# Patient Record
Sex: MALE | Race: OTHER | NOT HISPANIC OR LATINO | URBAN - METROPOLITAN AREA
[De-identification: names, ages, dates, MRNs, and addresses within clinical notes are randomized per-mention and may not be internally consistent; named-entity substitution may affect disease eponyms.]

---

## 2020-11-12 ENCOUNTER — LAB (OUTPATIENT)
Dept: LAB | Facility: CLINIC | Age: 80
End: 2020-11-12
Payer: COMMERCIAL

## 2020-11-12 ENCOUNTER — TRANSCRIBE ORDERS (OUTPATIENT)
Dept: LAB | Facility: CLINIC | Age: 80
End: 2020-11-12

## 2020-11-12 DIAGNOSIS — C61 MALIGNANT NEOPLASM OF PROSTATE (HCC): Primary | ICD-10-CM

## 2020-11-12 DIAGNOSIS — E06.3 CHRONIC LYMPHOCYTIC THYROIDITIS: ICD-10-CM

## 2020-11-12 DIAGNOSIS — E03.8 HYPOTHYROIDISM DUE TO HASHIMOTO'S THYROIDITIS: ICD-10-CM

## 2020-11-12 DIAGNOSIS — E06.3 HYPOTHYROIDISM DUE TO HASHIMOTO'S THYROIDITIS: ICD-10-CM

## 2020-11-12 DIAGNOSIS — E55.9 VITAMIN D DEFICIENCY: ICD-10-CM

## 2020-11-12 DIAGNOSIS — C61 MALIGNANT NEOPLASM OF PROSTATE (HCC): ICD-10-CM

## 2020-11-12 LAB
25(OH)D3 SERPL-MCNC: 43.6 NG/ML (ref 30–100)
ALBUMIN SERPL BCP-MCNC: 3.1 G/DL (ref 3.5–5)
ALP SERPL-CCNC: 60 U/L (ref 46–116)
ALT SERPL W P-5'-P-CCNC: 23 U/L (ref 12–78)
ANION GAP SERPL CALCULATED.3IONS-SCNC: 4 MMOL/L (ref 4–13)
AST SERPL W P-5'-P-CCNC: 17 U/L (ref 5–45)
BASOPHILS # BLD AUTO: 0.05 THOUSANDS/ΜL (ref 0–0.1)
BASOPHILS NFR BLD AUTO: 1 % (ref 0–1)
BILIRUB SERPL-MCNC: 0.96 MG/DL (ref 0.2–1)
BUN SERPL-MCNC: 10 MG/DL (ref 5–25)
CALCIUM ALBUM COR SERPL-MCNC: 9.1 MG/DL (ref 8.3–10.1)
CALCIUM SERPL-MCNC: 8.4 MG/DL (ref 8.3–10.1)
CHLORIDE SERPL-SCNC: 105 MMOL/L (ref 100–108)
CO2 SERPL-SCNC: 31 MMOL/L (ref 21–32)
CREAT SERPL-MCNC: 0.76 MG/DL (ref 0.6–1.3)
EOSINOPHIL # BLD AUTO: 0.28 THOUSAND/ΜL (ref 0–0.61)
EOSINOPHIL NFR BLD AUTO: 4 % (ref 0–6)
ERYTHROCYTE [DISTWIDTH] IN BLOOD BY AUTOMATED COUNT: 13.4 % (ref 11.6–15.1)
GFR SERPL CREATININE-BSD FRML MDRD: 85 ML/MIN/1.73SQ M
GLUCOSE P FAST SERPL-MCNC: 88 MG/DL (ref 65–99)
HCT VFR BLD AUTO: 39.4 % (ref 36.5–49.3)
HGB BLD-MCNC: 12.5 G/DL (ref 12–17)
IMM GRANULOCYTES # BLD AUTO: 0.02 THOUSAND/UL (ref 0–0.2)
IMM GRANULOCYTES NFR BLD AUTO: 0 % (ref 0–2)
LYMPHOCYTES # BLD AUTO: 2.25 THOUSANDS/ΜL (ref 0.6–4.47)
LYMPHOCYTES NFR BLD AUTO: 31 % (ref 14–44)
MCH RBC QN AUTO: 28.8 PG (ref 26.8–34.3)
MCHC RBC AUTO-ENTMCNC: 31.7 G/DL (ref 31.4–37.4)
MCV RBC AUTO: 91 FL (ref 82–98)
MONOCYTES # BLD AUTO: 0.72 THOUSAND/ΜL (ref 0.17–1.22)
MONOCYTES NFR BLD AUTO: 10 % (ref 4–12)
NEUTROPHILS # BLD AUTO: 3.99 THOUSANDS/ΜL (ref 1.85–7.62)
NEUTS SEG NFR BLD AUTO: 54 % (ref 43–75)
NRBC BLD AUTO-RTO: 0 /100 WBCS
PLATELET # BLD AUTO: 275 THOUSANDS/UL (ref 149–390)
PMV BLD AUTO: 9.9 FL (ref 8.9–12.7)
POTASSIUM SERPL-SCNC: 3.5 MMOL/L (ref 3.5–5.3)
PROT SERPL-MCNC: 6.5 G/DL (ref 6.4–8.2)
PSA SERPL-MCNC: 0.2 NG/ML (ref 0–4)
RBC # BLD AUTO: 4.34 MILLION/UL (ref 3.88–5.62)
SODIUM SERPL-SCNC: 140 MMOL/L (ref 136–145)
T4 FREE SERPL-MCNC: 1 NG/DL (ref 0.76–1.46)
TSH SERPL DL<=0.05 MIU/L-ACNC: 1.71 UIU/ML (ref 0.36–3.74)
WBC # BLD AUTO: 7.31 THOUSAND/UL (ref 4.31–10.16)

## 2020-11-12 PROCEDURE — 80053 COMPREHEN METABOLIC PANEL: CPT

## 2020-11-12 PROCEDURE — 84443 ASSAY THYROID STIM HORMONE: CPT

## 2020-11-12 PROCEDURE — 84153 ASSAY OF PSA TOTAL: CPT

## 2020-11-12 PROCEDURE — 84439 ASSAY OF FREE THYROXINE: CPT

## 2020-11-12 PROCEDURE — 36415 COLL VENOUS BLD VENIPUNCTURE: CPT

## 2020-11-12 PROCEDURE — 82306 VITAMIN D 25 HYDROXY: CPT

## 2020-11-12 PROCEDURE — 85025 COMPLETE CBC W/AUTO DIFF WBC: CPT

## 2021-02-12 ENCOUNTER — IMMUNIZATIONS (OUTPATIENT)
Dept: FAMILY MEDICINE CLINIC | Facility: HOSPITAL | Age: 81
End: 2021-02-12

## 2021-02-12 DIAGNOSIS — Z23 ENCOUNTER FOR IMMUNIZATION: Primary | ICD-10-CM

## 2021-02-12 PROCEDURE — 0011A SARS-COV-2 / COVID-19 MRNA VACCINE (MODERNA) 100 MCG: CPT

## 2021-02-12 PROCEDURE — 91301 SARS-COV-2 / COVID-19 MRNA VACCINE (MODERNA) 100 MCG: CPT

## 2021-03-15 ENCOUNTER — IMMUNIZATIONS (OUTPATIENT)
Dept: FAMILY MEDICINE CLINIC | Facility: HOSPITAL | Age: 81
End: 2021-03-15

## 2021-03-15 DIAGNOSIS — Z23 ENCOUNTER FOR IMMUNIZATION: Primary | ICD-10-CM

## 2021-03-15 PROCEDURE — 91301 SARS-COV-2 / COVID-19 MRNA VACCINE (MODERNA) 100 MCG: CPT

## 2021-03-15 PROCEDURE — 0012A SARS-COV-2 / COVID-19 MRNA VACCINE (MODERNA) 100 MCG: CPT

## 2022-09-14 ENCOUNTER — APPOINTMENT (OUTPATIENT)
Dept: LAB | Facility: CLINIC | Age: 82
End: 2022-09-14
Payer: COMMERCIAL

## 2022-09-14 DIAGNOSIS — E55.9 AVITAMINOSIS D: ICD-10-CM

## 2022-09-14 DIAGNOSIS — E05.00 TOXIC DIFFUSE GOITER WITH PRETIBIAL MYXEDEMA: ICD-10-CM

## 2022-09-14 DIAGNOSIS — R73.01 IMPAIRED FASTING GLUCOSE: ICD-10-CM

## 2022-09-14 DIAGNOSIS — I10 ESSENTIAL HYPERTENSION, MALIGNANT: ICD-10-CM

## 2022-09-14 DIAGNOSIS — E03.8 TOXIC DIFFUSE GOITER WITH PRETIBIAL MYXEDEMA: ICD-10-CM

## 2022-09-14 DIAGNOSIS — Z83.3 FAMILY HISTORY OF DIABETES MELLITUS: ICD-10-CM

## 2022-09-14 DIAGNOSIS — E06.3 CHRONIC LYMPHOCYTIC THYROIDITIS: ICD-10-CM

## 2022-09-14 LAB
25(OH)D3 SERPL-MCNC: 66.9 NG/ML (ref 30–100)
ALBUMIN SERPL BCP-MCNC: 3 G/DL (ref 3.5–5)
ALP SERPL-CCNC: 63 U/L (ref 46–116)
ALT SERPL W P-5'-P-CCNC: 23 U/L (ref 12–78)
ANION GAP SERPL CALCULATED.3IONS-SCNC: 3 MMOL/L (ref 4–13)
AST SERPL W P-5'-P-CCNC: 17 U/L (ref 5–45)
BASOPHILS # BLD AUTO: 0.04 THOUSANDS/ΜL (ref 0–0.1)
BASOPHILS NFR BLD AUTO: 1 % (ref 0–1)
BILIRUB SERPL-MCNC: 1.22 MG/DL (ref 0.2–1)
BUN SERPL-MCNC: 11 MG/DL (ref 5–25)
CALCIUM ALBUM COR SERPL-MCNC: 9.2 MG/DL (ref 8.3–10.1)
CALCIUM SERPL-MCNC: 8.4 MG/DL (ref 8.3–10.1)
CHLORIDE SERPL-SCNC: 106 MMOL/L (ref 96–108)
CHOLEST SERPL-MCNC: 189 MG/DL
CO2 SERPL-SCNC: 31 MMOL/L (ref 21–32)
CREAT SERPL-MCNC: 0.78 MG/DL (ref 0.6–1.3)
EOSINOPHIL # BLD AUTO: 0.23 THOUSAND/ΜL (ref 0–0.61)
EOSINOPHIL NFR BLD AUTO: 3 % (ref 0–6)
ERYTHROCYTE [DISTWIDTH] IN BLOOD BY AUTOMATED COUNT: 13.3 % (ref 11.6–15.1)
GFR SERPL CREATININE-BSD FRML MDRD: 82 ML/MIN/1.73SQ M
GLUCOSE P FAST SERPL-MCNC: 90 MG/DL (ref 65–99)
HCT VFR BLD AUTO: 40.9 % (ref 36.5–49.3)
HDLC SERPL-MCNC: 46 MG/DL
HGB BLD-MCNC: 13.4 G/DL (ref 12–17)
IMM GRANULOCYTES # BLD AUTO: 0.01 THOUSAND/UL (ref 0–0.2)
IMM GRANULOCYTES NFR BLD AUTO: 0 % (ref 0–2)
LDLC SERPL CALC-MCNC: 107 MG/DL (ref 0–100)
LYMPHOCYTES # BLD AUTO: 2.52 THOUSANDS/ΜL (ref 0.6–4.47)
LYMPHOCYTES NFR BLD AUTO: 36 % (ref 14–44)
MCH RBC QN AUTO: 28.9 PG (ref 26.8–34.3)
MCHC RBC AUTO-ENTMCNC: 32.8 G/DL (ref 31.4–37.4)
MCV RBC AUTO: 88 FL (ref 82–98)
MONOCYTES # BLD AUTO: 0.83 THOUSAND/ΜL (ref 0.17–1.22)
MONOCYTES NFR BLD AUTO: 12 % (ref 4–12)
NEUTROPHILS # BLD AUTO: 3.42 THOUSANDS/ΜL (ref 1.85–7.62)
NEUTS SEG NFR BLD AUTO: 48 % (ref 43–75)
NONHDLC SERPL-MCNC: 143 MG/DL
NRBC BLD AUTO-RTO: 0 /100 WBCS
PLATELET # BLD AUTO: 225 THOUSANDS/UL (ref 149–390)
PMV BLD AUTO: 10.2 FL (ref 8.9–12.7)
POTASSIUM SERPL-SCNC: 3.7 MMOL/L (ref 3.5–5.3)
PROT SERPL-MCNC: 6.9 G/DL (ref 6.4–8.4)
RBC # BLD AUTO: 4.63 MILLION/UL (ref 3.88–5.62)
SODIUM SERPL-SCNC: 140 MMOL/L (ref 135–147)
T4 FREE SERPL-MCNC: 1.09 NG/DL (ref 0.76–1.46)
TRIGL SERPL-MCNC: 180 MG/DL
TSH SERPL DL<=0.05 MIU/L-ACNC: 0.66 UIU/ML (ref 0.45–4.5)
WBC # BLD AUTO: 7.05 THOUSAND/UL (ref 4.31–10.16)

## 2022-09-14 PROCEDURE — 83036 HEMOGLOBIN GLYCOSYLATED A1C: CPT

## 2022-09-14 PROCEDURE — 80053 COMPREHEN METABOLIC PANEL: CPT

## 2022-09-14 PROCEDURE — 82306 VITAMIN D 25 HYDROXY: CPT

## 2022-09-14 PROCEDURE — 80061 LIPID PANEL: CPT

## 2022-09-14 PROCEDURE — 84439 ASSAY OF FREE THYROXINE: CPT

## 2022-09-14 PROCEDURE — 84443 ASSAY THYROID STIM HORMONE: CPT

## 2022-09-14 PROCEDURE — 85025 COMPLETE CBC W/AUTO DIFF WBC: CPT

## 2022-09-14 PROCEDURE — 36415 COLL VENOUS BLD VENIPUNCTURE: CPT

## 2022-09-15 LAB
EST. AVERAGE GLUCOSE BLD GHB EST-MCNC: 123 MG/DL
HBA1C MFR BLD: 5.9 %

## 2024-08-08 ENCOUNTER — APPOINTMENT (EMERGENCY)
Dept: CT IMAGING | Facility: HOSPITAL | Age: 84
End: 2024-08-08
Payer: COMMERCIAL

## 2024-08-08 ENCOUNTER — HOSPITAL ENCOUNTER (EMERGENCY)
Facility: HOSPITAL | Age: 84
Discharge: HOME | End: 2024-08-08
Attending: EMERGENCY MEDICINE
Payer: COMMERCIAL

## 2024-08-08 VITALS
RESPIRATION RATE: 16 BRPM | TEMPERATURE: 98.1 F | HEART RATE: 81 BPM | SYSTOLIC BLOOD PRESSURE: 177 MMHG | DIASTOLIC BLOOD PRESSURE: 90 MMHG | OXYGEN SATURATION: 98 %

## 2024-08-08 DIAGNOSIS — R31.9 HEMATURIA: ICD-10-CM

## 2024-08-08 DIAGNOSIS — M54.10 RADICULOPATHY: Primary | ICD-10-CM

## 2024-08-08 LAB
ANION GAP SERPL CALCULATED.3IONS-SCNC: 8 MMOL/L (ref 4–13)
BACTERIA UR QL AUTO: NORMAL /HPF
BASOPHILS # BLD AUTO: 0.03 THOUSANDS/ÂΜL (ref 0–0.1)
BASOPHILS NFR BLD AUTO: 0 % (ref 0–1)
BILIRUB UR QL STRIP: NEGATIVE
BUN SERPL-MCNC: 15 MG/DL (ref 5–25)
CALCIUM SERPL-MCNC: 8.6 MG/DL (ref 8.4–10.2)
CHLORIDE SERPL-SCNC: 102 MMOL/L (ref 96–108)
CLARITY UR: CLEAR
CO2 SERPL-SCNC: 31 MMOL/L (ref 21–32)
COLOR UR: YELLOW
CREAT SERPL-MCNC: 0.65 MG/DL (ref 0.6–1.3)
EOSINOPHIL # BLD AUTO: 0.35 THOUSAND/ÂΜL (ref 0–0.61)
EOSINOPHIL NFR BLD AUTO: 4 % (ref 0–6)
ERYTHROCYTE [DISTWIDTH] IN BLOOD BY AUTOMATED COUNT: 12.9 % (ref 11.6–15.1)
GFR SERPL CREATININE-BSD FRML MDRD: 88 ML/MIN/1.73SQ M
GLUCOSE SERPL-MCNC: 103 MG/DL (ref 65–140)
GLUCOSE UR STRIP-MCNC: NEGATIVE MG/DL
HCT VFR BLD AUTO: 41.2 % (ref 36.5–49.3)
HGB BLD-MCNC: 13.5 G/DL (ref 12–17)
HGB UR QL STRIP.AUTO: ABNORMAL
IMM GRANULOCYTES # BLD AUTO: 0.02 THOUSAND/UL (ref 0–0.2)
IMM GRANULOCYTES NFR BLD AUTO: 0 % (ref 0–2)
KETONES UR STRIP-MCNC: NEGATIVE MG/DL
LEUKOCYTE ESTERASE UR QL STRIP: NEGATIVE
LYMPHOCYTES # BLD AUTO: 3.46 THOUSANDS/ÂΜL (ref 0.6–4.47)
LYMPHOCYTES NFR BLD AUTO: 39 % (ref 14–44)
MCH RBC QN AUTO: 29.5 PG (ref 26.8–34.3)
MCHC RBC AUTO-ENTMCNC: 32.8 G/DL (ref 31.4–37.4)
MCV RBC AUTO: 90 FL (ref 82–98)
MONOCYTES # BLD AUTO: 1.15 THOUSAND/ÂΜL (ref 0.17–1.22)
MONOCYTES NFR BLD AUTO: 13 % (ref 4–12)
NEUTROPHILS # BLD AUTO: 3.98 THOUSANDS/ÂΜL (ref 1.85–7.62)
NEUTS SEG NFR BLD AUTO: 44 % (ref 43–75)
NITRITE UR QL STRIP: NEGATIVE
NON-SQ EPI CELLS URNS QL MICRO: NORMAL /HPF
NRBC BLD AUTO-RTO: 0 /100 WBCS
PH UR STRIP.AUTO: 7 [PH]
PLATELET # BLD AUTO: 231 THOUSANDS/UL (ref 149–390)
PMV BLD AUTO: 9.4 FL (ref 8.9–12.7)
POTASSIUM SERPL-SCNC: 3.6 MMOL/L (ref 3.5–5.3)
PROT UR STRIP-MCNC: NEGATIVE MG/DL
RBC # BLD AUTO: 4.57 MILLION/UL (ref 3.88–5.62)
RBC #/AREA URNS AUTO: NORMAL /HPF
SODIUM SERPL-SCNC: 141 MMOL/L (ref 135–147)
SP GR UR STRIP.AUTO: 1.01
UROBILINOGEN UR QL STRIP.AUTO: 0.2 E.U./DL
WBC # BLD AUTO: 8.99 THOUSAND/UL (ref 4.31–10.16)
WBC #/AREA URNS AUTO: NORMAL /HPF

## 2024-08-08 PROCEDURE — 81001 URINALYSIS AUTO W/SCOPE: CPT | Performed by: EMERGENCY MEDICINE

## 2024-08-08 PROCEDURE — 80048 BASIC METABOLIC PNL TOTAL CA: CPT | Performed by: EMERGENCY MEDICINE

## 2024-08-08 PROCEDURE — 96375 TX/PRO/DX INJ NEW DRUG ADDON: CPT

## 2024-08-08 PROCEDURE — 99283 EMERGENCY DEPT VISIT LOW MDM: CPT

## 2024-08-08 PROCEDURE — 85025 COMPLETE CBC W/AUTO DIFF WBC: CPT | Performed by: EMERGENCY MEDICINE

## 2024-08-08 PROCEDURE — 72131 CT LUMBAR SPINE W/O DYE: CPT

## 2024-08-08 PROCEDURE — 36415 COLL VENOUS BLD VENIPUNCTURE: CPT | Performed by: EMERGENCY MEDICINE

## 2024-08-08 PROCEDURE — 81003 URINALYSIS AUTO W/O SCOPE: CPT | Performed by: EMERGENCY MEDICINE

## 2024-08-08 PROCEDURE — 99285 EMERGENCY DEPT VISIT HI MDM: CPT | Performed by: EMERGENCY MEDICINE

## 2024-08-08 PROCEDURE — 74176 CT ABD & PELVIS W/O CONTRAST: CPT

## 2024-08-08 PROCEDURE — 96365 THER/PROPH/DIAG IV INF INIT: CPT

## 2024-08-08 RX ORDER — ACETAMINOPHEN 10 MG/ML
1000 INJECTION, SOLUTION INTRAVENOUS ONCE
Status: COMPLETED | OUTPATIENT
Start: 2024-08-08 | End: 2024-08-08

## 2024-08-08 RX ADMIN — ACETAMINOPHEN 1000 MG: 10 INJECTION INTRAVENOUS at 22:41

## 2024-08-08 RX ADMIN — MORPHINE SULFATE 2 MG: 2 INJECTION, SOLUTION INTRAMUSCULAR; INTRAVENOUS at 22:42

## 2024-08-09 ENCOUNTER — TELEPHONE (OUTPATIENT)
Dept: PHYSICAL THERAPY | Facility: OTHER | Age: 84
End: 2024-08-09

## 2024-08-09 NOTE — TELEPHONE ENCOUNTER
Patient's son Buck called into [facility] today and left v/m @11:12am.    Returned his call 11:40am.    Spoke with Buck (he is on communication consent and he is primarily contact for his dad). I explained program, protocol and what we offer. Gloria was not with his dad at the moment of the call. He provided Mr. Rivera home number [phone].    Called patient, he did not answer the call. I left v/m in [place] for patient to call back.    Phone number and hours of business provided.    This is the 1st attempt to reach the patient. Will defer referral per protocol.

## 2024-08-09 NOTE — ED PROVIDER NOTES
History  Chief Complaint   Patient presents with    Back Pain     Pt reports right lower back pain that radiates down the right leg. Pts son notes pt had blood in his urine today as well     HPI        All information was obtained via  at the bedside.      84-year-old male, history of adenocarcinoma the prostate presents the emergency department with his family via private vehicle with a chief complaint of a week and a half worth of left-sided back pain, confined to the L PSIS area, radiation down the posterior aspect of the thigh without any loss of bowel or bladder.  No history of trauma.  No hx of fever, chills but there is a history of some intermittent hematuria.  No history of rash, no history of testicular pain or bowel or bladder incontinence.  History of trauma.    History of back pain in which patient has had a renal cyst, followed remotely by urology at [facility].  None       Past Medical History:   Diagnosis Date    Prostate cancer (HCC)     Thyroid cancer (HCC)        No past surgical history on file.    No family history on file.  I have reviewed and agree with the history as documented.    E-Cigarette/Vaping     E-Cigarette/Vaping Substances          Review of Systems   Constitutional: Negative.  Negative for chills and fever.   HENT: Negative.     Eyes: Negative.    Respiratory: Negative.  Negative for chest tightness and shortness of breath.    Cardiovascular: Negative.  Negative for chest pain, palpitations and leg swelling.   Gastrointestinal: Negative.  Negative for abdominal pain, constipation and vomiting.   Endocrine: Negative.    Genitourinary: Negative.    Musculoskeletal:  Positive for back pain.   Skin: Negative.    Allergic/Immunologic: Negative.    Neurological: Negative.    Hematological: Negative.    Psychiatric/Behavioral: Negative.         Physical Exam  Physical Exam  Vitals and nursing note reviewed.   Constitutional:       General: He is not in acute  distress.     Appearance: Normal appearance. He is normal weight. He is not ill-appearing, toxic-appearing or diaphoretic.   HENT:      Head: Normocephalic and atraumatic.      Right Ear: External ear normal.      Left Ear: External ear normal.      Nose: Nose normal.      Mouth/Throat:      Mouth: Mucous membranes are moist.      Pharynx: Oropharynx is clear.   Eyes:      Extraocular Movements: Extraocular movements intact.      Conjunctiva/sclera: Conjunctivae normal.      Pupils: Pupils are equal, round, and reactive to light.   Neck:      Vascular: No carotid bruit.   Cardiovascular:      Rate and Rhythm: Normal rate and regular rhythm.      Pulses: Normal pulses.      Heart sounds: Normal heart sounds. No murmur heard.     No friction rub. No gallop.   Pulmonary:      Effort: Pulmonary effort is normal. No respiratory distress.      Breath sounds: Normal breath sounds. No stridor.   Abdominal:      General: Abdomen is flat. Bowel sounds are normal.   Musculoskeletal:         General: No swelling, tenderness, deformity or signs of injury. Normal range of motion.      Cervical back: Normal range of motion. No rigidity or tenderness.   Lymphadenopathy:      Cervical: No cervical adenopathy.   Skin:     General: Skin is warm.      Capillary Refill: Capillary refill takes less than 2 seconds.   Neurological:      General: No focal deficit present.      Mental Status: He is alert and oriented to person, place, and time. Mental status is at baseline.   Psychiatric:         Mood and Affect: Mood normal.         Behavior: Behavior normal.         Thought Content: Thought content normal.         Judgment: Judgment normal.         Vital Signs  ED Triage Vitals   Temperature Pulse Respirations Blood Pressure SpO2   08/08/24 2143 08/08/24 2143 08/08/24 2143 08/08/24 2143 08/08/24 2143   98.6 °F (37 °C) 86 18 (!) 183/96 96 %      Temp Source Heart Rate Source Patient Position - Orthostatic VS BP Location FiO2 (%)   08/08/24  2143 08/08/24 2143 -- 08/08/24 2143 --   Tympanic Monitor  Left arm       Pain Score       08/08/24 2242       10 - Worst Possible Pain           Vitals:    08/08/24 2143 08/08/24 2200   BP: (!) 183/96 (!) 177/90   Pulse: 86 81         Visual Acuity  Visual Acuity      Flowsheet Row Most Recent Value   L Pupil Size (mm) 3   R Pupil Size (mm) 3            ED Medications  Medications   acetaminophen (Ofirmev) injection 1,000 mg (0 mg Intravenous Stopped 8/8/24 2317)   morphine injection 2 mg (2 mg Intravenous Given 8/8/24 2242)       Diagnostic Studies  Results Reviewed       Procedure Component Value Units Date/Time    Urine Microscopic [[id number]]  (Normal) Collected: 08/08/24 2201    Lab Status: Final result Specimen: Urine, Clean Catch Updated: 08/08/24 2227     RBC, UA 1-2 /hpf      WBC, UA None Seen /hpf      Epithelial Cells None Seen /hpf      Bacteria, UA None Seen /hpf     Basic metabolic panel [[id number]] Collected: 08/08/24 2201    Lab Status: Final result Specimen: Blood from Arm, Left Updated: 08/08/24 2224     Sodium 141 mmol/L      Potassium 3.6 mmol/L      Chloride 102 mmol/L      CO2 31 mmol/L      ANION GAP 8 mmol/L      BUN 15 mg/dL      Creatinine 0.65 mg/dL      Glucose 103 mg/dL      Calcium 8.6 mg/dL      eGFR 88 ml/min/1.73sq m     Narrative:      National Kidney Disease Foundation guidelines for Chronic Kidney Disease (CKD):     Stage 1 with normal or high GFR (GFR > 90 mL/min/1.73 square meters)    Stage 2 Mild CKD (GFR = 60-89 mL/min/1.73 square meters)    Stage 3A Moderate CKD (GFR = 45-59 mL/min/1.73 square meters)    Stage 3B Moderate CKD (GFR = 30-44 mL/min/1.73 square meters)    Stage 4 Severe CKD (GFR = 15-29 mL/min/1.73 square meters)    Stage 5 End Stage CKD (GFR <15 mL/min/1.73 square meters)  Note: GFR calculation is accurate only with a steady state creatinine    UA w Reflex to Microscopic w Reflex to Culture [[id number]]  (Abnormal) Collected: 08/08/24 2201    Lab Status: Final  result Specimen: Urine, Clean Catch Updated: 08/08/24 2208     Color, UA Yellow     Clarity, UA Clear     Specific Gravity, UA 1.015     pH, UA 7.0     Leukocytes, UA Negative     Nitrite, UA Negative     Protein, UA Negative mg/dl      Glucose, UA Negative mg/dl      Ketones, UA Negative mg/dl      Urobilinogen, UA 0.2 E.U./dl      Bilirubin, UA Negative     Occult Blood, UA 1+    CBC and differential [[id number]]  (Abnormal) Collected: 08/08/24 2201    Lab Status: Final result Specimen: Blood from Arm, Left Updated: 08/08/24 2207     WBC 8.99 Thousand/uL      RBC 4.57 Million/uL      Hemoglobin 13.5 g/dL      Hematocrit 41.2 %      MCV 90 fL      MCH 29.5 pg      MCHC 32.8 g/dL      RDW 12.9 %      MPV 9.4 fL      Platelets 231 Thousands/uL      nRBC 0 /100 WBCs      Segmented % 44 %      Immature Grans % 0 %      Lymphocytes % 39 %      Monocytes % 13 %      Eosinophils Relative 4 %      Basophils Relative 0 %      Absolute Neutrophils 3.98 Thousands/µL      Absolute Immature Grans 0.02 Thousand/uL      Absolute Lymphocytes 3.46 Thousands/µL      Absolute Monocytes 1.15 Thousand/µL      Eosinophils Absolute 0.35 Thousand/µL      Basophils Absolute 0.03 Thousands/µL                    CT spine lumbar without contrast   Final Result by Daniel Daniels MD (08/08 2236)      Chronic disc degenerative change, most prominent in the lower lumbar spine resulting in nerve root encroachment.      Workstation performed: [id number]         CT renal stone study abdomen pelvis without contrast   Final Result by Daniel Daniels MD (08/08 2232)         1. No evidence of nephrolithiasis or obstructive uropathy.   2. Possible constipation.         Workstation performed: [id number]                    Procedures  Procedures         ED Course  ED Course as of 08/08/24 2328   Thu Aug 08, 2024   2154 Patient seen and examined: 1 1/2 week hx hx of back pain: PSIS R sided with hx hematuria, no dysuria.  No fevers, no  trauma.    Brief focused differential dx in this patient is as follows: Complications related to history of prostate cancer versus lumbar radiculopathy vs. UTI vs. Renal colic.   2322 Patient reassessed after IV Tylenol and low-dose 2 mg IV morphine, patient reports that the pain is 0 out of 10 when he does not move.  Is only when he moves that pain radiates from the left PSIS area down to the popliteal fossa consistent with lumbar radiculopathy.                                 SBIRT 22yo+      Flowsheet Row Most Recent Value   Initial Alcohol Screen: US AUDIT-C     1. How often do you have a drink containing alcohol? 0 Filed at: 08/08/2024 2204   2. How many drinks containing alcohol do you have on a typical day you are drinking?  0 Filed at: 08/08/2024 2204   3a. Male UNDER 65: How often do you have five or more drinks on one occasion? 0 Filed at: 08/08/2024 2204   3b. FEMALE Any Age, or MALE 65+: How often do you have 4 or more drinks on one occassion? 0 Filed at: 08/08/2024 2204   Audit-C Score 0 Filed at: 08/08/2024 2204   ANTIONE: How many times in the past year have you...    Used an illegal drug or used a prescription medication for non-medical reasons? Never Filed at: 08/08/2024 2204                      Medical Decision Making  CT imaging is consistent with severe multi level degenerative disc disease without entrapment, exam is consistent with with left-sided lumbar radiculopathy, L4-L5.    Denies history of saddle anesthesia/perineal anesthesia. Denies bowel or bladder incontinence/retention.  History does not suggest diagnosis of cauda equina syndrome.  Patient denies history of IVDA, denies history of fevers, no recent surgeries or any procedures to suggest a transient bacteremia leading to a diagnosis of subdural abscess. Denies history of blood thinner use with recent history of lumbar puncture or any violation of the epidural space to suggest history of epidural hematoma. Therefore these above  "diagnoses (cauda equina syndrome, epidural abscess, epidural hematoma) were not pursued with advanced diagnostic imaging.    UA negative for urinary tract infection, will proceed with urine cultures.  Refer patient to [facility] center.  Patient stable for discharge.    Portions of the record may have been created with voice recognition software. Occasional wrong word or \"sound a like\" substitutions may have occurred due to the inherent limitations of voice recognition software. Read the chart carefully and recognize, using context, where substitutions have occurred.       Counseling: I had a detailed discussion with the patient and/or guardian regarding: the historical points, exam findings, and any diagnostic results supporting the discharge diagnosis, lab results, radiology results, discharge instructions reviewed with patient and/or family/caregiver and understanding was verbalized. Instructions given to return to the emergency department if symptoms worsen or persist, or if there are any questions or concerns that arise at home.           Amount and/or Complexity of Data Reviewed  Labs: ordered.  Radiology: ordered.    Risk  Prescription drug management.                 Disposition  Final diagnoses:   Radiculopathy   Hematuria     Time reflects when diagnosis was documented in both MDM as applicable and the Disposition within this note       Time User Action Codes Description Comment    8/8/2024 10:04 PM Matteo Meza Add [M54.10] Radiculopathy     8/8/2024 10:39 PM Matteo Meza Add [R31.9] Hematuria           ED Disposition       ED Disposition   Discharge    Condition   Stable    Date/Time   Thu Aug 8, 2024 2239    Comment   Miguel Henderson discharge to home/self care.                   Follow-up Information       Follow up With Specialties Details Why Contact Info Additional Information    [facility]   [phone]    Usman Stanley " DO [facility]   [address]  [address]  [facility] [id number] [phone]               There are no discharge medications for this patient.          PDMP Review       None            ED Provider  Electronically Signed by             Matteo Meza III, DO  08/08/24 9372

## 2024-08-12 ENCOUNTER — NURSE TRIAGE (OUTPATIENT)
Dept: PHYSICAL THERAPY | Facility: OTHER | Age: 84
End: 2024-08-12

## 2024-08-12 DIAGNOSIS — M54.50 ACUTE EXACERBATION OF CHRONIC LOW BACK PAIN: Primary | ICD-10-CM

## 2024-08-12 DIAGNOSIS — G89.29 ACUTE EXACERBATION OF CHRONIC LOW BACK PAIN: Primary | ICD-10-CM

## 2024-08-12 NOTE — TELEPHONE ENCOUNTER
"Patient's son Buck called into [facility] today 08/12 and left v/m 8:53am.    Returned call @9:00am.    [place] SPEAKING, NEEDS IS.    Additional Information   Negative: Is this related to a work injury?   Negative: Is this related to an MVA?   Negative: Are you currently recieving homecare services?    Background - Initial Assessment  Clinical complaint: ED visit on 08/08 due to Lower Back Pain mainly in the left side that started about 1.5-2 weeks ago. Hx of back pain in the past. Pt has had PT in [place] back in April-May 2024. Patient states pain is from his lower back/ waist, radiating down to his left buttock, hip, down to the entire leg and foot. No numbness or tingling. NKI. Pain is constant \"everyday\". Worse when walking. Patient described pain as painful and heavy and pressure.  Date of onset: 1.5-2 weeks ago ( new flare up)  Frequency of pain: constant  Quality of pain: painful, pressure and heavy.    Protocols used: [facility] Protocol    "

## 2024-08-12 NOTE — TELEPHONE ENCOUNTER
I.S. #[id number]    Additional Information   Negative: Has the patient had unexplained weight loss?   Negative: Does the patient have a fever?   Negative: Is the patient experiencing urine retention?   Negative: Is the patient experiencing acute drop foot or paralysis?   Negative: Has the patient experienced major trauma? (fall from height, high speed collision, direct blow to spine) and is also experiencing nausea, light-headedness, or loss of consciousness?   Negative: Is the patient experiencing blood in sputum?   Negative: Is this a chronic condition?    Protocols used: [facility] Protocol    [facility] was reviewed in detail and what we can provide for their back pain.  Patient is agreeable to being triaged and would like to proceed with Physical Therapy.    Referral was placed for Physical Therapy at the [place] site. Patients information was sent to the  to make evaluation appointment. Patient made aware that the PT office  will be calling to schedule the appointment.  Patient was provided with the phone number to the PT office.    No further questions and/or concerns were voiced by the patient at this time. Patient states understanding of the referral that was placed.    Referral Closed.

## 2024-08-14 ENCOUNTER — EVALUATION (OUTPATIENT)
Dept: PHYSICAL THERAPY | Facility: CLINIC | Age: 84
End: 2024-08-14
Payer: COMMERCIAL

## 2024-08-14 DIAGNOSIS — M54.50 ACUTE EXACERBATION OF CHRONIC LOW BACK PAIN: Primary | ICD-10-CM

## 2024-08-14 DIAGNOSIS — G89.29 ACUTE EXACERBATION OF CHRONIC LOW BACK PAIN: Primary | ICD-10-CM

## 2024-08-14 PROCEDURE — 97162 PT EVAL MOD COMPLEX 30 MIN: CPT | Performed by: PHYSICAL THERAPIST

## 2024-08-14 PROCEDURE — 97535 SELF CARE MNGMENT TRAINING: CPT | Performed by: PHYSICAL THERAPIST

## 2024-08-14 NOTE — PROGRESS NOTES
PT Evaluation     Today's date: 2024  Patient name: Miguel Henderson  : 1940  MRN: [id number]  Referring provider: Usman Haynes*  Dx:   Encounter Diagnosis     ICD-10-CM    1. Acute exacerbation of chronic low back pain  M54.50     G89.29                      Assessment  Impairments: abnormal or restricted ROM, activity intolerance, impaired physical strength, lacks appropriate home exercise program and pain with function  Symptom irritability: high    Assessment details: Pt presents with signs and symptoms consistent with left sided L/S radiculopathy.  Pt presents with difficulty standing and walking with improved symptoms sitting.  Pt also has difficulty sleeping due to pain.    Barriers to intervention: ESL  Understanding of Dx/Px/POC: fair     Prognosis: good    Goals  ST) Pt. Will have centralized radicular symptoms in 6 weeks.  2) Pt. Will be able to sit as long as desired without pain in 6 weeks.  3)  Pt. Will be able to return to home and yard work under light/ modified duty in 6 weeks.  4)  Pt. Will be able to perform all routine chores at home without pain or limitations in 6 weeks.  5) Pt. Will be able to perform all LE dressing tasks without limitations.  LT) Pt. Will be coco to stand as long as desired without pain in 12 weeks.  2) Pt. Will be able to return to full duty home and uard work in 12 weeks.  3)  Pt. Will be able to perform all heavy activity at home, such as garbage, moving furniture, carrying weighted items on all surfaces without pain or limitation in 12 weeks.      Plan  Patient would benefit from: PT eval and skilled physical therapy  Referral necessary: No  Planned modality interventions: cryotherapy, thermotherapy: hydrocollator packs and unattended electrical stimulation    Planned therapy interventions: manual therapy, neuromuscular re-education, patient/caregiver education, self care, therapeutic activities, therapeutic exercise and home exercise  program    Frequency: 3x week  Duration in weeks: 12  Treatment plan discussed with: patient        Subjective Evaluation    History of Present Illness  Mechanism of injury: Chronic history of low back pian (1 year) with recent flare up upon returning to [place] from [place].  Pain starts in left L/S area and travel down the back of left thigh, lateral lower leg and dorsum of foot.  Pain is worse with standing and walking rather than sitting.  He has had difficulty sleeping due to pain.          Recurrent probem    Quality of life: good    Patient Goals  Patient goal: Return to previous activities and chores around the house.    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   Trigger point to gluteus medius and piriformis.     Tenderness     Left Hip   Tenderness in the PSIS.     Active Range of Motion     Lumbar   Flexion:  with pain Restriction level: minimal  Extension:  with pain Restriction level: minimal  Left lateral flexion:  Restriction level: minimal  Right lateral flexion:  Restriction level: minimal    Joint Play     Hypomobile: L3, L4, L5 and S1     Strength/Myotome Testing     Left Hip   Planes of Motion   Flexion: 3+  Extension: 3+    Right Hip   Planes of Motion   Flexion: 4  Extension: 4    Left Knee   Flexion: 3+    Right Knee   Flexion: 4    Left Ankle/Foot   Dorsiflexion: 3  Plantar flexion: 3+    Right Ankle/Foot   Dorsiflexion: 4  Plantar flexion: 4    Muscle Activation   Patient able to activate left transverse abdominals and right transverse abdominals.     Tests     Lumbar   Negative SIJ compression and sacroiliac distraction.     Left   Positive quadrant.   Negative crossed SLR and passive SLR.     Right   Negative  crossed SLR and passive SLR.     Left Hip   Negative long sit.     Right Hip   Negative long sit.     Ambulation     Ambulation: Level Surfaces     Additional Level Surfaces Ambulation Details  Pt ambulates with left sided antalgic gait along with decreased left stance and stride    Functional Assessment      Squat    Trunk lean right.                 Precautions : N/A       Manuals 8-14-24                                       Neuro Re-Ed                                                                 Ther Ex                                                        Pt Ed/ HEP Pathology and involved anatomy as well as issuing and reviewing HEP-15'               Ther Activity                        Gait Training                        Modalities

## 2024-08-14 NOTE — LETTER
2024    sUman Haynes DO   [address]  [address]  [facility] [address]    Patient: Miguel Henderson   YOB: 1940   Date of Visit: 2024     Encounter Diagnosis     ICD-10-CM    1. Acute exacerbation of chronic low back pain  M54.50 Ambulatory referral to PT spine    G89.29           Dear Dr. Haynes:    Thank you for your recent referral of Miguel Henderson. Please review the attached evaluation summary from Miguel's recent visit.     Please verify that you agree with the plan of care by signing the attached order.     If you have any questions or concerns, please do not hesitate to call.     I sincerely appreciate the opportunity to share in the care of one of your patients and hope to have another opportunity to work with you in the near future.       Sincerely,    Rafael Cornelius, PT      Referring Provider:      I certify that I have read the below Plan of Care and certify the need for these services furnished under this plan of treatment while under my care.                    Usman Haynes DO   [address]  [address]  [facility] [address]  Via Fax: [phone]          PT Evaluation     Today's date: 2024  Patient name: Miguel Henderson  : 1940  MRN: [id number]  Referring provider: Usman Haynes*  Dx:   Encounter Diagnosis     ICD-10-CM    1. Acute exacerbation of chronic low back pain  M54.50     G89.29                      Assessment  Impairments: abnormal or restricted ROM, activity intolerance, impaired physical strength, lacks appropriate home exercise program and pain with function  Symptom irritability: high    Assessment details: Pt presents with signs and symptoms consistent with left sided L/S radiculopathy.  Pt presents with difficulty standing and walking with improved symptoms sitting.  Pt also has difficulty sleeping due to pain.    Barriers to intervention: ESL  Understanding of Dx/Px/POC: fair      Prognosis: good    Goals  ST) Pt. Will have centralized radicular symptoms in 6 weeks.  2) Pt. Will be able to sit as long as desired without pain in 6 weeks.  3)  Pt. Will be able to return to home and yard work under light/ modified duty in 6 weeks.  4)  Pt. Will be able to perform all routine chores at home without pain or limitations in 6 weeks.  5) Pt. Will be able to perform all LE dressing tasks without limitations.  LT) Pt. Will be coco to stand as long as desired without pain in 12 weeks.  2) Pt. Will be able to return to full duty home and uard work in 12 weeks.  3)  Pt. Will be able to perform all heavy activity at home, such as garbage, moving furniture, carrying weighted items on all surfaces without pain or limitation in 12 weeks.      Plan  Patient would benefit from: PT eval and skilled physical therapy  Referral necessary: No  Planned modality interventions: cryotherapy, thermotherapy: hydrocollator packs and unattended electrical stimulation    Planned therapy interventions: manual therapy, neuromuscular re-education, patient/caregiver education, self care, therapeutic activities, therapeutic exercise and home exercise program    Frequency: 3x week  Duration in weeks: 12  Treatment plan discussed with: patient        Subjective Evaluation    History of Present Illness  Mechanism of injury: Chronic history of low back pian (1 year) with recent flare up upon returning to [place] from [place].  Pain starts in left L/S area and travel down the back of left thigh, lateral lower leg and dorsum of foot.  Pain is worse with standing and walking rather than sitting.  He has had difficulty sleeping due to pain.          Recurrent probem    Quality of life: good    Patient Goals  Patient goal: Return to previous activities and chores around the house.    Diagnostic Tests  CT scan: abnormal (spondylosis w/ nerve root encroachment)    FCE comments: Pt wants to get back to walking, work in the garage, cut  grass and small home projects.Treatments  Previous treatment: physical therapy (in [place])      Objective     Palpation   Left   Tenderness of the gluteus medius and piriformis.   Trigger point to gluteus medius and piriformis.     Tenderness     Left Hip   Tenderness in the PSIS.     Active Range of Motion     Lumbar   Flexion:  with pain Restriction level: minimal  Extension:  with pain Restriction level: minimal  Left lateral flexion:  Restriction level: minimal  Right lateral flexion:  Restriction level: minimal    Joint Play     Hypomobile: L3, L4, L5 and S1     Strength/Myotome Testing     Left Hip   Planes of Motion   Flexion: 3+  Extension: 3+    Right Hip   Planes of Motion   Flexion: 4  Extension: 4    Left Knee   Flexion: 3+    Right Knee   Flexion: 4    Left Ankle/Foot   Dorsiflexion: 3  Plantar flexion: 3+    Right Ankle/Foot   Dorsiflexion: 4  Plantar flexion: 4    Muscle Activation   Patient able to activate left transverse abdominals and right transverse abdominals.     Tests     Lumbar   Negative SIJ compression and sacroiliac distraction.     Left   Positive quadrant.   Negative crossed SLR and passive SLR.     Right   Negative crossed SLR and passive SLR.     Left Hip   Negative long sit.     Right Hip   Negative long sit.     Ambulation     Ambulation: Level Surfaces     Additional Level Surfaces Ambulation Details  Pt ambulates with left sided antalgic gait along with decreased left stance and stride    Functional Assessment      Squat    Trunk lean right.                 Precautions : N/A       Manuals 8-14-24                                       Neuro Re-Ed                                                                 Ther Ex                                                        Pt Ed/ HEP Pathology and involved anatomy as well as issuing and reviewing HEP-15'               Ther Activity                        Gait Training                        Modalities

## 2024-08-22 ENCOUNTER — OFFICE VISIT (OUTPATIENT)
Dept: PHYSICAL THERAPY | Facility: CLINIC | Age: 84
End: 2024-08-22
Payer: COMMERCIAL

## 2024-08-22 DIAGNOSIS — G89.29 ACUTE EXACERBATION OF CHRONIC LOW BACK PAIN: Primary | ICD-10-CM

## 2024-08-22 DIAGNOSIS — M54.50 ACUTE EXACERBATION OF CHRONIC LOW BACK PAIN: Primary | ICD-10-CM

## 2024-08-22 PROCEDURE — 97110 THERAPEUTIC EXERCISES: CPT | Performed by: PHYSICAL THERAPIST

## 2024-08-22 PROCEDURE — 97140 MANUAL THERAPY 1/> REGIONS: CPT | Performed by: PHYSICAL THERAPIST

## 2024-08-22 NOTE — PROGRESS NOTES
"Daily Note     Today's date: 2024  Patient name: Miguel Henderson  : 1940  MRN: [id number]  Referring provider: Usman Haynes*  Dx:   Encounter Diagnosis     ICD-10-CM    1. Acute exacerbation of chronic low back pain  M54.50     G89.29                      Subjective: Pt and son report that there was not much change after his first visit.  Intense radicular leg symptoms persist when standing or walking.      Objective: See treatment diary below      Assessment: Tolerated treatment well.  Pt responded fairly well to flexion based treatment with additional focus on right LE neural mobilization.  It still appears that his nerve is highly irritated/ inflamed.  Pt has been using OTC NSAIDS for the past week with no changes in symptoms.  PT feels patient may benefit from a steroid dose pack to help reduce inflammation around the nerve as well as to tolerate further progression of rehabilitation.      Plan: Continue with plan to reduce symptoms by increasing musculoskeletal and neural mobility.        Precautions : N/A       Manuals 24 24      Soft tissue mobilization of left L/S  and gluteal musculature  10'      Long axis traction on the left  30\" x4      Manual piriformis stretch  4'              Neuro Re-Ed         PPT  5\" x20                                                      Ther Ex        Nu STep  L3 12'      Seated sciatic nerve glides  20x       LTR  10\" x12  shamar      Glut med stretch  10\" x12 shamar                      Pt Ed/ HEP Pathology and involved anatomy as well as issuing and reviewing HEP-15'               Ther Activity                        Gait Training                        Modalities        IF stim and heat  15' post tx                       "

## 2024-08-26 ENCOUNTER — APPOINTMENT (OUTPATIENT)
Dept: PHYSICAL THERAPY | Facility: CLINIC | Age: 84
End: 2024-08-26
Payer: COMMERCIAL

## 2024-08-29 ENCOUNTER — OFFICE VISIT (OUTPATIENT)
Dept: PHYSICAL THERAPY | Facility: CLINIC | Age: 84
End: 2024-08-29
Payer: COMMERCIAL

## 2024-08-29 DIAGNOSIS — M54.50 ACUTE EXACERBATION OF CHRONIC LOW BACK PAIN: Primary | ICD-10-CM

## 2024-08-29 DIAGNOSIS — G89.29 ACUTE EXACERBATION OF CHRONIC LOW BACK PAIN: Primary | ICD-10-CM

## 2024-08-29 PROCEDURE — 97140 MANUAL THERAPY 1/> REGIONS: CPT

## 2024-08-29 PROCEDURE — 97110 THERAPEUTIC EXERCISES: CPT

## 2024-08-29 NOTE — PROGRESS NOTES
"Daily Note     Today's date: 2024  Patient name: Miguel Henderson  : 1940  MRN: [id number]  Referring provider: Usman Haynes*  Dx:   Encounter Diagnosis     ICD-10-CM    1. Acute exacerbation of chronic low back pain  M54.50     G89.29                      Subjective: Pt's sone reports his father got a lumbar injection as well as prescription medication since last visit and is feeling significantly better.      Objective: See treatment diary below      Assessment: Tolerated treatment well. Patient demonstrated fatigue post treatment, exhibited good technique with therapeutic exercises, and would benefit from continued PT      Plan: Continue per plan of care.  Progress treatment as tolerated.       Precautions : N/A       Manuals 24     Soft tissue mobilization of left L/S  and gluteal musculature  10' 10'     Long axis traction on the left  30\" x4 30\" x4     Manual piriformis stretch  4' 4'             Neuro Re-Ed         PPT  5\" x20 5\" x20                                                     Ther Ex        Nu STep  L3 12' L4 10'     Seated sciatic nerve glides  20x       LTR  10\" x12  shamar 10\" x12 shamar     Glut med stretch  10\" x12 shamar 10\" x12 shamar                     Pt Ed/ HEP Pathology and involved anatomy as well as issuing and reviewing HEP-15'               Ther Activity                        Gait Training                        Modalities        IF stim and heat  15' post tx 15' post tx                        "

## 2024-09-05 ENCOUNTER — OFFICE VISIT (OUTPATIENT)
Dept: PHYSICAL THERAPY | Facility: CLINIC | Age: 84
End: 2024-09-05
Payer: COMMERCIAL

## 2024-09-05 DIAGNOSIS — M54.50 ACUTE EXACERBATION OF CHRONIC LOW BACK PAIN: Primary | ICD-10-CM

## 2024-09-05 DIAGNOSIS — G89.29 ACUTE EXACERBATION OF CHRONIC LOW BACK PAIN: Primary | ICD-10-CM

## 2024-09-05 PROCEDURE — 97110 THERAPEUTIC EXERCISES: CPT | Performed by: PHYSICAL THERAPIST

## 2024-09-05 PROCEDURE — 97140 MANUAL THERAPY 1/> REGIONS: CPT | Performed by: PHYSICAL THERAPIST

## 2024-09-05 NOTE — PROGRESS NOTES
"Daily Note     Today's date: 2024  Patient name: Miguel Henderson  : 1940  MRN: [id number]  Referring provider: Usman Haynes*  Dx:   Encounter Diagnosis     ICD-10-CM    1. Acute exacerbation of chronic low back pain  M54.50     G89.29                      Subjective: Pt's son reports his father is significantly improved over the past 10 days.      Objective: See treatment diary below      Assessment: Tolerated treatment well. Patient demonstrated fatigue post treatment, exhibited good technique with therapeutic exercises, and would benefit from continued PT      Plan: Continue per plan of care.  Progress treatment as tolerated.       Precautions : N/A       Manuals 24    Soft tissue mobilization of left L/S  and gluteal musculature  10' 10' 10'    Long axis traction on the left  30\" x4 30\" x4 --    Manual piriformis stretch  4' 4' 4'            Neuro Re-Ed         PPT  5\" x20 5\" x20 5\" x20                                                    Ther Ex        Nu STep  L3 12' L4 10' L4 12'    Seated sciatic nerve glides  20x       LTR  10\" x12  shamar 10\" x12 shamar 10\" x12 shamar    Glut med stretch  10\" x12 shamar 10\" x12 shamar 10\" x12 shamar    Seated ball roll stretch    10\" x12 to right            Pt Ed/ HEP Pathology and involved anatomy as well as issuing and reviewing HEP-15'               Ther Activity                        Gait Training                        Modalities        IF stim and heat  15' post tx 15' post tx 15' post tx                         "

## 2024-09-10 ENCOUNTER — OFFICE VISIT (OUTPATIENT)
Dept: PHYSICAL THERAPY | Facility: CLINIC | Age: 84
End: 2024-09-10
Payer: COMMERCIAL

## 2024-09-10 DIAGNOSIS — G89.29 ACUTE EXACERBATION OF CHRONIC LOW BACK PAIN: Primary | ICD-10-CM

## 2024-09-10 DIAGNOSIS — M54.50 ACUTE EXACERBATION OF CHRONIC LOW BACK PAIN: Primary | ICD-10-CM

## 2024-09-10 PROCEDURE — 97112 NEUROMUSCULAR REEDUCATION: CPT | Performed by: PHYSICAL THERAPIST

## 2024-09-10 PROCEDURE — 97110 THERAPEUTIC EXERCISES: CPT | Performed by: PHYSICAL THERAPIST

## 2024-09-10 NOTE — PROGRESS NOTES
"Daily Note     Today's date: 9/10/2024  Patient name: Miguel Henderson  : 1940  MRN: [id number]  Referring provider: Usman Haynes*  Dx:   Encounter Diagnosis     ICD-10-CM    1. Acute exacerbation of chronic low back pain  M54.50     G89.29                      Subjective: Pt and his son reports that he is almost fully better and is ready for discharge.  He has returned to most activities at home ut is slow and cautious.      Objective: See treatment diary below      Assessment: Tolerated treatment well. Patient has nearly met all goals and is confident in his ability to return to full previous functional levels.      Plan: Pt will be discharged at this time.     Precautions : N/A       Manuals 8-14-24 8-22-24 8-29-24 9-5-24 9-10-24   Soft tissue mobilization of left L/S  and gluteal musculature  10' 10' 10' 12'   Long axis traction on the left  30\" x4 30\" x4 --    Manual piriformis stretch  4' 4' 4'            Neuro Re-Ed         PPT  5\" x20 5\" x20 5\" x20 5\" x30        Supine marches 20x shamar        Isometric abdominal ball crunches 5\" 2x10                                   Ther Ex        Nu STep  L3 12' L4 10' L4 12' L4 12'   Seated sciatic nerve glides  20x       LTR  10\" x12  shamar 10\" x12 shamar 10\" x12 shamar 10\" x12 shamar   Glut med stretch  10\" x12 shamar 10\" x12 shamar 10\" x12 shamar 10\" x12 shamar   Seated ball roll stretch    10\" x12 to right 10\" x12 to right           Pt Ed/ HEP Pathology and involved anatomy as well as issuing and reviewing HEP-15'               Ther Activity                        Gait Training                        Modalities        IF stim and heat  15' post tx 15' post tx 15' post tx                           "

## 2024-09-12 ENCOUNTER — APPOINTMENT (OUTPATIENT)
Dept: PHYSICAL THERAPY | Facility: CLINIC | Age: 84
End: 2024-09-12
Payer: COMMERCIAL

## 2024-09-18 ENCOUNTER — OFFICE VISIT (OUTPATIENT)
Dept: FAMILY MEDICINE CLINIC | Facility: CLINIC | Age: 84
End: 2024-09-18
Payer: COMMERCIAL

## 2024-09-18 ENCOUNTER — APPOINTMENT (OUTPATIENT)
Dept: LAB | Facility: CLINIC | Age: 84
End: 2024-09-18
Payer: COMMERCIAL

## 2024-09-18 VITALS
OXYGEN SATURATION: 100 % | WEIGHT: 166.7 LBS | HEART RATE: 74 BPM | DIASTOLIC BLOOD PRESSURE: 90 MMHG | HEIGHT: 65 IN | TEMPERATURE: 97.8 F | SYSTOLIC BLOOD PRESSURE: 162 MMHG | BODY MASS INDEX: 27.77 KG/M2

## 2024-09-18 DIAGNOSIS — M25.50 ARTHRALGIA, UNSPECIFIED JOINT: ICD-10-CM

## 2024-09-18 DIAGNOSIS — Z23 ENCOUNTER FOR IMMUNIZATION: ICD-10-CM

## 2024-09-18 DIAGNOSIS — E03.9 HYPOTHYROIDISM, UNSPECIFIED TYPE: ICD-10-CM

## 2024-09-18 DIAGNOSIS — M54.16 LUMBAR RADICULOPATHY: Primary | ICD-10-CM

## 2024-09-18 DIAGNOSIS — Z00.00 MEDICARE ANNUAL WELLNESS VISIT, INITIAL: ICD-10-CM

## 2024-09-18 DIAGNOSIS — C61 ADENOCARCINOMA OF PROSTATE (HCC): ICD-10-CM

## 2024-09-18 DIAGNOSIS — Z23 NEED FOR COVID-19 VACCINE: ICD-10-CM

## 2024-09-18 DIAGNOSIS — I10 PRIMARY HYPERTENSION: ICD-10-CM

## 2024-09-18 PROCEDURE — 86038 ANTINUCLEAR ANTIBODIES: CPT

## 2024-09-18 PROCEDURE — 91320 SARSCV2 VAC 30MCG TRS-SUC IM: CPT

## 2024-09-18 PROCEDURE — 86618 LYME DISEASE ANTIBODY: CPT

## 2024-09-18 PROCEDURE — 86430 RHEUMATOID FACTOR TEST QUAL: CPT

## 2024-09-18 PROCEDURE — 90480 ADMN SARSCOV2 VAC 1/ONLY CMP: CPT

## 2024-09-18 PROCEDURE — 36415 COLL VENOUS BLD VENIPUNCTURE: CPT

## 2024-09-18 PROCEDURE — G0008 ADMIN INFLUENZA VIRUS VAC: HCPCS

## 2024-09-18 PROCEDURE — 99203 OFFICE O/P NEW LOW 30 MIN: CPT | Performed by: FAMILY MEDICINE

## 2024-09-18 PROCEDURE — G0438 PPPS, INITIAL VISIT: HCPCS | Performed by: FAMILY MEDICINE

## 2024-09-18 PROCEDURE — 90662 IIV NO PRSV INCREASED AG IM: CPT

## 2024-09-18 RX ORDER — HYDROCHLOROTHIAZIDE 25 MG/1
1 TABLET ORAL DAILY
COMMUNITY
Start: 2024-07-03

## 2024-09-18 RX ORDER — IBUPROFEN 800 MG/1
800 TABLET, FILM COATED ORAL EVERY 8 HOURS PRN
Qty: 60 TABLET | Refills: 2 | Status: SHIPPED | OUTPATIENT
Start: 2024-09-18

## 2024-09-18 RX ORDER — CYCLOBENZAPRINE HCL 5 MG
5 TABLET ORAL 3 TIMES DAILY PRN
COMMUNITY
Start: 2024-08-26

## 2024-09-18 RX ORDER — LEVOTHYROXINE SODIUM 50 UG/1
50 TABLET ORAL DAILY
COMMUNITY
Start: 2024-07-03

## 2024-09-18 RX ORDER — LEVOTHYROXINE SODIUM 75 UG/1
75 TABLET ORAL
COMMUNITY
Start: 2024-07-03

## 2024-09-18 NOTE — ASSESSMENT & PLAN NOTE
Orders:    ibuprofen (MOTRIN) 800 mg tablet; Take 1 tablet (800 mg total) by mouth every 8 (eight) hours as needed for moderate pain    Ambulatory referral to Spine & Pain Management; Future

## 2024-09-18 NOTE — PROGRESS NOTES
Ambulatory Visit  Name: Miguel Henderson      : 1940      MRN: [id number]  Encounter Provider: Greg Foster MD  Encounter Date: 2024   Encounter department: [facility]    Assessment & Plan  Lumbar radiculopathy    Orders:    ibuprofen (MOTRIN) 800 mg tablet; Take 1 tablet (800 mg total) by mouth every 8 (eight) hours as needed for moderate pain    Ambulatory referral to Spine & Pain Management; Future    Adenocarcinoma of prostate (HCC)  F/U with Urologist       Arthralgia, unspecified joint    Orders:    Lyme Total AB W Reflex to IGM/IGG; Future    SHANTEL Screen w/ Reflex to Titer/Pattern; Future    Rheumatoid Arthritis Factor; Future    Encounter for immunization    Orders:    influenza vaccine, high-dose, PF 0.5 mL (Fluzone High Dose)    Need for COVID-19 vaccine    Orders:    COVID-19 Pfizer mRNA vaccine 12 yr and older (Comirnaty pre-filled syringe)    Hypothyroidism, unspecified type         Primary hypertension  Recommend to take half of hydrochlorothiazide 25 mg dose daily       Medicare annual wellness visit, initial  See medicare wellness note    Follow up in 6 months or as needed          Preventive health issues were discussed with patient, and age appropriate screening tests were ordered as noted in patient's After Visit Summary. Personalized health advice and appropriate referrals for health education or preventive services given if needed, as noted in patient's After Visit Summary.    History of Present Illness     Patient is here to establish care.  Has a hx of hypertension states that at home his BP has been on the low side around 110/80 mmHg at times. He was told by endo he could take hydrochlorothiazide as needed for elevated blood pressure readings.  Also has hypothyroidism denies any symptoms related this.  Also has low back pain over the past few years no injuries to his back no radiation of the pain.  Also has a Hx of prostate cancer in remission.        Patient Care Team:  Greg Foster MD as PCP - General (Family Medicine)    Review of Systems   Constitutional:  Negative for activity change, appetite change, fatigue and fever.   HENT:  Negative for congestion and ear discharge.    Respiratory:  Negative for cough and shortness of breath.    Cardiovascular:  Negative for chest pain and palpitations.   Gastrointestinal:  Negative for diarrhea and nausea.   Musculoskeletal:  Positive for arthralgias and myalgias. Negative for back pain.   Skin:  Negative for color change and rash.   Neurological:  Negative for dizziness and headaches.   Psychiatric/Behavioral:  Negative for agitation and behavioral problems.      Medical History Reviewed by provider this encounter:  Tobacco  Allergies  Meds  Problems  Med Hx  Surg Hx  Fam Hx       Annual Wellness Visit Questionnaire   Miguel is here for his Initial Wellness visit.     Health Risk Assessment:   Patient rates overall health as fair. Patient feels that their physical health rating is slightly worse. Patient is satisfied with their life. Eyesight was rated as same. Hearing was rated as same. Patient feels that their emotional and mental health rating is same. Patients states they are never, rarely angry. Patient states they are sometimes unusually tired/fatigued. Pain experienced in the last 7 days has been a lot. Patient's pain rating has been 4/10. Patient states that he has experienced no weight loss or gain in last 6 months. Back pain     Depression Screening:   PHQ-2 Score: 1      Fall Risk Screening:   In the past year, patient has experienced: no history of falling in past year      Home Safety:  Patient does not have trouble with stairs inside or outside of their home. Patient has working smoke alarms and has working carbon monoxide detector. Home safety hazards include: none. Patient takes it slow due to back pain     Nutrition:   Current diet is Regular.     Medications:   Patient is not currently  taking any over-the-counter supplements. Patient is able to manage medications.     Activities of Daily Living (ADLs)/Instrumental Activities of Daily Living (IADLs):   Walk and transfer into and out of bed and chair?: Yes  Dress and groom yourself?: Yes    Bathe or shower yourself?: Yes    Feed yourself? Yes  Do your laundry/housekeeping?: Yes  Manage your money, pay your bills and track your expenses?: Yes  Make your own meals?: Yes    Do your own shopping?: Yes    Previous Hospitalizations:   Any hospitalizations or ED visits within the last 12 months?: No      PREVENTIVE SCREENINGS      Cardiovascular Screening:    General: Screening Current      Diabetes Screening:     General: Screening Current      Colorectal Cancer Screening:     General: Screening Not Indicated      Prostate Cancer Screening:    General: History Prostate Cancer and Screening Not Indicated      Osteoporosis Screening:    General: Screening Not Indicated      Abdominal Aortic Aneurysm (AAA) Screening:        General: Screening Not Indicated      Lung Cancer Screening:     General: Screening Not Indicated      Hepatitis C Screening:    General: Screening Not Indicated    Screening, Brief Intervention, and Referral to Treatment (SBIRT)    Screening  Typical number of drinks in a day: 0  Typical number of drinks in a week: 3  Interpretation: Low risk drinking behavior.    Single Item Drug Screening:  How often have you used an illegal drug (including marijuana) or a prescription medication for non-medical reasons in the past year? never    Single Item Drug Screen Score: 0  Interpretation: Negative screen for possible drug use disorder    Social Determinants of Health     Food Insecurity: No Food Insecurity (9/18/2024)    Hunger Vital Sign     Worried About Running Out of Food in the Last Year: Never true     Ran Out of Food in the Last Year: Never true   Transportation Needs: No Transportation Needs (9/18/2024)    PRAPARE - Transportation      "Lack of Transportation (Medical): No     Lack of Transportation (Non-Medical): No   Housing Stability: Low Risk  (9/18/2024)    Housing Stability Vital Sign     Unable to Pay for Housing in the Last Year: No     Number of Times Moved in the Last Year: 1     Homeless in the Last Year: No   Utilities: Not At Risk (9/18/2024)    [facility] Utilities     Threatened with loss of utilities: No     No results found.    Objective     /90   Pulse 74   Temp 97.8 °F (36.6 °C)   Ht 5' 5\" (1.651 m)   Wt 75.6 kg (166 lb 11.2 oz)   SpO2 100%   BMI 27.74 kg/m²     Physical Exam  Vitals and nursing note reviewed.   Constitutional:       General: He is not in acute distress.     Appearance: He is well-developed.   HENT:      Head: Normocephalic and atraumatic.   Eyes:      Conjunctiva/sclera: Conjunctivae normal.   Cardiovascular:      Rate and Rhythm: Normal rate and regular rhythm.      Heart sounds: No murmur heard.  Pulmonary:      Effort: Pulmonary effort is normal. No respiratory distress.      Breath sounds: Normal breath sounds.   Abdominal:      Palpations: Abdomen is soft.      Tenderness: There is no abdominal tenderness.   Musculoskeletal:         General: No swelling.      Cervical back: Neck supple.   Skin:     General: Skin is warm and dry.      Capillary Refill: Capillary refill takes less than 2 seconds.   Neurological:      Mental Status: He is alert.   Psychiatric:         Mood and Affect: Mood normal.         "

## 2024-09-18 NOTE — PATIENT INSTRUCTIONS
Medicare Preventive Visit Patient Instructions  Thank you for completing your Welcome to Medicare Visit or Medicare Annual Wellness Visit today. Your next wellness visit will be due in one year (9/19/2025).  The screening/preventive services that you may require over the next 5-10 years are detailed below. Some tests may not apply to you based off risk factors and/or age. Screening tests ordered at today's visit but not completed yet may show as past due. Also, please note that scanned in results may not display below.  Preventive Screenings:  Service Recommendations Previous Testing/Comments   Colorectal Cancer Screening  Colonoscopy    Fecal Occult Blood Test (FOBT)/Fecal Immunochemical Test (FIT)  Fecal DNA/Cologuard Test  Flexible Sigmoidoscopy Age: 45-75 years old   Colonoscopy: every 10 years (May be performed more frequently if at higher risk)  OR  FOBT/FIT: every 1 year  OR  Cologuard: every 3 years  OR  Sigmoidoscopy: every 5 years  Screening may be recommended earlier than age 45 if at higher risk for colorectal cancer. Also, an individualized decision between you and your healthcare provider will decide whether screening between the ages of 76-85 would be appropriate. Colonoscopy: Not on file  FOBT/FIT: Not on file  Cologuard: Not on file  Sigmoidoscopy: Not on file    Screening Not Indicated     Prostate Cancer Screening Individualized decision between patient and health care provider in men between ages of 55-69   Medicare will cover every 12 months beginning on the day after your 50th birthday PSA: 0.2 ng/mL     History Prostate Cancer  Screening Not Indicated     Hepatitis C Screening Once for adults born between 1945 and 1965  More frequently in patients at high risk for Hepatitis C Hep C Antibody: Not on file        Diabetes Screening 1-2 times per year if you're at risk for diabetes or have pre-diabetes Fasting glucose: 90 mg/dL (9/14/2022)  A1C: 5.9 % (6/18/2024)  Screening Current   Cholesterol  Screening Once every 5 years if you don't have a lipid disorder. May order more often based on risk factors. Lipid panel: 06/18/2024  Screening Current      Other Preventive Screenings Covered by Medicare:  Abdominal Aortic Aneurysm (AAA) Screening: covered once if your at risk. You're considered to be at risk if you have a family history of AAA or a male between the age of 65-75 who smoking at least 100 cigarettes in your lifetime.  Lung Cancer Screening: covers low dose CT scan once per year if you meet all of the following conditions: (1) Age 55-77; (2) No signs or symptoms of lung cancer; (3) Current smoker or have quit smoking within the last 15 years; (4) You have a tobacco smoking history of at least 20 pack years (packs per day x number of years you smoked); (5) You get a written order from a healthcare provider.  Glaucoma Screening: covered annually if you're considered high risk: (1) You have diabetes OR (2) Family history of glaucoma OR (3)  aged 50 and older OR (4)  American aged 65 and older  Osteoporosis Screening: covered every 2 years if you meet one of the following conditions: (1) Have a vertebral abnormality; (2) On glucocorticoid therapy for more than 3 months; (3) Have primary hyperparathyroidism; (4) On osteoporosis medications and need to assess response to drug therapy.  HIV Screening: covered annually if you're between the age of 15-65. Also covered annually if you are younger than 15 and older than 65 with risk factors for HIV infection. For pregnant patients, it is covered up to 3 times per pregnancy.    Immunizations:  Immunization Recommendations   Influenza Vaccine Annual influenza vaccination during flu season is recommended for all persons aged >= 6 months who do not have contraindications   Pneumococcal Vaccine   * Pneumococcal conjugate vaccine = PCV13 (Prevnar 13), PCV15 (Vaxneuvance), PCV20 (Prevnar 20)  * Pneumococcal polysaccharide vaccine = PPSV23  (Pneumovax) Adults 19-63 yo with certain risk factors or if 65+ yo  If never received any pneumonia vaccine: recommend Prevnar 20 (PCV20)  Give PCV20 if previously received 1 dose of PCV13 or PPSV23   Hepatitis B Vaccine 3 dose series if at intermediate or high risk (ex: diabetes, end stage renal disease, liver disease)   Respiratory syncytial virus (RSV) Vaccine - COVERED BY MEDICARE PART D  * RSVPreF3 (Arexvy) CDC recommends that adults 60 years of age and older may receive a single dose of RSV vaccine using shared clinical decision-making (SCDM)   Tetanus (Td) Vaccine - COST NOT COVERED BY MEDICARE PART B Following completion of primary series, a booster dose should be given every 10 years to maintain immunity against tetanus. Td may also be given as tetanus wound prophylaxis.   Tdap Vaccine - COST NOT COVERED BY MEDICARE PART B Recommended at least once for all adults. For pregnant patients, recommended with each pregnancy.   Shingles Vaccine (Shingrix) - COST NOT COVERED BY MEDICARE PART B  2 shot series recommended in those 19 years and older who have or will have weakened immune systems or those 50 years and older     Health Maintenance Due:  There are no preventive care reminders to display for this patient.  Immunizations Due:      Topic Date Due   • COVID-19 Vaccine (5 - 2023-24 season) 09/01/2024   • Influenza Vaccine (1) 09/01/2024     Advance Directives   What are advance directives?  Advance directives are legal documents that state your wishes and plans for medical care. These plans are made ahead of time in case you lose your ability to make decisions for yourself. Advance directives can apply to any medical decision, such as the treatments you want, and if you want to donate organs.   What are the types of advance directives?  There are many types of advance directives, and each state has rules about how to use them. You may choose a combination of any of the following:  Living will:  This is a  written record of the treatment you want. You can also choose which treatments you do not want, which to limit, and which to stop at a certain time. This includes surgery, medicine, IV fluid, and tube feedings.   Durable power of  for healthcare (DPAHC):  This is a written record that states who you want to make healthcare choices for you when you are unable to make them for yourself. This person, called a proxy, is usually a family member or a friend. You may choose more than 1 proxy.  Do not resuscitate (DNR) order:  A DNR order is used in case your heart stops beating or you stop breathing. It is a request not to have certain forms of treatment, such as CPR. A DNR order may be included in other types of advance directives.  Medical directive:  This covers the care that you want if you are in a coma, near death, or unable to make decisions for yourself. You can list the treatments you want for each condition. Treatment may include pain medicine, surgery, blood transfusions, dialysis, IV or tube feedings, and a ventilator (breathing machine).  Values history:  This document has questions about your views, beliefs, and how you feel and think about life. This information can help others choose the care that you would choose.  Why are advance directives important?  An advance directive helps you control your care. Although spoken wishes may be used, it is better to have your wishes written down. Spoken wishes can be misunderstood, or not followed. Treatments may be given even if you do not want them. An advance directive may make it easier for your family to make difficult choices about your care.       © Copyright [employer] 2018 Information is for End User's use only and may not be sold, redistributed or otherwise used for commercial purposes. All illustrations and images included in CareNotes® are the copyrighted property of [employer] or [employer]

## 2024-09-19 ENCOUNTER — TELEPHONE (OUTPATIENT)
Dept: FAMILY MEDICINE CLINIC | Facility: CLINIC | Age: 84
End: 2024-09-19

## 2024-09-19 LAB
ANA SER QL IA: NEGATIVE
B BURGDOR IGG+IGM SER QL IA: NEGATIVE
RHEUMATOID FACT SER QL LA: NEGATIVE

## 2024-09-19 NOTE — TELEPHONE ENCOUNTER
----- Message from Greg Foster MD sent at 9/19/2024  1:27 PM EDT -----  Lyme test is negative and rheumatoid factor negative as well. Advise pt.

## 2024-09-27 ENCOUNTER — EVALUATION (OUTPATIENT)
Dept: PHYSICAL THERAPY | Facility: CLINIC | Age: 84
End: 2024-09-27
Payer: COMMERCIAL

## 2024-09-27 DIAGNOSIS — G89.29 ACUTE EXACERBATION OF CHRONIC LOW BACK PAIN: Primary | ICD-10-CM

## 2024-09-27 DIAGNOSIS — M54.50 ACUTE EXACERBATION OF CHRONIC LOW BACK PAIN: Primary | ICD-10-CM

## 2024-09-27 PROCEDURE — 97164 PT RE-EVAL EST PLAN CARE: CPT | Performed by: PHYSICAL THERAPIST

## 2024-09-27 PROCEDURE — 97014 ELECTRIC STIMULATION THERAPY: CPT | Performed by: PHYSICAL THERAPIST

## 2024-09-27 PROCEDURE — 97140 MANUAL THERAPY 1/> REGIONS: CPT | Performed by: PHYSICAL THERAPIST

## 2024-09-27 PROCEDURE — G0283 ELEC STIM OTHER THAN WOUND: HCPCS | Performed by: PHYSICAL THERAPIST

## 2024-09-27 PROCEDURE — 97110 THERAPEUTIC EXERCISES: CPT | Performed by: PHYSICAL THERAPIST

## 2024-09-27 NOTE — LETTER
2024    Greg Foster MD  [address]  [facility] [address]    Patient: Miguel Henderson  YOB: 1940   Date of Visit: 2024      Dear Dr. Foster:    One of your patients, Miguel Henderson, was referred to me by the [facility] program.  Please review the attached evaluation summary from Miguel Henderson's recent visit.  Please verify that you agree with the plan of care by signing the attached document and sending it back to our office.   If you have any questions or concerns, please don't hesitate to call.      Sincerely,    Rafael Cornelius, PT      Primary Care Provider:      I certify that I have read the below Plan of Care and certify the need for these services furnished under this plan of treatment while under my care.                                  Greg Foster MD  [address]  [facility] [address]  Via In Basket           PT Evaluation     Today's date: 2024  Patient name: Miguel Henderson  : 1940  MRN: [id number]  Referring provider: Usman Haynes*  Dx:   Encounter Diagnosis     ICD-10-CM    1. Acute exacerbation of chronic low back pain  M54.50     G89.29                      Assessment  Impairments: abnormal or restricted ROM, activity intolerance, impaired physical strength, lacks appropriate home exercise program and pain with function  Symptom irritability: high    Assessment details: Pt presents with signs and symptoms consistent with left sided L/S radiculopathy.  Pt presents with difficulty standing and walking with improved symptoms sitting.  Pt also has difficulty sleeping due to pain.    Barriers to intervention: ESL  Understanding of Dx/Px/POC: fair     Prognosis: good    Goals  ST) Pt. Will have centralized radicular symptoms in 6 weeks.  2) Pt. Will be able to sit as long as desired without pain in 6 weeks.  3)  Pt. Will be able to return to home and yard work under light/ modified duty in 6 weeks.  4)   Pt. Will be able to perform all routine chores at home without pain or limitations in 6 weeks.  5) Pt. Will be able to perform all LE dressing tasks without limitations.  LT) Pt. Will be coco to stand as long as desired without pain in 12 weeks.  2) Pt. Will be able to return to full duty home and uard work in 12 weeks.  3)  Pt. Will be able to perform all heavy activity at home, such as garbage, moving furniture, carrying weighted items on all surfaces without pain or limitation in 12 weeks.      Plan  Patient would benefit from: PT eval and skilled physical therapy  Referral necessary: No  Planned modality interventions: cryotherapy, thermotherapy: hydrocollator packs and unattended electrical stimulation    Planned therapy interventions: manual therapy, neuromuscular re-education, patient/caregiver education, self care, therapeutic activities, therapeutic exercise and home exercise program    Frequency: 3x week  Duration in weeks: 12  Treatment plan discussed with: patient      Subjective Evaluation    History of Present Illness  Mechanism of injury: Chronic history of low back pian (1 year) with recent flare up upon returning to [facility] from [place].  Pain starts in left L/S area and travel down the back of left thigh, lateral lower leg and dorsum of foot.  Pain is worse with standing and walking rather than sitting.  He has had difficulty sleeping due to pain.          Recurrent probem    Quality of life: good    Patient Goals  Patient goal: Return to previous activities and chores around the house.    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   Trigger point to gluteus medius and piriformis.     Tenderness     Left Hip  "  Tenderness in the PSIS.     Active Range of Motion     Lumbar   Flexion:  with pain Restriction level: minimal  Extension:  with pain Restriction level: minimal  Left lateral flexion:  Restriction level: minimal  Right lateral flexion:  Restriction level: minimal    Joint Play     Hypomobile: L3, L4, L5 and S1     Strength/Myotome Testing     Left Hip   Planes of Motion   Flexion: 3+  Extension: 3+    Right Hip   Planes of Motion   Flexion: 4  Extension: 4    Left Knee   Flexion: 3+    Right Knee   Flexion: 4    Left Ankle/Foot   Dorsiflexion: 3  Plantar flexion: 3+    Right Ankle/Foot   Dorsiflexion: 4  Plantar flexion: 4    Muscle Activation   Patient able to activate left transverse abdominals and right transverse abdominals.     Tests     Lumbar   Negative SIJ compression and sacroiliac distraction.     Left   Positive quadrant.   Negative crossed SLR and passive SLR.     Right   Negative crossed SLR and passive SLR.     Left Hip   Negative long sit.     Right Hip   Negative long sit.     Ambulation     Ambulation: Level Surfaces     Additional Level Surfaces Ambulation Details  Pt ambulates with left sided antalgic gait along with decreased left stance and stride    Functional Assessment      Squat    Trunk lean right.                   Precautions : N/A       Manuals 9-27-24 8-22-24 8-29-24 9-5-24 9-10-24   Soft tissue mobilization of left L/S  and gluteal musculature 15' 10' 10' 10' 12'   Long axis traction on the left  30\" x4 30\" x4 --    Manual piriformis stretch  4' 4' 4'            Neuro Re-Ed         PPT 5\" x20 5\" x20 5\" x20 5\" x20 5\" x30   Supine marches 20x shamar    Supine marches 20x shamar   Isometric abdominal ball crunches   5\" 2x10    Isometric abdominal ball crunches 5\" 2x10                                   Ther Ex        Nu STep L5 12' L3 12' L4 10' L4 12' L4 12'   Seated sciatic nerve glides  20x       LTR 10\" x12 shamar 10\" x12  shamar 10\" x12 shamar 10\" x12 shamar 10\" x12 shamar   Glut med stretch 10\" x12 shamar " "10\" x12 shamar 10\" x12 shamar 10\" x12 shamar 10\" x12 shamar   Seated ball roll stretch 10\" x12 right   10\" x12 to right 10\" x12 to right           Pt Ed/ HEP Pathology and involved anatomy as well as issuing and reviewing HEP-15'               Ther Activity                        Gait Training                        Modalities        IF stim and heat 15' post tx 15' post tx 15' post tx 15' post tx                          "

## 2024-09-27 NOTE — PROGRESS NOTES
PT Re-Evaluation    Today's date: 2024  Patient name: Miguel Henderson  : 1940  MRN: [id number]  Referring provider: Usman Haynes*  Dx:   Encounter Diagnosis     ICD-10-CM    1. Acute exacerbation of chronic low back pain  M54.50     G89.29                      Assessment  Impairments: abnormal or restricted ROM, activity intolerance, impaired physical strength, lacks appropriate home exercise program and pain with function  Symptom irritability: high    Assessment details: Pt presents with signs and symptoms consistent with left sided L/S radiculopathy.  Pt presents with difficulty standing and walking with improved symptoms sitting.  Pt also has difficulty sleeping due to pain.    Barriers to intervention: ESL  Understanding of Dx/Px/POC: fair     Prognosis: good    Goals  ST) Pt. Will have centralized radicular symptoms in 6 weeks.  2) Pt. Will be able to sit as long as desired without pain in 6 weeks.  3)  Pt. Will be able to return to home and yard work under light/ modified duty in 6 weeks.  4)  Pt. Will be able to perform all routine chores at home without pain or limitations in 6 weeks.  5) Pt. Will be able to perform all LE dressing tasks without limitations.  LT) Pt. Will be coco to stand as long as desired without pain in 12 weeks.  2) Pt. Will be able to return to full duty home and uard work in 12 weeks.  3)  Pt. Will be able to perform all heavy activity at home, such as garbage, moving furniture, carrying weighted items on all surfaces without pain or limitation in 12 weeks.      Plan  Patient would benefit from: PT eval and skilled physical therapy  Referral necessary: No  Planned modality interventions: cryotherapy, thermotherapy: hydrocollator packs and unattended electrical stimulation    Planned therapy interventions: manual therapy, neuromuscular re-education, patient/caregiver education, self care, therapeutic activities, therapeutic exercise and home exercise  program    Frequency: 3x week  Duration in weeks: 12  Treatment plan discussed with: patient      Subjective Evaluation    History of Present Illness  Mechanism of injury: Chronic history of low back pian (1 year) with recent flare up upon returning to [place] from [place].  Pain starts in left L/S area and travel down the back of left thigh, lateral lower leg and dorsum of foot.  Pain is worse with standing and walking rather than sitting.  He has had difficulty sleeping due to pain.          Recurrent probem    Quality of life: good    Patient Goals  Patient goal: Return to previous activities and chores around the house.    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   Trigger point to gluteus medius and piriformis.     Tenderness     Left Hip   Tenderness in the PSIS.     Active Range of Motion     Lumbar   Flexion:  with pain Restriction level: minimal  Extension:  with pain Restriction level: minimal  Left lateral flexion:  Restriction level: minimal  Right lateral flexion:  Restriction level: minimal    Joint Play     Hypomobile: L3, L4, L5 and S1     Strength/Myotome Testing     Left Hip   Planes of Motion   Flexion: 3+  Extension: 3+    Right Hip   Planes of Motion   Flexion: 4  Extension: 4    Left Knee   Flexion: 3+    Right Knee   Flexion: 4    Left Ankle/Foot   Dorsiflexion: 3  Plantar flexion: 3+    Right Ankle/Foot   Dorsiflexion: 4  Plantar flexion: 4    Muscle Activation   Patient able to activate left transverse abdominals and right transverse abdominals.     Tests     Lumbar   Negative SIJ compression and sacroiliac distraction.     Left   Positive quadrant.   Negative crossed SLR and passive SLR.     Right   Negative  "crossed SLR and passive SLR.     Left Hip   Negative long sit.     Right Hip   Negative long sit.     Ambulation     Ambulation: Level Surfaces     Additional Level Surfaces Ambulation Details  Pt ambulates with left sided antalgic gait along with decreased left stance and stride    Functional Assessment      Squat    Trunk lean right.                   Precautions : N/A       Manuals 9-27-24 8-22-24 8-29-24 9-5-24 9-10-24   Soft tissue mobilization of left L/S  and gluteal musculature 15' 10' 10' 10' 12'   Long axis traction on the left  30\" x4 30\" x4 --    Manual piriformis stretch  4' 4' 4'            Neuro Re-Ed         PPT 5\" x20 5\" x20 5\" x20 5\" x20 5\" x30   Supine marches 20x shamar    Supine marches 20x shamar   Isometric abdominal ball crunches   5\" 2x10    Isometric abdominal ball crunches 5\" 2x10                                   Ther Ex        Nu STep L5 12' L3 12' L4 10' L4 12' L4 12'   Seated sciatic nerve glides  20x       LTR 10\" x12 shamar 10\" x12  shamar 10\" x12 shamar 10\" x12 shamar 10\" x12 shamar   Glut med stretch 10\" x12 shamar 10\" x12 shamar 10\" x12 shamar 10\" x12 shamar 10\" x12 shamar   Seated ball roll stretch 10\" x12 right   10\" x12 to right 10\" x12 to right           Pt Ed/ HEP Pathology and involved anatomy as well as issuing and reviewing HEP-15'               Ther Activity                        Gait Training                        Modalities        IF stim and heat 15' post tx 15' post tx 15' post tx 15' post tx                 "

## 2024-10-02 ENCOUNTER — OFFICE VISIT (OUTPATIENT)
Dept: PHYSICAL THERAPY | Facility: CLINIC | Age: 84
End: 2024-10-02
Payer: COMMERCIAL

## 2024-10-02 DIAGNOSIS — M54.50 ACUTE EXACERBATION OF CHRONIC LOW BACK PAIN: Primary | ICD-10-CM

## 2024-10-02 DIAGNOSIS — G89.29 ACUTE EXACERBATION OF CHRONIC LOW BACK PAIN: Primary | ICD-10-CM

## 2024-10-02 PROCEDURE — 97112 NEUROMUSCULAR REEDUCATION: CPT | Performed by: PHYSICAL THERAPIST

## 2024-10-02 PROCEDURE — 97110 THERAPEUTIC EXERCISES: CPT | Performed by: PHYSICAL THERAPIST

## 2024-10-02 NOTE — PROGRESS NOTES
"Daily Note     Today's date: 10/2/2024  Patient name: Miguel Henderson  : 1940  MRN: [id number]  Referring provider: Greg Foster MD  Dx:   Encounter Diagnosis     ICD-10-CM    1. Acute exacerbation of chronic low back pain  M54.50     G89.29                      Subjective: Pt reports he was sore after last session but the past few days he is feeling good.      Objective: See treatment diary below      Assessment: Tolerated treatment well. Patient demonstrated fatigue post treatment, exhibited good technique with therapeutic exercises, and would benefit from continued PT      Plan: Continue per plan of care.  Progress treatment as tolerated.  Pt would benefit from progressing with dynamic core stabilization          Precautions : N/A       Manuals 9-27-24 10-2-24 8-29-24 9-5-24 9-10-24   Soft tissue mobilization of left L/S  and gluteal musculature 15'  10' 10' 12'   Long axis traction on the left   30\" x4 --    Manual piriformis stretch   4' 4'            Neuro Re-Ed         PPT 5\" x20 5\" x20 5\" x20 5\" x20 5\" x30   Supine marches 20x shamar    Supine marches 20x shamar   Isometric abdominal ball crunches   5\" 2x10 5\" 2x10   Isometric abdominal ball crunches 5\" 2x10   Anti rotations  Black 2x10 shamar      Diagonal med bal chops  Blue 2x10 shamar                      Ther Ex        Nu STep L5 12' L3 12' L4 10' L4 12' L4 12'   Seated sciatic nerve glides  20x       LTR 10\" x12 shamar 10\" x12  shamar 10\" x12 shamar 10\" x12 shamar 10\" x12 shamar   Glut med stretch 10\" x12 shamar 10\" x12 shamar 10\" x12 shamar 10\" x12 shamar 10\" x12 shamar   Seated ball roll stretch 10\" x12 right 10\" x12 riht  10\" x12 to right 10\" x12 to right           Pt Ed/ HEP Pathology and involved anatomy as well as issuing and reviewing HEP-15'               Ther Activity                        Gait Training                        Modalities        IF stim and heat 15' post tx 15' post tx 15' post tx 15' post tx                 "

## 2024-10-07 ENCOUNTER — HOSPITAL ENCOUNTER (INPATIENT)
Facility: HOSPITAL | Age: 84
LOS: 7 days | Discharge: HOME | DRG: 871 | End: 2024-10-15
Attending: INTERNAL MEDICINE | Admitting: INTERNAL MEDICINE
Payer: COMMERCIAL

## 2024-10-07 ENCOUNTER — APPOINTMENT (EMERGENCY)
Dept: RADIOLOGY | Facility: HOSPITAL | Age: 84
DRG: 871 | End: 2024-10-07
Payer: COMMERCIAL

## 2024-10-07 ENCOUNTER — TELEPHONE (OUTPATIENT)
Age: 84
End: 2024-10-07

## 2024-10-07 DIAGNOSIS — J18.9 COMMUNITY ACQUIRED PNEUMONIA OF RIGHT UPPER LOBE OF LUNG: ICD-10-CM

## 2024-10-07 DIAGNOSIS — R65.10 SIRS (SYSTEMIC INFLAMMATORY RESPONSE SYNDROME) (HCC): ICD-10-CM

## 2024-10-07 DIAGNOSIS — K21.9 GERD (GASTROESOPHAGEAL REFLUX DISEASE): ICD-10-CM

## 2024-10-07 DIAGNOSIS — A41.9 SEPSIS, DUE TO UNSPECIFIED ORGANISM, UNSPECIFIED WHETHER ACUTE ORGAN DYSFUNCTION PRESENT (HCC): Primary | ICD-10-CM

## 2024-10-07 DIAGNOSIS — R65.20 SEVERE SEPSIS (HCC): ICD-10-CM

## 2024-10-07 DIAGNOSIS — A41.9 SEVERE SEPSIS (HCC): ICD-10-CM

## 2024-10-07 DIAGNOSIS — B37.81 CANDIDA ESOPHAGITIS (HCC): ICD-10-CM

## 2024-10-07 LAB
ALBUMIN SERPL BCG-MCNC: 3.3 G/DL (ref 3.5–5)
ALP SERPL-CCNC: 68 U/L (ref 34–104)
ALT SERPL W P-5'-P-CCNC: 22 U/L (ref 7–52)
ANION GAP SERPL CALCULATED.3IONS-SCNC: 11 MMOL/L (ref 4–13)
APTT PPP: 32 SECONDS (ref 23–34)
AST SERPL W P-5'-P-CCNC: 26 U/L (ref 13–39)
BASOPHILS # BLD MANUAL: 0 THOUSAND/UL (ref 0–0.1)
BASOPHILS NFR MAR MANUAL: 0 % (ref 0–1)
BILIRUB SERPL-MCNC: 1.8 MG/DL (ref 0.2–1)
BUN SERPL-MCNC: 21 MG/DL (ref 5–25)
CALCIUM ALBUM COR SERPL-MCNC: 9.1 MG/DL (ref 8.3–10.1)
CALCIUM SERPL-MCNC: 8.5 MG/DL (ref 8.4–10.2)
CHLORIDE SERPL-SCNC: 97 MMOL/L (ref 96–108)
CO2 SERPL-SCNC: 25 MMOL/L (ref 21–32)
CREAT SERPL-MCNC: 1.03 MG/DL (ref 0.6–1.3)
EOSINOPHIL # BLD MANUAL: 0 THOUSAND/UL (ref 0–0.4)
EOSINOPHIL NFR BLD MANUAL: 0 % (ref 0–6)
ERYTHROCYTE [DISTWIDTH] IN BLOOD BY AUTOMATED COUNT: 13.5 % (ref 11.6–15.1)
FLUAV RNA RESP QL NAA+PROBE: NEGATIVE
FLUBV RNA RESP QL NAA+PROBE: NEGATIVE
GFR SERPL CREATININE-BSD FRML MDRD: 65 ML/MIN/1.73SQ M
GLUCOSE SERPL-MCNC: 158 MG/DL (ref 65–140)
HCT VFR BLD AUTO: 41.3 % (ref 36.5–49.3)
HGB BLD-MCNC: 13.8 G/DL (ref 12–17)
INR PPP: 1.18 (ref 0.85–1.19)
LACTATE SERPL-SCNC: 3.8 MMOL/L (ref 0.5–2)
LYMPHOCYTES # BLD AUTO: 0.34 THOUSAND/UL (ref 0.6–4.47)
LYMPHOCYTES # BLD AUTO: 2 % (ref 14–44)
MCH RBC QN AUTO: 29.1 PG (ref 26.8–34.3)
MCHC RBC AUTO-ENTMCNC: 33.4 G/DL (ref 31.4–37.4)
MCV RBC AUTO: 87 FL (ref 82–98)
MONOCYTES # BLD AUTO: 0.85 THOUSAND/UL (ref 0–1.22)
MONOCYTES NFR BLD: 5 % (ref 4–12)
NEUTROPHILS # BLD MANUAL: 15.75 THOUSAND/UL (ref 1.85–7.62)
NEUTS BAND NFR BLD MANUAL: 10 % (ref 0–8)
NEUTS SEG NFR BLD AUTO: 83 % (ref 43–75)
PLATELET # BLD AUTO: 150 THOUSANDS/UL (ref 149–390)
PLATELET BLD QL SMEAR: ADEQUATE
PMV BLD AUTO: 9.7 FL (ref 8.9–12.7)
POTASSIUM SERPL-SCNC: 3.3 MMOL/L (ref 3.5–5.3)
PROCALCITONIN SERPL-MCNC: 2.15 NG/ML
PROT SERPL-MCNC: 6.5 G/DL (ref 6.4–8.4)
PROTHROMBIN TIME: 15.5 SECONDS (ref 12.3–15)
RBC # BLD AUTO: 4.74 MILLION/UL (ref 3.88–5.62)
RBC MORPH BLD: NORMAL
RSV RNA RESP QL NAA+PROBE: NEGATIVE
S PYO DNA THROAT QL NAA+PROBE: NOT DETECTED
SARS-COV-2 RNA RESP QL NAA+PROBE: NEGATIVE
SODIUM SERPL-SCNC: 133 MMOL/L (ref 135–147)
WBC # BLD AUTO: 16.94 THOUSAND/UL (ref 4.31–10.16)

## 2024-10-07 PROCEDURE — 99285 EMERGENCY DEPT VISIT HI MDM: CPT | Performed by: INTERNAL MEDICINE

## 2024-10-07 PROCEDURE — 87651 STREP A DNA AMP PROBE: CPT | Performed by: INTERNAL MEDICINE

## 2024-10-07 PROCEDURE — 85027 COMPLETE CBC AUTOMATED: CPT | Performed by: INTERNAL MEDICINE

## 2024-10-07 PROCEDURE — 81001 URINALYSIS AUTO W/SCOPE: CPT | Performed by: INTERNAL MEDICINE

## 2024-10-07 PROCEDURE — 93005 ELECTROCARDIOGRAM TRACING: CPT

## 2024-10-07 PROCEDURE — 84145 PROCALCITONIN (PCT): CPT | Performed by: INTERNAL MEDICINE

## 2024-10-07 PROCEDURE — 87040 BLOOD CULTURE FOR BACTERIA: CPT | Performed by: INTERNAL MEDICINE

## 2024-10-07 PROCEDURE — 36415 COLL VENOUS BLD VENIPUNCTURE: CPT | Performed by: INTERNAL MEDICINE

## 2024-10-07 PROCEDURE — 96360 HYDRATION IV INFUSION INIT: CPT

## 2024-10-07 PROCEDURE — 0241U HB NFCT DS VIR RESP RNA 4 TRGT: CPT | Performed by: INTERNAL MEDICINE

## 2024-10-07 PROCEDURE — 81003 URINALYSIS AUTO W/O SCOPE: CPT | Performed by: INTERNAL MEDICINE

## 2024-10-07 PROCEDURE — 99285 EMERGENCY DEPT VISIT HI MDM: CPT

## 2024-10-07 PROCEDURE — 71045 X-RAY EXAM CHEST 1 VIEW: CPT

## 2024-10-07 PROCEDURE — 85007 BL SMEAR W/DIFF WBC COUNT: CPT | Performed by: INTERNAL MEDICINE

## 2024-10-07 PROCEDURE — 80053 COMPREHEN METABOLIC PANEL: CPT | Performed by: INTERNAL MEDICINE

## 2024-10-07 PROCEDURE — 85610 PROTHROMBIN TIME: CPT | Performed by: INTERNAL MEDICINE

## 2024-10-07 PROCEDURE — 83605 ASSAY OF LACTIC ACID: CPT | Performed by: INTERNAL MEDICINE

## 2024-10-07 PROCEDURE — 85730 THROMBOPLASTIN TIME PARTIAL: CPT | Performed by: INTERNAL MEDICINE

## 2024-10-07 RX ORDER — SODIUM CHLORIDE 9 MG/ML
150 INJECTION, SOLUTION INTRAVENOUS CONTINUOUS
Status: DISCONTINUED | OUTPATIENT
Start: 2024-10-07 | End: 2024-10-08

## 2024-10-07 RX ADMIN — SODIUM CHLORIDE 150 ML/HR: 0.9 INJECTION, SOLUTION INTRAVENOUS at 23:04

## 2024-10-08 ENCOUNTER — APPOINTMENT (INPATIENT)
Dept: RADIOLOGY | Facility: HOSPITAL | Age: 84
DRG: 871 | End: 2024-10-08
Payer: COMMERCIAL

## 2024-10-08 ENCOUNTER — APPOINTMENT (EMERGENCY)
Dept: CT IMAGING | Facility: HOSPITAL | Age: 84
DRG: 871 | End: 2024-10-08
Payer: COMMERCIAL

## 2024-10-08 PROBLEM — R65.20 SEVERE SEPSIS (HCC): Status: ACTIVE | Noted: 2024-10-08

## 2024-10-08 PROBLEM — J18.9 COMMUNITY ACQUIRED PNEUMONIA OF RIGHT UPPER LOBE OF LUNG: Status: ACTIVE | Noted: 2024-10-08

## 2024-10-08 PROBLEM — A41.9 SEVERE SEPSIS (HCC): Status: ACTIVE | Noted: 2024-10-08

## 2024-10-08 LAB
ANION GAP SERPL CALCULATED.3IONS-SCNC: 6 MMOL/L (ref 4–13)
ATRIAL RATE: 115 BPM
BACTERIA UR QL AUTO: ABNORMAL /HPF
BASOPHILS # BLD AUTO: 0.02 THOUSANDS/ΜL (ref 0–0.1)
BASOPHILS NFR BLD AUTO: 0 % (ref 0–1)
BILIRUB UR QL STRIP: NEGATIVE
BUN SERPL-MCNC: 23 MG/DL (ref 5–25)
CALCIUM SERPL-MCNC: 7.1 MG/DL (ref 8.4–10.2)
CHLORIDE SERPL-SCNC: 104 MMOL/L (ref 96–108)
CLARITY UR: CLEAR
CO2 SERPL-SCNC: 25 MMOL/L (ref 21–32)
COLOR UR: YELLOW
CREAT SERPL-MCNC: 0.86 MG/DL (ref 0.6–1.3)
EOSINOPHIL # BLD AUTO: 0.4 THOUSAND/ΜL (ref 0–0.61)
EOSINOPHIL NFR BLD AUTO: 3 % (ref 0–6)
ERYTHROCYTE [DISTWIDTH] IN BLOOD BY AUTOMATED COUNT: 13.9 % (ref 11.6–15.1)
GFR SERPL CREATININE-BSD FRML MDRD: 78 ML/MIN/1.73SQ M
GLUCOSE SERPL-MCNC: 114 MG/DL (ref 65–140)
GLUCOSE UR STRIP-MCNC: ABNORMAL MG/DL
HCT VFR BLD AUTO: 37.2 % (ref 36.5–49.3)
HGB BLD-MCNC: 12.2 G/DL (ref 12–17)
HGB UR QL STRIP.AUTO: ABNORMAL
IMM GRANULOCYTES # BLD AUTO: 0.08 THOUSAND/UL (ref 0–0.2)
IMM GRANULOCYTES NFR BLD AUTO: 1 % (ref 0–2)
KETONES UR STRIP-MCNC: NEGATIVE MG/DL
LACTATE SERPL-SCNC: 2.2 MMOL/L (ref 0.5–2)
LEUKOCYTE ESTERASE UR QL STRIP: NEGATIVE
LYMPHOCYTES # BLD AUTO: 0.83 THOUSANDS/ΜL (ref 0.6–4.47)
LYMPHOCYTES NFR BLD AUTO: 5 % (ref 14–44)
MCH RBC QN AUTO: 29.4 PG (ref 26.8–34.3)
MCHC RBC AUTO-ENTMCNC: 32.8 G/DL (ref 31.4–37.4)
MCV RBC AUTO: 90 FL (ref 82–98)
MONOCYTES # BLD AUTO: 0.26 THOUSAND/ΜL (ref 0.17–1.22)
MONOCYTES NFR BLD AUTO: 2 % (ref 4–12)
MUCOUS THREADS UR QL AUTO: ABNORMAL
NEUTROPHILS # BLD AUTO: 14.51 THOUSANDS/ΜL (ref 1.85–7.62)
NEUTS SEG NFR BLD AUTO: 89 % (ref 43–75)
NITRITE UR QL STRIP: NEGATIVE
NON-SQ EPI CELLS URNS QL MICRO: ABNORMAL /HPF
NRBC BLD AUTO-RTO: 0 /100 WBCS
P AXIS: 58 DEGREES
PH UR STRIP.AUTO: 6 [PH]
PLATELET # BLD AUTO: 130 THOUSANDS/UL (ref 149–390)
PMV BLD AUTO: 9.4 FL (ref 8.9–12.7)
POTASSIUM SERPL-SCNC: 3.7 MMOL/L (ref 3.5–5.3)
PR INTERVAL: 158 MS
PROT UR STRIP-MCNC: ABNORMAL MG/DL
QRS AXIS: 261 DEGREES
QRSD INTERVAL: 80 MS
QT INTERVAL: 342 MS
QTC INTERVAL: 473 MS
RBC # BLD AUTO: 4.15 MILLION/UL (ref 3.88–5.62)
RBC #/AREA URNS AUTO: ABNORMAL /HPF
RBC CASTS URNS QL MICRO: ABNORMAL /LPF
SODIUM SERPL-SCNC: 135 MMOL/L (ref 135–147)
SP GR UR STRIP.AUTO: 1.02
T WAVE AXIS: 65 DEGREES
UROBILINOGEN UR QL STRIP.AUTO: 1 E.U./DL
VENTRICULAR RATE: 115 BPM
WBC # BLD AUTO: 16.1 THOUSAND/UL (ref 4.31–10.16)
WBC #/AREA URNS AUTO: ABNORMAL /HPF

## 2024-10-08 PROCEDURE — 83605 ASSAY OF LACTIC ACID: CPT | Performed by: INTERNAL MEDICINE

## 2024-10-08 PROCEDURE — 96361 HYDRATE IV INFUSION ADD-ON: CPT

## 2024-10-08 PROCEDURE — 71045 X-RAY EXAM CHEST 1 VIEW: CPT

## 2024-10-08 PROCEDURE — 94664 DEMO&/EVAL PT USE INHALER: CPT

## 2024-10-08 PROCEDURE — 80048 BASIC METABOLIC PNL TOTAL CA: CPT | Performed by: PHYSICIAN ASSISTANT

## 2024-10-08 PROCEDURE — 99223 1ST HOSP IP/OBS HIGH 75: CPT | Performed by: HOSPITALIST

## 2024-10-08 PROCEDURE — 93010 ELECTROCARDIOGRAM REPORT: CPT | Performed by: INTERNAL MEDICINE

## 2024-10-08 PROCEDURE — 94760 N-INVAS EAR/PLS OXIMETRY 1: CPT

## 2024-10-08 PROCEDURE — 94668 MNPJ CHEST WALL SBSQ: CPT

## 2024-10-08 PROCEDURE — 71250 CT THORAX DX C-: CPT

## 2024-10-08 PROCEDURE — 85025 COMPLETE CBC W/AUTO DIFF WBC: CPT | Performed by: PHYSICIAN ASSISTANT

## 2024-10-08 PROCEDURE — 0202U NFCT DS 22 TRGT SARS-COV-2: CPT | Performed by: HOSPITALIST

## 2024-10-08 PROCEDURE — 94640 AIRWAY INHALATION TREATMENT: CPT

## 2024-10-08 PROCEDURE — 36415 COLL VENOUS BLD VENIPUNCTURE: CPT | Performed by: INTERNAL MEDICINE

## 2024-10-08 RX ORDER — LEVOTHYROXINE SODIUM 75 UG/1
75 TABLET ORAL
Status: DISCONTINUED | OUTPATIENT
Start: 2024-10-11 | End: 2024-10-15 | Stop reason: HOSPADM

## 2024-10-08 RX ORDER — ALBUTEROL SULFATE 0.83 MG/ML
2.5 SOLUTION RESPIRATORY (INHALATION) EVERY 6 HOURS PRN
Status: DISCONTINUED | OUTPATIENT
Start: 2024-10-08 | End: 2024-10-15 | Stop reason: HOSPADM

## 2024-10-08 RX ORDER — ONDANSETRON 2 MG/ML
4 INJECTION INTRAMUSCULAR; INTRAVENOUS EVERY 6 HOURS PRN
Status: DISCONTINUED | OUTPATIENT
Start: 2024-10-08 | End: 2024-10-15 | Stop reason: HOSPADM

## 2024-10-08 RX ORDER — KETOROLAC TROMETHAMINE 30 MG/ML
15 INJECTION, SOLUTION INTRAMUSCULAR; INTRAVENOUS ONCE
Status: COMPLETED | OUTPATIENT
Start: 2024-10-08 | End: 2024-10-08

## 2024-10-08 RX ORDER — FUROSEMIDE 10 MG/ML
40 INJECTION INTRAMUSCULAR; INTRAVENOUS ONCE
Status: COMPLETED | OUTPATIENT
Start: 2024-10-08 | End: 2024-10-08

## 2024-10-08 RX ORDER — CYCLOBENZAPRINE HCL 10 MG
5 TABLET ORAL 3 TIMES DAILY PRN
Status: DISCONTINUED | OUTPATIENT
Start: 2024-10-08 | End: 2024-10-13

## 2024-10-08 RX ORDER — POTASSIUM CHLORIDE 1500 MG/1
20 TABLET, EXTENDED RELEASE ORAL ONCE
Status: COMPLETED | OUTPATIENT
Start: 2024-10-08 | End: 2024-10-08

## 2024-10-08 RX ORDER — CEFTRIAXONE 1 G/50ML
1000 INJECTION, SOLUTION INTRAVENOUS EVERY 24 HOURS
Status: DISCONTINUED | OUTPATIENT
Start: 2024-10-09 | End: 2024-10-10

## 2024-10-08 RX ORDER — ACETAMINOPHEN 325 MG/1
650 TABLET ORAL EVERY 6 HOURS PRN
Status: DISCONTINUED | OUTPATIENT
Start: 2024-10-08 | End: 2024-10-15 | Stop reason: HOSPADM

## 2024-10-08 RX ORDER — LANOLIN ALCOHOL/MO/W.PET/CERES
3 CREAM (GRAM) TOPICAL
Status: DISCONTINUED | OUTPATIENT
Start: 2024-10-08 | End: 2024-10-15 | Stop reason: HOSPADM

## 2024-10-08 RX ORDER — HYDROCHLOROTHIAZIDE 25 MG/1
25 TABLET ORAL DAILY
Status: DISCONTINUED | OUTPATIENT
Start: 2024-10-08 | End: 2024-10-09

## 2024-10-08 RX ORDER — IBUPROFEN 800 MG/1
800 TABLET, FILM COATED ORAL EVERY 8 HOURS PRN
Status: DISCONTINUED | OUTPATIENT
Start: 2024-10-08 | End: 2024-10-09

## 2024-10-08 RX ORDER — CEFTRIAXONE 2 G/50ML
2000 INJECTION, SOLUTION INTRAVENOUS ONCE
Status: COMPLETED | OUTPATIENT
Start: 2024-10-08 | End: 2024-10-08

## 2024-10-08 RX ORDER — HEPARIN SODIUM 5000 [USP'U]/ML
5000 INJECTION, SOLUTION INTRAVENOUS; SUBCUTANEOUS EVERY 8 HOURS SCHEDULED
Status: DISCONTINUED | OUTPATIENT
Start: 2024-10-08 | End: 2024-10-15 | Stop reason: HOSPADM

## 2024-10-08 RX ORDER — LEVOTHYROXINE SODIUM 50 UG/1
50 TABLET ORAL
Status: DISCONTINUED | OUTPATIENT
Start: 2024-10-09 | End: 2024-10-15 | Stop reason: HOSPADM

## 2024-10-08 RX ADMIN — Medication 3 MG: at 20:48

## 2024-10-08 RX ADMIN — HEPARIN SODIUM 5000 UNITS: 5000 INJECTION, SOLUTION INTRAVENOUS; SUBCUTANEOUS at 13:04

## 2024-10-08 RX ADMIN — HYDROCHLOROTHIAZIDE 25 MG: 25 TABLET ORAL at 08:23

## 2024-10-08 RX ADMIN — AZITHROMYCIN MONOHYDRATE 500 MG: 500 INJECTION, POWDER, LYOPHILIZED, FOR SOLUTION INTRAVENOUS at 15:08

## 2024-10-08 RX ADMIN — ONDANSETRON 4 MG: 2 INJECTION INTRAMUSCULAR; INTRAVENOUS at 16:57

## 2024-10-08 RX ADMIN — IBUPROFEN 800 MG: 800 TABLET, FILM COATED ORAL at 20:48

## 2024-10-08 RX ADMIN — HEPARIN SODIUM 5000 UNITS: 5000 INJECTION, SOLUTION INTRAVENOUS; SUBCUTANEOUS at 05:43

## 2024-10-08 RX ADMIN — ALBUTEROL SULFATE 2.5 MG: 2.5 SOLUTION RESPIRATORY (INHALATION) at 13:48

## 2024-10-08 RX ADMIN — ACETAMINOPHEN 650 MG: 325 TABLET ORAL at 16:44

## 2024-10-08 RX ADMIN — CEFTRIAXONE 2000 MG: 2 INJECTION, SOLUTION INTRAVENOUS at 02:22

## 2024-10-08 RX ADMIN — CYCLOBENZAPRINE HYDROCHLORIDE 5 MG: 10 TABLET, FILM COATED ORAL at 20:06

## 2024-10-08 RX ADMIN — KETOROLAC TROMETHAMINE 15 MG: 30 INJECTION, SOLUTION INTRAMUSCULAR at 02:37

## 2024-10-08 RX ADMIN — SODIUM CHLORIDE 1000 ML: 0.9 INJECTION, SOLUTION INTRAVENOUS at 04:09

## 2024-10-08 RX ADMIN — SODIUM CHLORIDE 1000 ML: 0.9 INJECTION, SOLUTION INTRAVENOUS at 02:30

## 2024-10-08 RX ADMIN — POTASSIUM CHLORIDE 20 MEQ: 1500 TABLET, EXTENDED RELEASE ORAL at 00:21

## 2024-10-08 RX ADMIN — HEPARIN SODIUM 5000 UNITS: 5000 INJECTION, SOLUTION INTRAVENOUS; SUBCUTANEOUS at 20:47

## 2024-10-08 RX ADMIN — ALBUTEROL SULFATE 2.5 MG: 2.5 SOLUTION RESPIRATORY (INHALATION) at 08:04

## 2024-10-08 RX ADMIN — SODIUM CHLORIDE 150 ML/HR: 0.9 INJECTION, SOLUTION INTRAVENOUS at 07:36

## 2024-10-08 RX ADMIN — FUROSEMIDE 40 MG: 10 INJECTION, SOLUTION INTRAMUSCULAR; INTRAVENOUS at 08:44

## 2024-10-08 NOTE — ED NOTES
Pt appears SOB. Oxygen saturation is WNL. Provider notified      Fluids stopped and lasix given   Shahida Spring RN  10/08/24 0826       Shahida Spring RN  10/08/24 0828

## 2024-10-08 NOTE — ASSESSMENT & PLAN NOTE
Admit to medicine  Trend lactic acid and procalcitonin  Blood cultures are currently pending  We will give sepsis protocol IV fluid bolus followed by normal saline 150 cc/h  Give Rocephin 2 g IV daily  Patient met severe sepsis criteria and that he was febrile with a Tmax of 103, tachycardia with a heart rate as high as 107, cytosis of 16.94, lactic acidosis of 3.8 with a suspected source of infection being right sided pneumonia

## 2024-10-08 NOTE — RESPIRATORY THERAPY NOTE
RT Protocol Note  Miguel Henderson 84 y.o. male MRN: [id number]  Unit/Bed#: ED 05 Encounter: [id number]    Assessment    Active Problems:    Hypothyroidism    Primary hypertension      Home Pulmonary Medications:  none  Home Devices/Therapy:  (none)    Past Medical History:   Diagnosis Date    Prostate cancer (HCC)     Thyroid cancer (HCC)      Social History     Socioeconomic History    Marital status: Unknown     Spouse name: None    Number of children: None    Years of education: None    Highest education level: None   Occupational History    None   Tobacco Use    Smoking status: Never    Smokeless tobacco: Never   Vaping Use    Vaping status: Never Used   Substance and Sexual Activity    Alcohol use: None    Drug use: None    Sexual activity: None   Other Topics Concern    None   Social History Narrative    None     Social Determinants of Health     Financial Resource Strain: Not on file   Food Insecurity: No Food Insecurity (9/18/2024)    Hunger Vital Sign     Worried About Running Out of Food in the Last Year: Never true     Ran Out of Food in the Last Year: Never true   Transportation Needs: No Transportation Needs (9/18/2024)    PRAPARE - Transportation     Lack of Transportation (Medical): No     Lack of Transportation (Non-Medical): No   Physical Activity: Not on file   Stress: Not on file   Social Connections: Not on file   Intimate Partner Violence: Not on file   Housing Stability: Low Risk  (9/18/2024)    Housing Stability Vital Sign     Unable to Pay for Housing in the Last Year: No     Number of Times Moved in the Last Year: 1     Homeless in the Last Year: No       Subjective         Objective    Physical Exam:   Assessment Type: Assess only  General Appearance: Alert, Awake  Respiratory Pattern: Dyspnea at rest  Chest Assessment: Chest expansion symmetrical  Bilateral Breath Sounds: Crackles  Cough: None  O2 Device: room air    Vitals:  Blood pressure 98/69, pulse 93, temperature 100 °F (37.8 °C),  temperature source Oral, resp. rate 20, SpO2 94%.          Imaging and other studies:    10/08/24 0500   Respiratory Protocol   Protocol Initiated? Yes   Protocol Selection Airway Clearance   Language Barrier? Yes   Medical & Social History Reviewed? Yes   Diagnostic Studies Reviewed? Yes   Physical Assessment Performed? Yes   Home Devices/Therapy   (none)   Airway Clearance Plan Flutter;Incentive Spirometer   Respiratory Assessment   Assessment Type Assess only   General Appearance Alert;Awake   Respiratory Pattern Dyspnea at rest   Chest Assessment Chest expansion symmetrical   Bilateral Breath Sounds Crackles   Cough None   Resp Comments pt assessed as per protocol. BBS fine crackles . encourage pt to use IS and flutter valve .   O2 Device room air   Additional Assessments   Pulse 93   Respirations 20   SpO2 94 %         O2 Device: room air     Plan       Airway Clearance Plan: Flutter, Incentive Spirometer     Resp Comments: pt assessed as per protocol. BBS fine crackles . encourage pt to use IS and flutter valve .

## 2024-10-08 NOTE — ED NOTES
Pt ambulated to the bathroom independently, dyspnea on ambulation     Shahida Spring RN  10/08/24 4821

## 2024-10-08 NOTE — ASSESSMENT & PLAN NOTE
Continue prehospital Synthroid 50 mcg p.o. daily Monday through Thursday 75 mcg p.o. daily Friday through Sunday

## 2024-10-08 NOTE — ED NOTES
Messaged Slim to let him know about his hypotension, orders placed for fluids.      Phyllis Del Cid RN  10/08/24 1584

## 2024-10-08 NOTE — ED PROVIDER NOTES
Final diagnoses:   None     ED Disposition       None          Assessment & Plan   {Hyperlinks  Risk Stratification - NIHSS - HEART SCORE - Fill out sepsis note and make sure you call [id number] if severe or septic shock:[id number]}    Medical Decision Making  Amount and/or Complexity of Data Reviewed  Labs: ordered.  Radiology: ordered.    Risk  Prescription drug management.             Medications   sodium chloride 0.9 % infusion (has no administration in time range)       ED Risk Strat Scores                                               History of Present Illness   {Hyperlinks  History (Med, Surg, Fam, Social) - Current Medications - Allergies  :[id number]}    Chief Complaint   Patient presents with    Shortness of Breath     Started Friday with SOB and intermittent cough; pain in chest earlier today that went away; sore throat       Past Medical History:   Diagnosis Date    Prostate cancer (HCC)     Thyroid cancer (HCC)       Past Surgical History:   Procedure Laterality Date    APPENDECTOMY        History reviewed. No pertinent family history.   Social History     Tobacco Use    Smoking status: Never    Smokeless tobacco: Never   Vaping Use    Vaping status: Never Used      E-Cigarette/Vaping    E-Cigarette Use Never User       E-Cigarette/Vaping Substances      I have reviewed and agree with the history as documented.     84-year-old male accompanied by 2 of his sons presents emergency room with fever for the past 3 to 4 days.  TM at home is 103.7, patient was given Tylenol.  Son was concerned because he had a cough and seem to be more short of breath on exertion.  They have been giving the patient Tylenol when he has a fever of greater than 101 and it seems to be keeping the temperature down.  However he increasingly short of breath, had no chest pain chest pressure.  The cough appears to be mostly dry occasionally sputum is clear to yellow.  No recent travel.  Patient is not a smoker he stopped smoking over 30  years ago.  No history of coronary artery disease or diabetes.        Review of Systems   Constitutional:  Positive for appetite change and fever.   HENT:  Positive for sore throat.    Eyes: Negative.    Respiratory:  Positive for cough, shortness of breath and wheezing. Negative for apnea, choking, chest tightness and stridor.    Cardiovascular: Negative.    Gastrointestinal: Negative.    Endocrine: Negative.    Genitourinary: Negative.    Musculoskeletal: Negative.    Skin: Negative.    Neurological: Negative.    Hematological: Negative.    Psychiatric/Behavioral: Negative.             Objective   {Hyperlinks  Historical Vitals - Historical Labs - Chart Review/Microbiology - Last Echo - Code Status  :[id number]}    ED Triage Vitals   Temperature Pulse Blood Pressure Respirations SpO2 Patient Position - Orthostatic VS   10/07/24 2230 10/07/24 2226 10/07/24 2226 10/07/24 2226 10/07/24 2226 --   100 °F (37.8 °C) (!) 107 115/65 20 93 %       Temp Source Heart Rate Source BP Location FiO2 (%) Pain Score    10/07/24 2230 10/07/24 2226 -- -- 10/07/24 2226    Oral Monitor   No Pain      Vitals      Date and Time Temp Pulse SpO2 Resp BP Pain Score FACES Pain Rating User   10/07/24 2230 100 °F (37.8 °C) -- -- -- -- -- -- AF   10/07/24 2226 -- 107 93 % 20 115/65 No Pain -- AF            Physical Exam  Vitals and nursing note reviewed.   Constitutional:       General: He is not in acute distress.     Appearance: He is well-developed. He is diaphoretic. He is not ill-appearing or toxic-appearing.   HENT:      Head: Normocephalic and atraumatic.      Mouth/Throat:      Mouth: Mucous membranes are moist.      Comments: Patient's throat is erythematous there are no exudates appreciated.  Eyes:      Extraocular Movements: Extraocular movements intact.   Cardiovascular:      Rate and Rhythm: Tachycardia present.   Pulmonary:      Effort: Pulmonary effort is normal.      Breath sounds: Examination of the right-lower field reveals  decreased breath sounds. Examination of the left-lower field reveals decreased breath sounds. Decreased breath sounds present. No wheezing, rhonchi or rales.   Chest:      Chest wall: No mass or deformity.   Abdominal:      General: Bowel sounds are normal.      Palpations: Abdomen is soft.   Musculoskeletal:         General: Normal range of motion.      Cervical back: Normal range of motion and neck supple.   Lymphadenopathy:      Cervical: No cervical adenopathy.   Skin:     General: Skin is warm.      Capillary Refill: Capillary refill takes less than 2 seconds.   Neurological:      General: No focal deficit present.      Mental Status: He is alert and oriented to person, place, and time.      Cranial Nerves: No cranial nerve deficit.      Motor: No weakness.   Psychiatric:         Mood and Affect: Mood normal.         Behavior: Behavior normal.         Results Reviewed       Procedure Component Value Units Date/Time    Strep A PCR [[id number]]     Lab Status: No result Specimen: Throat     CBC and differential [[id number]]     Lab Status: No result Specimen: Blood     Comprehensive metabolic panel [[id number]]     Lab Status: No result Specimen: Blood     Lactic acid [[id number]]     Lab Status: No result Specimen: Blood     Procalcitonin [[id number]]     Lab Status: No result Specimen: Blood     Protime-INR [[id number]]     Lab Status: No result Specimen: Blood     APTT [[id number]]     Lab Status: No result Specimen: Blood     Blood culture #1 [[id number]]     Lab Status: No result Specimen: Blood     Blood culture #2 [[id number]]     Lab Status: No result Specimen: Blood     UA w Reflex to Microscopic w Reflex to Culture [[id number]]     Lab Status: No result Specimen: Urine     FLU/RSV/COVID - if FLU/RSV clinically relevant [[id number]]     Lab Status: No result Specimen: Nares from Nose             XR chest portable - 1 view    (Results Pending)       Procedures    ED Medication and Procedure Management   Prior to  Admission Medications   Prescriptions Last Dose Informant Patient Reported? Taking?   cyclobenzaprine (FLEXERIL) 5 mg tablet   Yes No   Sig: Take 5 mg by mouth 3 (three) times a day as needed   hydroCHLOROthiazide 25 mg tablet   Yes No   Sig: Take 1 tablet by mouth daily   ibuprofen (MOTRIN) 800 mg tablet   No No   Sig: Take 1 tablet (800 mg total) by mouth every 8 (eight) hours as needed for moderate pain   levothyroxine 50 mcg tablet   Yes No   Sig: Take 50 mcg by mouth daily   levothyroxine 75 mcg tablet   Yes No   Sig: Take 75 mcg by mouth Contingent Taken every Friday through Sunday      Facility-Administered Medications: None     Patient's Medications   Discharge Prescriptions    No medications on file     No discharge procedures on file.  ED SEPSIS DOCUMENTATION

## 2024-10-08 NOTE — H&P
"H&P - Hospitalist   Name: Miguel Henderson 84 y.o. male I MRN: [id number]  Unit/Bed#: ED 05 I Date of Admission: 10/7/2024   Date of Service: 10/8/2024 I Hospital Day: 0     Assessment & Plan  Severe sepsis (HCC)  Admit to medicine  Trend lactic acid and procalcitonin  Blood cultures are currently pending  We will give sepsis protocol IV fluid bolus followed by normal saline 150 cc/h  Give Rocephin 2 g IV daily  Patient met severe sepsis criteria and that he was febrile with a Tmax of 103, tachycardia with a heart rate as high as 107, cytosis of 16.94, lactic acidosis of 3.8 with a suspected source of infection being right sided pneumonia  Community acquired pneumonia of right upper lobe of lung  CT imaging revealed \"There is some minor groundglass and tree-in-bud nodularity suggested in the right upper lobe inferior segment and to a lesser degree in the right lower lobe\"  Antibiotics as per above  Placed on respiratory and O2 protocol  Hypothyroidism  Continue prehospital Synthroid 50 mcg p.o. daily Monday through Thursday 75 mcg p.o. daily Friday through Sunday  Primary hypertension  Continue prehospital hydrochlorothiazide 25 mg p.o. daily      VTE Pharmacologic Prophylaxis: VTE Score: 8 High Risk (Score >/= 5) - Pharmacological DVT Prophylaxis Ordered: heparin. Sequential Compression Devices Ordered.  Code Status: Level 1 - Full Code patient and patient's sons  Discussion with family: Updated  (son) at bedside.    Anticipated Length of Stay: Patient will be admitted on an inpatient basis with an anticipated length of stay of greater than 2 midnights secondary to severe sepsis requiring IV fluid resuscitation and IV antibiotics.    History of Present Illness   Chief Complaint: Fever x 4 days    Miguel Henderson is a 84 y.o. male with a PMH of prostate cancer and thyroid cancer who presents with fever x 4 days.  Patient is Egyptian-speaking only and history is obtained through assistance of his son " who is present at bedside and acting as .    Patient has been having intermittent fevers which have been responsive to Tylenol and Motrin at home but have ranged as high as 103.7 and yesterday was having some apparent respiratory distress but he has no respiratory history and is on no respiratory medications at home.  Patient does have a scant cough which is nonproductive and denies any increased urinary frequency or dysuria though he does complain of some hematuria which has been ongoing for the past month.  Patient additionally has been complaining of a sore throat for the past 4 days with decreased p.o. fluid intake secondary to pain with swallowing.    While in ER patient was noted to meet severe sepsis criteria and was thus referred for admission.    Review of Systems   Constitutional:  Positive for activity change, appetite change, chills, fatigue and fever.   HENT:  Positive for sore throat.    Respiratory:  Positive for cough and shortness of breath. Negative for wheezing.    Cardiovascular:  Negative for chest pain and palpitations.   Gastrointestinal:  Negative for abdominal pain, diarrhea, nausea and vomiting.   Genitourinary:  Negative for dysuria, frequency, hematuria and urgency.   Neurological:  Negative for weakness, light-headedness and headaches.   All other systems reviewed and are negative.      Historical Information   Past Medical History:   Diagnosis Date    Prostate cancer (HCC)     Thyroid cancer (HCC)      Past Surgical History:   Procedure Laterality Date    APPENDECTOMY       Social History     Tobacco Use    Smoking status: Never    Smokeless tobacco: Never   Vaping Use    Vaping status: Never Used   Substance and Sexual Activity    Alcohol use: Not on file    Drug use: Not on file    Sexual activity: Not on file     E-Cigarette/Vaping    E-Cigarette Use Never User      E-Cigarette/Vaping Substances     Family history non-contributory  Social History:  Marital Status: Unknown    Occupation: Retired  Patient Pre-hospital Living Situation: Home  Patient Pre-hospital Level of Mobility: walks  Patient Pre-hospital Diet Restrictions: None    Objective :  Temp:  [100 °F (37.8 °C)] 100 °F (37.8 °C)  HR:  [] 93  BP: ()/(48-69) 98/69  Resp:  [19-20] 20  SpO2:  [92 %-95 %] 94 %  O2 Device: None (Room air)    Physical Exam  Vitals and nursing note reviewed.   Constitutional:       Appearance: He is well-developed.   HENT:      Head: Normocephalic and atraumatic.      Right Ear: Tympanic membrane normal.      Left Ear: Tympanic membrane normal.      Nose: Nose normal.      Mouth/Throat:      Mouth: Mucous membranes are moist.      Pharynx: No oropharyngeal exudate.   Eyes:      General: No scleral icterus.     Pupils: Pupils are equal, round, and reactive to light.   Neck:      Vascular: No JVD.   Cardiovascular:      Rate and Rhythm: Normal rate and regular rhythm.      Heart sounds: Normal heart sounds. No murmur heard.  Pulmonary:      Effort: Pulmonary effort is normal. No respiratory distress.      Breath sounds: Decreased breath sounds present. No wheezing or rales.   Abdominal:      General: Bowel sounds are normal.      Palpations: Abdomen is soft.      Tenderness: There is no abdominal tenderness. There is no guarding or rebound.   Musculoskeletal:         General: Normal range of motion.      Cervical back: Normal range of motion and neck supple.      Right lower leg: No edema.      Left lower leg: No edema.   Lymphadenopathy:      Cervical: No cervical adenopathy.   Skin:     General: Skin is warm and dry.      Findings: No erythema or rash.   Neurological:      Mental Status: He is alert and oriented to person, place, and time.   Psychiatric:         Behavior: Behavior normal.         Lines/Drains:            Lab Results: I have reviewed the following results:  Results from last 7 days   Lab Units 10/08/24  0509 10/07/24  2306   WBC Thousand/uL 16.10* 16.94*   HEMOGLOBIN g/dL  12.2 13.8   HEMATOCRIT % 37.2 41.3   PLATELETS Thousands/uL 130* 150   BANDS PCT %  --  10*   SEGS PCT % 89*  --    LYMPHO PCT % 5* 2*   MONO PCT % 2* 5   EOS PCT % 3 0     Results from last 7 days   Lab Units 10/08/24  0509 10/07/24  2306   SODIUM mmol/L 135 133*   POTASSIUM mmol/L 3.7 3.3*   CHLORIDE mmol/L 104 97   CO2 mmol/L 25 25   BUN mg/dL 23 21   CREATININE mg/dL 0.86 1.03   ANION GAP mmol/L 6 11   CALCIUM mg/dL 7.1* 8.5   ALBUMIN g/dL  --  3.3*   TOTAL BILIRUBIN mg/dL  --  1.80*   ALK PHOS U/L  --  68   ALT U/L  --  22   AST U/L  --  26   GLUCOSE RANDOM mg/dL 114 158*     Results from last 7 days   Lab Units 10/07/24  2306   INR  1.18         Lab Results   Component Value Date    HGBA1C 5.9 (H) 06/18/2024    HGBA1C 5.8 (H) 09/12/2023    HGBA1C 5.8 (H) 06/26/2023     Results from last 7 days   Lab Units 10/08/24  0202 10/07/24  2306   LACTIC ACID mmol/L 2.2* 3.8*   PROCALCITONIN ng/ml  --  2.15*       Imaging Results Review: I reviewed radiology reports from this admission including: CT chest.  Other Study Results Review: No additional pertinent studies reviewed.    Administrative Statements   I have spent a total time of 60 minutes in caring for this patient on the day of the visit/encounter including Diagnostic results, Patient and family education, Impressions, Documenting in the medical record, Reviewing / ordering tests, medicine, procedures  , and Obtaining or reviewing history  .    ** Please Note: This note has been constructed using a voice recognition system. **

## 2024-10-08 NOTE — ASSESSMENT & PLAN NOTE
"CT imaging revealed \"There is some minor groundglass and tree-in-bud nodularity suggested in the right upper lobe inferior segment and to a lesser degree in the right lower lobe\"  Antibiotics as per above  Placed on respiratory and O2 protocol  "

## 2024-10-08 NOTE — ED NOTES
Monitor for any signs or symptoms of infection such as redness, swelling, or pain at wound site.  Report to the Emergency Room or call clinic if you suspect infection.  Include adequate protein in your diet to assist with wound healing.      Great Job on Healing! We are so happy you chose us to be apart of your wound healing journey. Please feel free to call us should you need us again.   Our phone number is [phone], we are open Monday - Friday 7:00 am- 5:00 pm.     Some important tips to stay healed:  • Eat a balanced diet- good protein and if you are diabetic control blood sugar levels.  • Stay active- activities and exercise promote good circulation  • Avoid smoking- smoking narrows your blood vessels.   • Moisturize your skin daily, avoiding scented lotion. Avoid excess lotion between your toes.   • Most importantly inspect your skin daily.        Report given to 2nd floor charge      Shahida Spring RN  10/08/24 4736

## 2024-10-08 NOTE — RESPIRATORY THERAPY NOTE
RT Protocol Note  Miguel Henderson 84 y.o. male MRN: [id number]  Unit/Bed#: ED 05 Encounter: [id number]    Assessment    Principal Problem:    Severe sepsis (HCC)  Active Problems:    Hypothyroidism    Primary hypertension    Community acquired pneumonia of right upper lobe of lung      Home Pulmonary Medications:  None    Home Devices/Therapy: Other (Comment) (None)    Past Medical History:   Diagnosis Date    Prostate cancer (HCC)     Thyroid cancer (HCC)      Social History     Socioeconomic History    Marital status: Unknown     Spouse name: None    Number of children: None    Years of education: None    Highest education level: None   Occupational History    None   Tobacco Use    Smoking status: Never    Smokeless tobacco: Never   Vaping Use    Vaping status: Never Used   Substance and Sexual Activity    Alcohol use: None    Drug use: None    Sexual activity: None   Other Topics Concern    None   Social History Narrative    None     Social Determinants of Health     Financial Resource Strain: Not on file   Food Insecurity: No Food Insecurity (9/18/2024)    Hunger Vital Sign     Worried About Running Out of Food in the Last Year: Never true     Ran Out of Food in the Last Year: Never true   Transportation Needs: No Transportation Needs (9/18/2024)    PRAPARE - Transportation     Lack of Transportation (Medical): No     Lack of Transportation (Non-Medical): No   Physical Activity: Not on file   Stress: Not on file   Social Connections: Not on file   Intimate Partner Violence: Not on file   Housing Stability: Low Risk  (9/18/2024)    Housing Stability Vital Sign     Unable to Pay for Housing in the Last Year: No     Number of Times Moved in the Last Year: 1     Homeless in the Last Year: No       Subjective         Objective    Physical Exam:   Assessment Type: Pre-treatment  General Appearance: Alert, Awake  Respiratory Pattern: Dyspnea with exertion, Dyspnea at rest  Chest Assessment: Chest expansion  symmetrical  Bilateral Breath Sounds: Diminished  Cough: None  O2 Device: room air    Vitals:  Blood pressure 134/72, pulse 104, temperature 97.5 °F (36.4 °C), temperature source Temporal, resp. rate 21, SpO2 94%.          Imaging and other studies: Results Review Statement: I reviewed radiology reports from this admission including:   and chest xray.    O2 Device: room air     Plan    Respiratory Plan: Mild Distress pathway  Airway Clearance Plan: Flutter     Resp Comments: Patient was very SOB after going to the bathroom . Respiratory protocol reordered and PRN nebs ordered with albuterol. Patient stated he felt alittle better post TX. Will continune to monitor the patient

## 2024-10-08 NOTE — PLAN OF CARE
Problem: PAIN - ADULT  Goal: Verbalizes/displays adequate comfort level or baseline comfort level  Description: Interventions:  - Encourage patient to monitor pain and request assistance  - Assess pain using appropriate pain scale  - Administer analgesics based on type and severity of pain and evaluate response  - Implement non-pharmacological measures as appropriate and evaluate response  - Consider cultural and social influences on pain and pain management  - Notify physician/advanced practitioner if interventions unsuccessful or patient reports new pain  Outcome: Progressing    Problem: INFECTION - ADULT  Goal: Absence or prevention of progression during hospitalization  Description: INTERVENTIONS:  - Assess and monitor for signs and symptoms of infection  - Monitor lab/diagnostic results  - Monitor all insertion sites, i.e. indwelling lines, tubes, and drains  - Monitor endotracheal if appropriate and nasal secretions for changes in amount and color  - [place] appropriate cooling/warming therapies per order  - Administer medications as ordered  - Instruct and encourage patient and family to use good hand hygiene technique  - Identify and instruct in appropriate isolation precautions for identified infection/condition  Outcome: Progressing    Problem: RESPIRATORY - ADULT  Goal: Achieves optimal ventilation and oxygenation  Description: INTERVENTIONS:  - Assess for changes in respiratory status  - Assess for changes in mentation and behavior  - Position to facilitate oxygenation and minimize respiratory effort  - Oxygen administered by appropriate delivery if ordered  - Initiate smoking cessation education as indicated  - Encourage broncho-pulmonary hygiene including cough, deep breathe, Incentive Spirometry  - Assess the need for suctioning and aspirate as needed  - Assess and instruct to report SOB or any respiratory difficulty  - Respiratory Therapy support as indicated  Outcome: Progressing

## 2024-10-09 ENCOUNTER — APPOINTMENT (OUTPATIENT)
Dept: PHYSICAL THERAPY | Facility: CLINIC | Age: 84
End: 2024-10-09
Payer: COMMERCIAL

## 2024-10-09 ENCOUNTER — APPOINTMENT (INPATIENT)
Dept: NON INVASIVE DIAGNOSTICS | Facility: HOSPITAL | Age: 84
DRG: 871 | End: 2024-10-09
Payer: COMMERCIAL

## 2024-10-09 ENCOUNTER — APPOINTMENT (INPATIENT)
Dept: RADIOLOGY | Facility: HOSPITAL | Age: 84
DRG: 871 | End: 2024-10-09
Payer: COMMERCIAL

## 2024-10-09 PROBLEM — J18.9 PNEUMONIA: Status: ACTIVE | Noted: 2024-10-09

## 2024-10-09 PROBLEM — N17.9 ACUTE KIDNEY INJURY (HCC): Status: ACTIVE | Noted: 2024-10-09

## 2024-10-09 PROBLEM — B34.8 RHINOVIRUS: Status: ACTIVE | Noted: 2024-10-09

## 2024-10-09 LAB
ANION GAP SERPL CALCULATED.3IONS-SCNC: 10 MMOL/L (ref 4–13)
AORTIC ROOT: 3.8 CM
APICAL FOUR CHAMBER EJECTION FRACTION: 75 %
ASCENDING AORTA: 3.9 CM
AV LVOT MEAN GRADIENT: 3 MMHG
AV LVOT PEAK GRADIENT: 5 MMHG
B PARAP IS1001 DNA NPH QL NAA+NON-PROBE: NOT DETECTED
B PERT.PT PRMT NPH QL NAA+NON-PROBE: NOT DETECTED
BSA FOR ECHO PROCEDURE: 1.83 M2
BUN SERPL-MCNC: 34 MG/DL (ref 5–25)
C PNEUM DNA NPH QL NAA+NON-PROBE: NOT DETECTED
CALCIUM SERPL-MCNC: 8 MG/DL (ref 8.4–10.2)
CHLORIDE SERPL-SCNC: 99 MMOL/L (ref 96–108)
CO2 SERPL-SCNC: 26 MMOL/L (ref 21–32)
CREAT SERPL-MCNC: 1.43 MG/DL (ref 0.6–1.3)
DOP CALC LVOT PEAK VEL VTI: 19.88 CM
DOP CALC LVOT PEAK VEL: 1.16 M/S
E WAVE DECELERATION TIME: 283 MS
E/A RATIO: 0.74
ERYTHROCYTE [DISTWIDTH] IN BLOOD BY AUTOMATED COUNT: 14 % (ref 11.6–15.1)
FLUAV RNA NPH QL NAA+NON-PROBE: NOT DETECTED
FLUBV RNA NPH QL NAA+NON-PROBE: NOT DETECTED
FRACTIONAL SHORTENING: 31 (ref 28–44)
GFR SERPL CREATININE-BSD FRML MDRD: 44 ML/MIN/1.73SQ M
GLUCOSE SERPL-MCNC: 94 MG/DL (ref 65–140)
HADV DNA NPH QL NAA+NON-PROBE: NOT DETECTED
HCOV 229E RNA NPH QL NAA+NON-PROBE: NOT DETECTED
HCOV HKU1 RNA NPH QL NAA+NON-PROBE: NOT DETECTED
HCOV NL63 RNA NPH QL NAA+NON-PROBE: NOT DETECTED
HCOV OC43 RNA NPH QL NAA+NON-PROBE: NOT DETECTED
HCT VFR BLD AUTO: 40.9 % (ref 36.5–49.3)
HGB BLD-MCNC: 13.5 G/DL (ref 12–17)
HMPV RNA NPH QL NAA+NON-PROBE: NOT DETECTED
HPIV1 RNA NPH QL NAA+NON-PROBE: NOT DETECTED
HPIV2 RNA NPH QL NAA+NON-PROBE: NOT DETECTED
HPIV3 RNA NPH QL NAA+NON-PROBE: NOT DETECTED
HPIV4 RNA NPH QL NAA+NON-PROBE: NOT DETECTED
INTERVENTRICULAR SEPTUM IN DIASTOLE (PARASTERNAL SHORT AXIS VIEW): 1.3 CM
INTERVENTRICULAR SEPTUM: 1.3 CM (ref 0.6–1.1)
L PNEUMO1 AG UR QL IA.RAPID: NEGATIVE
LAAS-AP2: 15.3 CM2
LAAS-AP4: 13.9 CM2
LEFT ATRIUM SIZE: 3.6 CM
LEFT ATRIUM VOLUME (MOD BIPLANE): 33 ML
LEFT ATRIUM VOLUME INDEX (MOD BIPLANE): 18 ML/M2
LEFT INTERNAL DIMENSION IN SYSTOLE: 3.3 CM (ref 2.1–4)
LEFT VENTRICULAR INTERNAL DIMENSION IN DIASTOLE: 4.8 CM (ref 3.5–6)
LEFT VENTRICULAR POSTERIOR WALL IN END DIASTOLE: 1.2 CM
LEFT VENTRICULAR STROKE VOLUME: 64 ML
LVSV (TEICH): 64 ML
M PNEUMO DNA NPH QL NAA+NON-PROBE: NOT DETECTED
MCH RBC QN AUTO: 28.8 PG (ref 26.8–34.3)
MCHC RBC AUTO-ENTMCNC: 33 G/DL (ref 31.4–37.4)
MCV RBC AUTO: 87 FL (ref 82–98)
MV PEAK A VEL: 1 M/S
MV PEAK E VEL: 74 CM/S
MV STENOSIS PRESSURE HALF TIME: 82 MS
MV VALVE AREA P 1/2 METHOD: 2.7
PLATELET # BLD AUTO: 161 THOUSANDS/UL (ref 149–390)
PMV BLD AUTO: 9.8 FL (ref 8.9–12.7)
POTASSIUM SERPL-SCNC: 2.8 MMOL/L (ref 3.5–5.3)
PROCALCITONIN SERPL-MCNC: 6.95 NG/ML
RBC # BLD AUTO: 4.69 MILLION/UL (ref 3.88–5.62)
RIGHT ATRIUM AREA SYSTOLE A4C: 15.1 CM2
RIGHT VENTRICLE ID DIMENSION: 3 CM
RSV RNA NPH QL NAA+NON-PROBE: NOT DETECTED
RV+EV RNA NPH QL NAA+NON-PROBE: DETECTED
S PNEUM AG UR QL: NEGATIVE
SARS-COV-2 RNA NPH QL NAA+NON-PROBE: NOT DETECTED
SL CV LEFT ATRIUM LENGTH A2C: 5.9 CM
SL CV LV EF: 55
SL CV PED ECHO LEFT VENTRICLE DIASTOLIC VOLUME (MOD BIPLANE) 2D: 107 ML
SL CV PED ECHO LEFT VENTRICLE SYSTOLIC VOLUME (MOD BIPLANE) 2D: 43 ML
SODIUM SERPL-SCNC: 135 MMOL/L (ref 135–147)
TR MAX PG: 23 MMHG
TR PEAK VELOCITY: 2.4 M/S
TRICUSPID ANNULAR PLANE SYSTOLIC EXCURSION: 1.8 CM
TRICUSPID VALVE PEAK REGURGITATION VELOCITY: 2.37 M/S
WBC # BLD AUTO: 15.93 THOUSAND/UL (ref 4.31–10.16)

## 2024-10-09 PROCEDURE — 80048 BASIC METABOLIC PNL TOTAL CA: CPT | Performed by: HOSPITALIST

## 2024-10-09 PROCEDURE — 94664 DEMO&/EVAL PT USE INHALER: CPT

## 2024-10-09 PROCEDURE — 87070 CULTURE OTHR SPECIMN AEROBIC: CPT | Performed by: HOSPITALIST

## 2024-10-09 PROCEDURE — 94668 MNPJ CHEST WALL SBSQ: CPT

## 2024-10-09 PROCEDURE — 85027 COMPLETE CBC AUTOMATED: CPT | Performed by: HOSPITALIST

## 2024-10-09 PROCEDURE — 87205 SMEAR GRAM STAIN: CPT | Performed by: HOSPITALIST

## 2024-10-09 PROCEDURE — 94760 N-INVAS EAR/PLS OXIMETRY 1: CPT

## 2024-10-09 PROCEDURE — 87106 FUNGI IDENTIFICATION YEAST: CPT | Performed by: HOSPITALIST

## 2024-10-09 PROCEDURE — 74230 X-RAY XM SWLNG FUNCJ C+: CPT

## 2024-10-09 PROCEDURE — 93306 TTE W/DOPPLER COMPLETE: CPT | Performed by: INTERNAL MEDICINE

## 2024-10-09 PROCEDURE — 87449 NOS EACH ORGANISM AG IA: CPT | Performed by: HOSPITALIST

## 2024-10-09 PROCEDURE — 84145 PROCALCITONIN (PCT): CPT | Performed by: PHYSICIAN ASSISTANT

## 2024-10-09 PROCEDURE — 92611 MOTION FLUOROSCOPY/SWALLOW: CPT

## 2024-10-09 PROCEDURE — 93306 TTE W/DOPPLER COMPLETE: CPT

## 2024-10-09 PROCEDURE — 99223 1ST HOSP IP/OBS HIGH 75: CPT | Performed by: STUDENT IN AN ORGANIZED HEALTH CARE EDUCATION/TRAINING PROGRAM

## 2024-10-09 PROCEDURE — 92610 EVALUATE SWALLOWING FUNCTION: CPT

## 2024-10-09 PROCEDURE — 99232 SBSQ HOSP IP/OBS MODERATE 35: CPT | Performed by: HOSPITALIST

## 2024-10-09 RX ORDER — SODIUM CHLORIDE 9 MG/ML
75 INJECTION, SOLUTION INTRAVENOUS CONTINUOUS
Status: DISCONTINUED | OUTPATIENT
Start: 2024-10-09 | End: 2024-10-11

## 2024-10-09 RX ORDER — KETOROLAC TROMETHAMINE 30 MG/ML
15 INJECTION, SOLUTION INTRAMUSCULAR; INTRAVENOUS EVERY 6 HOURS PRN
Status: DISCONTINUED | OUTPATIENT
Start: 2024-10-09 | End: 2024-10-09

## 2024-10-09 RX ORDER — POTASSIUM CHLORIDE 1500 MG/1
40 TABLET, EXTENDED RELEASE ORAL 2 TIMES DAILY
Status: COMPLETED | OUTPATIENT
Start: 2024-10-09 | End: 2024-10-10

## 2024-10-09 RX ORDER — POTASSIUM CHLORIDE 14.9 MG/ML
20 INJECTION INTRAVENOUS
Status: DISPENSED | OUTPATIENT
Start: 2024-10-09 | End: 2024-10-09

## 2024-10-09 RX ADMIN — POTASSIUM CHLORIDE 40 MEQ: 1500 TABLET, EXTENDED RELEASE ORAL at 13:45

## 2024-10-09 RX ADMIN — ACETAMINOPHEN 650 MG: 325 TABLET ORAL at 17:10

## 2024-10-09 RX ADMIN — ACETAMINOPHEN 650 MG: 325 TABLET ORAL at 10:41

## 2024-10-09 RX ADMIN — HEPARIN SODIUM 5000 UNITS: 5000 INJECTION, SOLUTION INTRAVENOUS; SUBCUTANEOUS at 13:46

## 2024-10-09 RX ADMIN — Medication 3 MG: at 21:30

## 2024-10-09 RX ADMIN — SODIUM CHLORIDE 125 ML/HR: 0.9 INJECTION, SOLUTION INTRAVENOUS at 10:25

## 2024-10-09 RX ADMIN — POTASSIUM CHLORIDE 20 MEQ: 14.9 INJECTION, SOLUTION INTRAVENOUS at 10:25

## 2024-10-09 RX ADMIN — HEPARIN SODIUM 5000 UNITS: 5000 INJECTION, SOLUTION INTRAVENOUS; SUBCUTANEOUS at 06:19

## 2024-10-09 RX ADMIN — HEPARIN SODIUM 5000 UNITS: 5000 INJECTION, SOLUTION INTRAVENOUS; SUBCUTANEOUS at 21:30

## 2024-10-09 RX ADMIN — KETOROLAC TROMETHAMINE 15 MG: 30 INJECTION, SOLUTION INTRAMUSCULAR at 02:48

## 2024-10-09 RX ADMIN — POTASSIUM CHLORIDE 40 MEQ: 1500 TABLET, EXTENDED RELEASE ORAL at 17:10

## 2024-10-09 RX ADMIN — CEFTRIAXONE 1000 MG: 1 INJECTION, SOLUTION INTRAVENOUS at 01:43

## 2024-10-09 RX ADMIN — LEVOTHYROXINE SODIUM 50 MCG: 50 TABLET ORAL at 06:19

## 2024-10-09 NOTE — PLAN OF CARE
Problem: PAIN - ADULT  Goal: Verbalizes/displays adequate comfort level or baseline comfort level  Description: Interventions:  - Encourage patient to monitor pain and request assistance  - Assess pain using appropriate pain scale  - Administer analgesics based on type and severity of pain and evaluate response  - Implement non-pharmacological measures as appropriate and evaluate response  - Consider cultural and social influences on pain and pain management  - Notify physician/advanced practitioner if interventions unsuccessful or patient reports new pain  Outcome: Progressing     Problem: INFECTION - ADULT  Goal: Absence or prevention of progression during hospitalization  Description: INTERVENTIONS:  - Assess and monitor for signs and symptoms of infection  - Monitor lab/diagnostic results  - Monitor all insertion sites, i.e. indwelling lines, tubes, and drains  - Monitor endotracheal if appropriate and nasal secretions for changes in amount and color  - [place] appropriate cooling/warming therapies per order  - Administer medications as ordered  - Instruct and encourage patient and family to use good hand hygiene technique  - Identify and instruct in appropriate isolation precautions for identified infection/condition  Outcome: Progressing  Goal: Absence of fever/infection during neutropenic period  Description: INTERVENTIONS:  - Monitor WBC    Outcome: Progressing     Problem: SAFETY ADULT  Goal: Patient will remain free of falls  Description: INTERVENTIONS:  - Educate patient/family on patient safety including physical limitations  - Instruct patient to call for assistance with activity   - Consult OT/PT to assist with strengthening/mobility   - Keep Call bell within reach  - Keep bed low and locked with side rails adjusted as appropriate  - Keep care items and personal belongings within reach  - Initiate and maintain comfort rounds  - Make Fall Risk Sign visible to staff  - Offer Toileting every 2 Hours,  in advance of need  - Initiate/Maintain bed alarm  - Obtain necessary fall risk management equipment: non skid socks  - Apply yellow socks and bracelet for high fall risk patients  - Consider moving patient to room near nurses station  Outcome: Progressing  Goal: Maintain or return to baseline ADL function  Description: INTERVENTIONS:  -  Assess patient's ability to carry out ADLs; assess patient's baseline for ADL function and identify physical deficits which impact ability to perform ADLs (bathing, care of mouth/teeth, toileting, grooming, dressing, etc.)  - Assess/evaluate cause of self-care deficits   - Assess range of motion  - Assess patient's mobility; develop plan if impaired  - Assess patient's need for assistive devices and provide as appropriate  - Encourage maximum independence but intervene and supervise when necessary  - Involve family in performance of ADLs  - Assess for home care needs following discharge   - Consider OT consult to assist with ADL evaluation and planning for discharge  - Provide patient education as appropriate  Outcome: Progressing  Goal: Maintains/Returns to pre admission functional level  Description: INTERVENTIONS:  - Perform AM-PAC 6 Click Basic Mobility/ Daily Activity assessment daily.  - Set and communicate daily mobility goal to care team and patient/family/caregiver.   - Collaborate with rehabilitation services on mobility goals if consulted  - Perform Range of Motion 2 times a day.  - Reposition patient every 2 hours.  - Dangle patient 2 times a day  - Stand patient 2 times a day  - Ambulate patient 2 times a day  - Out of bed to chair 3 times a day   - Out of bed for meals 3 times a day  - Out of bed for toileting  - Record patient progress and toleration of activity level   Outcome: Progressing     Problem: DISCHARGE PLANNING  Goal: Discharge to home or other facility with appropriate resources  Description: INTERVENTIONS:  - Identify barriers to discharge w/patient and  caregiver  - Arrange for needed discharge resources and transportation as appropriate  - Identify discharge learning needs (meds, wound care, etc.)  - Arrange for interpretive services to assist at discharge as needed  - Refer to Case Management Department for coordinating discharge planning if the patient needs post-hospital services based on physician/advanced practitioner order or complex needs related to functional status, cognitive ability, or social support system  Outcome: Progressing     Problem: Knowledge Deficit  Goal: Patient/family/caregiver demonstrates understanding of disease process, treatment plan, medications, and discharge instructions  Description: Complete learning assessment and assess knowledge base.  Interventions:  - Provide teaching at level of understanding  - Provide teaching via preferred learning methods  Outcome: Progressing     Problem: METABOLIC, FLUID AND ELECTROLYTES - ADULT  Goal: Electrolytes maintained within normal limits  Description: INTERVENTIONS:  - Monitor labs and assess patient for signs and symptoms of electrolyte imbalances  - Administer electrolyte replacement as ordered  - Monitor response to electrolyte replacements, including repeat lab results as appropriate  - Instruct patient on fluid and nutrition as appropriate  Outcome: Progressing  Goal: Fluid balance maintained  Description: INTERVENTIONS:  - Monitor labs   - Monitor I/O and WT  - Instruct patient on fluid and nutrition as appropriate  - Assess for signs & symptoms of volume excess or deficit  Outcome: Progressing     Problem: CARDIOVASCULAR - ADULT  Goal: Maintains optimal cardiac output and hemodynamic stability  Description: INTERVENTIONS:  - Monitor I/O, vital signs and rhythm  - Monitor for S/S and trends of decreased cardiac output  - Administer and titrate ordered vasoactive medications to optimize hemodynamic stability  - Assess quality of pulses, skin color and temperature  - Assess for signs of  decreased coronary artery perfusion  - Instruct patient to report change in severity of symptoms  Outcome: Progressing     Problem: RESPIRATORY - ADULT  Goal: Achieves optimal ventilation and oxygenation  Description: INTERVENTIONS:  - Assess for changes in respiratory status  - Assess for changes in mentation and behavior  - Position to facilitate oxygenation and minimize respiratory effort  - Oxygen administered by appropriate delivery if ordered  - Initiate smoking cessation education as indicated  - Encourage broncho-pulmonary hygiene including cough, deep breathe, Incentive Spirometry  - Assess the need for suctioning and aspirate as needed  - Assess and instruct to report SOB or any respiratory difficulty  - Respiratory Therapy support as indicated  Outcome: Progressing

## 2024-10-09 NOTE — ASSESSMENT & PLAN NOTE
Secondary to a combination of rhinovirus positivity, and a right sided pneumonia  Tmax over the past 24 hours is 102 °F, tachycardia has resolved white blood cell count is slightly downtrending  Procalcitonin testing as follows-2.15, 6.95  Continue ceftriaxone, discontinue Zithromax  Blood cultures x 2 sets are pending  Urine testing for both Streptococcus pneumonia and Legionella antigen is pending  RP 2 panel revealed rhino/enterovirus positivity  Restart IV fluids-patient's blood pressure is on the lower end this morning  We will ask ID to follow along  Repeat blood work in the a.m.

## 2024-10-09 NOTE — ASSESSMENT & PLAN NOTE
"CT imaging revealed \"There is some minor groundglass and tree-in-bud nodularity suggested in the right upper lobe inferior segment and to a lesser degree in the right lower lobe\"  Antibiotics as per above  Continue respiratory protocol  Additionally, Case reviewed in great depth with the patient's son who happens to be a nurse here in the ICU, he states that there are concerns for the possibility of aspiration.  He reports that the patient is pocketing food, and there is some concern for the possibility of aspiration since there is some reported coughing and choking after meals, particularly liquids.  Will check both a bedside, and video barium swallow evaluation to rule out microaspiration  "

## 2024-10-09 NOTE — ASSESSMENT & PLAN NOTE
Likely viral pneumonia.  No dense consolidations noted on chest x-ray and CT chest.  RP 2 positive for rhinovirus/enterovirus.  Procalcitonin slightly elevated.  Urinary antigens and sputum culture are pending.  With recent episodes of coughing while eating, consider possibility of aspiration pneumonitis.  Continue ceftriaxone for now  Discontinue azithromycin  Follow-up blood cultures -if remain negative, anticipate discontinuing antibiotics in the next 24 hours  Follow-up barium swallow  Follow-up sputum culture  Follow-up urinary antigens  Aspiration precautions

## 2024-10-09 NOTE — PROGRESS NOTES
"Progress Note - Hospitalist   Name: Miguel Henderson 84 y.o. male I MRN: [id number]  Unit/Bed#: -01 I Date of Admission: 10/7/2024   Date of Service: 10/9/2024 I Hospital Day: 1    Assessment & Plan  Severe sepsis (HCC)  Secondary to a combination of rhinovirus positivity, and a right sided pneumonia  Tmax over the past 24 hours is 102 °F, tachycardia has resolved white blood cell count is slightly downtrending  Procalcitonin testing as follows-2.15, 6.95  Continue ceftriaxone, discontinue Zithromax  Blood cultures x 2 sets are pending  Urine testing for both Streptococcus pneumonia and Legionella antigen is pending  RP 2 panel revealed rhino/enterovirus positivity  Restart IV fluids-patient's blood pressure is on the lower end this morning  We will ask ID to follow along  Repeat blood work in the a.m.  Community acquired pneumonia of right upper lobe of lung  CT imaging revealed \"There is some minor groundglass and tree-in-bud nodularity suggested in the right upper lobe inferior segment and to a lesser degree in the right lower lobe\"  Antibiotics as per above  Continue respiratory protocol  Additionally, Case reviewed in great depth with the patient's son who happens to be a nurse here in the ICU, he states that there are concerns for the possibility of aspiration.  He reports that the patient is pocketing food, and there is some concern for the possibility of aspiration since there is some reported coughing and choking after meals, particularly liquids.  Will check both a bedside, and video barium swallow evaluation to rule out microaspiration  Hypothyroidism  Continue Synthroid  Primary hypertension  Hydrochlorothiazide has been put on hold in view of softer blood pressures  Rhinovirus  Supportive therapy  Acute kidney injury (HCC)  Most likely secondary to intravascular depletion in the setting of receiving a dose of Lasix yesterday  Will restart IV fluid-normal saline at 125 cc an hour  Echocardiogram " was within normal limits  Monitor input and output, monitor urinary output  Hypokalemia-also secondary to Lasix dosing, will replete IV and recheck  Avoid nephrotoxins-both Motrin, and Toradol have been discontinued    VTE Pharmacologic Prophylaxis: VTE Score: 8 High Risk (Score >/= 5) - Pharmacological DVT Prophylaxis Ordered: heparin. Sequential Compression Devices Ordered.    Mobility:   Basic Mobility Inpatient Raw Score: 19  JH-HLM Goal: 6: Walk 10 steps or more  JH-HLM Achieved: 5: Stand (1 or more minutes)  JH-HLM Goal achieved. Continue to encourage appropriate mobility.    Patient Centered Rounds: I performed bedside rounds with nursing staff today.   Discussions with Specialists or Other Care Team Provider: Infectious disease    Education and Discussions with Family / Patient: Updated  (son) at bedside.    Current Length of Stay: 1 day(s)  Current Patient Status: Inpatient   Certification Statement: The patient will continue to require additional inpatient hospital stay due to need for continued IV antibiotics  Discharge Plan: Anticipate discharge in 48-72 hrs to home.    Code Status: Level 1 - Full Code    Subjective   Patient seen, reports feeling better than yesterday, continues to feel short of breath with exertion, no new pain    Objective :  Temp:  [97.9 °F (36.6 °C)-102 °F (38.9 °C)] 97.9 °F (36.6 °C)  HR:  [] 68  BP: ()/(54-80) 86/54  Resp:  [16-22] 16  SpO2:  [94 %-95 %] 94 %  O2 Device: None (Room air)    Body mass index is 27.79 kg/m².     Input and Output Summary (last 24 hours):     Intake/Output Summary (Last 24 hours) at 10/9/2024 1011  Last data filed at 10/8/2024 1744  Gross per 24 hour   Intake 370 ml   Output --   Net 370 ml       Physical Exam  Vitals and nursing note reviewed.   Constitutional:       General: He is not in acute distress.     Appearance: Normal appearance. He is not ill-appearing.   HENT:      Head: Normocephalic and atraumatic.      Nose: Nose  normal.   Eyes:      Extraocular Movements: Extraocular movements intact.      Pupils: Pupils are equal, round, and reactive to light.   Cardiovascular:      Rate and Rhythm: Normal rate and regular rhythm.      Pulses: Normal pulses.      Heart sounds: Normal heart sounds. No murmur heard.     No friction rub. No gallop.   Pulmonary:      Effort: Pulmonary effort is normal.      Breath sounds: Normal breath sounds.   Abdominal:      General: There is no distension.      Palpations: Abdomen is soft. There is no mass.      Tenderness: There is no abdominal tenderness. There is no guarding or rebound.   Musculoskeletal:         General: No swelling or tenderness. Normal range of motion.      Cervical back: Normal range of motion and neck supple. No rigidity. No muscular tenderness.      Right lower leg: No edema.      Left lower leg: No edema.   Skin:     General: Skin is warm.      Capillary Refill: Capillary refill takes less than 2 seconds.      Findings: No erythema or rash.   Neurological:      General: No focal deficit present.      Mental Status: He is alert and oriented to person, place, and time. Mental status is at baseline.   Psychiatric:         Mood and Affect: Mood normal.         Behavior: Behavior normal.           Lines/Drains:              Lab Results: I have reviewed the following results:   Results from last 7 days   Lab Units 10/09/24  0618 10/08/24  0509 10/07/24  2306   WBC Thousand/uL 15.93* 16.10* 16.94*   HEMOGLOBIN g/dL 13.5 12.2 13.8   HEMATOCRIT % 40.9 37.2 41.3   PLATELETS Thousands/uL 161 130* 150   BANDS PCT %  --   --  10*   SEGS PCT %  --  89*  --    LYMPHO PCT %  --  5* 2*   MONO PCT %  --  2* 5   EOS PCT %  --  3 0     Results from last 7 days   Lab Units 10/09/24  0618 10/08/24  0509 10/07/24  2306   SODIUM mmol/L 135   < > 133*   POTASSIUM mmol/L 2.8*   < > 3.3*   CHLORIDE mmol/L 99   < > 97   CO2 mmol/L 26   < > 25   BUN mg/dL 34*   < > 21   CREATININE mg/dL 1.43*   < > 1.03    ANION GAP mmol/L 10   < > 11   CALCIUM mg/dL 8.0*   < > 8.5   ALBUMIN g/dL  --   --  3.3*   TOTAL BILIRUBIN mg/dL  --   --  1.80*   ALK PHOS U/L  --   --  68   ALT U/L  --   --  22   AST U/L  --   --  26   GLUCOSE RANDOM mg/dL 94   < > 158*    < > = values in this interval not displayed.     Results from last 7 days   Lab Units 10/07/24  2306   INR  1.18             Results from last 7 days   Lab Units 10/09/24  0618 10/08/24  0202 10/07/24  2306   LACTIC ACID mmol/L  --  2.2* 3.8*   PROCALCITONIN ng/ml 6.95*  --  2.15*       Recent Cultures (last 7 days):   Results from last 7 days   Lab Units 10/07/24  2306 10/07/24  2305   BLOOD CULTURE  No Growth at 24 hrs. No Growth at 24 hrs.       Imaging Results Review: I reviewed radiology reports from this admission including: chest xray.  Other Study Results Review: No additional pertinent studies reviewed.    Last 24 Hours Medication List:     Current Facility-Administered Medications:     acetaminophen (TYLENOL) tablet 650 mg, Q6H PRN    albuterol inhalation solution 2.5 mg, Q6H PRN    cefTRIAXone (ROCEPHIN) IVPB (premix in dextrose) 1,000 mg 50 mL, Q24H, Last Rate: 1,000 mg (10/09/24 0143)    cyclobenzaprine (FLEXERIL) tablet 5 mg, TID PRN    heparin (porcine) subcutaneous injection 5,000 Units, Q8H JOE    levothyroxine tablet 50 mcg, Once per day on Monday Tuesday Wednesday Thursday    [START ON 10/11/2024] levothyroxine tablet 75 mcg, Once per day on Sunday Friday Saturday    melatonin tablet 3 mg, HS    ondansetron (ZOFRAN) injection 4 mg, Q6H PRN    potassium chloride 20 mEq IVPB (premix), Q2H    sodium chloride 0.9 % infusion, Continuous    Administrative Statements   Today, Patient Was Seen By: Judy Wade MD      **Please Note: This note may have been constructed using a voice recognition system.**

## 2024-10-09 NOTE — ASSESSMENT & PLAN NOTE
FRAN noted this morning.  Suspect multifactorial etiology.  May be related to hypotensive episode, diuretic use, poor p.o. intake and sepsis.

## 2024-10-09 NOTE — ASSESSMENT & PLAN NOTE
Most likely secondary to intravascular depletion in the setting of receiving a dose of Lasix yesterday  Will restart IV fluid-normal saline at 125 cc an hour  Echocardiogram was within normal limits  Monitor input and output, monitor urinary output  Hypokalemia-also secondary to Lasix dosing, will replete IV and recheck  Avoid nephrotoxins-both Motrin, and Toradol have been discontinued

## 2024-10-09 NOTE — DISCHARGE INSTR - DIET
Video Swallow Study    Patient Name: Miguel Henderson  Today's Date: 10/9/2024     Past Medical History    Images are on PACS for review.      Oral Phase : Pt presented with mild oral dysphagia. Mastication was mod prolonged however eventual functional breakdown. Bolus control, formation were WNL. Transfer was piecemeal. Trace posterior base of tongue residue.      Pharyngeal Phase :Pt presented with mild pharyngeal dysphagia. Swallow initiation was intermittently min delayed with solids and prompt with liquids. Spill to the valleculae occurred with solids. Hyoid elevation and laryngeal excursion were WNL. Pharyngeal stripping wave diminished with solid trials. Epiglottic inversion was mostly complete. With nutri grain min epiglottic undercoat present. Trace vallecular retention with thin liquids and mild/mod with solids. Pt intermittently initiated secondary swallow to reduce residue. No pyriform retention. Large CP bar C4-C5 evident however did not impede bolus flow. No aspiration or penetration observed with trials.      Per Esophageal screen   Slow motility observed with liquids and solids. High retropulsion observed with liquids, pt risk for bottom up aspiration. Stasis occurred in distal esophagus with 13 mm pill however provided with additional sip of thin, ntl, and puree. Provided additional time x4 minutes pt continued with residue with esophagus.      Recommendations:  Diet: Dysphagia 3 dental soft   Liquids:thin   Meds: whole with thin liquids   Strategies:slow rate, alternate liquids with solids, swallow prior to additional po   Upright position  F/u ST tx: yes   Therapy Prognosis: fair   Prognosis considerations:age, medical status,  Intermittent/Distant Supervision  Aspiration Precautions  Reflux Precautions  Consider consult with: GI, rehab   Results reviewed with: pt, nursing, family, physician,   Aspiration precautions posted.  If a dedicated assessment of the  esophagus is desired, consider esophagram/barium swallow or EGD.

## 2024-10-09 NOTE — CONSULTS
Consultation - Infectious Disease   Name: Miguel Henderson 84 y.o. male I MRN: [id number]  Unit/Bed#: -01 I Date of Admission: 10/7/2024   Date of Service: 10/9/2024 I Hospital Day: 1     Inpatient consult to Infectious Diseases  Consult performed by: Yunier Handley PA-C  Consult ordered by: Judy Wade MD        Physician Requesting Evaluation: Judy Wade MD   Reason for Evaluation / Principal Problem: Sepsis, pneumonia    Assessment & Plan  Severe sepsis (HCC)  Leukocytosis, fever, tachycardia and tachypnea on admission.  Tmax 102 °F yesterday afternoon.  Patient is intermittently hypotensive but seems to have responded to IV fluids.  Suspect pulmonary source.  Likely viral.  So far blood cultures are negative.  He remains nontoxic-appearing despite systemic illness.  Continue antibiotics as below  Follow-up blood cultures  Trend temps and hemodynamics  Repeat CBC differential and BMP to monitor response to treatment and for developing toxicities  Pneumonia  Likely viral pneumonia.  No dense consolidations noted on chest x-ray and CT chest.  RP 2 positive for rhinovirus/enterovirus.  Procalcitonin slightly elevated.  Urinary antigens and sputum culture are pending.  With recent episodes of coughing while eating, consider possibility of aspiration pneumonitis.  Continue ceftriaxone for now  Discontinue azithromycin  Follow-up blood cultures -if remain negative, anticipate discontinuing antibiotics in the next 24 hours  Follow-up barium swallow  Follow-up sputum culture  Follow-up urinary antigens  Aspiration precautions  Rhinovirus  Suspected viral tracheobronchitis.  Rhinovirus/enterovirus positive on RP 2.  Strep PCR negative.  Flu/COVID/RSV negative.  Continue supportive cares  Acute kidney injury (HCC)  FRAN noted this morning.  Suspect multifactorial etiology.  May be related to hypotensive episode, diuretic use, poor p.o. intake and sepsis.    I have discussed the above  management plan in detail with the primary service.   Infectious Disease service will follow.    Antibiotics:  IV ceftriaxone day 2    History of Present Illness   Miguel Henderson is a 84 y.o. year old male with hypothyroidism, hypertension who presented with almost 4-day history of fevers.  Patient developed respiratory distress/shortness of breath and was brought to the ED for evaluation.  Temps at home were almost up to 104 °F.  He was taking Tylenol and Motrin as needed.  He reported sore throat, mild cough that was initially dry.  Since admission noted to have productive cough with yellowish sputum.  He was hypotensive shortly after admission and was given IV fluid boluses with improvement.  Met severe sepsis criteria and was admitted for further workup.  Chest x-ray and CT chest were negative for lobar pneumonia but did reveal findings that may be consistent with a viral etiology.  RP 2 was positive for rhinovirus/enterovirus.  Interestingly procalcitonin was slightly elevated.  Patient was started on IV ceftriaxone and azithromycin.  Infectious ease consultation was requested.  So far blood cultures also remain negative.  Patient remains on room air without shortness of breath.  He does still have sore throat and cough is now productive as previously stated.  No vomiting or diarrhea.  No dysuria, urgency or urinary frequency.  Patient lives at home with his significant other.  Sees his 2 young grandchildren, elementary aged frequently after school.  Otherwise no known sick contacts.  He received a dose of IV Lasix this morning and is again hypotensive.    A complete review of systems is negative other than that noted in the HPI.    I have reviewed the patient's PMH, PSH, Social History, Family History, Meds, and Allergies    Objective :  Temp:  [97.9 °F (36.6 °C)-102 °F (38.9 °C)] 97.9 °F (36.6 °C)  HR:  [] 87  BP: ()/(50-80) 88/50  Resp:  [16-22] 16  SpO2:  [94 %-95 %] 95 %  O2 Device: None  (Room air)    Physical exam:  General:  No acute distress  Psychiatric:  Awake and alert  HEENT: Mucous membranes moist.  Neck is supple.  Sclera anicteric.  Cardiovascular: Regular rate and rhythm.  No murmur.  Pulmonary: Lung sounds diminished, otherwise clear.  On room air.  Normal respiratory excursion without accessory muscle use  Abdomen:  Soft, nontender.  Extremities:  No edema to lower extremities.  Moves all 4 extremities.  Skin:  No rashes or wounds noted on exposed skin.  : No suprapubic or flank tenderness  Neurologic: No gross focal neurodeficit      Lab Results: I have reviewed the following results:  Results from last 7 days   Lab Units 10/09/24  0618 10/08/24  0509 10/07/24  2306   WBC Thousand/uL 15.93* 16.10* 16.94*   HEMOGLOBIN g/dL 13.5 12.2 13.8   PLATELETS Thousands/uL 161 130* 150     Results from last 7 days   Lab Units 10/09/24  0618 10/08/24  0509 10/07/24  2306   SODIUM mmol/L 135 135 133*   POTASSIUM mmol/L 2.8* 3.7 3.3*   CHLORIDE mmol/L 99 104 97   CO2 mmol/L 26 25 25   BUN mg/dL 34* 23 21   CREATININE mg/dL 1.43* 0.86 1.03   EGFR ml/min/1.73sq m 44 78 65   CALCIUM mg/dL 8.0* 7.1* 8.5   AST U/L  --   --  26   ALT U/L  --   --  22   ALK PHOS U/L  --   --  68   ALBUMIN g/dL  --   --  3.3*     Results from last 7 days   Lab Units 10/07/24  2306 10/07/24  2305   BLOOD CULTURE  No Growth at 24 hrs. No Growth at 24 hrs.     Results from last 7 days   Lab Units 10/09/24  0618 10/07/24  2306   PROCALCITONIN ng/ml 6.95* 2.15*                   Imaging Results Review: I personally reviewed the following image studies in PACS and associated radiology reports: chest xray and CT chest. My interpretation of the radiology images/reports is: agree no dense lobar consolidations c/w pneumonia.  Other Study Results Review: Other studies reviewed include: micro    Administrative Statements   I have spent a total time of 45 minutes in caring for this patient on the day of the visit/encounter including  Diagnostic results, Patient and family education, Impressions, Counseling / Coordination of care, Documenting in the medical record, Reviewing / ordering tests, medicine, procedures  , Obtaining or reviewing history  , and Communicating with other healthcare professionals .

## 2024-10-09 NOTE — ASSESSMENT & PLAN NOTE
Leukocytosis, fever, tachycardia and tachypnea on admission.  Tmax 102 °F yesterday afternoon.  Patient is intermittently hypotensive but seems to have responded to IV fluids.  Suspect pulmonary source.  Likely viral.  So far blood cultures are negative.  He remains nontoxic-appearing despite systemic illness.  Continue antibiotics as below  Follow-up blood cultures  Trend temps and hemodynamics  Repeat CBC differential and BMP to monitor response to treatment and for developing toxicities

## 2024-10-09 NOTE — ASSESSMENT & PLAN NOTE
Suspected viral tracheobronchitis.  Rhinovirus/enterovirus positive on RP 2.  Strep PCR negative.  Flu/COVID/RSV negative.  Continue supportive cares

## 2024-10-09 NOTE — PROCEDURES
Video Swallow Study      Patient Name: Miguel Henderson  Today's Date: 10/9/2024        Past Medical History  Past Medical History:   Diagnosis Date    Prostate cancer (HCC)     Thyroid cancer (HCC)         Past Surgical History  Past Surgical History:   Procedure Laterality Date    APPENDECTOMY         Summary:  Images are on PACS for review.     Oral Phase : Pt presented with mild oral dysphagia. Mastication was mod prolonged however eventual functional breakdown. Bolus control, formation were WNL. Transfer was piecemeal. Trace posterior base of tongue residue.     Pharyngeal Phase :Pt presented with mild pharyngeal dysphagia. Swallow initiation was intermittently min delayed with solids and prompt with liquids. Spill to the valleculae occurred with solids. Hyoid elevation and laryngeal excursion were WNL. Pharyngeal stripping wave diminished with solid trials. Epiglottic inversion was mostly complete. With nutri grain min epiglottic undercoat present. Trace vallecular retention with thin liquids and mild/mod with solids. Pt intermittently initiated secondary swallow to reduce residue. No pyriform retention. Large CP bar C4-C5 evident however did not impede bolus flow. No aspiration or penetration observed with trials.     Per Esophageal screen   Slow motility observed with liquids and solids. High retropulsion observed with liquids, pt risk for bottom up aspiration. Stasis occurred in distal esophagus with 13 mm pill however provided with additional sip of thin, ntl, and puree. Provided additional time x4 minutes pt continued with residue with esophagus.     Observations: Son present throughout study provided translation.     Recommendations:  Diet: Dysphagia 3 dental soft   Liquids:thin   Meds: whole with thin liquids   Strategies:slow rate, alternate liquids with solids, swallow prior to additional po   Upright position  F/u ST tx: yes   Therapy Prognosis: fair   Prognosis  considerations:age, medical status,  Intermittent/Distant Supervision  Aspiration Precautions  Reflux Precautions  Consider consult with: GI, rehab   Results reviewed with: pt, nursing, physician. ST reviewed images and recommendations reviewed with family at bedside.   Aspiration precautions posted.  If a dedicated assessment of the esophagus is desired, consider esophagram/barium swallow or EGD.    Goals:  Dysphagia LTG  -Patient will demonstrate safe and effective oral intake (without overt s/s significant oral/pharyngeal dysphagia including s/s penetration or aspiration) for the highest appropriate diet level.     Short Term Goals:  -Pt will tolerate Dysphagia 3/advanced (dental soft) diet and thin liquid with no significant s/s oral or pharyngeal dysphagia across 1-3 diagnostic session/s.    -Patient will tolerate trials of upgraded food and/or liquid texture with no significant s/s of oral or pharyngeal dysphagia including aspiration across 1-3 diagnostic sessions     -Patient will comply with a Video/Modified Barium Swallow study for more complete assessment of swallowing anatomy/physiology/aspiration risk and to assess efficacy of treatment techniques    -Patient will demonstrate the ability to adequately self-monitor swallowing skills and perform appropriate compensatory techniques to reduce overt s/sx of penetration/aspiration to safely tolerate least restrictive food/liquid consistencies.    Patient's goal: none stated     H&P/Admit info, pertinent provider notes/procedures/studies/PMH:(copied and placed in this report)  Miguel Henderson is a 84 y.o. male with a PMH of prostate cancer and thyroid cancer who presents with fever x 4 days.  Patient is [place]ic-speaking only and history is obtained through assistance of his son who is present at bedside and acting as .     Patient has been having intermittent fevers which have been responsive to Tylenol and Motrin at home but have ranged as high as  103.7 and yesterday was having some apparent respiratory distress but he has no respiratory history and is on no respiratory medications at home.  Patient does have a scant cough which is nonproductive and denies any increased urinary frequency or dysuria though he does complain of some hematuria which has been ongoing for the past month.  Patient additionally has been complaining of a sore throat for the past 4 days with decreased p.o. fluid intake secondary to pain with swallowing.     While in ER patient was noted to meet severe sepsis criteria and was thus referred for admission.    Special Studies   XR CHEST PORTABLE 10/08/2024  IMPRESSION:  Mild tree-in-bud pattern of opacity mostly in the right lung and in the left base again suggesting a mild infectious/inflammatory condition  CT CHEST WITHOUT IV CONTRAST 10/08/2024  IMPRESSION:  Mild bibasilar probable atelectatic changes, right greater than left. Otherwise no focal airspace consolidation to suggest pneumonia. Minor tree-in-bud nodularity in the right lung which could relate to infectious versus inflammatory small airways   disease. Clinical correlation recommended.  Pulmonary nodules as above. Based on current Fleischner Society 2017 Guidelines on incidental pulmonary nodule, no routine follow-up is needed if the patient is low risk. If the patient is high risk, optional follow-up chest CT at 12 months can be   considered.  Trace right pleural effusion.  Mild right axillary lymphadenopathy, nonspecific    Code Status: Level 1 full code     Previous VBS: none     Precautions   Contact  Precautions     Food allergies:  No know    Current diet:  Regular diet and thin liquids    Premorbid diet:  Regular diet and thin liquids    Dentition  Upper and lower dentures   Oral Mech  WNL   O2 requirements:  Room air    Vocal Quality/Speech WNL   Cognitive status:  Alert      Consistencies administered: Barium laden applesauce, nutri grain bar, sandwich bite, thin  liquids,ntl and  13mm barium pill. Liquids were administered by cup.    Pt was seated laterally at 90 degrees.   Pt  unable to be viewed in AP position, due to transfer status

## 2024-10-09 NOTE — UTILIZATION REVIEW
Initial Clinical Review    Admission: Date/Time/Statement:   Admission Orders (From admission, onward)       Ordered        10/08/24 0202  INPATIENT ADMISSION  Once                          Orders Placed This Encounter   Procedures    INPATIENT ADMISSION     Standing Status:   Standing     Number of Occurrences:   1     Order Specific Question:   Level of Care     Answer:   Med Surg [16]     Order Specific Question:   Estimated length of stay     Answer:   More than 2 Midnights     Order Specific Question:   Certification     Answer:   I certify that inpatient services are medically necessary for this patient for a duration of greater than two midnights. See H&P and MD Progress Notes for additional information about the patient's course of treatment.     ED Arrival Information       Expected   -    Arrival   10/7/2024 22:13    Acuity   Emergent              Means of arrival   Walk-In    Escorted by   Family Member    Service   Hospitalist    Admission type   Emergency              Arrival complaint   sob & fever             Chief Complaint   Patient presents with    Shortness of Breath     Started Friday with SOB and intermittent cough; pain in chest earlier today that went away; sore throat       Initial Presentation: 84 y.o. male to the ED from home with complaints of shortness of breath, cough, chest pain. Admitted to inpatient for severe sepsis, pneumonia.  H/O  prostate cancer and thyroid cancer.  At home had temp of 103.7.  Arrives with decreased breath sounds. Hypotensive. Given 2 liters iv fluids in the ED.  Tachycardic.  Blood cultures pending.  Started on IV abx.     Anticipated Length of Stay/Certification Statement: Patient will be admitted on an inpatient basis with an anticipated length of stay of greater than 2 midnights secondary to severe sepsis requiring IV fluid resuscitation and IV antibiotics.     Date: 10/8   Day 2:    Continues with shortness of breath on exertion.  WBCs 16.94.  CT chest  "shows: There is some minor groundglass and tree-in-bud nodularity suggested in the right upper lobe inferior segment and to a lesser degree in the right lower lobe\" .  Continue  IV abx. Check RP2 panel, check urine studies.  Consult pulmonary.   Date: 10/9  Day 3: Has surpassed a 2nd midnight with active treatments and services.Initially admitted to inpatient for severe sepsis related to pneumonia.  Has been on IV abx. ID following cultures.  Urine test pending.  IV fluids requiring restart due to lower bp. Febrile last pm. Creat up to 1.43 up from 0.86 yesterday.  Recheck.  Continue with IV fluids, lasix.         ED Treatment-Medication Administration from 10/07/2024 2213 to 10/08/2024 1613         Date/Time Order Dose Route Action     10/07/2024 2304 sodium chloride 0.9 % infusion 150 mL/hr Intravenous New Bag     10/08/2024 0736 sodium chloride 0.9 % infusion 150 mL/hr Intravenous New Bag     10/08/2024 0021 potassium chloride (Klor-Con M20) CR tablet 20 mEq 20 mEq Oral Given     10/08/2024 0823 hydroCHLOROthiazide tablet 25 mg 25 mg Oral Given     10/08/2024 0222 cefTRIAXone (ROCEPHIN) IVPB (premix in dextrose) 2,000 mg 50 mL 2,000 mg Intravenous New Bag     10/08/2024 0543 heparin (porcine) subcutaneous injection 5,000 Units 5,000 Units Subcutaneous Given     10/08/2024 1304 heparin (porcine) subcutaneous injection 5,000 Units 5,000 Units Subcutaneous Given     10/08/2024 0237 ketorolac (TORADOL) injection 15 mg 15 mg Intravenous Given     10/08/2024 0230 sodium chloride 0.9 % bolus 1,000 mL 1,000 mL Intravenous New Bag     10/08/2024 0409 sodium chloride 0.9 % bolus 1,000 mL 1,000 mL Intravenous New Bag     10/08/2024 0804 albuterol inhalation solution 2.5 mg 2.5 mg Nebulization Given     10/08/2024 1348 albuterol inhalation solution 2.5 mg 2.5 mg Nebulization Given     10/08/2024 0844 furosemide (LASIX) injection 40 mg 40 mg Intravenous Given     10/08/2024 1508 azithromycin (ZITHROMAX) 500 mg in sodium " chloride 0.9 % 250 mL IVPB 500 mg Intravenous New Bag            Scheduled Medications:  azithromycin, 500 mg, Intravenous, Q24H  cefTRIAXone, 1,000 mg, Intravenous, Q24H  heparin (porcine), 5,000 Units, Subcutaneous, Q8H JOE  hydroCHLOROthiazide, 25 mg, Oral, Daily  levothyroxine, 50 mcg, Oral, Once per day on Monday Tuesday Wednesday Thursday  [START ON 10/11/2024] levothyroxine, 75 mcg, Oral, Once per day on Sunday Friday Saturday  melatonin, 3 mg, Oral, HS  potassium chloride, 20 mEq, Intravenous, Q2H      Continuous IV Infusions:  sodium chloride, 125 mL/hr, Intravenous, Continuous      PRN Meds:  acetaminophen, 650 mg, Oral, Q6H PRN  albuterol, 2.5 mg, Nebulization, Q6H PRN  cyclobenzaprine, 5 mg, Oral, TID PRN  ondansetron, 4 mg, Intravenous, Q6H PRN      ED Triage Vitals   Temperature Pulse Respirations Blood Pressure SpO2 Pain Score   10/07/24 2230 10/07/24 2226 10/07/24 2226 10/07/24 2226 10/07/24 2226 10/07/24 2226   100 °F (37.8 °C) (!) 107 20 115/65 93 % No Pain     Weight (last 2 days)       Date/Time Weight    10/09/24 0917 75.8 (167)    10/09/24 0828 75.8 (167)    10/08/24 1700 75.8 (167)            Vital Signs (last 3 days)       Date/Time Temp Pulse Resp BP MAP (mmHg) SpO2 O2 Device Patient Position - Orthostatic VS [place] Coma Scale Score Pain    10/09/24 1041 -- -- -- -- -- -- -- -- -- 8    10/09/24 1028 -- -- -- 88/50 -- -- -- -- -- --    10/09/24 10:19:46 -- 87 -- 85/50 62 95 % -- -- -- --    10/09/24 10:18:06 -- 61 -- 87/52 64 94 % -- -- -- --    10/09/24 0917 -- 68 -- 86/54 -- -- -- -- -- --    10/09/24 0828 -- 67 -- 86/54 -- -- -- -- -- --    10/09/24 07:59:43 97.9 °F (36.6 °C) 63 16 86/54 65 94 % -- -- -- --    10/09/24 0248 -- -- -- -- -- -- -- -- -- 8    10/09/24 0029 -- -- -- -- -- 94 % None (Room air) -- 15 No Pain    10/08/24 23:11:52 99.2 °F (37.3 °C) 104 22 105/76 86 94 % -- -- -- --    10/08/24 19:44:03 99 °F (37.2 °C) 62 -- -- -- 95 % -- -- -- --    10/08/24 1644 -- -- -- -- -- --  -- -- -- Med Not Given for Pain - for MAR use only    10/08/24 1620 102 °F (38.9 °C) 107 22 129/80 96 95 % -- -- 15 4    10/08/24 1400 -- 111 21 131/70 91 95 % None (Room air) Sitting -- --    10/08/24 1348 -- -- -- -- -- -- None (Room air) -- -- --    10/08/24 1300 -- 112 21 129/69 93 94 % None (Room air) Sitting -- --    10/08/24 1030 -- 96 21 123/77 94 94 % None (Room air) Lying -- --    10/08/24 0930 -- 109 20 120/78 95 94 % None (Room air) Lying -- --    10/08/24 0804 -- -- -- -- -- -- None (Room air) -- -- --    10/08/24 0739 -- -- -- -- -- -- -- -- 15 No Pain    10/08/24 0735 97.5 °F (36.4 °C) 104 21 134/72 98 94 % None (Room air) Lying -- No Pain    10/08/24 0630 -- 101 20 122/87 99 94 % None (Room air) Lying -- --    10/08/24 0615 -- 98 20 114/73 88 94 % None (Room air) Lying -- --    10/08/24 0600 -- 98 20 115/72 88 94 % None (Room air) Lying -- --    10/08/24 0545 -- 100 20 112/71 87 94 % None (Room air) Lying -- --    10/08/24 0500 -- 93 20 -- -- 94 % None (Room air) -- -- --    10/08/24 0445 -- 93 20 98/69 79 95 % None (Room air) Lying -- --    10/08/24 0345 -- 90 20 91/58 69 93 % None (Room air) Lying -- --    10/08/24 0245 -- 96 20 91/59 71 93 % None (Room air) Lying -- --    10/08/24 0234 -- 97 -- 82/57 65 93 % -- -- -- --    10/08/24 0232 -- 97 -- 74/48 57 92 % -- -- -- --    10/08/24 0230 -- 96 19 73/49 57 93 % -- -- -- --    10/08/24 0100 -- 91 20 93/66 75 93 % None (Room air) Sitting -- --    10/08/24 0058 -- -- -- 90/60 -- -- -- -- -- --    10/08/24 0000 -- 96 20 92/58 70 93 % None (Room air) Sitting -- --    10/07/24 2350 -- 96 20 84/58 67 92 % None (Room air) Sitting -- --    10/07/24 2230 100 °F (37.8 °C) -- -- -- -- -- -- -- -- --    10/07/24 2226 -- 107 20 115/65 -- 93 % -- -- -- No Pain              Pertinent Labs/Diagnostic Test Results:   Radiology:  FL barium swallow video w speech   Final Interpretation by SYSTEMGENERATED, DOCUMENTATION (10/09 7114)      XR chest portable   Final  Interpretation by Faheem Olvera MD (10/08 2028)      Mild tree-in-bud pattern of opacity mostly in the right lung and in the left base again suggesting a mild infectious/inflammatory condition            Workstation performed: [id number]         CT chest wo contrast   Final Interpretation by Deondre Leroy MD (10/08 0126)      Mild bibasilar probable atelectatic changes, right greater than left. Otherwise no focal airspace consolidation to suggest pneumonia. Minor tree-in-bud nodularity in the right lung which could relate to infectious versus inflammatory small airways    disease. Clinical correlation recommended.      Pulmonary nodules as above. Based on current Fleischner Society 2017 Guidelines on incidental pulmonary nodule, no routine follow-up is needed if the patient is low risk. If the patient is high risk, optional follow-up chest CT at 12 months can be    considered.      Trace right pleural effusion.      Mild right axillary lymphadenopathy, nonspecific.         Workstation performed: [id number]         XR chest portable - 1 view   ED Interpretation by Adi Johnson MD (10/07 2320)   No obvious infiltrate or effusion is appreciated.      Final Interpretation by Klever Gordon MD (10/08 0628)      No acute cardiopulmonary disease.            Workstation performed: [id number]           Cardiology:  Echo complete w/ contrast if indicated   Final Result by Jorge Frederick DO (10/09 0958)        Left Ventricle: Left ventricular cavity size is normal. Wall thickness    is mildly increased. The left ventricular ejection fraction is 55%.    Systolic function is normal. Wall motion is normal.     Aortic Valve: There is aortic valve sclerosis.     Aorta: The aortic root is mildly dilated. The ascending aorta is mildly    dilated. The aortic root is 3.80 cm. The ascending aorta is 3.9 cm.           10/7 EKG:   Narrative & Impression    Sinus tachycardia with Premature atrial complexes  Right  superior axis deviation  Low voltage QRS  Cannot rule out Anterior infarct , age undetermined  Abnormal ECG  No previous ECGs available         Results from last 7 days   Lab Units 10/08/24  1710 10/07/24  2306   SARS-COV-2  Not Detected Negative     Results from last 7 days   Lab Units 10/09/24  0618 10/08/24  0509 10/07/24  2306   WBC Thousand/uL 15.93* 16.10* 16.94*   HEMOGLOBIN g/dL 13.5 12.2 13.8   HEMATOCRIT % 40.9 37.2 41.3   PLATELETS Thousands/uL 161 130* 150   TOTAL NEUT ABS Thousands/µL  --  14.51*  --    BANDS PCT %  --   --  10*         Results from last 7 days   Lab Units 10/09/24  0618 10/08/24  0509 10/07/24  2306   SODIUM mmol/L 135 135 133*   POTASSIUM mmol/L 2.8* 3.7 3.3*   CHLORIDE mmol/L 99 104 97   CO2 mmol/L 26 25 25   ANION GAP mmol/L 10 6 11   BUN mg/dL 34* 23 21   CREATININE mg/dL 1.43* 0.86 1.03   EGFR ml/min/1.73sq m 44 78 65   CALCIUM mg/dL 8.0* 7.1* 8.5     Results from last 7 days   Lab Units 10/07/24  2306   AST U/L 26   ALT U/L 22   ALK PHOS U/L 68   TOTAL PROTEIN g/dL 6.5   ALBUMIN g/dL 3.3*   TOTAL BILIRUBIN mg/dL 1.80*         Results from last 7 days   Lab Units 10/09/24  0618 10/08/24  0509 10/07/24  2306   GLUCOSE RANDOM mg/dL 94 114 158*       Results from last 7 days   Lab Units 10/07/24  2306   PROTIME seconds 15.5*   INR  1.18   PTT seconds 32         Results from last 7 days   Lab Units 10/09/24  0618 10/07/24  2306   PROCALCITONIN ng/ml 6.95* 2.15*     Results from last 7 days   Lab Units 10/08/24  0202 10/07/24  2306   LACTIC ACID mmol/L 2.2* 3.8*       Results from last 7 days   Lab Units 10/07/24  2359   CLARITY UA  Clear   COLOR UA  Yellow   SPEC GRAV UA  1.025   PH UA  6.0   GLUCOSE UA mg/dl Trace*   KETONES UA mg/dl Negative   BLOOD UA  2+*   PROTEIN UA mg/dl 1+*   NITRITE UA  Negative   BILIRUBIN UA  Negative   UROBILINOGEN UA E.U./dl 1.0   LEUKOCYTES UA  Negative   WBC UA /hpf 0-1   RBC UA /hpf 10-20*   BACTERIA UA /hpf Occasional   EPITHELIAL CELLS WET PREP /hpf  Occasional   MUCUS THREADS  Occasional*     Results from last 7 days   Lab Units 10/09/24  0313 10/08/24  1710 10/07/24  2306   STREP PNEUMONIAE ANTIGEN, URINE  Negative  --   --    LEGIONELLA URINARY ANTIGEN  Negative  --   --    INFLUENZA A PCR   --   --  Negative   INFLUENZA B PCR   --   --  Negative   INFLUENZA B   --  Not Detected  --    RSV PCR   --   --  Negative   RESPIRATORY SYNCYTIAL VIRUS   --  Not Detected  --            Results from last 7 days   Lab Units 10/07/24  2306 10/07/24  2305   BLOOD CULTURE  No Growth at 24 hrs. No Growth at 24 hrs.     Past Medical History:   Diagnosis Date    Prostate cancer (HCC)     Thyroid cancer (HCC)      Present on Admission:   Hypothyroidism   Primary hypertension   Severe sepsis ([facility])   Community acquired pneumonia of right upper lobe of lung      Admitting Diagnosis: SOB (shortness of breath) [R06.02]  SIRS (systemic inflammatory response syndrome) ([facility]) [R65.10]  Community acquired pneumonia of right upper lobe of lung [J18.9]  Sepsis, due to unspecified organism, unspecified whether acute organ dysfunction present ([facility]) [A41.9]  Age/Sex: 84 y.o. male    Network Utilization Review Department  ATTENTION: Please call with any questions or concerns to [phone] and carefully listen to the prompts so that you are directed to the right person. All voicemails are confidential.   For Discharge needs, contact Care Management DC Support Team at [phone] opt. [phone]  Send all requests for admission clinical reviews, approved or denied determinations and any other requests to dedicated fax number below belonging to the campus where the patient is receiving treatment. List of dedicated fax numbers for the Facilities:  FACILITY NAME UR FAX NUMBER   ADMISSION DENIALS (Administrative/Medical Necessity) [phone]   DISCHARGE SUPPORT TEAM (NETWORK) [phone]   PARENT CHILD HEALTH (Maternity/NICU/Pediatrics) [phone]   [facility] [phone]    [facility] [phone]   [facility] [phone]   [facility] [phone]   Critical access hospital [phone]   [facility] [phone]   [facility] [phone]   [facility] [phone]   [facility] [phone]   [facility] [phone]   [facility] [phone]   [facility] [phone]

## 2024-10-09 NOTE — PLAN OF CARE
Problem: INFECTION - ADULT  Goal: Absence or prevention of progression during hospitalization  Description: INTERVENTIONS:  - Assess and monitor for signs and symptoms of infection  - Monitor lab/diagnostic results  - Monitor all insertion sites, i.e. indwelling lines, tubes, and drains  - Monitor endotracheal if appropriate and nasal secretions for changes in amount and color  - [place] appropriate cooling/warming therapies per order  - Administer medications as ordered  - Instruct and encourage patient and family to use good hand hygiene technique  - Identify and instruct in appropriate isolation precautions for identified infection/condition  Outcome: Progressing     Problem: SAFETY ADULT  Goal: Patient will remain free of falls  Description: INTERVENTIONS:  - Educate patient/family on patient safety including physical limitations  - Instruct patient to call for assistance with activity   - Consult OT/PT to assist with strengthening/mobility   - Keep Call bell within reach  - Keep bed low and locked with side rails adjusted as appropriate  - Keep care items and personal belongings within reach  - Initiate and maintain comfort rounds  - Make Fall Risk Sign visible to staff  - Offer Toileting every 2 Hours, in advance of need  - Initiate/Maintain bed alarm  - Obtain necessary fall risk management equipment: non skid socks  - Apply yellow socks and bracelet for high fall risk patients  - Consider moving patient to room near nurses station  Outcome: Progressing     Problem: METABOLIC, FLUID AND ELECTROLYTES - ADULT  Goal: Electrolytes maintained within normal limits  Description: INTERVENTIONS:  - Monitor labs and assess patient for signs and symptoms of electrolyte imbalances  - Administer electrolyte replacement as ordered  - Monitor response to electrolyte replacements, including repeat lab results as appropriate  - Instruct patient on fluid and nutrition as appropriate  Outcome: Progressing  Goal: Fluid balance  maintained  Description: INTERVENTIONS:  - Monitor labs   - Monitor I/O and WT  - Instruct patient on fluid and nutrition as appropriate  - Assess for signs & symptoms of volume excess or deficit  Outcome: Progressing    Problem: RESPIRATORY - ADULT  Goal: Achieves optimal ventilation and oxygenation  Description: INTERVENTIONS:  - Assess for changes in respiratory status  - Assess for changes in mentation and behavior  - Position to facilitate oxygenation and minimize respiratory effort  - Oxygen administered by appropriate delivery if ordered  - Initiate smoking cessation education as indicated  - Encourage broncho-pulmonary hygiene including cough, deep breathe, Incentive Spirometry  - Assess the need for suctioning and aspirate as needed  - Assess and instruct to report SOB or any respiratory difficulty  - Respiratory Therapy support as indicated  Outcome: Progressing

## 2024-10-09 NOTE — SPEECH THERAPY NOTE
Speech Language/Pathology  Speech/Language Pathology  Assessment    Patient Name: Miguel Hendesron  Today's Date: 10/9/2024     Problem List  Principal Problem:    Severe sepsis (HCC)  Active Problems:    Hypothyroidism    Primary hypertension    Community acquired pneumonia of right upper lobe of lung    Pneumonia    Rhinovirus    Acute kidney injury (HCC)    Past Medical History  Past Medical History:   Diagnosis Date    Prostate cancer (HCC)     Thyroid cancer (HCC)      Past Surgical History  Past Surgical History:   Procedure Laterality Date    APPENDECTOMY        Bedside Swallow Evaluation:    Summary:  Pt presented w/ mild oral and suspect WNL pharyngeal stage of swallow. Positioned upright and alert. Family at bedside. Reported pt hx of increased pocketing at meals as well as prolonged mastication with meals. Stated sometimes will cough at end of meal and facial grimacing. Pt agreeable to trials of loorna doones and thin liquids via straw with single and successive sips. Mastication was mod prolonged. Bolus control, formation, and transfer were WNL. Swallows appeared prompt. No overt s/s of aspiration. Nursing administered medications whole with thin liquids, no overt difficulties however did note min facial grimacing present. Family denied use of dietary supplements. Reported that he does have hx of frequent acid reflux which he was utilizing TUMs prior. Did state occasional globus sensation. VBS to further assess, family understood.     Recommendations:  Diet: PENDING VBS  Liquid:PENDING VBS  Meds:PENDING VBS  Positioning:Upright  Strategies: PENDING VBS  Pt to take PO/Meds only when fully alert and upright.   Oral care:  Aspiration precautions  Reflux precautions  Therapy Prognosis: fair   Prognosis considerations: age, medical status, family support   Frequency:1-3 times weekly as indicated     Consider consult w/:  Rehab  Nutrition    Goal(s):  Dysphagia LTG  -Patient will demonstrate safe and  effective oral intake (without overt s/s significant oral/pharyngeal dysphagia including s/s penetration or aspiration) for the highest appropriate diet level.     1.Pt will tolerate least restrictive diet w/out s/s aspiration or oral/pharyngeal difficulties.   2.Pt will will effectively manipulate/masticate and transfer purees/solids w/out s/s dysphagia/aspiration.   3.Pt will tolerate thin liquids w/out s/s aspiration.   -If indicated, patient will comply with a Video/Modified Barium Swallow study for more complete assessment of swallowing anatomy/physiology/aspiration risk and to assess efficacy of treatment techniques so as to best guide treatment plan     H&P/Admit info/ pertinent provider notes: (PMH noted above)  Miguel Henderson is a 84 y.o. male with a PMH of prostate cancer and thyroid cancer who presents with fever x 4 days.  Patient is Wolof-speaking only and history is obtained through assistance of his son who is present at bedside and acting as .     Patient has been having intermittent fevers which have been responsive to Tylenol and Motrin at home but have ranged as high as 103.7 and yesterday was having some apparent respiratory distress but he has no respiratory history and is on no respiratory medications at home.  Patient does have a scant cough which is nonproductive and denies any increased urinary frequency or dysuria though he does complain of some hematuria which has been ongoing for the past month.  Patient additionally has been complaining of a sore throat for the past 4 days with decreased p.o. fluid intake secondary to pain with swallowing.     While in ER patient was noted to meet severe sepsis criteria and was thus referred for admission.    Special Studies:  XR CHEST PORTABLE 10/08/2024  IMPRESSION:  Mild tree-in-bud pattern of opacity mostly in the right lung and in the left base again suggesting a mild infectious/inflammatory condition  CT CHEST WITHOUT IV CONTRAST  10/08/2024  IMPRESSION:  Mild bibasilar probable atelectatic changes, right greater than left. Otherwise no focal airspace consolidation to suggest pneumonia. Minor tree-in-bud nodularity in the right lung which could relate to infectious versus inflammatory small airways   disease. Clinical correlation recommended.  Pulmonary nodules as above. Based on current Fleischner Society 2017 Guidelines on incidental pulmonary nodule, no routine follow-up is needed if the patient is low risk. If the patient is high risk, optional follow-up chest CT at 12 months can be   considered.  Trace right pleural effusion.  Mild right axillary lymphadenopathy, nonspecific    Procalcitonin: 6.95                               10/09/2024   WBC: 15.93                            10/09/2024     Code Status Level 1 full code     Previous MBS:none    Patient's goal: none stated     Did the pt report pain? no  If yes, was nursing notified/was it addressed? N/a    Reason for consult:  R/o aspiration  Determine safest and least restrictive diet  h/o dysphagia   C/o solid food dysphagia    Precautions:  Contact  Droplet    Food Allergies: No known    Current Diet: Regular and thin liquids    Premorbid diet: Regular and thin liquids    O2 requirement: Room air    Social/Prior living Lives with family   Voice/Speech: WNL   Follows commands: Basic WNL   Cognitive status: Alert      Oral [facility] exam:  Dentition: upper and lower dentures   Lips (VII):WNL  Tongue (XII): midline   Mandible (V):adequate rom   Face/oral sensation (V):WNL  Velum (X):WNL    Items administered:  Hard cookie, thin liquids via straw. nursing administered medications whole with thin liquids.     Oral stage:  Lip closure:WNL  Mastication: modl prolonged   Bolus formation:WNL  Bolus control:WNL  Transfer:WNL    Pharyngeal stage:  Swallow promptness:prompt   Laryngeal rise:adequate   No overt s/s aspiration    Esophageal stage:  No s/s reported    Aspiration precautions  posted    Results d/w:  Pt, nursing, family,

## 2024-10-10 ENCOUNTER — APPOINTMENT (INPATIENT)
Dept: RADIOLOGY | Facility: HOSPITAL | Age: 84
DRG: 871 | End: 2024-10-10
Payer: COMMERCIAL

## 2024-10-10 PROBLEM — M25.562 LEFT KNEE PAIN: Status: ACTIVE | Noted: 2024-10-10

## 2024-10-10 LAB
ANION GAP SERPL CALCULATED.3IONS-SCNC: 10 MMOL/L (ref 4–13)
BUN SERPL-MCNC: 37 MG/DL (ref 5–25)
CALCIUM SERPL-MCNC: 7.7 MG/DL (ref 8.4–10.2)
CHLORIDE SERPL-SCNC: 103 MMOL/L (ref 96–108)
CO2 SERPL-SCNC: 22 MMOL/L (ref 21–32)
CREAT SERPL-MCNC: 1.05 MG/DL (ref 0.6–1.3)
ERYTHROCYTE [DISTWIDTH] IN BLOOD BY AUTOMATED COUNT: 14.2 % (ref 11.6–15.1)
GFR SERPL CREATININE-BSD FRML MDRD: 63 ML/MIN/1.73SQ M
GLUCOSE SERPL-MCNC: 79 MG/DL (ref 65–140)
HCT VFR BLD AUTO: 35.9 % (ref 36.5–49.3)
HGB BLD-MCNC: 11.8 G/DL (ref 12–17)
MCH RBC QN AUTO: 29 PG (ref 26.8–34.3)
MCHC RBC AUTO-ENTMCNC: 32.9 G/DL (ref 31.4–37.4)
MCV RBC AUTO: 88 FL (ref 82–98)
PLATELET # BLD AUTO: 155 THOUSANDS/UL (ref 149–390)
PMV BLD AUTO: 11.2 FL (ref 8.9–12.7)
POTASSIUM SERPL-SCNC: 3.5 MMOL/L (ref 3.5–5.3)
RBC # BLD AUTO: 4.07 MILLION/UL (ref 3.88–5.62)
SODIUM SERPL-SCNC: 135 MMOL/L (ref 135–147)
WBC # BLD AUTO: 13.99 THOUSAND/UL (ref 4.31–10.16)

## 2024-10-10 PROCEDURE — 80048 BASIC METABOLIC PNL TOTAL CA: CPT | Performed by: HOSPITALIST

## 2024-10-10 PROCEDURE — 94664 DEMO&/EVAL PT USE INHALER: CPT

## 2024-10-10 PROCEDURE — 94760 N-INVAS EAR/PLS OXIMETRY 1: CPT

## 2024-10-10 PROCEDURE — 94668 MNPJ CHEST WALL SBSQ: CPT

## 2024-10-10 PROCEDURE — 99232 SBSQ HOSP IP/OBS MODERATE 35: CPT | Performed by: HOSPITALIST

## 2024-10-10 PROCEDURE — 99232 SBSQ HOSP IP/OBS MODERATE 35: CPT | Performed by: STUDENT IN AN ORGANIZED HEALTH CARE EDUCATION/TRAINING PROGRAM

## 2024-10-10 PROCEDURE — 73562 X-RAY EXAM OF KNEE 3: CPT

## 2024-10-10 PROCEDURE — 85025 COMPLETE CBC W/AUTO DIFF WBC: CPT | Performed by: HOSPITALIST

## 2024-10-10 PROCEDURE — 94640 AIRWAY INHALATION TREATMENT: CPT

## 2024-10-10 RX ADMIN — Medication 3 MG: at 22:23

## 2024-10-10 RX ADMIN — ALBUTEROL SULFATE 2.5 MG: 2.5 SOLUTION RESPIRATORY (INHALATION) at 15:58

## 2024-10-10 RX ADMIN — ACETAMINOPHEN 650 MG: 325 TABLET ORAL at 22:28

## 2024-10-10 RX ADMIN — POTASSIUM CHLORIDE 40 MEQ: 1500 TABLET, EXTENDED RELEASE ORAL at 18:19

## 2024-10-10 RX ADMIN — HEPARIN SODIUM 5000 UNITS: 5000 INJECTION, SOLUTION INTRAVENOUS; SUBCUTANEOUS at 15:35

## 2024-10-10 RX ADMIN — HEPARIN SODIUM 5000 UNITS: 5000 INJECTION, SOLUTION INTRAVENOUS; SUBCUTANEOUS at 05:39

## 2024-10-10 RX ADMIN — CYCLOBENZAPRINE HYDROCHLORIDE 5 MG: 10 TABLET, FILM COATED ORAL at 15:45

## 2024-10-10 RX ADMIN — ACETAMINOPHEN 650 MG: 325 TABLET ORAL at 08:21

## 2024-10-10 RX ADMIN — HEPARIN SODIUM 5000 UNITS: 5000 INJECTION, SOLUTION INTRAVENOUS; SUBCUTANEOUS at 22:23

## 2024-10-10 RX ADMIN — ACETAMINOPHEN 650 MG: 325 TABLET ORAL at 15:45

## 2024-10-10 RX ADMIN — CEFTRIAXONE 1000 MG: 1 INJECTION, SOLUTION INTRAVENOUS at 02:58

## 2024-10-10 RX ADMIN — LEVOTHYROXINE SODIUM 50 MCG: 50 TABLET ORAL at 05:39

## 2024-10-10 RX ADMIN — SODIUM CHLORIDE 75 ML/HR: 0.9 INJECTION, SOLUTION INTRAVENOUS at 18:19

## 2024-10-10 RX ADMIN — SODIUM CHLORIDE 75 ML/HR: 0.9 INJECTION, SOLUTION INTRAVENOUS at 03:06

## 2024-10-10 RX ADMIN — POTASSIUM CHLORIDE 40 MEQ: 1500 TABLET, EXTENDED RELEASE ORAL at 08:21

## 2024-10-10 NOTE — PROGRESS NOTES
"Progress Note - Hospitalist   Name: Miguel Henderson 84 y.o. male I MRN: [id number]  Unit/Bed#: -01 I Date of Admission: 10/7/2024   Date of Service: 10/10/2024 I Hospital Day: 2    Assessment & Plan  Severe sepsis (HCC)  Secondary to a combination of rhinovirus positivity, and a right sided pneumonia  Tmax over the past 24 hours is 101 point degrees Fahrenheit, tachycardia has resolved white blood cell count continues to downtrend  Patient tested positive for rhinovirus/enterovirus  Patient was initially treated with both ceftriaxone, Zithromax-antibiotics discontinued by ID  Blood cultures x 2 sets -no growth to date  Urine testing for both Streptococcus pneumonia and Legionella antigen is negative  RP 2 panel revealed rhino/enterovirus positivity  Continue IV fluid  If afebrile, discharge planning for tomorrow  Community acquired pneumonia of right upper lobe of lung  CT imaging revealed \"There is some minor groundglass and tree-in-bud nodularity suggested in the right upper lobe inferior segment and to a lesser degree in the right lower lobe\"  Most likely a viral pneumonia-see the management as outlined above  Status post speech and swallow evaluation inclusive of a bedside and video barium swallow -see the full report for additional details  Diet has been modified to dysphagia level 3 and thin liquid diet  Hypothyroidism  Continue Synthroid  Primary hypertension  Hydrochlorothiazide has been put on hold in view of softer blood pressures  Rhinovirus  Continue supportive therapy  Acute kidney injury (HCC)  Has resolved-this is secondary to intravascular depletion in the setting of receiving IV Lasix  Continue IV fluid x 24 more hours  Echocardiogram was within normal limits  Monitor input and output, monitor urinary output  Hypokalemia-has resolved with repletion  Avoid nephrotoxins-both Motrin, and Toradol have been discontinued  Left knee pain  Will check a left knee x-ray  Will request for PT and OT " evaluation    VTE Pharmacologic Prophylaxis: VTE Score: 8 High Risk (Score >/= 5) - Pharmacological DVT Prophylaxis Ordered: heparin. Sequential Compression Devices Ordered.    Mobility:   Basic Mobility Inpatient Raw Score: 19  JH-HLM Goal: 6: Walk 10 steps or more  JH-HLM Achieved: 3: Sit at edge of bed  JH-HLM Goal NOT achieved. Continue with multidisciplinary rounding and encourage appropriate mobility to improve upon JH-HLM goals.    Patient Centered Rounds: I performed bedside rounds with nursing staff today.   Discussions with Specialists or Other Care Team Provider: Infectious disease    Education and Discussions with Family / Patient: Updated  (patient's wife and son) at bedside.    Current Length of Stay: 2 day(s)  Current Patient Status: Inpatient   Certification Statement: The patient will continue to require additional inpatient hospital stay due to the need for continued IV fluid therapy, and imaging of his left knee along with a PT and OT evaluation  Discharge Plan: Anticipate discharge in 24-48 hrs to rehab facility.    Code Status: Level 1 - Full Code    Subjective   Patient seen, looks and feels a lot better, complains of some left knee discomfort, but otherwise is doing okay    Objective :  Temp:  [98.5 °F (36.9 °C)-101.1 °F (38.4 °C)] 98.5 °F (36.9 °C)  HR:  [] 94  BP: ()/(52-74) 106/67  Resp:  [14-19] 19  SpO2:  [94 %-98 %] 95 %  O2 Device: None (Room air)    Body mass index is 27.79 kg/m².     Input and Output Summary (last 24 hours):     Intake/Output Summary (Last 24 hours) at 10/10/2024 1227  Last data filed at 10/10/2024 0900  Gross per 24 hour   Intake 684 ml   Output --   Net 684 ml       Physical Exam  Vitals and nursing note reviewed.   Constitutional:       General: He is not in acute distress.     Appearance: Normal appearance. He is not ill-appearing.   HENT:      Head: Normocephalic and atraumatic.      Nose: Nose normal.   Eyes:      Extraocular Movements:  Extraocular movements intact.      Pupils: Pupils are equal, round, and reactive to light.   Cardiovascular:      Rate and Rhythm: Normal rate and regular rhythm.      Pulses: Normal pulses.      Heart sounds: Normal heart sounds. No murmur heard.     No friction rub. No gallop.   Pulmonary:      Effort: Pulmonary effort is normal.      Breath sounds: Normal breath sounds.   Abdominal:      General: There is no distension.      Palpations: Abdomen is soft. There is no mass.      Tenderness: There is no abdominal tenderness. There is no guarding or rebound.   Musculoskeletal:         General: No swelling or tenderness. Normal range of motion.      Cervical back: Normal range of motion and neck supple. No rigidity. No muscular tenderness.      Right lower leg: No edema.      Left lower leg: No edema.   Skin:     General: Skin is warm.      Capillary Refill: Capillary refill takes less than 2 seconds.      Findings: No erythema or rash.   Neurological:      General: No focal deficit present.      Mental Status: He is alert and oriented to person, place, and time. Mental status is at baseline.   Psychiatric:         Mood and Affect: Mood normal.         Behavior: Behavior normal.           Lab Results: I have reviewed the following results:   Results from last 7 days   Lab Units 10/10/24  0537 10/09/24  0618 10/08/24  0509 10/07/24  2306   WBC Thousand/uL 13.99*   < > 16.10* 16.94*   HEMOGLOBIN g/dL 11.8*   < > 12.2 13.8   HEMATOCRIT % 35.9*   < > 37.2 41.3   PLATELETS Thousands/uL 155   < > 130* 150   BANDS PCT %  --   --   --  10*   SEGS PCT %  --   --  89*  --    LYMPHO PCT %  --   --  5* 2*   MONO PCT %  --   --  2* 5   EOS PCT %  --   --  3 0    < > = values in this interval not displayed.     Results from last 7 days   Lab Units 10/10/24  0537 10/08/24  0509 10/07/24  2306   SODIUM mmol/L 135   < > 133*   POTASSIUM mmol/L 3.5   < > 3.3*   CHLORIDE mmol/L 103   < > 97   CO2 mmol/L 22   < > 25   BUN mg/dL 37*   < >  21   CREATININE mg/dL 1.05   < > 1.03   ANION GAP mmol/L 10   < > 11   CALCIUM mg/dL 7.7*   < > 8.5   ALBUMIN g/dL  --   --  3.3*   TOTAL BILIRUBIN mg/dL  --   --  1.80*   ALK PHOS U/L  --   --  68   ALT U/L  --   --  22   AST U/L  --   --  26   GLUCOSE RANDOM mg/dL 79   < > 158*    < > = values in this interval not displayed.     Results from last 7 days   Lab Units 10/07/24  2306   INR  1.18             Results from last 7 days   Lab Units 10/09/24  0618 10/08/24  0202 10/07/24  2306   LACTIC ACID mmol/L  --  2.2* 3.8*   PROCALCITONIN ng/ml 6.95*  --  2.15*       Recent Cultures (last 7 days):   Results from last 7 days   Lab Units 10/09/24  1745 10/09/24  0313 10/07/24  2306 10/07/24  2305   BLOOD CULTURE   --   --  No Growth at 48 hrs. No Growth at 48 hrs.   SPUTUM CULTURE  Culture too young- will reincubate  --   --   --    GRAM STAIN RESULT  No polys seen*  4+ Yeast*  --   --   --    LEGIONELLA URINARY ANTIGEN   --  Negative  --   --        Imaging Results Review: I reviewed radiology reports from this admission including: Video barium swallow study results.  Other Study Results Review: No additional pertinent studies reviewed.    Last 24 Hours Medication List:     Current Facility-Administered Medications:     acetaminophen (TYLENOL) tablet 650 mg, Q6H PRN    albuterol inhalation solution 2.5 mg, Q6H PRN    cyclobenzaprine (FLEXERIL) tablet 5 mg, TID PRN    heparin (porcine) subcutaneous injection 5,000 Units, Q8H [facility]    levothyroxine tablet 50 mcg, Once per day on Monday Tuesday Wednesday Thursday    [START ON 10/11/2024] levothyroxine tablet 75 mcg, Once per day on Sunday Friday Saturday    melatonin tablet 3 mg, HS    ondansetron (ZOFRAN) injection 4 mg, Q6H PRN    potassium chloride (Klor-Con M20) CR tablet 40 mEq, BID    sodium chloride 0.9 % infusion, Continuous, Last Rate: 75 mL/hr (10/10/24 0306)    Administrative Statements   Today, Patient Was Seen By: Judy Wade MD      **Please Note:  This note may have been constructed using a voice recognition system.**

## 2024-10-10 NOTE — ASSESSMENT & PLAN NOTE
Has resolved-this is secondary to intravascular depletion in the setting of receiving IV Lasix  Continue IV fluid x 24 more hours  Echocardiogram was within normal limits  Monitor input and output, monitor urinary output  Hypokalemia-has resolved with repletion  Avoid nephrotoxins-both Motrin, and Toradol have been discontinued

## 2024-10-10 NOTE — PROGRESS NOTES
Progress Note - Infectious Disease   Name: Miguel Henderson 84 y.o. male I MRN: [id number]  Unit/Bed#: -01 I Date of Admission: 10/7/2024   Date of Service: 10/10/2024 I Hospital Day: 2     Assessment & Plan  Severe sepsis (HCC)  Leukocytosis, fever, tachycardia and tachypnea on admission.  Tmax 101.1 °F this AM.  Patient is intermittently hypotensive but seems to have responded to IV fluids.  Suspect pulmonary source.  Likely viral.  So far blood cultures are negative.  He remains nontoxic-appearing despite systemic illness.  Monitor off antibiotics as below  Follow-up blood cultures  Trend temps and hemodynamics  Repeat CBC differential and BMP to monitor response to treatment and for developing toxicities  Pneumonia  Likely viral pneumonia.  No dense consolidations noted on chest x-ray and CT chest.  RP 2 positive for rhinovirus/enterovirus.  Procalcitonin slightly elevated.  Urinary antigens are negative. Sputum culture pending.  With recent episodes of coughing while eating, Consider possibility of aspiration pneumonitis. Mild oropharyngeal dysphagia noted on VBS.  Disontinue ceftriaxone, monitor off abx   Follow-up blood cultures   Follow-up sputum culture  Aspiration precautions  Rhinovirus  Suspected viral tracheobronchitis.  Rhinovirus/enterovirus positive on RP 2.  Strep PCR negative.  Flu/COVID/RSV negative.  Continue supportive cares  Acute kidney injury (HCC)  Now resolving.  Suspect multifactorial etiology.  May be related to hypotensive episodes, diuretic use, poor p.o. intake and sepsis.    Antibiotics:  D3 IV ceftriaxone    I have discussed the above management plan in detail with patient.     Above plan discussed in detail with Dr. Wade who is in agreement with plan to continue abx as above.     24 Hour events:  Yesterday and overnight notes reviewed. Febrile again this AM.    Subjective:  Patient feels improved today. Some wheezing, chest tightness. Improving sore throat, congestion.  Subjective fevers this AM.     Objective:  Vitals:  Temp:  [98.5 °F (36.9 °C)-101.1 °F (38.4 °C)] 101.1 °F (38.4 °C)  HR:  [] 101  Resp:  [14-19] 19  BP: ()/(50-74) 101/66  SpO2:  [94 %-98 %] 98 %  Temp (24hrs), Av.4 °F (37.4 °C), Min:98.5 °F (36.9 °C), Max:101.1 °F (38.4 °C)  Current: Temperature: (!) 101.1 °F (38.4 °C)    PHYSICAL EXAM:  General Appearance:  Appearing well, nontoxic, and in no distress   HEENT: Normocephalic, without obvious abnormality, atraumatic. Conjunctiva pink and sclera anicteric. Oropharynx moist without lesions.     Lungs:   On room air, few exp wheezes noted, respirations unlabored   Heart:  RRR; no murmur, rub or gallop   Abdomen:   Soft, non-tender, non-distended, positive bowel sounds    Extremities: No cyanosis, clubbing or edema   Musculoskeletal: Back symmetric without curvature, ROM normal.    : No CVA or suprapubic tenderness.     Skin: No rashes or lesions. No draining wounds noted. Peripheral IV intact without evidence of erythema, warmth, or exudate.        LABS, IMAGING, & OTHER STUDIES:  Extensive review of the medical records in epic including review of the notes, radiographs, and laboratory results were reviewed personally as below.     Lab Results:  Results from last 7 days   Lab Units 10/10/24  0537 10/09/24  0618 10/08/24  0509   WBC Thousand/uL 13.99* 15.93* 16.10*   HEMOGLOBIN g/dL 11.8* 13.5 12.2   PLATELETS Thousands/uL 155 161 130*     Results from last 7 days   Lab Units 10/10/24  0537 10/09/24  0618 10/08/24  0509 10/07/24  2306   SODIUM mmol/L 135 135 135 133*   POTASSIUM mmol/L 3.5 2.8* 3.7 3.3*   CHLORIDE mmol/L 103 99 104 97   CO2 mmol/L 22 26 25 25   BUN mg/dL 37* 34* 23 21   CREATININE mg/dL 1.05 1.43* 0.86 1.03   EGFR ml/min/1.73sq m 63 44 78 65   CALCIUM mg/dL 7.7* 8.0* 7.1* 8.5   AST U/L  --   --   --  26   ALT U/L  --   --   --  22   ALK PHOS U/L  --   --   --  68     Results from last 7 days   Lab Units 10/09/24  0313 10/07/24  6586  10/07/24  2305   BLOOD CULTURE   --  No Growth at 48 hrs. No Growth at 48 hrs.   LEGIONELLA URINARY ANTIGEN  Negative  --   --      Results from last 7 days   Lab Units 10/09/24  0618 10/07/24  2306   PROCALCITONIN ng/ml 6.95* 2.15*                   Lab interpretation/comments: improving leukocytosis      Culture data: urinary ag neg, sputum cx pending, blood cx neg    Imaging Studies:   Imaging interpretation/comments: viewed VBS report

## 2024-10-10 NOTE — OCCUPATIONAL THERAPY NOTE
Occupational Therapy    Patient Name: Miguel Henderson  Today's Date: 10/10/2024         10/10/24    OT Last Visit   OT Visit Date 10/10/24   Note Type   Note type Cancelled Session   Additional Comments OT order received and chart review performed. @ this time, OT evaluation cancelled d/t  awaiting imaging L knee . OT will follow and evaluate in efforts to provide skilled interventions as appropriate. Nsg aware.    Brunilda Bradley, OTR/L

## 2024-10-10 NOTE — ASSESSMENT & PLAN NOTE
Now resolving.  Suspect multifactorial etiology.  May be related to hypotensive episodes, diuretic use, poor p.o. intake and sepsis.

## 2024-10-10 NOTE — ASSESSMENT & PLAN NOTE
Likely viral pneumonia.  No dense consolidations noted on chest x-ray and CT chest.  RP 2 positive for rhinovirus/enterovirus.  Procalcitonin slightly elevated.  Urinary antigens are negative. Sputum culture pending.  With recent episodes of coughing while eating, Consider possibility of aspiration pneumonitis. Mild oropharyngeal dysphagia noted on VBS.  Disontinue ceftriaxone, monitor off abx   Follow-up blood cultures   Follow-up sputum culture  Aspiration precautions

## 2024-10-10 NOTE — ASSESSMENT & PLAN NOTE
"CT imaging revealed \"There is some minor groundglass and tree-in-bud nodularity suggested in the right upper lobe inferior segment and to a lesser degree in the right lower lobe\"  Most likely a viral pneumonia-see the management as outlined above  Status post speech and swallow evaluation inclusive of a bedside and video barium swallow -see the full report for additional details  Diet has been modified to dysphagia level 3 and thin liquid diet  "

## 2024-10-10 NOTE — CASE MANAGEMENT
Case Management Assessment & Discharge Planning Note    Patient name Miguel Henderson  Location /-01 MRN [id number]  : 1940 Date 10/10/2024       Current Admission Date: 10/7/2024  Current Admission Diagnosis:Severe sepsis (HCC)   Patient Active Problem List    Diagnosis Date Noted Date Diagnosed    Left knee pain 10/10/2024     Pneumonia 10/09/2024     Rhinovirus 10/09/2024     Acute kidney injury (HCC) 10/09/2024     Severe sepsis (HCC) 10/08/2024     Community acquired pneumonia of right upper lobe of lung 10/08/2024     Adenocarcinoma of prostate (HCC) 2024     Lumbar radiculopathy 2024     Hypothyroidism 2024     Primary hypertension 2024       LOS (days): 2  Geometric Mean LOS (GMLOS) (days): 4.9  Days to GMLOS:2.2     OBJECTIVE:    Risk of Unplanned Readmission Score: 11.57         Current admission status: Inpatient  Referral Reason: Other    Preferred Pharmacy:   [id number] - [place] [id number]  Phone: [phone] Fax: [phone]    Primary Care Provider: Gerg Foster MD    Primary Insurance: [email or web address] [facility]  Secondary Insurance:     ASSESSMENT:  Active Health Care Proxies       Juan Henderson Health Care Representative - Son   Primary Phone: [phone] (Mobile)                 Advance Directives  Does patient have a Health Care POA?: No  Was patient offered paperwork?: Yes (paperwork was given)  Does patient have Advance Directives?: No  Was patient offered paperwork?: Yes (paperwork was given)         Readmission Root Cause  30 Day Readmission: No    Patient Information  Admitted from:: Home  Mental Status: Alert  During Assessment patient was accompanied by: Not accompanied during assessment  Assessment information provided by:: Son  Primary Caregiver: Self  Support Systems: Spouse/significant other, Son, Family members  County of Residence:  do  you live in?: [place]  Home entry access options. Select all that apply.: Stairs  Number of steps to enter home.: 3  Do the steps have railings?: Yes  Type of Current Residence: [facility]  Living Arrangements: Lives w/ Spouse/significant other, Lives w/ Son, Other (Comment) (daughter in-law)  Is patient a ?: No    Activities of Daily Living Prior to Admission  Functional Status: Independent  Completes ADLs independently?: Yes  Ambulates independently?: Yes  Does patient use assisted devices?: No  Does patient currently own DME?: No  Does patient have a history of Outpatient Therapy (PT/OT)?: Yes (active at [facility])  Does the patient have a history of Short-Term Rehab?: No  Does patient have a history of HHC?: No  Does patient currently have HHC?: No         Patient Information Continued  Income Source: Pension/senior care  Does patient have prescription coverage?: Yes ([facility])  Does patient receive dialysis treatments?: No  Does patient have a history of substance abuse?: No  Does patient have a history of Mental Health Diagnosis?: No         Means of Transportation  Means of Transport to Appts:: Family transport      Social Determinants of Health (SDOH)      Flowsheet Row Most Recent Value   Housing Stability    In the last 12 months, was there a time when you were not able to pay the mortgage or rent on time? N   In the past 12 months, how many times have you moved where you were living? 0   At any time in the past 12 months, were you homeless or living in a shelter (including now)? N   Transportation Needs    In the past 12 months, has lack of transportation kept you from medical appointments or from getting medications? no   In the past 12 months, has lack of transportation kept you from meetings, work, or from getting things needed for daily living? No   Food Insecurity    Within the past 12 months, you worried that your food would run out before you got the  money to buy more. Never true   Within the past 12 months, the food you bought just didn't last and you didn't have money to get more. Never true   Utilities    In the past 12 months has the electric, gas, oil, or water company threatened to shut off services in your home? No            DISCHARGE DETAILS:    Discharge planning discussed with:: cm met with his son  Freedom of Choice: Yes  Comments - Freedom of Choice: pt is active with out rehab center-  cm will continue to follow for additonal d/c needs  CM contacted family/caregiver?: Yes             Contacts  Patient Contacts: Juan Henderson  Relationship to Patient:: Family (son)  Contact Method: In Person  Reason/Outcome: Discharge Planning    Requested Home Health Care         Is the patient interested in HHC at discharge?: No    DME Referral Provided  Referral made for DME?: No    Other Referral/Resources/Interventions Provided:  Interventions: Outpatient OT, Outpatient PT  Referral Comments: pt is active at the [facility] center- pt/ot eval ordered - cm will continue to follow for any additonal d/c needs- pt had 101 temp today - pt is not stable for d/c    Would you like to participate in our [facility] service program?  : No - Declined    Treatment Team Recommendation:  (d/c plan TBD - TBD)                                         Family notified:: son

## 2024-10-10 NOTE — PLAN OF CARE
Problem: PAIN - ADULT  Goal: Verbalizes/displays adequate comfort level or baseline comfort level  Description: Interventions:  - Encourage patient to monitor pain and request assistance  - Assess pain using appropriate pain scale  - Administer analgesics based on type and severity of pain and evaluate response  - Implement non-pharmacological measures as appropriate and evaluate response  - Consider cultural and social influences on pain and pain management  - Notify physician/advanced practitioner if interventions unsuccessful or patient reports new pain  Outcome: Progressing     Problem: INFECTION - ADULT  Goal: Absence or prevention of progression during hospitalization  Description: INTERVENTIONS:  - Assess and monitor for signs and symptoms of infection  - Monitor lab/diagnostic results  - Monitor all insertion sites, i.e. indwelling lines, tubes, and drains  - Monitor endotracheal if appropriate and nasal secretions for changes in amount and color  - Burna appropriate cooling/warming therapies per order  - Administer medications as ordered  - Instruct and encourage patient and family to use good hand hygiene technique  - Identify and instruct in appropriate isolation precautions for identified infection/condition  Outcome: Progressing     Problem: SAFETY ADULT  Goal: Patient will remain free of falls  Description: INTERVENTIONS:  - Educate patient/family on patient safety including physical limitations  - Instruct patient to call for assistance with activity   - Consult OT/PT to assist with strengthening/mobility   - Keep Call bell within reach  - Keep bed low and locked with side rails adjusted as appropriate  - Keep care items and personal belongings within reach  - Initiate and maintain comfort rounds  - Make Fall Risk Sign visible to staff  - Offer Toileting every 2 Hours, in advance of need  - Initiate/Maintain bed alarm  - Obtain necessary fall risk management equipment: non skid socks  - Apply  yellow socks and bracelet for high fall risk patients  - Consider moving patient to room near nurses station  Outcome: Progressing    Problem: METABOLIC, FLUID AND ELECTROLYTES - ADULT  Goal: Electrolytes maintained within normal limits  Description: INTERVENTIONS:  - Monitor labs and assess patient for signs and symptoms of electrolyte imbalances  - Administer electrolyte replacement as ordered  - Monitor response to electrolyte replacements, including repeat lab results as appropriate  - Instruct patient on fluid and nutrition as appropriate  Outcome: Progressing  Goal: Fluid balance maintained  Description: INTERVENTIONS:  - Monitor labs   - Monitor I/O and WT  - Instruct patient on fluid and nutrition as appropriate  - Assess for signs & symptoms of volume excess or deficit  Outcome: Progressing     Problem: RESPIRATORY - ADULT  Goal: Achieves optimal ventilation and oxygenation  Description: INTERVENTIONS:  - Assess for changes in respiratory status  - Assess for changes in mentation and behavior  - Position to facilitate oxygenation and minimize respiratory effort  - Oxygen administered by appropriate delivery if ordered  - Initiate smoking cessation education as indicated  - Encourage broncho-pulmonary hygiene including cough, deep breathe, Incentive Spirometry  - Assess the need for suctioning and aspirate as needed  - Assess and instruct to report SOB or any respiratory difficulty  - Respiratory Therapy support as indicated  Outcome: Progressing

## 2024-10-10 NOTE — ASSESSMENT & PLAN NOTE
Leukocytosis, fever, tachycardia and tachypnea on admission.  Tmax 101.1 °F this AM.  Patient is intermittently hypotensive but seems to have responded to IV fluids.  Suspect pulmonary source.  Likely viral.  So far blood cultures are negative.  He remains nontoxic-appearing despite systemic illness.  Monitor off antibiotics as below  Follow-up blood cultures  Trend temps and hemodynamics  Repeat CBC differential and BMP to monitor response to treatment and for developing toxicities

## 2024-10-10 NOTE — PLAN OF CARE
Problem: SAFETY ADULT  Goal: Maintain or return to baseline ADL function  Description: INTERVENTIONS:  -  Assess patient's ability to carry out ADLs; assess patient's baseline for ADL function and identify physical deficits which impact ability to perform ADLs (bathing, care of mouth/teeth, toileting, grooming, dressing, etc.)  - Assess/evaluate cause of self-care deficits   - Assess range of motion  - Assess patient's mobility; develop plan if impaired  - Assess patient's need for assistive devices and provide as appropriate  - Encourage maximum independence but intervene and supervise when necessary  - Involve family in performance of ADLs  - Assess for home care needs following discharge   - Consider OT consult to assist with ADL evaluation and planning for discharge  - Provide patient education as appropriate  Outcome: Progressing     Problem: PAIN - ADULT  Goal: Verbalizes/displays adequate comfort level or baseline comfort level  Description: Interventions:  - Encourage patient to monitor pain and request assistance  - Assess pain using appropriate pain scale  - Administer analgesics based on type and severity of pain and evaluate response  - Implement non-pharmacological measures as appropriate and evaluate response  - Consider cultural and social influences on pain and pain management  - Notify physician/advanced practitioner if interventions unsuccessful or patient reports new pain  Outcome: Progressing     Problem: INFECTION - ADULT  Goal: Absence or prevention of progression during hospitalization  Description: INTERVENTIONS:  - Assess and monitor for signs and symptoms of infection  - Monitor lab/diagnostic results  - Monitor all insertion sites, i.e. indwelling lines, tubes, and drains  - Monitor endotracheal if appropriate and nasal secretions for changes in amount and color  - [place] appropriate cooling/warming therapies per order  - Administer medications as ordered  - Instruct and encourage  patient and family to use good hand hygiene technique  - Identify and instruct in appropriate isolation precautions for identified infection/condition  Outcome: Progressing  Goal: Absence of fever/infection during neutropenic period  Description: INTERVENTIONS:  - Monitor WBC    Outcome: Progressing     Problem: DISCHARGE PLANNING  Goal: Discharge to home or other facility with appropriate resources  Description: INTERVENTIONS:  - Identify barriers to discharge w/patient and caregiver  - Arrange for needed discharge resources and transportation as appropriate  - Identify discharge learning needs (meds, wound care, etc.)  - Arrange for interpretive services to assist at discharge as needed  - Refer to Case Management Department for coordinating discharge planning if the patient needs post-hospital services based on physician/advanced practitioner order or complex needs related to functional status, cognitive ability, or social support system  Outcome: Progressing     Problem: Knowledge Deficit  Goal: Patient/family/caregiver demonstrates understanding of disease process, treatment plan, medications, and discharge instructions  Description: Complete learning assessment and assess knowledge base.  Interventions:  - Provide teaching at level of understanding  - Provide teaching via preferred learning methods  Outcome: Progressing     Problem: METABOLIC, FLUID AND ELECTROLYTES - ADULT  Goal: Electrolytes maintained within normal limits  Description: INTERVENTIONS:  - Monitor labs and assess patient for signs and symptoms of electrolyte imbalances  - Administer electrolyte replacement as ordered  - Monitor response to electrolyte replacements, including repeat lab results as appropriate  - Instruct patient on fluid and nutrition as appropriate  Outcome: Progressing  Goal: Fluid balance maintained  Description: INTERVENTIONS:  - Monitor labs   - Monitor I/O and WT  - Instruct patient on fluid and nutrition as  appropriate  - Assess for signs & symptoms of volume excess or deficit  Outcome: Progressing     Problem: CARDIOVASCULAR - ADULT  Goal: Maintains optimal cardiac output and hemodynamic stability  Description: INTERVENTIONS:  - Monitor I/O, vital signs and rhythm  - Monitor for S/S and trends of decreased cardiac output  - Administer and titrate ordered vasoactive medications to optimize hemodynamic stability  - Assess quality of pulses, skin color and temperature  - Assess for signs of decreased coronary artery perfusion  - Instruct patient to report change in severity of symptoms  Outcome: Progressing     Problem: RESPIRATORY - ADULT  Goal: Achieves optimal ventilation and oxygenation  Description: INTERVENTIONS:  - Assess for changes in respiratory status  - Assess for changes in mentation and behavior  - Position to facilitate oxygenation and minimize respiratory effort  - Oxygen administered by appropriate delivery if ordered  - Initiate smoking cessation education as indicated  - Encourage broncho-pulmonary hygiene including cough, deep breathe, Incentive Spirometry  - Assess the need for suctioning and aspirate as needed  - Assess and instruct to report SOB or any respiratory difficulty  - Respiratory Therapy support as indicated  Outcome: Progressing     Problem: Nutrition/Hydration-ADULT  Goal: Nutrient/Hydration intake appropriate for improving, restoring or maintaining nutritional needs  Description: Monitor and assess patient's nutrition/hydration status for malnutrition. Collaborate with interdisciplinary team and initiate plan and interventions as ordered.  Monitor patient's weight and dietary intake as ordered or per policy. Utilize nutrition screening tool and intervene as necessary. Determine patient's food preferences and provide high-protein, high-caloric foods as appropriate.     INTERVENTIONS:  - Monitor oral intake, urinary output, labs, and treatment plans  - Assess nutrition and hydration  status and recommend course of action  - Evaluate amount of meals eaten  - Assist patient with eating if necessary   - Allow adequate time for meals  - Recommend/ encourage appropriate diets, oral nutritional supplements, and vitamin/mineral supplements  - Order, calculate, and assess calorie counts as needed  - Recommend, monitor, and adjust tube feedings and TPN/PPN based on assessed needs  - Assess need for intravenous fluids  - Provide specific nutrition/hydration education as appropriate  - Include patient/family/caregiver in decisions related to nutrition  Outcome: Progressing

## 2024-10-10 NOTE — ASSESSMENT & PLAN NOTE
Secondary to a combination of rhinovirus positivity, and a right sided pneumonia  Tmax over the past 24 hours is 101 point degrees Fahrenheit, tachycardia has resolved white blood cell count continues to downtrend  Patient tested positive for rhinovirus/enterovirus  Patient was initially treated with both ceftriaxone, Zithromax-antibiotics discontinued by ID  Blood cultures x 2 sets -no growth to date  Urine testing for both Streptococcus pneumonia and Legionella antigen is negative  RP 2 panel revealed rhino/enterovirus positivity  Continue IV fluid  If afebrile, discharge planning for tomorrow

## 2024-10-11 ENCOUNTER — APPOINTMENT (INPATIENT)
Dept: CT IMAGING | Facility: HOSPITAL | Age: 84
DRG: 871 | End: 2024-10-11
Payer: COMMERCIAL

## 2024-10-11 LAB
ANION GAP SERPL CALCULATED.3IONS-SCNC: 4 MMOL/L (ref 4–13)
ANISOCYTOSIS BLD QL SMEAR: PRESENT
BACTERIA SPT RESP CULT: ABNORMAL
BASOPHILS # BLD MANUAL: 0 THOUSAND/UL (ref 0–0.1)
BASOPHILS NFR MAR MANUAL: 0 % (ref 0–1)
BUN SERPL-MCNC: 19 MG/DL (ref 5–25)
CALCIUM SERPL-MCNC: 7.7 MG/DL (ref 8.4–10.2)
CHLORIDE SERPL-SCNC: 107 MMOL/L (ref 96–108)
CO2 SERPL-SCNC: 25 MMOL/L (ref 21–32)
CREAT SERPL-MCNC: 0.59 MG/DL (ref 0.6–1.3)
DME PARACHUTE DELIVERY DATE REQUESTED: NORMAL
DME PARACHUTE DELIVERY NOTE: NORMAL
DME PARACHUTE ITEM DESCRIPTION: NORMAL
DME PARACHUTE ORDER STATUS: NORMAL
DME PARACHUTE SUPPLIER NAME: NORMAL
DME PARACHUTE SUPPLIER PHONE: NORMAL
EOSINOPHIL # BLD MANUAL: 0.84 THOUSAND/UL (ref 0–0.4)
EOSINOPHIL NFR BLD MANUAL: 7 % (ref 0–6)
ERYTHROCYTE [DISTWIDTH] IN BLOOD BY AUTOMATED COUNT: 14.5 % (ref 11.6–15.1)
GFR SERPL CREATININE-BSD FRML MDRD: 91 ML/MIN/1.73SQ M
GLUCOSE SERPL-MCNC: 144 MG/DL (ref 65–140)
GLUCOSE SERPL-MCNC: 88 MG/DL (ref 65–140)
GRAM STN SPEC: ABNORMAL
GRAM STN SPEC: ABNORMAL
HCT VFR BLD AUTO: 32.9 % (ref 36.5–49.3)
HGB BLD-MCNC: 10.8 G/DL (ref 12–17)
LYMPHOCYTES # BLD AUTO: 21 % (ref 14–44)
LYMPHOCYTES # BLD AUTO: 3.11 THOUSAND/UL (ref 0.6–4.47)
MCH RBC QN AUTO: 29.2 PG (ref 26.8–34.3)
MCHC RBC AUTO-ENTMCNC: 32.8 G/DL (ref 31.4–37.4)
MCV RBC AUTO: 89 FL (ref 82–98)
MONOCYTES # BLD AUTO: 0 THOUSAND/UL (ref 0–1.22)
MONOCYTES NFR BLD: 0 % (ref 4–12)
NEUTROPHILS # BLD MANUAL: 8.03 THOUSAND/UL (ref 1.85–7.62)
NEUTS BAND NFR BLD MANUAL: 4 % (ref 0–8)
NEUTS SEG NFR BLD AUTO: 63 % (ref 43–75)
OVALOCYTES BLD QL SMEAR: PRESENT
PLATELET # BLD AUTO: 151 THOUSANDS/UL (ref 149–390)
PLATELET BLD QL SMEAR: ADEQUATE
PMV BLD AUTO: 10.2 FL (ref 8.9–12.7)
POTASSIUM SERPL-SCNC: 3.8 MMOL/L (ref 3.5–5.3)
PROCALCITONIN SERPL-MCNC: 1.68 NG/ML
RBC # BLD AUTO: 3.7 MILLION/UL (ref 3.88–5.62)
RBC MORPH BLD: PRESENT
SODIUM SERPL-SCNC: 136 MMOL/L (ref 135–147)
VARIANT LYMPHS # BLD AUTO: 5 %
WBC # BLD AUTO: 11.98 THOUSAND/UL (ref 4.31–10.16)

## 2024-10-11 PROCEDURE — 85027 COMPLETE CBC AUTOMATED: CPT | Performed by: HOSPITALIST

## 2024-10-11 PROCEDURE — 99222 1ST HOSP IP/OBS MODERATE 55: CPT | Performed by: STUDENT IN AN ORGANIZED HEALTH CARE EDUCATION/TRAINING PROGRAM

## 2024-10-11 PROCEDURE — 94640 AIRWAY INHALATION TREATMENT: CPT

## 2024-10-11 PROCEDURE — 97163 PT EVAL HIGH COMPLEX 45 MIN: CPT

## 2024-10-11 PROCEDURE — 92526 ORAL FUNCTION THERAPY: CPT

## 2024-10-11 PROCEDURE — 82948 REAGENT STRIP/BLOOD GLUCOSE: CPT

## 2024-10-11 PROCEDURE — 80048 BASIC METABOLIC PNL TOTAL CA: CPT | Performed by: HOSPITALIST

## 2024-10-11 PROCEDURE — 99232 SBSQ HOSP IP/OBS MODERATE 35: CPT | Performed by: PHYSICIAN ASSISTANT

## 2024-10-11 PROCEDURE — 97167 OT EVAL HIGH COMPLEX 60 MIN: CPT

## 2024-10-11 PROCEDURE — 84145 PROCALCITONIN (PCT): CPT | Performed by: PHYSICIAN ASSISTANT

## 2024-10-11 PROCEDURE — 74177 CT ABD & PELVIS W/CONTRAST: CPT

## 2024-10-11 PROCEDURE — 94664 DEMO&/EVAL PT USE INHALER: CPT

## 2024-10-11 PROCEDURE — 94760 N-INVAS EAR/PLS OXIMETRY 1: CPT

## 2024-10-11 PROCEDURE — 99233 SBSQ HOSP IP/OBS HIGH 50: CPT | Performed by: STUDENT IN AN ORGANIZED HEALTH CARE EDUCATION/TRAINING PROGRAM

## 2024-10-11 PROCEDURE — 71260 CT THORAX DX C+: CPT

## 2024-10-11 PROCEDURE — 85007 BL SMEAR W/DIFF WBC COUNT: CPT | Performed by: HOSPITALIST

## 2024-10-11 RX ORDER — PANTOPRAZOLE SODIUM 40 MG/1
40 TABLET, DELAYED RELEASE ORAL
Status: DISCONTINUED | OUTPATIENT
Start: 2024-10-12 | End: 2024-10-15 | Stop reason: HOSPADM

## 2024-10-11 RX ORDER — PANTOPRAZOLE SODIUM 40 MG/10ML
40 INJECTION, POWDER, LYOPHILIZED, FOR SOLUTION INTRAVENOUS DAILY
Status: DISCONTINUED | OUTPATIENT
Start: 2024-10-11 | End: 2024-10-11

## 2024-10-11 RX ORDER — CEFTRIAXONE 1 G/50ML
1000 INJECTION, SOLUTION INTRAVENOUS EVERY 24 HOURS
Status: DISCONTINUED | OUTPATIENT
Start: 2024-10-11 | End: 2024-10-12

## 2024-10-11 RX ADMIN — HEPARIN SODIUM 5000 UNITS: 5000 INJECTION, SOLUTION INTRAVENOUS; SUBCUTANEOUS at 15:26

## 2024-10-11 RX ADMIN — IOHEXOL 100 ML: 350 INJECTION, SOLUTION INTRAVENOUS at 18:46

## 2024-10-11 RX ADMIN — CYCLOBENZAPRINE HYDROCHLORIDE 5 MG: 10 TABLET, FILM COATED ORAL at 15:34

## 2024-10-11 RX ADMIN — Medication 3 MG: at 21:06

## 2024-10-11 RX ADMIN — CEFTRIAXONE 1000 MG: 1 INJECTION, SOLUTION INTRAVENOUS at 12:20

## 2024-10-11 RX ADMIN — ACETAMINOPHEN 650 MG: 325 TABLET ORAL at 12:21

## 2024-10-11 RX ADMIN — SODIUM CHLORIDE 75 ML/HR: 0.9 INJECTION, SOLUTION INTRAVENOUS at 06:00

## 2024-10-11 RX ADMIN — HEPARIN SODIUM 5000 UNITS: 5000 INJECTION, SOLUTION INTRAVENOUS; SUBCUTANEOUS at 21:08

## 2024-10-11 RX ADMIN — ALBUTEROL SULFATE 2.5 MG: 2.5 SOLUTION RESPIRATORY (INHALATION) at 10:58

## 2024-10-11 RX ADMIN — LEVOTHYROXINE SODIUM 75 MCG: 75 TABLET ORAL at 06:07

## 2024-10-11 RX ADMIN — ACETAMINOPHEN 650 MG: 325 TABLET ORAL at 21:06

## 2024-10-11 RX ADMIN — ALBUTEROL SULFATE 2.5 MG: 2.5 SOLUTION RESPIRATORY (INHALATION) at 21:26

## 2024-10-11 RX ADMIN — HEPARIN SODIUM 5000 UNITS: 5000 INJECTION, SOLUTION INTRAVENOUS; SUBCUTANEOUS at 06:08

## 2024-10-11 RX ADMIN — PANTOPRAZOLE SODIUM 40 MG: 40 INJECTION, POWDER, FOR SOLUTION INTRAVENOUS at 15:28

## 2024-10-11 NOTE — PROGRESS NOTES
Progress Note - Infectious Disease   Name: Miguel Henderson 84 y.o. male I MRN: [id number]  Unit/Bed#: MS 216Payal I Date of Admission: 10/7/2024   Date of Service: 10/11/2024 I Hospital Day: 3    Assessment & Plan  Severe sepsis (HCC)  Leukocytosis, fever, tachycardia and tachypnea on admission.  Tmax 101.9 °F yesterday evening.  Patient was hypotensive but seems to have responded to IV fluids.  Suspect pulmonary source.  Wheezing noted on exam. Suspect a viral tracheobronchitis given positive RP2. He is net positive >4L, thus consider possibility of mild volume overload and compressive atelectasis causing fevers. Consider possibility of ongoing aspiration. So far blood cultures are negative.  COnsider possibility of intra-abdominal source, though no GI or  sx. He remains nontoxic-appearing despite systemic illness.  Abx as below   Recheck procalcitonin  D/w primary team, plan to obtain CT C/A/P   Follow-up blood cultures  Trend temps and hemodynamics  Repeat CBC differential and BMP to monitor response to treatment and for developing toxicities  Pneumonia  Likely viral pneumonia.  No dense consolidations noted on chest x-ray and CT chest.  RP 2 positive for rhinovirus/enterovirus.  Procalcitonin slightly elevated.  Urinary antigens are negative. Sputum culture pending.  With recent episodes of coughing while eating, consider possibility of aspiration pneumonitis. Mild oropharyngeal dysphagia noted on VBS and SLP concerned for possible bottom up aspiration. Consider possibility of developing post-viral pneumonia.   Continue ceftriaxone for now   Follow-up blood cultures   Follow-up sputum culture  Additional imaging as above   Repeat procalcitonin   Aspiration precautions  Rhinovirus  Suspected viral tracheobronchitis. C/o of cough, wheezing and sore throat. Rhinovirus/enterovirus positive on RP2.  Strep PCR negative.  Flu/COVID/RSV negative. Exam of posterior oropharynx benign.   Continue supportive cares  Acute  kidney injury (HCC)  Now resolving.  Suspect multifactorial etiology.  May be related to hypotensive episodes, diuretic use, poor p.o. intake and sepsis.    I have discussed the above management plan in detail with the primary service.     Antibiotics:  D4 IV ceftriaxone    Subjective   Patient reports fever overnight. No chills. Having chest tightness and wheezing. Sore throat. Hard to take deep breaths. Cough mostly dry. No abdominal pain. Had diarrhea 2 days ago, now resolved. No dysuria or flank pains.     Objective :  Temp:  [98.5 °F (36.9 °C)-101.9 °F (38.8 °C)] 99.4 °F (37.4 °C)  HR:  [] 97  BP: ()/(61-84) 116/61  Resp:  [18-20] 18  SpO2:  [95 %-100 %] 98 %  O2 Device: Nasal cannula  Nasal Cannula O2 Flow Rate (L/min):  [2 L/min] 2 L/min    Physical exam:  General:  No acute distress  Psychiatric:  Awake and alert  HEENT: Mucous membranes moist.  Neck is supple.  Sclera anicteric.  Cardiovascular: Regular rate and rhythm.  No murmur.  Pulmonary: Lung sounds diminished, though diffuse exp wheezes noted without r/r. On room air.  Normal respiratory excursion without accessory muscle use.  Abdomen:  Soft, nontender.  Extremities:  No edema to lower extremities.  Moves all 4 extremities.  Skin:  No rashes or wounds noted on exposed skin.  : No suprapubic or flank tenderness  Neurologic: No gross focal neurodeficit      Lab Results: I have reviewed the following results:  Results from last 7 days   Lab Units 10/11/24  0554 10/10/24  0537 10/09/24  0618   WBC Thousand/uL 11.98* 13.99* 15.93*   HEMOGLOBIN g/dL 10.8* 11.8* 13.5   PLATELETS Thousands/uL 151 155 161     Results from last 7 days   Lab Units 10/11/24  0554 10/10/24  0537 10/09/24  0618 10/08/24  0509 10/07/24  2306   SODIUM mmol/L 136 135 135   < > 133*   POTASSIUM mmol/L 3.8 3.5 2.8*   < > 3.3*   CHLORIDE mmol/L 107 103 99   < > 97   CO2 mmol/L 25 22 26   < > 25   BUN mg/dL 19 37* 34*   < > 21   CREATININE mg/dL 0.59* 1.05 1.43*   < > 1.03    EGFR ml/min/1.73sq m 91 63 44   < > 65   CALCIUM mg/dL 7.7* 7.7* 8.0*   < > 8.5   AST U/L  --   --   --   --  26   ALT U/L  --   --   --   --  22   ALK PHOS U/L  --   --   --   --  68   ALBUMIN g/dL  --   --   --   --  3.3*    < > = values in this interval not displayed.     Results from last 7 days   Lab Units 10/09/24  1745 10/09/24  0313 10/07/24  2306 10/07/24  2305   BLOOD CULTURE   --   --  No Growth at 72 hrs. No Growth at 72 hrs.   SPUTUM CULTURE  Culture too young- will reincubate  --   --   --    GRAM STAIN RESULT  No polys seen*  4+ Yeast*  --   --   --    LEGIONELLA URINARY ANTIGEN   --  Negative  --   --      Results from last 7 days   Lab Units 10/09/24  0618 10/07/24  2306   PROCALCITONIN ng/ml 6.95* 2.15*                 Imaging Results Review: I reviewed radiology reports from this admission including: CT chest.  Other Study Results Review: Other studies reviewed include: micro

## 2024-10-11 NOTE — ASSESSMENT & PLAN NOTE
"CT imaging revealed \"There is some minor groundglass and tree-in-bud nodularity suggested in the right upper lobe inferior segment and to a lesser degree in the right lower lobe\"  Most likely a viral pneumonia-see the management as outlined above  Status post speech and swallow evaluation inclusive of a bedside and video barium swallow -see the full report for additional details  Diet has been modified to dysphagia level 3 and thin liquid diet  GI consulted per speech recommendations  Video barium images with some stasis in the esophagus, unclear if this is motility or structural   Patient will need EGD when he is more stable- monitor to determine inpatient vs outpatient  Continue IV antibiotics per ID  "

## 2024-10-11 NOTE — PLAN OF CARE
Problem: INFECTION - ADULT  Goal: Absence or prevention of progression during hospitalization  Description: INTERVENTIONS:  - Assess and monitor for signs and symptoms of infection  - Monitor lab/diagnostic results  - Monitor all insertion sites, i.e. indwelling lines, tubes, and drains  - Monitor endotracheal if appropriate and nasal secretions for changes in amount and color  - [place] appropriate cooling/warming therapies per order  - Administer medications as ordered  - Instruct and encourage patient and family to use good hand hygiene technique  - Identify and instruct in appropriate isolation precautions for identified infection/condition  Outcome: Progressing  Goal: Absence of fever/infection during neutropenic period  Description: INTERVENTIONS:  - Monitor WBC    Outcome: Progressing     Problem: SAFETY ADULT  Goal: Patient will remain free of falls  Description: INTERVENTIONS:  - Educate patient/family on patient safety including physical limitations  - Instruct patient to call for assistance with activity   - Consult OT/PT to assist with strengthening/mobility   - Keep Call bell within reach  - Keep bed low and locked with side rails adjusted as appropriate  - Keep care items and personal belongings within reach  - Initiate and maintain comfort rounds  - Make Fall Risk Sign visible to staff  - Offer Toileting every 2 Hours, in advance of need  - Initiate/Maintain bed alarm  - Obtain necessary fall risk management equipment: non skid socks  - Apply yellow socks and bracelet for high fall risk patients  - Consider moving patient to room near nurses station  Outcome: Progressing  Goal: Maintain or return to baseline ADL function  Description: INTERVENTIONS:  -  Assess patient's ability to carry out ADLs; assess patient's baseline for ADL function and identify physical deficits which impact ability to perform ADLs (bathing, care of mouth/teeth, toileting, grooming, dressing, etc.)  - Assess/evaluate cause  of self-care deficits   - Assess range of motion  - Assess patient's mobility; develop plan if impaired  - Assess patient's need for assistive devices and provide as appropriate  - Encourage maximum independence but intervene and supervise when necessary  - Involve family in performance of ADLs  - Assess for home care needs following discharge   - Consider OT consult to assist with ADL evaluation and planning for discharge  - Provide patient education as appropriate  Outcome: Progressing  Goal: Maintains/Returns to pre admission functional level  Description: INTERVENTIONS:  - Perform AM-PAC 6 Click Basic Mobility/ Daily Activity assessment daily.  - Set and communicate daily mobility goal to care team and patient/family/caregiver.   - Collaborate with rehabilitation services on mobility goals if consulted  - Perform Range of Motion 2 times a day.  - Reposition patient every 2 hours.  - Dangle patient 2 times a day  - Stand patient 2 times a day  - Ambulate patient 2 times a day  - Out of bed to chair 3 times a day   - Out of bed for meals 3 times a day  - Out of bed for toileting  - Record patient progress and toleration of activity level   Outcome: Progressing

## 2024-10-11 NOTE — ASSESSMENT & PLAN NOTE
Secondary to a combination of rhinovirus positivity, and a right sided pneumonia  Patient continues to be febrile with Tmax 101.9 in 24 hours  Tachycardia has resolved white blood cell count continues to downtrend  Patient tested positive for rhinovirus/enterovirus  ID consultation appreciated   Patient was initially treated with both ceftriaxone, Zithromax-antibiotics discontinued by ID 10/10  IV ceftriaxone restarted later in the day on 10/10 due to fevers  Check CT chest/abd/pelvis with IV contrast to r/o other causes of fevers  Blood cultures x 2 sets -no growth to date  Streptococcus pneumonia and Legionella antigen: negative  RP 2 panel revealed rhino/enterovirus positivity  Repeat labs in am

## 2024-10-11 NOTE — ASSESSMENT & PLAN NOTE
Likely viral pneumonia.  No dense consolidations noted on chest x-ray and CT chest.  RP 2 positive for rhinovirus/enterovirus.  Procalcitonin slightly elevated.  Urinary antigens are negative. Sputum culture pending.  With recent episodes of coughing while eating, consider possibility of aspiration pneumonitis. Mild oropharyngeal dysphagia noted on VBS and SLP concerned for possible bottom up aspiration. Consider possibility of developing post-viral pneumonia.   Continue ceftriaxone for now   Follow-up blood cultures   Follow-up sputum culture  Additional imaging as above   Repeat procalcitonin   Aspiration precautions

## 2024-10-11 NOTE — PLAN OF CARE
Problem: OCCUPATIONAL THERAPY ADULT  Goal: Performs self-care activities at highest level of function for planned discharge setting.  See evaluation for individualized goals.  Description: Treatment Interventions: ADL retraining, Functional transfer training, Endurance training, Patient/family training, Equipment evaluation/education, Compensatory technique education, Continued evaluation, Energy conservation, Activityengagement          See flowsheet documentation for full assessment, interventions and recommendations.   Note: Limitation: Decreased ADL status, Decreased endurance, Decreased self-care trans, Decreased high-level ADLs  Prognosis: Good  Assessment: Pt is a 84 y.o. male seen for OT evaluation s/p adm to  [facility]  on 10/7/2024 w/ Severe sepsis (HCC) . Comorbidities affecting pt’s functional performance include a significant PMH of prostate cancer, thyroid cancer, HTN. Pt with active OT orders and activity orders for Up and OOB as tolerated. Pt lives with family in a ranch style house with 4 CIERA. Pt has tub/shower and standard toilets. At baseline, pt was independent with ADLs/IADLs, (-) . Pt completed sit to stand with supervision. Pt completed don of socks with Francis. Pt completed functional mobility from EOB to chair with Francis and use of RW. Pt in chair stand to sit with supervision. Verbal cues for hand placement and safety with RW. Pt educated on ADLs/IADLs, and safety with RW. Upon evaluation, pt currently requires S for UB ADLs, Francis for LB ADLs, Francis for toileting, unable to assess for bed mobility, Francis for functional mobility, and Francis for transfers 2* the following deficits impacting occupational performance: weakness, decreased strength , decreased balance, decreased activity tolerance, increased pain, SOB, and JIMÉNEZ. These impairments, as well at pt’s personal factors of: CIERA home environment, steps within home environment, difficulty performing ADLs, difficulty  performing IADLs, difficulty performing transfers/mobility, fall risk , and new use of AD for functional transfers/mobility limit pt’s ability to safely engage in all baseline areas of occupation. Based on the aforementioned OT evaluation, functional performance deficits, and assessments, pt has been identified as a high complexity evaluation. Pt to continue to benefit from continued acute OT services during hospital stay to address defined deficits and to maximize level of functional independence in the following Occupational Performance areas: grooming, bathing/shower, toilet hygiene, dressing, health maintenance, functional mobility, community mobility, clothing management, cleaning, and household maintenance. From OT standpoint, recommend II; Moderate Resource Intensity upon D/C. OT will continue to follow pt 3-5x/wk.     Rehab Resource Intensity Level, OT: II (Moderate Resource Intensity)

## 2024-10-11 NOTE — ASSESSMENT & PLAN NOTE
Leukocytosis, fever, tachycardia and tachypnea on admission.  Tmax 101.9 °F yesterday evening.  Patient was hypotensive but seems to have responded to IV fluids.  Suspect pulmonary source.  Wheezing noted on exam. Suspect a viral tracheobronchitis given positive RP2. He is net positive >4L, thus consider possibility of mild volume overload and compressive atelectasis causing fevers. Consider possibility of ongoing aspiration. So far blood cultures are negative.  COnsider possibility of intra-abdominal source, though no GI or  sx. He remains nontoxic-appearing despite systemic illness.  Abx as below   Recheck procalcitonin  D/w primary team, plan to obtain CT C/A/P   Follow-up blood cultures  Trend temps and hemodynamics  Repeat CBC differential and BMP to monitor response to treatment and for developing toxicities

## 2024-10-11 NOTE — SPEECH THERAPY NOTE
Speech Language/Pathology    Speech/Language Pathology Progress Note    Patient Name: Miguel Henderson  Today's Date: 10/11/2024     Problem List  Principal Problem:    Severe sepsis (HCC)  Active Problems:    Hypothyroidism    Primary hypertension    Community acquired pneumonia of right upper lobe of lung    Pneumonia    Rhinovirus    Acute kidney injury (HCC)    Left knee pain       Past Medical History  Past Medical History:   Diagnosis Date    Prostate cancer (HCC)     Thyroid cancer (HCC)         Past Surgical History  Past Surgical History:   Procedure Laterality Date    APPENDECTOMY           Subjective:  Pt was alert and positioned upright.  Objective:  Pt was seen for f/u dysphagia therapy at breakfast. Sitting edge of bed he fed himself pancakes and thin liquids via cup. Mastication was functional for current diet level. Bolus control, formation, and transfer were WNL. Swallows appeared prompt. No overt s/s of aspiration. Throughout meal pt occasional facial grimacing and would hold chest stating the food felt stuck there. ST utilizing device to translate recommended if pt feeling full to discontinue with meal. Also reviewed pt may do better with smaller meals more frequently throughout the day. As seen on vbs completed 10/07/2024 slow motility with solids and liquids. High retropulsion occurred with thin liquids suspect risk of bottom up aspiration.  Assessment:  Mastication was functional for current diet level. No overt s/s of aspiration.   Plan/Recommendations:  Recommend continue dysphagia 3 dental soft and thin liquids  Ensure good oral care  Aspiration and Reflux precautions  Recommend GI consult   ST continue to f/u as indicated.

## 2024-10-11 NOTE — ASSESSMENT & PLAN NOTE
Suspected viral tracheobronchitis. C/o of cough, wheezing and sore throat. Rhinovirus/enterovirus positive on RP2.  Strep PCR negative.  Flu/COVID/RSV negative. Exam of posterior oropharynx benign.   Continue supportive cares

## 2024-10-11 NOTE — ASSESSMENT & PLAN NOTE
Has resolved-this is secondary to intravascular depletion in the setting of receiving IV Lasix  IV fluid discontinued  Echocardiogram was within normal limits  Monitor input and output, monitor urinary output  Hypokalemia-has resolved with repletion  Avoid nephrotoxins-both Motrin, and Toradol have been discontinued

## 2024-10-11 NOTE — PLAN OF CARE
Problem: PHYSICAL THERAPY ADULT  Goal: Performs mobility at highest level of function for planned discharge setting.  See evaluation for individualized goals.  Description: Treatment/Interventions: Functional transfer training, LE strengthening/ROM, Elevations, Therapeutic exercise, Endurance training, Gait training          See flowsheet documentation for full assessment, interventions and recommendations.  Outcome: Progressing  Note: Prognosis: Good  Problem List: Decreased strength, Decreased endurance, Impaired balance, Decreased mobility, Obesity  Assessment: Pt is 84 y.o. male seen for PT evaluation s/p admit to [facility] on 10/7/2024 w/ Severe sepsis (HCC). PT consulted to assess pt's functional mobility and d/c needs. Order placed for PT eval and tx, w/ up and OOB as tolerated order. Pt agreeable to PT  session upon arrival, pt found  seated EOB .  PTA, pt was independent w/ all functional mobility w/ no AD and lives w/ spouse and family in 2 level house .  Pt to benefit from continued PT tx to address deficits and maximize level of functional independent mobility and consistency. Upon conclusion pt  seated in recliner. Complexity: Comorbidities affecting pt's physical performance at time of assessment include: htn and sepsis, LE edema, FRAN, hypothyroidism . Personal factors affecting pt at time of IE include: advanced age, limited mobility, lives in multistory home, steps to enter home, and preferred language not English (language barrier). Please find objective findings from PT assessment regarding body systems outlined above with impairments and limitations including weakness, impaired balance, decreased endurance, decreased activity tolerance, and fall risk.  Pt's clinical presentation is currently unstable/unpredictable seen in pt's presentation of tachycardia, abnormal H&H, abnormal WBC count, abnormal calcium levels, and abnormal blood sugar levels. The patient's AM-PAC Basic Mobility  Inpatient Short Form Raw Score is 19  .  Based on patient presentations and impairments, pt would most appropriately benefit from Level 2 resource intensity upon discharge. Please also refer to the recommendation of the Physical Therapist for safe discharge planning. RN verbalized pt appropriate for PT session.             See flowsheet documentation for full assessment.

## 2024-10-11 NOTE — PLAN OF CARE
Problem: PAIN - ADULT  Goal: Verbalizes/displays adequate comfort level or baseline comfort level  Description: Interventions:  - Encourage patient to monitor pain and request assistance  - Assess pain using appropriate pain scale  - Administer analgesics based on type and severity of pain and evaluate response  - Implement non-pharmacological measures as appropriate and evaluate response  - Consider cultural and social influences on pain and pain management  - Notify physician/advanced practitioner if interventions unsuccessful or patient reports new pain  Outcome: Progressing     Problem: INFECTION - ADULT  Goal: Absence or prevention of progression during hospitalization  Description: INTERVENTIONS:  - Assess and monitor for signs and symptoms of infection  - Monitor lab/diagnostic results  - Monitor all insertion sites, i.e. indwelling lines, tubes, and drains  - Monitor endotracheal if appropriate and nasal secretions for changes in amount and color  - [place] appropriate cooling/warming therapies per order  - Administer medications as ordered  - Instruct and encourage patient and family to use good hand hygiene technique  - Identify and instruct in appropriate isolation precautions for identified infection/condition  Outcome: Progressing  Goal: Absence of fever/infection during neutropenic period  Description: INTERVENTIONS:  - Monitor WBC    Outcome: Progressing     Problem: SAFETY ADULT  Goal: Maintain or return to baseline ADL function  Description: INTERVENTIONS:  -  Assess patient's ability to carry out ADLs; assess patient's baseline for ADL function and identify physical deficits which impact ability to perform ADLs (bathing, care of mouth/teeth, toileting, grooming, dressing, etc.)  - Assess/evaluate cause of self-care deficits   - Assess range of motion  - Assess patient's mobility; develop plan if impaired  - Assess patient's need for assistive devices and provide as appropriate  - Encourage  maximum independence but intervene and supervise when necessary  - Involve family in performance of ADLs  - Assess for home care needs following discharge   - Consider OT consult to assist with ADL evaluation and planning for discharge  - Provide patient education as appropriate  Outcome: Progressing     Problem: DISCHARGE PLANNING  Goal: Discharge to home or other facility with appropriate resources  Description: INTERVENTIONS:  - Identify barriers to discharge w/patient and caregiver  - Arrange for needed discharge resources and transportation as appropriate  - Identify discharge learning needs (meds, wound care, etc.)  - Arrange for interpretive services to assist at discharge as needed  - Refer to Case Management Department for coordinating discharge planning if the patient needs post-hospital services based on physician/advanced practitioner order or complex needs related to functional status, cognitive ability, or social support system  Outcome: Progressing     Problem: METABOLIC, FLUID AND ELECTROLYTES - ADULT  Goal: Electrolytes maintained within normal limits  Description: INTERVENTIONS:  - Monitor labs and assess patient for signs and symptoms of electrolyte imbalances  - Administer electrolyte replacement as ordered  - Monitor response to electrolyte replacements, including repeat lab results as appropriate  - Instruct patient on fluid and nutrition as appropriate  Outcome: Progressing  Goal: Fluid balance maintained  Description: INTERVENTIONS:  - Monitor labs   - Monitor I/O and WT  - Instruct patient on fluid and nutrition as appropriate  - Assess for signs & symptoms of volume excess or deficit  Outcome: Progressing     Problem: CARDIOVASCULAR - ADULT  Goal: Maintains optimal cardiac output and hemodynamic stability  Description: INTERVENTIONS:  - Monitor I/O, vital signs and rhythm  - Monitor for S/S and trends of decreased cardiac output  - Administer and titrate ordered vasoactive medications to  optimize hemodynamic stability  - Assess quality of pulses, skin color and temperature  - Assess for signs of decreased coronary artery perfusion  - Instruct patient to report change in severity of symptoms  Outcome: Progressing     Problem: RESPIRATORY - ADULT  Goal: Achieves optimal ventilation and oxygenation  Description: INTERVENTIONS:  - Assess for changes in respiratory status  - Assess for changes in mentation and behavior  - Position to facilitate oxygenation and minimize respiratory effort  - Oxygen administered by appropriate delivery if ordered  - Initiate smoking cessation education as indicated  - Encourage broncho-pulmonary hygiene including cough, deep breathe, Incentive Spirometry  - Assess the need for suctioning and aspirate as needed  - Assess and instruct to report SOB or any respiratory difficulty  - Respiratory Therapy support as indicated  Outcome: Progressing     Problem: Nutrition/Hydration-ADULT  Goal: Nutrient/Hydration intake appropriate for improving, restoring or maintaining nutritional needs  Description: Monitor and assess patient's nutrition/hydration status for malnutrition. Collaborate with interdisciplinary team and initiate plan and interventions as ordered.  Monitor patient's weight and dietary intake as ordered or per policy. Utilize nutrition screening tool and intervene as necessary. Determine patient's food preferences and provide high-protein, high-caloric foods as appropriate.     INTERVENTIONS:  - Monitor oral intake, urinary output, labs, and treatment plans  - Assess nutrition and hydration status and recommend course of action  - Evaluate amount of meals eaten  - Assist patient with eating if necessary   - Allow adequate time for meals  - Recommend/ encourage appropriate diets, oral nutritional supplements, and vitamin/mineral supplements  - Order, calculate, and assess calorie counts as needed  - Recommend, monitor, and adjust tube feedings and TPN/PPN based on  assessed needs  - Assess need for intravenous fluids  - Provide specific nutrition/hydration education as appropriate  - Include patient/family/caregiver in decisions related to nutrition  Outcome: Progressing

## 2024-10-11 NOTE — PHYSICAL THERAPY NOTE
PHYSICAL THERAPY EVALUATION  NAME:  Miguel Henderson  DATE: 10/11/24    AGE:   84 y.o.  Mrn:   [id number]  ADMIT DX:  SOB (shortness of breath) [R06.02]  SIRS (systemic inflammatory response syndrome) (HCC) [R65.10]  Community acquired pneumonia of right upper lobe of lung [J18.9]  Sepsis, due to unspecified organism, unspecified whether acute organ dysfunction present (HCC) [A41.9]  Problem List:   Patient Active Problem List   Diagnosis    Adenocarcinoma of prostate (HCC)    Lumbar radiculopathy    Hypothyroidism    Primary hypertension    Severe sepsis (HCC)    Community acquired pneumonia of right upper lobe of lung    Pneumonia    Rhinovirus    Acute kidney injury (HCC)    Left knee pain       Past Medical History  Past Medical History:   Diagnosis Date    Prostate cancer (HCC)     Thyroid cancer (HCC)        Past Surgical History  Past Surgical History:   Procedure Laterality Date    APPENDECTOMY         Length Of Stay: 3  Performed at least 2 patient identifiers during session: Name and Epic photo       10/11/24 1113   PT Last Visit   PT Visit Date 10/11/24   Note Type   Note type Evaluation   Additional Comments Pt is Hungarian speaking only. Interpretauer and pts son were used to answer questions during eval   Pain Assessment   Pain Assessment Tool 0-10   Pain Score 6   Pain Location/Orientation Location: Chest   Patient's Stated Pain Goal No pain   Restrictions/Precautions   Weight Bearing Precautions Per Order No   Other Precautions Droplet precautions;Contact/isolation;Fall Risk;Pain  Main Language is Hebrew   Home Living   Type of Home House   Home Layout Two level;Stairs to enter with rails;Bed/bath upstairs  (lives on 2nd floor, full flight to 2nd floor, 5 [address])   Bathroom Shower/Tub Tub/shower unit   Bathroom Toilet Standard   Bathroom Accessibility Accessible   Home Equipment Walker   Additional Comments Pt was   Prior Function   Level of [place] Independent with ADLs;Independent with  functional mobility   Lives With Spouse   Receives Help From Family   IADLs Family/Friend/Other provides transportation;Independent with meal prep;Independent with medication management   Falls in the last 6 months 0   Vocational Retired   Cognition   Overall Cognitive Status WFL   Arousal/Participation Alert   Orientation Level Oriented X4   Memory Within functional limits   Following Commands Follows one step commands without difficulty   Subjective   Subjective Pt agreeable to session   RLE Assessment   RLE Assessment   (generalized weakness)   LLE Assessment   LLE Assessment   (generalized weakness)   Vision-Basic Assessment   Current Vision No visual deficits   Coordination   Sensation WFL   Bed Mobility   Additional Comments Pt receieved sitting EOB   Transfers   Sit to Stand 4  Minimal assistance   Additional items Assist x 1;Armrests;Increased time required   Stand to Sit 5  Supervision   Additional items Assist x 1;Increased time required;Impulsive   Ambulation/Elevation   Gait pattern Improper Weight shift;Forward Flexion;Decreased foot clearance;Inconsistent [place]   Gait Assistance 4  Minimal assist   Additional items Assist x 1;Verbal cues   Assistive Device Rolling walker   Distance 10 ft, bed to chair   Balance   Static Sitting Fair +   Dynamic Sitting Fair +   Static Standing Fair   Dynamic Standing Fair -   Ambulatory Fair -   Activity Tolerance   Activity Tolerance Patient limited by fatigue   Nurse Made Aware RN was made aware of outcomes   Assessment   Prognosis Good   Problem List Decreased strength;Decreased endurance;Impaired balance;Decreased mobility;Obesity   Assessment Pt is 84 y.o. male seen for PT evaluation s/p admit to [facility] on 10/7/2024 w/ Severe sepsis (HCC). PT consulted to assess pt's functional mobility and d/c needs. Order placed for PT eval and tx, w/ up and OOB as tolerated order. Pt agreeable to PT  session upon arrival, pt found  seated EOB .  PTA, pt was  independent w/ all functional mobility w/ no AD and lives w/ spouse and family in 2 level house .  Pt to benefit from continued PT tx to address deficits and maximize level of functional independent mobility and consistency. Upon conclusion pt  seated in recliner. Complexity: Comorbidities affecting pt's physical performance at time of assessment include: htn and sepsis, LE edema, FRAN, hypothyroidism . Personal factors affecting pt at time of IE include: advanced age, limited mobility, lives in multistory home, steps to enter home, and preferred language not English (language barrier). Please find objective findings from PT assessment regarding body systems outlined above with impairments and limitations including weakness, impaired balance, decreased endurance, decreased activity tolerance, and fall risk.  Pt's clinical presentation is currently unstable/unpredictable seen in pt's presentation of tachycardia, abnormal H&H, abnormal WBC count, abnormal calcium levels, and abnormal blood sugar levels. The patient's AM-PAC Basic Mobility Inpatient Short Form Raw Score is 19  .  Based on patient presentations and impairments, pt would most appropriately benefit from Level 2 resource intensity upon discharge. Please also refer to the recommendation of the Physical Therapist for safe discharge planning. RN verbalized pt appropriate for PT session.   Goals   Patient Goals To go home   LTG Expiration Date 10/21/24   Long Term Goal #1 Pt will: Perform bed mobility tasks to modified I to improve ease of bed mobility. Perform transfers to modified I to increase Indep in home environment, decrease risk for falls, and improve ease of transfers. Perform ambulation with rw for 50 ft to  increase Indep in home environment, decrease risk for falls, and improve activity tolerance. Increase dynamic standing balance to F+ to decrease fall risk.   Increase OOB activity tolerance to 10 minutes without s/s of exertion to decrease fall  risk.  Navigate up and down 8 steps with Supervision so patient can enter and exit home.   Plan   Treatment/Interventions Functional transfer training;LE strengthening/ROM;Elevations;Therapeutic exercise;Endurance training;Gait training   AM-PAC Basic Mobility Inpatient   Turning in Flat Bed Without Bedrails 4   Lying on Back to Sitting on Edge of Flat Bed Without Bedrails 3   Moving Bed to Chair 3   Standing Up From Chair Using Arms 3   Walk in Room 3   Climb 3-5 Stairs With Railing 3   Basic Mobility Inpatient Raw Score 19   Basic Mobility Standardized Score 42.48   [facility] Highest Level Of Mobility   -HLM Goal 6: Walk 10 steps or more   -HLM Achieved 6: Walk 10 steps or more     Pt seen as a co-eval with OT due to the patient's co-morbidities, clinically unstable presentation, and present impairments which are a regression from the patient's baseline.         Time In: 1108  Time Out: 1136  Total Evaluation Minutes: 28    Brian Hoang, PT

## 2024-10-11 NOTE — CASE MANAGEMENT
Case Management Discharge Planning Note    Patient name Miguel Henderson  Location /-01 MRN [id number]  : 1940 Date 10/11/2024       Current Admission Date: 10/7/2024  Current Admission Diagnosis:Severe sepsis (HCC)   Patient Active Problem List    Diagnosis Date Noted Date Diagnosed    Left knee pain 10/10/2024     Pneumonia 10/09/2024     Rhinovirus 10/09/2024     Acute kidney injury (HCC) 10/09/2024     Severe sepsis (HCC) 10/08/2024     Community acquired pneumonia of right upper lobe of lung 10/08/2024     Adenocarcinoma of prostate (HCC) 2024     Lumbar radiculopathy 2024     Hypothyroidism 2024     Primary hypertension 2024       LOS (days): 3  Geometric Mean LOS (GMLOS) (days): 4.9  Days to GMLOS:1.2     OBJECTIVE:  Risk of Unplanned Readmission Score: 10.62         Current admission status: Inpatient   Preferred Pharmacy:   [id number] [address] [address]  Phone: [phone] Fax: [phone]    Primary Care Provider: Greg Foster MD    Primary Insurance: [facility] [employer] [facility]  Secondary Insurance:     DISCHARGE DETAILS:    Discharge planning discussed with:: Juan (son)  Freedom of Choice: Yes  Comments - Freedom of Choice: pt is active with an  outpt rehab - pt would like to continue- pt will need an outpt pt/ot order   family requested a walker- family was [employer]bert [employer] of [employer] - family's choice [employer]  CM contacted family/caregiver?: Yes             Contacts  Patient Contacts: Juan Hortonmona  Relationship to Patient:: Family  Contact Method: In Person  Reason/Outcome: Discharge Planning    Requested Home Health Care         Is the patient interested in HHC at discharge?: No    DME Referral Provided  Referral made for DME?: Yes (walker)  DME referral completed for the following items:: Walker  DME Supplier Name:: [employer]    Other  Referral/Resources/Interventions Provided:  Interventions: DME, Outpatient OT, Outpatient PT  Referral Comments: pt will need an ambulatory outpt pt/ot order so he can return to outpt rehab center in [place]-  family was made aware of the $ 19.55 copay for the walker - family is ok with [facility] cost- walker was issued to francis son and he will take it home-  pt is not stable for d/c - pt continues to have fevers-    Would you like to participate in our [facility] service program?  : No - Declined    Treatment Team Recommendation: Home (home with family & outpt rehab & outpt follow up- family)                                   IMM Given (Date):: 10/11/24  IMM Given to:: Family (son)  Family notified:: son

## 2024-10-11 NOTE — CONSULTS
Consultation -  [facility]  Miguel Henderson 84 y.o. male MRN: [id number]  Unit/Bed#: -01 Encounter: [id number]            Inpatient consult to gastroenterology     Date/Time  10/11/2024 1:42 PM     Performed by  Molly Manjarrez MD   Authorized by  Brett Gandhi PA-C             Reason for Consult / Principal Problem:     GERD and dysphagia      ASSESSMENT AND PLAN:      Miguel Henderson is a 84 y.o. old male with PMH of  GERD, chronic dysphagia, prostate and thyroid cancer currently admitted for sepsis in the setting of suspected rhinovirus/enterovirus tracheobronchitis with possible developmental postviral pneumonia.  GI consulted with history of GERD and chronic dysphagia.  He does have a longstanding history of intermittent dysphagia to solids.  Some esophageal stasis seen on video swallowing study with SLP.  No worsening of his baseline symptoms or red flag symptoms.  Currently tolerating dysphagia diet while in the hospital.    # Esophageal dysphagia  #History of GERD  #Acute hypoxic respiratory failure 2/2 rhinovirus/enterovirus tracheobronchitis    - If patient is clinically improved and medically optimized from a respiratory standpoint, will consider EGD next week for further evaluation  - Continue with diet per SLP recommendations  - Recommend strict aspiration precautions  - Will start him on Protonix 40 mg IV daily for GERD      Rest of care per primary team.  Thank you for this consultation.   ______________________________________________________________________    HPI: 84-year-old Sinhala-speaking male with a past medical history of GERD, chronic dysphagia, prostate and thyroid cancer currently admitted for sepsis in the setting of suspected tracheobronchitis with possible developmental postviral pneumonia. Rhinovirus/enterovirus positive on RP2.  GI consulted for concerns of dysphagia. His son Buck helped with translation over the phone. He reports a history of  "dysphagia to solids for the past few years. Symptoms began after a got dentures.  He and his family describe prolonged chewing and mastication with difficulty swallowing does not chew his food properly.  Denies any sensation of \"food getting stuck\", odynophagia, nausea, vomiting, dysphagia to liquids, unintentional weight loss.  He does have a history of GERD and reports daily burning chest discomfort that is worse at night .  Previously on omeprazole but stopped taking it for a while.  He is a non-smoker, denies alcohol or IV drug use.  States that he had a colonoscopy and EGD in [place] about 3 years ago and had a polyp resected but does not recall any other findings.  He is currently able to tolerate a dysphagia diet he was placed on in the hospital and denies any worsening of his baseline symptoms.  He does state that due to his current illness he has poor appetite and does not really feel up to eating.      Videofluoroscopy with SLP on 10/9 showed some esophageal stasis      REVIEW OF SYSTEMS:    Review of Systems   Constitutional:  Positive for appetite change. Negative for chills and fever.   HENT:  Negative for ear pain and sore throat.    Eyes:  Negative for pain and visual disturbance.   Respiratory:  Negative for cough and shortness of breath.    Cardiovascular:  Negative for chest pain and palpitations.   Gastrointestinal:  Negative for abdominal pain and vomiting.   Genitourinary:  Negative for dysuria and hematuria.   Musculoskeletal:  Negative for arthralgias and back pain.   Skin:  Negative for color change and rash.   Neurological:  Negative for seizures and syncope.   All other systems reviewed and are negative.         Historical Information   Past Medical History:   Diagnosis Date    Prostate cancer (HCC)     Thyroid cancer (HCC)      Past Surgical History:   Procedure Laterality Date    APPENDECTOMY       Social History   Social History     Substance and Sexual Activity   Alcohol Use Yes    " "Comment: Occasional     Social History     Substance and Sexual Activity   Drug Use Never     Social History     Tobacco Use   Smoking Status Never   Smokeless Tobacco Never     History reviewed. No pertinent family history.    Meds/Allergies       Medications Prior to Admission:     hydroCHLOROthiazide 25 mg tablet    cyclobenzaprine (FLEXERIL) 5 mg tablet    ibuprofen (MOTRIN) 800 mg tablet    levothyroxine 50 mcg tablet    levothyroxine 75 mcg tablet    Current Facility-Administered Medications:     acetaminophen (TYLENOL) tablet 650 mg, Q6H PRN    albuterol inhalation solution 2.5 mg, Q6H PRN    cefTRIAXone (ROCEPHIN) IVPB (premix in dextrose) 1,000 mg 50 mL, Q24H, Last Rate: 1,000 mg (10/11/24 1220)    cyclobenzaprine (FLEXERIL) tablet 5 mg, TID PRN    heparin (porcine) subcutaneous injection 5,000 Units, Q8H JOE    levothyroxine tablet 50 mcg, Once per day on Monday Tuesday Wednesday Thursday    levothyroxine tablet 75 mcg, Once per day on Sunday Friday Saturday    melatonin tablet 3 mg, HS    ondansetron (ZOFRAN) injection 4 mg, Q6H PRN    pantoprazole (PROTONIX) injection 40 mg, Daily    Allergies   Allergen Reactions    Shellfish Allergy - Food Allergy Anaphylaxis           Objective     Blood pressure 104/51, pulse 92, temperature 97.6 °F (36.4 °C), resp. rate 18, height 5' 5\" (1.651 m), weight 75.8 kg (167 lb), SpO2 96%. Body mass index is 27.79 kg/m².    No intake or output data in the 24 hours ending 10/11/24 1700        PHYSICAL EXAM:      Physical Exam  Vitals and nursing note reviewed.   Constitutional:       General: He is not in acute distress.     Appearance: He is well-developed.   HENT:      Head: Normocephalic and atraumatic.   Eyes:      Conjunctiva/sclera: Conjunctivae normal.   Cardiovascular:      Rate and Rhythm: Normal rate and regular rhythm.      Heart sounds: No murmur heard.  Pulmonary:      Effort: Pulmonary effort is normal. No respiratory distress.      Breath sounds: Normal breath " sounds.   Abdominal:      Palpations: Abdomen is soft.      Tenderness: There is no abdominal tenderness. There is no guarding or rebound.   Musculoskeletal:         General: No swelling.      Cervical back: Neck supple.   Skin:     General: Skin is warm and dry.      Capillary Refill: Capillary refill takes less than 2 seconds.   Neurological:      Mental Status: He is alert.   Psychiatric:         Mood and Affect: Mood normal.          Lab Results:   No results displayed because visit has over 200 results.          Imaging Studies: I have personally reviewed pertinent imaging studies.    Molly Manjarrez MD  Gastroenterology Fellow  Available via EPIC Secure chat  10/11/2024 5:00 PM

## 2024-10-11 NOTE — OCCUPATIONAL THERAPY NOTE
Occupational Therapy Evaluation     Patient Name: Miguel Henderson  Today's Date: 10/11/2024  Problem List  Principal Problem:    Severe sepsis (HCC)  Active Problems:    Hypothyroidism    Primary hypertension    Community acquired pneumonia of right upper lobe of lung    Pneumonia    Rhinovirus    Acute kidney injury (HCC)    Left knee pain    Past Medical History  Past Medical History:   Diagnosis Date    Prostate cancer (HCC)     Thyroid cancer (HCC)      Past Surgical History  Past Surgical History:   Procedure Laterality Date    APPENDECTOMY          10/11/24 1108   OT Last Visit   OT Visit Date 10/11/24   Note Type   Note type Evaluation   Additional Comments Pt is primarly Irish speaking,  [employer] used for translation.   Pain Assessment   Pain Assessment Tool 0-10   Pain Score 6   Restrictions/Precautions   Weight Bearing Precautions Per Order No   Other Precautions Droplet precautions;Contact/isolation;Fall Risk;Pain   Home Living   Type of Home House   Home Layout One level;Stairs to enter with rails;Performs ADLs on one level;Able to live on main level with bedroom/bathroom  (ranch style house with basement. Pt has 4 CIERA.)   Bathroom Shower/Tub Tub/shower unit   Bathroom Toilet Standard   Bathroom Equipment Other (Comment)  (Denies DME)   Bathroom Accessibility Accessible   Home Equipment Walker   Additional Comments Above information obtained by son   Prior Function   Level of [place] Independent with ADLs;Independent with functional mobility;Independent with IADLS   Lives With Spouse;Other (Comment);Family  (Pt lives with spouse, son, and other family)   Receives Help From Family   IADLs Family/Friend/Other provides transportation;Family/Friend/Other provides meals;Independent with medication management  (pt reports spouse does the cooking and (-) )   Falls in the last 6 months 0   Vocational Retired   Comments (-)    Lifestyle   Autonomy Independent with ADLs    Reciprocal Relationships Family   Service to Others Retired   General   Family/Caregiver Present No   ADL   Where Assessed Edge of bed   Eating Assistance 5  Supervision/Setup   Grooming Assistance 5  Supervision/Setup   UB Bathing Assistance 5  Supervision/Setup   LB Bathing Assistance 4  Minimal Assistance   UB Dressing Assistance 5  Supervision/Setup   LB Dressing Assistance 4  Minimal Assistance   Toileting Assistance  4  Minimal Assistance   Bed Mobility   Supine to Sit Unable to assess   Sit to Supine Unable to assess   Additional Comments Pt greeted EOB at start of session, ended session seated in chair.   Transfers   Sit to Stand 4  Minimal assistance   Additional items Assist x 1;Increased time required;Verbal cues  (RW)   Stand to Sit 5  Supervision   Additional items Armrests;Increased time required;Verbal cues  (RW)   Additional Comments verbal cues for hand placement and safety with RW   Functional Mobility   Functional Mobility 4  Minimal assistance   Additional Comments minAx1 with use of RW from EOB to chair   Additional items Rolling walker   Balance   Static Sitting Fair +   Dynamic Sitting Fair   Static Standing Fair   Dynamic Standing Fair -   Ambulatory Fair -   Activity Tolerance   Activity Tolerance Patient limited by fatigue   Medical Staff Made Aware PT Brian, Pt seen for co-evaluation/treatment with skilled Physical Therapy due to pt's medical complexity, decreased endurance, overall functional level, overall safety, and post surgical day #0.   Nurse Made Aware RN yes   RUE Assessment   RUE Assessment WFL   LUE Assessment   LUE Assessment WFL   Hand Function   Gross Motor Coordination Functional   Fine Motor Coordination Functional   Cognition   Overall Cognitive Status WFL   Arousal/Participation Alert;Cooperative   Attention Within functional limits   Orientation Level Oriented X4   Memory Within functional limits   Following Commands Follows all commands and directions without  difficulty   Comments Cooperative, pt Georgian speaking, interpretor used   Assessment   Limitation Decreased ADL status;Decreased endurance;Decreased self-care trans;Decreased high-level ADLs   Prognosis Good   Assessment Pt is a 84 y.o. male seen for OT evaluation s/p adm to  [facility]  on 10/7/2024 w/ Severe sepsis (HCC) . Comorbidities affecting pt’s functional performance include a significant PMH of prostate cancer, thyroid cancer, HTN. Pt with active OT orders and activity orders for Up and OOB as tolerated. Pt lives with family in a ranch style house with 4 CIERA. Pt has tub/shower and standard toilets. At baseline, pt was independent with ADLs/IADLs, (-) . Pt completed sit to stand with supervision. Pt completed don of socks with Francis. Pt completed functional mobility from EOB to chair with Francis and use of RW. Pt in chair stand to sit with supervision. Verbal cues for hand placement and safety with RW. Pt educated on ADLs/IADLs, and safety with RW. Upon evaluation, pt currently requires S for UB ADLs, Francis for LB ADLs, Francis for toileting, unable to assess for bed mobility, Francis for functional mobility, and Francis for transfers 2* the following deficits impacting occupational performance: weakness, decreased strength , decreased balance, decreased activity tolerance, increased pain, SOB, and JIMÉNEZ. These impairments, as well at pt’s personal factors of: CIERA home environment, steps within home environment, difficulty performing ADLs, difficulty performing IADLs, difficulty performing transfers/mobility, fall risk , and new use of AD for functional transfers/mobility limit pt’s ability to safely engage in all baseline areas of occupation. Based on the aforementioned OT evaluation, functional performance deficits, and assessments, pt has been identified as a high complexity evaluation. Pt to continue to benefit from continued acute OT services during hospital stay to address defined deficits and  to maximize level of functional independence in the following Occupational Performance areas: grooming, bathing/shower, toilet hygiene, dressing, health maintenance, functional mobility, community mobility, clothing management, cleaning, and household maintenance. From OT standpoint, recommend II; Moderate Resource Intensity upon D/C. OT will continue to follow pt 3-5x/wk.   Goals   Patient Goals to go home   STG Time Frame 3-5   Short Term Goal #1 Pt will improve activity tolerance to G for min 30 min treatment sessions for increase engagement in functional tasks   Short Term Goal #2 Pt will complete bed mobility at a mod I level w/ G balance/safety demonstrated to decrease caregiver assistance required   Short Term Goal  Pt will complete LB dressing/self care w/ mod I using adaptive device and DME as needed   LTG Time Frame 10-14   Long Term Goal #1 Pt will complete toileting w/ mod I w/ G hygiene/thoroughness using DME as needed   Long Term Goal #2 Pt will improve functional transfers to mod I on/off all surfaces using DME as needed w/ G balance/safety   Long Term Goal Pt will improve functional mobility during ADL/IADL/leisure tasks to mod I using DME as needed w/ G balance/safety   Plan   Treatment Interventions ADL retraining;Functional transfer training;Endurance training;Patient/family training;Equipment evaluation/education;Compensatory technique education;Continued evaluation;Energy conservation;Activityengagement   Goal Expiration Date 10/25/24   OT Treatment Day 0   OT Frequency 3-5x/wk   Discharge Recommendation   Rehab Resource Intensity Level, OT II (Moderate Resource Intensity)   Additional Comments  The patient's raw score on the -PAC Daily Activity Inpatient Short Form is 18 . A raw score of less than 19 suggests the patient may benefit from discharge to post-acute rehabilitation services. Please refer to the recommendation of the Occupational Therapist for safe discharge planning.   AM-PAC Daily  Activity Inpatient   Lower Body Dressing 2   Bathing 3   Toileting 3   Upper Body Dressing 3   Grooming 3   Eating 4   Daily Activity Raw Score 18   Daily Activity Standardized Score (Calc for Raw Score >=11) 38.66   AM-PAC Applied Cognition Inpatient   Following a Speech/Presentation 4   Understanding Ordinary Conversation 4   Taking Medications 4   Remembering Where Things Are Placed or Put Away 4   Remembering List of 4-5 Errands 4   Taking Care of Complicated Tasks 4   Applied Cognition Raw Score 24   Applied Cognition Standardized Score 62.21   End of Consult   Education Provided Yes;Family or social support of family present for education by provider   Patient Position at End of Consult Bedside chair;All needs within reach   Nurse Communication Nurse aware of consult   End of Consult Comments Pt seated OOB in chair at end of session. Call bell and phone within reach. All needs met and pt reports no further questions for OT at this time.   Joanne Duque, OT

## 2024-10-11 NOTE — PROGRESS NOTES
"Progress Note - Hospitalist   Name: Miguel Henderson 84 y.o. male I MRN: [id number]  Unit/Bed#: -01 I Date of Admission: 10/7/2024   Date of Service: 10/11/2024 I Hospital Day: 3    Assessment & Plan  Severe sepsis (HCC)  Secondary to a combination of rhinovirus positivity, and a right sided pneumonia  Patient continues to be febrile with Tmax 101.9 in 24 hours  Tachycardia has resolved white blood cell count continues to downtrend  Patient tested positive for rhinovirus/enterovirus  ID consultation appreciated   Patient was initially treated with both ceftriaxone, Zithromax-antibiotics discontinued by ID 10/10  IV ceftriaxone restarted later in the day on 10/10 due to fevers  Check CT chest/abd/pelvis with IV contrast to r/o other causes of fevers  Blood cultures x 2 sets -no growth to date  Streptococcus pneumonia and Legionella antigen: negative  RP 2 panel revealed rhino/enterovirus positivity  Repeat labs in am  Community acquired pneumonia of right upper lobe of lung  CT imaging revealed \"There is some minor groundglass and tree-in-bud nodularity suggested in the right upper lobe inferior segment and to a lesser degree in the right lower lobe\"  Most likely a viral pneumonia-see the management as outlined above  Status post speech and swallow evaluation inclusive of a bedside and video barium swallow -see the full report for additional details  Diet has been modified to dysphagia level 3 and thin liquid diet  GI consulted per speech recommendations  Video barium images with some stasis in the esophagus, unclear if this is motility or structural   Patient will need EGD when he is more stable- monitor to determine inpatient vs outpatient  Continue IV antibiotics per ID  Hypothyroidism  Continue Synthroid  Primary hypertension  Hydrochlorothiazide has been put on hold in view of softer blood pressures  Rhinovirus  Continue supportive therapy  Acute kidney injury (HCC)  Has resolved-this is secondary to " intravascular depletion in the setting of receiving IV Lasix  IV fluid discontinued  Echocardiogram was within normal limits  Monitor input and output, monitor urinary output  Hypokalemia-has resolved with repletion  Avoid nephrotoxins-both Motrin, and Toradol have been discontinued  Left knee pain  Left knee x-ray: No acute osseous abnormality.   PT/OT evaluation- moderate resource intensity   Pneumonia      VTE Pharmacologic Prophylaxis: VTE Score: 8 High Risk (Score >/= 5) - Pharmacological DVT Prophylaxis Ordered: heparin. Sequential Compression Devices Ordered.    Mobility:   Basic Mobility Inpatient Raw Score: 19  JH-HLM Goal: 6: Walk 10 steps or more  JH-HLM Achieved: 6: Walk 10 steps or more  JH-HLM Goal achieved. Continue to encourage appropriate mobility.    Patient Centered Rounds: I performed bedside rounds with nursing staff today.   Discussions with Specialists or Other Care Team Provider: nursing, CM, ID, GI    Education and Discussions with Family / Patient: Updated  (son) at bedside.    Current Length of Stay: 3 day(s)  Current Patient Status: Inpatient   Certification Statement: The patient will continue to require additional inpatient hospital stay due to continued need for IV antibiotics, continued workup with CT chest/abd/pelvis  Discharge Plan: Anticipate discharge in 48-72 hrs to discharge location to be determined pending rehab evaluations.    Code Status: Level 1 - Full Code    Subjective   The patient was seen and examined. The patient is lying in bed in no acute distress. The patient only speaks Yakut. The patient's son who is also an RN provides translation service for the patient. He reports his dad is c/o left asiatic pain which has been ongoing and he follows with pain management. He also reports continued SOB and wheezing.     Objective :  Temp:  [97.6 °F (36.4 °C)-101.9 °F (38.8 °C)] 97.6 °F (36.4 °C)  HR:  [] 92  BP: (104-116)/(51-70) 104/51  Resp:  [18-20]  18  SpO2:  [96 %-99 %] 96 %  O2 Device: None (Room air)  Nasal Cannula O2 Flow Rate (L/min):  [2 L/min] 2 L/min    Body mass index is 27.79 kg/m².     Input and Output Summary (last 24 hours):     Intake/Output Summary (Last 24 hours) at 10/11/2024 1752  Last data filed at 10/11/2024 1740  Gross per 24 hour   Intake 100 ml   Output --   Net 100 ml       Physical Exam  Vitals and nursing note reviewed.   Constitutional:       General: He is awake.      Appearance: Normal appearance.   HENT:      Head: Normocephalic and atraumatic.   Cardiovascular:      Rate and Rhythm: Normal rate and regular rhythm.   Pulmonary:      Effort: Pulmonary effort is normal.      Breath sounds: Wheezing (mild scattered wheezes) present.   Abdominal:      Palpations: Abdomen is soft.      Tenderness: There is no abdominal tenderness.   Skin:     General: Skin is warm and dry.   Neurological:      General: No focal deficit present.      Mental Status: He is alert and oriented to person, place, and time.   Psychiatric:         Attention and Perception: Attention normal.         Mood and Affect: Mood normal.         Speech: Speech normal.         Behavior: Behavior is cooperative.           Lines/Drains:              Lab Results: I have reviewed the following results:   Results from last 7 days   Lab Units 10/11/24  0554 10/09/24  0618 10/08/24  0509   WBC Thousand/uL 11.98*   < > 16.10*   HEMOGLOBIN g/dL 10.8*   < > 12.2   HEMATOCRIT % 32.9*   < > 37.2   PLATELETS Thousands/uL 151   < > 130*   BANDS PCT % 4  --   --    SEGS PCT %  --   --  89*   LYMPHO PCT % 21  --  5*   MONO PCT % 0*  --  2*   EOS PCT % 7*  --  3    < > = values in this interval not displayed.     Results from last 7 days   Lab Units 10/11/24  0554 10/08/24  0509 10/07/24  2306   SODIUM mmol/L 136   < > 133*   POTASSIUM mmol/L 3.8   < > 3.3*   CHLORIDE mmol/L 107   < > 97   CO2 mmol/L 25   < > 25   BUN mg/dL 19   < > 21   CREATININE mg/dL 0.59*   < > 1.03   ANION GAP  mmol/L 4   < > 11   CALCIUM mg/dL 7.7*   < > 8.5   ALBUMIN g/dL  --   --  3.3*   TOTAL BILIRUBIN mg/dL  --   --  1.80*   ALK PHOS U/L  --   --  68   ALT U/L  --   --  22   AST U/L  --   --  26   GLUCOSE RANDOM mg/dL 88   < > 158*    < > = values in this interval not displayed.     Results from last 7 days   Lab Units 10/07/24  2306   INR  1.18             Results from last 7 days   Lab Units 10/11/24  0554 10/09/24  0618 10/08/24  0202 10/07/24  2306   LACTIC ACID mmol/L  --   --  2.2* 3.8*   PROCALCITONIN ng/ml 1.68* 6.95*  --  2.15*       Recent Cultures (last 7 days):   Results from last 7 days   Lab Units 10/09/24  1745 10/09/24  0313 10/07/24  2306 10/07/24  2305   BLOOD CULTURE   --   --  No Growth at 72 hrs. No Growth at 72 hrs.   SPUTUM CULTURE  4+ Growth of Candida tropicalis*  4+ Growth of Candida glabrata*  Few Colonies of  --   --   --    GRAM STAIN RESULT  No polys seen*  4+ Yeast*  --   --   --    LEGIONELLA URINARY ANTIGEN   --  Negative  --   --        Imaging Results Review: No pertinent imaging studies reviewed.  Other Study Results Review: No additional pertinent studies reviewed.    Last 24 Hours Medication List:     Current Facility-Administered Medications:     acetaminophen (TYLENOL) tablet 650 mg, Q6H PRN    albuterol inhalation solution 2.5 mg, Q6H PRN    cefTRIAXone (ROCEPHIN) IVPB (premix in dextrose) 1,000 mg 50 mL, Q24H, Last Rate: 1,000 mg (10/11/24 1220)    cyclobenzaprine (FLEXERIL) tablet 5 mg, TID PRN    heparin (porcine) subcutaneous injection 5,000 Units, Q8H JOE    levothyroxine tablet 50 mcg, Once per day on Monday Tuesday Wednesday Thursday    levothyroxine tablet 75 mcg, Once per day on Sunday Friday Saturday    melatonin tablet 3 mg, HS    ondansetron (ZOFRAN) injection 4 mg, Q6H PRN    [START ON 10/12/2024] pantoprazole (PROTONIX) EC tablet 40 mg, Early Morning    Administrative Statements   Today, Patient Was Seen By: Brett Gandhi PA-C  I have spent a total time of 25  minutes in caring for this patient on the day of the visit/encounter including Patient and family education, Documenting in the medical record, Reviewing / ordering tests, medicine, procedures  , Obtaining or reviewing history  , and Communicating with other healthcare professionals .    **Please Note: This note may have been constructed using a voice recognition system.**

## 2024-10-12 LAB
ANION GAP SERPL CALCULATED.3IONS-SCNC: 5 MMOL/L (ref 4–13)
BUN SERPL-MCNC: 12 MG/DL (ref 5–25)
CALCIUM SERPL-MCNC: 8 MG/DL (ref 8.4–10.2)
CHLORIDE SERPL-SCNC: 103 MMOL/L (ref 96–108)
CO2 SERPL-SCNC: 28 MMOL/L (ref 21–32)
CREAT SERPL-MCNC: 0.53 MG/DL (ref 0.6–1.3)
ERYTHROCYTE [DISTWIDTH] IN BLOOD BY AUTOMATED COUNT: 14.6 % (ref 11.6–15.1)
GFR SERPL CREATININE-BSD FRML MDRD: 95 ML/MIN/1.73SQ M
GLUCOSE SERPL-MCNC: 94 MG/DL (ref 65–140)
HCT VFR BLD AUTO: 34 % (ref 36.5–49.3)
HGB BLD-MCNC: 11 G/DL (ref 12–17)
MCH RBC QN AUTO: 29.1 PG (ref 26.8–34.3)
MCHC RBC AUTO-ENTMCNC: 32.4 G/DL (ref 31.4–37.4)
MCV RBC AUTO: 90 FL (ref 82–98)
PLATELET # BLD AUTO: 195 THOUSANDS/UL (ref 149–390)
PMV BLD AUTO: 10.6 FL (ref 8.9–12.7)
POTASSIUM SERPL-SCNC: 3.5 MMOL/L (ref 3.5–5.3)
PROCALCITONIN SERPL-MCNC: 0.99 NG/ML
RBC # BLD AUTO: 3.78 MILLION/UL (ref 3.88–5.62)
SODIUM SERPL-SCNC: 136 MMOL/L (ref 135–147)
WBC # BLD AUTO: 12.39 THOUSAND/UL (ref 4.31–10.16)

## 2024-10-12 PROCEDURE — 84145 PROCALCITONIN (PCT): CPT | Performed by: PHYSICIAN ASSISTANT

## 2024-10-12 PROCEDURE — 94640 AIRWAY INHALATION TREATMENT: CPT

## 2024-10-12 PROCEDURE — 80048 BASIC METABOLIC PNL TOTAL CA: CPT | Performed by: PHYSICIAN ASSISTANT

## 2024-10-12 PROCEDURE — 99232 SBSQ HOSP IP/OBS MODERATE 35: CPT | Performed by: HOSPITALIST

## 2024-10-12 PROCEDURE — 94760 N-INVAS EAR/PLS OXIMETRY 1: CPT

## 2024-10-12 PROCEDURE — 85027 COMPLETE CBC AUTOMATED: CPT | Performed by: PHYSICIAN ASSISTANT

## 2024-10-12 RX ADMIN — ACETAMINOPHEN 650 MG: 325 TABLET ORAL at 22:24

## 2024-10-12 RX ADMIN — CYCLOBENZAPRINE HYDROCHLORIDE 5 MG: 10 TABLET, FILM COATED ORAL at 18:08

## 2024-10-12 RX ADMIN — ACETAMINOPHEN 650 MG: 325 TABLET ORAL at 05:23

## 2024-10-12 RX ADMIN — CEFTRIAXONE 1000 MG: 1 INJECTION, SOLUTION INTRAVENOUS at 10:53

## 2024-10-12 RX ADMIN — HEPARIN SODIUM 5000 UNITS: 5000 INJECTION, SOLUTION INTRAVENOUS; SUBCUTANEOUS at 05:24

## 2024-10-12 RX ADMIN — Medication 3 MG: at 22:11

## 2024-10-12 RX ADMIN — ACETAMINOPHEN 650 MG: 325 TABLET ORAL at 13:08

## 2024-10-12 RX ADMIN — PANTOPRAZOLE SODIUM 40 MG: 40 TABLET, DELAYED RELEASE ORAL at 05:24

## 2024-10-12 RX ADMIN — HEPARIN SODIUM 5000 UNITS: 5000 INJECTION, SOLUTION INTRAVENOUS; SUBCUTANEOUS at 13:08

## 2024-10-12 RX ADMIN — HEPARIN SODIUM 5000 UNITS: 5000 INJECTION, SOLUTION INTRAVENOUS; SUBCUTANEOUS at 22:11

## 2024-10-12 RX ADMIN — ALBUTEROL SULFATE 2.5 MG: 2.5 SOLUTION RESPIRATORY (INHALATION) at 06:21

## 2024-10-12 RX ADMIN — LEVOTHYROXINE SODIUM 75 MCG: 75 TABLET ORAL at 05:23

## 2024-10-12 NOTE — ASSESSMENT & PLAN NOTE
Secondary to a combination of rhinovirus positivity, and a right sided pneumonia  Tmax was 102.6 last evening  Tachycardia has resolved white blood cell count continues to downtrend  Patient tested positive for rhinovirus/enterovirus  ID consultation appreciated   Status post initial treatment with Zithromax  Patient has now completed 5 days worth of ceftriaxone  Case reviewed with ID-we will entertain the possibility of a drug fever  CT chest, abdomen pelvis-reviewed no obvious pathology noted that would explain the fever  DC ceftriaxone today, and reevaluate in the a.m.  Afebrile tomorrow, anticipate discharge planning  Blood cultures x 2 sets -no growth to date  Streptococcus pneumonia and Legionella antigen: negative  Continue all other management for now

## 2024-10-12 NOTE — PROGRESS NOTES
"Progress Note - Hospitalist   Name: Miguel Henderson 84 y.o. male I MRN: [id number]  Unit/Bed#: -01 I Date of Admission: 10/7/2024   Date of Service: 10/12/2024 I Hospital Day: 4    Assessment & Plan  Severe sepsis (HCC)  Secondary to a combination of rhinovirus positivity, and a right sided pneumonia  Tmax was 102.6 last evening  Tachycardia has resolved white blood cell count continues to downtrend  Patient tested positive for rhinovirus/enterovirus  ID consultation appreciated   Status post initial treatment with Zithromax  Patient has now completed 5 days worth of ceftriaxone  Case reviewed with ID-we will entertain the possibility of a drug fever  CT chest, abdomen pelvis-reviewed no obvious pathology noted that would explain the fever  DC ceftriaxone today, and reevaluate in the a.m.  Afebrile tomorrow, anticipate discharge planning  Blood cultures x 2 sets -no growth to date  Streptococcus pneumonia and Legionella antigen: negative  Continue all other management for now  Community acquired pneumonia of right upper lobe of lung  CT imaging revealed \"There is some minor groundglass and tree-in-bud nodularity suggested in the right upper lobe inferior segment and to a lesser degree in the right lower lobe\"  Most likely a viral pneumonia-see the management as outlined above  Status post speech and swallow evaluation inclusive of a bedside and video barium swallow -see the full report for additional details  Diet has been modified to dysphagia level 3 and thin liquid diet  GI consulted per speech recommendations  Video barium images with some stasis in the esophagus, unclear if this is motility or structural   Appreciate GI input  Patient will need EGD when he is more stable- monitor to determine inpatient vs outpatient    Hypothyroidism  Continue Synthroid  Primary hypertension  Hydrochlorothiazide has been put on hold in view of softer blood pressures  Rhinovirus  Continue supportive therapy  Acute kidney " injury (HCC)  Has resolved-this is secondary to intravascular depletion in the setting of receiving IV Lasix  IV fluid discontinued  Echocardiogram was within normal limits  Monitor input and output, monitor urinary output  Hypokalemia-has resolved with repletion  Avoid nephrotoxins-both Motrin, and Toradol have been discontinued  Left knee pain  Left knee x-ray: No acute osseous abnormality.   PT/OT evaluation- moderate resource intensity     VTE Pharmacologic Prophylaxis: VTE Score: 8 High Risk (Score >/= 5) - Pharmacological DVT Prophylaxis Ordered: heparin. Sequential Compression Devices Ordered.    Mobility:   Basic Mobility Inpatient Raw Score: 20  JH-HLM Goal: 6: Walk 10 steps or more  JH-HLM Achieved: 6: Walk 10 steps or more  JH-HLM Goal achieved. Continue to encourage appropriate mobility.    Patient Centered Rounds: I performed bedside rounds with nursing staff today.   Discussions with Specialists or Other Care Team Provider: Infectious disease    Education and Discussions with Family / Patient: Updated  (son) at bedside.    Current Length of Stay: 4 day(s)  Current Patient Status: Inpatient   Certification Statement: The patient will continue to require additional inpatient hospital stay due to the need to be afebrile prior to discharge  Discharge Plan: Anticipate discharge in 24-48 hrs to home.    Code Status: Level 1 - Full Code    Subjective   Patient seen, resting in bed, is comfortable, no new complaints    Objective :  Temp:  [98.6 °F (37 °C)-102.6 °F (39.2 °C)] 99.2 °F (37.3 °C)  HR:  [] 93  BP: ()/(57-74) 109/57  Resp:  [17-20] 17  SpO2:  [92 %-97 %] 93 %  O2 Device: None (Room air)    Body mass index is 27.79 kg/m².     Input and Output Summary (last 24 hours):     Intake/Output Summary (Last 24 hours) at 10/12/2024 1533  Last data filed at 10/12/2024 1200  Gross per 24 hour   Intake 460 ml   Output 400 ml   Net 60 ml       Physical Exam  Vitals and nursing note  reviewed.   Constitutional:       General: He is not in acute distress.     Appearance: Normal appearance. He is not ill-appearing.   HENT:      Head: Normocephalic and atraumatic.      Nose: Nose normal.   Eyes:      Extraocular Movements: Extraocular movements intact.      Pupils: Pupils are equal, round, and reactive to light.   Cardiovascular:      Rate and Rhythm: Normal rate and regular rhythm.      Pulses: Normal pulses.      Heart sounds: Normal heart sounds. No murmur heard.     No friction rub. No gallop.   Pulmonary:      Effort: Pulmonary effort is normal.      Breath sounds: Normal breath sounds.   Abdominal:      General: There is no distension.      Palpations: Abdomen is soft. There is no mass.      Tenderness: There is no abdominal tenderness. There is no guarding or rebound.   Musculoskeletal:         General: No swelling or tenderness. Normal range of motion.      Cervical back: Normal range of motion and neck supple. No rigidity. No muscular tenderness.      Right lower leg: No edema.      Left lower leg: No edema.   Skin:     General: Skin is warm.      Capillary Refill: Capillary refill takes less than 2 seconds.      Findings: No erythema or rash.   Neurological:      General: No focal deficit present.      Mental Status: He is alert and oriented to person, place, and time. Mental status is at baseline.   Psychiatric:         Mood and Affect: Mood normal.         Behavior: Behavior normal.           Lines/Drains:              Lab Results: I have reviewed the following results:   Results from last 7 days   Lab Units 10/12/24  0455 10/11/24  0554 10/09/24  0618 10/08/24  0509   WBC Thousand/uL 12.39* 11.98*   < > 16.10*   HEMOGLOBIN g/dL 11.0* 10.8*   < > 12.2   HEMATOCRIT % 34.0* 32.9*   < > 37.2   PLATELETS Thousands/uL 195 151   < > 130*   BANDS PCT %  --  4  --   --    SEGS PCT %  --   --   --  89*   LYMPHO PCT %  --  21  --  5*   MONO PCT %  --  0*  --  2*   EOS PCT %  --  7*  --  3    < >  = values in this interval not displayed.     Results from last 7 days   Lab Units 10/12/24  0455 10/08/24  0509 10/07/24  2306   SODIUM mmol/L 136   < > 133*   POTASSIUM mmol/L 3.5   < > 3.3*   CHLORIDE mmol/L 103   < > 97   CO2 mmol/L 28   < > 25   BUN mg/dL 12   < > 21   CREATININE mg/dL 0.53*   < > 1.03   ANION GAP mmol/L 5   < > 11   CALCIUM mg/dL 8.0*   < > 8.5   ALBUMIN g/dL  --   --  3.3*   TOTAL BILIRUBIN mg/dL  --   --  1.80*   ALK PHOS U/L  --   --  68   ALT U/L  --   --  22   AST U/L  --   --  26   GLUCOSE RANDOM mg/dL 94   < > 158*    < > = values in this interval not displayed.     Results from last 7 days   Lab Units 10/07/24  2306   INR  1.18     Results from last 7 days   Lab Units 10/11/24  2054   POC GLUCOSE mg/dl 144*         Results from last 7 days   Lab Units 10/12/24  0455 10/11/24  0554 10/09/24  0618 10/08/24  0202 10/07/24  2306   LACTIC ACID mmol/L  --   --   --  2.2* 3.8*   PROCALCITONIN ng/ml 0.99* 1.68* 6.95*  --  2.15*       Recent Cultures (last 7 days):   Results from last 7 days   Lab Units 10/09/24  1745 10/09/24  0313 10/07/24  2306 10/07/24  2305   BLOOD CULTURE   --   --  No Growth After 4 Days. No Growth After 4 Days.   SPUTUM CULTURE  4+ Growth of Candida tropicalis*  4+ Growth of Candida glabrata*  Few Colonies of  --   --   --    GRAM STAIN RESULT  No polys seen*  4+ Yeast*  --   --   --    LEGIONELLA URINARY ANTIGEN   --  Negative  --   --        Imaging Results Review: No pertinent imaging studies reviewed.  Other Study Results Review: No additional pertinent studies reviewed.    Last 24 Hours Medication List:     Current Facility-Administered Medications:     acetaminophen (TYLENOL) tablet 650 mg, Q6H PRN    albuterol inhalation solution 2.5 mg, Q6H PRN    cyclobenzaprine (FLEXERIL) tablet 5 mg, TID PRN    heparin (porcine) subcutaneous injection 5,000 Units, Q8H JOE    levothyroxine tablet 50 mcg, Once per day on Monday Tuesday Wednesday Thursday    levothyroxine  tablet 75 mcg, Once per day on Sunday Friday Saturday    melatonin tablet 3 mg, HS    ondansetron (ZOFRAN) injection 4 mg, Q6H PRN    pantoprazole (PROTONIX) EC tablet 40 mg, Early Morning    Administrative Statements   Today, Patient Was Seen By: Judy Wade MD      **Please Note: This note may have been constructed using a voice recognition system.**

## 2024-10-12 NOTE — PLAN OF CARE
Problem: INFECTION - ADULT  Goal: Absence or prevention of progression during hospitalization  Description: INTERVENTIONS:  - Assess and monitor for signs and symptoms of infection  - Monitor lab/diagnostic results  - Monitor all insertion sites, i.e. indwelling lines, tubes, and drains  - Monitor endotracheal if appropriate and nasal secretions for changes in amount and color  - [place] appropriate cooling/warming therapies per order  - Administer medications as ordered  - Instruct and encourage patient and family to use good hand hygiene technique  - Identify and instruct in appropriate isolation precautions for identified infection/condition  Outcome: Progressing  Goal: Absence of fever/infection during neutropenic period  Description: INTERVENTIONS:  - Monitor WBC    Outcome: Progressing

## 2024-10-12 NOTE — PLAN OF CARE
Problem: INFECTION - ADULT  Goal: Absence or prevention of progression during hospitalization  Description: INTERVENTIONS:  - Assess and monitor for signs and symptoms of infection  - Monitor lab/diagnostic results  - Monitor all insertion sites, i.e. indwelling lines, tubes, and drains  - Monitor endotracheal if appropriate and nasal secretions for changes in amount and color  - [place] appropriate cooling/warming therapies per order  - Administer medications as ordered  - Instruct and encourage patient and family to use good hand hygiene technique  - Identify and instruct in appropriate isolation precautions for identified infection/condition  Outcome: Progressing  Goal: Absence of fever/infection during neutropenic period  Description: INTERVENTIONS:  - Monitor WBC    Outcome: Progressing     Problem: RESPIRATORY - ADULT  Goal: Achieves optimal ventilation and oxygenation  Description: INTERVENTIONS:  - Assess for changes in respiratory status  - Assess for changes in mentation and behavior  - Position to facilitate oxygenation and minimize respiratory effort  - Oxygen administered by appropriate delivery if ordered  - Initiate smoking cessation education as indicated  - Encourage broncho-pulmonary hygiene including cough, deep breathe, Incentive Spirometry  - Assess the need for suctioning and aspirate as needed  - Assess and instruct to report SOB or any respiratory difficulty  - Respiratory Therapy support as indicated  Outcome: Progressing

## 2024-10-12 NOTE — ASSESSMENT & PLAN NOTE
"CT imaging revealed \"There is some minor groundglass and tree-in-bud nodularity suggested in the right upper lobe inferior segment and to a lesser degree in the right lower lobe\"  Most likely a viral pneumonia-see the management as outlined above  Status post speech and swallow evaluation inclusive of a bedside and video barium swallow -see the full report for additional details  Diet has been modified to dysphagia level 3 and thin liquid diet  GI consulted per speech recommendations  Video barium images with some stasis in the esophagus, unclear if this is motility or structural   Appreciate GI input  Patient will need EGD when he is more stable- monitor to determine inpatient vs outpatient    "

## 2024-10-13 PROBLEM — B37.81 CANDIDA ESOPHAGITIS (HCC): Status: ACTIVE | Noted: 2024-10-13

## 2024-10-13 LAB
ANION GAP SERPL CALCULATED.3IONS-SCNC: 5 MMOL/L (ref 4–13)
BACTERIA BLD CULT: NORMAL
BACTERIA BLD CULT: NORMAL
BUN SERPL-MCNC: 13 MG/DL (ref 5–25)
CALCIUM SERPL-MCNC: 8 MG/DL (ref 8.4–10.2)
CHLORIDE SERPL-SCNC: 103 MMOL/L (ref 96–108)
CO2 SERPL-SCNC: 28 MMOL/L (ref 21–32)
CREAT SERPL-MCNC: 0.54 MG/DL (ref 0.6–1.3)
ERYTHROCYTE [DISTWIDTH] IN BLOOD BY AUTOMATED COUNT: 14.6 % (ref 11.6–15.1)
GFR SERPL CREATININE-BSD FRML MDRD: 95 ML/MIN/1.73SQ M
GLUCOSE SERPL-MCNC: 85 MG/DL (ref 65–140)
HCT VFR BLD AUTO: 33.6 % (ref 36.5–49.3)
HGB BLD-MCNC: 10.9 G/DL (ref 12–17)
MCH RBC QN AUTO: 29.5 PG (ref 26.8–34.3)
MCHC RBC AUTO-ENTMCNC: 32.4 G/DL (ref 31.4–37.4)
MCV RBC AUTO: 91 FL (ref 82–98)
PLATELET # BLD AUTO: 226 THOUSANDS/UL (ref 149–390)
PMV BLD AUTO: 9.5 FL (ref 8.9–12.7)
POTASSIUM SERPL-SCNC: 3.6 MMOL/L (ref 3.5–5.3)
RBC # BLD AUTO: 3.7 MILLION/UL (ref 3.88–5.62)
SARS-COV-2 RNA RESP QL NAA+PROBE: NEGATIVE
SODIUM SERPL-SCNC: 136 MMOL/L (ref 135–147)
WBC # BLD AUTO: 10.02 THOUSAND/UL (ref 4.31–10.16)

## 2024-10-13 PROCEDURE — 80048 BASIC METABOLIC PNL TOTAL CA: CPT | Performed by: HOSPITALIST

## 2024-10-13 PROCEDURE — 87635 SARS-COV-2 COVID-19 AMP PRB: CPT | Performed by: HOSPITALIST

## 2024-10-13 PROCEDURE — 99232 SBSQ HOSP IP/OBS MODERATE 35: CPT | Performed by: HOSPITALIST

## 2024-10-13 PROCEDURE — 85027 COMPLETE CBC AUTOMATED: CPT | Performed by: HOSPITALIST

## 2024-10-13 RX ORDER — FLUCONAZOLE 2 MG/ML
400 INJECTION, SOLUTION INTRAVENOUS EVERY 24 HOURS
Status: DISCONTINUED | OUTPATIENT
Start: 2024-10-13 | End: 2024-10-15

## 2024-10-13 RX ORDER — OXYCODONE AND ACETAMINOPHEN 5; 325 MG/1; MG/1
1 TABLET ORAL EVERY 8 HOURS PRN
Status: DISCONTINUED | OUTPATIENT
Start: 2024-10-13 | End: 2024-10-15 | Stop reason: HOSPADM

## 2024-10-13 RX ADMIN — PANTOPRAZOLE SODIUM 40 MG: 40 TABLET, DELAYED RELEASE ORAL at 05:28

## 2024-10-13 RX ADMIN — LEVOTHYROXINE SODIUM 75 MCG: 75 TABLET ORAL at 05:23

## 2024-10-13 RX ADMIN — Medication 3 MG: at 21:49

## 2024-10-13 RX ADMIN — HEPARIN SODIUM 5000 UNITS: 5000 INJECTION, SOLUTION INTRAVENOUS; SUBCUTANEOUS at 14:10

## 2024-10-13 RX ADMIN — ACETAMINOPHEN 650 MG: 325 TABLET ORAL at 23:38

## 2024-10-13 RX ADMIN — HEPARIN SODIUM 5000 UNITS: 5000 INJECTION, SOLUTION INTRAVENOUS; SUBCUTANEOUS at 21:49

## 2024-10-13 RX ADMIN — ACETAMINOPHEN 650 MG: 325 TABLET ORAL at 14:09

## 2024-10-13 RX ADMIN — FLUCONAZOLE IN SODIUM CHLORIDE 400 MG: 2 INJECTION, SOLUTION INTRAVENOUS at 14:10

## 2024-10-13 RX ADMIN — HEPARIN SODIUM 5000 UNITS: 5000 INJECTION, SOLUTION INTRAVENOUS; SUBCUTANEOUS at 05:23

## 2024-10-13 NOTE — ASSESSMENT & PLAN NOTE
Video swallow evaluation results noted  Patient has clinical stigmata for Candida in that he has oral thrush and additional white spots noted in his oral cavity  Patient complains of difficulty swallowing  Sputum culture positive for Melissa  Will start empiric therapy with fluconazole 400 mg IV today, upon further improvement will transition to oral

## 2024-10-13 NOTE — ASSESSMENT & PLAN NOTE
Secondary to a combination of rhinovirus positivity, and a right sided pneumonia  Additional contributing etiology is a suspected candidal esophagitis -see the management as outlined below  Tmax was 101.7 degrees Fahrenheit last evening  Tachycardia, and leukocytosis have resolved  Patient tested positive for rhinovirus/enterovirus  ID consultation appreciated   Status post initial treatment with Zithromax  Status post completed course of ceftriaxone  CT chest, abdomen pelvis-reviewed no obvious pathology noted that would explain the fever  Blood cultures x 2 sets -no growth to date  Streptococcus pneumonia and Legionella antigen: negative  Sputum culture-4+ growth of Candida glabrata, and Candida tropicalis  Continue all other management for now

## 2024-10-13 NOTE — PLAN OF CARE
Problem: PAIN - ADULT  Goal: Verbalizes/displays adequate comfort level or baseline comfort level  Description: Interventions:  - Encourage patient to monitor pain and request assistance  - Assess pain using appropriate pain scale  - Administer analgesics based on type and severity of pain and evaluate response  - Implement non-pharmacological measures as appropriate and evaluate response  - Consider cultural and social influences on pain and pain management  - Notify physician/advanced practitioner if interventions unsuccessful or patient reports new pain  10/12/2024 2010 by Cleo Lopes RN  Outcome: Progressing  10/12/2024 2010 by Cleo Lopes RN  Outcome: Progressing

## 2024-10-13 NOTE — ASSESSMENT & PLAN NOTE
"CT imaging revealed \"There is some minor groundglass and tree-in-bud nodularity suggested in the right upper lobe inferior segment and to a lesser degree in the right lower lobe\"  Antibiotics have been completed  Status post a speech and swallow evaluation inclusive of a bedside and video barium swallow -see the full report for additional details  Diet has been modified to dysphagia level 3 and thin liquid diet  GI consulted per speech recommendations  Video barium images with some stasis in the esophagus, unclear if this is motility or structural   Appreciate GI input  Patient will need EGD when he is more stable- monitor to determine inpatient vs outpatient  Initiate treatment for Candida as outlined above    "

## 2024-10-13 NOTE — PLAN OF CARE
Problem: INFECTION - ADULT  Goal: Absence or prevention of progression during hospitalization  Description: INTERVENTIONS:  - Assess and monitor for signs and symptoms of infection  - Monitor lab/diagnostic results  - Monitor all insertion sites, i.e. indwelling lines, tubes, and drains  - Monitor endotracheal if appropriate and nasal secretions for changes in amount and color  - [place] appropriate cooling/warming therapies per order  - Administer medications as ordered  - Instruct and encourage patient and family to use good hand hygiene technique  - Identify and instruct in appropriate isolation precautions for identified infection/condition  Outcome: Progressing  Goal: Absence of fever/infection during neutropenic period  Description: INTERVENTIONS:  - Monitor WBC    Outcome: Progressing     Problem: RESPIRATORY - ADULT  Goal: Achieves optimal ventilation and oxygenation  Description: INTERVENTIONS:  - Assess for changes in respiratory status  - Assess for changes in mentation and behavior  - Position to facilitate oxygenation and minimize respiratory effort  - Oxygen administered by appropriate delivery if ordered  - Initiate smoking cessation education as indicated  - Encourage broncho-pulmonary hygiene including cough, deep breathe, Incentive Spirometry  - Assess the need for suctioning and aspirate as needed  - Assess and instruct to report SOB or any respiratory difficulty  - Respiratory Therapy support as indicated  Outcome: Progressing

## 2024-10-13 NOTE — PROGRESS NOTES
"Progress Note - Hospitalist   Name: Miguel Henderson 84 y.o. male I MRN: [id number]  Unit/Bed#: -01 I Date of Admission: 10/7/2024   Date of Service: 10/13/2024 I Hospital Day: 5    Assessment & Plan  Severe sepsis (HCC)  Secondary to a combination of rhinovirus positivity, and a right sided pneumonia  Additional contributing etiology is a suspected candidal esophagitis -see the management as outlined below  Tmax was 101.7 degrees Fahrenheit last evening  Tachycardia, and leukocytosis have resolved  Patient tested positive for rhinovirus/enterovirus  ID consultation appreciated   Status post initial treatment with Zithromax  Status post completed course of ceftriaxone  CT chest, abdomen pelvis-reviewed no obvious pathology noted that would explain the fever  Blood cultures x 2 sets -no growth to date  Streptococcus pneumonia and Legionella antigen: negative  Sputum culture-4+ growth of Candida glabrata, and Candida tropicalis  Continue all other management for now  Community acquired pneumonia of right upper lobe of lung  CT imaging revealed \"There is some minor groundglass and tree-in-bud nodularity suggested in the right upper lobe inferior segment and to a lesser degree in the right lower lobe\"  Antibiotics have been completed  Status post a speech and swallow evaluation inclusive of a bedside and video barium swallow -see the full report for additional details  Diet has been modified to dysphagia level 3 and thin liquid diet  GI consulted per speech recommendations  Video barium images with some stasis in the esophagus, unclear if this is motility or structural   Appreciate GI input  Patient will need EGD when he is more stable- monitor to determine inpatient vs outpatient  Initiate treatment for Candida as outlined above    Hypothyroidism  Continue Synthroid  Primary hypertension  Hydrochlorothiazide has been put on hold in view of softer blood pressures  Rhinovirus  Continue supportive therapy  Acute " kidney injury (HCC)  Has resolved-this is secondary to intravascular depletion in the setting of receiving IV Lasix  IV fluid discontinued  Echocardiogram was within normal limits  Monitor input and output, monitor urinary output  Hypokalemia-has resolved with repletion  Avoid nephrotoxins-both Motrin, and Toradol have been discontinued  Left knee pain  Left knee x-ray: No acute osseous abnormality.   PT/OT evaluation- moderate resource intensity   Candida esophagitis (HCC)  Video swallow evaluation results noted  Patient has clinical stigmata for Candida in that he has oral thrush and additional white spots noted in his oral cavity  Patient complains of difficulty swallowing  Sputum culture positive for Melissa  Will start empiric therapy with fluconazole 400 mg IV today, upon further improvement will transition to oral    VTE Pharmacologic Prophylaxis: VTE Score: 8 High Risk (Score >/= 5) - Pharmacological DVT Prophylaxis Ordered: heparin. Sequential Compression Devices Ordered.    Mobility:   Basic Mobility Inpatient Raw Score: 19  JH-HLM Goal: 6: Walk 10 steps or more  JH-HLM Achieved: 5: Stand (1 or more minutes)  JH-HLM Goal achieved. Continue to encourage appropriate mobility.    Patient Centered Rounds: I performed bedside rounds with nursing staff today.   Discussions with Specialists or Other Care Team Provider: Infectious disease    Education and Discussions with Family / Patient:  Multiple family members including the patient's daughter, son-in-law, wife, and son were all brought up to par.     Current Length of Stay: 5 day(s)  Current Patient Status: Inpatient   Certification Statement: The patient will continue to require additional inpatient hospital stay due to need for IV fluconazole  Discharge Plan: Anticipate discharge in 24-48 hrs to home.    Code Status: Level 1 - Full Code    Subjective   Patient seen, complains of difficulty swallowing    Objective :  Temp:  [98.5 °F (36.9 °C)-101.7 °F (38.7  °C)] 98.6 °F (37 °C)  HR:  [] 104  BP: (109-124)/(57-76) 121/76  Resp:  [17-20] 18  SpO2:  [91 %-95 %] 93 %  O2 Device: None (Room air)    Body mass index is 27.79 kg/m².     Input and Output Summary (last 24 hours):     Intake/Output Summary (Last 24 hours) at 10/13/2024 1347  Last data filed at 10/13/2024 0900  Gross per 24 hour   Intake 340 ml   Output 260 ml   Net 80 ml       Physical Exam  Vitals and nursing note reviewed.   Constitutional:       General: He is not in acute distress.     Appearance: Normal appearance. He is not ill-appearing.   HENT:      Head: Normocephalic and atraumatic.      Comments: Oral thrush     Nose: Nose normal.   Eyes:      Extraocular Movements: Extraocular movements intact.      Pupils: Pupils are equal, round, and reactive to light.   Cardiovascular:      Rate and Rhythm: Normal rate and regular rhythm.      Pulses: Normal pulses.      Heart sounds: Normal heart sounds. No murmur heard.     No friction rub. No gallop.   Pulmonary:      Effort: Pulmonary effort is normal.      Breath sounds: Normal breath sounds.   Abdominal:      General: There is no distension.      Palpations: Abdomen is soft. There is no mass.      Tenderness: There is no abdominal tenderness. There is no guarding or rebound.   Musculoskeletal:         General: No swelling or tenderness. Normal range of motion.      Cervical back: Normal range of motion and neck supple. No rigidity. No muscular tenderness.      Right lower leg: No edema.      Left lower leg: No edema.   Skin:     General: Skin is warm.      Capillary Refill: Capillary refill takes less than 2 seconds.      Findings: No erythema or rash.   Neurological:      General: No focal deficit present.      Mental Status: He is alert and oriented to person, place, and time. Mental status is at baseline.   Psychiatric:         Mood and Affect: Mood normal.         Behavior: Behavior normal.           Lines/Drains:              Lab Results: I have  reviewed the following results:   Results from last 7 days   Lab Units 10/13/24  0536 10/12/24  0455 10/11/24  0554 10/09/24  0618 10/08/24  0509   WBC Thousand/uL 10.02   < > 11.98*   < > 16.10*   HEMOGLOBIN g/dL 10.9*   < > 10.8*   < > 12.2   HEMATOCRIT % 33.6*   < > 32.9*   < > 37.2   PLATELETS Thousands/uL 226   < > 151   < > 130*   BANDS PCT %  --   --  4  --   --    SEGS PCT %  --   --   --   --  89*   LYMPHO PCT %  --   --  21  --  5*   MONO PCT %  --   --  0*  --  2*   EOS PCT %  --   --  7*  --  3    < > = values in this interval not displayed.     Results from last 7 days   Lab Units 10/13/24  0536 10/08/24  0509 10/07/24  2306   SODIUM mmol/L 136   < > 133*   POTASSIUM mmol/L 3.6   < > 3.3*   CHLORIDE mmol/L 103   < > 97   CO2 mmol/L 28   < > 25   BUN mg/dL 13   < > 21   CREATININE mg/dL 0.54*   < > 1.03   ANION GAP mmol/L 5   < > 11   CALCIUM mg/dL 8.0*   < > 8.5   ALBUMIN g/dL  --   --  3.3*   TOTAL BILIRUBIN mg/dL  --   --  1.80*   ALK PHOS U/L  --   --  68   ALT U/L  --   --  22   AST U/L  --   --  26   GLUCOSE RANDOM mg/dL 85   < > 158*    < > = values in this interval not displayed.     Results from last 7 days   Lab Units 10/07/24  2306   INR  1.18     Results from last 7 days   Lab Units 10/11/24  2054   POC GLUCOSE mg/dl 144*         Results from last 7 days   Lab Units 10/12/24  0455 10/11/24  0554 10/09/24  0618 10/08/24  0202 10/07/24  2306   LACTIC ACID mmol/L  --   --   --  2.2* 3.8*   PROCALCITONIN ng/ml 0.99* 1.68* 6.95*  --  2.15*       Recent Cultures (last 7 days):   Results from last 7 days   Lab Units 10/09/24  1745 10/09/24  0313 10/07/24  2306 10/07/24  2305   BLOOD CULTURE   --   --  No Growth After 5 Days. No Growth After 5 Days.   SPUTUM CULTURE  4+ Growth of Candida tropicalis*  4+ Growth of Candida glabrata*  Few Colonies of  --   --   --    GRAM STAIN RESULT  No polys seen*  4+ Yeast*  --   --   --    LEGIONELLA URINARY ANTIGEN   --  Negative  --   --        Imaging Results  Review: No pertinent imaging studies reviewed.  Other Study Results Review: No additional pertinent studies reviewed.    Last 24 Hours Medication List:     Current Facility-Administered Medications:     acetaminophen (TYLENOL) tablet 650 mg, Q6H PRN    albuterol inhalation solution 2.5 mg, Q6H PRN    fluconazole (DIFLUCAN) IVPB (premix) 400 mg 200 mL, Q24H    heparin (porcine) subcutaneous injection 5,000 Units, Q8H JOE    levothyroxine tablet 50 mcg, Once per day on Monday Tuesday Wednesday Thursday    levothyroxine tablet 75 mcg, Once per day on Sunday Friday Saturday    melatonin tablet 3 mg, HS    ondansetron (ZOFRAN) injection 4 mg, Q6H PRN    oxyCODONE-acetaminophen (PERCOCET) 5-325 mg per tablet 1 tablet, Q8H PRN    pantoprazole (PROTONIX) EC tablet 40 mg, Early Morning    Administrative Statements   Today, Patient Was Seen By: Judy Wade MD      **Please Note: This note may have been constructed using a voice recognition system.**

## 2024-10-14 PROBLEM — R13.10 DYSPHAGIA: Status: ACTIVE | Noted: 2024-10-14

## 2024-10-14 PROBLEM — K12.1 ULCERATION OF ORAL MUCOSA: Status: ACTIVE | Noted: 2024-10-14

## 2024-10-14 LAB
ANION GAP SERPL CALCULATED.3IONS-SCNC: 5 MMOL/L (ref 4–13)
BASOPHILS # BLD MANUAL: 0.08 THOUSAND/UL (ref 0–0.1)
BASOPHILS NFR MAR MANUAL: 1 % (ref 0–1)
BUN SERPL-MCNC: 14 MG/DL (ref 5–25)
CALCIUM SERPL-MCNC: 7.8 MG/DL (ref 8.4–10.2)
CHLORIDE SERPL-SCNC: 105 MMOL/L (ref 96–108)
CO2 SERPL-SCNC: 26 MMOL/L (ref 21–32)
CREAT SERPL-MCNC: 0.61 MG/DL (ref 0.6–1.3)
EOSINOPHIL # BLD MANUAL: 0.33 THOUSAND/UL (ref 0–0.4)
EOSINOPHIL NFR BLD MANUAL: 4 % (ref 0–6)
ERYTHROCYTE [DISTWIDTH] IN BLOOD BY AUTOMATED COUNT: 16 % (ref 11.6–15.1)
GFR SERPL CREATININE-BSD FRML MDRD: 90 ML/MIN/1.73SQ M
GLUCOSE SERPL-MCNC: 89 MG/DL (ref 65–140)
HCT VFR BLD AUTO: 29.9 % (ref 36.5–49.3)
HGB BLD-MCNC: 10.3 G/DL (ref 12–17)
LYMPHOCYTES # BLD AUTO: 1.55 THOUSAND/UL (ref 0.6–4.47)
LYMPHOCYTES # BLD AUTO: 18 % (ref 14–44)
MCH RBC QN AUTO: 32.3 PG (ref 26.8–34.3)
MCHC RBC AUTO-ENTMCNC: 34.4 G/DL (ref 31.4–37.4)
MCV RBC AUTO: 94 FL (ref 82–98)
METAMYELOCYTE ABSOLUTE CT: 0.16 THOUSAND/UL (ref 0–0.1)
METAMYELOCYTES NFR BLD MANUAL: 2 % (ref 0–1)
MONOCYTES # BLD AUTO: 0.24 THOUSAND/UL (ref 0–1.22)
MONOCYTES NFR BLD: 3 % (ref 4–12)
MYELOCYTE ABSOLUTE CT: 0.16 THOUSAND/UL (ref 0–0.1)
MYELOCYTES NFR BLD MANUAL: 2 % (ref 0–1)
NEUTROPHILS # BLD MANUAL: 5.62 THOUSAND/UL (ref 1.85–7.62)
NEUTS BAND NFR BLD MANUAL: 4 % (ref 0–8)
NEUTS SEG NFR BLD AUTO: 65 % (ref 43–75)
PLATELET # BLD AUTO: 208 THOUSANDS/UL (ref 149–390)
PLATELET BLD QL SMEAR: ADEQUATE
PMV BLD AUTO: 10.2 FL (ref 8.9–12.7)
POTASSIUM SERPL-SCNC: 3.4 MMOL/L (ref 3.5–5.3)
RBC # BLD AUTO: 3.19 MILLION/UL (ref 3.88–5.62)
SODIUM SERPL-SCNC: 136 MMOL/L (ref 135–147)
VARIANT LYMPHS # BLD AUTO: 1 %
WBC # BLD AUTO: 8.14 THOUSAND/UL (ref 4.31–10.16)

## 2024-10-14 PROCEDURE — 92526 ORAL FUNCTION THERAPY: CPT

## 2024-10-14 PROCEDURE — 87529 HSV DNA AMP PROBE: CPT | Performed by: PHYSICIAN ASSISTANT

## 2024-10-14 PROCEDURE — 85007 BL SMEAR W/DIFF WBC COUNT: CPT | Performed by: HOSPITALIST

## 2024-10-14 PROCEDURE — 86664 EPSTEIN-BARR NUCLEAR ANTIGEN: CPT | Performed by: PHYSICIAN ASSISTANT

## 2024-10-14 PROCEDURE — 99232 SBSQ HOSP IP/OBS MODERATE 35: CPT | Performed by: HOSPITALIST

## 2024-10-14 PROCEDURE — 99232 SBSQ HOSP IP/OBS MODERATE 35: CPT | Performed by: INTERNAL MEDICINE

## 2024-10-14 PROCEDURE — 97110 THERAPEUTIC EXERCISES: CPT

## 2024-10-14 PROCEDURE — 85027 COMPLETE CBC AUTOMATED: CPT | Performed by: HOSPITALIST

## 2024-10-14 PROCEDURE — 80048 BASIC METABOLIC PNL TOTAL CA: CPT | Performed by: HOSPITALIST

## 2024-10-14 PROCEDURE — 99232 SBSQ HOSP IP/OBS MODERATE 35: CPT | Performed by: STUDENT IN AN ORGANIZED HEALTH CARE EDUCATION/TRAINING PROGRAM

## 2024-10-14 PROCEDURE — 86663 EPSTEIN-BARR ANTIBODY: CPT | Performed by: PHYSICIAN ASSISTANT

## 2024-10-14 PROCEDURE — 94760 N-INVAS EAR/PLS OXIMETRY 1: CPT

## 2024-10-14 PROCEDURE — 97116 GAIT TRAINING THERAPY: CPT

## 2024-10-14 PROCEDURE — 97530 THERAPEUTIC ACTIVITIES: CPT

## 2024-10-14 PROCEDURE — 94664 DEMO&/EVAL PT USE INHALER: CPT

## 2024-10-14 PROCEDURE — 86665 EPSTEIN-BARR CAPSID VCA: CPT | Performed by: PHYSICIAN ASSISTANT

## 2024-10-14 RX ORDER — VALACYCLOVIR HYDROCHLORIDE 500 MG/1
1000 TABLET, FILM COATED ORAL EVERY 12 HOURS SCHEDULED
Status: DISCONTINUED | OUTPATIENT
Start: 2024-10-14 | End: 2024-10-15 | Stop reason: HOSPADM

## 2024-10-14 RX ORDER — DIPHENHYDRAMINE HYDROCHLORIDE AND LIDOCAINE HYDROCHLORIDE AND ALUMINUM HYDROXIDE AND MAGNESIUM HYDRO
10 KIT EVERY 4 HOURS PRN
Status: DISCONTINUED | OUTPATIENT
Start: 2024-10-14 | End: 2024-10-15 | Stop reason: HOSPADM

## 2024-10-14 RX ADMIN — VALACYCLOVIR HYDROCHLORIDE 1000 MG: 500 TABLET, FILM COATED ORAL at 22:46

## 2024-10-14 RX ADMIN — ACETAMINOPHEN 650 MG: 325 TABLET ORAL at 15:06

## 2024-10-14 RX ADMIN — Medication 3 MG: at 22:46

## 2024-10-14 RX ADMIN — HEPARIN SODIUM 5000 UNITS: 5000 INJECTION, SOLUTION INTRAVENOUS; SUBCUTANEOUS at 06:05

## 2024-10-14 RX ADMIN — LEVOTHYROXINE SODIUM 50 MCG: 50 TABLET ORAL at 06:05

## 2024-10-14 RX ADMIN — DIPHENHYDRAMINE HYDROCHLORIDE AND LIDOCAINE HYDROCHLORIDE AND ALUMINUM HYDROXIDE AND MAGNESIUM HYDRO 10 ML: KIT at 22:44

## 2024-10-14 RX ADMIN — PANTOPRAZOLE SODIUM 40 MG: 40 TABLET, DELAYED RELEASE ORAL at 06:05

## 2024-10-14 RX ADMIN — HEPARIN SODIUM 5000 UNITS: 5000 INJECTION, SOLUTION INTRAVENOUS; SUBCUTANEOUS at 14:19

## 2024-10-14 RX ADMIN — FLUCONAZOLE IN SODIUM CHLORIDE 400 MG: 2 INJECTION, SOLUTION INTRAVENOUS at 14:19

## 2024-10-14 RX ADMIN — HEPARIN SODIUM 5000 UNITS: 5000 INJECTION, SOLUTION INTRAVENOUS; SUBCUTANEOUS at 22:47

## 2024-10-14 NOTE — PLAN OF CARE
Problem: SAFETY ADULT  Goal: Patient will remain free of falls  Description: INTERVENTIONS:  - Educate patient/family on patient safety including physical limitations  - Instruct patient to call for assistance with activity   - Consult OT/PT to assist with strengthening/mobility   - Keep Call bell within reach  - Keep bed low and locked with side rails adjusted as appropriate  - Keep care items and personal belongings within reach  - Initiate and maintain comfort rounds  - Make Fall Risk Sign visible to staff  - Offer Toileting every 2 Hours, in advance of need  - Initiate/Maintain bed alarm  - Obtain necessary fall risk management equipment: non skid socks  - Apply yellow socks and bracelet for high fall risk patients  - Consider moving patient to room near nurses station  Outcome: Progressing     Problem: CARDIOVASCULAR - ADULT  Goal: Maintains optimal cardiac output and hemodynamic stability  Description: INTERVENTIONS:  - Monitor I/O, vital signs and rhythm  - Monitor for S/S and trends of decreased cardiac output  - Administer and titrate ordered vasoactive medications to optimize hemodynamic stability  - Assess quality of pulses, skin color and temperature  - Assess for signs of decreased coronary artery perfusion  - Instruct patient to report change in severity of symptoms  Outcome: Progressing     Problem: RESPIRATORY - ADULT  Goal: Achieves optimal ventilation and oxygenation  Description: INTERVENTIONS:  - Assess for changes in respiratory status  - Assess for changes in mentation and behavior  - Position to facilitate oxygenation and minimize respiratory effort  - Oxygen administered by appropriate delivery if ordered  - Initiate smoking cessation education as indicated  - Encourage broncho-pulmonary hygiene including cough, deep breathe, Incentive Spirometry  - Assess the need for suctioning and aspirate as needed  - Assess and instruct to report SOB or any respiratory difficulty  - Respiratory Therapy  support as indicated  Outcome: Progressing

## 2024-10-14 NOTE — PLAN OF CARE
Problem: PHYSICAL THERAPY ADULT  Goal: Performs mobility at highest level of function for planned discharge setting.  See evaluation for individualized goals.  Description: Treatment/Interventions: Functional transfer training, LE strengthening/ROM, Elevations, Therapeutic exercise, Endurance training, Gait training          See flowsheet documentation for full assessment, interventions and recommendations.  Outcome: Progressing  Note: Prognosis: Good  Problem List: Decreased strength, Decreased endurance, Impaired balance, Decreased mobility, Pain  Assessment: Pt seen for PT treatment session this date with interventions consisting of gait training w/ emphasis on improving pt's ability to ambulate level surfaces x 40 ft x1, 20 ftx1 with min A provided by therapist with RW, Therapeutic exercise consisting of: AROM 20 reps B LE in seated unsupported at EOB position, and therapeutic activity consisting of training: bed mobility, supine<>sit transfers, sit<>stand transfers, and stand pivot transfers towards B direction. Pt agreeable to PT treatment session upon arrival, pt found supine in bed w/ HOB elevated, in no apparent distress and responsive. In comparison to previous session, pt with improvements in distance ambulated . Post session: pt returned BTB, all needs in reach, and RN notified of session findings/recommendations. Continue to recommend Level II (Moderate Resource Intensity at time of d/c in order to maximize pt's functional independence and safety w/ mobility. Pt continues to be functioning below baseline level. PT will continue to see pt during current hospitalization in order to address the deficits listed above and provide interventions consistent w/ POC in effort to achieve STGs.    Annemarie Rodriguez, PTA             See flowsheet documentation for full assessment.

## 2024-10-14 NOTE — CASE MANAGEMENT
Case Management Discharge Planning Note    Patient name Miguel Henderson  Location /-01 MRN [id number]  : 1940 Date 10/14/2024       Current Admission Date: 10/7/2024  Current Admission Diagnosis:Severe sepsis (HCC)   Patient Active Problem List    Diagnosis Date Noted Date Diagnosed    Dysphagia 10/14/2024     Ulceration of oral mucosa 10/14/2024     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     Left knee pain 10/10/2024     Pneumonia 10/09/2024     Rhinovirus 10/09/2024     Acute kidney injury (HCC) 10/09/2024     Severe sepsis (HCC) 10/08/2024     Community acquired pneumonia of right upper lobe of lung 10/08/2024     Adenocarcinoma of prostate (HCC) 2024     Lumbar radiculopathy 2024     Hypothyroidism 2024     Primary hypertension 2024       LOS (days): 6  Geometric Mean LOS (GMLOS) (days): 4.9  Days to GMLOS:-1.8     OBJECTIVE:  Risk of Unplanned Readmission Score: 11.25         Current admission status: Inpatient   Preferred Pharmacy:   [id number] - [place], [place] - [address] [place] [address]  Phone: [phone] Fax: [phone]    Primary Care Provider: Greg Foster MD    Primary Insurance: [facility]  Secondary Insurance:     DISCHARGE DETAILS:    Discharge planning discussed with:: Juan (son)  Freedom of Choice: Yes  Comments - Freedom of Choice: pt is active with outpt rehab - pt would like to continue - pt will need an order upon d/c -  fami;y requested a walker and it was issued with permission & the family took it home  CM contacted family/caregiver?: Yes             Contacts  Patient Contacts: Juan Henderson  Relationship to Patient:: Family  Contact Method: In Person  Reason/Outcome: Discharge Planning    Requested Home Health Care         Is the patient interested in HHC at discharge?: No    DME Referral Provided  Referral made for DME?: Yes (walker was issued)  DME referral completed  for the following items:: Walker    Other Referral/Resources/Interventions Provided:  Interventions: DME, Outpatient OT, Outpatient PT  Referral Comments: walker was issued with permission - family was made aware of the copay $19.55  family took the walker home-  pt will need an ambulatory pt/ot order upon d/c so the pt can continue with his outpt rehab -  pt is not stable for d/c - ID following pt was placed on IV diflucan & valtres - pt continues to have 101.7 fevers    Would you like to participate in our [facility] service program?  : No - Declined    Treatment Team Recommendation: Home (home with family & outpt follow up- family)                                   IMM Given (Date):: 10/14/24  IMM Given to:: Family (son)

## 2024-10-14 NOTE — PROGRESS NOTES
Progress Note - [facility]  Miguel Henderson 84 y.o. male MRN: [id number]  Unit/Bed#: -01 Encounter: [id number]      ASSESSMENT & PLAN:      Miguel Henderson is a 84 y.o. old male with notable history of  GERD, chronic dysphagia, prostate and thyroid cancer currently admitted for sepsis in the setting of suspected rhinovirus/enterovirus tracheobronchitis with possible developmental postviral pneumonia.  GI consulted with history of GERD and chronic dysphagia.       Esophageal dysphagia  Oral candidiasis   History of GERD  Severe sepsis - secondary to #5  Community-acquired pneumonia - due to rhinovirus/enterovirus tracheobronchitis  Acute hypoxic respiratory failure - resolved at this time     He does have a longstanding history of intermittent dysphagia to solids.  Some esophageal stasis seen on video swallowing study with SLP.  No worsening of his baseline symptoms or red flag symptoms.  Currently tolerating dysphagia diet while in the hospital. Over the weekend, patient was found to have evidence of oral candidiasis on exam and sputum cultures confirmed growth of Candida.  I suspect that he does have Candida esophagitis which is contributing to his dysphagia and odynophagia.    Plan:  Given new findings of oral candidiasis with confirmatory sputum cultures, recommend treating empirically for Candida esophagitis.   If patient improves clinically with treatment for Candida esophagitis, no indication for upper endoscopy.  However, if patient's symptoms persist we can consider upper endoscopy at that time  Continue with diet per SLP recommendations  Aspiration precautions  Continue Protonix 40 mg p.o. daily    Rest of care per primary team.  ______________________________________________________________________    SUBJECTIVE: Patient seen and examined at bedside this morning.  No acute events overnight.  He is about to try breakfast at this time.  He reports no new GI complaints  "this time.  Reports some ongoing dysphagia and odynophagia.  Denies nausea, vomiting, acid reflux.      REVIEW OF SYSTEMS: Negative other than stated above.       Historical Information   Past Medical History:   Diagnosis Date    Prostate cancer (HCC)     Thyroid cancer (HCC)      Past Surgical History:   Procedure Laterality Date    APPENDECTOMY       Social History   Social History     Substance and Sexual Activity   Alcohol Use Yes    Comment: Occasional     Social History     Substance and Sexual Activity   Drug Use Never     Social History     Tobacco Use   Smoking Status Never   Smokeless Tobacco Never     History reviewed. No pertinent family history.      Meds/Allergies     Medications Prior to Admission:     hydroCHLOROthiazide 25 mg tablet    cyclobenzaprine (FLEXERIL) 5 mg tablet    ibuprofen (MOTRIN) 800 mg tablet    levothyroxine 50 mcg tablet    levothyroxine 75 mcg tablet    Current Facility-Administered Medications:     acetaminophen (TYLENOL) tablet 650 mg, Q6H PRN    albuterol inhalation solution 2.5 mg, Q6H PRN    fluconazole (DIFLUCAN) IVPB (premix) 400 mg 200 mL, Q24H, Last Rate: 400 mg (10/13/24 1410)    heparin (porcine) subcutaneous injection 5,000 Units, Q8H JOE    levothyroxine tablet 50 mcg, Once per day on Monday Tuesday Wednesday Thursday    levothyroxine tablet 75 mcg, Once per day on Sunday Friday Saturday    melatonin tablet 3 mg, HS    ondansetron (ZOFRAN) injection 4 mg, Q6H PRN    oxyCODONE-acetaminophen (PERCOCET) 5-325 mg per tablet 1 tablet, Q8H PRN    pantoprazole (PROTONIX) EC tablet 40 mg, Early Morning    Allergies   Allergen Reactions    Shellfish Allergy - Food Allergy Anaphylaxis         Objective     Blood pressure 125/76, pulse 80, temperature 98.8 °F (37.1 °C), temperature source Oral, resp. rate 20, height 5' 5\" (1.651 m), weight 75.8 kg (167 lb), SpO2 93%. Body mass index is 27.79 kg/m².      Intake/Output Summary (Last 24 hours) at 10/14/2024 1026  Last data filed " at 10/14/2024 0924  Gross per 24 hour   Intake 120 ml   Output --   Net 120 ml       PHYSICAL EXAM:  General: No apparent distress, resting comfortably   Head: Normocephalic, atraumatic  Eyes: Anicteric, no conjunctival erythema  Neck: Trachea midline, no visible lymphadenopathy or goiter  Respiratory: Non-labored respirations, symmetric thorax expansion  Cardiovascular: Extremities appear well-perfused  Abdomen:  Non-distended, non-tender  Extremities: Moves extremities spontaneously  Skin: No visible rashes, wounds, or jaundice  Neuro: No gross focal deficits, no aphasia      LAB RESULTS:  No results displayed because visit has over 200 results.            IMAGING STUDIES: I have personally reviewed pertinent imaging studies.      Shreyas Cox DO   Gastroenterology Fellow, PGY-4  [facility]

## 2024-10-14 NOTE — ASSESSMENT & PLAN NOTE
Secondary to a combination of rhinovirus positivity, and a right sided pneumonia  Additional contributing etiology is a suspected candidal esophagitis -see the management as outlined below  Tmax again was 101.7 °F last night  Tachycardia, and leukocytosis have resolved  Patient tested positive for rhinovirus/enterovirus  ID consultation appreciated   Status post initial treatment with Zithromax  Status post completed course of ceftriaxone  CT chest, abdomen pelvis-reviewed no obvious pathology noted that would explain the fever  Blood cultures x 2 sets -no growth to date  Streptococcus pneumonia and Legionella antigen: negative  Sputum culture-4+ growth of Candida glabrata, and Candida tropicalis  Seen by ID again today-patient has now been started on valacyclovir for suspected herpes infection  Continued management as per ID

## 2024-10-14 NOTE — ASSESSMENT & PLAN NOTE
Video swallow evaluation results noted  Patient has clinical stigmata for Candida in that he has oral thrush and additional white spots noted in his oral cavity  Patient complains of difficulty swallowing  Sputum culture positive for Candida  Continue fluconazole day 2

## 2024-10-14 NOTE — ASSESSMENT & PLAN NOTE
Dysphagia with odynophagia. With recent episodes of coughing while eating, consider possibility of aspiration pneumonitis as etiology for fevers. Mild oropharyngeal dysphagia noted on VBS and SLP concerned for possible bottom up aspiration. Subsequent CT A/P imaging showed diffusely dilated esophagus. GI consulted and planning EGD once clinically improved, likely on outpatient basis.    Antimicrobials as above  Follow up with SLP   Aspiration precautions

## 2024-10-14 NOTE — ASSESSMENT & PLAN NOTE
"CT imaging revealed \"There is some minor groundglass and tree-in-bud nodularity suggested in the right upper lobe inferior segment and to a lesser degree in the right lower lobe\"  Antibiotics have been completed  Status post a speech and swallow evaluation inclusive of a bedside and video barium swallow -see the full report for additional details  Diet has been modified to dysphagia level 3 and thin liquid diet  GI consulted per speech recommendations  Video barium images with some stasis in the esophagus, unclear if this is motility or structural   Appreciate GI input  Patient will need EGD when he is more stable- monitor to determine inpatient vs outpatient  continue Candida treatment as outlined above    "

## 2024-10-14 NOTE — ASSESSMENT & PLAN NOTE
Leukocytosis, fever, tachycardia and tachypnea on admission.  Tmax 101.7 °F yesterday evening.  Patient was hypotensive but seems to have responded to IV fluids. Initially suspected pulmonary source with positive RP2 and wheezing on exam. Consider possibility of ongoing aspiration. Blood cultures are negative.  CT C/A/P negative for acute findings for fever. LFTs normal. Repeat COVID negative. Consider possibility of drug fever. Consider possibility of candidal vs HSV esophagitis. Low suspicion for tick borne/vector borne illness with no recent exposures. He remains nontoxic-appearing despite systemic illness. Symptomatically he is improved.   Monitor off antibiotics   Continue empiric fluconazole, switch to PO if able to tolerate   Start empiric PO valtrex 1g BID  Check EBV  Repeat blood cultures x2 if fever persists tonight   Trend temps and hemodynamics  Repeat CBC differential and CMP to monitor response to treatment and for developing toxicities

## 2024-10-14 NOTE — PROGRESS NOTES
Progress Note - Infectious Disease   Name: Miguel Henderson 84 y.o. male I MRN: [id number]  Unit/Bed#: MS 216Payal I Date of Admission: 10/7/2024   Date of Service: 10/14/2024 I Hospital Day: 6    Assessment & Plan  Severe sepsis (HCC)  Leukocytosis, fever, tachycardia and tachypnea on admission.  Tmax 101.7 °F yesterday evening.  Patient was hypotensive but seems to have responded to IV fluids. Initially suspected pulmonary source with positive RP2 and wheezing on exam. Consider possibility of ongoing aspiration. Blood cultures are negative.  CT C/A/P negative for acute findings for fever. LFTs normal. Repeat COVID negative. Consider possibility of drug fever. Consider possibility of candidal vs HSV esophagitis. Low suspicion for tick borne/vector borne illness with no recent exposures. He remains nontoxic-appearing despite systemic illness. Symptomatically he is improved.   Monitor off antibiotics   Continue empiric fluconazole, switch to PO if able to tolerate   Start empiric PO valtrex 1g BID  Check EBV  Repeat blood cultures x2 if fever persists tonight   Trend temps and hemodynamics  Repeat CBC differential and CMP to monitor response to treatment and for developing toxicities  Ulceration of oral mucosa  With concern for oropharyngeal thrush with esophagitis over the weekend, primary team started IV fluconazole. Unlikely to cause fevers though may be related to recent abx exposures. Today noted to have painful ulcerations on tongue, lips, and posterior pharynx. More suspicious for viral etiology given exam findings and prodromal high fevers.  Start empiric PO valtrex 1g BID  Check HSV PCR, if positive, d/c fluconazole   Check HIV for completeness   Supportive care, magic mouthwash   Dysphagia  Dysphagia with odynophagia. With recent episodes of coughing while eating, consider possibility of aspiration pneumonitis as etiology for fevers. Mild oropharyngeal dysphagia noted on VBS and SLP concerned for possible  bottom up aspiration. Subsequent CT A/P imaging showed diffusely dilated esophagus. GI consulted and planning EGD once clinically improved, likely on outpatient basis.    Antimicrobials as above  Follow up with SLP   Aspiration precautions   Pneumonia  Likely viral pneumonia.  No dense consolidations noted on chest x-ray and CT chest.  RP 2 positive for rhinovirus/enterovirus.  Procalcitonin slightly elevated.  Urinary antigens are negative. Sputum culture with candida, likely colonization. With recent episodes of coughing while eating, consider possibility of aspiration pneumonitis. Consider possibility of developing post-viral pneumonia. Repeat CT chest with only small pleural effusions, atelectasis and lung nodule. He completed 5 day course of IV ceftriaxone.   Observe off further IV antibiotics   Aspiration precautions  Rhinovirus  Suspected viral tracheobronchitis. C/o of cough, wheezing and sore throat. Rhinovirus/enterovirus positive on RP2.  Strep PCR negative.  Flu/COVID/RSV negative. Exam of posterior oropharynx benign.   Continue supportive cares  Acute kidney injury (HCC)  Resolved.  Suspect multifactorial etiology.  May be related to hypotensive episodes, diuretic use, poor p.o. intake and sepsis.  Left knee pain  Imaging negative. This resolved.     I have discussed the above management plan in detail with the primary service.     Antibiotics:  Fluconazole D2     Subjective   Patient has no fever, chills, sweats; no nausea, vomiting, diarrhea; no cough, shortness of breath; no pain. No new symptoms.    Objective :  Temp:  [98.8 °F (37.1 °C)-101.7 °F (38.7 °C)] 98.8 °F (37.1 °C)  HR:  [] 80  BP: (110-137)/(71-85) 125/76  Resp:  [17-20] 20  SpO2:  [92 %-96 %] 94 %  O2 Device: None (Room air)      Physical exam:  General:  No acute distress  Psychiatric:  Awake and alert  HEENT: Mucous membranes moist. Ulcerative lesions on lips, tongue and posterior pharynx noted. Se clinical media.  Neck is  supple.  Sclera anicteric.  Cardiovascular: Regular rate and rhythm.  No murmur.  Pulmonary: Lung sounds diminished, though diffuse exp wheezes noted without r/r. On room air.  Normal respiratory excursion without accessory muscle use.  Abdomen:  Soft, nontender.  Extremities:  No edema to lower extremities.  Moves all 4 extremities.  Skin:  No rashes or wounds noted on exposed skin.  : No suprapubic or flank tenderness  Neurologic: No gross focal neurodeficit      Lab Results: I have reviewed the following results:  Results from last 7 days   Lab Units 10/14/24  0602 10/13/24  0536 10/12/24  0455   WBC Thousand/uL 8.14 10.02 12.39*   HEMOGLOBIN g/dL 10.3* 10.9* 11.0*   PLATELETS Thousands/uL 208 226 195     Results from last 7 days   Lab Units 10/14/24  0602 10/13/24  0536 10/12/24  0455 10/08/24  0509 10/07/24  2306   SODIUM mmol/L 136 136 136   < > 133*   POTASSIUM mmol/L 3.4* 3.6 3.5   < > 3.3*   CHLORIDE mmol/L 105 103 103   < > 97   CO2 mmol/L 26 28 28   < > 25   BUN mg/dL 14 13 12   < > 21   CREATININE mg/dL 0.61 0.54* 0.53*   < > 1.03   EGFR ml/min/1.73sq m 90 95 95   < > 65   CALCIUM mg/dL 7.8* 8.0* 8.0*   < > 8.5   AST U/L  --   --   --   --  26   ALT U/L  --   --   --   --  22   ALK PHOS U/L  --   --   --   --  68   ALBUMIN g/dL  --   --   --   --  3.3*    < > = values in this interval not displayed.     Results from last 7 days   Lab Units 10/09/24  1745 10/09/24  0313 10/07/24  2306 10/07/24  2305   BLOOD CULTURE   --   --  No Growth After 5 Days. No Growth After 5 Days.   SPUTUM CULTURE  4+ Growth of Candida tropicalis*  4+ Growth of Candida glabrata*  Few Colonies of  --   --   --    GRAM STAIN RESULT  No polys seen*  4+ Yeast*  --   --   --    LEGIONELLA URINARY ANTIGEN   --  Negative  --   --      Results from last 7 days   Lab Units 10/12/24  0455 10/11/24  0554 10/09/24  0618 10/07/24  2306   PROCALCITONIN ng/ml 0.99* 1.68* 6.95* 2.15*                 Imaging Results Review: I reviewed  radiology reports from this admission including: CT chest and CT abdomen/pelvis.  Other Study Results Review: No additional pertinent studies reviewed.

## 2024-10-14 NOTE — PLAN OF CARE
Problem: OCCUPATIONAL THERAPY ADULT  Goal: Performs self-care activities at highest level of function for planned discharge setting.  See evaluation for individualized goals.  Description: Treatment Interventions: ADL retraining, Functional transfer training, Endurance training, Patient/family training, Equipment evaluation/education, Compensatory technique education, Continued evaluation, Energy conservation, Activityengagement          See flowsheet documentation for full assessment, interventions and recommendations.   Outcome: Progressing  Note: Limitation: Decreased ADL status, Decreased endurance, Decreased self-care trans, Decreased high-level ADLs  Prognosis: Good  Assessment: Patient participated in Skilled OT session this date with interventions consisting of functional transfer training,  Energy Conservation techniques and therapeutic exercise to: increase functional use of BUEs, increase BUE muscle strength  . Patient agreeable to OT treatment session, upon arrival patient was found supine in bed, alert, responsive , and in no apparent distress. Patient transfers supine to sit EOB with S x 1. Patient then sits unsupported EOB x 15+ min to complete UB TE using 1# weight as detailed in flow sheet. Patient required frequent rest breaks due to SOB/JIMÉNEZ with activity. Patient then transfers sit to stand and ambulates x 40 feet, then another 20 feet with Min A x 1. Session ended with patient supine in bedm all needs met, and call bell within reach. In comparison to previous session, patient with improvements in UB strength, endurance, and functional transfer training. Patient requiring verbal cues for safety, verbal cues for correct technique, verbal cues for pacing thru activity steps, and frequent rest periods. Patient performance  demonstrated good carryover of learned techniques and strategies to facilitate safety during functional tasks. Patient continues to be functioning below baseline level,  occupational performance remains limited secondary to factors listed above and increased risk for falls and injury.   From OT standpoint, recommendation at time of d/c would be Level II (Moderate Resource Intensity). Patient to benefit from continued Occupational Therapy treatment while in the hospital to address deficits as defined above and maximize level of functional independence with ADLs and functional mobility in order to return to PLOF.     Rehab Resource Intensity Level, OT: II (Moderate Resource Intensity)

## 2024-10-14 NOTE — OCCUPATIONAL THERAPY NOTE
10/14/24 1336   OT Last Visit   OT Visit Date 10/14/24   Note Type   Note Type Treatment   Pain Assessment   Pain Assessment Tool 0-10   Pain Score No Pain   Restrictions/Precautions   Weight Bearing Precautions Per Order No   Other Precautions Droplet precautions;Contact/isolation;Fall Risk;Pain   Bed Mobility   Supine to Sit 5  Supervision   Additional items Assist x 1;Bedrails;HOB elevated;Increased time required;Verbal cues   Additional Comments Sat unsupported EOB 15+ min to ocmplete UB TE with no LOB noted.   Transfers   Sit to Stand 4  Minimal assistance   Additional items Assist x 1;Increased time required;Verbal cues;Bedrails   Stand to Sit 5  Supervision   Additional items Assist x 1;Increased time required;Verbal cues   Stand pivot 4  Minimal assistance   Additional items Assist x 1;Bedrails;Increased time required;Verbal cues   Functional Mobility   Functional Mobility 4  Minimal assistance   Additional Comments Pt ambulates 40 feet followed by another 20 feet   Additional items Rolling walker   Therapeutic Excerise-Strength   UE Strength Yes   Right Upper Extremity- Strength   R Shoulder Flexion;Extension;Horizontal ABduction  (horiz. add, scap pro/ret)   R Elbow Elbow flexion;Elbow extension   R Wrist   (wrist rotation)   R Position Seated   Equipment Dumbbell   R Weight/Reps/Sets 1# weight x 20 reps   Left Upper Extremity-Strength   L Weights/Reps/Sets TE same as RUE above   Cognition   Overall Cognitive Status WFL   Arousal/Participation Alert;Responsive;Cooperative   Attention Within functional limits   Orientation Level Oriented X4   Memory Within functional limits   Following Commands Follows one step commands without difficulty   Comments PT agreeable to OT treatment.   Activity Tolerance   Activity Tolerance Patient limited by fatigue;Patient tolerated treatment well   Assessment   Assessment Patient participated in Skilled OT session this date with interventions consisting of functional  transfer training,  Energy Conservation techniques and therapeutic exercise to: increase functional use of BUEs, increase BUE muscle strength  . Patient agreeable to OT treatment session, upon arrival patient was found supine in bed, alert, responsive , and in no apparent distress. Patient transfers supine to sit EOB with S x 1. Patient then sits unsupported EOB x 15+ min to complete UB TE using 1# weight as detailed in flow sheet. Patient required frequent rest breaks due to SOB/JIMÉNEZ with activity. Patient then transfers sit to stand and ambulates x 40 feet, then another 20 feet with Min A x 1. Session ended with patient supine in bedm all needs met, and call bell within reach. In comparison to previous session, patient with improvements in UB strength, endurance, and functional transfer training. Patient requiring verbal cues for safety, verbal cues for correct technique, verbal cues for pacing thru activity steps, and frequent rest periods. Patient performance  demonstrated good carryover of learned techniques and strategies to facilitate safety during functional tasks. Patient continues to be functioning below baseline level, occupational performance remains limited secondary to factors listed above and increased risk for falls and injury.   From OT standpoint, recommendation at time of d/c would be Level II (Moderate Resource Intensity). Patient to benefit from continued Occupational Therapy treatment while in the hospital to address deficits as defined above and maximize level of functional independence with ADLs and functional mobility in order to return to PLOF.   Plan   Treatment Interventions Functional transfer training;UE strengthening/ROM;Energy conservation   Goal Expiration Date 10/25/24   OT Treatment Day 1   OT Frequency 3-5x/wk   Discharge Recommendation   Rehab Resource Intensity Level, OT II (Moderate Resource Intensity)   Additional Comments  The patient's raw score on the AM-PAC Daily Activity  Inpatient Short Form is 19. A raw score of greater than or equal to 19 suggests the patient may benefit from discharge to home. Please refer to the recommendation of the Occupational Therapist for safe discharge planning.   AM-PAC Daily Activity Inpatient   Lower Body Dressing 3   Bathing 3   Toileting 3   Upper Body Dressing 3   Grooming 3   Eating 4   Daily Activity Raw Score 19   Daily Activity Standardized Score (Calc for Raw Score >=11) 40.22   AM-PAC Applied Cognition Inpatient   Following a Speech/Presentation 4   Understanding Ordinary Conversation 4   Taking Medications 4   Remembering Where Things Are Placed or Put Away 4   Remembering List of 4-5 Errands 4   Taking Care of Complicated Tasks 4   Applied Cognition Raw Score 24   Applied Cognition Standardized Score 62.21     Natividad Sierra

## 2024-10-14 NOTE — ASSESSMENT & PLAN NOTE
Resolved.  Suspect multifactorial etiology.  May be related to hypotensive episodes, diuretic use, poor p.o. intake and sepsis.

## 2024-10-14 NOTE — PROGRESS NOTES
"Progress Note - Hospitalist   Name: Miguel Henderson 84 y.o. male I MRN: [id number]  Unit/Bed#: -01 I Date of Admission: 10/7/2024   Date of Service: 10/14/2024 I Hospital Day: 6    Assessment & Plan  Severe sepsis (HCC)  Secondary to a combination of rhinovirus positivity, and a right sided pneumonia  Additional contributing etiology is a suspected candidal esophagitis -see the management as outlined below  Tmax again was 101.7 °F last night  Tachycardia, and leukocytosis have resolved  Patient tested positive for rhinovirus/enterovirus  ID consultation appreciated   Status post initial treatment with Zithromax  Status post completed course of ceftriaxone  CT chest, abdomen pelvis-reviewed no obvious pathology noted that would explain the fever  Blood cultures x 2 sets -no growth to date  Streptococcus pneumonia and Legionella antigen: negative  Sputum culture-4+ growth of Candida glabrata, and Candida tropicalis  Seen by ID again today-patient has now been started on valacyclovir for suspected herpes infection  Continued management as per ID  Community acquired pneumonia of right upper lobe of lung  CT imaging revealed \"There is some minor groundglass and tree-in-bud nodularity suggested in the right upper lobe inferior segment and to a lesser degree in the right lower lobe\"  Antibiotics have been completed  Status post a speech and swallow evaluation inclusive of a bedside and video barium swallow -see the full report for additional details  Diet has been modified to dysphagia level 3 and thin liquid diet  GI consulted per speech recommendations  Video barium images with some stasis in the esophagus, unclear if this is motility or structural   Appreciate GI input  Patient will need EGD when he is more stable- monitor to determine inpatient vs outpatient  continue Candida treatment as outlined above    Hypothyroidism  Continue Synthroid  Primary hypertension  Hydrochlorothiazide has been put on hold in view " of softer blood pressures  Rhinovirus  Continue supportive therapy  Acute kidney injury (HCC)  Has resolved-this is secondary to intravascular depletion in the setting of receiving IV Lasix  IV fluid discontinued  Echocardiogram was within normal limits  Monitor input and output, monitor urinary output  Hypokalemia-has resolved with repletion  Avoid nephrotoxins-both Motrin, and Toradol have been discontinued  Left knee pain  Left knee x-ray: No acute osseous abnormality.   PT/OT evaluation- moderate resource intensity   Candida esophagitis (HCC)  Video swallow evaluation results noted  Patient has clinical stigmata for Candida in that he has oral thrush and additional white spots noted in his oral cavity  Patient complains of difficulty swallowing  Sputum culture positive for Candida  Continue fluconazole day 2    VTE Pharmacologic Prophylaxis: VTE Score: 8 High Risk (Score >/= 5) - Pharmacological DVT Prophylaxis Ordered: heparin. Sequential Compression Devices Ordered.    Mobility:   Basic Mobility Inpatient Raw Score: 17  JH-HLM Goal: 5: Stand one or more mins  JH-HLM Achieved: 6: Walk 10 steps or more  JH-HLM Goal achieved. Continue to encourage appropriate mobility.    Patient Centered Rounds: I performed bedside rounds with nursing staff today.   Discussions with Specialists or Other Care Team Provider: ID, gastroenterology    Education and Discussions with Family / Patient: Updated  (son) at bedside.    Current Length of Stay: 6 day(s)  Current Patient Status: Inpatient   Certification Statement: The patient will continue to require additional inpatient hospital stay due to need for continued fluconazole, and valacyclovir  Discharge Plan:  Discharge planning once the patient is afebrile, and has been cleared by ID for discharge    Code Status: Level 1 - Full Code    Subjective   Patient seen, resting in bed, is getting a little frustrated, and wants to be discharged    Objective :  Temp:  [98.8  °F (37.1 °C)-101.7 °F (38.7 °C)] 101.7 °F (38.7 °C)  HR:  [80-95] 95  BP: (121-137)/(75-85) 136/78  Resp:  [17-20] 20  SpO2:  [93 %-96 %] 93 %  O2 Device: None (Room air)    Body mass index is 27.79 kg/m².     Input and Output Summary (last 24 hours):     Intake/Output Summary (Last 24 hours) at 10/14/2024 1607  Last data filed at 10/14/2024 0924  Gross per 24 hour   Intake 120 ml   Output --   Net 120 ml       Physical Exam  Vitals and nursing note reviewed.   Constitutional:       General: He is not in acute distress.     Appearance: Normal appearance. He is not ill-appearing.   HENT:      Head: Normocephalic and atraumatic.      Nose: Nose normal.   Eyes:      Extraocular Movements: Extraocular movements intact.      Pupils: Pupils are equal, round, and reactive to light.   Cardiovascular:      Rate and Rhythm: Normal rate and regular rhythm.      Pulses: Normal pulses.      Heart sounds: Normal heart sounds. No murmur heard.     No friction rub. No gallop.   Pulmonary:      Effort: Pulmonary effort is normal.      Breath sounds: Normal breath sounds.   Abdominal:      General: There is no distension.      Palpations: Abdomen is soft. There is no mass.      Tenderness: There is no abdominal tenderness. There is no guarding or rebound.   Musculoskeletal:         General: No swelling or tenderness. Normal range of motion.      Cervical back: Normal range of motion and neck supple. No rigidity. No muscular tenderness.      Right lower leg: No edema.      Left lower leg: No edema.   Skin:     General: Skin is warm.      Capillary Refill: Capillary refill takes less than 2 seconds.      Findings: No erythema or rash.   Neurological:      General: No focal deficit present.      Mental Status: He is alert and oriented to person, place, and time. Mental status is at baseline.   Psychiatric:         Mood and Affect: Mood normal.         Behavior: Behavior normal.           Lines/Drains:              Lab Results: I have  reviewed the following results:   Results from last 7 days   Lab Units 10/14/24  0602 10/09/24  0618 10/08/24  0509   WBC Thousand/uL 8.14   < > 16.10*   HEMOGLOBIN g/dL 10.3*   < > 12.2   HEMATOCRIT % 29.9*   < > 37.2   PLATELETS Thousands/uL 208   < > 130*   BANDS PCT % 4   < >  --    SEGS PCT %  --   --  89*   LYMPHO PCT % 18   < > 5*   MONO PCT % 3*   < > 2*   EOS PCT % 4   < > 3    < > = values in this interval not displayed.     Results from last 7 days   Lab Units 10/14/24  0602 10/08/24  0509 10/07/24  2306   SODIUM mmol/L 136   < > 133*   POTASSIUM mmol/L 3.4*   < > 3.3*   CHLORIDE mmol/L 105   < > 97   CO2 mmol/L 26   < > 25   BUN mg/dL 14   < > 21   CREATININE mg/dL 0.61   < > 1.03   ANION GAP mmol/L 5   < > 11   CALCIUM mg/dL 7.8*   < > 8.5   ALBUMIN g/dL  --   --  3.3*   TOTAL BILIRUBIN mg/dL  --   --  1.80*   ALK PHOS U/L  --   --  68   ALT U/L  --   --  22   AST U/L  --   --  26   GLUCOSE RANDOM mg/dL 89   < > 158*    < > = values in this interval not displayed.     Results from last 7 days   Lab Units 10/07/24  2306   INR  1.18     Results from last 7 days   Lab Units 10/11/24  2054   POC GLUCOSE mg/dl 144*         Results from last 7 days   Lab Units 10/12/24  0455 10/11/24  0554 10/09/24  0618 10/08/24  0202 10/07/24  2306   LACTIC ACID mmol/L  --   --   --  2.2* 3.8*   PROCALCITONIN ng/ml 0.99* 1.68* 6.95*  --  2.15*       Recent Cultures (last 7 days):   Results from last 7 days   Lab Units 10/09/24  1745 10/09/24  0313 10/07/24  2306 10/07/24  2305   BLOOD CULTURE   --   --  No Growth After 5 Days. No Growth After 5 Days.   SPUTUM CULTURE  4+ Growth of Candida tropicalis*  4+ Growth of Candida glabrata*  Few Colonies of  --   --   --    GRAM STAIN RESULT  No polys seen*  4+ Yeast*  --   --   --    LEGIONELLA URINARY ANTIGEN   --  Negative  --   --        Imaging Results Review: No pertinent imaging studies reviewed.  Other Study Results Review: No additional pertinent studies  reviewed.    Last 24 Hours Medication List:     Current Facility-Administered Medications:     acetaminophen (TYLENOL) tablet 650 mg, Q6H PRN    albuterol inhalation solution 2.5 mg, Q6H PRN    diphenhydramine, lidocaine, Al/Mg hydroxide, simethicone (Magic Mouthwash) oral solution 10 mL, Q4H PRN    fluconazole (DIFLUCAN) IVPB (premix) 400 mg 200 mL, Q24H, Last Rate: 400 mg (10/14/24 1419)    heparin (porcine) subcutaneous injection 5,000 Units, Q8H JOE    levothyroxine tablet 50 mcg, Once per day on Monday Tuesday Wednesday Thursday    levothyroxine tablet 75 mcg, Once per day on Sunday Friday Saturday    melatonin tablet 3 mg, HS    ondansetron (ZOFRAN) injection 4 mg, Q6H PRN    oxyCODONE-acetaminophen (PERCOCET) 5-325 mg per tablet 1 tablet, Q8H PRN    pantoprazole (PROTONIX) EC tablet 40 mg, Early Morning    valACYclovir (VALTREX) tablet 1,000 mg, Q12H [facility] hospital    Administrative Statements   Today, Patient Was Seen By: Judy Wade MD      **Please Note: This note may have been constructed using a voice recognition system.**

## 2024-10-14 NOTE — SPEECH THERAPY NOTE
Speech Language/Pathology    Speech/Language Pathology Progress Note    Patient Name: Miguel Henderson  Today's Date: 10/14/2024     Problem List  Principal Problem:    Severe sepsis (HCC)  Active Problems:    Hypothyroidism    Primary hypertension    Community acquired pneumonia of right upper lobe of lung    Pneumonia    Rhinovirus    Acute kidney injury (HCC)    Left knee pain    Candida esophagitis (HCC)    Dysphagia       Past Medical History  Past Medical History:   Diagnosis Date    Prostate cancer (HCC)     Thyroid cancer (HCC)         Past Surgical History  Past Surgical History:   Procedure Laterality Date    APPENDECTOMY           Subjective:  Pt was positioned upright and alerted to stim.   Objective:  Pt was seen for f/u dysphagia therapy at lunch. He refused most of lunch tray as reported increased difficulty swallowing. However agreeable to rice pudding and thin liquids via straw with single sips. Bolus control, formation, and transfer were WNL. Swallows appeared prompt.  No overt s/s of aspiration. Pt intermittently pointing to chest benefited from slow rate and breaks periodically. Pt declined trailing the apple sauce, ST encouraged ensure. ST reviewed recommendations to order selections which are more moist and soft. Pt understood. ST returned later spoke with family reviewed diet recommendation to liberalize. Reviewed with family full liquids diet and additional modified diet, family requested to remain on current diet level.   Assessment:  Pt min intake of tray as increased difficulty swallowing with suspected Jl esophagitis. Utilizing slow rate and small sips. No overt s/s of aspiration with intake.   Plan/Recommendations:  Recommend dysphagia 3 dental soft to liberalize and thin liquids  Ensure good oral care  Aspiration and Reflux precautions   Continue f/u with GI  ST will continue to f/u

## 2024-10-14 NOTE — ASSESSMENT & PLAN NOTE
Likely viral pneumonia.  No dense consolidations noted on chest x-ray and CT chest.  RP 2 positive for rhinovirus/enterovirus.  Procalcitonin slightly elevated.  Urinary antigens are negative. Sputum culture with candida, likely colonization. With recent episodes of coughing while eating, consider possibility of aspiration pneumonitis. Consider possibility of developing post-viral pneumonia. Repeat CT chest with only small pleural effusions, atelectasis and lung nodule. He completed 5 day course of IV ceftriaxone.   Observe off further IV antibiotics   Aspiration precautions

## 2024-10-14 NOTE — ASSESSMENT & PLAN NOTE
With concern for oropharyngeal thrush with esophagitis over the weekend, primary team started IV fluconazole. Unlikely to cause fevers though may be related to recent abx exposures. Today noted to have painful ulcerations on tongue, lips, and posterior pharynx. More suspicious for viral etiology given exam findings and prodromal high fevers.  Start empiric PO valtrex 1g BID  Check HSV PCR, if positive, d/c fluconazole   Check HIV for completeness   Supportive care, magic mouthwash

## 2024-10-15 VITALS
HEART RATE: 87 BPM | SYSTOLIC BLOOD PRESSURE: 125 MMHG | OXYGEN SATURATION: 94 % | HEIGHT: 65 IN | RESPIRATION RATE: 16 BRPM | WEIGHT: 167 LBS | BODY MASS INDEX: 27.82 KG/M2 | DIASTOLIC BLOOD PRESSURE: 79 MMHG | TEMPERATURE: 98.5 F

## 2024-10-15 PROBLEM — K13.70 LESION OF ORAL MUCOSA: Status: ACTIVE | Noted: 2024-10-15

## 2024-10-15 PROBLEM — K76.9 LIVER LESION: Status: ACTIVE | Noted: 2024-10-15

## 2024-10-15 LAB
ANION GAP SERPL CALCULATED.3IONS-SCNC: 6 MMOL/L (ref 4–13)
BUN SERPL-MCNC: 11 MG/DL (ref 5–25)
CALCIUM SERPL-MCNC: 7.6 MG/DL (ref 8.4–10.2)
CHLORIDE SERPL-SCNC: 104 MMOL/L (ref 96–108)
CO2 SERPL-SCNC: 26 MMOL/L (ref 21–32)
CREAT SERPL-MCNC: 0.6 MG/DL (ref 0.6–1.3)
ERYTHROCYTE [DISTWIDTH] IN BLOOD BY AUTOMATED COUNT: 14.9 % (ref 11.6–15.1)
GFR SERPL CREATININE-BSD FRML MDRD: 91 ML/MIN/1.73SQ M
GLUCOSE SERPL-MCNC: 85 MG/DL (ref 65–140)
HCT VFR BLD AUTO: 32.1 % (ref 36.5–49.3)
HGB BLD-MCNC: 10.5 G/DL (ref 12–17)
MCH RBC QN AUTO: 29.5 PG (ref 26.8–34.3)
MCHC RBC AUTO-ENTMCNC: 32.7 G/DL (ref 31.4–37.4)
MCV RBC AUTO: 90 FL (ref 82–98)
PLATELET # BLD AUTO: 161 THOUSANDS/UL (ref 149–390)
PMV BLD AUTO: 11.4 FL (ref 8.9–12.7)
POTASSIUM SERPL-SCNC: 3.7 MMOL/L (ref 3.5–5.3)
RBC # BLD AUTO: 3.56 MILLION/UL (ref 3.88–5.62)
SODIUM SERPL-SCNC: 136 MMOL/L (ref 135–147)
WBC # BLD AUTO: 8.33 THOUSAND/UL (ref 4.31–10.16)

## 2024-10-15 PROCEDURE — 99232 SBSQ HOSP IP/OBS MODERATE 35: CPT | Performed by: PHYSICIAN ASSISTANT

## 2024-10-15 PROCEDURE — 85027 COMPLETE CBC AUTOMATED: CPT | Performed by: HOSPITALIST

## 2024-10-15 PROCEDURE — 80048 BASIC METABOLIC PNL TOTAL CA: CPT | Performed by: HOSPITALIST

## 2024-10-15 PROCEDURE — 94664 DEMO&/EVAL PT USE INHALER: CPT

## 2024-10-15 PROCEDURE — 94760 N-INVAS EAR/PLS OXIMETRY 1: CPT

## 2024-10-15 PROCEDURE — 99239 HOSP IP/OBS DSCHRG MGMT >30: CPT | Performed by: HOSPITALIST

## 2024-10-15 RX ORDER — FLUCONAZOLE 200 MG/1
400 TABLET ORAL DAILY
Qty: 22 TABLET | Refills: 0 | Status: SHIPPED | OUTPATIENT
Start: 2024-10-16 | End: 2024-10-23

## 2024-10-15 RX ORDER — FLUCONAZOLE 100 MG/1
400 TABLET ORAL DAILY
Status: DISCONTINUED | OUTPATIENT
Start: 2024-10-15 | End: 2024-10-15 | Stop reason: HOSPADM

## 2024-10-15 RX ORDER — PANTOPRAZOLE SODIUM 40 MG/1
40 TABLET, DELAYED RELEASE ORAL
Qty: 30 TABLET | Refills: 0 | Status: ON HOLD | OUTPATIENT
Start: 2024-10-16 | End: 2024-11-15

## 2024-10-15 RX ADMIN — LEVOTHYROXINE SODIUM 50 MCG: 50 TABLET ORAL at 05:18

## 2024-10-15 RX ADMIN — PANTOPRAZOLE SODIUM 40 MG: 40 TABLET, DELAYED RELEASE ORAL at 05:18

## 2024-10-15 RX ADMIN — DIPHENHYDRAMINE HYDROCHLORIDE AND LIDOCAINE HYDROCHLORIDE AND ALUMINUM HYDROXIDE AND MAGNESIUM HYDRO 10 ML: KIT at 09:34

## 2024-10-15 RX ADMIN — HEPARIN SODIUM 5000 UNITS: 5000 INJECTION, SOLUTION INTRAVENOUS; SUBCUTANEOUS at 05:19

## 2024-10-15 RX ADMIN — ACETAMINOPHEN 650 MG: 325 TABLET ORAL at 07:56

## 2024-10-15 RX ADMIN — VALACYCLOVIR HYDROCHLORIDE 1000 MG: 500 TABLET, FILM COATED ORAL at 08:00

## 2024-10-15 RX ADMIN — FLUCONAZOLE 400 MG: 100 TABLET ORAL at 13:24

## 2024-10-15 RX ADMIN — HEPARIN SODIUM 5000 UNITS: 5000 INJECTION, SOLUTION INTRAVENOUS; SUBCUTANEOUS at 13:24

## 2024-10-15 NOTE — PROGRESS NOTES
Progress Note - Infectious Disease   Name: Miguel Henderson 84 y.o. male I MRN: [id number]  Unit/Bed#: [facility] 216Payal I Date of Admission: 10/7/2024   Date of Service: 10/15/2024 I Hospital Day: 7    Assessment & Plan  Severe sepsis (HCC)  Leukocytosis, fever, tachycardia and tachypnea on admission.  Tmax 101.7 °F yesterday afternoon.  Patient was hypotensive but seems to have responded to IV fluids. Initially suspected pulmonary source with positive RP2 and wheezing on exam. Consider possibility of ongoing aspiration. Blood cultures are negative.  CT C/A/P negative for acute findings for fever. LFTs normal. Repeat COVID negative. Consider possibility of drug fever. Consider possibility of candidal vs HSV esophagitis. Low suspicion for tick borne/vector borne illness with no recent exposures. He remains nontoxic-appearing despite systemic illness. Symptomatically he is improved.   Monitor off antibiotics   Continue empiric fluconazole, switch to PO for 14 day course   Fill follow up HSV PCR and continue PO valtrex 1g BID for 7-10 days if positive   F/u EBV  Repeat blood cultures x2 if fever persists   Trend temps and hemodynamics  Repeat CBC differential and CMP to monitor response to treatment and for developing toxicities  Lesion of oral mucosa  With concern for oropharyngeal thrush with esophagitis over the weekend, primary team started IV fluconazole. Unlikely to cause fevers though may be related to recent abx exposures. Oral lesions are painful and noted on tongue, lips, and posterior pharynx. Suspicious for viral etiology given exam findings and prodromal high fevers.  Will plan to continue PO valtrex 1g BID if HSV PCR is positive   Continue empiric fluconazole   F/u HIV for completeness   Supportive care, magic mouthwash   Dysphagia  Dysphagia with odynophagia. With recent episodes of coughing while eating, consider possibility of aspiration pneumonitis as etiology for fevers. Mild oropharyngeal dysphagia noted  on VBS and SLP concerned for possible bottom up aspiration. Subsequent CT A/P imaging showed diffusely dilated esophagus. GI consulted and planning EGD once clinically improved, likely on outpatient basis.    Antimicrobials as above  Follow up with SLP   Outpatient GI follow up for EGD   Aspiration precautions   Pneumonia  Likely viral pneumonia.  No dense consolidations noted on chest x-ray and CT chest.  RP 2 positive for rhinovirus/enterovirus.  Procalcitonin slightly elevated.  Urinary antigens are negative. Sputum culture with candida, likely colonization. With recent episodes of coughing while eating, consider possibility of aspiration pneumonitis. Consider possibility of developing post-viral pneumonia. Repeat CT chest with only small pleural effusions, atelectasis and lung nodule. He completed 5 day course of IV ceftriaxone.   Observe off further antibiotics   Aspiration precautions  Rhinovirus  Suspected viral tracheobronchitis. C/o of cough, wheezing and sore throat. Rhinovirus/enterovirus positive on RP2.  Strep PCR negative.  Flu/COVID/RSV negative.   Continue supportive cares  Liver lesion  Enhancing liver lesion noted on CT. Denies abdominal pain. LFTs normal. Consider possibility for fever. GI following. May need MRI to further evaluate.   Outpatient GI follow up     I have discussed the above management plan in detail with the primary service.     Antibiotics:  D2 PO valtrex   D3 IV fluconazole     Subjective   Patient had fever yesterday afternoon. Felt hot. No chills. Still with painful swallowing. May be improving slightly.     Objective :  Temp:  [98.1 °F (36.7 °C)-101.7 °F (38.7 °C)] 98.1 °F (36.7 °C)  HR:  [] 100  BP: (136-140)/(78-85) 140/85  Resp:  [18-20] 18  SpO2:  [93 %-94 %] 93 %  O2 Device: None (Room air)    Physical exam:  General:  No acute distress  Psychiatric:  Awake and alert  HEENT: Mucous membranes moist. Flat, white, tender lesions on lips, tongue and posterior pharynx  noted. Se clinical media.  Neck is supple.  Sclera anicteric.  Cardiovascular: Regular rate and rhythm.  No murmur.  Pulmonary: Lung sounds diminished, though diffuse exp wheezes noted without r/r. On room air.  Normal respiratory excursion without accessory muscle use.  Abdomen:  Soft, nontender.  Extremities:  No edema to lower extremities.  Moves all 4 extremities.  Skin:  No rashes or wounds noted on exposed skin.  : No suprapubic or flank tenderness  Neurologic: No gross focal neurodeficit      Lab Results: I have reviewed the following results:  Results from last 7 days   Lab Units 10/15/24  0523 10/14/24  0602 10/13/24  0536   WBC Thousand/uL 8.33 8.14 10.02   HEMOGLOBIN g/dL 10.5* 10.3* 10.9*   PLATELETS Thousands/uL 161 208 226     Results from last 7 days   Lab Units 10/15/24  0523 10/14/24  0602 10/13/24  0536   SODIUM mmol/L 136 136 136   POTASSIUM mmol/L 3.7 3.4* 3.6   CHLORIDE mmol/L 104 105 103   CO2 mmol/L 26 26 28   BUN mg/dL 11 14 13   CREATININE mg/dL 0.60 0.61 0.54*   EGFR ml/min/1.73sq m 91 90 95   CALCIUM mg/dL 7.6* 7.8* 8.0*     Results from last 7 days   Lab Units 10/09/24  1745 10/09/24  0313   SPUTUM CULTURE  4+ Growth of Candida tropicalis*  4+ Growth of Candida glabrata*  Few Colonies of  --    GRAM STAIN RESULT  No polys seen*  4+ Yeast*  --    LEGIONELLA URINARY ANTIGEN   --  Negative     Results from last 7 days   Lab Units 10/12/24  0455 10/11/24  0554 10/09/24  0618   PROCALCITONIN ng/ml 0.99* 1.68* 6.95*                 Imaging Results Review: No pertinent imaging studies reviewed.  Other Study Results Review: No additional pertinent studies reviewed.

## 2024-10-15 NOTE — PLAN OF CARE
Problem: PAIN - ADULT  Goal: Verbalizes/displays adequate comfort level or baseline comfort level  Description: Interventions:  - Encourage patient to monitor pain and request assistance  - Assess pain using appropriate pain scale  - Administer analgesics based on type and severity of pain and evaluate response  - Implement non-pharmacological measures as appropriate and evaluate response  - Consider cultural and social influences on pain and pain management  - Notify physician/advanced practitioner if interventions unsuccessful or patient reports new pain  10/15/2024 1558 by Jessica Gomez RN  Outcome: Adequate for Discharge  10/15/2024 1227 by Jessica Gomez RN  Outcome: Progressing     Problem: INFECTION - ADULT  Goal: Absence or prevention of progression during hospitalization  Description: INTERVENTIONS:  - Assess and monitor for signs and symptoms of infection  - Monitor lab/diagnostic results  - Monitor all insertion sites, i.e. indwelling lines, tubes, and drains  - Monitor endotracheal if appropriate and nasal secretions for changes in amount and color  - [place] appropriate cooling/warming therapies per order  - Administer medications as ordered  - Instruct and encourage patient and family to use good hand hygiene technique  - Identify and instruct in appropriate isolation precautions for identified infection/condition  Outcome: Adequate for Discharge  Goal: Absence of fever/infection during neutropenic period  Description: INTERVENTIONS:  - Monitor WBC    Outcome: Adequate for Discharge     Problem: SAFETY ADULT  Goal: Patient will remain free of falls  Description: INTERVENTIONS:  - Educate patient/family on patient safety including physical limitations  - Instruct patient to call for assistance with activity   - Consult OT/PT to assist with strengthening/mobility   - Keep Call bell within reach  - Keep bed low and locked with side rails adjusted as appropriate  - Keep care items and personal belongings  within reach  - Initiate and maintain comfort rounds  - Make Fall Risk Sign visible to staff  - Offer Toileting every 2 Hours, in advance of need  - Initiate/Maintain bed alarm  - Obtain necessary fall risk management equipment: non skid socks  - Apply yellow socks and bracelet for high fall risk patients  - Consider moving patient to room near nurses station  Outcome: Adequate for Discharge  Goal: Maintain or return to baseline ADL function  Description: INTERVENTIONS:  -  Assess patient's ability to carry out ADLs; assess patient's baseline for ADL function and identify physical deficits which impact ability to perform ADLs (bathing, care of mouth/teeth, toileting, grooming, dressing, etc.)  - Assess/evaluate cause of self-care deficits   - Assess range of motion  - Assess patient's mobility; develop plan if impaired  - Assess patient's need for assistive devices and provide as appropriate  - Encourage maximum independence but intervene and supervise when necessary  - Involve family in performance of ADLs  - Assess for home care needs following discharge   - Consider OT consult to assist with ADL evaluation and planning for discharge  - Provide patient education as appropriate  Outcome: Adequate for Discharge  Goal: Maintains/Returns to pre admission functional level  Description: INTERVENTIONS:  - Perform AM-PAC 6 Click Basic Mobility/ Daily Activity assessment daily.  - Set and communicate daily mobility goal to care team and patient/family/caregiver.   - Collaborate with rehabilitation services on mobility goals if consulted  - Out of bed for toileting  - Record patient progress and toleration of activity level   Outcome: Adequate for Discharge     Problem: DISCHARGE PLANNING  Goal: Discharge to home or other facility with appropriate resources  Description: INTERVENTIONS:  - Identify barriers to discharge w/patient and caregiver  - Arrange for needed discharge resources and transportation as appropriate  -  Identify discharge learning needs (meds, wound care, etc.)  - Arrange for interpretive services to assist at discharge as needed  - Refer to Case Management Department for coordinating discharge planning if the patient needs post-hospital services based on physician/advanced practitioner order or complex needs related to functional status, cognitive ability, or social support system  Outcome: Adequate for Discharge     Problem: Knowledge Deficit  Goal: Patient/family/caregiver demonstrates understanding of disease process, treatment plan, medications, and discharge instructions  Description: Complete learning assessment and assess knowledge base.  Interventions:  - Provide teaching at level of understanding  - Provide teaching via preferred learning methods  Outcome: Adequate for Discharge     Problem: METABOLIC, FLUID AND ELECTROLYTES - ADULT  Goal: Electrolytes maintained within normal limits  Description: INTERVENTIONS:  - Monitor labs and assess patient for signs and symptoms of electrolyte imbalances  - Administer electrolyte replacement as ordered  - Monitor response to electrolyte replacements, including repeat lab results as appropriate  - Instruct patient on fluid and nutrition as appropriate  Outcome: Adequate for Discharge  Goal: Fluid balance maintained  Description: INTERVENTIONS:  - Monitor labs   - Monitor I/O and WT  - Instruct patient on fluid and nutrition as appropriate  - Assess for signs & symptoms of volume excess or deficit  Outcome: Adequate for Discharge     Problem: CARDIOVASCULAR - ADULT  Goal: Maintains optimal cardiac output and hemodynamic stability  Description: INTERVENTIONS:  - Monitor I/O, vital signs and rhythm  - Monitor for S/S and trends of decreased cardiac output  - Administer and titrate ordered vasoactive medications to optimize hemodynamic stability  - Assess quality of pulses, skin color and temperature  - Assess for signs of decreased coronary artery perfusion  - Instruct  patient to report change in severity of symptoms  Outcome: Adequate for Discharge     Problem: RESPIRATORY - ADULT  Goal: Achieves optimal ventilation and oxygenation  Description: INTERVENTIONS:  - Assess for changes in respiratory status  - Assess for changes in mentation and behavior  - Position to facilitate oxygenation and minimize respiratory effort  - Oxygen administered by appropriate delivery if ordered  - Initiate smoking cessation education as indicated  - Encourage broncho-pulmonary hygiene including cough, deep breathe, Incentive Spirometry  - Assess the need for suctioning and aspirate as needed  - Assess and instruct to report SOB or any respiratory difficulty  - Respiratory Therapy support as indicated  Outcome: Adequate for Discharge     Problem: Nutrition/Hydration-ADULT  Goal: Nutrient/Hydration intake appropriate for improving, restoring or maintaining nutritional needs  Description: Monitor and assess patient's nutrition/hydration status for malnutrition. Collaborate with interdisciplinary team and initiate plan and interventions as ordered.  Monitor patient's weight and dietary intake as ordered or per policy. Utilize nutrition screening tool and intervene as necessary. Determine patient's food preferences and provide high-protein, high-caloric foods as appropriate.     INTERVENTIONS:  - Monitor oral intake, urinary output, labs, and treatment plans  - Assess nutrition and hydration status and recommend course of action  - Evaluate amount of meals eaten  - Assist patient with eating if necessary   - Allow adequate time for meals  - Recommend/ encourage appropriate diets, oral nutritional supplements, and vitamin/mineral supplements  - Order, calculate, and assess calorie counts as needed  - Recommend, monitor, and adjust tube feedings and TPN/PPN based on assessed needs  - Assess need for intravenous fluids  - Provide specific nutrition/hydration education as appropriate  - Include  patient/family/caregiver in decisions related to nutrition  Outcome: Adequate for Discharge

## 2024-10-15 NOTE — QUICK NOTE
GASTROENTEROLOGY QUICK NOTE:     GI has been following for dysphagia and odynophagia.  Patient was noted to have oral candidiasis with confirmatory sputum cultures which is overall suggestive of esophageal candidiasis.  He has not been appropriate for endoscopic evaluation given his sepsis and respiratory status which puts him at high risk for anesthesia.  We agree with treating empirically with fluconazole.  I am optimistic that his esophageal symptoms will improve with treatment.  However, if symptoms persist or worsen we can consider endoscopic evaluation at that time if he is stable to undergo procedure.  Discussed with the primary team.  Please reach back out to GI with any questions or concerns.    DO THEO Cottrell Gastroenterology Fellow, PGY-4

## 2024-10-15 NOTE — ASSESSMENT & PLAN NOTE
With concern for oropharyngeal thrush with esophagitis over the weekend, primary team started IV fluconazole. Unlikely to cause fevers though may be related to recent abx exposures. Oral lesions are painful and noted on tongue, lips, and posterior pharynx. Suspicious for viral etiology given exam findings and prodromal high fevers.  Will plan to continue PO valtrex 1g BID if HSV PCR is positive   Continue empiric fluconazole   F/u HIV for completeness   Supportive care, magic mouthwash

## 2024-10-15 NOTE — ASSESSMENT & PLAN NOTE
Dysphagia with odynophagia. With recent episodes of coughing while eating, consider possibility of aspiration pneumonitis as etiology for fevers. Mild oropharyngeal dysphagia noted on VBS and SLP concerned for possible bottom up aspiration. Subsequent CT A/P imaging showed diffusely dilated esophagus. GI consulted and planning EGD once clinically improved, likely on outpatient basis.    Antimicrobials as above  Follow up with SLP   Outpatient GI follow up for EGD   Aspiration precautions

## 2024-10-15 NOTE — ASSESSMENT & PLAN NOTE
Likely viral pneumonia.  No dense consolidations noted on chest x-ray and CT chest.  RP 2 positive for rhinovirus/enterovirus.  Procalcitonin slightly elevated.  Urinary antigens are negative. Sputum culture with candida, likely colonization. With recent episodes of coughing while eating, consider possibility of aspiration pneumonitis. Consider possibility of developing post-viral pneumonia. Repeat CT chest with only small pleural effusions, atelectasis and lung nodule. He completed 5 day course of IV ceftriaxone.   Observe off further antibiotics   Aspiration precautions

## 2024-10-15 NOTE — PLAN OF CARE
Problem: INFECTION - ADULT  Goal: Absence or prevention of progression during hospitalization  Description: INTERVENTIONS:  - Assess and monitor for signs and symptoms of infection  - Monitor lab/diagnostic results  - Monitor all insertion sites, i.e. indwelling lines, tubes, and drains  - Monitor endotracheal if appropriate and nasal secretions for changes in amount and color  - [place] appropriate cooling/warming therapies per order  - Administer medications as ordered  - Instruct and encourage patient and family to use good hand hygiene technique  - Identify and instruct in appropriate isolation precautions for identified infection/condition  Outcome: Progressing     Problem: SAFETY ADULT  Goal: Patient will remain free of falls  Description: INTERVENTIONS:  - Educate patient/family on patient safety including physical limitations  - Instruct patient to call for assistance with activity   - Consult OT/PT to assist with strengthening/mobility   - Keep Call bell within reach  - Keep bed low and locked with side rails adjusted as appropriate  - Keep care items and personal belongings within reach  - Initiate and maintain comfort rounds  - Make Fall Risk Sign visible to staff  - Offer Toileting every 2 Hours, in advance of need  - Initiate/Maintain bed alarm  - Obtain necessary fall risk management equipment: non skid socks  - Apply yellow socks and bracelet for high fall risk patients  - Consider moving patient to room near nurses station  Outcome: Progressing

## 2024-10-15 NOTE — DISCHARGE SUMMARY
"Discharge Summary - Hospitalist   Name: Miguel Henderson 84 y.o. male I MRN: [id number]  Unit/Bed#: -01 I Date of Admission: 10/7/2024   Date of Service: 10/15/2024 I Hospital Day: 7     Assessment & Plan  Severe sepsis (HCC)  Secondary to a combination of rhinovirus positivity, and a right sided pneumonia  Additional contributing etiology is a suspected candidal esophagitis -see the management as outlined below  Tachycardia, and leukocytosis have resolved  Patient tested positive for rhinovirus/enterovirus  ID consultation appreciated   Status post initial treatment with Zithromax  Status post completed course of ceftriaxone  CT chest, abdomen pelvis-reviewed no obvious pathology noted that would explain the fever  Blood cultures x 2 sets -no growth to date  Streptococcus pneumonia and Legionella antigen: negative  Sputum culture-4+ growth of Candida glabrata, and Candida tropicalis  Patient has now been afebrile x 24 hours  Cleared by ID for discharge  Patient will be discharged home on fluconazole 400 mg daily p.o. x 12 more days to complete the course for Candida  ID will follow-up on the herpes simplex testing results, and if necessary they will call in [facility] for the patient  DC home today  Outpatient follow-up with PCP also  Community acquired pneumonia of right upper lobe of lung  CT imaging revealed \"There is some minor groundglass and tree-in-bud nodularity suggested in the right upper lobe inferior segment and to a lesser degree in the right lower lobe\"  Antibiotics have been completed  Status post a speech and swallow evaluation inclusive of a bedside and video barium swallow -see the full report for additional details  Diet has been modified to dysphagia level 3 and thin liquid diet  GI consulted per speech recommendations  Video barium images with some stasis in the esophagus, unclear if this is motility or structural   Appreciate GI input  Patient will need EGD when he is more stable- monitor " to determine inpatient vs outpatient  continue Candida treatment as outlined above  Outpatient follow-up with GI regarding need for EGD    Hypothyroidism  Continue Synthroid post discharge  Primary hypertension  DC home on hydrochlorothiazide as the patient was taking prior to arrival  Rhinovirus  Supportive therapy provided for the patient throughout  Acute kidney injury (HCC)  Has resolved-this is secondary to intravascular depletion in the setting of receiving IV Lasix  IV fluid discontinued  Echocardiogram was within normal limits  Monitor input and output, monitor urinary output  Hypokalemia-has resolved with repletion  Avoid nephrotoxins-both Motrin, and Toradol have been discontinued  Will need continued periodic outpatient BMP monitoring via his PCP  Left knee pain  Left knee x-ray: No acute osseous abnormality.   PT/OT evaluation- moderate resource intensity   Candida esophagitis (HCC)  Video swallow evaluation results noted  Patient has clinical stigmata for Candida in that he has oral thrush and additional white spots noted in his oral cavity  Patient complains of difficulty swallowing  Sputum culture positive for Candida  DC home on fluconazole x 12 more days     Medical Problems       Resolved Problems  Date Reviewed: 10/8/2024   None       Discharging Physician / Practitioner: Judy Wade MD  PCP: Greg Foster MD  Admission Date:   Admission Orders (From admission, onward)       Ordered        10/08/24 0202  INPATIENT ADMISSION  Once                          Discharge Date: 10/15/24    Consultations During Hospital Stay:  Gastroenterology  Infectious disease    Procedures Performed:   None    Significant Findings / Test Results:   CT chest, abdomen, pelvis-1.  Small bilateral pleural effusions with overlying atelectasis. 2.  3 mm right apical lung nodule. Based on current Fleischner Society 2017 Guidelines on incidental pulmonary nodule, no routine follow-up is needed if the patient is low  risk. If the patient is high risk, optional follow-up chest CT at 12 months can be  considered. 3.  Stable diffusely dilated esophagus. This may be related to dysmotility. Obstructing lesion is not visualized. This would be better evaluated with esophagram. 4.  No acute abnormality in the abdomen or pelvis.   5.  Enhancing liver lesion, indeterminate. Possibilities include hemangioma and vascular malformation though other etiologies including neoplasm not excluded. Follow-up nonemergent MRI is recommended.   X-ray left knee-no acute osseous abnormality  Video barium swallow-see the full report for additional details  X-ray chest-Mild tree-in-bud pattern of opacity mostly in the right lung and in the left base again suggesting a mild infectious/inflammatory condition   X-ray chest-no acute cardiopulmonary disease  CT chest-Mild bibasilar probable atelectatic changes, right greater than left. Otherwise no focal airspace consolidation to suggest pneumonia. Minor tree-in-bud nodularity in the right lung which could relate to infectious versus inflammatory small airways disease. Clinical correlation recommended. Pulmonary nodules as above. Based on current Fleischner Society 2017 Guidelines on incidental pulmonary nodule, no routine follow-up is needed if the patient is low risk. If the patient is high risk, optional follow-up chest CT at 12 months can be considered. Trace right pleural effusion. Mild right axillary lymphadenopathy, nonspecific.         Incidental Findings:   Liver lesion  I reviewed the above mentioned incidental findings with the patient and/or family and they expressed understanding.    Test Results Pending at Discharge (will require follow up):   Herpes simplex testing results-these will be followed up in the outpatient setting by infectious disease     Outpatient Tests Requested:  None    Complications: None    Reason for Admission: Severe sepsis    Hospital Course:   Miguel Henderson is a 84 y.o.  male patient who originally presented to the hospital on 10/7/2024 due to due to fevers.  Please refer to the initial history and physical examination completed by Morgan Gongora PA-C for the initial presenting features and complaints.    In brief, the patient was admitted for severe sepsis.  Initial considerations included the possibility of a pneumonia.  He was treated with 5 days of IV ceftriaxone, and initially also with Zithromax.  Infectious disease followed along.  Patient had daily fevers of unspecified exact etiology.  He tested positive for enterovirus/rhinovirus.  Supportive therapy was provided for the same.  Drug fever at 1 point was also amidst the differential.  Once he was afebrile over 24 hours, he was deemed medically stable for discharge.  Imaging studies performed on this admission are resulted as outlined above.    Patient was seen by GI also in view of having some swallowing difficulty, and an abnormal video barium swallow.  Patient was started on Protonix.  Sputum revealed Candida positivity.  Patient was treated for a presumed Candida esophagitis.  He was discharged home to complete a total of 14 days worth of Diflucan.  EGD will be performed in the near future in the outpatient setting after the patient follows up with GI.  He was also given a prescription for Protonix.    Finally, there was also consideration for the possibility of herpes infection.  Cultures are pending.  If they are positive ID will follow-up in the outpatient setting and provide the patient with Valtrex.    Please refer to the assessment/plan portion of this discharge summary as outlined above for additional details regarding his stay.  Please see above list of diagnoses and related plan for additional information.     Condition at Discharge: good    Discharge Day Visit / Exam:   Subjective: Patient seen, his son performed interpretation services, he is doing well, feels well, and wants to go home  Vitals: Blood Pressure:  "125/79 (10/15/24 1432)  Pulse: 87 (10/15/24 1432)  Temperature: 98.5 °F (36.9 °C) (10/15/24 1432)  Temp Source: Oral (10/14/24 1714)  Respirations: 16 (10/15/24 1432)  Height: 5' 5\" (165.1 cm) (10/09/24 0917)  Weight - Scale: 75.8 kg (167 lb) (10/09/24 0917)  SpO2: 94 % (10/15/24 1432)  Physical Exam  Vitals and nursing note reviewed.   Constitutional:       General: He is not in acute distress.     Appearance: Normal appearance. He is not ill-appearing.   HENT:      Head: Normocephalic and atraumatic.      Nose: Nose normal.   Eyes:      Extraocular Movements: Extraocular movements intact.      Pupils: Pupils are equal, round, and reactive to light.   Cardiovascular:      Rate and Rhythm: Normal rate and regular rhythm.      Pulses: Normal pulses.      Heart sounds: Normal heart sounds. No murmur heard.     No friction rub. No gallop.   Pulmonary:      Effort: Pulmonary effort is normal.      Breath sounds: Normal breath sounds.   Abdominal:      General: There is no distension.      Palpations: Abdomen is soft. There is no mass.      Tenderness: There is no abdominal tenderness. There is no guarding or rebound.   Musculoskeletal:         General: No swelling or tenderness. Normal range of motion.      Cervical back: Normal range of motion and neck supple. No rigidity. No muscular tenderness.      Right lower leg: No edema.      Left lower leg: No edema.   Skin:     General: Skin is warm.      Capillary Refill: Capillary refill takes less than 2 seconds.      Findings: No erythema or rash.   Neurological:      General: No focal deficit present.      Mental Status: He is alert and oriented to person, place, and time. Mental status is at baseline.   Psychiatric:         Mood and Affect: Mood normal.         Behavior: Behavior normal.          Discussion with Family: Updated  (wife and son) at bedside.    Discharge instructions/Information to patient and family:   See after visit summary for information " provided to patient and family.      Provisions for Follow-Up Care:  See after visit summary for information related to follow-up care and any pertinent home health orders.      Mobility at time of Discharge:   Basic Mobility Inpatient Raw Score: 17  JH-HLM Goal: 5: Stand one or more mins  JH-HLM Achieved: 6: Walk 10 steps or more  HLM Goal achieved. Continue to encourage appropriate mobility.     Disposition:   Home    Planned Readmission: None    Discharge Medications:  See after visit summary for reconciled discharge medications provided to patient and/or family.      Administrative Statements   Discharge Statement:  I have spent a total time of 35 minutes in caring for this patient on the day of the visit/encounter. >30 minutes of time was spent on: Diagnostic results, Prognosis, Risks and benefits of tx options, Instructions for management, Patient and family education, Importance of tx compliance, Risk factor reductions, Impressions, Counseling / Coordination of care, Documenting in the medical record, Reviewing / ordering tests, medicine, procedures  , and Communicating with other healthcare professionals .    **Please Note: This note may have been constructed using a voice recognition system**

## 2024-10-15 NOTE — ASSESSMENT & PLAN NOTE
Has resolved-this is secondary to intravascular depletion in the setting of receiving IV Lasix  IV fluid discontinued  Echocardiogram was within normal limits  Monitor input and output, monitor urinary output  Hypokalemia-has resolved with repletion  Avoid nephrotoxins-both Motrin, and Toradol have been discontinued  Will need continued periodic outpatient BMP monitoring via his PCP

## 2024-10-15 NOTE — ASSESSMENT & PLAN NOTE
"CT imaging revealed \"There is some minor groundglass and tree-in-bud nodularity suggested in the right upper lobe inferior segment and to a lesser degree in the right lower lobe\"  Antibiotics have been completed  Status post a speech and swallow evaluation inclusive of a bedside and video barium swallow -see the full report for additional details  Diet has been modified to dysphagia level 3 and thin liquid diet  GI consulted per speech recommendations  Video barium images with some stasis in the esophagus, unclear if this is motility or structural   Appreciate GI input  Patient will need EGD when he is more stable- monitor to determine inpatient vs outpatient  continue Candida treatment as outlined above  Outpatient follow-up with GI regarding need for EGD    "

## 2024-10-15 NOTE — ASSESSMENT & PLAN NOTE
Secondary to a combination of rhinovirus positivity, and a right sided pneumonia  Additional contributing etiology is a suspected candidal esophagitis -see the management as outlined below  Tachycardia, and leukocytosis have resolved  Patient tested positive for rhinovirus/enterovirus  ID consultation appreciated   Status post initial treatment with Zithromax  Status post completed course of ceftriaxone  CT chest, abdomen pelvis-reviewed no obvious pathology noted that would explain the fever  Blood cultures x 2 sets -no growth to date  Streptococcus pneumonia and Legionella antigen: negative  Sputum culture-4+ growth of Candida glabrata, and Candida tropicalis  Patient has now been afebrile x 24 hours  Cleared by ID for discharge  Patient will be discharged home on fluconazole 400 mg daily p.o. x 12 more days to complete the course for Candida  ID will follow-up on the herpes simplex testing results, and if necessary they will call in [facility] for the patient  DC home today  Outpatient follow-up with PCP also

## 2024-10-15 NOTE — DISCHARGE INSTR - AVS FIRST PAGE
Dear Miguel Henderson,     It was our pleasure to care for you here at [facility].  It is our hope that we were always able to exceed the expected standards for your care during your stay.  You were hospitalized due to pneumonia, and Candida esophagitis.  You were cared for on the medical/surgical floor by Judy Wade MD with the [facility] who covers for your primary care physician (PCP), Greg Foster MD, while you were hospitalized.      You were additionally seen by the [facility], and by the [facility].      If you have any questions or concerns related to this hospitalization, you may contact us at .  For follow up as well as any medication refills, we recommend that you follow up with your primary care physician.  A registered nurse will reach out to you by phone within a few days after your discharge to answer any additional questions that you may have after going home.  However, at this time we provide for you here, the most important instructions / recommendations at discharge:     Notable Medication Adjustments -   New prescription -fluconazole 400 mg-take 1 tablet daily by mouth for 12 days and then stop  New prescription-Protonix 40 mg-take 1 tablet daily by mouth 30 minutes prior to the first meal of the day  Okay to continue all other preadmission medications at the preadmission doses  Testing Required after Discharge -   EGD will need to be completed in the outpatient setting this will be set up by GI  Additional testing required after discharge will be decided upon by your primary care provider  Important follow up information -   Please follow-up with the providers as outlined in this discharge package  Other Instructions -   Please maintain a healthy diet  Please review this entire after visit summary as additional general instructions including medication  list, appointments, activity, diet, any pertinent wound care, and other additional recommendations from your care team that may be provided for you.      Sincerely,     Judy Wade MD

## 2024-10-15 NOTE — ASSESSMENT & PLAN NOTE
Video swallow evaluation results noted  Patient has clinical stigmata for Candida in that he has oral thrush and additional white spots noted in his oral cavity  Patient complains of difficulty swallowing  Sputum culture positive for Candida  DC home on fluconazole x 12 more days

## 2024-10-15 NOTE — ASSESSMENT & PLAN NOTE
Suspected viral tracheobronchitis. C/o of cough, wheezing and sore throat. Rhinovirus/enterovirus positive on RP2.  Strep PCR negative.  Flu/COVID/RSV negative.   Continue supportive cares

## 2024-10-15 NOTE — ASSESSMENT & PLAN NOTE
Leukocytosis, fever, tachycardia and tachypnea on admission.  Tmax 101.7 °F yesterday afternoon.  Patient was hypotensive but seems to have responded to IV fluids. Initially suspected pulmonary source with positive RP2 and wheezing on exam. Consider possibility of ongoing aspiration. Blood cultures are negative.  CT C/A/P negative for acute findings for fever. LFTs normal. Repeat COVID negative. Consider possibility of drug fever. Consider possibility of candidal vs HSV esophagitis. Low suspicion for tick borne/vector borne illness with no recent exposures. He remains nontoxic-appearing despite systemic illness. Symptomatically he is improved.   Monitor off antibiotics   Continue empiric fluconazole, switch to PO for 14 day course   Fill follow up HSV PCR and continue PO valtrex 1g BID for 7-10 days if positive   F/u EBV  Repeat blood cultures x2 if fever persists   Trend temps and hemodynamics  Repeat CBC differential and CMP to monitor response to treatment and for developing toxicities

## 2024-10-15 NOTE — ASSESSMENT & PLAN NOTE
Enhancing liver lesion noted on CT. Denies abdominal pain. LFTs normal. Consider possibility for fever. GI following. May need MRI to further evaluate.   Outpatient GI follow up

## 2024-10-16 ENCOUNTER — TRANSITIONAL CARE MANAGEMENT (OUTPATIENT)
Dept: FAMILY MEDICINE CLINIC | Facility: CLINIC | Age: 84
End: 2024-10-16

## 2024-10-16 ENCOUNTER — OFFICE VISIT (OUTPATIENT)
Dept: FAMILY MEDICINE CLINIC | Facility: CLINIC | Age: 84
End: 2024-10-16
Payer: COMMERCIAL

## 2024-10-16 VITALS
HEART RATE: 95 BPM | BODY MASS INDEX: 28.89 KG/M2 | OXYGEN SATURATION: 99 % | SYSTOLIC BLOOD PRESSURE: 128 MMHG | TEMPERATURE: 99.1 F | DIASTOLIC BLOOD PRESSURE: 78 MMHG | WEIGHT: 173.4 LBS | HEIGHT: 65 IN

## 2024-10-16 DIAGNOSIS — R60.0 LOCALIZED EDEMA: ICD-10-CM

## 2024-10-16 DIAGNOSIS — B37.81 CANDIDA ESOPHAGITIS (HCC): ICD-10-CM

## 2024-10-16 DIAGNOSIS — J18.9 COMMUNITY ACQUIRED PNEUMONIA OF RIGHT UPPER LOBE OF LUNG: ICD-10-CM

## 2024-10-16 DIAGNOSIS — R65.20 SEVERE SEPSIS (HCC): Primary | ICD-10-CM

## 2024-10-16 DIAGNOSIS — R06.02 SHORTNESS OF BREATH ON EXERTION: ICD-10-CM

## 2024-10-16 DIAGNOSIS — B34.8 RHINOVIRUS: ICD-10-CM

## 2024-10-16 DIAGNOSIS — A41.9 SEVERE SEPSIS (HCC): Primary | ICD-10-CM

## 2024-10-16 DIAGNOSIS — R05.9 COUGH, UNSPECIFIED TYPE: ICD-10-CM

## 2024-10-16 LAB
EBV EA IGG SER QL IA: NEGATIVE
EBV NA IGG SER QL IA: NEGATIVE
EBV VCA IGG SER QL IA: POSITIVE
EBV VCA IGM SER QL IA: NEGATIVE

## 2024-10-16 PROCEDURE — 99495 TRANSJ CARE MGMT MOD F2F 14D: CPT | Performed by: FAMILY MEDICINE

## 2024-10-16 RX ORDER — BENZONATATE 200 MG/1
200 CAPSULE ORAL 2 TIMES DAILY PRN
Qty: 40 CAPSULE | Refills: 2 | Status: ON HOLD | OUTPATIENT
Start: 2024-10-16

## 2024-10-16 RX ORDER — FLUTICASONE PROPIONATE 50 MCG
1 SPRAY, SUSPENSION (ML) NASAL DAILY
Qty: 11.1 ML | Refills: 1 | Status: ON HOLD | OUTPATIENT
Start: 2024-10-16

## 2024-10-16 NOTE — PROGRESS NOTES
Transition of Care Visit  Name: Miguel Henderson      : 1940      MRN: [id number]  Encounter Provider: Greg Foster MD  Encounter Date: 10/16/2024   Encounter department: [facility]    Assessment & Plan  Severe sepsis (HCC)  resolved         Rhinovirus  improving         Localized edema  Recommend leg elevation and compression stockings daily  Orders:    Compression Stocking      Shortness of breath on exertion  Normal echo  Will explore for CAD with stress test and PFT has a hx of smoking  Orders:    NM myocardial perfusion spect (rx stress and/or rest); Future    Complete PFT with post bronchodilator; Future      Cough, unspecified type    Orders:    fluticasone (FLONASE) 50 mcg/act nasal spray; 1 spray into each nostril daily    benzonatate (TESSALON) 200 MG capsule; Take 1 capsule (200 mg total) by mouth 2 (two) times a day as needed for cough      Community acquired pneumonia of right upper lobe of lung  Finished antibiotic use         Candida esophagitis (HCC)  Advise to finish fluconazole course.       Follow up in 6 months or as needed       History of Present Illness     Transitional Care Management Review:   Miguel Henderson is a 84 y.o. male here for TCM follow up.     During the TCM phone call patient stated:  TCM Call       Date and time call was made  10/16/2024 10:00 AM    Hospital care reviewed  Records reviewed    Patient was hospitial[facility] at  [facility]    Date of Admission  10/07/24    Date of discharge  10/15/24    Diagnosis  Severe Sepsis    Disposition  Home    Were the patients medications reviewed and updated  Yes    Current Symptoms  Cough; Weakness    Cough Severity  Moderate    Weakness severity  Moderate          TCM Call       Post hospital issues  Reduced activity    Should patient be enrolled in anticoag monitoring?  No    Scheduled for follow up?  Yes    Patients specialists  Other (comment)    Other specialists names  Gastroenterology    Did  you obtain your prescribed medications  Yes    Do you need help managing your prescriptions or medications  No    Is transportation to your appointment needed  No    Living Arrangements  Family members    Support System  Family    The type of support provided  Emotional; Physical    Do you have social support  Yes, a little    Are you recieving any outpatient services  No    Are you recieving home care services  Yes    Types of home care services  Home PT    Are you using any community resources  No    Current waiver services  No    Have you fallen in the last 12 months  No    Interperter language line needed  Yes    Counseling  Patient    Counseling topics  Activities of daily living          [facility] 10/07 to 10/15/2024 [facility]    Patient is here for a hospital follow up. He presented to the ER on 10/7/2024 due to fevers.    He was found to have severe sepsis secondary to Pneumonia. He was treated with 5 days of IV ceftriaxone and Zithromax.  He tested positive for enterovirus/rhinovirus.     Also he was having swallowing difficulty, and an abnormal video barium swallow.  Patient was started on Protonix.  Sputum revealed Candida positivity.  Patient was treated for a presumed Candida esophagitis.  He was discharged home to complete a total of 14 days worth of Diflucan.  EGD will be performed in the near future in the outpatient setting after the patient follows up with GI.  He was also given a prescription for Protonix.  He still has a non productive cough.    He has been having SOB on exertion with associated leg swelling. Of note echo done at the hospital was normal, no evidence of CHF.             Review of Systems   Constitutional:  Negative for activity change, appetite change, fatigue and fever.   HENT:  Negative for congestion and ear discharge.    Respiratory:  Positive for cough. Negative for shortness of breath.    Cardiovascular:  Negative for chest pain and palpitations.  "  Gastrointestinal:  Negative for diarrhea and nausea.   Musculoskeletal:  Positive for joint swelling. Negative for arthralgias and back pain.   Skin:  Negative for color change and rash.   Neurological:  Negative for dizziness and headaches.   Psychiatric/Behavioral:  Negative for agitation and behavioral problems.      Objective     /78   Pulse 95   Temp 99.1 °F (37.3 °C)   Ht 5' 5\" (1.651 m)   Wt 78.7 kg (173 lb 6.4 oz)   SpO2 99%   BMI 28.86 kg/m²     Physical Exam  Constitutional:       General: He is not in acute distress.     Appearance: He is well-developed. He is not diaphoretic.   Eyes:      General: No scleral icterus.     Pupils: Pupils are equal, round, and reactive to light.   Cardiovascular:      Rate and Rhythm: Normal rate and regular rhythm.      Heart sounds: Normal heart sounds. No murmur heard.  Pulmonary:      Effort: Pulmonary effort is normal. No respiratory distress.      Breath sounds: Normal breath sounds. No wheezing.   Abdominal:      General: Bowel sounds are normal. There is no distension.      Palpations: Abdomen is soft.      Tenderness: There is no abdominal tenderness.   Musculoskeletal:         General: Swelling present.      Right lower leg: Edema present.      Left lower leg: Edema present.   Skin:     General: Skin is warm and dry.      Findings: No rash.   Neurological:      Mental Status: He is alert and oriented to person, place, and time.             "

## 2024-10-16 NOTE — ASSESSMENT & PLAN NOTE
Normal echo  Will explore for CAD with stress test and PFT has a hx of smoking  Orders:    NM myocardial perfusion spect (rx stress and/or rest); Future    Complete PFT with post bronchodilator; Future

## 2024-10-17 ENCOUNTER — TELEPHONE (OUTPATIENT)
Dept: OTHER | Facility: HOSPITAL | Age: 84
End: 2024-10-17

## 2024-10-17 DIAGNOSIS — B97.89 STOMATITIS, VIRAL: Primary | ICD-10-CM

## 2024-10-17 DIAGNOSIS — K12.1 STOMATITIS, VIRAL: Primary | ICD-10-CM

## 2024-10-17 LAB
HSV1 DNA SPEC QL NAA+PROBE: POSITIVE
HSV2 DNA SPEC QL NAA+PROBE: NEGATIVE

## 2024-10-17 RX ORDER — VALACYCLOVIR HYDROCHLORIDE 1 G/1
1000 TABLET, FILM COATED ORAL 2 TIMES DAILY
Qty: 14 TABLET | Refills: 0 | Status: SHIPPED | OUTPATIENT
Start: 2024-10-17 | End: 2024-10-21

## 2024-10-17 NOTE — TELEPHONE ENCOUNTER
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 Will call in Rx for Valtrex 1g BID x 7 days and d/c fluconazole. Pt continues to have severe sore throat and painful swallowing.

## 2024-10-21 ENCOUNTER — OFFICE VISIT (OUTPATIENT)
Dept: FAMILY MEDICINE CLINIC | Facility: CLINIC | Age: 84
End: 2024-10-21
Payer: COMMERCIAL

## 2024-10-21 ENCOUNTER — NURSE TRIAGE (OUTPATIENT)
Dept: OTHER | Facility: OTHER | Age: 84
End: 2024-10-21

## 2024-10-21 ENCOUNTER — APPOINTMENT (EMERGENCY)
Dept: RADIOLOGY | Facility: HOSPITAL | Age: 84
DRG: 177 | End: 2024-10-21
Payer: COMMERCIAL

## 2024-10-21 ENCOUNTER — HOSPITAL ENCOUNTER (INPATIENT)
Facility: HOSPITAL | Age: 84
LOS: 1 days | Discharge: HOME | DRG: 177 | End: 2024-10-23
Attending: EMERGENCY MEDICINE | Admitting: STUDENT IN AN ORGANIZED HEALTH CARE EDUCATION/TRAINING PROGRAM
Payer: COMMERCIAL

## 2024-10-21 ENCOUNTER — APPOINTMENT (EMERGENCY)
Dept: CT IMAGING | Facility: HOSPITAL | Age: 84
DRG: 177 | End: 2024-10-21
Payer: COMMERCIAL

## 2024-10-21 VITALS
HEART RATE: 102 BPM | WEIGHT: 167 LBS | DIASTOLIC BLOOD PRESSURE: 86 MMHG | SYSTOLIC BLOOD PRESSURE: 121 MMHG | HEIGHT: 65 IN | BODY MASS INDEX: 27.82 KG/M2 | TEMPERATURE: 99.4 F | OXYGEN SATURATION: 96 %

## 2024-10-21 DIAGNOSIS — R50.81 FEVER IN OTHER DISEASES: ICD-10-CM

## 2024-10-21 DIAGNOSIS — R50.9 FEVER, UNSPECIFIED FEVER CAUSE: Primary | ICD-10-CM

## 2024-10-21 DIAGNOSIS — R59.1 LYMPHADENOPATHY: ICD-10-CM

## 2024-10-21 DIAGNOSIS — R50.9 FEVER: ICD-10-CM

## 2024-10-21 DIAGNOSIS — B37.81 CANDIDA ESOPHAGITIS (HCC): ICD-10-CM

## 2024-10-21 DIAGNOSIS — R21 RASH: ICD-10-CM

## 2024-10-21 DIAGNOSIS — A41.9 SEVERE SEPSIS (HCC): ICD-10-CM

## 2024-10-21 DIAGNOSIS — R65.20 SEVERE SEPSIS (HCC): ICD-10-CM

## 2024-10-21 DIAGNOSIS — U07.1 COVID-19: Primary | ICD-10-CM

## 2024-10-21 DIAGNOSIS — R53.1 WEAKNESS: ICD-10-CM

## 2024-10-21 DIAGNOSIS — R00.0 TACHYCARDIA, UNSPECIFIED: ICD-10-CM

## 2024-10-21 DIAGNOSIS — R50.9 FEBRILE ILLNESS: ICD-10-CM

## 2024-10-21 PROBLEM — J18.9 PNEUMONIA: Status: RESOLVED | Noted: 2024-10-09 | Resolved: 2024-10-21

## 2024-10-21 LAB
ALBUMIN SERPL BCG-MCNC: 2 G/DL (ref 3.5–5)
ALP SERPL-CCNC: 179 U/L (ref 34–104)
ALT SERPL W P-5'-P-CCNC: 42 U/L (ref 7–52)
ANION GAP SERPL CALCULATED.3IONS-SCNC: 8 MMOL/L (ref 4–13)
ANISOCYTOSIS BLD QL SMEAR: PRESENT
APTT PPP: 33 SECONDS (ref 23–34)
AST SERPL W P-5'-P-CCNC: 140 U/L (ref 13–39)
BASOPHILS # BLD MANUAL: 0.07 THOUSAND/UL (ref 0–0.1)
BASOPHILS NFR MAR MANUAL: 1 % (ref 0–1)
BILIRUB SERPL-MCNC: 2.04 MG/DL (ref 0.2–1)
BUN SERPL-MCNC: 29 MG/DL (ref 5–25)
CALCIUM ALBUM COR SERPL-MCNC: 9.9 MG/DL (ref 8.3–10.1)
CALCIUM SERPL-MCNC: 8.3 MG/DL (ref 8.4–10.2)
CHLORIDE SERPL-SCNC: 92 MMOL/L (ref 96–108)
CO2 SERPL-SCNC: 23 MMOL/L (ref 21–32)
CREAT SERPL-MCNC: 0.88 MG/DL (ref 0.6–1.3)
EOSINOPHIL # BLD MANUAL: 0.07 THOUSAND/UL (ref 0–0.4)
EOSINOPHIL NFR BLD MANUAL: 1 % (ref 0–6)
ERYTHROCYTE [DISTWIDTH] IN BLOOD BY AUTOMATED COUNT: 15.5 % (ref 11.6–15.1)
FLUAV AG UPPER RESP QL IA.RAPID: NEGATIVE
FLUBV AG UPPER RESP QL IA.RAPID: NEGATIVE
GFR SERPL CREATININE-BSD FRML MDRD: 77 ML/MIN/1.73SQ M
GLUCOSE SERPL-MCNC: 99 MG/DL (ref 65–140)
HCT VFR BLD AUTO: 33.2 % (ref 36.5–49.3)
HGB BLD-MCNC: 11 G/DL (ref 12–17)
INR PPP: 1.05 (ref 0.85–1.19)
LACTATE SERPL-SCNC: 3.5 MMOL/L (ref 0.5–2)
LACTATE SERPL-SCNC: 4.2 MMOL/L (ref 0.5–2)
LYMPHOCYTES # BLD AUTO: 1.68 THOUSAND/UL (ref 0.6–4.47)
LYMPHOCYTES # BLD AUTO: 18 % (ref 14–44)
MCH RBC QN AUTO: 30.6 PG (ref 26.8–34.3)
MCHC RBC AUTO-ENTMCNC: 33.1 G/DL (ref 31.4–37.4)
MCV RBC AUTO: 93 FL (ref 82–98)
METAMYELOCYTE ABSOLUTE CT: 0.07 THOUSAND/UL (ref 0–0.1)
METAMYELOCYTES NFR BLD MANUAL: 1 % (ref 0–1)
MONOCYTES # BLD AUTO: 0.66 THOUSAND/UL (ref 0–1.22)
MONOCYTES NFR BLD: 9 % (ref 4–12)
MYELOCYTE ABSOLUTE CT: 0.07 THOUSAND/UL (ref 0–0.1)
MYELOCYTES NFR BLD MANUAL: 1 % (ref 0–1)
NEUTROPHILS # BLD MANUAL: 4.67 THOUSAND/UL (ref 1.85–7.62)
NEUTS BAND NFR BLD MANUAL: 22 % (ref 0–8)
NEUTS SEG NFR BLD AUTO: 42 % (ref 43–75)
PLATELET # BLD AUTO: 330 THOUSANDS/UL (ref 149–390)
PLATELET BLD QL SMEAR: ADEQUATE
PMV BLD AUTO: 12.4 FL (ref 8.9–12.7)
POLYCHROMASIA BLD QL SMEAR: PRESENT
POTASSIUM SERPL-SCNC: 4.4 MMOL/L (ref 3.5–5.3)
PROCALCITONIN SERPL-MCNC: 4.96 NG/ML
PROT SERPL-MCNC: 6.8 G/DL (ref 6.4–8.4)
PROTHROMBIN TIME: 14.2 SECONDS (ref 12.3–15)
RBC # BLD AUTO: 3.59 MILLION/UL (ref 3.88–5.62)
RBC MORPH BLD: PRESENT
SARS-COV+SARS-COV-2 AG RESP QL IA.RAPID: POSITIVE
SODIUM SERPL-SCNC: 123 MMOL/L (ref 135–147)
VARIANT LYMPHS # BLD AUTO: 5 %
WBC # BLD AUTO: 7.3 THOUSAND/UL (ref 4.31–10.16)

## 2024-10-21 PROCEDURE — 74177 CT ABD & PELVIS W/CONTRAST: CPT

## 2024-10-21 PROCEDURE — 85610 PROTHROMBIN TIME: CPT | Performed by: EMERGENCY MEDICINE

## 2024-10-21 PROCEDURE — 0202U NFCT DS 22 TRGT SARS-COV-2: CPT | Performed by: PHYSICIAN ASSISTANT

## 2024-10-21 PROCEDURE — 99285 EMERGENCY DEPT VISIT HI MDM: CPT | Performed by: EMERGENCY MEDICINE

## 2024-10-21 PROCEDURE — 80053 COMPREHEN METABOLIC PANEL: CPT | Performed by: EMERGENCY MEDICINE

## 2024-10-21 PROCEDURE — 99215 OFFICE O/P EST HI 40 MIN: CPT

## 2024-10-21 PROCEDURE — 99285 EMERGENCY DEPT VISIT HI MDM: CPT

## 2024-10-21 PROCEDURE — 96360 HYDRATION IV INFUSION INIT: CPT

## 2024-10-21 PROCEDURE — 85007 BL SMEAR W/DIFF WBC COUNT: CPT | Performed by: EMERGENCY MEDICINE

## 2024-10-21 PROCEDURE — 87811 SARS-COV-2 COVID19 W/OPTIC: CPT | Performed by: EMERGENCY MEDICINE

## 2024-10-21 PROCEDURE — 93005 ELECTROCARDIOGRAM TRACING: CPT

## 2024-10-21 PROCEDURE — G2211 COMPLEX E/M VISIT ADD ON: HCPCS

## 2024-10-21 PROCEDURE — 86618 LYME DISEASE ANTIBODY: CPT | Performed by: EMERGENCY MEDICINE

## 2024-10-21 PROCEDURE — 87804 INFLUENZA ASSAY W/OPTIC: CPT | Performed by: EMERGENCY MEDICINE

## 2024-10-21 PROCEDURE — 85730 THROMBOPLASTIN TIME PARTIAL: CPT | Performed by: EMERGENCY MEDICINE

## 2024-10-21 PROCEDURE — 96361 HYDRATE IV INFUSION ADD-ON: CPT

## 2024-10-21 PROCEDURE — 87040 BLOOD CULTURE FOR BACTERIA: CPT | Performed by: EMERGENCY MEDICINE

## 2024-10-21 PROCEDURE — 36415 COLL VENOUS BLD VENIPUNCTURE: CPT | Performed by: EMERGENCY MEDICINE

## 2024-10-21 PROCEDURE — 85027 COMPLETE CBC AUTOMATED: CPT | Performed by: EMERGENCY MEDICINE

## 2024-10-21 PROCEDURE — 84145 PROCALCITONIN (PCT): CPT | Performed by: EMERGENCY MEDICINE

## 2024-10-21 PROCEDURE — 83605 ASSAY OF LACTIC ACID: CPT | Performed by: EMERGENCY MEDICINE

## 2024-10-21 PROCEDURE — 71045 X-RAY EXAM CHEST 1 VIEW: CPT

## 2024-10-21 RX ORDER — SODIUM CHLORIDE 9 MG/ML
125 INJECTION, SOLUTION INTRAVENOUS CONTINUOUS
Status: DISCONTINUED | OUTPATIENT
Start: 2024-10-21 | End: 2024-10-23

## 2024-10-21 RX ORDER — ONDANSETRON 2 MG/ML
4 INJECTION INTRAMUSCULAR; INTRAVENOUS EVERY 6 HOURS PRN
Status: DISCONTINUED | OUTPATIENT
Start: 2024-10-21 | End: 2024-10-23 | Stop reason: HOSPADM

## 2024-10-21 RX ORDER — BENZONATATE 100 MG/1
200 CAPSULE ORAL 2 TIMES DAILY PRN
Status: DISCONTINUED | OUTPATIENT
Start: 2024-10-21 | End: 2024-10-23 | Stop reason: HOSPADM

## 2024-10-21 RX ORDER — FLUTICASONE PROPIONATE 50 MCG
1 SPRAY, SUSPENSION (ML) NASAL DAILY
Status: DISCONTINUED | OUTPATIENT
Start: 2024-10-22 | End: 2024-10-23 | Stop reason: HOSPADM

## 2024-10-21 RX ORDER — HYDROCHLOROTHIAZIDE 25 MG/1
25 TABLET ORAL DAILY
Status: DISCONTINUED | OUTPATIENT
Start: 2024-10-22 | End: 2024-10-21

## 2024-10-21 RX ORDER — CYCLOBENZAPRINE HCL 10 MG
5 TABLET ORAL 3 TIMES DAILY PRN
Status: DISCONTINUED | OUTPATIENT
Start: 2024-10-21 | End: 2024-10-23 | Stop reason: HOSPADM

## 2024-10-21 RX ORDER — PANTOPRAZOLE SODIUM 40 MG/1
40 TABLET, DELAYED RELEASE ORAL
Status: DISCONTINUED | OUTPATIENT
Start: 2024-10-22 | End: 2024-10-23 | Stop reason: HOSPADM

## 2024-10-21 RX ORDER — HEPARIN SODIUM 5000 [USP'U]/ML
5000 INJECTION, SOLUTION INTRAVENOUS; SUBCUTANEOUS EVERY 8 HOURS SCHEDULED
Status: DISCONTINUED | OUTPATIENT
Start: 2024-10-21 | End: 2024-10-23 | Stop reason: HOSPADM

## 2024-10-21 RX ORDER — POTASSIUM CHLORIDE 750 MG/1
10 TABLET, EXTENDED RELEASE ORAL DAILY
Status: ON HOLD | COMMUNITY

## 2024-10-21 RX ADMIN — Medication 1 SPRAY: at 23:22

## 2024-10-21 RX ADMIN — HEPARIN SODIUM 5000 UNITS: 5000 INJECTION INTRAVENOUS; SUBCUTANEOUS at 21:55

## 2024-10-21 RX ADMIN — IOHEXOL 100 ML: 350 INJECTION, SOLUTION INTRAVENOUS at 18:31

## 2024-10-21 RX ADMIN — SODIUM CHLORIDE 1000 ML: 0.9 INJECTION, SOLUTION INTRAVENOUS at 16:55

## 2024-10-21 RX ADMIN — VANCOMYCIN HYDROCHLORIDE 1250 MG: 1 INJECTION, POWDER, LYOPHILIZED, FOR SOLUTION INTRAVENOUS at 21:55

## 2024-10-21 RX ADMIN — SODIUM CHLORIDE 150 ML/HR: 0.9 INJECTION, SOLUTION INTRAVENOUS at 20:01

## 2024-10-21 NOTE — ED PROVIDER NOTES
Time reflects when diagnosis was documented in both MDM as applicable and the Disposition within this note       Time User Action Codes Description Comment    10/21/2024  8:26 PM Adriano Browning Add [U07.1] COVID-19           ED Disposition       ED Disposition   Admit    Condition   Stable    Date/Time   Mon Oct 21, 2024  8:26 PM    Comment   Case was discussed with Morgan Gongora and the patient's admission status was agreed to be Admission Status: inpatient status to the service of Dr. Espana .               Assessment & Plan       Medical Decision Making  84-year-old male presents to the emergency department after being seen by his primary care provider earlier today.  The patient was recently admitted for a viral syndrome and was found to have HSV as well as enterovirus and then was also found to have thrush.  The patient has been on medications out of the hospital including valacyclovir as well as Diflucan however valacyclovir seem to give the patient a plaque-like raised pruritic rash.  The patient however has been having continual fevers ever since being discharged and was looking very fatigued and his doctor's office today, prompting them to send him to the ER.  Upon our evaluation, the patient does not really have any significant signs of rash anymore as family has been giving him Benadryl.  This may relate to some of the fatigue the patient is having however the patient has been historically febrile multiple times this week.  The patient was found to have an elevated white count with a lactate over 4 and 22% bands.  The patient had been having abdominal discomfort as well as nausea vomiting and diarrhea as well as cough.  Patient's COVID swab is positive and yet the patient's chest x-ray is not definitive for pneumonia.  Abdominal CT shows lymphadenopathy but no definitive colitis.  Stool was ordered for C. difficile as well as culture due to the patient's elevated lactic acid and Pro-Kendrick.  The patient  however was found to be hyponatremic and being that he was not tachycardic or hypotensive, the decision to hydrate him slowly was made in order to not create a seizure.  The patient will be admitted to the hospital, and was given a dose of vancomycin however the source is not yet determined.  Vasile has decided to involve infectious disease with this patient.  Patient admitted.    Amount and/or Complexity of Data Reviewed  Labs: ordered. Decision-making details documented in ED Course.  Radiology: ordered and independent interpretation performed.    Risk  Prescription drug management.  Decision regarding hospitalization.        ED Course as of 10/22/24 0017   Mon Oct 21, 2024   1854 SARS COV Rapid Antigen(!): Positive   1854 Procalcitonin(!): 4.96   1854 Patient needs gentle fluid rehydration due to the fact that the sodium level is critically low.   1918 Bands %(!): 22 1944 Discussed with the hospitalist service who recommends that the patient get maintenance fluids only at  this time due to hyponatremia.       Medications   sodium chloride 0.9 % infusion (150 mL/hr Intravenous New Bag 10/21/24 2001)   ondansetron (ZOFRAN) injection 4 mg (has no administration in time range)   heparin (porcine) subcutaneous injection 5,000 Units (has no administration in time range)   sodium chloride 0.9 % bolus 1,000 mL (0 mL Intravenous Stopped 10/21/24 1935)   iohexol (OMNIPAQUE) 350 MG/ML injection (MULTI-DOSE) 100 mL (100 mL Intravenous Given 10/21/24 1831)   vancomycin (VANCOCIN) 1,250 mg in sodium chloride 0.9 % 250 mL IVPB (has no administration in time range)       ED Risk Strat Scores                           SBIRT 22yo+      Flowsheet Row Most Recent Value   Initial Alcohol Screen: US AUDIT-C     1. How often do you have a drink containing alcohol? 0 Filed at: 10/21/2024 8654   2. How many drinks containing alcohol do you have on a typical day you are drinking?  0 Filed at: 10/21/2024 1637   3a. Male UNDER 65: How often  do you have five or more drinks on one occasion? 0 Filed at: 10/21/2024 1637   3b. FEMALE Any Age, or MALE 65+: How often do you have 4 or more drinks on one occassion? 0 Filed at: 10/21/2024 1637   Audit-C Score 0 Filed at: 10/21/2024 1637   ANTIONE: How many times in the past year have you...    Used an illegal drug or used a prescription medication for non-medical reasons? Never Filed at: 10/21/2024 1637                            History of Present Illness       Chief Complaint   Patient presents with    Weakness - Generalized     Pt recently hospitalized due to sepsis -- now having fevers again (afebrile in office but took tylenol three hours ago), weak, short of breath at times, chills, and tachy at 102 in the office today, rash all over body    Shortness of Breath       Past Medical History:   Diagnosis Date    Prostate cancer (HCC)       Past Surgical History:   Procedure Laterality Date    APPENDECTOMY        History reviewed. No pertinent family history.   Social History     Tobacco Use    Smoking status: Never    Smokeless tobacco: Never   Vaping Use    Vaping status: Never Used   Substance Use Topics    Alcohol use: Yes     Comment: Occasional    Drug use: Never      E-Cigarette/Vaping    E-Cigarette Use Never User       E-Cigarette/Vaping Substances      I have reviewed and agree with the history as documented.     84-year-old male presents emergency room with his family secondary to fever chills and generalized weakness.  The patient was admitted few weeks ago for what was felt to be rhinovirus and subsequent secondary pneumonia.  The patient was on medication (Valtrex) and then developed a rash on his chest arms and legs.  The patient was told that this could be a medication reaction and apparently his doctor changed his medication back to Diflucan which is what he was on prior due to intraoral manifestations of disease, likely infectious..  The patient has been complaining of cough congestion and mild  abdominal tenderness with nausea vomiting and diarrhea.        Review of Systems   Constitutional:  Positive for activity change, fatigue and fever. Negative for chills.   HENT:  Negative for ear pain and sore throat.    Eyes:  Negative for pain and visual disturbance.   Respiratory:  Positive for cough. Negative for shortness of breath.    Cardiovascular:  Negative for chest pain and palpitations.   Gastrointestinal:  Positive for abdominal pain, diarrhea, nausea and vomiting.   Genitourinary:  Negative for dysuria and hematuria.   Musculoskeletal:  Negative for arthralgias and back pain.   Skin:  Negative for color change and rash.   Neurological:  Positive for weakness. Negative for seizures and syncope.   All other systems reviewed and are negative.          Objective       ED Triage Vitals   Temperature Pulse Blood Pressure Respirations SpO2 Patient Position - Orthostatic VS   10/21/24 1635 10/21/24 1635 10/21/24 1635 10/21/24 1635 10/21/24 1635 10/21/24 1635   (!) 97.3 °F (36.3 °C) 86 117/72 20 96 % Sitting      Temp Source Heart Rate Source BP Location FiO2 (%) Pain Score    10/21/24 1635 10/21/24 1635 10/21/24 1635 -- 10/21/24 2215    Oral Monitor Left arm  6      Vitals      Date and Time Temp Pulse SpO2 Resp BP Pain Score FACES Pain Rating User   10/21/24 2323 97.9 °F (36.6 °C) 94 92 % -- -- -- -- DII   10/21/24 2317 98.4 °F (36.9 °C) 96 95 % 20 127/79 -- -- DII   10/21/24 2215 -- -- -- -- -- 6 -- JT   10/21/24 2103 98 °F (36.7 °C) 93 94 % 20 117/78 -- -- DII   10/21/24 1957 97.3 °F (36.3 °C) 84 94 % 20 121/70 -- -- EM   10/21/24 1815 -- 81 94 % 20 115/70 -- -- EM   10/21/24 1800 -- 86 94 % 19 114/72 -- -- EM   10/21/24 1745 -- 78 93 % 20 122/65 -- -- EM   10/21/24 1730 -- 78 94 % 19 127/63 -- -- EM   10/21/24 1700 -- 81 94 % 20 111/68 -- -- EM   10/21/24 1645 -- 84 96 % 22 112/71 -- -- EM   10/21/24 1635 97.3 °F (36.3 °C) 86 96 % 20 117/72 -- -- EM            Physical Exam  Constitutional:       General:  He is not in acute distress.     Appearance: Normal appearance. He is normal weight. He is not ill-appearing or diaphoretic.      Comments: Patient appears fatigued.   HENT:      Head: Normocephalic and atraumatic.      Right Ear: External ear normal.      Left Ear: External ear normal.      Nose: Nose normal. No congestion.      Mouth/Throat:      Mouth: Mucous membranes are moist.   Eyes:      Conjunctiva/sclera: Conjunctivae normal.   Cardiovascular:      Rate and Rhythm: Normal rate and regular rhythm.      Pulses: Normal pulses.      Heart sounds: Normal heart sounds.   Pulmonary:      Effort: Pulmonary effort is normal.      Breath sounds: Normal breath sounds.   Abdominal:      General: Abdomen is flat. There is no distension.      Palpations: Abdomen is soft. There is no mass.      Tenderness: There is abdominal tenderness. There is no guarding or rebound.   Musculoskeletal:         General: No swelling, tenderness, deformity or signs of injury. Normal range of motion.      Cervical back: Normal range of motion.   Skin:     General: Skin is warm and dry.      Capillary Refill: Capillary refill takes 2 to 3 seconds.      Coloration: Skin is not pale.   Neurological:      General: No focal deficit present.      Mental Status: He is alert and oriented to person, place, and time. Mental status is at baseline.   Psychiatric:         Mood and Affect: Mood normal.         Results Reviewed       Procedure Component Value Units Date/Time    Blood culture #1 [[id number]] Collected: 10/21/24 1649    Lab Status: Preliminary result Specimen: Blood from Arm, Left Updated: 10/22/24 0001     Blood Culture Received in Microbiology Lab. Culture in Progress.    Blood culture #2 [[id number]] Collected: 10/21/24 1649    Lab Status: Preliminary result Specimen: Blood from Arm, Right Updated: 10/22/24 0001     Blood Culture Received in Microbiology Lab. Culture in Progress.    Lactic acid 2 Hours [[id number]]  (Abnormal) Collected:  10/21/24 1936    Lab Status: Final result Specimen: Blood from Arm, Right Updated: 10/21/24 2007     LACTIC ACID 3.5 mmol/L     Narrative:      Result may be elevated if tourniquet was used during collection.    BMP Q8 hours X 3 (Hyponatremia monitoring) [[id number]]     Lab Status: No result Specimen: Blood     RBC Morphology Reflex Test [[id number]] Collected: 10/21/24 1649    Lab Status: Final result Specimen: Blood from Arm, Right Updated: 10/21/24 2001    LYME TOTAL AB W REFLEX TO IGM/IGG [[id number]] Collected: 10/21/24 1936    Lab Status: In process Specimen: Blood from Arm, Right Updated: 10/21/24 1942    Narrative:      The following orders were created for panel order LYME TOTAL AB W REFLEX TO IGM/IGG.  Procedure                               Abnormality         Status                     ---------                               -----------         ------                     Lyme Total AB W Reflex t...[[id number]]                      In process                   Please view results for these tests on the individual orders.    Lyme Total AB W Reflex to IGM/IGG [[id number]] Collected: 10/21/24 1936    Lab Status: In process Specimen: Blood from Arm, Right Updated: 10/21/24 1942    Respiratory Panel 2.1(RP2)with COVID19 [[id number]] Collected: 10/21/24 1936    Lab Status: In process Specimen: Nasopharyngeal Swab Updated: 10/21/24 1942    Stool Enteric Bacterial Panel by PCR [[id number]]     Lab Status: No result Specimen: Stool     Clostridium difficile toxin by PCR with EIA [[id number]]     Lab Status: No result Specimen: Stool from Per Rectum     CBC and differential [[id number]]  (Abnormal) Collected: 10/21/24 1649    Lab Status: Final result Specimen: Blood from Arm, Right Updated: 10/21/24 1916     WBC 7.30 Thousand/uL      RBC 3.59 Million/uL      Hemoglobin 11.0 g/dL      Hematocrit 33.2 %      MCV 93 fL      MCH 30.6 pg      MCHC 33.1 g/dL      RDW 15.5 %      MPV 12.4 fL      Platelets 330 Thousands/uL      Narrative:      This is an appended report.  These results have been appended to a previously verified report.    Manual Differential(PHLEBS Do Not Order) [[id number]]  (Abnormal) Collected: 10/21/24 1649    Lab Status: Final result Specimen: Blood from Arm, Right Updated: 10/21/24 1916     Segmented % 42 %      Bands % 22 %      Lymphocytes % 18 %      Monocytes % 9 %      Eosinophils % 1 %      Basophils % 1 %      Metamyelocytes % 1 %      Myelocytes % 1 %      Atypical Lymphocytes % 5 %      Absolute Neutrophils 4.67 Thousand/uL      Absolute Lymphocytes 1.68 Thousand/uL      Absolute Monocytes 0.66 Thousand/uL      Absolute Eosinophils 0.07 Thousand/uL      Absolute Basophils 0.07 Thousand/uL      Absolute Metamyelocytes 0.07 Thousand/uL      Absolute Myelocytes 0.07 Thousand/uL      Total Counted --     RBC Morphology Present     Platelet Estimate Adequate     Anisocytosis Present     Polychromasia Present    Lactic acid [[id number]]  (Abnormal) Collected: 10/21/24 1649    Lab Status: Final result Specimen: Blood from Arm, Right Updated: 10/21/24 1822     LACTIC ACID 4.2 mmol/L     Narrative:      Result may be elevated if tourniquet was used during collection.    Procalcitonin [[id number]]  (Abnormal) Collected: 10/21/24 1649    Lab Status: Final result Specimen: Blood from Arm, Right Updated: 10/21/24 1817     Procalcitonin 4.96 ng/ml     Comprehensive metabolic panel [[id number]]  (Abnormal) Collected: 10/21/24 1649    Lab Status: Final result Specimen: Blood from Arm, Right Updated: 10/21/24 1810     Sodium 123 mmol/L      Potassium 4.4 mmol/L      Chloride 92 mmol/L      CO2 23 mmol/L      ANION GAP 8 mmol/L      BUN 29 mg/dL      Creatinine 0.88 mg/dL      Glucose 99 mg/dL      Calcium 8.3 mg/dL      Corrected Calcium 9.9 mg/dL       U/L      ALT 42 U/L      Alkaline Phosphatase 179 U/L      Total Protein 6.8 g/dL      Albumin 2.0 g/dL      Total Bilirubin 2.04 mg/dL      eGFR 77 ml/min/1.73sq m      Narrative:      National Kidney Disease Foundation guidelines for Chronic Kidney Disease (CKD):     Stage 1 with normal or high GFR (GFR > 90 mL/min/1.73 square meters)    Stage 2 Mild CKD (GFR = 60-89 mL/min/1.73 square meters)    Stage 3A Moderate CKD (GFR = 45-59 mL/min/1.73 square meters)    Stage 3B Moderate CKD (GFR = 30-44 mL/min/1.73 square meters)    Stage 4 Severe CKD (GFR = 15-29 mL/min/1.73 square meters)    Stage 5 End Stage CKD (GFR <15 mL/min/1.73 square meters)  Note: GFR calculation is accurate only with a steady state creatinine    FLU/COVID Rapid Antigen (30 min. TAT) - Preferred screening test in ED [[id number]]  (Abnormal) Collected: 10/21/24 1649    Lab Status: Final result Specimen: Nares from Nose Updated: 10/21/24 1807     SARS COV Rapid Antigen Positive     Influenza A Rapid Antigen Negative     Influenza B Rapid Antigen Negative    Narrative:      This test has been performed using the Quidel Margarita 2 FLU+SARS Antigen test under the Emergency Use Authorization (EUA). This test has been validated by the  and verified by the performing laboratory. The Margarita uses lateral flow immunofluorescent sandwich assay to detect SARS-COV, Influenza A and Influenza B Antigen.     The Quidel Margarita 2 SARS Antigen test does not differentiate between SARS-CoV and SARS-CoV-2.     Negative results are presumptive and may be confirmed with a molecular assay, if necessary, for patient management. Negative results do not rule out SARS-CoV-2 or influenza infection and should not be used as the sole basis for treatment or patient management decisions. A negative test result may occur if the level of antigen in a sample is below the limit of detection of this test.     Positive results are indicative of the presence of viral antigens, but do not rule out bacterial infection or co-infection with other viruses.     All test results should be used as an adjunct to clinical observations and other information  available to the provider.    FOR PEDIATRIC PATIENTS - copy/paste COVID Guidelines URL to browser: [email or web address]    Protime-INR [[id number]]  (Normal) Collected: 10/21/24 1649    Lab Status: Final result Specimen: Blood from Arm, Right Updated: 10/21/24 1803     Protime 14.2 seconds      INR 1.05    Narrative:      INR Therapeutic Range    Indication                                             INR Range      Atrial Fibrillation                                               2.0-3.0  Hypercoagulable State                                    2.0.2.3  Left Ventricular Asist Device                            2.0-3.0  Mechanical Heart Valve                                  -    Aortic(with afib, MI, embolism, HF, LA enlargement,    and/or coagulopathy)                                     2.0-3.0 (2.5-3.5)     Mitral                                                             2.5-3.5  Prosthetic/Bioprosthetic Heart Valve               2.0-3.0  Venous thromboembolism (VTE: VT, PE        2.0-3.0    APTT [[id number]]  (Normal) Collected: 10/21/24 1649    Lab Status: Final result Specimen: Blood from Arm, Right Updated: 10/21/24 1803     PTT 33 seconds     UA w Reflex to Microscopic w Reflex to Culture [[id number]]     Lab Status: No result Specimen: Urine             CT abdomen pelvis with contrast   Final Interpretation by Daniel Daniels MD (10/21 1958)         1. No evidence of bowel obstruction, colitis or diverticulitis.   2. Numerous mildly enlarged para-aortic, iliac chain and inguinal lymph nodes, possibly reactive however lymphoproliferative disorder not excluded.         Workstation performed: [id number]         XR chest 1 view portable   ED Interpretation by Adriano Browning Jr., DO (10/21 1706)   No definitive acute pulmonary pathology.          ECG 12 Lead Documentation Only    Date/Time: 10/21/2024 4:47 PM    Performed by: Adriano Browning Jr.,  DO  Authorized by: Adriano Browning Jr., DO    ECG reviewed by me, the ED Provider: yes    Patient location:  ED  Comments:      EKG is a sinus rhythm at 80 beats a minute with a left axis deviation.  There is occasional PACs noted.  There is no other definitive acute ST or T wave changes appreciated.      ED Medication and Procedure Management   Prior to Admission Medications   Prescriptions Last Dose Informant Patient Reported? Taking?   benzonatate (TESSALON) 200 MG capsule 10/21/2024  No Yes   Sig: Take 1 capsule (200 mg total) by mouth 2 (two) times a day as needed for cough   cyclobenzaprine (FLEXERIL) 5 mg tablet 10/21/2024  Yes Yes   Sig: Take 5 mg by mouth 3 (three) times a day as needed   fluconazole (DIFLUCAN) 200 mg tablet Not Taking  No No   Sig: Take 2 tablets (400 mg total) by mouth daily for 11 doses   Patient not taking: Reported on 10/21/2024   fluticasone (FLONASE) 50 mcg/act nasal spray 10/21/2024  No Yes   Si spray into each nostril daily   hydroCHLOROthiazide 25 mg tablet 10/21/2024  Yes Yes   Sig: Take 1 tablet by mouth daily   ibuprofen (MOTRIN) 800 mg tablet 10/21/2024  No Yes   Sig: Take 1 tablet (800 mg total) by mouth every 8 (eight) hours as needed for moderate pain   levothyroxine 50 mcg tablet 10/21/2024  Yes Yes   Sig: Take 50 mcg by mouth daily   levothyroxine 75 mcg tablet 10/21/2024  Yes Yes   Sig: Take 75 mcg by mouth Contingent Taken every Friday through    pantoprazole (PROTONIX) 40 mg tablet 10/21/2024  No Yes   Sig: Take 1 tablet (40 mg total) by mouth daily in the early morning   potassium chloride (Klor-Con M10) 10 mEq tablet 10/21/2024  Yes Yes   Sig: Take 10 mEq by mouth daily      Facility-Administered Medications: None     Current Discharge Medication List        CONTINUE these medications which have NOT CHANGED    Details   benzonatate (TESSALON) 200 MG capsule Take 1 capsule (200 mg total) by mouth 2 (two) times a day as needed for cough  Qty: 40 capsule,  Refills: 2    Associated Diagnoses: Cough, unspecified type      cyclobenzaprine (FLEXERIL) 5 mg tablet Take 5 mg by mouth 3 (three) times a day as needed      fluticasone (FLONASE) 50 mcg/act nasal spray 1 spray into each nostril daily  Qty: 11.1 mL, Refills: 1    Associated Diagnoses: Cough, unspecified type      hydroCHLOROthiazide 25 mg tablet Take 1 tablet by mouth daily      ibuprofen (MOTRIN) 800 mg tablet Take 1 tablet (800 mg total) by mouth every 8 (eight) hours as needed for moderate pain  Qty: 60 tablet, Refills: 2    Associated Diagnoses: Lumbar radiculopathy      !! levothyroxine 50 mcg tablet Take 50 mcg by mouth daily      !! levothyroxine 75 mcg tablet Take 75 mcg by mouth Contingent Taken every Friday through Sunday      pantoprazole (PROTONIX) 40 mg tablet Take 1 tablet (40 mg total) by mouth daily in the early morning  Qty: 30 tablet, Refills: 0    Associated Diagnoses: Candida esophagitis (HCC)      potassium chloride (Klor-Con M10) 10 mEq tablet Take 10 mEq by mouth daily      fluconazole (DIFLUCAN) 200 mg tablet Take 2 tablets (400 mg total) by mouth daily for 11 doses  Qty: 22 tablet, Refills: 0    Associated Diagnoses: Candida esophagitis (HCC)       !! - Potential duplicate medications found. Please discuss with provider.        No discharge procedures on file.  ED SEPSIS DOCUMENTATION   Time reflects when diagnosis was documented in both MDM as applicable and the Disposition within this note       Time User Action Codes Description Comment    10/21/2024  8:26 PM Adriano Browning Add [U07.1] COVID-19                  Adriano Browning Jr., DO  10/22/24 0017

## 2024-10-21 NOTE — TELEPHONE ENCOUNTER
"Appointment scheduled for today 10.21.24 at 12:40pm with Jessica Hein PA-C. Advised to call back with questions or concerns.    Patient would like to schedule with Dr. Foster, as he is [place] speaking, but no appointments are available. Spoke with Dima at office, Dr. Foster not in office today. SonBuck made aware and father is willing to see another provider.     Reason for Disposition   [1] Fever > 101 F (38.3 C) AND [2] age > 60 years    Answer Assessment - Initial Assessment Questions  1. TEMPERATURE: \"What is the most recent temperature?\"  \"How was it measured?\"       102.0 ear, Right now 100.7 ear  2. ONSET: \"When did the fever start?\"       Started 4 days ago  3. CHILLS: \"Do you have chills?\" If yes: \"How bad are they?\"  (e.g., none, mild, moderate, severe)      Had chills 4 days ago, mild  4. OTHER SYMPTOMS: \"Do you have any other symptoms besides the fever?\"  (e.g., abdomen pain, cough, diarrhea, earache, headache, sore throat, urination pain)      Diarrhea, sore throat, abdominal pain by belly button, nausea  5. CAUSE: If there are no symptoms, ask: \"What do you think is causing the fever?\"       Unsure  6. CONTACTS: \"Does anyone else in the family have an infection?\"      Denies  7. TREATMENT: \"What have you done so far to treat this fever?\" (e.g., OTC fever medicines)      Tylenol, was on Diflucan was told to stop and start Valtrex, developed rash and was then told to stop Valtrex and restart Diflucan. Rash seems to be getting better. Rash over body. Son gave Benadryl last night because of itch  8. IMMUNOCOMPROMISE: \"Do you have of the following: diabetes, HIV positive, splenectomy, cancer chemotherapy, chronic steroid treatment, transplant patient, etc.?\"      Denies  10. TRAVEL: \"Have you traveled out of the country in the last month?\" (e.g., travel history, exposures)        Denies      Was in hospital and dx sepsis. D/C 10.15.24    Protocols used: Fever-Adult-AH    "

## 2024-10-21 NOTE — TELEPHONE ENCOUNTER
I will evaluate at [facility] this afternoon -- however with his age and recent hospitalization for sepsis, low threshold of sending back to ER for stat labs and sepsis rule out with new reoccurring fever.

## 2024-10-21 NOTE — PROGRESS NOTES
"Ambulatory Visit  Name: Miguel Henderson      : 1940      MRN: [id number]  Encounter Provider: Jessica Hein PA-C  Encounter Date: 10/21/2024   Encounter department: [facility]    Assessment & Plan  Fever, unspecified fever cause  - patient presents with son for evaluation of recurrent fevers x 5 days after recently being admitted for 8 days last week for severe sepsis and treatment with ceftriaxone and azithromycin; patient also now has new onset rash x 5 days -- erythematous papules on abdomen, arms, legs, and back; suspected drug reaction however it is unclear if patient is taking valtrex or diflucan as son reports \"spoke to doctor who changed medications\", however no documentation of such encounter   - patient's temperature in office today is 99.4F, however received tylenol three hours ago -- he is also tachycardic with initial pulse of 101, repeated by me and continued to be tachy at 102; son reports home readings have been high 90s   - patient appears ill and is falling asleep in exam room -- son reports increased weakness, episodes of SOB, and not eating/drinking much due to throat pain from candida esophagitis   - given patient's age, recent hospitalization for sepsis, new rash, and recurrent fevers with tachycardia I did advise to go back to the ED for STAT labs and imaging to rule out reoccurrence of sepsis, son is agreeable and reports he will take him to [facility]. ADT order placed. They decline EMS.   - overall, will be going to ER for further evaluation     Orders:    Transfer to other facility    Rash    Orders:    Transfer to other facility    Tachycardia, unspecified    Orders:    Transfer to other facility    Weakness    Orders:    Transfer to other facility    Severe sepsis (HCC)    Orders:    Transfer to other facility    Candida esophagitis (HCC)    Orders:    Transfer to other facility       History of Present Illness     CC: fever, weakness, rash     Patient " "presents with his son -- patient is [place] speaking, son translates throughout the visit.   Patient was admitted from 10/7-10/15 for severe sepsis secondary to pneumonia -- was also diagnosed with candida esophagitis. ID was following -- was discharged and did his TCM with PCP on 10/16 last week.   However last Thursday, 10/17 patient's son reports he started getting fevers again -- has gotten a fever everyday since this for the last 5 days. Son reports yesterday was 102.5F and patient reported chills. Gave him tylenol which improved fever. Patient woke up this morning, had fever of 100.7 F and reported chills again -- son gave him tylenol at 10 am (about three hours ago) and he started feeling better  Same day that fevers started (10/17) patient broke out in rash on his chest, arms, and legs -- patient reports \"brother called the doctor\" however no record of this and they told him to discontinue the valtrex for HSV and restart the diflucan (unclear due to patient's confusion and lack of documentation in patient's chart?)   Reports patient is very weak and much more tired than usual - he has been having trouble eating and drinking as well due to his throat pain from the candida       History obtained from : patient's son who is translating for him  Review of Systems   Constitutional:  Positive for appetite change, chills, fatigue and fever. Negative for diaphoresis.   Respiratory:  Positive for shortness of breath. Negative for chest tightness and wheezing.    Cardiovascular:  Negative for chest pain and palpitations.   Gastrointestinal:  Negative for abdominal pain.   Genitourinary:  Negative for difficulty urinating, dysuria, frequency, hematuria and urgency.   Neurological:  Positive for weakness. Negative for dizziness, light-headedness and headaches.     Medical History Reviewed by provider this encounter:       Past Medical History   Past Medical History:   Diagnosis Date    Prostate cancer (HCC)     Thyroid " cancer (HCC)      Past Surgical History:   Procedure Laterality Date    APPENDECTOMY       No family history on file.  Current Outpatient Medications on File Prior to Visit   Medication Sig Dispense Refill    benzonatate (TESSALON) 200 MG capsule Take 1 capsule (200 mg total) by mouth 2 (two) times a day as needed for cough 40 capsule 2    cyclobenzaprine (FLEXERIL) 5 mg tablet Take 5 mg by mouth 3 (three) times a day as needed      fluconazole (DIFLUCAN) 200 mg tablet Take 2 tablets (400 mg total) by mouth daily for 11 doses 22 tablet 0    fluticasone (FLONASE) 50 mcg/act nasal spray 1 spray into each nostril daily 11.1 mL 1    hydroCHLOROthiazide 25 mg tablet Take 1 tablet by mouth daily      ibuprofen (MOTRIN) 800 mg tablet Take 1 tablet (800 mg total) by mouth every 8 (eight) hours as needed for moderate pain 60 tablet 2    levothyroxine 50 mcg tablet Take 50 mcg by mouth daily      levothyroxine 75 mcg tablet Take 75 mcg by mouth Contingent Taken every Friday through Sunday      pantoprazole (PROTONIX) 40 mg tablet Take 1 tablet (40 mg total) by mouth daily in the early morning 30 tablet 0    valACYclovir (VALTREX) 1,000 mg tablet Take 1 tablet (1,000 mg total) by mouth 2 (two) times a day for 7 days 14 tablet 0     No current facility-administered medications on file prior to visit.     Allergies   Allergen Reactions    Shellfish Allergy - Food Allergy Anaphylaxis      Current Outpatient Medications on File Prior to Visit   Medication Sig Dispense Refill    benzonatate (TESSALON) 200 MG capsule Take 1 capsule (200 mg total) by mouth 2 (two) times a day as needed for cough 40 capsule 2    cyclobenzaprine (FLEXERIL) 5 mg tablet Take 5 mg by mouth 3 (three) times a day as needed      fluconazole (DIFLUCAN) 200 mg tablet Take 2 tablets (400 mg total) by mouth daily for 11 doses 22 tablet 0    fluticasone (FLONASE) 50 mcg/act nasal spray 1 spray into each nostril daily 11.1 mL 1    hydroCHLOROthiazide 25 mg tablet  "Take 1 tablet by mouth daily      ibuprofen (MOTRIN) 800 mg tablet Take 1 tablet (800 mg total) by mouth every 8 (eight) hours as needed for moderate pain 60 tablet 2    levothyroxine 50 mcg tablet Take 50 mcg by mouth daily      levothyroxine 75 mcg tablet Take 75 mcg by mouth Contingent Taken every Friday through Sunday      pantoprazole (PROTONIX) 40 mg tablet Take 1 tablet (40 mg total) by mouth daily in the early morning 30 tablet 0    valACYclovir (VALTREX) 1,000 mg tablet Take 1 tablet (1,000 mg total) by mouth 2 (two) times a day for 7 days 14 tablet 0     No current facility-administered medications on file prior to visit.      Social History     Tobacco Use    Smoking status: Never    Smokeless tobacco: Never   Vaping Use    Vaping status: Never Used   Substance and Sexual Activity    Alcohol use: Yes     Comment: Occasional    Drug use: Never    Sexual activity: Not on file     Objective     /86   Pulse 102   Temp 99.4 °F (37.4 °C)   Ht 5' 5\" (1.651 m)   Wt 75.8 kg (167 lb)   SpO2 96%   BMI 27.79 kg/m²     Physical Exam  Vitals reviewed.   Constitutional:       General: He is not in acute distress.     Appearance: Normal appearance. He is ill-appearing. He is not diaphoretic.      Comments: Pt falling asleep in exam room   HENT:      Head: Normocephalic and atraumatic.      Right Ear: External ear normal.      Left Ear: External ear normal.      Nose: Nose normal.      Mouth/Throat:      Mouth: Mucous membranes are moist.   Eyes:      General:         Right eye: No discharge.         Left eye: No discharge.      Conjunctiva/sclera: Conjunctivae normal.   Cardiovascular:      Rate and Rhythm: Normal rate and regular rhythm.      Pulses: Normal pulses.      Heart sounds: Normal heart sounds.   Pulmonary:      Effort: Pulmonary effort is normal. No respiratory distress.      Breath sounds: Normal breath sounds. No wheezing, rhonchi or rales.   Musculoskeletal:      Cervical back: Normal range of " motion.   Skin:     General: Skin is warm.      Findings: Rash (diffuse erythematous papules on arms, abdomen, and legs) present.   Neurological:      General: No focal deficit present.      Mental Status: He is alert.      Gait: Gait abnormal (using walker for assistance).   Psychiatric:         Mood and Affect: Mood normal.

## 2024-10-22 PROBLEM — J18.9 COMMUNITY ACQUIRED PNEUMONIA OF RIGHT UPPER LOBE OF LUNG: Status: RESOLVED | Noted: 2024-10-08 | Resolved: 2024-10-22

## 2024-10-22 PROBLEM — R50.9 FEBRILE ILLNESS: Status: ACTIVE | Noted: 2024-10-22

## 2024-10-22 PROBLEM — R13.10 ODYNOPHAGIA: Status: ACTIVE | Noted: 2024-10-22

## 2024-10-22 PROBLEM — E87.1 HYPONATREMIA: Status: ACTIVE | Noted: 2024-10-22

## 2024-10-22 PROBLEM — N17.9 ACUTE KIDNEY INJURY (HCC): Status: RESOLVED | Noted: 2024-10-09 | Resolved: 2024-10-22

## 2024-10-22 PROBLEM — E87.20 LACTIC ACIDOSIS: Status: ACTIVE | Noted: 2024-10-22

## 2024-10-22 PROBLEM — B37.0 ORAL CANDIDIASIS: Status: ACTIVE | Noted: 2024-10-22

## 2024-10-22 PROBLEM — B00.9 HSV-1 (HERPES SIMPLEX VIRUS 1) INFECTION: Status: ACTIVE | Noted: 2024-10-22

## 2024-10-22 PROBLEM — R50.9 FEVER: Status: ACTIVE | Noted: 2024-10-22

## 2024-10-22 LAB
ALBUMIN SERPL BCG-MCNC: 1.8 G/DL (ref 3.5–5)
ALP SERPL-CCNC: 158 U/L (ref 34–104)
ALT SERPL W P-5'-P-CCNC: 35 U/L (ref 7–52)
ANION GAP SERPL CALCULATED.3IONS-SCNC: 7 MMOL/L (ref 4–13)
AST SERPL W P-5'-P-CCNC: 124 U/L (ref 13–39)
ATRIAL RATE: 88 BPM
ATRIAL RATE: 89 BPM
B BURGDOR IGG+IGM SER QL IA: NEGATIVE
B PARAP IS1001 DNA NPH QL NAA+NON-PROBE: NOT DETECTED
B PERT.PT PRMT NPH QL NAA+NON-PROBE: NOT DETECTED
BACTERIA UR QL AUTO: ABNORMAL /HPF
BILIRUB SERPL-MCNC: 1.72 MG/DL (ref 0.2–1)
BILIRUB UR QL STRIP: ABNORMAL
BLD SMEAR FINDING POSITIVE INTERP: NO
BUN SERPL-MCNC: 18 MG/DL (ref 5–25)
BUN SERPL-MCNC: 21 MG/DL (ref 5–25)
BUN SERPL-MCNC: 21 MG/DL (ref 5–25)
BUN SERPL-MCNC: 24 MG/DL (ref 5–25)
C PNEUM DNA NPH QL NAA+NON-PROBE: NOT DETECTED
CALCIUM ALBUM COR SERPL-MCNC: 9.4 MG/DL (ref 8.3–10.1)
CALCIUM SERPL-MCNC: 7.6 MG/DL (ref 8.4–10.2)
CALCIUM SERPL-MCNC: 7.6 MG/DL (ref 8.4–10.2)
CALCIUM SERPL-MCNC: 7.7 MG/DL (ref 8.4–10.2)
CALCIUM SERPL-MCNC: 7.7 MG/DL (ref 8.4–10.2)
CHLORIDE SERPL-SCNC: 100 MMOL/L (ref 96–108)
CHLORIDE SERPL-SCNC: 100 MMOL/L (ref 96–108)
CHLORIDE SERPL-SCNC: 101 MMOL/L (ref 96–108)
CHLORIDE SERPL-SCNC: 98 MMOL/L (ref 96–108)
CLARITY UR: CLEAR
CO2 SERPL-SCNC: 19 MMOL/L (ref 21–32)
CO2 SERPL-SCNC: 21 MMOL/L (ref 21–32)
COLOR UR: YELLOW
CREAT SERPL-MCNC: 0.54 MG/DL (ref 0.6–1.3)
CREAT SERPL-MCNC: 0.55 MG/DL (ref 0.6–1.3)
CREAT SERPL-MCNC: 0.56 MG/DL (ref 0.6–1.3)
CREAT SERPL-MCNC: 0.62 MG/DL (ref 0.6–1.3)
ERYTHROCYTE [DISTWIDTH] IN BLOOD BY AUTOMATED COUNT: 15.9 % (ref 11.6–15.1)
FLUAV RNA NPH QL NAA+NON-PROBE: NOT DETECTED
FLUBV RNA NPH QL NAA+NON-PROBE: NOT DETECTED
GFR SERPL CREATININE-BSD FRML MDRD: 89 ML/MIN/1.73SQ M
GFR SERPL CREATININE-BSD FRML MDRD: 93 ML/MIN/1.73SQ M
GFR SERPL CREATININE-BSD FRML MDRD: 94 ML/MIN/1.73SQ M
GFR SERPL CREATININE-BSD FRML MDRD: 95 ML/MIN/1.73SQ M
GLUCOSE SERPL-MCNC: 75 MG/DL (ref 65–140)
GLUCOSE SERPL-MCNC: 82 MG/DL (ref 65–140)
GLUCOSE SERPL-MCNC: 97 MG/DL (ref 65–140)
GLUCOSE SERPL-MCNC: 98 MG/DL (ref 65–140)
GLUCOSE UR STRIP-MCNC: NEGATIVE MG/DL
HADV DNA NPH QL NAA+NON-PROBE: NOT DETECTED
HCOV 229E RNA NPH QL NAA+NON-PROBE: NOT DETECTED
HCOV HKU1 RNA NPH QL NAA+NON-PROBE: NOT DETECTED
HCOV NL63 RNA NPH QL NAA+NON-PROBE: NOT DETECTED
HCOV OC43 RNA NPH QL NAA+NON-PROBE: NOT DETECTED
HCT VFR BLD AUTO: 32 % (ref 36.5–49.3)
HGB BLD-MCNC: 10.3 G/DL (ref 12–17)
HGB UR QL STRIP.AUTO: ABNORMAL
HMPV RNA NPH QL NAA+NON-PROBE: NOT DETECTED
HPIV1 RNA NPH QL NAA+NON-PROBE: NOT DETECTED
HPIV2 RNA NPH QL NAA+NON-PROBE: NOT DETECTED
HPIV3 RNA NPH QL NAA+NON-PROBE: NOT DETECTED
HPIV4 RNA NPH QL NAA+NON-PROBE: NOT DETECTED
KETONES UR STRIP-MCNC: NEGATIVE MG/DL
LEUKOCYTE ESTERASE UR QL STRIP: NEGATIVE
M PNEUMO DNA NPH QL NAA+NON-PROBE: NOT DETECTED
MCH RBC QN AUTO: 28.9 PG (ref 26.8–34.3)
MCHC RBC AUTO-ENTMCNC: 32.2 G/DL (ref 31.4–37.4)
MCV RBC AUTO: 90 FL (ref 82–98)
MUCOUS THREADS UR QL AUTO: ABNORMAL
NITRITE UR QL STRIP: NEGATIVE
NON-SQ EPI CELLS URNS QL MICRO: ABNORMAL /HPF
P AXIS: 36 DEGREES
PARASITE BLD: NO
PARASITE BLD: NO
PARASITE INFECTED RBC BLD-NFR: 0 %
PH UR STRIP.AUTO: 6.5 [PH]
PLATELET # BLD AUTO: 283 THOUSANDS/UL (ref 149–390)
PMV BLD AUTO: 10.4 FL (ref 8.9–12.7)
POTASSIUM SERPL-SCNC: 3.7 MMOL/L (ref 3.5–5.3)
POTASSIUM SERPL-SCNC: 3.7 MMOL/L (ref 3.5–5.3)
POTASSIUM SERPL-SCNC: 4 MMOL/L (ref 3.5–5.3)
POTASSIUM SERPL-SCNC: 4.1 MMOL/L (ref 3.5–5.3)
PR INTERVAL: 152 MS
PR INTERVAL: 160 MS
PROT SERPL-MCNC: 6.1 G/DL (ref 6.4–8.4)
PROT UR STRIP-MCNC: ABNORMAL MG/DL
QRS AXIS: -21 DEGREES
QRS AXIS: -23 DEGREES
QRSD INTERVAL: 82 MS
QRSD INTERVAL: 82 MS
QT INTERVAL: 392 MS
QT INTERVAL: 396 MS
QTC INTERVAL: 476 MS
QTC INTERVAL: 479 MS
RBC # BLD AUTO: 3.56 MILLION/UL (ref 3.88–5.62)
RBC #/AREA URNS AUTO: ABNORMAL /HPF
RSV RNA NPH QL NAA+NON-PROBE: NOT DETECTED
RV+EV RNA NPH QL NAA+NON-PROBE: NOT DETECTED
SARS-COV-2 RNA NPH QL NAA+NON-PROBE: NOT DETECTED
SODIUM SERPL-SCNC: 126 MMOL/L (ref 135–147)
SODIUM SERPL-SCNC: 127 MMOL/L (ref 135–147)
SODIUM SERPL-SCNC: 128 MMOL/L (ref 135–147)
SODIUM SERPL-SCNC: 128 MMOL/L (ref 135–147)
SP GR UR STRIP.AUTO: 1.01
T WAVE AXIS: 37 DEGREES
T WAVE AXIS: 40 DEGREES
UROBILINOGEN UR QL STRIP.AUTO: 4 E.U./DL
VENTRICULAR RATE: 88 BPM
VENTRICULAR RATE: 89 BPM
WBC # BLD AUTO: 10.4 THOUSAND/UL (ref 4.31–10.16)
WBC #/AREA URNS AUTO: ABNORMAL /HPF

## 2024-10-22 PROCEDURE — 80048 BASIC METABOLIC PNL TOTAL CA: CPT | Performed by: EMERGENCY MEDICINE

## 2024-10-22 PROCEDURE — 93010 ELECTROCARDIOGRAM REPORT: CPT | Performed by: INTERNAL MEDICINE

## 2024-10-22 PROCEDURE — 85060 BLOOD SMEAR INTERPRETATION: CPT | Performed by: PATHOLOGY

## 2024-10-22 PROCEDURE — 87505 NFCT AGENT DETECTION GI: CPT | Performed by: EMERGENCY MEDICINE

## 2024-10-22 PROCEDURE — 99223 1ST HOSP IP/OBS HIGH 75: CPT | Performed by: INTERNAL MEDICINE

## 2024-10-22 PROCEDURE — 85027 COMPLETE CBC AUTOMATED: CPT | Performed by: PHYSICIAN ASSISTANT

## 2024-10-22 PROCEDURE — 87207 SMEAR SPECIAL STAIN: CPT | Performed by: PATHOLOGY

## 2024-10-22 PROCEDURE — 99222 1ST HOSP IP/OBS MODERATE 55: CPT | Performed by: STUDENT IN AN ORGANIZED HEALTH CARE EDUCATION/TRAINING PROGRAM

## 2024-10-22 PROCEDURE — 87207 SMEAR SPECIAL STAIN: CPT | Performed by: PHYSICIAN ASSISTANT

## 2024-10-22 PROCEDURE — 80053 COMPREHEN METABOLIC PANEL: CPT | Performed by: PHYSICIAN ASSISTANT

## 2024-10-22 PROCEDURE — 81001 URINALYSIS AUTO W/SCOPE: CPT | Performed by: EMERGENCY MEDICINE

## 2024-10-22 RX ORDER — FLUCONAZOLE 40 MG/ML
200 POWDER, FOR SUSPENSION ORAL DAILY
Status: DISCONTINUED | OUTPATIENT
Start: 2024-10-22 | End: 2024-10-22

## 2024-10-22 RX ORDER — IBUPROFEN 800 MG/1
800 TABLET, FILM COATED ORAL EVERY 8 HOURS PRN
Status: DISCONTINUED | OUTPATIENT
Start: 2024-10-22 | End: 2024-10-23 | Stop reason: HOSPADM

## 2024-10-22 RX ORDER — LEVOTHYROXINE SODIUM 50 UG/1
50 TABLET ORAL
Status: DISCONTINUED | OUTPATIENT
Start: 2024-10-22 | End: 2024-10-23 | Stop reason: HOSPADM

## 2024-10-22 RX ORDER — LEVOTHYROXINE SODIUM 75 UG/1
75 TABLET ORAL
Status: DISCONTINUED | OUTPATIENT
Start: 2024-10-25 | End: 2024-10-23 | Stop reason: HOSPADM

## 2024-10-22 RX ORDER — FLUCONAZOLE 40 MG/ML
400 POWDER, FOR SUSPENSION ORAL DAILY
Status: DISCONTINUED | OUTPATIENT
Start: 2024-10-22 | End: 2024-10-22

## 2024-10-22 RX ORDER — HYDRALAZINE HYDROCHLORIDE 20 MG/ML
5 INJECTION INTRAMUSCULAR; INTRAVENOUS EVERY 6 HOURS PRN
Status: DISCONTINUED | OUTPATIENT
Start: 2024-10-22 | End: 2024-10-23 | Stop reason: HOSPADM

## 2024-10-22 RX ADMIN — SODIUM CHLORIDE 125 ML/HR: 0.9 INJECTION, SOLUTION INTRAVENOUS at 11:38

## 2024-10-22 RX ADMIN — HEPARIN SODIUM 5000 UNITS: 5000 INJECTION INTRAVENOUS; SUBCUTANEOUS at 22:53

## 2024-10-22 RX ADMIN — NIRMATRELVIR AND RITONAVIR 3 TABLET: KIT at 17:08

## 2024-10-22 RX ADMIN — LEVOTHYROXINE SODIUM 50 MCG: 50 TABLET ORAL at 05:01

## 2024-10-22 RX ADMIN — HEPARIN SODIUM 5000 UNITS: 5000 INJECTION INTRAVENOUS; SUBCUTANEOUS at 14:23

## 2024-10-22 RX ADMIN — IBUPROFEN 800 MG: 800 TABLET, FILM COATED ORAL at 00:33

## 2024-10-22 RX ADMIN — NIRMATRELVIR AND RITONAVIR 3 TABLET: KIT at 10:52

## 2024-10-22 RX ADMIN — PANTOPRAZOLE SODIUM 40 MG: 40 TABLET, DELAYED RELEASE ORAL at 05:01

## 2024-10-22 RX ADMIN — SODIUM CHLORIDE 150 ML/HR: 0.9 INJECTION, SOLUTION INTRAVENOUS at 04:43

## 2024-10-22 RX ADMIN — FLUTICASONE PROPIONATE 1 SPRAY: 50 SPRAY, METERED NASAL at 09:08

## 2024-10-22 RX ADMIN — HEPARIN SODIUM 5000 UNITS: 5000 INJECTION INTRAVENOUS; SUBCUTANEOUS at 05:01

## 2024-10-22 RX ADMIN — SODIUM CHLORIDE 125 ML/HR: 0.9 INJECTION, SOLUTION INTRAVENOUS at 20:00

## 2024-10-22 NOTE — PLAN OF CARE
Problem: Potential for Falls  Goal: Patient will remain free of falls  Description: INTERVENTIONS:  - Educate patient/family on patient safety including physical limitations  - Instruct patient to call for assistance with activity   - Consult OT/PT to assist with strengthening/mobility   - Keep Call bell within reach  - Keep bed low and locked with side rails adjusted as appropriate  - Keep care items and personal belongings within reach  - Initiate and maintain comfort rounds  - Make Fall Risk Sign visible to staff  - Offer Toileting every 2 Hours, in advance of need  - Initiate/Maintain bed alarm  - Obtain necessary fall risk management equipment: nonskid socks  - Apply yellow socks and bracelet for high fall risk patients  - Consider moving patient to room near nurses station  Outcome: Progressing     Problem: PAIN - ADULT  Goal: Verbalizes/displays adequate comfort level or baseline comfort level  Description: Interventions:  - Encourage patient to monitor pain and request assistance  - Assess pain using appropriate pain scale  - Administer analgesics based on type and severity of pain and evaluate response  - Implement non-pharmacological measures as appropriate and evaluate response  - Consider cultural and social influences on pain and pain management  - Notify physician/advanced practitioner if interventions unsuccessful or patient reports new pain  Outcome: Progressing     Problem: INFECTION - ADULT  Goal: Absence or prevention of progression during hospitalization  Description: INTERVENTIONS:  - Assess and monitor for signs and symptoms of infection  - Monitor lab/diagnostic results  - Monitor all insertion sites, i.e. indwelling lines, tubes, and drains  - Monitor endotracheal if appropriate and nasal secretions for changes in amount and color  - [place] appropriate cooling/warming therapies per order  - Administer medications as ordered  - Instruct and encourage patient and family to use good hand  hygiene technique  - Identify and instruct in appropriate isolation precautions for identified infection/condition  Outcome: Progressing  Goal: Absence of fever/infection during neutropenic period  Description: INTERVENTIONS:  - Monitor WBC    Outcome: Progressing     Problem: SAFETY ADULT  Goal: Maintain or return to baseline ADL function  Description: INTERVENTIONS:  -  Assess patient's ability to carry out ADLs; assess patient's baseline for ADL function and identify physical deficits which impact ability to perform ADLs (bathing, care of mouth/teeth, toileting, grooming, dressing, etc.)  - Assess/evaluate cause of self-care deficits   - Assess range of motion  - Assess patient's mobility; develop plan if impaired  - Assess patient's need for assistive devices and provide as appropriate  - Encourage maximum independence but intervene and supervise when necessary  - Involve family in performance of ADLs  - Assess for home care needs following discharge   - Consider OT consult to assist with ADL evaluation and planning for discharge  - Provide patient education as appropriate  Outcome: Progressing  Goal: Maintains/Returns to pre admission functional level  Description: INTERVENTIONS:  - Perform AM-PAC 6 Click Basic Mobility/ Daily Activity assessment daily.  - Set and communicate daily mobility goal to care team and patient/family/caregiver.   - Collaborate with rehabilitation services on mobility goals if consulted  - Perform Range of Motion 3 times a day.  - Reposition patient every 2 hours.  - Dangle patient 3 times a day  - Stand patient 3 times a day  - Ambulate patient 3 times a day  - Out of bed to chair 3 times a day   - Out of bed for meals 3 times a day  - Out of bed for toileting  - Record patient progress and toleration of activity level   Outcome: Progressing     Problem: DISCHARGE PLANNING  Goal: Discharge to home or other facility with appropriate resources  Description: INTERVENTIONS:  - Identify  barriers to discharge w/patient and caregiver  - Arrange for needed discharge resources and transportation as appropriate  - Identify discharge learning needs (meds, wound care, etc.)  - Arrange for interpretive services to assist at discharge as needed  - Refer to Case Management Department for coordinating discharge planning if the patient needs post-hospital services based on physician/advanced practitioner order or complex needs related to functional status, cognitive ability, or social support system  Outcome: Progressing     Problem: Knowledge Deficit  Goal: Patient/family/caregiver demonstrates understanding of disease process, treatment plan, medications, and discharge instructions  Description: Complete learning assessment and assess knowledge base.  Interventions:  - Provide teaching at level of understanding  - Provide teaching via preferred learning methods  Outcome: Progressing     Problem: Nutrition/Hydration-ADULT  Goal: Nutrient/Hydration intake appropriate for improving, restoring or maintaining nutritional needs  Description: Monitor and assess patient's nutrition/hydration status for malnutrition. Collaborate with interdisciplinary team and initiate plan and interventions as ordered.  Monitor patient's weight and dietary intake as ordered or per policy. Utilize nutrition screening tool and intervene as necessary. Determine patient's food preferences and provide high-protein, high-caloric foods as appropriate.     INTERVENTIONS:  - Monitor oral intake, urinary output, labs, and treatment plans  - Assess nutrition and hydration status and recommend course of action  - Evaluate amount of meals eaten  - Assist patient with eating if necessary   - Allow adequate time for meals  - Recommend/ encourage appropriate diets, oral nutritional supplements, and vitamin/mineral supplements  - Order, calculate, and assess calorie counts as needed  - Recommend, monitor, and adjust tube feedings and TPN/PPN based  on assessed needs  - Assess need for intravenous fluids  - Provide specific nutrition/hydration education as appropriate  - Include patient/family/caregiver in decisions related to nutrition  Outcome: Progressing     Problem: METABOLIC, FLUID AND ELECTROLYTES - ADULT  Goal: Electrolytes maintained within normal limits  Description: INTERVENTIONS:  - Monitor labs and assess patient for signs and symptoms of electrolyte imbalances  - Administer electrolyte replacement as ordered  - Monitor response to electrolyte replacements, including repeat lab results as appropriate  - Instruct patient on fluid and nutrition as appropriate  Outcome: Progressing  Goal: Fluid balance maintained  Description: INTERVENTIONS:  - Monitor labs   - Monitor I/O and WT  - Instruct patient on fluid and nutrition as appropriate  - Assess for signs & symptoms of volume excess or deficit  Outcome: Progressing     Problem: Prexisting or High Potential for Compromised Skin Integrity  Goal: Skin integrity is maintained or improved  Description: INTERVENTIONS:  - Identify patients at risk for skin breakdown  - Assess and monitor skin integrity  - Assess and monitor nutrition and hydration status  - Monitor labs   - Assess for incontinence   - Turn and reposition patient  - Assist with mobility/ambulation  - Relieve pressure over bony prominences  - Avoid friction and shearing  - Provide appropriate hygiene as needed including keeping skin clean and dry  - Evaluate need for skin moisturizer/barrier cream  - Collaborate with interdisciplinary team   - Patient/family teaching  - Consider wound care consult   Outcome: Progressing

## 2024-10-22 NOTE — UTILIZATION REVIEW
Initial Clinical Review        Admission: Date/Time/Statement:   Admission Orders (From admission, onward)       Ordered        10/23/24 0942  Place in Observation  Once            10/23/24 0943  INPATIENT ADMISSION  Once            10/21/24 2005  Inpatient Admission  Once                          Orders Placed This Encounter   Procedures    Inpatient Admission     Standing Status:   Standing     Number of Occurrences:   1     Order Specific Question:   Level of Care     Answer:   Med Surg [16]     Order Specific Question:   Estimated length of stay     Answer:   More than 2 Midnights     Order Specific Question:   Certification     Answer:   I certify that inpatient services are medically necessary for this patient for a duration of greater than two midnights. See H&P and MD Progress Notes for additional information about the patient's course of treatment.                                INPATIENT ADMISSION     Standing Status:   Standing     Number of Occurrences:   1     Order Specific Question:   Level of Care     Answer:   Med Surg [16]     Order Specific Question:   Estimated length of stay     Answer:   More than 2 Midnights     Order Specific Question:   Certification     Answer:   I certify that inpatient services are medically necessary for this patient for a duration of greater than two midnights. See H&P and MD Progress Notes for additional information about the patient's course of treatment.     ED Arrival Information       Expected   10/21/2024 13:15    Arrival   10/21/2024 16:00    Acuity   Emergent              Means of arrival   Walk-In    Escorted by   Family Member    Service   Hospitalist    Admission type   Emergency              Arrival complaint   sepsis             Chief Complaint   Patient presents with    Weakness - Generalized     Pt recently hospitalized due to sepsis -- now having fevers again (afebrile in office but took tylenol three hours ago), weak, short of breath at times, chills,  and tachy at 102 in the office today, rash all over body    Shortness of Breath       Initial Presentation: 84 y.o. male presents to the ED today after being seen by PCP due to ongoing fevers and a rash. 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 Was treated for community acquired pneumonia as well with IV Ceftriaxone and Azithromycin. The patient has been on medications out of the hospital including valacyclovir as well as Diflucan however valacyclovir seems to give the patient a plaque-like raised pruritic rash. The patient however has been having continual fevers ever since being discharged and was looking very fatigued and his doctor's office today, prompting them to send him to the ER. Pt has reports being febrile multiple times this week , TMAX 102F, and in PCP office was 99.4 F despite taking acetaminophen 3 hours prior. Pt also reports abdominal pain, nausea and vomiting and cough and because of his symptoms has had poor po intake and decreased activity. Pt is found to be Covid+ in ED. Pt given 2 liters bolus of IVF's while in the ED, for hyponatremia and IV dose of Vancomycin x 1.  Stool studies ordered for C-diff,  collected and sent, pending results. PMH: prostate cancer and hypothyroidism Abnormal labs: Covid +,  , Lactic Acid 3.5, HGB 11.0/HCT 33.2 Procalc 4.96, BUN 29,  and Alkaline 179, albumin 2.0, TBili 2.04. EKG occasional PAC's. On exam: + fatigue, chills, +cough, + weakness. Plan : admit for Febrile illness and Hyponatremia,continue IVF's, consult Infectious Disease,  trend fever and Labs,  repeat BC and lyme panel, obtain Q8 BMP's due to severe hyponatremia, IV hydralazine PRN 6 hrs as needed for BP > 170 as HCTZ currently on hold due to hyponatremia.     Anticipated Length of Stay/Certification Statement: Patient will be admitted on an inpatient basis with an anticipated length  of stay of greater than 2 midnights secondary to febrile illness requiring further evaluation, hyponatremia requiring IV fluid resuscitation and continued monitoring, lactic acidosis requiring IV fluid resuscitation.       Date: 10/22/24   Day 2: Infectious Disease Consult: Monitor off antibiotics, continue Paxlovid, f/u on BC's, stool studies, lyme and parasite smear which are all pending. Trend temps, daily CBC and CMP to monitor response to treatment. NA today 128, continued on IVF's. .Labs reviewed and show elevated AST, T. bili, hyponatremia, bandemia with atypical lymphocytes. UA unremarkable. CT imaging shows mildly enlarged periaortic, iliac chain and inguinal lymph nodes. Recommend Hematology/oncolog consult, GI consult for EGD due to dysphagia unlikely to be caused by oral candida.        Date: 10/23/24  Day 3: Has surpassed a 2nd midnight with active treatments and services.    Infectious Disease: Covid + Remains afebrile. Blood cultures negative so far. Rec hem/onc consult for CT finding of intraabdominal lymphadenopathy. Stool for c diff negative, stool for enteric bacterial panel negative.   today, creatinine down 0.46. WBC up to 14.45. has been off Antibiotics since 10/21. Proc ashley down to 2.06. Continues on Paxlovid for covid.     Hospitalist: will dc to home today as symptomatically improved. Will f/u with hem/onc as outpatient. Patient was advised to follow-up for outpatient CBC, CMP to follow hyponatremia, and leukocytosis.          ED Treatment-Medication Administration from 10/21/2024 1314 to 10/21/2024 2057         Date/Time Order Dose Route Action     10/21/2024 1655 sodium chloride 0.9 % bolus 1,000 mL 1,000 mL Intravenous New Bag     10/21/2024 1831 iohexol (OMNIPAQUE) 350 MG/ML injection (MULTI-DOSE) 100 mL 100 mL Intravenous Given     10/21/2024 2001 sodium chloride 0.9 % infusion 150 mL/hr Intravenous New Bag            Scheduled Medications:  fluticasone, 1 spray, Nasal,  Daily  heparin (porcine), 5,000 Units, Subcutaneous, Q8H JOE  levothyroxine, 50 mcg, Oral, Once per day on Monday Tuesday Wednesday Thursday  [START ON 10/25/2024] levothyroxine, 75 mcg, Oral, Once per day on Sunday Friday Saturday  nirmatrelvir & ritonavir, 3 tablet, Oral, BID  pantoprazole, 40 mg, Oral, Early Morning      Continuous IV Infusions:  sodium chloride, 125 mL/hr, Intravenous, Continuous      PRN Meds:  benzonatate, 200 mg, Oral, BID PRN  cyclobenzaprine, 5 mg, Oral, TID PRN  hydrALAZINE, 5 mg, Intravenous, Q6H PRN  ibuprofen, 800 mg, Oral, Q8H PRN  ondansetron, 4 mg, Intravenous, Q6H PRN  phenol, 1 spray, Mouth/Throat, Q2H PRN      ED Triage Vitals   Temperature Pulse Respirations Blood Pressure SpO2 Pain Score   10/21/24 1635 10/21/24 1635 10/21/24 1635 10/21/24 1635 10/21/24 1635 10/21/24 2215   (!) 97.3 °F (36.3 °C) 86 20 117/72 96 % 6     Weight (last 2 days) before discharge       Date/Time Weight    10/21/24 21:03:37 77.6 (171.08)            Vital Signs (last 3 days) before discharge       Date/Time Temp Pulse Resp BP MAP (mmHg) SpO2 O2 Device Patient Position - Orthostatic VS Pain    10/23/24 07:12:37 97.6 °F (36.4 °C) 87 20 129/80 96 93 % -- -- No Pain    10/22/24 2252 97.5 °F (36.4 °C) -- 18 127/77 94 -- -- -- --    10/22/24 19:33:52 97.7 °F (36.5 °C) 84 -- -- -- 95 % None (Room air) -- No Pain    10/22/24 14:58:45 97.5 °F (36.4 °C) 80 -- 131/82 98 97 % -- -- --    10/22/24 1045 -- -- -- -- -- 97 % None (Room air) -- No Pain    10/22/24 07:24:45 -- 78 16 125/75 92 95 % -- -- --    10/22/24 0200 98.7 °F (37.1 °C) -- -- -- -- -- -- -- --    10/22/24 0033 -- -- -- -- -- -- -- -- Med Not Given for Pain - for MAR use only    10/22/24 0024 100.5 °F (38.1 °C) -- -- -- -- -- -- -- --    10/21/24 23:23:16 97.9 °F (36.6 °C) 94 -- -- -- 92 % -- -- --    10/21/24 23:17:41 98.4 °F (36.9 °C) 96 20 127/79 95 95 % -- -- --    10/21/24 2215 -- -- -- -- -- -- -- -- 6    10/21/24 21:03:37 98 °F (36.7 °C) 93 20  117/78 91 94 % -- -- --    10/21/24 1957 97.3 °F (36.3 °C) 84 20 121/70 -- 94 % None (Room air) Sitting --    10/21/24 1815 -- 81 20 115/70 88 94 % None (Room air) -- --    10/21/24 1800 -- 86 19 114/72 89 94 % -- -- --    10/21/24 1745 -- 78 20 122/65 87 93 % None (Room air) -- --    10/21/24 1730 -- 78 19 127/63 90 94 % None (Room air) -- --    10/21/24 1700 -- 81 20 111/68 85 94 % None (Room air) -- --    10/21/24 1645 -- 84 22 112/71 87 96 % -- -- --    10/21/24 1644 -- -- -- -- -- -- None (Room air) -- --    10/21/24 1635 97.3 °F (36.3 °C) 86 20 117/72 -- 96 % None (Room air) Sitting --              Pertinent Labs/Diagnostic Test Results:   Radiology:  CT abdomen pelvis with contrast   Final Interpretation by Daniel Daniels MD (10/21 1958)         1. No evidence of bowel obstruction, colitis or diverticulitis.   2. Numerous mildly enlarged para-aortic, iliac chain and inguinal lymph nodes, possibly reactive however lymphoproliferative disorder not excluded.         Workstation performed: [id number]         XR chest 1 view portable   ED Interpretation by Adriano Browning Jr., DO (10/21 1706)   No definitive acute pulmonary pathology.      Final Interpretation by Carter Mayes DO (10/22 1650)      Mild bibasilar atelectasis and trace left pleural effusion.               Resident: MARTHA Whiteside I, the attending radiologist, have reviewed the images and agree with the final report above.      Workstation performed: [id number]           Cardiology:  ECG 12 lead   Final Result by Jorge Frederick DO (10/22 0522)   Sinus rhythm with Premature atrial complexes   Nonspecific ST and T wave abnormality   Low voltage QRS   When compared with ECG of 21-OCT-2024 16:32, (unconfirmed)   No significant change was found   Confirmed by Jorge Frederick ([id number]) on 10/22/2024 5:22:42 AM      ECG 12 lead   Final Result by Jorge Frederick DO (10/22 0522)   Sinus rhythm with Premature atrial complexes   Nonspecific ST  and T wave abnormality   Low voltage QRS   When compared with ECG of 07-OCT-2024 22:26,   QRS axis Shifted right   Confirmed by Jorge Frederick ([id number]) on 10/22/2024 5:22:21 AM        GI:  No orders to display       Results from last 7 days   Lab Units 10/21/24  1936   SARS-COV-2  Not Detected     Results from last 7 days   Lab Units 10/23/24  0553 10/22/24  0853 10/21/24  1649   WBC Thousand/uL 14.45* 10.40* 7.30   HEMOGLOBIN g/dL 9.1* 10.3* 11.0*   HEMATOCRIT % 25.0* 32.0* 33.2*   PLATELETS Thousands/uL 255 283 330   BANDS PCT %  --   --  22*         Results from last 7 days   Lab Units 10/23/24  0553 10/22/24  1708 10/22/24  0853 10/22/24  0022 10/21/24  1649   SODIUM mmol/L 130* 127* 128*  128* 126* 123*   POTASSIUM mmol/L 3.8 4.0 3.7  3.7 4.1 4.4   CHLORIDE mmol/L 102 101 100  100 98 92*   CO2 mmol/L 22 19* 21  21 21 23   ANION GAP mmol/L 6 7 7  7 7 8   BUN mg/dL 11 18 21  21 24 29*   CREATININE mg/dL 0.46* 0.54* 0.56*  0.55* 0.62 0.88   EGFR ml/min/1.73sq m 101 95 93  94 89 77   CALCIUM mg/dL 7.3* 7.7* 7.6*  7.6* 7.7* 8.3*   MAGNESIUM mg/dL 1.5*  --   --   --   --    PHOSPHORUS mg/dL 3.1  --   --   --   --      Results from last 7 days   Lab Units 10/22/24  0853 10/21/24  1649   AST U/L 124* 140*   ALT U/L 35 42   ALK PHOS U/L 158* 179*   TOTAL PROTEIN g/dL 6.1* 6.8   ALBUMIN g/dL 1.8* 2.0*   TOTAL BILIRUBIN mg/dL 1.72* 2.04*         Results from last 7 days   Lab Units 10/23/24  0553 10/22/24  1708 10/22/24  0853 10/22/24  0022 10/21/24  1649   GLUCOSE RANDOM mg/dL 74 82 97  98 75 99                   Results from last 7 days   Lab Units 10/21/24  1649   PROTIME seconds 14.2   INR  1.05   PTT seconds 33     Results from last 7 days   Lab Units 10/23/24  0553   TSH 3RD GENERATON uIU/mL 6.063*     Results from last 7 days   Lab Units 10/23/24  0553 10/21/24  1649   PROCALCITONIN ng/ml 2.06* 4.96*     Results from last 7 days   Lab Units 10/21/24  1936 10/21/24  1649   LACTIC ACID mmol/L 3.5* 4.2*                      Results from last 7 days   Lab Units 10/22/24  0202   CLARITY UA  Clear   COLOR UA  Yellow   SPEC GRAV UA  1.010   PH UA  6.5   GLUCOSE UA mg/dl Negative   KETONES UA mg/dl Negative   BLOOD UA  Trace-Intact*   PROTEIN UA mg/dl 1+*   NITRITE UA  Negative   BILIRUBIN UA  1+*   UROBILINOGEN UA E.U./dl 4.0*   LEUKOCYTES UA  Negative   WBC UA /hpf 0-1   RBC UA /hpf 4-10*   BACTERIA UA /hpf Occasional   EPITHELIAL CELLS WET PREP /hpf Occasional   MUCUS THREADS  Occasional*     Results from last 7 days   Lab Units 10/21/24  1936   INFLUENZA B  Not Detected   RESPIRATORY SYNCYTIAL VIRUS  Not Detected     Results from last 7 days   Lab Units 10/21/24  1936   ADENOVIRUS  Not Detected   BORDETELLA PARAPERTUSSIS  Not Detected   BORDETELLA PERTUSSIS  Not Detected   CHLAMYDIA PNEUMONIAE  Not Detected   CORONAVIRUS 229E  Not Detected   CORONAVIRUS HKU1  Not Detected   CORONAVIRUS NL63  Not Detected   CORONAVIRUS OC43  Not Detected   METAPNEUMOVIRUS  Not Detected   RHINOVIRUS  Not Detected   MYCOPLASMA PNEUMONIAE  Not Detected   PARAINFLUENZA 1  Not Detected   PARAINFLUENZA 2  Not Detected   PARAINFLUENZA 3  Not Detected   PARAINFLUENZA 4  Not Detected         Results from last 7 days   Lab Units 10/22/24  1015   C DIFF TOXIN B BY PCR  Negative     Results from last 7 days   Lab Units 10/22/24  1015   SALMONELLA SP PCR  Negative   SHIGELLA SP/ENTEROINVASIVE E. COLI (EIEC)  Negative   CAMPYLOBACTER SP (JEJUNI AND COLI)  Negative   SHIGA TOXIN 1/SHIGA TOXIN 2  Negative         Results from last 7 days   Lab Units 10/21/24  1649   BLOOD CULTURE  No Growth at 24 hrs.  No Growth at 24 hrs.                   Past Medical History:   Diagnosis Date    Prostate cancer (HCC)      Present on Admission:   Hypothyroidism   Primary hypertension   Febrile illness   Hyponatremia   Lactic acidosis   Oral candidiasis      Admitting Diagnosis: Weakness [R53.1]  COVID-19 [U07.1]  Age/Sex: 84 y.o. male    Network Utilization Review  Department  ATTENTION: Please call with any questions or concerns to [phone] and carefully listen to the prompts so that you are directed to the right person. All voicemails are confidential.   For Discharge needs, contact Care Management DC Support Team at [phone] opt. [phone]  Send all requests for admission clinical reviews, approved or denied determinations and any other requests to dedicated fax number below belonging to the campus where the patient is receiving treatment. List of dedicated fax numbers for the Facilities:  FACILITY NAME UR FAX NUMBER   ADMISSION DENIALS (Administrative/Medical Necessity) [phone]   DISCHARGE SUPPORT TEAM (NETWORK) [phone]   PARENT CHILD HEALTH (Maternity/NICU/Pediatrics) [phone]   [facility] [phone]   [facility] [phone]   [facility] [phone]   [facility] [phone]   [facility] [phone]   [facility] [phone]   [facility] [phone]   [facility] [phone]   [facility] [phone]   [facility] [phone]   [facility] [phone]   [facility] [phone]

## 2024-10-22 NOTE — H&P
H&P - Hospitalist   Name: Miguel Henderson 84 y.o. male I MRN: [id number]  Unit/Bed#: -01 I Date of Admission: 10/21/2024   Date of Service: 10/22/2024 I Hospital Day: 1     Assessment & Plan  Febrile illness  Admit to medicine  Patient was previously admitted on 10/8/2024 for similar complaints  Was found to have rhinovirus during that admission and additionally had tested positive for COVID on a home test on 10/11/2024  Was seen and evaluated by infectious disease during that admission  Was treated for community-acquired pneumonia  Has continued to spike fevers at home  Patient additionally reports some diarrhea since being discharged and had been on Zithromax and Rocephin for his community-acquired pneumonia  Stool studies are currently pending as well as HIV testing, repeat blood cultures and Lyme panel  Procalcitonin elevated at 4.96 with 22 bands    Patient received 1 dose of vancomycin in ER, will hold off on further antibiotics until infectious disease weighs in  Will reconsult infectious disease  Hyponatremia  Will hydrate with normal saline 125 cc/h  Obtain Q8 BMPs  Lactic acidosis  Initial lactic 4.2 with improvement to 3.5 status post fluid resuscitation  We will continue to monitor  Hypothyroidism  Continue prehospital levothyroxine 50 mcg p.o. daily Monday through Thursday and 75 mcg p.o. daily on Friday Saturday Sunday  Primary hypertension  Hydrochlorothiazide currently on hold, will give hydralazine 5 mg IV every 6 hours as needed for systolic blood pressure greater 170  Oral candidiasis  Continue prehospital Diflucan 400 mg daily      VTE Pharmacologic Prophylaxis: VTE Score: 7 High Risk (Score >/= 5) - Pharmacological DVT Prophylaxis Ordered: heparin. Sequential Compression Devices Ordered.  Code Status: Level 1 - Full Code per patient  Discussion with family: Updated  (wife and son) at bedside.    Anticipated Length of Stay: Patient will be admitted on an inpatient basis with  an anticipated length of stay of greater than 2 midnights secondary to febrile illness requiring further evaluation, hyponatremia requiring IV fluid resuscitation and continued monitoring, lactic acidosis requiring IV fluid resuscitation.    History of Present Illness   Chief Complaint: Fever x 3 weeks    Miguel Henderson is a 84 y.o. male with a PMH of prostate cancer, hypothyroidism who presents with fever x 3 weeks.  Patient was recently admitted to our facility on 10/8/2024 for severe sepsis secondary to community-acquired pneumonia and subsequently discharged on 10/15/2024.  Patient had a somewhat complicated stay and that he was diagnosed with rhinovirus and additionally tested positive for COVID on 10/11/2024 on a home test ministered by his son who is an ICU nurse here.    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  Patient's family states that he took 2 doses and developed a generalized rash the medications were subsequently stopped.  Patient's family states since being discharged he has continued to spike fevers 102 with no obvious source of infection.  Patient does continue complain of a cough and sore throat and a rash is seemingly resolved.  Patient has had poor p.o. intake secondary to his sore throat and has had decreased activity.    Review of Systems   Constitutional:  Positive for activity change, appetite change, fatigue and fever. Negative for chills.   Respiratory:  Positive for cough. Negative for shortness of breath and wheezing.    Cardiovascular:  Negative for chest pain and palpitations.   Gastrointestinal:  Positive for diarrhea and nausea. Negative for abdominal pain and vomiting.   Genitourinary:  Negative for dysuria, frequency, hematuria and urgency.   Neurological:  Positive for weakness. Negative for light-headedness and headaches.   Psychiatric/Behavioral:  Negative for hallucinations.    All other systems reviewed and are  negative.      Historical Information   Past Medical History:   Diagnosis Date    Prostate cancer (HCC)      Past Surgical History:   Procedure Laterality Date    APPENDECTOMY       Social History     Tobacco Use    Smoking status: Never    Smokeless tobacco: Never   Vaping Use    Vaping status: Never Used   Substance and Sexual Activity    Alcohol use: Yes     Comment: Occasional    Drug use: Never    Sexual activity: Not on file     E-Cigarette/Vaping    E-Cigarette Use Never User      E-Cigarette/Vaping Substances     Family history non-contributory  Social History:  Marital Status: Unknown   Occupation: Retired  Patient Pre-hospital Living Situation: Home  Patient Pre-hospital Level of Mobility: walks  Patient Pre-hospital Diet Restrictions: None    Meds/Allergies   I have reviewed home medications with patient family member.  Prior to Admission medications    Medication Sig Start Date End Date Taking? Authorizing Provider   benzonatate (TESSALON) 200 MG capsule Take 1 capsule (200 mg total) by mouth 2 (two) times a day as needed for cough 10/16/24  Yes Greg Foster MD   cyclobenzaprine (FLEXERIL) 5 mg tablet Take 5 mg by mouth 3 (three) times a day as needed 8/26/24  Yes Historical Provider, MD   fluticasone (FLONASE) 50 mcg/act nasal spray 1 spray into each nostril daily 10/16/24  Yes Greg Foster MD   hydroCHLOROthiazide 25 mg tablet Take 1 tablet by mouth daily 7/3/24  Yes Historical Provider, MD   ibuprofen (MOTRIN) 800 mg tablet Take 1 tablet (800 mg total) by mouth every 8 (eight) hours as needed for moderate pain 9/18/24  Yes Greg Foster MD   levothyroxine 50 mcg tablet Take 50 mcg by mouth daily 7/3/24  Yes Historical Provider, MD   levothyroxine 75 mcg tablet Take 75 mcg by mouth Contingent Taken every Friday through Bill 7/3/24  Yes Historical Provider, MD   pantoprazole (PROTONIX) 40 mg tablet Take 1 tablet (40 mg total) by mouth daily in the early morning 10/16/24 11/15/24 Yes Judy Khan  MD Sheri   potassium chloride (Klor-Con M10) 10 mEq tablet Take 10 mEq by mouth daily   Yes Historical Provider, MD   fluconazole (DIFLUCAN) 200 mg tablet Take 2 tablets (400 mg total) by mouth daily for 11 doses  Patient not taking: Reported on 10/21/2024 10/16/24 10/27/24  Judy Wade MD     Allergies   Allergen Reactions    Shellfish Allergy - Food Allergy Anaphylaxis    Valtrex [Valacyclovir] Hives       Objective :  Temp:  [97.3 °F (36.3 °C)-100.5 °F (38.1 °C)] 100.5 °F (38.1 °C)  HR:  [] 94  BP: (111-127)/(63-86) 127/79  Resp:  [19-22] 20  SpO2:  [92 %-96 %] 92 %  O2 Device: None (Room air)    Physical Exam  Vitals and nursing note reviewed.   Constitutional:       Appearance: He is well-developed. He is ill-appearing.   HENT:      Head: Normocephalic and atraumatic.      Right Ear: Tympanic membrane normal.      Left Ear: Tympanic membrane normal.      Nose: Nose normal.      Mouth/Throat:      Mouth: Mucous membranes are moist.      Pharynx: No oropharyngeal exudate.   Eyes:      General: No scleral icterus.     Pupils: Pupils are equal, round, and reactive to light.   Neck:      Vascular: No JVD.   Cardiovascular:      Rate and Rhythm: Normal rate and regular rhythm.      Heart sounds: Normal heart sounds. No murmur heard.  Pulmonary:      Effort: Pulmonary effort is normal. No respiratory distress.      Breath sounds: Normal breath sounds. No wheezing or rales.   Abdominal:      General: Bowel sounds are normal.      Palpations: Abdomen is soft.      Tenderness: There is no abdominal tenderness. There is no guarding or rebound.   Musculoskeletal:         General: Normal range of motion.      Cervical back: Normal range of motion and neck supple.      Right lower leg: No edema.      Left lower leg: No edema.   Lymphadenopathy:      Cervical: No cervical adenopathy.   Skin:     General: Skin is warm and dry.      Findings: No erythema or rash.   Neurological:      Mental Status: He is alert  and oriented to person, place, and time.   Psychiatric:         Behavior: Behavior normal.         Lines/Drains:            Lab Results: I have reviewed the following results:  Results from last 7 days   Lab Units 10/21/24  1649   WBC Thousand/uL 7.30   HEMOGLOBIN g/dL 11.0*   HEMATOCRIT % 33.2*   PLATELETS Thousands/uL 330   BANDS PCT % 22*   LYMPHO PCT % 18   MONO PCT % 9   EOS PCT % 1     Results from last 7 days   Lab Units 10/22/24  0022 10/21/24  1649   SODIUM mmol/L 126* 123*   POTASSIUM mmol/L 4.1 4.4   CHLORIDE mmol/L 98 92*   CO2 mmol/L 21 23   BUN mg/dL 24 29*   CREATININE mg/dL 0.62 0.88   ANION GAP mmol/L 7 8   CALCIUM mg/dL 7.7* 8.3*   ALBUMIN g/dL  --  2.0*   TOTAL BILIRUBIN mg/dL  --  2.04*   ALK PHOS U/L  --  179*   ALT U/L  --  42   AST U/L  --  140*   GLUCOSE RANDOM mg/dL 75 99     Results from last 7 days   Lab Units 10/21/24  1649   INR  1.05         Lab Results   Component Value Date    HGBA1C 5.9 (H) 06/18/2024    HGBA1C 5.8 (H) 09/12/2023    HGBA1C 5.8 (H) 06/26/2023     Results from last 7 days   Lab Units 10/21/24  1936 10/21/24  1649   LACTIC ACID mmol/L 3.5* 4.2*   PROCALCITONIN ng/ml  --  4.96*       Imaging Results Review: I reviewed radiology reports from this admission including: chest xray and CT abdomen/pelvis.  Other Study Results Review: No additional pertinent studies reviewed.    Administrative Statements   I have spent a total time of 60 minutes in caring for this patient on the day of the visit/encounter including Diagnostic results, Patient and family education, Documenting in the medical record, Reviewing / ordering tests, medicine, procedures  , and Obtaining or reviewing history  .    ** Please Note: This note has been constructed using a voice recognition system. **

## 2024-10-22 NOTE — ASSESSMENT & PLAN NOTE
Admit to medicine  Patient was previously admitted on 10/8/2024 for similar complaints  Was found to have rhinovirus during that admission and additionally had tested positive for COVID on a home test on 10/11/2024  Was seen and evaluated by infectious disease during that admission  Was treated for community-acquired pneumonia  Has continued to spike fevers at home  Patient additionally reports some diarrhea since being discharged and had been on Zithromax and Rocephin for his community-acquired pneumonia  Stool studies are currently pending as well as HIV testing, repeat blood cultures and Lyme panel  Procalcitonin elevated at 4.96 with 22 bands    Patient received 1 dose of vancomycin in ER, will hold off on further antibiotics until infectious disease weighs in  Will reconsult infectious disease

## 2024-10-22 NOTE — PLAN OF CARE
Problem: Potential for Falls  Goal: Patient will remain free of falls  Description: INTERVENTIONS:  - Educate patient/family on patient safety including physical limitations  - Instruct patient to call for assistance with activity   - Consult OT/PT to assist with strengthening/mobility   - Keep Call bell within reach  - Keep bed low and locked with side rails adjusted as appropriate  - Keep care items and personal belongings within reach  - Initiate and maintain comfort rounds  - Make Fall Risk Sign visible to staff  - Offer Toileting every 2 Hours, in advance of need  - Initiate/Maintain bed alarm  - Obtain necessary fall risk management equipment  - Apply yellow socks and bracelet for high fall risk patients  - Consider moving patient to room near nurses station  Outcome: Progressing     Problem: PAIN - ADULT  Goal: Verbalizes/displays adequate comfort level or baseline comfort level  Description: Interventions:  - Encourage patient to monitor pain and request assistance  - Assess pain using appropriate pain scale  - Administer analgesics based on type and severity of pain and evaluate response  - Implement non-pharmacological measures as appropriate and evaluate response  - Consider cultural and social influences on pain and pain management  - Notify physician/advanced practitioner if interventions unsuccessful or patient reports new pain  Outcome: Progressing     Problem: INFECTION - ADULT  Goal: Absence or prevention of progression during hospitalization  Description: INTERVENTIONS:  - Assess and monitor for signs and symptoms of infection  - Monitor lab/diagnostic results  - Monitor all insertion sites, i.e. indwelling lines, tubes, and drains  - Monitor endotracheal if appropriate and nasal secretions for changes in amount and color  - [place] appropriate cooling/warming therapies per order  - Administer medications as ordered  - Instruct and encourage patient and family to use good hand hygiene  technique  - Identify and instruct in appropriate isolation precautions for identified infection/condition  Outcome: Progressing  Goal: Absence of fever/infection during neutropenic period  Description: INTERVENTIONS:  - Monitor WBC    Outcome: Progressing     Problem: SAFETY ADULT  Goal: Maintain or return to baseline ADL function  Description: INTERVENTIONS:  -  Assess patient's ability to carry out ADLs; assess patient's baseline for ADL function and identify physical deficits which impact ability to perform ADLs (bathing, care of mouth/teeth, toileting, grooming, dressing, etc.)  - Assess/evaluate cause of self-care deficits   - Assess range of motion  - Assess patient's mobility; develop plan if impaired  - Assess patient's need for assistive devices and provide as appropriate  - Encourage maximum independence but intervene and supervise when necessary  - Involve family in performance of ADLs  - Assess for home care needs following discharge   - Consider OT consult to assist with ADL evaluation and planning for discharge  - Provide patient education as appropriate  Outcome: Progressing  Goal: Maintains/Returns to pre admission functional level  Description: INTERVENTIONS:  - Perform AM-PAC 6 Click Basic Mobility/ Daily Activity assessment daily.  - Set and communicate daily mobility goal to care team and patient/family/caregiver.   - Collaborate with rehabilitation services on mobility goals if consulted  - Perform Range of Motion 3 times a day.  - Reposition patient every 2 hours.  - Dangle patient 3 times a day  - Stand patient 3 times a day  - Ambulate patient 3 times a day  - Out of bed to chair 3 times a day   - Out of bed for meals 3 times a day  - Out of bed for toileting  - Record patient progress and toleration of activity level   Outcome: Progressing     Problem: DISCHARGE PLANNING  Goal: Discharge to home or other facility with appropriate resources  Description: INTERVENTIONS:  - Identify barriers  to discharge w/patient and caregiver  - Arrange for needed discharge resources and transportation as appropriate  - Identify discharge learning needs (meds, wound care, etc.)  - Arrange for interpretive services to assist at discharge as needed  - Refer to Case Management Department for coordinating discharge planning if the patient needs post-hospital services based on physician/advanced practitioner order or complex needs related to functional status, cognitive ability, or social support system  Outcome: Progressing     Problem: Knowledge Deficit  Goal: Patient/family/caregiver demonstrates understanding of disease process, treatment plan, medications, and discharge instructions  Description: Complete learning assessment and assess knowledge base.  Interventions:  - Provide teaching at level of understanding  - Provide teaching via preferred learning methods  Outcome: Progressing     Problem: Nutrition/Hydration-ADULT  Goal: Nutrient/Hydration intake appropriate for improving, restoring or maintaining nutritional needs  Description: Monitor and assess patient's nutrition/hydration status for malnutrition. Collaborate with interdisciplinary team and initiate plan and interventions as ordered.  Monitor patient's weight and dietary intake as ordered or per policy. Utilize nutrition screening tool and intervene as necessary. Determine patient's food preferences and provide high-protein, high-caloric foods as appropriate.     INTERVENTIONS:  - Monitor oral intake, urinary output, labs, and treatment plans  - Assess nutrition and hydration status and recommend course of action  - Evaluate amount of meals eaten  - Assist patient with eating if necessary   - Allow adequate time for meals  - Recommend/ encourage appropriate diets, oral nutritional supplements, and vitamin/mineral supplements  - Order, calculate, and assess calorie counts as needed  - Assess need for intravenous fluids  - Provide specific  nutrition/hydration education as appropriate  - Include patient/family/caregiver in decisions related to nutrition  Outcome: Progressing     Problem: METABOLIC, FLUID AND ELECTROLYTES - ADULT  Goal: Electrolytes maintained within normal limits  Description: INTERVENTIONS:  - Monitor labs and assess patient for signs and symptoms of electrolyte imbalances  - Administer electrolyte replacement as ordered  - Monitor response to electrolyte replacements, including repeat lab results as appropriate  - Instruct patient on fluid and nutrition as appropriate  Outcome: Progressing  Goal: Fluid balance maintained  Description: INTERVENTIONS:  - Monitor labs   - Monitor I/O and WT  - Instruct patient on fluid and nutrition as appropriate  - Assess for signs & symptoms of volume excess or deficit  Outcome: Progressing

## 2024-10-22 NOTE — ASSESSMENT & PLAN NOTE
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 Consider other infectious esophagitis. Consider underlying malignancy. Symptoms have improved.   Planning for outpatient EGD   If sx persist, would recommend inpatient Gi evaluation

## 2024-10-22 NOTE — ASSESSMENT & PLAN NOTE
Recent admission for fevers, diagnosed with rhinovirus, and esophagitis. Prior work up otherwise negative. Fevers ongoing now for 3 weeks. With atypical lymphs noted on CBCd and intra-abdominal lymphadenopathy on CT A/P. Consider possibility of underlying malignancy. Consider possibility of infectious esophagitis. May all be due to COVID infection. Several home Covid antigen test positive, however several PCR are negative. States initially tested positive around 10/16/24. LFTs are normal. Blood cultures are pending. UA is benign. Stool studies pending. Lyme and parasite smear are pending.  He is otherwise hemodynamically stable without leukocytosis.  He remained afebrile since admission.  Monitor off abx   Can continue paxlovid for now   Recommend heme/onc consultation   Follow up blood cultures   Follow up stool studies, lyme and parasite smear  Trend temps and hemodynamics   Daily CBCd and CMP to monitor response to treatment

## 2024-10-22 NOTE — CONSULTS
Consultation - Infectious Disease   Name: Miguel Henderson 84 y.o. male I MRN: [id number]  Unit/Bed#: -01 I Date of Admission: 10/21/2024   Date of Service: 10/22/2024 I Hospital Day: 1     Inpatient consult to Infectious Diseases  Consult performed by: Yunier Handley PA-C  Consult ordered by: Jose Sims DO      Physician Requesting Evaluation: Jose Sims DO   Reason for Evaluation / Principal Problem: Fever     Assessment & Plan  Fever  Recent admission for fevers, diagnosed with rhinovirus, and esophagitis. Prior work up otherwise negative. Fevers ongoing now for 3 weeks. With atypical lymphs noted on CBCd and intra-abdominal lymphadenopathy on CT A/P. Consider possibility of underlying malignancy. Consider possibility of infectious esophagitis. May all be due to COVID infection. Several home Covid antigen test positive, however several PCR are negative. States initially tested positive around 10/16/24. LFTs are normal. Blood cultures are pending. UA is benign. Stool studies pending. Lyme and parasite smear are pending.  He is otherwise hemodynamically stable without leukocytosis.  He remained afebrile since admission.  Monitor off abx   Can continue paxlovid for now   Recommend heme/onc consultation   Follow up blood cultures   Follow up stool studies, lyme and parasite smear  Trend temps and hemodynamics   Daily CBCd and CMP to monitor response to treatment   HSV-1 (herpes simplex virus 1) infection  HSV swab of oral lesions positive for HSV1. Previously felt to be 2/2 candidiasis given positive sputum culture however this does not correlate to diagnosis. Diflucan stopped in outpatient setting and valtrex started however patient developed rash. Either would be unlikely causes for fever as above. Oral lesions all resolved.   Odynophagia  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 Consider other infectious esophagitis. Consider underlying malignancy. Symptoms have improved.    Planning for outpatient EGD   If sx persist, would recommend inpatient Gi evaluation   Hyponatremia  Sodium 123 on admission. Previously normal. Suspect due to poor PO intake. Management per primary team/renal.     I have discussed the above management plan in detail with the primary service.   Infectious Disease service will follow.    Antibiotics:  D1 off abx     History of Present Illness   Miguel Henderson is a 84 y.o. year old male who initially presented on 10/21 with complaints of persistent fevers.  Patient was seen by his PCP yesterday due to complaints of fevers, lethargy and poor p.o. intake.  He was sent to the ED for further workup.  Of note patient was recently admitted 10/7 to 10/15 with severe sepsis.  He was treated with IV antibiotics for possible pneumonia for 5 days.  Sputum culture was only positive for Candida species likely colonization of airway.  Blood cultures were negative.  Primary team had concern for esophageal candidiasis and started patient on fluconazole.  HSV PCR swab of lesions was done prior to discharge.  This resulted positive and I spoke with patient's son to discontinue the fluconazole and instead start Valtrex.  Patient started this medication however developed diffuse pruritic rash after 2 days.  This medication was stopped at the discretion of his PCP and he was restarted on fluconazole.  Today, the patient states he feels significantly better, almost 90% better.  Oral lesions have resolved.  He is swallowing better and ate most of his breakfast this morning.  Denies congestion or sore throat but does have a cough which per the wife is normal.  States while at home since his last hospital stay he did not have any fevers for about 2 days however they started again.  He continues to have dysphagia and odynophagia with oral lesions noted.  Patient's wife states that she initially checked him for COVID with home antigen test about 16 days ago that was positive.  She tested  herself and she was negative.  While in the ED last admission was told he did not have COVID.  Again had another positive COVID antigen test last admission and again this admission.  Both subsequent PCR testing have been negative.  He denies abdominal pain.  No dysuria.  No diarrhea.  Wounds and rash resolved.  No recent outdoor exposures.  Grandchildren are not sick and denies other known sick contacts.  No recent travel.  No known underlying malignancy.    A complete review of systems is negative other than that noted in the HPI.    I have reviewed the patient's PMH, PSH, Social History, Family History, Meds, and Allergies    Objective :  Temp:  [97.3 °F (36.3 °C)-100.5 °F (38.1 °C)] 98.7 °F (37.1 °C)  HR:  [] 78  BP: (111-127)/(63-86) 125/75  Resp:  [16-22] 16  SpO2:  [92 %-96 %] 95 %  O2 Device: None (Room air)    Physical exam:   General:  No acute distress.  Sleeping in bed though easily arousable.  Psychiatric:  Awake and alert, oriented x 3.  HEENT: Oral lesions resolved.  Edentulous.  Mucous membranes moist.  Cardiovascular: Regular rate and rhythm.  No murmur.  Pulmonary: Breath sounds clear.  On room air.  Normal respiratory excursion without accessory muscle use  Abdomen:  Soft, nontender  Extremities:  No edema.  Moves all 4 extremities.  Skin:  No rashes or wounds on exposed skin.  : No suprapubic or CVA tenderness.  Neuro: No gross focal neurodeficit.      Lab Results: I have reviewed the following results:  Results from last 7 days   Lab Units 10/21/24  1649   WBC Thousand/uL 7.30   HEMOGLOBIN g/dL 11.0*   PLATELETS Thousands/uL 330     Results from last 7 days   Lab Units 10/22/24  0853 10/22/24  0022 10/21/24  1649   SODIUM mmol/L 128*  128* 126* 123*   POTASSIUM mmol/L 3.7  3.7 4.1 4.4   CHLORIDE mmol/L 100  100 98 92*   CO2 mmol/L 21 21 21 23   BUN mg/dL 21 21 24 29*   CREATININE mg/dL 0.56*  0.55* 0.62 0.88   EGFR ml/min/1.73sq m 93  94 89 77   CALCIUM mg/dL 7.6*  7.6* 7.7*  8.3*   AST U/L 124*  --  140*   ALT U/L 35  --  42   ALK PHOS U/L 158*  --  179*   ALBUMIN g/dL 1.8*  --  2.0*     Results from last 7 days   Lab Units 10/21/24  1649   BLOOD CULTURE  Received in Microbiology Lab. Culture in Progress.  Received in Microbiology Lab. Culture in Progress.     Results from last 7 days   Lab Units 10/21/24  1649   PROCALCITONIN ng/ml 4.96*                   Imaging Results Review: I reviewed radiology reports from this admission including: chest xray and CT abdomen/pelvis.  Other Study Results Review: Other studies reviewed include: micro     Administrative Statements   I have spent a total time of 45 minutes in caring for this patient on the day of the visit/encounter including Diagnostic results, Patient and family education, Impressions, Counseling / Coordination of care, Documenting in the medical record, Reviewing / ordering tests, medicine, procedures  , Obtaining or reviewing history  , and Communicating with other healthcare professionals .

## 2024-10-22 NOTE — ASSESSMENT & PLAN NOTE
Sodium 123 on admission. Previously normal. Suspect due to poor PO intake. Management per primary team/renal.

## 2024-10-22 NOTE — ASSESSMENT & PLAN NOTE
Continue prehospital levothyroxine 50 mcg p.o. daily Monday through Thursday and 75 mcg p.o. daily on Friday Saturday Sunday

## 2024-10-22 NOTE — ASSESSMENT & PLAN NOTE
HSV swab of oral lesions positive for HSV1. Previously felt to be 2/2 candidiasis given positive sputum culture however this does not correlate to diagnosis. Diflucan stopped in outpatient setting and valtrex started however patient developed rash. Either would be unlikely causes for fever as above. Oral lesions all resolved.

## 2024-10-22 NOTE — ASSESSMENT & PLAN NOTE
Hydrochlorothiazide currently on hold, will give hydralazine 5 mg IV every 6 hours as needed for systolic blood pressure greater 170

## 2024-10-22 NOTE — CASE MANAGEMENT
Case Management Assessment & Discharge Planning Note    Patient name Miguel Henderson  Location /-01 MRN [id number]  : 1940 Date 10/22/2024       Current Admission Date: 10/21/2024  Current Admission Diagnosis:Febrile illness   Patient Active Problem List    Diagnosis Date Noted Date Diagnosed    Febrile illness 10/22/2024     Hyponatremia 10/22/2024     Lactic acidosis 10/22/2024     Oral candidiasis 10/22/2024     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     Fever 10/22/2024     Odynophagia 10/22/2024     Localized edema 10/16/2024     Shortness of breath on exertion 10/16/2024     Cough 10/16/2024     Lesion of oral mucosa 10/15/2024     Liver lesion 10/15/2024     Dysphagia 10/14/2024     Ulceration of oral mucosa 10/14/2024     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     Left knee pain 10/10/2024     Rhinovirus 10/09/2024     Adenocarcinoma of prostate (HCC) 2024     Lumbar radiculopathy 2024     Hypothyroidism 2024     Primary hypertension 2024       LOS (days): 1  Geometric Mean LOS (GMLOS) (days):   Days to GMLOS:     OBJECTIVE:  PATIENT READMITTED TO HOSPITAL  Risk of Unplanned Readmission Score: 15.47         Current admission status: Inpatient  Referral Reason: Other (d/c planning)    Preferred Pharmacy:   [id number] - [place] [id number]  Phone: [phone] Fax: [phone]    Primary Care Provider: Greg Foster MD    Primary Insurance: [employer]  Secondary Insurance:     ASSESSMENT:  Active Health Care Proxies       Juan Henderson Health Care Representative - Son   Primary Phone: [phone] (Mobile)                 Advance Directives  Does patient have a Health Care POA?: No  Was patient offered paperwork?: Yes (family has paperwork at home)  Does patient have Advance Directives?: No  Was patient offered paperwork?: Yes (family has paperwork at  home)         Readmission Root Cause  30 Day Readmission: Yes  During your hospital stay, did someone (provider, nurse, ) explain your care to you in a way you could understand?: Yes  Did you feel medically stable to leave the hospital?: Yes  Were you able to pay for your medication at the pharmacy?: Yes  Did you have reliable transportation to take you to your appointments?: Yes  During previous admission, was a post-acute recommendation made?: Yes  What post-acute resources were offered?: OP Therapy (pt was active - family wished for him to continue)  Patient was readmitted due to: febrile illness, weakness, hyponatremia,covid, r/c c-diff,  Action Plan: outpt follow up and the pt may need rehab    Patient Information  Admitted from:: Home  Mental Status: Alert  During Assessment patient was accompanied by: Not accompanied during assessment  Assessment information provided by:: Son  Primary Caregiver: Self  Support Systems: Spouse/significant other, Son (daughter- penny)  County of Residence: [place]  What city do you live in?: [place]  Home entry access options. Select all that apply.: Stairs  Number of steps to enter home.: 3  Do the steps have railings?: Yes  Type of Current Residence: [facility]  Living Arrangements: Lives w/ Spouse/significant other, Lives w/ Son (son & his wife)  Is patient a ?: No    Activities of Daily Living Prior to Admission  Functional Status: Independent  Completes ADLs independently?: Yes  Ambulates independently?: Yes  Does patient use assisted devices?: Yes  Assisted Devices (DME) used: Walker  Does patient currently own DME?: Yes  What DME does the patient currently own?: Walker  Does patient have a history of Outpatient Therapy (PT/OT)?: Yes ([facility]      he was active)  Does the patient have a history of Short-Term Rehab?: No  Does patient have a history of HHC?: No  Does patient currently have HHC?: No         Patient Information Continued  Income Source:  Pension/long-term  Does patient have prescription coverage?: Yes ([employer])  Does patient receive dialysis treatments?: No  Does patient have a history of substance abuse?: No  Does patient have a history of Mental Health Diagnosis?: No         Means of Transportation  Means of Transport to Appts:: Family transport          DISCHARGE DETAILS:    Discharge planning discussed with:: son was called at 16:37pm  Freedom of Choice: Yes                   Contacts  Patient Contacts: Juan Henderson  Relationship to Patient:: Family (son)  Contact Method: Phone  Phone Number: [phone]  Reason/Outcome: Discharge Planning              Other Referral/Resources/Interventions Provided:  Referral Comments: pt not stable for d/c- pt continues to have fevers-  hematogy consult ordered    Would you like to participate in our [facility] service program?  : No - Declined    Treatment Team Recommendation:  (d/c plan tbd - TBD)                                         Family notified:: son was called

## 2024-10-22 NOTE — ASSESSMENT & PLAN NOTE
Initial lactic 4.2 with improvement to 3.5 status post fluid resuscitation  We will continue to monitor

## 2024-10-23 ENCOUNTER — TRANSITIONAL CARE MANAGEMENT (OUTPATIENT)
Dept: FAMILY MEDICINE CLINIC | Facility: CLINIC | Age: 84
End: 2024-10-23

## 2024-10-23 VITALS
RESPIRATION RATE: 20 BRPM | BODY MASS INDEX: 28.5 KG/M2 | SYSTOLIC BLOOD PRESSURE: 129 MMHG | HEART RATE: 87 BPM | DIASTOLIC BLOOD PRESSURE: 80 MMHG | OXYGEN SATURATION: 93 % | TEMPERATURE: 97.6 F | WEIGHT: 171.08 LBS | HEIGHT: 65 IN

## 2024-10-23 LAB
ANION GAP SERPL CALCULATED.3IONS-SCNC: 6 MMOL/L (ref 4–13)
BUN SERPL-MCNC: 11 MG/DL (ref 5–25)
C COLI+JEJUNI TUF STL QL NAA+PROBE: NEGATIVE
C DIFF TOX GENS STL QL NAA+PROBE: NEGATIVE
CALCIUM SERPL-MCNC: 7.3 MG/DL (ref 8.4–10.2)
CHLORIDE SERPL-SCNC: 102 MMOL/L (ref 96–108)
CO2 SERPL-SCNC: 22 MMOL/L (ref 21–32)
CREAT SERPL-MCNC: 0.46 MG/DL (ref 0.6–1.3)
EC STX1+STX2 GENES STL QL NAA+PROBE: NEGATIVE
ERYTHROCYTE [DISTWIDTH] IN BLOOD BY AUTOMATED COUNT: 16.2 % (ref 11.6–15.1)
GFR SERPL CREATININE-BSD FRML MDRD: 101 ML/MIN/1.73SQ M
GLUCOSE SERPL-MCNC: 74 MG/DL (ref 65–140)
HCT VFR BLD AUTO: 25 % (ref 36.5–49.3)
HGB BLD-MCNC: 9.1 G/DL (ref 12–17)
MAGNESIUM SERPL-MCNC: 1.5 MG/DL (ref 1.9–2.7)
MCH RBC QN AUTO: 34.1 PG (ref 26.8–34.3)
MCHC RBC AUTO-ENTMCNC: 36.4 G/DL (ref 31.4–37.4)
MCV RBC AUTO: 94 FL (ref 82–98)
PHOSPHATE SERPL-MCNC: 3.1 MG/DL (ref 2.3–4.1)
PLATELET # BLD AUTO: 255 THOUSANDS/UL (ref 149–390)
PMV BLD AUTO: 11.5 FL (ref 8.9–12.7)
POTASSIUM SERPL-SCNC: 3.8 MMOL/L (ref 3.5–5.3)
PROCALCITONIN SERPL-MCNC: 2.06 NG/ML
RBC # BLD AUTO: 2.67 MILLION/UL (ref 3.88–5.62)
SALMONELLA SP SPAO STL QL NAA+PROBE: NEGATIVE
SHIGELLA SP+EIEC IPAH STL QL NAA+PROBE: NEGATIVE
SODIUM SERPL-SCNC: 130 MMOL/L (ref 135–147)
T4 FREE SERPL-MCNC: 1.04 NG/DL (ref 0.61–1.12)
TSH SERPL DL<=0.05 MIU/L-ACNC: 6.06 UIU/ML (ref 0.45–4.5)
WBC # BLD AUTO: 14.45 THOUSAND/UL (ref 4.31–10.16)

## 2024-10-23 PROCEDURE — 99239 HOSP IP/OBS DSCHRG MGMT >30: CPT | Performed by: STUDENT IN AN ORGANIZED HEALTH CARE EDUCATION/TRAINING PROGRAM

## 2024-10-23 PROCEDURE — 83735 ASSAY OF MAGNESIUM: CPT | Performed by: INTERNAL MEDICINE

## 2024-10-23 PROCEDURE — 84100 ASSAY OF PHOSPHORUS: CPT | Performed by: INTERNAL MEDICINE

## 2024-10-23 PROCEDURE — 84443 ASSAY THYROID STIM HORMONE: CPT | Performed by: STUDENT IN AN ORGANIZED HEALTH CARE EDUCATION/TRAINING PROGRAM

## 2024-10-23 PROCEDURE — 84145 PROCALCITONIN (PCT): CPT | Performed by: INTERNAL MEDICINE

## 2024-10-23 PROCEDURE — 84439 ASSAY OF FREE THYROXINE: CPT | Performed by: STUDENT IN AN ORGANIZED HEALTH CARE EDUCATION/TRAINING PROGRAM

## 2024-10-23 PROCEDURE — 85025 COMPLETE CBC W/AUTO DIFF WBC: CPT | Performed by: INTERNAL MEDICINE

## 2024-10-23 PROCEDURE — 80048 BASIC METABOLIC PNL TOTAL CA: CPT | Performed by: INTERNAL MEDICINE

## 2024-10-23 PROCEDURE — 99232 SBSQ HOSP IP/OBS MODERATE 35: CPT | Performed by: PHYSICIAN ASSISTANT

## 2024-10-23 RX ORDER — ONDANSETRON 2 MG/ML
4 INJECTION INTRAMUSCULAR; INTRAVENOUS EVERY 6 HOURS PRN
Qty: 20 ML | Refills: 0 | Status: SHIPPED | OUTPATIENT
Start: 2024-10-23 | End: 2024-10-23

## 2024-10-23 RX ORDER — ONDANSETRON 4 MG/1
4 TABLET, FILM COATED ORAL EVERY 8 HOURS PRN
Qty: 20 TABLET | Refills: 0 | Status: ON HOLD | OUTPATIENT
Start: 2024-10-23

## 2024-10-23 RX ADMIN — PANTOPRAZOLE SODIUM 40 MG: 40 TABLET, DELAYED RELEASE ORAL at 05:54

## 2024-10-23 RX ADMIN — FLUTICASONE PROPIONATE 1 SPRAY: 50 SPRAY, METERED NASAL at 08:03

## 2024-10-23 RX ADMIN — NIRMATRELVIR AND RITONAVIR 3 TABLET: KIT at 08:03

## 2024-10-23 RX ADMIN — HEPARIN SODIUM 5000 UNITS: 5000 INJECTION INTRAVENOUS; SUBCUTANEOUS at 05:54

## 2024-10-23 RX ADMIN — SODIUM CHLORIDE 125 ML/HR: 0.9 INJECTION, SOLUTION INTRAVENOUS at 04:05

## 2024-10-23 RX ADMIN — LEVOTHYROXINE SODIUM 50 MCG: 50 TABLET ORAL at 05:54

## 2024-10-23 NOTE — PLAN OF CARE
Problem: Potential for Falls  Goal: Patient will remain free of falls  Description: INTERVENTIONS:  - Educate patient/family on patient safety including physical limitations  - Instruct patient to call for assistance with activity   - Consult OT/PT to assist with strengthening/mobility   - Keep Call bell within reach  - Keep bed low and locked with side rails adjusted as appropriate  - Keep care items and personal belongings within reach  - Initiate and maintain comfort rounds  - Make Fall Risk Sign visible to staff  - Offer Toileting every 2 Hours, in advance of need  - Initiate/Maintain bed alarm  - Apply yellow socks and bracelet for high fall risk patients  - Consider moving patient to room near nurses station  Outcome: Progressing     Problem: INFECTION - ADULT  Goal: Absence or prevention of progression during hospitalization  Description: INTERVENTIONS:  - Assess and monitor for signs and symptoms of infection  - Monitor lab/diagnostic results  - Monitor all insertion sites, i.e. indwelling lines, tubes, and drains  - Monitor endotracheal if appropriate and nasal secretions for changes in amount and color  - [place] appropriate cooling/warming therapies per order  - Administer medications as ordered  - Instruct and encourage patient and family to use good hand hygiene technique  - Identify and instruct in appropriate isolation precautions for identified infection/condition  Outcome: Progressing

## 2024-10-23 NOTE — DISCHARGE INSTR - AVS FIRST PAGE
You were hospitalized for ongoing periodic fevers the last 2 weeks.  You are diagnosed with COVID.  There is still a possibility that your fever may be related unrelated to your acute infections.    -Please finish Paxlovid for COVID  - Follow-up with hematology to discuss alternate causes of fever  - Please return to the hospital if any progressive fevers

## 2024-10-23 NOTE — PROGRESS NOTES
Progress Note - Infectious Disease   Name: Miguel Henderson 84 y.o. male I MRN: [id number]  Unit/Bed#: MS 219Payal I Date of Admission: 10/21/2024   Date of Service: 10/23/2024 I Hospital Day: 2    Assessment & Plan  Fever  Recent admission for fevers, diagnosed with rhinovirus, and esophagitis. Prior work up otherwise negative. Fevers ongoing now for 3 weeks. With atypical lymphs noted on CBCd and intra-abdominal lymphadenopathy on CT A/P. Consider possibility of underlying malignancy. Consider possibility of infectious esophagitis. May all be due to COVID infection. Several home Covid antigen test positive, however several PCR are negative. States initially tested positive around 10/16/24. LFTs are normal. Blood cultures are so far negative. UA is benign. Stool studies pending. Lyme and parasite smear are negative.  He is otherwise hemodynamically stable without leukocytosis.  He remains afebrile since admission.  Monitor off abx   Continue paxlovid   Recommend heme/onc consultation   Follow up blood cultures   Follow up stool studies  Trend temps and hemodynamics   Daily CBCd and CMP to monitor response to treatment   HSV-1 (herpes simplex virus 1) infection  HSV swab of oral lesions positive for HSV1. Previously felt to be 2/2 candidiasis given positive sputum culture however this does not correlate to diagnosis. Diflucan stopped in outpatient setting and valtrex started however patient developed rash. Either would be unlikely causes for fever as above. Oral lesions all resolved.   Odynophagia  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 Consider other infectious esophagitis. Consider underlying malignancy. Symptoms have improved.   Planning for outpatient EGD   If sx persist, would recommend inpatient Gi evaluation   Hyponatremia  Sodium 123 on admission. Previously normal. Suspect due to poor PO intake. Management per primary team/renal.     I have discussed the above management plan in detail with the  primary service.     Antibiotics:  Paxlovid D2    Subjective   Patient has no fever, chills, sweats; no nausea, vomiting, diarrhea; no cough, shortness of breath; no pain. No new symptoms.    Objective :  Temp:  [97.5 °F (36.4 °C)-97.7 °F (36.5 °C)] 97.6 °F (36.4 °C)  HR:  [80-87] 87  BP: (127-131)/(77-82) 129/80  Resp:  [18-20] 20  SpO2:  [93 %-97 %] 93 %  O2 Device: None (Room air)    Physical exam:   General:  No acute distress.  Sleeping in bed though easily arousable.  Psychiatric:  Awake and alert, oriented x 3.  HEENT: Oral lesions resolved.  Edentulous.  Mucous membranes moist.  Cardiovascular: Regular rate and rhythm.  No murmur.  Pulmonary: Breath sounds clear.  On room air.  Normal respiratory excursion without accessory muscle use  Abdomen:  Soft, nontender  Extremities:  No edema.  Moves all 4 extremities.  Skin:  No rashes or wounds on exposed skin.  : No suprapubic or CVA tenderness.  Neuro: No gross focal neurodeficit.      Lab Results: I have reviewed the following results:  Results from last 7 days   Lab Units 10/23/24  0553 10/22/24  0853 10/21/24  1649   WBC Thousand/uL 14.45* 10.40* 7.30   HEMOGLOBIN g/dL 9.1* 10.3* 11.0*   PLATELETS Thousands/uL 255 283 330     Results from last 7 days   Lab Units 10/23/24  0553 10/22/24  1708 10/22/24  0853 10/22/24  0022 10/21/24  1649   SODIUM mmol/L 130* 127* 128*  128*   < > 123*   POTASSIUM mmol/L 3.8 4.0 3.7  3.7   < > 4.4   CHLORIDE mmol/L 102 101 100  100   < > 92*   CO2 mmol/L 22 19* 21  21   < > 23   BUN mg/dL 11 18 21  21   < > 29*   CREATININE mg/dL 0.46* 0.54* 0.56*  0.55*   < > 0.88   EGFR ml/min/1.73sq m 101 95 93  94   < > 77   CALCIUM mg/dL 7.3* 7.7* 7.6*  7.6*   < > 8.3*   AST U/L  --   --  124*  --  140*   ALT U/L  --   --  35  --  42   ALK PHOS U/L  --   --  158*  --  179*   ALBUMIN g/dL  --   --  1.8*  --  2.0*    < > = values in this interval not displayed.     Results from last 7 days   Lab Units 10/21/24  5528   BLOOD CULTURE   No Growth at 24 hrs.  No Growth at 24 hrs.     Results from last 7 days   Lab Units 10/23/24  0553 10/21/24  1649   PROCALCITONIN ng/ml 2.06* 4.96*                 Imaging Results Review: No pertinent imaging studies reviewed.  Other Study Results Review: Other studies reviewed include: parasite spear and pathologists interpretation

## 2024-10-23 NOTE — DISCHARGE SUMMARY
Discharge Summary - Hospitalist   Name: Miguel Henderson 84 y.o. male I MRN: [id number]  Unit/Bed#: -01 I Date of Admission: 10/21/2024   Date of Service: 10/23/2024 I Hospital Day: 2     Assessment & Plan  Febrile illness  Admit to medicine  Patient was previously admitted on 10/8/2024 for similar complaints  Was found to have rhinovirus during that admission and additionally had tested positive for COVID on a home test on 10/11/2024  Was seen and evaluated by infectious disease during that admission  Was treated for community-acquired pneumonia  Has continued to spike fevers at home  Patient additionally reports some diarrhea since being discharged and had been on Zithromax and Rocephin for his community-acquired pneumonia  Stool studies are currently pending as well as HIV testing, repeat blood cultures and Lyme panel  Procalcitonin elevated at 4.96 with 22 bands    Patient received 1 dose of vancomycin in ER, will hold off on further antibiotics until infectious disease weighs in  Will reconsult infectious disease, plan to follow-up outpatient with hematology in the setting of lymphadenopathy, and unexplained fever, for further workup for leukemia lymphoma.  Hyponatremia  Will hydrate with normal saline 125 cc/h, stopped on 10/23  Obtain Q8 BMPs  Lactic acidosis  Initial lactic 4.2 with improvement to 3.5 status post fluid resuscitation  We will continue to monitor  Hypothyroidism  Continue prehospital levothyroxine 50 mcg p.o. daily Monday through Thursday and 75 mcg p.o. daily on Friday Saturday Sunday  Primary hypertension  Hydrochlorothiazide currently on hold, will give hydralazine 5 mg IV every 6 hours as needed for systolic blood pressure greater 170  Oral candidiasis  Completed Diflucan in outpatient setting  HSV-1 (herpes simplex virus 1) infection  Status post Valtrex  Fever    Odynophagia  Status post Diflucan for esophageal candidiasis     Medical Problems       Resolved Problems  Date  Reviewed: 10/21/2024   None       Discharging Physician / Practitioner: Cody Metzger MD  PCP: Greg Foster MD  Admission Date:   Admission Orders (From admission, onward)       Ordered        10/23/24 0942  Place in Observation  Once            10/23/24 0943  INPATIENT ADMISSION  Once            10/21/24 2005  Inpatient Admission  Once                          Discharge Date: 10/23/24    Consultations During Hospital Stay:  Infectious disease    Procedures Performed:   None    Significant Findings / Test Results:   None    Incidental Findings:   CT scan with CT scan with numerous mildly enlarged periaortic iliac chain and inguinal lymph nodes possibly reactive or lymphoproliferative  I reviewed the above mentioned incidental findings with the patient and/or family and they expressed understanding.    Test Results Pending at Discharge (will require follow up):   Stool studies     Outpatient Tests Requested:  CBC, CMP in 1 week     Complications: None    Reason for Admission:     Hospital Course:   This is an 84-year-old male with a recent admission 10/7 to 10/15 with persistent fevers. RP 2 was positive for rhinovirus so viral tracheobronchitis was suspected. He also completed 5 days of IV ceftriaxone for possible superimposed bacterial process. The patient was evaluated for dysphagia and VBS concerning for possible bottom up aspiration. 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 He has not undergone EGD. The patient had oral lesions and HSV swab was performed to return positive after discharge so he was started on p.o. Valtrex outpatient advised to stop fluconazole.  Patient readmitted with fevers, dysphagia and odynophagia. Reportedly had positive home COVID antigen test 2 weeks prior to admission, another positive test during recent admission despite negative PCR testing. Patient  started on Paxlovid. Labs reviewed and show elevated AST, T. bili, hyponatremia, bandemia with atypical lymphocytes. UA unremarkable. CT imaging shows mildly enlarged periaortic, iliac chain and inguinal lymph nodes.     Prior to discharge on 10/23, patient had been without fever.  Hematology had plan to follow-up outpatient for possible alternate causes of fever, particularly in the setting of enlarged periaortic iliac and inguinal lymph nodes.    Patient was advised to follow-up for outpatient CBC, CMP to follow hyponatremia, and leukocytosis.    Hospital Course: No notes on file    The patient, initially admitted to the hospital as inpatient, was discharged earlier than expected given the following:  .  Please see above list of diagnoses and related plan for additional information.     Condition at Discharge: stable    Discharge Day Visit / Exam:   * Please refer to separate progress note for these details *    Discussion with Family: Updated  (son) at bedside.    Discharge instructions/Information to patient and family:   See after visit summary for information provided to patient and family.      Provisions for Follow-Up Care:  See after visit summary for information related to follow-up care and any pertinent home health orders.      Mobility at time of Discharge:   Basic Mobility Inpatient Raw Score: 15  JH-HLM Goal: 4: Move to chair/commode  JH-HLM Achieved: 7: Walk 25 feet or more  HLM Goal achieved. Continue to encourage appropriate mobility.     Disposition:   Home    Planned Readmission: no    Discharge Medications:  See after visit summary for reconciled discharge medications provided to patient and/or family.      Administrative Statements   Discharge Statement:  I have spent a total time of 30 minutes in caring for this patient on the day of the visit/encounter. .    **Please Note: This note may have been constructed using a voice recognition system**

## 2024-10-23 NOTE — ASSESSMENT & PLAN NOTE
Admit to medicine  Patient was previously admitted on 10/8/2024 for similar complaints  Was found to have rhinovirus during that admission and additionally had tested positive for COVID on a home test on 10/11/2024  Was seen and evaluated by infectious disease during that admission  Was treated for community-acquired pneumonia  Has continued to spike fevers at home  Patient additionally reports some diarrhea since being discharged and had been on Zithromax and Rocephin for his community-acquired pneumonia  Stool studies are currently pending as well as HIV testing, repeat blood cultures and Lyme panel  Procalcitonin elevated at 4.96 with 22 bands    Patient received 1 dose of vancomycin in ER, will hold off on further antibiotics until infectious disease weighs in  Will reconsult infectious disease, plan to follow-up outpatient with hematology in the setting of lymphadenopathy, and unexplained fever, for further workup for leukemia lymphoma.

## 2024-10-23 NOTE — CASE MANAGEMENT
Case Management Discharge Planning Note    Patient name Miguel Henderson  Location /-01 MRN [id number]  : 1940 Date 10/23/2024       Current Admission Date: 10/21/2024  Current Admission Diagnosis:Febrile illness   Patient Active Problem List    Diagnosis Date Noted Date Diagnosed    Febrile illness 10/22/2024     Hyponatremia 10/22/2024     Lactic acidosis 10/22/2024     Oral candidiasis 10/22/2024     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     Fever 10/22/2024     Odynophagia 10/22/2024     Localized edema 10/16/2024     Shortness of breath on exertion 10/16/2024     Cough 10/16/2024     Lesion of oral mucosa 10/15/2024     Liver lesion 10/15/2024     Dysphagia 10/14/2024     Ulceration of oral mucosa 10/14/2024     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     Left knee pain 10/10/2024     Rhinovirus 10/09/2024     Adenocarcinoma of prostate (HCC) 2024     Lumbar radiculopathy 2024     Hypothyroidism 2024     Primary hypertension 2024       LOS (days): 2  Geometric Mean LOS (GMLOS) (days):   Days to GMLOS:     OBJECTIVE:  Risk of Unplanned Readmission Score: 16.58         Current admission status: Inpatient   Preferred Pharmacy:   [id number]  [place] - [address] [address]  Phone: [phone] Fax: [phone]    [id number] - [place] - [address]  [address] [facility]  [place] [address]  Phone: [phone] Fax: [phone]    Primary Care Provider: Greg Foster MD    Primary Insurance: [facility]  Secondary Insurance:     DISCHARGE DETAILS:    Discharge planning discussed with:: cindy called Juan at 10:25 am & met with Buck  Freedom of Choice: Yes  Comments - Freedom of Choice: family requested a commode - permission given to send to [employer]- he received a walker from them-      pt & family are in agreement with the d/c & d/c plan  CM contacted  "family/caregiver?: Yes  Were Treatment Team discharge recommendations reviewed with patient/caregiver?: Yes  Did patient/caregiver verbalize understanding of patient care needs?: Yes  Were patient/caregiver advised of the risks associated with not following Treatment Team discharge recommendations?: Yes    Contacts  Patient Contacts: Juan & Buck  Relationship to Patient:: Family (sons)  Contact Method: Phone, In Person  Phone Number: Juan  # [phone]  & Buck in person  Reason/Outcome: Discharge Planning    Requested Home Health Care         Is the patient interested in HHC at discharge?: No    DME Referral Provided  Referral made for DME?: Yes  DME referral completed for the following items:: Bedside Commode  DME Supplier Name:: [employer]    Other Referral/Resources/Interventions Provided:  Interventions: DME  Referral Comments: family requested a commode- family aware of the $16.78 coinsurance-  they requested the commode - permission was robert to issue from the [employer]-  \"in basket\" message sent for a follow up appt in 3-5 days    Would you like to participate in our [facility] service program?  : No - Declined    Treatment Team Recommendation: Home (home with family and outpt follow up-family)  Discharge Destination Plan:: Home (home with family & outpt follow up-family)                                                                "

## 2024-10-23 NOTE — PLAN OF CARE
Problem: Potential for Falls  Goal: Patient will remain free of falls  Description: INTERVENTIONS:  - Educate patient/family on patient safety including physical limitations  - Instruct patient to call for assistance with activity   - Consult OT/PT to assist with strengthening/mobility   - Keep Call bell within reach  - Keep bed low and locked with side rails adjusted as appropriate  - Keep care items and personal belongings within reach  - Initiate and maintain comfort rounds  - Make Fall Risk Sign visible to staff  - Offer Toileting every 2 Hours, in advance of need  - Initiate/Maintain bed alarm  - Apply yellow socks and bracelet for high fall risk patients  - Consider moving patient to room near nurses station  Outcome: Adequate for Discharge     Problem: PAIN - ADULT  Goal: Verbalizes/displays adequate comfort level or baseline comfort level  Description: Interventions:  - Encourage patient to monitor pain and request assistance  - Assess pain using appropriate pain scale  - Administer analgesics based on type and severity of pain and evaluate response  - Implement non-pharmacological measures as appropriate and evaluate response  - Consider cultural and social influences on pain and pain management  - Notify physician/advanced practitioner if interventions unsuccessful or patient reports new pain  10/23/2024 0957 by Jessica Gomez RN  Outcome: Adequate for Discharge  10/23/2024 0953 by Jessica Gomez RN  Outcome: Progressing     Problem: INFECTION - ADULT  Goal: Absence or prevention of progression during hospitalization  Description: INTERVENTIONS:  - Assess and monitor for signs and symptoms of infection  - Monitor lab/diagnostic results  - Monitor all insertion sites, i.e. indwelling lines, tubes, and drains  - Monitor endotracheal if appropriate and nasal secretions for changes in amount and color  - [place] appropriate cooling/warming therapies per order  - Administer medications as ordered  - Instruct  and encourage patient and family to use good hand hygiene technique  - Identify and instruct in appropriate isolation precautions for identified infection/condition  Outcome: Adequate for Discharge     Problem: SAFETY ADULT  Goal: Maintain or return to baseline ADL function  Description: INTERVENTIONS:  -  Assess patient's ability to carry out ADLs; assess patient's baseline for ADL function and identify physical deficits which impact ability to perform ADLs (bathing, care of mouth/teeth, toileting, grooming, dressing, etc.)  - Assess/evaluate cause of self-care deficits   - Assess range of motion  - Assess patient's mobility; develop plan if impaired  - Assess patient's need for assistive devices and provide as appropriate  - Encourage maximum independence but intervene and supervise when necessary  - Involve family in performance of ADLs  - Assess for home care needs following discharge   - Consider OT consult to assist with ADL evaluation and planning for discharge  - Provide patient education as appropriate  Outcome: Adequate for Discharge  Goal: Maintains/Returns to pre admission functional level  Description: INTERVENTIONS:  - Perform AM-PAC 6 Click Basic Mobility/ Daily Activity assessment daily.  - Set and communicate daily mobility goal to care team and patient/family/caregiver.   - Collaborate with rehabilitation services on mobility goals if consulted  - Ambulate patient 2 times a day  - Out of bed for toileting  - Record patient progress and toleration of activity level   Outcome: Adequate for Discharge     Problem: DISCHARGE PLANNING  Goal: Discharge to home or other facility with appropriate resources  Description: INTERVENTIONS:  - Identify barriers to discharge w/patient and caregiver  - Arrange for needed discharge resources and transportation as appropriate  - Identify discharge learning needs (meds, wound care, etc.)  - Arrange for interpretive services to assist at discharge as needed  - Refer to  Case Management Department for coordinating discharge planning if the patient needs post-hospital services based on physician/advanced practitioner order or complex needs related to functional status, cognitive ability, or social support system  Outcome: Adequate for Discharge     Problem: Knowledge Deficit  Goal: Patient/family/caregiver demonstrates understanding of disease process, treatment plan, medications, and discharge instructions  Description: Complete learning assessment and assess knowledge base.  Interventions:  - Provide teaching at level of understanding  - Provide teaching via preferred learning methods  Outcome: Adequate for Discharge     Problem: Nutrition/Hydration-ADULT  Goal: Nutrient/Hydration intake appropriate for improving, restoring or maintaining nutritional needs  Description: Monitor and assess patient's nutrition/hydration status for malnutrition. Collaborate with interdisciplinary team and initiate plan and interventions as ordered.  Monitor patient's weight and dietary intake as ordered or per policy. Utilize nutrition screening tool and intervene as necessary. Determine patient's food preferences and provide high-protein, high-caloric foods as appropriate.     INTERVENTIONS:  - Monitor oral intake, urinary output, labs, and treatment plans  - Assess nutrition and hydration status and recommend course of action  - Evaluate amount of meals eaten  - Assist patient with eating if necessary   - Allow adequate time for meals  - Recommend/ encourage appropriate diets, oral nutritional supplements, and vitamin/mineral supplements  - Order, calculate, and assess calorie counts as needed  - Recommend, monitor, and adjust tube feedings and TPN/PPN based on assessed needs  - Assess need for intravenous fluids  - Provide specific nutrition/hydration education as appropriate  - Include patient/family/caregiver in decisions related to nutrition  Outcome: Adequate for Discharge     Problem:  METABOLIC, FLUID AND ELECTROLYTES - ADULT  Goal: Electrolytes maintained within normal limits  Description: INTERVENTIONS:  - Monitor labs and assess patient for signs and symptoms of electrolyte imbalances  - Administer electrolyte replacement as ordered  - Monitor response to electrolyte replacements, including repeat lab results as appropriate  - Instruct patient on fluid and nutrition as appropriate  Outcome: Adequate for Discharge  Goal: Fluid balance maintained  Description: INTERVENTIONS:  - Monitor labs   - Monitor I/O and WT  - Instruct patient on fluid and nutrition as appropriate  - Assess for signs & symptoms of volume excess or deficit  Outcome: Adequate for Discharge     Problem: Prexisting or High Potential for Compromised Skin Integrity  Goal: Skin integrity is maintained or improved  Description: INTERVENTIONS:  - Identify patients at risk for skin breakdown  - Assess and monitor skin integrity  - Assess and monitor nutrition and hydration status  - Monitor labs   - Assess for incontinence   - Turn and reposition patient  - Assist with mobility/ambulation  - Relieve pressure over bony prominences  - Avoid friction and shearing  - Provide appropriate hygiene as needed including keeping skin clean and dry  - Evaluate need for skin moisturizer/barrier cream  - Collaborate with interdisciplinary team   - Patient/family teaching  - Consider wound care consult   Outcome: Adequate for Discharge

## 2024-10-23 NOTE — ASSESSMENT & PLAN NOTE
Recent admission for fevers, diagnosed with rhinovirus, and esophagitis. Prior work up otherwise negative. Fevers ongoing now for 3 weeks. With atypical lymphs noted on CBCd and intra-abdominal lymphadenopathy on CT A/P. Consider possibility of underlying malignancy. Consider possibility of infectious esophagitis. May all be due to COVID infection. Several home Covid antigen test positive, however several PCR are negative. States initially tested positive around 10/16/24. LFTs are normal. Blood cultures are so far negative. UA is benign. Stool studies pending. Lyme and parasite smear are negative.  He is otherwise hemodynamically stable without leukocytosis.  He remains afebrile since admission.  Monitor off abx   Continue paxlovid   Recommend heme/onc consultation   Follow up blood cultures   Follow up stool studies  Trend temps and hemodynamics   Daily CBCd and CMP to monitor response to treatment

## 2024-10-23 NOTE — NURSING NOTE
Patient's IV was removed with catheter tip intact. DSD and pressure was applied. AVS was given to patient and family, no further questions/concerns at this time

## 2024-10-24 LAB
DME PARACHUTE DELIVERY DATE ACTUAL: NORMAL
DME PARACHUTE DELIVERY DATE REQUESTED: NORMAL
DME PARACHUTE DELIVERY NOTE: NORMAL
DME PARACHUTE ITEM DESCRIPTION: NORMAL
DME PARACHUTE ORDER STATUS: NORMAL
DME PARACHUTE SUPPLIER NAME: NORMAL
DME PARACHUTE SUPPLIER PHONE: NORMAL

## 2024-10-25 ENCOUNTER — APPOINTMENT (EMERGENCY)
Dept: CT IMAGING | Facility: HOSPITAL | Age: 84
DRG: 071 | End: 2024-10-25
Payer: COMMERCIAL

## 2024-10-25 ENCOUNTER — APPOINTMENT (INPATIENT)
Dept: MRI IMAGING | Facility: HOSPITAL | Age: 84
DRG: 071 | End: 2024-10-25
Payer: COMMERCIAL

## 2024-10-25 ENCOUNTER — HOSPITAL ENCOUNTER (INPATIENT)
Facility: HOSPITAL | Age: 84
LOS: 1 days | DRG: 071 | End: 2024-10-26
Attending: EMERGENCY MEDICINE | Admitting: INTERNAL MEDICINE
Payer: COMMERCIAL

## 2024-10-25 ENCOUNTER — APPOINTMENT (INPATIENT)
Dept: INTERVENTIONAL RADIOLOGY/VASCULAR | Facility: HOSPITAL | Age: 84
DRG: 071 | End: 2024-10-25
Attending: INTERNAL MEDICINE
Payer: COMMERCIAL

## 2024-10-25 DIAGNOSIS — E87.1 HYPONATREMIA: ICD-10-CM

## 2024-10-25 DIAGNOSIS — I51.7 CARDIOMEGALY: ICD-10-CM

## 2024-10-25 DIAGNOSIS — R59.9 ADENOPATHY: ICD-10-CM

## 2024-10-25 DIAGNOSIS — J81.1 PULMONARY EDEMA: ICD-10-CM

## 2024-10-25 DIAGNOSIS — R50.9 FEVER, UNSPECIFIED FEVER CAUSE: ICD-10-CM

## 2024-10-25 DIAGNOSIS — D72.829 LEUKOCYTOSIS: ICD-10-CM

## 2024-10-25 DIAGNOSIS — E87.8 HYPOCHLOREMIA: ICD-10-CM

## 2024-10-25 DIAGNOSIS — J90 PLEURAL EFFUSION, BILATERAL: ICD-10-CM

## 2024-10-25 DIAGNOSIS — G93.40 ACUTE ENCEPHALOPATHY: Primary | ICD-10-CM

## 2024-10-25 LAB
ALBUMIN SERPL BCG-MCNC: 2.2 G/DL (ref 3.5–5)
ALP SERPL-CCNC: 202 U/L (ref 34–104)
ALT SERPL W P-5'-P-CCNC: 40 U/L (ref 7–52)
AMMONIA PLAS-SCNC: 26 UMOL/L (ref 18–72)
ANION GAP SERPL CALCULATED.3IONS-SCNC: 6 MMOL/L (ref 4–13)
ANISOCYTOSIS BLD QL SMEAR: PRESENT
APAP SERPL-MCNC: <2 UG/ML (ref 10–20)
APPEARANCE CSF: ABNORMAL
AST SERPL W P-5'-P-CCNC: 96 U/L (ref 13–39)
ATRIAL RATE: 91 BPM
BACTERIA UR QL AUTO: NORMAL /HPF
BASOPHILS # BLD MANUAL: 0 THOUSAND/UL (ref 0–0.1)
BASOPHILS NFR MAR MANUAL: 0 % (ref 0–1)
BILIRUB SERPL-MCNC: 1.62 MG/DL (ref 0.2–1)
BILIRUB UR QL STRIP: NEGATIVE
BUN SERPL-MCNC: 5 MG/DL (ref 5–25)
C GATTII+NEOFOR DNA CSF QL NAA+NON-PROBE: NOT DETECTED
CALCIUM ALBUM COR SERPL-MCNC: 9.4 MG/DL (ref 8.3–10.1)
CALCIUM SERPL-MCNC: 8 MG/DL (ref 8.4–10.2)
CHLORIDE SERPL-SCNC: 92 MMOL/L (ref 96–108)
CLARITY UR: CLEAR
CMV DNA CSF QL NAA+NON-PROBE: NOT DETECTED
CO2 SERPL-SCNC: 31 MMOL/L (ref 21–32)
COLOR UR: YELLOW
CREAT SERPL-MCNC: 0.59 MG/DL (ref 0.6–1.3)
E COLI K1 DNA CSF QL NAA+NON-PROBE: NOT DETECTED
EOSINOPHIL # BLD MANUAL: 0.35 THOUSAND/UL (ref 0–0.4)
EOSINOPHIL NFR BLD MANUAL: 3 % (ref 0–6)
ETHANOL SERPL-MCNC: <10 MG/DL
EV RNA CSF QL NAA+NON-PROBE: NOT DETECTED
FLUAV AG UPPER RESP QL IA.RAPID: NEGATIVE
FLUBV AG UPPER RESP QL IA.RAPID: NEGATIVE
GFR SERPL CREATININE-BSD FRML MDRD: 91 ML/MIN/1.73SQ M
GLUCOSE CSF-MCNC: 43 MG/DL (ref 40–70)
GLUCOSE SERPL-MCNC: 117 MG/DL (ref 65–140)
GLUCOSE UR STRIP-MCNC: NEGATIVE MG/DL
GP B STREP DNA CSF QL NAA+NON-PROBE: NOT DETECTED
GRAM STN SPEC: NORMAL
GRAM STN SPEC: NORMAL
HAEM INFLU DNA CSF QL NAA+NON-PROBE: NOT DETECTED
HCT VFR BLD AUTO: 29.4 % (ref 36.5–49.3)
HGB BLD-MCNC: 10.8 G/DL (ref 12–17)
HGB UR QL STRIP.AUTO: ABNORMAL
HHV6 DNA CSF QL NAA+NON-PROBE: NOT DETECTED
HSV1 DNA CSF QL NAA+NON-PROBE: NOT DETECTED
HSV2 DNA CSF QL NAA+NON-PROBE: NOT DETECTED
KETONES UR STRIP-MCNC: NEGATIVE MG/DL
L MONOCYTOG DNA CSF QL NAA+NON-PROBE: NOT DETECTED
LACTATE SERPL-SCNC: 1.6 MMOL/L (ref 0.5–2)
LEUKOCYTE ESTERASE UR QL STRIP: NEGATIVE
LG PLATELETS BLD QL SMEAR: PRESENT
LIPASE SERPL-CCNC: 24 U/L (ref 11–82)
LYMPHOCYTES # BLD AUTO: 52 % (ref 14–44)
LYMPHOCYTES # BLD AUTO: 6.77 THOUSAND/UL (ref 0.6–4.47)
LYMPHOCYTES NFR CSF MANUAL: 85 %
MCH RBC QN AUTO: 37 PG (ref 26.8–34.3)
MCHC RBC AUTO-ENTMCNC: 36.7 G/DL (ref 31.4–37.4)
MCV RBC AUTO: 101 FL (ref 82–98)
MONOCYTES # BLD AUTO: 0.47 THOUSAND/UL (ref 0–1.22)
MONOCYTES NFR BLD: 4 % (ref 4–12)
MONOS+MACROS CSF MANUAL: 11 %
N MEN DNA CSF QL NAA+NON-PROBE: NOT DETECTED
NEUTROPHILS # BLD MANUAL: 4.09 THOUSAND/UL (ref 1.85–7.62)
NEUTROPHILS NFR CSF MANUAL: 4 %
NEUTS SEG NFR BLD AUTO: 35 % (ref 43–75)
NITRITE UR QL STRIP: NEGATIVE
NON-SQ EPI CELLS URNS QL MICRO: NORMAL /HPF
P AXIS: 43 DEGREES
PARECHOVIRUS A RNA CSF QL NAA+NON-PROBE: NOT DETECTED
PH UR STRIP.AUTO: 8 [PH]
PLATELET # BLD AUTO: 256 THOUSANDS/UL (ref 149–390)
PLATELET BLD QL SMEAR: ADEQUATE
PMV BLD AUTO: 10.4 FL (ref 8.9–12.7)
POTASSIUM SERPL-SCNC: 3.5 MMOL/L (ref 3.5–5.3)
PR INTERVAL: 160 MS
PROCALCITONIN SERPL-MCNC: 0.59 NG/ML
PROT CSF-MCNC: 86 MG/DL (ref 15–45)
PROT SERPL-MCNC: 7.7 G/DL (ref 6.4–8.4)
PROT UR STRIP-MCNC: NEGATIVE MG/DL
QRS AXIS: -35 DEGREES
QRSD INTERVAL: 86 MS
QT INTERVAL: 388 MS
QTC INTERVAL: 477 MS
RBC # BLD AUTO: 2.92 MILLION/UL (ref 3.88–5.62)
RBC # CSF MANUAL: 3 UL (ref 0–10)
RBC #/AREA URNS AUTO: NORMAL /HPF
RBC MORPH BLD: PRESENT
S PNEUM DNA CSF QL NAA+NON-PROBE: NOT DETECTED
SALICYLATES SERPL-MCNC: <5 MG/DL (ref 3–20)
SARS-COV+SARS-COV-2 AG RESP QL IA.RAPID: NEGATIVE
SODIUM SERPL-SCNC: 129 MMOL/L (ref 135–147)
SP GR UR STRIP.AUTO: 1.02
T WAVE AXIS: 0 DEGREES
TOTAL CELLS COUNTED BLD: NO
TOTAL CELLS COUNTED SPEC: 100
TSH SERPL DL<=0.05 MIU/L-ACNC: 3.92 UIU/ML (ref 0.45–4.5)
TUBE # CSF: 4
URINE COMMENT: NORMAL
UROBILINOGEN UR QL STRIP.AUTO: 4 E.U./DL
VARIANT LYMPHS # BLD AUTO: 6 %
VENTRICULAR RATE: 91 BPM
VZV DNA CSF QL NAA+NON-PROBE: NOT DETECTED
WBC # BLD AUTO: 11.68 THOUSAND/UL (ref 4.31–10.16)
WBC # CSF AUTO: 114 /UL (ref 0–5)
WBC #/AREA URNS AUTO: NORMAL /HPF

## 2024-10-25 PROCEDURE — 85007 BL SMEAR W/DIFF WBC COUNT: CPT

## 2024-10-25 PROCEDURE — 83605 ASSAY OF LACTIC ACID: CPT

## 2024-10-25 PROCEDURE — 88184 FLOWCYTOMETRY/ TC 1 MARKER: CPT | Performed by: INTERNAL MEDICINE

## 2024-10-25 PROCEDURE — 80179 DRUG ASSAY SALICYLATE: CPT

## 2024-10-25 PROCEDURE — 80053 COMPREHEN METABOLIC PANEL: CPT

## 2024-10-25 PROCEDURE — 87811 SARS-COV-2 COVID19 W/OPTIC: CPT

## 2024-10-25 PROCEDURE — A9585 GADOBUTROL INJECTION: HCPCS | Performed by: INTERNAL MEDICINE

## 2024-10-25 PROCEDURE — 86592 SYPHILIS TEST NON-TREP QUAL: CPT | Performed by: INTERNAL MEDICINE

## 2024-10-25 PROCEDURE — 99152 MOD SED SAME PHYS/QHP 5/>YRS: CPT | Performed by: RADIOLOGY

## 2024-10-25 PROCEDURE — 80143 DRUG ASSAY ACETAMINOPHEN: CPT

## 2024-10-25 PROCEDURE — 62328 DX LMBR SPI PNXR W/FLUOR/CT: CPT | Performed by: RADIOLOGY

## 2024-10-25 PROCEDURE — 89050 BODY FLUID CELL COUNT: CPT | Performed by: INTERNAL MEDICINE

## 2024-10-25 PROCEDURE — 83690 ASSAY OF LIPASE: CPT

## 2024-10-25 PROCEDURE — 84145 PROCALCITONIN (PCT): CPT

## 2024-10-25 PROCEDURE — 70450 CT HEAD/BRAIN W/O DYE: CPT

## 2024-10-25 PROCEDURE — 82077 ASSAY SPEC XCP UR&BREATH IA: CPT

## 2024-10-25 PROCEDURE — 87070 CULTURE OTHR SPECIMN AEROBIC: CPT | Performed by: INTERNAL MEDICINE

## 2024-10-25 PROCEDURE — 81001 URINALYSIS AUTO W/SCOPE: CPT

## 2024-10-25 PROCEDURE — 87804 INFLUENZA ASSAY W/OPTIC: CPT

## 2024-10-25 PROCEDURE — 96367 TX/PROPH/DG ADDL SEQ IV INF: CPT

## 2024-10-25 PROCEDURE — 87483 CNS DNA AMP PROBE TYPE 12-25: CPT | Performed by: INTERNAL MEDICINE

## 2024-10-25 PROCEDURE — 99153 MOD SED SAME PHYS/QHP EA: CPT

## 2024-10-25 PROCEDURE — 009U3ZX DRAINAGE OF SPINAL CANAL, PERCUTANEOUS APPROACH, DIAGNOSTIC: ICD-10-PCS | Performed by: RADIOLOGY

## 2024-10-25 PROCEDURE — 62328 DX LMBR SPI PNXR W/FLUOR/CT: CPT

## 2024-10-25 PROCEDURE — 99152 MOD SED SAME PHYS/QHP 5/>YRS: CPT

## 2024-10-25 PROCEDURE — 82945 GLUCOSE OTHER FLUID: CPT | Performed by: INTERNAL MEDICINE

## 2024-10-25 PROCEDURE — 84157 ASSAY OF PROTEIN OTHER: CPT | Performed by: INTERNAL MEDICINE

## 2024-10-25 PROCEDURE — 93010 ELECTROCARDIOGRAM REPORT: CPT | Performed by: INTERNAL MEDICINE

## 2024-10-25 PROCEDURE — 87476 LYME DIS DNA AMP PROBE: CPT | Performed by: INTERNAL MEDICINE

## 2024-10-25 PROCEDURE — 36415 COLL VENOUS BLD VENIPUNCTURE: CPT

## 2024-10-25 PROCEDURE — 82140 ASSAY OF AMMONIA: CPT

## 2024-10-25 PROCEDURE — 71260 CT THORAX DX C+: CPT

## 2024-10-25 PROCEDURE — 93005 ELECTROCARDIOGRAM TRACING: CPT

## 2024-10-25 PROCEDURE — 96361 HYDRATE IV INFUSION ADD-ON: CPT

## 2024-10-25 PROCEDURE — 87798 DETECT AGENT NOS DNA AMP: CPT | Performed by: INTERNAL MEDICINE

## 2024-10-25 PROCEDURE — 74177 CT ABD & PELVIS W/CONTRAST: CPT

## 2024-10-25 PROCEDURE — 88185 FLOWCYTOMETRY/TC ADD-ON: CPT

## 2024-10-25 PROCEDURE — 99223 1ST HOSP IP/OBS HIGH 75: CPT | Performed by: INTERNAL MEDICINE

## 2024-10-25 PROCEDURE — 70553 MRI BRAIN STEM W/O & W/DYE: CPT

## 2024-10-25 PROCEDURE — 99285 EMERGENCY DEPT VISIT HI MDM: CPT

## 2024-10-25 PROCEDURE — 87040 BLOOD CULTURE FOR BACTERIA: CPT

## 2024-10-25 PROCEDURE — 85027 COMPLETE CBC AUTOMATED: CPT

## 2024-10-25 PROCEDURE — 96365 THER/PROPH/DIAG IV INF INIT: CPT

## 2024-10-25 PROCEDURE — 84443 ASSAY THYROID STIM HORMONE: CPT

## 2024-10-25 PROCEDURE — 89051 BODY FLUID CELL COUNT: CPT | Performed by: INTERNAL MEDICINE

## 2024-10-25 RX ORDER — GADOBUTROL 604.72 MG/ML
7 INJECTION INTRAVENOUS
Status: COMPLETED | OUTPATIENT
Start: 2024-10-25 | End: 2024-10-25

## 2024-10-25 RX ORDER — LEVOTHYROXINE SODIUM 75 UG/1
75 TABLET ORAL
Status: DISCONTINUED | OUTPATIENT
Start: 2024-10-26 | End: 2024-10-26 | Stop reason: HOSPADM

## 2024-10-25 RX ORDER — ENOXAPARIN SODIUM 100 MG/ML
40 INJECTION SUBCUTANEOUS DAILY
Status: DISCONTINUED | OUTPATIENT
Start: 2024-10-27 | End: 2024-10-26 | Stop reason: HOSPADM

## 2024-10-25 RX ORDER — CEFTRIAXONE 2 G/50ML
2000 INJECTION, SOLUTION INTRAVENOUS EVERY 12 HOURS
Status: DISCONTINUED | OUTPATIENT
Start: 2024-10-25 | End: 2024-10-26

## 2024-10-25 RX ORDER — LORAZEPAM 2 MG/ML
1 INJECTION INTRAMUSCULAR ONCE
Status: COMPLETED | OUTPATIENT
Start: 2024-10-25 | End: 2024-10-25

## 2024-10-25 RX ORDER — FENTANYL CITRATE 50 UG/ML
INJECTION, SOLUTION INTRAMUSCULAR; INTRAVENOUS AS NEEDED
Status: COMPLETED | OUTPATIENT
Start: 2024-10-25 | End: 2024-10-25

## 2024-10-25 RX ORDER — ACETAMINOPHEN 10 MG/ML
1000 INJECTION, SOLUTION INTRAVENOUS EVERY 6 HOURS PRN
Status: DISCONTINUED | OUTPATIENT
Start: 2024-10-25 | End: 2024-10-26 | Stop reason: HOSPADM

## 2024-10-25 RX ORDER — ACETAMINOPHEN 325 MG/1
650 TABLET ORAL EVERY 6 HOURS PRN
Status: DISCONTINUED | OUTPATIENT
Start: 2024-10-25 | End: 2024-10-25

## 2024-10-25 RX ORDER — MIDAZOLAM HYDROCHLORIDE 2 MG/2ML
INJECTION, SOLUTION INTRAMUSCULAR; INTRAVENOUS AS NEEDED
Status: COMPLETED | OUTPATIENT
Start: 2024-10-25 | End: 2024-10-25

## 2024-10-25 RX ORDER — LEVOTHYROXINE SODIUM 50 UG/1
50 TABLET ORAL DAILY
Status: DISCONTINUED | OUTPATIENT
Start: 2024-10-28 | End: 2024-10-26 | Stop reason: HOSPADM

## 2024-10-25 RX ORDER — SODIUM CHLORIDE, SODIUM GLUCONATE, SODIUM ACETATE, POTASSIUM CHLORIDE, MAGNESIUM CHLORIDE, SODIUM PHOSPHATE, DIBASIC, AND POTASSIUM PHOSPHATE .53; .5; .37; .037; .03; .012; .00082 G/100ML; G/100ML; G/100ML; G/100ML; G/100ML; G/100ML; G/100ML
100 INJECTION, SOLUTION INTRAVENOUS CONTINUOUS
Status: DISCONTINUED | OUTPATIENT
Start: 2024-10-25 | End: 2024-10-26 | Stop reason: HOSPADM

## 2024-10-25 RX ORDER — FLUTICASONE PROPIONATE 50 MCG
1 SPRAY, SUSPENSION (ML) NASAL DAILY
Status: DISCONTINUED | OUTPATIENT
Start: 2024-10-25 | End: 2024-10-26 | Stop reason: HOSPADM

## 2024-10-25 RX ORDER — PANTOPRAZOLE SODIUM 40 MG/1
40 TABLET, DELAYED RELEASE ORAL
Status: DISCONTINUED | OUTPATIENT
Start: 2024-10-26 | End: 2024-10-26 | Stop reason: HOSPADM

## 2024-10-25 RX ORDER — CEFTRIAXONE 1 G/50ML
1000 INJECTION, SOLUTION INTRAVENOUS ONCE
Status: COMPLETED | OUTPATIENT
Start: 2024-10-25 | End: 2024-10-25

## 2024-10-25 RX ORDER — VANCOMYCIN HYDROCHLORIDE 750 MG/150ML
10 INJECTION, SOLUTION INTRAVENOUS EVERY 8 HOURS
Status: DISCONTINUED | OUTPATIENT
Start: 2024-10-26 | End: 2024-10-26

## 2024-10-25 RX ORDER — LIDOCAINE WITH 8.4% SOD BICARB 0.9%(10ML)
SYRINGE (ML) INJECTION AS NEEDED
Status: COMPLETED | OUTPATIENT
Start: 2024-10-25 | End: 2024-10-25

## 2024-10-25 RX ORDER — ONDANSETRON 2 MG/ML
4 INJECTION INTRAMUSCULAR; INTRAVENOUS EVERY 6 HOURS PRN
Status: DISCONTINUED | OUTPATIENT
Start: 2024-10-25 | End: 2024-10-26 | Stop reason: HOSPADM

## 2024-10-25 RX ADMIN — VANCOMYCIN HYDROCHLORIDE 1500 MG: 1 INJECTION, POWDER, LYOPHILIZED, FOR SOLUTION INTRAVENOUS at 19:16

## 2024-10-25 RX ADMIN — CEFTRIAXONE 2000 MG: 2 INJECTION, SOLUTION INTRAVENOUS at 18:27

## 2024-10-25 RX ADMIN — ACETAMINOPHEN 1000 MG: 10 INJECTION INTRAVENOUS at 20:42

## 2024-10-25 RX ADMIN — SODIUM CHLORIDE 500 ML: 0.9 INJECTION, SOLUTION INTRAVENOUS at 15:39

## 2024-10-25 RX ADMIN — FENTANYL CITRATE 50 MCG: 50 INJECTION, SOLUTION INTRAMUSCULAR; INTRAVENOUS at 16:53

## 2024-10-25 RX ADMIN — AZITHROMYCIN MONOHYDRATE 500 MG: 500 INJECTION, POWDER, LYOPHILIZED, FOR SOLUTION INTRAVENOUS at 13:48

## 2024-10-25 RX ADMIN — SODIUM CHLORIDE, SODIUM GLUCONATE, SODIUM ACETATE, POTASSIUM CHLORIDE, MAGNESIUM CHLORIDE, SODIUM PHOSPHATE, DIBASIC, AND POTASSIUM PHOSPHATE 100 ML/HR: .53; .5; .37; .037; .03; .012; .00082 INJECTION, SOLUTION INTRAVENOUS at 18:20

## 2024-10-25 RX ADMIN — SODIUM CHLORIDE 500 ML: 0.9 INJECTION, SOLUTION INTRAVENOUS at 11:28

## 2024-10-25 RX ADMIN — GADOBUTROL 7 ML: 604.72 INJECTION INTRAVENOUS at 22:03

## 2024-10-25 RX ADMIN — AMPICILLIN SODIUM 2000 MG: 2 INJECTION, POWDER, FOR SOLUTION INTRAMUSCULAR; INTRAVENOUS at 22:21

## 2024-10-25 RX ADMIN — CEFTRIAXONE 1000 MG: 1 INJECTION, SOLUTION INTRAVENOUS at 13:14

## 2024-10-25 RX ADMIN — MIDAZOLAM HYDROCHLORIDE 1 MG: 1 INJECTION, SOLUTION INTRAMUSCULAR; INTRAVENOUS at 16:59

## 2024-10-25 RX ADMIN — MIDAZOLAM HYDROCHLORIDE 1 MG: 1 INJECTION, SOLUTION INTRAMUSCULAR; INTRAVENOUS at 16:53

## 2024-10-25 RX ADMIN — MIDAZOLAM HYDROCHLORIDE 2 MG: 1 INJECTION, SOLUTION INTRAMUSCULAR; INTRAVENOUS at 16:43

## 2024-10-25 RX ADMIN — FENTANYL CITRATE 50 MCG: 50 INJECTION, SOLUTION INTRAMUSCULAR; INTRAVENOUS at 16:43

## 2024-10-25 RX ADMIN — IOHEXOL 100 ML: 350 INJECTION, SOLUTION INTRAVENOUS at 12:11

## 2024-10-25 RX ADMIN — Medication 10 ML: at 16:51

## 2024-10-25 RX ADMIN — LORAZEPAM 1 MG: 2 INJECTION INTRAMUSCULAR; INTRAVENOUS at 22:00

## 2024-10-25 NOTE — BRIEF OP NOTE (RAD/CATH)
INTERVENTIONAL RADIOLOGY PROCEDURE NOTE    Date: 10/25/2024    Procedure: IR LUMBAR PUNCTURE     Preoperative diagnosis:   1. Acute encephalopathy    2. Leukocytosis    3. Hyponatremia    4. Hypochloremia    5. Adenopathy    6. Pulmonary edema    7. Pleural effusion, bilateral    8. Cardiomegaly    9. Fever, unspecified fever cause         Postoperative diagnosis: Same.    Surgeon: Jorge Martinez MD     Assistant: None. No qualified resident was available.    Blood loss: minimal    Specimens: 30 cc clear CSF.    Findings: Successful LP    Complications: None immediate.    Anesthesia: conscious sedation

## 2024-10-25 NOTE — ASSESSMENT & PLAN NOTE
HCTZ on hold as patient has had poor oral intake over the last few days.  Will monitor blood pressure and treat as needed

## 2024-10-25 NOTE — ASSESSMENT & PLAN NOTE
This is patient's third hospital visit in the last 2 weeks  Patient has had significant infectious workup over the last 2 weeks including RP 2 panel on 10/21/2024 which was negative  Blood cultures drawn in the ER, will follow-up results  Mild leukocytosis and slight elevation of procalcitonin  Lactate normal  Tylenol as needed  Follow-up LP results  Infectious disease consult

## 2024-10-25 NOTE — H&P
H&P - Hospitalist   Name: Miguel Hendreson 84 y.o. male I MRN: [id number]  Unit/Bed#: -01 I Date of Admission: 10/25/2024   Date of Service: 10/25/2024 I Hospital Day: 0     Assessment & Plan  Encephalopathy  Unclear etiology.  CT head negative in the ER  Patient was recently admitted for infectious workup and discharged on 10/23/2024 with recommendation to follow-up with hematology  Patient with continued fever today  We will check lumbar puncture given altered mental status and fever  Check MRI brain  Monitor electrolytes  If no improvement can consider neurology consultation  Hypothyroidism  Continue levothyroxine  Primary hypertension  HCTZ on hold as patient has had poor oral intake over the last few days.  Will monitor blood pressure and treat as needed  Fever  This is patient's third hospital visit in the last 2 weeks  Patient has had significant infectious workup over the last 2 weeks including RP 2 panel on 10/21/2024 which was negative  Blood cultures drawn in the ER, will follow-up results  Mild leukocytosis and slight elevation of procalcitonin  Lactate normal  Tylenol as needed  Follow-up LP results  Infectious disease consult      VTE Pharmacologic Prophylaxis: VTE Score: 4 Moderate Risk (Score 3-4) - Pharmacological DVT Prophylaxis Ordered: Will hold for now given lumbar puncture performed today.  Resume anticoagulation on Sunday.  Code Status: Level 3 - DNAR and DNI   Discussion with family: Updated  (son and daughter) at bedside.    Anticipated Length of Stay: Patient will be admitted on an inpatient basis with an anticipated length of stay of greater than 2 midnights secondary to encephalopathy.    History of Present Illness   Chief Complaint: Altered mental status    Miguel Henderson is a 84 y.o. male with a PMH of hypertension, hypothyroidism who presents with altered mental status.  This is patient's third visit to the hospital in the last 2 weeks.  First visit was for  sepsis which was thought to be secondary to pneumonia versus viral infection.  Infectious disease evaluated patient and patient had extensive infectious workup including RP 2 panel which was initially positive for rhinovirus.  During last admission RP 2 panel was negative.  Suspicion for possible Candida esophagitis, patient completed fluconazole.  Also with recent COVID infection and completed Paxlovid.  Over the last 24 to 48 hours patient has had worsening mental status.  Unable to recommend family members which is unusual for patient.  Patient complaining of slight pain in neck.  Also had a fever spike today in the ER.    Review of Systems   Unable to perform ROS: Mental status change       Historical Information   Past Medical History:   Diagnosis Date    Prostate cancer (HCC)      Past Surgical History:   Procedure Laterality Date    APPENDECTOMY       Social History     Tobacco Use    Smoking status: Never    Smokeless tobacco: Never   Vaping Use    Vaping status: Never Used   Substance and Sexual Activity    Alcohol use: Yes     Comment: Occasional    Drug use: Never    Sexual activity: Not on file     E-Cigarette/Vaping    E-Cigarette Use Never User      E-Cigarette/Vaping Substances     History reviewed. No pertinent family history.  Social History:  Marital Status: Unknown   Occupation: none  Patient Pre-hospital Living Situation: Home  Patient Pre-hospital Level of Mobility: walks with walker  Patient Pre-hospital Diet Restrictions: none    Meds/Allergies   I have reviewed home medications with patient family member.  Prior to Admission medications    Medication Sig Start Date End Date Taking? Authorizing Provider   benzonatate (TESSALON) 200 MG capsule Take 1 capsule (200 mg total) by mouth 2 (two) times a day as needed for cough 10/16/24   Greg Foster MD   cyclobenzaprine (FLEXERIL) 5 mg tablet Take 5 mg by mouth 3 (three) times a day as needed 8/26/24   Historical Provider, MD   fluticasone (FLONASE)  50 mcg/act nasal spray 1 spray into each nostril daily 10/16/24   Greg Foster MD   hydroCHLOROthiazide 25 mg tablet Take 1 tablet by mouth daily 7/3/24   Historical Provider, MD   ibuprofen (MOTRIN) 800 mg tablet Take 1 tablet (800 mg total) by mouth every 8 (eight) hours as needed for moderate pain 9/18/24   Greg Foster MD   levothyroxine 50 mcg tablet Take 50 mcg by mouth daily 7/3/24   Historical Provider, MD   levothyroxine 75 mcg tablet Take 75 mcg by mouth Contingent Taken every Friday through Bill 7/3/24   Historical Provider, MD   nirmatrelvir & ritonavir (Paxlovid) tablet therapy pack Take 3 tablets by mouth 2 (two) times a day for 7 doses 10/23/24 10/27/24  Cody Metzger MD   ondansetron (ZOFRAN) 4 mg tablet Take 1 tablet (4 mg total) by mouth every 8 (eight) hours as needed for nausea or vomiting 10/23/24   Cody Metzger MD   pantoprazole (PROTONIX) 40 mg tablet Take 1 tablet (40 mg total) by mouth daily in the early morning 10/16/24 11/15/24  Judy Wade MD   potassium chloride (Klor-Con M10) 10 mEq tablet Take 10 mEq by mouth daily    Historical Provider, MD     Allergies   Allergen Reactions    Shellfish Allergy - Food Allergy Anaphylaxis    Valtrex [Valacyclovir] Hives       Objective :  Temp:  [99.1 °F (37.3 °C)-101.9 °F (38.8 °C)] 99.2 °F (37.3 °C)  HR:  [] 102  BP: (125-157)/() 128/73  Resp:  [14-24] 14  SpO2:  [92 %-95 %] 92 %  O2 Device: None (Room air)    Physical Exam  Constitutional:       Comments: Patient is drowsy but arousable   HENT:      Head: Normocephalic and atraumatic.      Nose: Nose normal.      Mouth/Throat:      Mouth: Mucous membranes are dry.   Eyes:      Extraocular Movements: Extraocular movements intact.      Conjunctiva/sclera: Conjunctivae normal.   Neck:      Comments: Discomfort on passive range of motion of neck  Cardiovascular:      Rate and Rhythm: Normal rate and regular rhythm.   Pulmonary:      Effort: Pulmonary effort is normal. No  respiratory distress.   Abdominal:      Palpations: Abdomen is soft.      Tenderness: There is no abdominal tenderness.   Musculoskeletal:         General: No swelling or tenderness.      Cervical back: Neck supple.      Comments: Generalized weakness   Skin:     General: Skin is warm and dry.   Neurological:      Comments: Drowsy but arousable.  Able to follow simple commands.  Patient is confused which patient states is not his baseline   Psychiatric:         Mood and Affect: Mood normal.         Behavior: Behavior normal.          Lines/Drains:            Lab Results: I have reviewed the following results:  Results from last 7 days   Lab Units 10/25/24  1121 10/22/24  0853 10/21/24  1649   WBC Thousand/uL 11.68*   < > 7.30   HEMOGLOBIN g/dL 10.8*   < > 11.0*   HEMATOCRIT % 29.4*   < > 33.2*   PLATELETS Thousands/uL 256   < > 330   BANDS PCT %  --   --  22*   LYMPHO PCT % 52*  --  18   MONO PCT % 4  --  9   EOS PCT % 3  --  1    < > = values in this interval not displayed.     Results from last 7 days   Lab Units 10/25/24  1121   SODIUM mmol/L 129*   POTASSIUM mmol/L 3.5   CHLORIDE mmol/L 92*   CO2 mmol/L 31   BUN mg/dL 5   CREATININE mg/dL 0.59*   ANION GAP mmol/L 6   CALCIUM mg/dL 8.0*   ALBUMIN g/dL 2.2*   TOTAL BILIRUBIN mg/dL 1.62*   ALK PHOS U/L 202*   ALT U/L 40   AST U/L 96*   GLUCOSE RANDOM mg/dL 117     Results from last 7 days   Lab Units 10/21/24  1649   INR  1.05         Lab Results   Component Value Date    HGBA1C 5.9 (H) 06/18/2024    HGBA1C 5.8 (H) 09/12/2023    HGBA1C 5.8 (H) 06/26/2023     Results from last 7 days   Lab Units 10/25/24  1309 10/25/24  1121 10/23/24  0553 10/21/24  1936 10/21/24  1649   LACTIC ACID mmol/L 1.6  --   --  3.5* 4.2*   PROCALCITONIN ng/ml  --  0.59* 2.06*  --  4.96*       Imaging Results Review: I reviewed radiology reports from this admission including: CT head.  Other Study Results Review: No additional pertinent studies reviewed.    Administrative Statements       **  Please Note: This note has been constructed using a voice recognition system. **

## 2024-10-25 NOTE — ED PROVIDER NOTES
Time reflects when diagnosis was documented in both MDM as applicable and the Disposition within this note       Time User Action Codes Description Comment    10/25/2024  1:06 PM Jose Dye Add [G93.40] Acute encephalopathy     10/25/2024  1:35 PM Jose Dye Add [D72.829] Leukocytosis     10/25/2024  1:35 PM Jose Dye Add [E87.1] Hyponatremia     10/25/2024  1:35 PM Afshin Dyeon Add [E87.8] Hypochloremia     10/25/2024  2:14 PM Jose Dye Add [R59.9] Adenopathy     10/25/2024  2:14 PM Jose Dye Add [J81.1] Pulmonary edema     10/25/2024  2:14 PM Jose Dye Add [J90] Pleural effusion, bilateral     10/25/2024  2:15 PM Jose Dye Add [I51.7] Cardiomegaly     10/25/2024  3:21 PM Haley Borrero [R50.9] Fever, unspecified fever cause           ED Disposition       ED Disposition   Admit    Condition   Stable    Date/Time   Fri Oct 25, 2024  2:12 PM    Comment   Case was discussed with Dr. Borrero and the patient's admission status was agreed to be Admission Status: inpatient status to the service of Dr. Borrero.               Assessment & Plan       Medical Decision Making  Patient is a nontoxic-appearing 84-year-old male presenting with confusion that started this morning. He also reports chills, fatigue, abdominal pain, and frequency. Wife and family are at the bedside and translated for the patient. GCS 14 with confusion. He is not oriented to person, place, time, or event. He does follow commands and has good strength. No focal neurodeficits and low suspicion for acute stroke so stroke alert was not called. He ambulated into the ED. He was recently admitted on 10/21 for COVID-19 and febrile illness and was seen by infectious disease. Family wants level 3 CODE STATUS.  Plan is to obtain abdominal labs including CBC, CMP, lipase and CT chest/abdomen/pelvis to evaluate for acute cardiopulmonary disease or acute abdominal pathology. Will obtain ECG as well. Will also  obtain CT head wo contrast, COVID swab, coma panel, ammonia level, and TSH given his new confusion.   See ED course for interpretation of labs, imaging, and further medical decision making.   Gentle IV fluids for hydration. Will cover empirically with IV antibiotics for possible pneumonia/bacteremia. Labs and imaging appears somewhat unchanged but patient requiring further workup due to new confusion and ongoing fevers. 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 Will reach out to [facility] for hospitalization.  Dispo: Patient hospitalized to inpatient [facility] under the care of Dr. Borrero for further workup and management.    Amount and/or Complexity of Data Reviewed  External Data Reviewed: labs, radiology and notes.  Labs: ordered. Decision-making details documented in ED Course.  Radiology: ordered. Decision-making details documented in ED Course.  ECG/medicine tests: ordered and independent interpretation performed.    Risk  Prescription drug management.  Decision regarding hospitalization.        ED Course as of 10/25/24 1628   Fri Oct 25, 2024   1048 Blood Pressure: 152/86  Mildly elevated BP. Other vital signs reviewed and within normal limits.    1129 Spoke to wife and daughter about patient's CODE STATUS. They want to continue with level 1 code.   1146 UA w Reflex to Microscopic w Reflex to Culture(!)  Negative for UTI.   1146 Ammonia: 26   1146 ETHANOL: <10   1156 FLU/COVID Rapid Antigen (30 min. TAT) - Preferred screening test in ED  Negative for COVID/flu.   1158 TSH 3RD GENERATON: 3.923   1214 Sodium(!): 129  Mild hyponatremia. Repleting with IV fluids.   1215 Chloride(!): 92   1242 CT head without contrast  IMPRESSION:  No acute intracranial abnormality.   1245 Per lab, having some trouble with the CBC. The patient has a really strong cold agglutinin. His WBC is 11.68, . Nothing concerning with RBC, but having a hard time getting everything to match. I will do a differential and  report that out as well while I has his specimen warming to try and correct his RBC issues.   1248 Concern for left lower pneumonia upon my preliminary CT interpretation. Ordered lactate/procal, blood cultures, and antibiotics.   1250 SC sent to hematology about the cold agglutinin.   1258 CT chest abdomen pelvis w contrast  IMPRESSION:  Right axillary adenopathy is worsened when compared with CT chest 10/11/2024  Unchanged mediastinal, bilateral iliac chain and bilateral inguinal adenopathy when compared with CT abdomen pelvis 10/21/2024.  Potential mild pulmonary edema. Small pleural effusions. Unchanged cardiomegaly.   1259 Went over results with patient and family.   1335 Procalcitonin(!): 0.59   1349 SC sent to [facility] for [facility].   Correction to ED course: They want to change to level 3 CODE STATUS. This was ordered.    Medications   sodium chloride 0.9 % bolus 500 mL (500 mL Intravenous New Bag 10/25/24 1128)   iohexol (OMNIPAQUE) 350 MG/ML injection (MULTI-DOSE) 100 mL (100 mL Intravenous Given 10/25/24 1211)   cefTRIAXone (ROCEPHIN) IVPB (premix in dextrose) 1,000 mg 50 mL (0 mg Intravenous Stopped 10/25/24 1348)   azithromycin (ZITHROMAX) 500 mg in sodium chloride 0.9 % 250 mL IVPB (500 mg Intravenous New Bag 10/25/24 1348)       ED Risk Strat Scores                           SBIRT 22yo+      Flowsheet Row Most Recent Value   Initial Alcohol Screen: US AUDIT-C     1. How often do you have a drink containing alcohol? 0 Filed at: 10/25/2024 1042   2. How many drinks containing alcohol do you have on a typical day you are drinking?  0 Filed at: 10/25/2024 1042   3a. Male UNDER 65: How often do you have five or more drinks on one occasion? 0 Filed at: 10/25/2024 1042   3b. FEMALE Any Age, or MALE 65+: How often do you have 4 or more drinks on one occassion? 0 Filed at: 10/25/2024 1042   Audit-C Score 0 Filed at: 10/25/2024 1042   ANTIONE: How many times in the past year have you...    Used an illegal drug  or used a prescription medication for non-medical reasons? Never Filed at: 10/25/2024 1042                            History of Present Illness       Chief Complaint   Patient presents with    Altered Mental Status     Patient having altered mental status that started this morning.         Past Medical History:   Diagnosis Date    Prostate cancer (HCC)       Past Surgical History:   Procedure Laterality Date    APPENDECTOMY        History reviewed. No pertinent family history.   Social History     Tobacco Use    Smoking status: Never    Smokeless tobacco: Never   Vaping Use    Vaping status: Never Used   Substance Use Topics    Alcohol use: Yes     Comment: Occasional    Drug use: Never      E-Cigarette/Vaping    E-Cigarette Use Never User       E-Cigarette/Vaping Substances      I have reviewed and agree with the history as documented.     Patient is a 84-year-old male with relevant past medical history of prostate cancer, appendectomy, COVID-19, sepsis, and febrile ilness presenting with confusion x 1 day. He woke up very confused this morning according to family and has been repetitive with questioning. He is unable to tell me the year, his location, or events. He did ambulate into the ED. He reports upper abdominal pain and his wife reports he vomited last evening. He was just admitted for COVID-19 and febrile illness on 10/21 and was discharged home 2 days ago. He was feeling good yesterday other than the vomiting last evening. Associated symptoms of chills, fatigue, and urinary frequency. Patient and family deny facial droop, dysarthria, unilateral weakness, or balance issues. He denies headache, visual changes, weakness, chest pain, shortness of breath, or changes BMs.      History provided by:  Patient, spouse and relative (Daughter)   used: No    Altered Mental Status  Presenting symptoms: confusion    Associated symptoms: abdominal pain    Associated symptoms: no agitation, no fever,  no headaches, no light-headedness, no nausea, no palpitations, no rash, no seizures and no vomiting        Review of Systems   Constitutional:  Positive for chills and fatigue. Negative for fever.   HENT:  Negative for congestion, ear pain, rhinorrhea and sore throat.    Eyes:  Negative for pain and visual disturbance.   Respiratory:  Negative for cough and shortness of breath.    Cardiovascular:  Negative for chest pain and palpitations.   Gastrointestinal:  Positive for abdominal pain. Negative for constipation, diarrhea, nausea and vomiting.   Genitourinary:  Positive for frequency. Negative for dysuria, hematuria and urgency.   Musculoskeletal:  Negative for arthralgias and back pain.   Skin:  Negative for color change and rash.   Neurological:  Negative for dizziness, seizures, syncope, light-headedness and headaches.   Psychiatric/Behavioral:  Positive for confusion. Negative for agitation.            Objective       ED Triage Vitals   Temperature Pulse Blood Pressure Respirations SpO2 Patient Position - Orthostatic VS   10/25/24 1042 10/25/24 1042 10/25/24 1042 10/25/24 1042 10/25/24 1042 10/25/24 1042   99.1 °F (37.3 °C) 90 152/86 16 95 % Sitting      Temp Source Heart Rate Source BP Location FiO2 (%) Pain Score    10/25/24 1445 10/25/24 1042 10/25/24 1042 -- 10/25/24 1042    Oral Monitor Left arm  No Pain      Vitals      Date and Time Temp Pulse SpO2 Resp BP Pain Score FACES Pain Rating User   10/25/24 1615 -- -- -- -- -- Med Not Given for Pain - for MAR use only -- ND   10/25/24 1507 -- -- -- -- -- No Pain -- ND   10/25/24 1507 99.2 °F (37.3 °C) 102 92 % 14 128/73 -- -- DII   10/25/24 1445 101.9 °F (38.8 °C) provider ahktar made aware -- -- -- -- -- -- EM   10/25/24 1415 -- 114 94 % 24 125/69 -- -- EM   10/25/24 1345 -- 93 92 % 21 149/80 -- -- EM   10/25/24 1315 -- 90 92 % 22 157/107 -- -- EM   10/25/24 1042 99.1 °F (37.3 °C) 90 95 % 16 152/86 No Pain -- KB            Physical Exam  Vitals and nursing  note reviewed.   Constitutional:       General: He is not in acute distress.     Appearance: He is well-developed. He is not ill-appearing.   HENT:      Head: Normocephalic and atraumatic.   Eyes:      Conjunctiva/sclera: Conjunctivae normal.   Cardiovascular:      Rate and Rhythm: Normal rate and regular rhythm.      Heart sounds: No murmur heard.  Pulmonary:      Effort: Pulmonary effort is normal. No respiratory distress.      Breath sounds: Normal breath sounds. No wheezing, rhonchi or rales.   Abdominal:      Palpations: Abdomen is soft.      Tenderness: There is generalized abdominal tenderness. There is no guarding or rebound.   Musculoskeletal:         General: No swelling.      Cervical back: Neck supple.   Skin:     General: Skin is warm and dry.      Capillary Refill: Capillary refill takes less than 2 seconds.      Findings: Rash present.      Comments: Diffuse nonspecific rash to chest.   Neurological:      General: No focal deficit present.      Mental Status: He is alert. He is confused.      GCS: GCS eye subscore is 4. GCS verbal subscore is 4. GCS motor subscore is 6.      Cranial Nerves: Cranial nerves 2-12 are intact.      Sensory: Sensation is intact.      Motor: Motor function is intact.      Coordination: Coordination is intact.      Gait: Gait is intact.   Psychiatric:         Mood and Affect: Mood normal.         Results Reviewed       Procedure Component Value Units Date/Time    Lactic acid [[id number]]  (Normal) Collected: 10/25/24 1309    Lab Status: Final result Specimen: Blood from Arm, Left Updated: 10/25/24 1331     LACTIC ACID 1.6 mmol/L     Narrative:      Result may be elevated if tourniquet was used during collection.    Blood culture #2 [[id number]] Collected: 10/25/24 1309    Lab Status: In process Specimen: Blood from Arm, Left Updated: 10/25/24 1313    Blood culture #1 [[id number]] Collected: 10/25/24 1252    Lab Status: In process Specimen: Blood from Arm, Right Updated: 10/25/24  1312    Procalcitonin [[id number]]  (Abnormal) Collected: 10/25/24 1121    Lab Status: Final result Specimen: Blood from Arm, Left Updated: 10/25/24 1311     Procalcitonin 0.59 ng/ml     RBC Morphology Reflex Test [[id number]] Collected: 10/25/24 1121    Lab Status: Final result Specimen: Blood from Arm, Left Updated: 10/25/24 1301    CBC and differential [[id number]]  (Abnormal) Collected: 10/25/24 1121    Lab Status: Final result Specimen: Blood from Arm, Left Updated: 10/25/24 1254     WBC 11.68 Thousand/uL      RBC 2.92 Million/uL      Hemoglobin 10.8 g/dL      Hematocrit 29.4 %       fL      MCH 37.0 pg      MCHC 36.7 g/dL      MPV 10.4 fL      Platelets 256 Thousands/uL     Manual Differential(PHLEBS Do Not Order) [[id number]]  (Abnormal) Collected: 10/25/24 1121    Lab Status: Final result Specimen: Blood from Arm, Left Updated: 10/25/24 1254     Segmented % 35 %      Lymphocytes % 52 %      Monocytes % 4 %      Eosinophils % 3 %      Basophils % 0 %      Atypical Lymphocytes % 6 %      Absolute Neutrophils 4.09 Thousand/uL      Absolute Lymphocytes 6.77 Thousand/uL      Absolute Monocytes 0.47 Thousand/uL      Absolute Eosinophils 0.35 Thousand/uL      Absolute Basophils 0.00 Thousand/uL      Total Counted --     RBC Morphology Present     Platelet Estimate Adequate     Large Platelet Present     Anisocytosis Present    Salicylate level [[id number]]  (Normal) Collected: 10/25/24 1121    Lab Status: Final result Specimen: Blood from Arm, Left Updated: 10/25/24 1211     Salicylate Lvl <5 mg/dL     CMP [[id number]]  (Abnormal) Collected: 10/25/24 1121    Lab Status: Final result Specimen: Blood from Arm, Left Updated: 10/25/24 1202     Sodium 129 mmol/L      Potassium 3.5 mmol/L      Chloride 92 mmol/L      CO2 31 mmol/L      ANION GAP 6 mmol/L      BUN 5 mg/dL      Creatinine 0.59 mg/dL      Glucose 117 mg/dL      Calcium 8.0 mg/dL      Corrected Calcium 9.4 mg/dL      AST 96 U/L      ALT 40 U/L      Alkaline  Phosphatase 202 U/L      Total Protein 7.7 g/dL      Albumin 2.2 g/dL      Total Bilirubin 1.62 mg/dL      eGFR 91 ml/min/1.73sq m     Narrative:      National Kid[employer] guidelines for Chronic Kidney Disease (CKD):     Stage 1 with normal or high GFR (GFR > 90 mL/min/1.73 square meters)    Stage 2 Mild CKD (GFR = 60-89 mL/min/1.73 square meters)    Stage 3A Moderate CKD (GFR = 45-59 mL/min/1.73 square meters)    Stage 3B Moderate CKD (GFR = 30-44 mL/min/1.73 square meters)    Stage 4 Severe CKD (GFR = 15-29 mL/min/1.73 square meters)    Stage 5 End Stage CKD (GFR <15 mL/min/1.73 square meters)  Note: GFR calculation is accurate only with a steady state creatinine    Lipase [[id number]]  (Normal) Collected: 10/25/24 1121    Lab Status: Final result Specimen: Blood from Arm, Left Updated: 10/25/24 1202     Lipase 24 u/L     Acetaminophen level-If concentration is detectable, please discuss with medical  on call. [[id number]]  (Abnormal) Collected: 10/25/24 1121    Lab Status: Final result Specimen: Blood from Arm, Left Updated: 10/25/24 1202     Acetaminophen Level <2 ug/mL     TSH, 3rd generation with Free T4 reflex [[id number]]  (Normal) Collected: 10/25/24 1121    Lab Status: Final result Specimen: Blood from Arm, Left Updated: 10/25/24 1157     TSH 3RD GENERATON 3.923 uIU/mL     FLU/COVID Rapid Antigen (30 min. TAT) - Preferred screening test in ED [[id number]]  (Normal) Collected: 10/25/24 1132    Lab Status: Final result Specimen: Nares from Nose Updated: 10/25/24 1156     SARS COV Rapid Antigen Negative     Influenza A Rapid Antigen Negative     Influenza B Rapid Antigen Negative    Narrative:      This test has been performed using the Mark[employer] Margarita 2 FLU+SARS Antigen test under the Emergency Use Authorization (EUA). This test has been validated by the  and verified by the performing laboratory. The Margarita uses lateral flow immunofluorescent sandwich assay to detect SARS-COV,  Influenza A and Influenza B Antigen.     The [employer]idel Margarita 2 SARS Antigen test does not differentiate between SARS-CoV and SARS-CoV-2.     Negative results are presumptive and may be confirmed with a molecular assay, if necessary, for patient management. Negative results do not rule out SARS-CoV-2 or influenza infection and should not be used as the sole basis for treatment or patient management decisions. A negative test result may occur if the level of antigen in a sample is below the limit of detection of this test.     Positive results are indicative of the presence of viral antigens, but do not rule out bacterial infection or co-infection with other viruses.     All test results should be used as an adjunct to clinical observations and other information available to the provider.    FOR PEDIATRIC PATIENTS - copy/paste COVID Guidelines URL to browser: [email or web address]    Urine Microscopic [[id number]] Collected: 10/25/24 1132    Lab Status: Final result Specimen: Urine, Clean Catch Updated: 10/25/24 1145     RBC, UA 0-1 /hpf      WBC, UA 0-1 /hpf      Epithelial Cells None Seen /hpf      Bacteria, UA None Seen /hpf      URINE COMMENT Microscopic examined unspun due to limited sample    Ethanol [[id number]]  (Normal) Collected: 10/25/24 1121    Lab Status: Final result Specimen: Blood from Arm, Left Updated: 10/25/24 1145     Ethanol Lvl <10 mg/dL     Ammonia [[id number]]  (Normal) Collected: 10/25/24 1121    Lab Status: Final result Specimen: Blood from Arm, Left Updated: 10/25/24 1144     Ammonia 26 umol/L     UA w Reflex to Microscopic w Reflex to Culture [[id number]]  (Abnormal) Collected: 10/25/24 1132    Lab Status: Final result Specimen: Urine, Clean Catch Updated: 10/25/24 1143     Color, UA Yellow     Clarity, UA Clear     Specific Gravity, UA 1.020     pH, UA 8.0     Leukocytes, UA Negative     Nitrite, UA Negative     Protein, UA Negative mg/dl       Glucose, UA Negative mg/dl      Ketones, UA Negative mg/dl      Urobilinogen, UA 4.0 E.U./dl      Bilirubin, UA Negative     Occult Blood, UA Trace-Intact            CT chest abdomen pelvis w contrast   Final Interpretation by Bjorn Van MD (10/25 2996)      Right axillary adenopathy is worsened when compared with CT chest 10/11/2024      Unchanged mediastinal, bilateral iliac chain and bilateral inguinal adenopathy when compared with CT abdomen pelvis 10/21/2024.      Potential mild pulmonary edema. Small pleural effusions. Unchanged cardiomegaly.      The study was marked in EPIC for immediate notification.         Workstation performed: [id number]         CT head without contrast   Final Interpretation by Bjorn Van MD (10/25 3411)      No acute intracranial abnormality.                  Workstation performed: [id number]         IR lumbar puncture    (Results Pending)   MRI brain w wo contrast    (Results Pending)       ECG 12 Lead Documentation Only    Date/Time: 10/25/2024 11:09 AM    Performed by: Jose Dye PA-C  Authorized by: Jose Dye PA-C    Indications / Diagnosis:  Confusion  ECG reviewed by me, the ED Provider: yes    Patient location:  ED  Previous ECG:     Comparison to cardiac monitor: Yes    Interpretation:     Interpretation: abnormal    Rate:     ECG rate:  91    ECG rate assessment: normal    Rhythm:     Rhythm: sinus rhythm    Ectopy:     Ectopy: PAC    QRS:     QRS axis:  Left    QRS intervals:  Normal  Conduction:     Conduction: normal    ST segments:     ST segments:  Normal  T waves:     T waves: non-specific        ED Medication and Procedure Management   Prior to Admission Medications   Prescriptions Last Dose Informant Patient Reported? Taking?   benzonatate (TESSALON) 200 MG capsule   No No   Sig: Take 1 capsule (200 mg total) by mouth 2 (two) times a day as needed for cough   cyclobenzaprine (FLEXERIL) 5 mg tablet   Yes No   Sig:  Take 5 mg by mouth 3 (three) times a day as needed   fluticasone (FLONASE) 50 mcg/act nasal spray   No No   Si spray into each nostril daily   hydroCHLOROthiazide 25 mg tablet   Yes No   Sig: Take 1 tablet by mouth daily   ibuprofen (MOTRIN) 800 mg tablet   No No   Sig: Take 1 tablet (800 mg total) by mouth every 8 (eight) hours as needed for moderate pain   levothyroxine 50 mcg tablet   Yes No   Sig: Take 50 mcg by mouth daily   levothyroxine 75 mcg tablet   Yes No   Sig: Take 75 mcg by mouth Contingent Taken every Friday through    nirmatrelvir & ritonavir (Paxlovid) tablet therapy pack   No No   Sig: Take 3 tablets by mouth 2 (two) times a day for 7 doses   ondansetron (ZOFRAN) 4 mg tablet   No No   Sig: Take 1 tablet (4 mg total) by mouth every 8 (eight) hours as needed for nausea or vomiting   pantoprazole (PROTONIX) 40 mg tablet   No No   Sig: Take 1 tablet (40 mg total) by mouth daily in the early morning   potassium chloride (Klor-Con M10) 10 mEq tablet   Yes No   Sig: Take 10 mEq by mouth daily      Facility-Administered Medications: None     Current Discharge Medication List        CONTINUE these medications which have NOT CHANGED    Details   benzonatate (TESSALON) 200 MG capsule Take 1 capsule (200 mg total) by mouth 2 (two) times a day as needed for cough  Qty: 40 capsule, Refills: 2    Associated Diagnoses: Cough, unspecified type      cyclobenzaprine (FLEXERIL) 5 mg tablet Take 5 mg by mouth 3 (three) times a day as needed      fluticasone (FLONASE) 50 mcg/act nasal spray 1 spray into each nostril daily  Qty: 11.1 mL, Refills: 1    Associated Diagnoses: Cough, unspecified type      hydroCHLOROthiazide 25 mg tablet Take 1 tablet by mouth daily      ibuprofen (MOTRIN) 800 mg tablet Take 1 tablet (800 mg total) by mouth every 8 (eight) hours as needed for moderate pain  Qty: 60 tablet, Refills: 2    Associated Diagnoses: Lumbar radiculopathy      !! levothyroxine 50 mcg tablet Take 50 mcg by  mouth daily      !! levothyroxine 75 mcg tablet Take 75 mcg by mouth Contingent Taken every Friday through Sunday      nirmatrelvir & ritonavir (Paxlovid) tablet therapy pack Take 3 tablets by mouth 2 (two) times a day for 7 doses  Qty: 30 tablet, Refills: 0    Associated Diagnoses: COVID-19      ondansetron (ZOFRAN) 4 mg tablet Take 1 tablet (4 mg total) by mouth every 8 (eight) hours as needed for nausea or vomiting  Qty: 20 tablet, Refills: 0    Associated Diagnoses: Fever      pantoprazole (PROTONIX) 40 mg tablet Take 1 tablet (40 mg total) by mouth daily in the early morning  Qty: 30 tablet, Refills: 0    Associated Diagnoses: Candida esophagitis (HCC)      potassium chloride (Klor-Con M10) 10 mEq tablet Take 10 mEq by mouth daily       !! - Potential duplicate medications found. Please discuss with provider.        No discharge procedures on file.  ED SEPSIS DOCUMENTATION   Time reflects when diagnosis was documented in both MDM as applicable and the Disposition within this note       Time User Action Codes Description Comment    10/25/2024  1:06 PM Jose Dye [G93.40] Acute encephalopathy     10/25/2024  1:35 PM Jose Dye [D72.829] Leukocytosis     10/25/2024  1:35 PM Jose Dye Add [E87.1] Hyponatremia     10/25/2024  1:35 PM Jose Dye [E87.8] Hypochloremia     10/25/2024  2:14 PM Jose Dye Add [R59.9] Adenopathy     10/25/2024  2:14 PM Jose Dye [J81.1] Pulmonary edema     10/25/2024  2:14 PM Jose Dye [J90] Pleural effusion, bilateral     10/25/2024  2:15 PM Jose Dye [I51.7] Cardiomegaly     10/25/2024  3:21 PM Haley Borrero Add [R50.9] Fever, unspecified fever cause                  Jose Dye PA-C  10/25/24 9527

## 2024-10-25 NOTE — ASSESSMENT & PLAN NOTE
Unclear etiology.  CT head negative in the ER  Patient was recently admitted for infectious workup and discharged on 10/23/2024 with recommendation to follow-up with hematology  Patient with continued fever today  We will check lumbar puncture given altered mental status and fever  Check MRI brain  Monitor electrolytes  If no improvement can consider neurology consultation

## 2024-10-26 ENCOUNTER — HOSPITAL ENCOUNTER (INPATIENT)
Facility: HOSPITAL | Age: 84
LOS: 24 days | Discharge: HOME HEALTH | DRG: 823 | End: 2024-11-19
Admitting: INTERNAL MEDICINE
Payer: COMMERCIAL

## 2024-10-26 ENCOUNTER — APPOINTMENT (INPATIENT)
Dept: RADIOLOGY | Facility: HOSPITAL | Age: 84
DRG: 823 | End: 2024-10-26
Attending: RADIOLOGY
Payer: COMMERCIAL

## 2024-10-26 VITALS
SYSTOLIC BLOOD PRESSURE: 114 MMHG | DIASTOLIC BLOOD PRESSURE: 59 MMHG | OXYGEN SATURATION: 95 % | HEART RATE: 100 BPM | RESPIRATION RATE: 18 BRPM | BODY MASS INDEX: 28.47 KG/M2 | TEMPERATURE: 100.9 F | HEIGHT: 65 IN

## 2024-10-26 DIAGNOSIS — R50.9 FEVER, UNSPECIFIED FEVER CAUSE: ICD-10-CM

## 2024-10-26 DIAGNOSIS — G93.40 ENCEPHALOPATHY, UNSPECIFIED TYPE: Primary | ICD-10-CM

## 2024-10-26 DIAGNOSIS — R59.9 ADENOPATHY: ICD-10-CM

## 2024-10-26 DIAGNOSIS — G25.3 MYOCLONIA: ICD-10-CM

## 2024-10-26 DIAGNOSIS — R59.1 LYMPHADENOPATHY: ICD-10-CM

## 2024-10-26 PROBLEM — R65.10 SIRS (SYSTEMIC INFLAMMATORY RESPONSE SYNDROME) (HCC): Status: ACTIVE | Noted: 2024-10-22

## 2024-10-26 PROBLEM — G93.41 METABOLIC ENCEPHALOPATHY: Status: ACTIVE | Noted: 2024-10-25

## 2024-10-26 LAB
ABO GROUP BLD: NORMAL
ABO GROUP BLD: NORMAL
ALBUMIN SERPL BCG-MCNC: 1.8 G/DL (ref 3.5–5)
ALBUMIN SERPL BCG-MCNC: 1.9 G/DL (ref 3.5–5)
ALP SERPL-CCNC: 171 U/L (ref 34–104)
ALP SERPL-CCNC: 454 U/L (ref 34–104)
ALT SERPL W P-5'-P-CCNC: 30 U/L (ref 7–52)
ALT SERPL W P-5'-P-CCNC: 61 U/L (ref 7–52)
ANION GAP SERPL CALCULATED.3IONS-SCNC: 3 MMOL/L (ref 4–13)
ANION GAP SERPL CALCULATED.3IONS-SCNC: 3 MMOL/L (ref 4–13)
ANISOCYTOSIS BLD QL SMEAR: PRESENT
AST SERPL W P-5'-P-CCNC: 151 U/L (ref 13–39)
AST SERPL W P-5'-P-CCNC: 73 U/L (ref 13–39)
BACTERIA BLD CULT: NORMAL
BACTERIA BLD CULT: NORMAL
BILIRUB SERPL-MCNC: 1.53 MG/DL (ref 0.2–1)
BILIRUB SERPL-MCNC: 2.47 MG/DL (ref 0.2–1)
BLD GP AB SCN SERPL QL: NEGATIVE
BUN SERPL-MCNC: 7 MG/DL (ref 5–25)
BUN SERPL-MCNC: 9 MG/DL (ref 5–25)
CALCIUM ALBUM COR SERPL-MCNC: 8.6 MG/DL (ref 8.3–10.1)
CALCIUM ALBUM COR SERPL-MCNC: 9 MG/DL (ref 8.3–10.1)
CALCIUM SERPL-MCNC: 6.9 MG/DL (ref 8.4–10.2)
CALCIUM SERPL-MCNC: 7.2 MG/DL (ref 8.4–10.2)
CHLORIDE SERPL-SCNC: 95 MMOL/L (ref 96–108)
CHLORIDE SERPL-SCNC: 96 MMOL/L (ref 96–108)
CO2 SERPL-SCNC: 31 MMOL/L (ref 21–32)
CO2 SERPL-SCNC: 31 MMOL/L (ref 21–32)
CREAT SERPL-MCNC: 0.52 MG/DL (ref 0.6–1.3)
CREAT SERPL-MCNC: 0.56 MG/DL (ref 0.6–1.3)
EOSINOPHIL # BLD AUTO: 0.39 THOUSAND/UL (ref 0–0.61)
EOSINOPHIL NFR BLD MANUAL: 6 % (ref 0–6)
ERYTHROCYTE [DISTWIDTH] IN BLOOD BY AUTOMATED COUNT: 17.8 % (ref 11.6–15.1)
FIBRINOGEN PPP-MCNC: 132 MG/DL (ref 206–523)
GFR SERPL CREATININE-BSD FRML MDRD: 93 ML/MIN/1.73SQ M
GFR SERPL CREATININE-BSD FRML MDRD: 96 ML/MIN/1.73SQ M
GLUCOSE SERPL-MCNC: 88 MG/DL (ref 65–140)
GLUCOSE SERPL-MCNC: 93 MG/DL (ref 65–140)
HCT VFR BLD AUTO: 25.5 % (ref 36.5–49.3)
HCT VFR BLD AUTO: 25.6 % (ref 36.5–49.3)
HGB BLD-MCNC: 9 G/DL (ref 12–17)
HGB BLD-MCNC: 9.4 G/DL (ref 12–17)
IGA SERPL-MCNC: 1364 MG/DL (ref 66–433)
IGG SERPL-MCNC: 2330 MG/DL (ref 635–1741)
IGM SERPL-MCNC: 565 MG/DL (ref 45–281)
INR PPP: 1.33 (ref 0.85–1.19)
LDH SERPL-CCNC: 560 U/L (ref 140–271)
LYMPHOCYTES # BLD AUTO: 0.52 THOUSAND/UL (ref 0.6–4.47)
LYMPHOCYTES # BLD AUTO: 8 %
MAGNESIUM SERPL-MCNC: 1.7 MG/DL (ref 1.9–2.7)
MCH RBC QN AUTO: 33.8 PG (ref 26.8–34.3)
MCH RBC QN AUTO: 36.7 PG (ref 26.8–34.3)
MCHC RBC AUTO-ENTMCNC: 35.3 G/DL (ref 31.4–37.4)
MCHC RBC AUTO-ENTMCNC: 36.7 G/DL (ref 31.4–37.4)
MCV RBC AUTO: 100 FL (ref 82–98)
MCV RBC AUTO: 96 FL (ref 82–98)
METAMYELOCYTES # BLD MANUAL: 0.07 THOUSAND/UL (ref 0–0.1)
METAMYELOCYTES NFR BLD MANUAL: 1 % (ref 0–1)
MONOCYTES # BLD AUTO: 0.46 THOUSAND/UL (ref 0–1.22)
MONOCYTES NFR BLD AUTO: 7 % (ref 4–12)
NEUTS BAND NFR BLD MANUAL: 3 % (ref 0–8)
NEUTS SEG # BLD: 5.1 THOUSAND/UL (ref 1.81–6.82)
NEUTS SEG NFR BLD AUTO: 75 %
NRBC BLD AUTO-RTO: 0 /100 WBCS
PLATELET # BLD AUTO: 161 THOUSANDS/UL (ref 149–390)
PLATELET # BLD AUTO: 196 THOUSANDS/UL (ref 149–390)
PLATELET BLD QL SMEAR: ADEQUATE
PMV BLD AUTO: 11 FL (ref 8.9–12.7)
PMV BLD AUTO: 11.3 FL (ref 8.9–12.7)
POTASSIUM SERPL-SCNC: 3.3 MMOL/L (ref 3.5–5.3)
POTASSIUM SERPL-SCNC: 3.6 MMOL/L (ref 3.5–5.3)
PROCALCITONIN SERPL-MCNC: 0.43 NG/ML
PROT SERPL-MCNC: 6.6 G/DL (ref 6.4–8.4)
PROT SERPL-MCNC: 6.8 G/DL (ref 6.4–8.4)
PROTHROMBIN TIME: 16.7 SECONDS (ref 12.3–15)
RBC # BLD AUTO: 2.56 MILLION/UL (ref 3.88–5.62)
RBC # BLD AUTO: 2.66 MILLION/UL (ref 3.88–5.62)
RBC MORPH BLD: PRESENT
RH BLD: POSITIVE
RH BLD: POSITIVE
SODIUM SERPL-SCNC: 129 MMOL/L (ref 135–147)
SODIUM SERPL-SCNC: 130 MMOL/L (ref 135–147)
SPECIMEN EXPIRATION DATE: NORMAL
TOTAL CELLS COUNTED SPEC: 100
WBC # BLD AUTO: 6.54 THOUSAND/UL (ref 4.31–10.16)
WBC # BLD AUTO: 8.46 THOUSAND/UL (ref 4.31–10.16)

## 2024-10-26 PROCEDURE — 80053 COMPREHEN METABOLIC PANEL: CPT | Performed by: INTERNAL MEDICINE

## 2024-10-26 PROCEDURE — 88185 FLOWCYTOMETRY/TC ADD-ON: CPT

## 2024-10-26 PROCEDURE — 99222 1ST HOSP IP/OBS MODERATE 55: CPT | Performed by: INTERNAL MEDICINE

## 2024-10-26 PROCEDURE — NC001 PR NO CHARGE: Performed by: RADIOLOGY

## 2024-10-26 PROCEDURE — 83521 IG LIGHT CHAINS FREE EACH: CPT

## 2024-10-26 PROCEDURE — 76937 US GUIDE VASCULAR ACCESS: CPT | Performed by: RADIOLOGY

## 2024-10-26 PROCEDURE — 85610 PROTHROMBIN TIME: CPT

## 2024-10-26 PROCEDURE — 36556 INSERT NON-TUNNEL CV CATH: CPT | Performed by: RADIOLOGY

## 2024-10-26 PROCEDURE — 82232 ASSAY OF BETA-2 PROTEIN: CPT

## 2024-10-26 PROCEDURE — 84145 PROCALCITONIN (PCT): CPT | Performed by: INTERNAL MEDICINE

## 2024-10-26 PROCEDURE — 02HV33Z INSERTION OF INFUSION DEVICE INTO SUPERIOR VENA CAVA, PERCUTANEOUS APPROACH: ICD-10-PCS | Performed by: RADIOLOGY

## 2024-10-26 PROCEDURE — 88184 FLOWCYTOMETRY/ TC 1 MARKER: CPT

## 2024-10-26 PROCEDURE — NC001 PR NO CHARGE: Performed by: INTERNAL MEDICINE

## 2024-10-26 PROCEDURE — 86900 BLOOD TYPING SEROLOGIC ABO: CPT

## 2024-10-26 PROCEDURE — 85007 BL SMEAR W/DIFF WBC COUNT: CPT

## 2024-10-26 PROCEDURE — 77001 FLUOROGUIDE FOR VEIN DEVICE: CPT | Performed by: RADIOLOGY

## 2024-10-26 PROCEDURE — 99223 1ST HOSP IP/OBS HIGH 75: CPT | Performed by: INTERNAL MEDICINE

## 2024-10-26 PROCEDURE — 84165 PROTEIN E-PHORESIS SERUM: CPT

## 2024-10-26 PROCEDURE — 86334 IMMUNOFIX E-PHORESIS SERUM: CPT

## 2024-10-26 PROCEDURE — 99239 HOSP IP/OBS DSCHRG MGMT >30: CPT | Performed by: INTERNAL MEDICINE

## 2024-10-26 PROCEDURE — 82595 ASSAY OF CRYOGLOBULIN: CPT

## 2024-10-26 PROCEDURE — 1124F ACP DISCUSS-NO DSCNMKR DOCD: CPT | Performed by: INTERNAL MEDICINE

## 2024-10-26 PROCEDURE — 83615 LACTATE (LD) (LDH) ENZYME: CPT

## 2024-10-26 PROCEDURE — 83735 ASSAY OF MAGNESIUM: CPT | Performed by: INTERNAL MEDICINE

## 2024-10-26 PROCEDURE — 85810 BLOOD VISCOSITY EXAMINATION: CPT

## 2024-10-26 PROCEDURE — 85027 COMPLETE CBC AUTOMATED: CPT | Performed by: INTERNAL MEDICINE

## 2024-10-26 PROCEDURE — 82784 ASSAY IGA/IGD/IGG/IGM EACH: CPT

## 2024-10-26 PROCEDURE — 86850 RBC ANTIBODY SCREEN: CPT

## 2024-10-26 PROCEDURE — 85384 FIBRINOGEN ACTIVITY: CPT

## 2024-10-26 PROCEDURE — 76942 ECHO GUIDE FOR BIOPSY: CPT

## 2024-10-26 PROCEDURE — 86901 BLOOD TYPING SEROLOGIC RH(D): CPT

## 2024-10-26 PROCEDURE — C1769 GUIDE WIRE: HCPCS

## 2024-10-26 PROCEDURE — 36566 INSERT TUNNELED CV CATH: CPT

## 2024-10-26 PROCEDURE — 99448 NTRPROF PH1/NTRNET/EHR 21-30: CPT | Performed by: INTERNAL MEDICINE

## 2024-10-26 PROCEDURE — 80053 COMPREHEN METABOLIC PANEL: CPT

## 2024-10-26 RX ORDER — LIDOCAINE WITH 8.4% SOD BICARB 0.9%(10ML)
SYRINGE (ML) INJECTION AS NEEDED
Status: COMPLETED | OUTPATIENT
Start: 2024-10-26 | End: 2024-10-26

## 2024-10-26 RX ORDER — FLUTICASONE PROPIONATE 50 MCG
1 SPRAY, SUSPENSION (ML) NASAL DAILY
Status: CANCELLED | OUTPATIENT
Start: 2024-10-27

## 2024-10-26 RX ORDER — PANTOPRAZOLE SODIUM 40 MG/1
40 TABLET, DELAYED RELEASE ORAL
Status: DISCONTINUED | OUTPATIENT
Start: 2024-10-27 | End: 2024-11-19 | Stop reason: HOSPADM

## 2024-10-26 RX ORDER — SODIUM CHLORIDE, SODIUM GLUCONATE, SODIUM ACETATE, POTASSIUM CHLORIDE, MAGNESIUM CHLORIDE, SODIUM PHOSPHATE, DIBASIC, AND POTASSIUM PHOSPHATE .53; .5; .37; .037; .03; .012; .00082 G/100ML; G/100ML; G/100ML; G/100ML; G/100ML; G/100ML; G/100ML
100 INJECTION, SOLUTION INTRAVENOUS CONTINUOUS
Status: DISCONTINUED | OUTPATIENT
Start: 2024-10-26 | End: 2024-11-08

## 2024-10-26 RX ORDER — LEVOTHYROXINE SODIUM 50 UG/1
50 TABLET ORAL DAILY
Status: DISCONTINUED | OUTPATIENT
Start: 2024-10-28 | End: 2024-11-01

## 2024-10-26 RX ORDER — ACETAMINOPHEN 10 MG/ML
1000 INJECTION, SOLUTION INTRAVENOUS EVERY 6 HOURS PRN
Status: CANCELLED | OUTPATIENT
Start: 2024-10-26

## 2024-10-26 RX ORDER — ACETAMINOPHEN 10 MG/ML
1000 INJECTION, SOLUTION INTRAVENOUS EVERY 6 HOURS PRN
Status: DISCONTINUED | OUTPATIENT
Start: 2024-10-26 | End: 2024-11-19 | Stop reason: HOSPADM

## 2024-10-26 RX ORDER — FLUTICASONE PROPIONATE 50 MCG
1 SPRAY, SUSPENSION (ML) NASAL DAILY
Status: DISCONTINUED | OUTPATIENT
Start: 2024-10-27 | End: 2024-11-19 | Stop reason: HOSPADM

## 2024-10-26 RX ORDER — CALCIUM GLUCONATE 20 MG/ML
2 INJECTION, SOLUTION INTRAVENOUS DAILY
Status: DISPENSED | OUTPATIENT
Start: 2024-10-26 | End: 2024-10-28

## 2024-10-26 RX ORDER — POTASSIUM CHLORIDE 1500 MG/1
20 TABLET, EXTENDED RELEASE ORAL
Status: CANCELLED | OUTPATIENT
Start: 2024-10-26 | End: 2024-10-27

## 2024-10-26 RX ORDER — ONDANSETRON 2 MG/ML
4 INJECTION INTRAMUSCULAR; INTRAVENOUS EVERY 6 HOURS PRN
Status: DISCONTINUED | OUTPATIENT
Start: 2024-10-26 | End: 2024-11-19 | Stop reason: HOSPADM

## 2024-10-26 RX ORDER — POTASSIUM CHLORIDE 1500 MG/1
20 TABLET, EXTENDED RELEASE ORAL
Status: COMPLETED | OUTPATIENT
Start: 2024-10-26 | End: 2024-10-27

## 2024-10-26 RX ORDER — PANTOPRAZOLE SODIUM 40 MG/1
40 TABLET, DELAYED RELEASE ORAL
Status: CANCELLED | OUTPATIENT
Start: 2024-10-27

## 2024-10-26 RX ORDER — SODIUM CHLORIDE, SODIUM GLUCONATE, SODIUM ACETATE, POTASSIUM CHLORIDE, MAGNESIUM CHLORIDE, SODIUM PHOSPHATE, DIBASIC, AND POTASSIUM PHOSPHATE .53; .5; .37; .037; .03; .012; .00082 G/100ML; G/100ML; G/100ML; G/100ML; G/100ML; G/100ML; G/100ML
100 INJECTION, SOLUTION INTRAVENOUS CONTINUOUS
Status: CANCELLED | OUTPATIENT
Start: 2024-10-26

## 2024-10-26 RX ORDER — HEPARIN SODIUM 5000 [USP'U]/ML
5000 INJECTION, SOLUTION INTRAVENOUS; SUBCUTANEOUS EVERY 8 HOURS SCHEDULED
Status: DISCONTINUED | OUTPATIENT
Start: 2024-10-26 | End: 2024-11-19 | Stop reason: HOSPADM

## 2024-10-26 RX ORDER — FENTANYL CITRATE 50 UG/ML
INJECTION, SOLUTION INTRAMUSCULAR; INTRAVENOUS AS NEEDED
Status: COMPLETED | OUTPATIENT
Start: 2024-10-26 | End: 2024-10-26

## 2024-10-26 RX ORDER — MIDAZOLAM HYDROCHLORIDE 2 MG/2ML
INJECTION, SOLUTION INTRAMUSCULAR; INTRAVENOUS AS NEEDED
Status: COMPLETED | OUTPATIENT
Start: 2024-10-26 | End: 2024-10-26

## 2024-10-26 RX ORDER — ALBUMIN HUMAN 50 G/1000ML
175 SOLUTION INTRAVENOUS DAILY
Status: DISPENSED | OUTPATIENT
Start: 2024-10-26 | End: 2024-10-28

## 2024-10-26 RX ORDER — LEVOTHYROXINE SODIUM 50 UG/1
50 TABLET ORAL DAILY
Status: CANCELLED | OUTPATIENT
Start: 2024-10-28 | End: 2024-11-02

## 2024-10-26 RX ORDER — MAGNESIUM SULFATE HEPTAHYDRATE 40 MG/ML
2 INJECTION, SOLUTION INTRAVENOUS ONCE
Status: COMPLETED | OUTPATIENT
Start: 2024-10-26 | End: 2024-10-26

## 2024-10-26 RX ORDER — ONDANSETRON 2 MG/ML
4 INJECTION INTRAMUSCULAR; INTRAVENOUS EVERY 6 HOURS PRN
Status: CANCELLED | OUTPATIENT
Start: 2024-10-26

## 2024-10-26 RX ORDER — LEVOTHYROXINE SODIUM 75 UG/1
75 TABLET ORAL
Status: CANCELLED | OUTPATIENT
Start: 2024-10-27 | End: 2024-10-28

## 2024-10-26 RX ORDER — LEVOTHYROXINE SODIUM 75 UG/1
75 TABLET ORAL
Status: COMPLETED | OUTPATIENT
Start: 2024-10-27 | End: 2024-10-27

## 2024-10-26 RX ORDER — POTASSIUM CHLORIDE 1500 MG/1
20 TABLET, EXTENDED RELEASE ORAL
Status: DISCONTINUED | OUTPATIENT
Start: 2024-10-26 | End: 2024-10-26 | Stop reason: HOSPADM

## 2024-10-26 RX ADMIN — LEVOTHYROXINE SODIUM 75 MCG: 75 TABLET ORAL at 06:34

## 2024-10-26 RX ADMIN — HEPARIN SODIUM 5000 UNITS: 5000 INJECTION INTRAVENOUS; SUBCUTANEOUS at 21:27

## 2024-10-26 RX ADMIN — CEFTRIAXONE 2000 MG: 2 INJECTION, SOLUTION INTRAVENOUS at 05:48

## 2024-10-26 RX ADMIN — SODIUM CHLORIDE, SODIUM GLUCONATE, SODIUM ACETATE, POTASSIUM CHLORIDE, MAGNESIUM CHLORIDE, SODIUM PHOSPHATE, DIBASIC, AND POTASSIUM PHOSPHATE 100 ML/HR: .53; .5; .37; .037; .03; .012; .00082 INJECTION, SOLUTION INTRAVENOUS at 13:47

## 2024-10-26 RX ADMIN — ACETAMINOPHEN 1000 MG: 10 INJECTION INTRAVENOUS at 19:37

## 2024-10-26 RX ADMIN — PANTOPRAZOLE SODIUM 40 MG: 40 TABLET, DELAYED RELEASE ORAL at 06:34

## 2024-10-26 RX ADMIN — POTASSIUM CHLORIDE 20 MEQ: 1500 TABLET, EXTENDED RELEASE ORAL at 16:51

## 2024-10-26 RX ADMIN — ALBUMIN (HUMAN) 175 G: 12.5 INJECTION, SOLUTION INTRAVENOUS at 18:51

## 2024-10-26 RX ADMIN — SODIUM CHLORIDE, SODIUM GLUCONATE, SODIUM ACETATE, POTASSIUM CHLORIDE, MAGNESIUM CHLORIDE, SODIUM PHOSPHATE, DIBASIC, AND POTASSIUM PHOSPHATE 100 ML/HR: .53; .5; .37; .037; .03; .012; .00082 INJECTION, SOLUTION INTRAVENOUS at 05:47

## 2024-10-26 RX ADMIN — AMPICILLIN SODIUM 2000 MG: 2 INJECTION, POWDER, FOR SOLUTION INTRAMUSCULAR; INTRAVENOUS at 02:41

## 2024-10-26 RX ADMIN — MIDAZOLAM 1 MG: 1 INJECTION INTRAMUSCULAR; INTRAVENOUS at 14:44

## 2024-10-26 RX ADMIN — AMPICILLIN SODIUM 2000 MG: 2 INJECTION, POWDER, FOR SOLUTION INTRAMUSCULAR; INTRAVENOUS at 06:23

## 2024-10-26 RX ADMIN — VANCOMYCIN HYDROCHLORIDE 750 MG: 750 INJECTION, SOLUTION INTRAVENOUS at 01:28

## 2024-10-26 RX ADMIN — MAGNESIUM SULFATE HEPTAHYDRATE 2 G: 40 INJECTION, SOLUTION INTRAVENOUS at 07:39

## 2024-10-26 RX ADMIN — FLUTICASONE PROPIONATE 1 SPRAY: 50 SPRAY, METERED NASAL at 08:37

## 2024-10-26 RX ADMIN — Medication 10 ML: at 14:44

## 2024-10-26 RX ADMIN — AMPICILLIN SODIUM 2000 MG: 2 INJECTION, POWDER, FOR SOLUTION INTRAMUSCULAR; INTRAVENOUS at 09:52

## 2024-10-26 RX ADMIN — FENTANYL CITRATE 50 MCG: 50 INJECTION INTRAMUSCULAR; INTRAVENOUS at 14:44

## 2024-10-26 RX ADMIN — ACETAMINOPHEN 1000 MG: 10 INJECTION INTRAVENOUS at 10:55

## 2024-10-26 RX ADMIN — POTASSIUM CHLORIDE 20 MEQ: 1500 TABLET, EXTENDED RELEASE ORAL at 07:40

## 2024-10-26 RX ADMIN — CALCIUM GLUCONATE 2 G: 20 INJECTION, SOLUTION INTRAVENOUS at 18:51

## 2024-10-26 NOTE — PROGRESS NOTES
Miguel Henderson is a 84 y.o. male who is currently ordered Vancomycin IV with management by the Pharmacy Consult service.  Relevant clinical data and objective / subjective history reviewed.  Vancomycin Assessment:  Indication and Goal AUC/Trough: CNS infection (goal trough 15-20)  Clinical Status: worsening  Micro:   Cx pending  Renal Function:  SCr: 0.59 mg/dL  CrCl: 86.3 mL/min  Renal replacement: Not on dialysis  Days of Therapy: 1  Current Dose: new start  Vancomycin Plan:  New Dosin mg IV x 1 then 750 mg IV q8  Estimated AUC: 542 mcg*hr/mL  Estimated Trough: 17.2 mcg/mL  Next Level: 10/26 @ 1730  Renal Function Monitoring: Daily BMP and UOP  Pharmacy will continue to follow closely for s/sx of nephrotoxicity, infusion reactions and appropriateness of therapy.  BMP and CBC will be ordered per protocol. We will continue to follow the patient’s culture results and clinical progress daily.    Braulio Mario, Pharmacist

## 2024-10-26 NOTE — TELEMEDICINE
e-Consult (IPC) - Oncology-Medical   Name: Miguel Henderson 84 y.o. male I MRN: [id number]  Unit/Bed#: -01 I Date of Admission: 10/25/2024   Date of Service: 10/26/2024 I Hospital Day: 1   Consults  Physician Requesting Evaluation: Haley Borrero, *   Reason for Evaluation / Principal Problem: LAD      Spoke with primary attending Dr. Borrero.      Patient has had multiple admissions with workup for infectious disease as he has been presenting with fevers and lymphadenopathy on imaging that has worsening.  He initially was admitted approximately 2 weeks ago with an ID workup and found to have COVID positivity in the outpatient setting and rhinovirus and thought all secondary to these infections.  He returned and quickly improved and all thought to again still be due to previous infections.  Imaging did demonstrate lymphadenopathy again thought to be attributed to infection with instructions to follow-up with hematology/medical oncology in the outpatient setting to ensure resolution.    Patient has been feeling unwell for the past few weeks.  He is very fatigued.  Has had poor appetite and poor p.o. intake.  Has noted weight loss.    Presents to the hospital yesterday with lethargy and altered mental status.  Underwent LP yesterday that is negative.  There were some lymphocytes but ID does not feel like this is enough for meningitis.  Leukemia lymphoma flow was sent on cytology.    White blood cell count yesterday 11.68 today 8.46.  Hemoglobin 10.8 yesterday and 9.4 today.  Platelets 256 yesterday and 196 today.  Differential yesterday demonstrates 4090 neutrophils and 6770 lymphocytes.    I had also received a separate message yesterday regarding the patient had a strong cold agglutinin with no other changes to blood counts.    CT scan on 10/25/2024 demonstrates right axillary adenopathy that is worsened compared to previous imaging on 10/11/2024.  Unchanged mediastinal, bilateral iliac chain and  bilateral inguinal adenopathy compared to CT on 10/21/2024.  Mild potential pulmonary edema.  Small pleural effusions.  Unchanged cardiomegaly.  Right axillary and subpectoral adenopathy demonstrates a lymph node measuring 21 mm that was previously 12 mm.  Numerous new enlarged nodes unchanged subcentimeter left axillary nodes.  Right external iliac node measures 2.2 cm.  Left external iliac node demonstrates size of 10 mm.  Inguinal lymphadenopathy measures 16 mm on the right and 11 mm on the left.    [facility] MRI on 10/25/2024 demonstrates no acute infarct, mass effect or midline shift with mild to moderate chronic microangiopathy.  There is multiple indeterminate bilateral parotid nodes, the largest which measures up to 1 cm on the left.  Is incompletely evaluated on this imaging.        ASSESSMENT:  Mr. Henderson is a 84-year-old gentleman with with B symptoms and lymphadenopathy on imaging concerning for lymphoma.  On further review concern that symptoms are consistent with potential hyperviscosity.    RECOMMENDATIONS:  1.  Lymphadenopathy  2.  B symptoms  3.  Altered mental status and lethargy  4.  Concern for lymphoma, lymphoproliferative disorder, and complications secondary to hyperviscosity      Initially discussed with primary attending getting IR to biopsy one of the lymph nodes with a core biopsy for concerns for lymphoma.  Will proceed with workup for lymphoma with blood work as well.  However concern for hyperviscosity syndrome as well given his presentation this time to the hospital and it has progressed over the past few weeks with his ongoing infectious workup that has been negative to date with the exception of previous COVID and rhinovirus infection.    Discussed with the attending transfer down here to [place] for expedited workup with concerns for potential need for pheresis.  If he will be transferred within the next couple hours will get blood work while he is here at [place] to expedite  results.  If it appears his transfer will be delayed I will place orders and start the workup while he is up at [facility].  Did discuss that this transfer is an urgent transfer.      Will continue to follow this patient closely if he is transferred here to [place] or if he remains at [facility].   21-30 minutes, >50% of the total time devoted to medical consultative verbal/EMR discussion between providers. Written report will be generated in the EMR.      Leticia Maxwell MD, PhD

## 2024-10-26 NOTE — ASSESSMENT & PLAN NOTE
Unclear etiology.  CT head negative in the ER  Patient was recently admitted for infectious workup and discharged on 10/23/2024 with recommendation to follow-up with hematology  Patient still with low-grade fevers  Lumbar puncture performed.  Does not appear to be consistent with meningitis per infectious disease  MRI brain performed  Monitor electrolytes  If no improvement can consider neurology consultation

## 2024-10-26 NOTE — CONSULTS
e-Consult (IPC)  - Interventional Radiology  Miguel Henderson 84 y.o. male MRN: [id number]  Unit/Bed#: [facility] 817-01 Encounter: [id number]          Interventional Radiology has been consulted to evaluate Miguel Henderson    Consults  10/26/24    Assessment/Recommendation:   84 year old male with B symptoms and lymphadenopathy concerning for lymphoma requires urgent pheresis for hyperviscosity syndrome.    - Plan for non tunneled pheresis catheter placement      5-10 minutes, >50% of the total time devoted to medical consultative verbal/EMR discussion between providers. Written report will be generated in the EMR.     Thank you for allowing Interventional Radiology to participate in the care of Miguel Henderson. Please don't hesitate to call or TigerText us with any questions.     Eros Sotelo MD

## 2024-10-26 NOTE — ASSESSMENT & PLAN NOTE
Unclear etiology.  CT head negative in the ER  Patient was recently admitted for infectious workup and discharged on 10/23/2024 with recommendation to follow-up with hematology  Patient still with low-grade fevers  Lumbar puncture performed.  Does not appear to be consistent with meningitis per infectious disease  MRI brain performed  Monitor electrolytes

## 2024-10-26 NOTE — PLAN OF CARE
Problem: INFECTION - ADULT  Goal: Absence or prevention of progression during hospitalization  Description: INTERVENTIONS:  - Assess and monitor for signs and symptoms of infection  - Monitor lab/diagnostic results  - Monitor all insertion sites, i.e. indwelling lines, tubes, and drains  - Monitor endotracheal if appropriate and nasal secretions for changes in amount and color  - [place] appropriate cooling/warming therapies per order  - Administer medications as ordered  - Instruct and encourage patient and family to use good hand hygiene technique  - Identify and instruct in appropriate isolation precautions for identified infection/condition  Outcome: Progressing  Goal: Absence of fever/infection during neutropenic period  Description: INTERVENTIONS:  - Monitor WBC    Outcome: Progressing

## 2024-10-26 NOTE — PLAN OF CARE
Problem: INFECTION - ADULT  Goal: Absence or prevention of progression during hospitalization  Description: INTERVENTIONS:  - Assess and monitor for signs and symptoms of infection  - Monitor lab/diagnostic results  - Monitor all insertion sites, i.e. indwelling lines, tubes, and drains  - Monitor endotracheal if appropriate and nasal secretions for changes in amount and color  - [place] appropriate cooling/warming therapies per order  - Administer medications as ordered  - Instruct and encourage patient and family to use good hand hygiene technique  - Identify and instruct in appropriate isolation precautions for identified infection/condition  Outcome: Progressing

## 2024-10-26 NOTE — SEDATION DOCUMENTATION
Right temporary HD catheter placed by Dr. Sotelo. Pt tolerated procedure well. Will return to [facility]. Report given to nurse.

## 2024-10-26 NOTE — ASSESSMENT & PLAN NOTE
This is patient's third hospital visit in the last 2 weeks  Patient has had significant infectious workup over the last 2 weeks including RP 2 panel on 10/21/2024 which was negative  Blood cultures drawn in the ER, will follow-up results  Mild leukocytosis and slight elevation of procalcitonin  Lactate normal  Tylenol as needed  Patient was initially started on ampicillin, ceftriaxone, vancomycin for possible meningitis  LP performed on 10/25/2024 with IR.  Reviewed findings with infectious disease.  Does not appear to be consistent with meningitis.  Will discontinue antibiotics.  ME panel negative for herpes or varicella  Infectious disease recommending hematology evaluation

## 2024-10-26 NOTE — PROCEDURES
Central Line    Date/Time: 10/26/2024 1:16 PM    Performed by: Eros Sotelo MD  Authorized by: Eros Sotelo MD    Patient location:  IR  Consent:     Consent obtained:  Written    Consent given by:  Patient    Risks discussed:  Bleeding and infection  Universal protocol:     Immediately prior to procedure, a time out was called: yes      Patient identity confirmed:  Provided demographic data  Pre-procedure details:     Hand hygiene: Hand hygiene performed prior to insertion      Sterile barrier technique: All elements of maximal sterile technique followed      Skin preparation:  2% chlorhexidine    Skin preparation agent: Skin preparation agent completely dried prior to procedure    Indications:     Central line indications: other (comment)    Anesthesia (see MAR for exact dosages):     Anesthesia method:  Local infiltration    Local anesthetic:  Lidocaine 1% w/o epi  Procedure details:     Location:  Right internal jugular    Vessel type: vein      Laterality:  Right    Approach: percutaneous technique used      Patient position:  Flat    Catheter type:  Double lumen    Catheter size:  14 Fr    Ultrasound guidance: yes      Ultrasound image availability:  Images available in PACS    Sterile ultrasound techniques: Sterile gel and sterile probe covers were used      Number of attempts:  1    Successful placement: yes      Catheter tip vessel location: superior vena cava    Post-procedure details:     Post-procedure:  Dressing applied and line sutured    Assessment:  Blood return through all ports, no pneumothorax on x-ray, free fluid flow and placement verified by x-ray    Post-procedure complications: none      Patient tolerance of procedure:  Tolerated well, no immediate complications

## 2024-10-26 NOTE — H&P
H&P - Hospitalist   Name: Miguel Henderson 84 y.o. male I MRN: [id number]  Unit/Bed#: [facility] 817-01 I Date of Admission: (Not on file)   Date of Service: 10/26/2024 I Hospital Day: 0     Assessment & Plan  Encephalopathy  84-year-old male who initially presented to [facility] noted to have acute confusion unclear etiology  Noted to have intermittent fevers  CT head negative for any acute abnormalities  MRI brain also without any acute abnormalities  Had LP performed which ruled out meningitis thus antibiotics were discontinued  Concern fevers are due to possible lymphoma  Transferred to [facility] for expedited lymphoma workup and possible pheresis  Possible hyperviscosity syndrome Case discussed with Dr. Maxwell of oncology  Follow-up oncology recommendations    Hypothyroidism  Continue Synthroid  Primary hypertension  HCTZ currently on hold given adequately controlled blood pressure  BP currently controlled  Continue to monitor  Fever  Presented with intermittent fevers  Does not appear to be infectious  LP negative for meningitis  ID was consulted at [facility]  Antibiotics since been discontinued  Possibly secondary to lymphoma versus hyperviscosity syndrome  Follow-up hematology recommendations  Monitor fever curve      VTE Pharmacologic Prophylaxis:   Moderate Risk (Score 3-4) - Pharmacological DVT Prophylaxis Ordered: heparin.  Code Status: Level 3 - DNAR and DNI   Discussion with family: Patient declined call to .     Anticipated Length of Stay: Patient will be admitted on an inpatient basis with an anticipated length of stay of greater than 2 midnights secondary to encephalopathy.    History of Present Illness   Chief Complaint: Confusion    Miguel Henderson is a 84 y.o. male w with past medical history significant of hypertension, hypothyroidism initially presented to [facility] with confusion.  Underwent infectious workup with MRI brain and LP which  were negative.  Case was discussed with hematology recommended transfer for lymphoma workup as the possible cause given significant lymphadenopathy.  He otherwise denies any acute complaints.  Denies chest pain, cough shortness of breath, abdominal pain, nausea, vomiting, diarrhea or any other complaints    Review of Systems   Constitutional:  Positive for fever. Negative for appetite change, chills, diaphoresis, fatigue and unexpected weight change.   HENT:  Negative for congestion, rhinorrhea and sore throat.    Eyes:  Negative for photophobia and visual disturbance.   Respiratory:  Negative for cough, shortness of breath and wheezing.    Cardiovascular:  Negative for chest pain, palpitations and leg swelling.   Gastrointestinal:  Negative for abdominal pain, anal bleeding, blood in stool, constipation, diarrhea, nausea and vomiting.   Genitourinary:  Negative for decreased urine volume, difficulty urinating, dysuria, flank pain, frequency, hematuria and urgency.   Musculoskeletal:  Negative for arthralgias, back pain, joint swelling and myalgias.   Skin:  Negative for color change and rash.   Neurological:  Negative for dizziness, seizures, facial asymmetry, speech difficulty, numbness and headaches.   Psychiatric/Behavioral:  Positive for confusion. Negative for agitation and decreased concentration. The patient is not nervous/anxious.        Historical Information   Past Medical History:   Diagnosis Date    Prostate cancer (HCC)      Past Surgical History:   Procedure Laterality Date    APPENDECTOMY      IR LUMBAR PUNCTURE  10/25/2024     Social History     Tobacco Use    Smoking status: Never    Smokeless tobacco: Never   Vaping Use    Vaping status: Never Used   Substance and Sexual Activity    Alcohol use: Yes     Comment: Occasional    Drug use: Never    Sexual activity: Not on file     E-Cigarette/Vaping    E-Cigarette Use Never User      E-Cigarette/Vaping Substances     No family history on file.  Social  History:  Marital Status: Unknown     Patient Pre-hospital Living Situation: Home  Patient Pre-hospital Level of Mobility: walks  Patient Pre-hospital Diet Restrictions: none    Meds/Allergies   I have reviewed home medications with patient personally.  Prior to Admission medications    Medication Sig Start Date End Date Taking? Authorizing Provider   benzonatate (TESSALON) 200 MG capsule Take 1 capsule (200 mg total) by mouth 2 (two) times a day as needed for cough 10/16/24   Greg Foster MD   cyclobenzaprine (FLEXERIL) 5 mg tablet Take 5 mg by mouth 3 (three) times a day as needed 8/26/24   Historical Provider, MD   fluticasone (FLONASE) 50 mcg/act nasal spray 1 spray into each nostril daily 10/16/24   Greg Foster MD   hydroCHLOROthiazide 25 mg tablet Take 1 tablet by mouth daily 7/3/24   Historical Provider, MD   ibuprofen (MOTRIN) 800 mg tablet Take 1 tablet (800 mg total) by mouth every 8 (eight) hours as needed for moderate pain 9/18/24   Greg Foster MD   levothyroxine 50 mcg tablet Take 50 mcg by mouth daily 7/3/24   Historical Provider, MD   levothyroxine 75 mcg tablet Take 75 mcg by mouth Contingent Taken every Friday through Bill 7/3/24   Historical Provider, MD   nirmatrelvir & ritonavir (Paxlovid) tablet therapy pack Take 3 tablets by mouth 2 (two) times a day for 7 doses 10/23/24 10/27/24  Cody Metzger MD   ondansetron (ZOFRAN) 4 mg tablet Take 1 tablet (4 mg total) by mouth every 8 (eight) hours as needed for nausea or vomiting 10/23/24   Cody Metzger MD   pantoprazole (PROTONIX) 40 mg tablet Take 1 tablet (40 mg total) by mouth daily in the early morning 10/16/24 11/15/24  Judy Wade MD   potassium chloride (Klor-Con M10) 10 mEq tablet Take 10 mEq by mouth daily    Historical Provider, MD     Allergies   Allergen Reactions    Shellfish Allergy - Food Allergy Anaphylaxis    Valtrex [Valacyclovir] Hives       Objective :  Temp:  [98.5 °F (36.9 °C)-101.9 °F (38.8 °C)] 100.9 °F (38.3  °C)  HR:  [] 100  BP: (108-157)/() 114/59  Resp:  [13-24] 18  SpO2:  [89 %-100 %] 95 %  O2 Device: None (Room air)  Nasal Cannula O2 Flow Rate (L/min):  [1 L/min-4 L/min] 1 L/min    Physical Exam  Constitutional:       General: He is not in acute distress.     Appearance: He is well-developed. He is not diaphoretic.   HENT:      Head: Normocephalic and atraumatic.      Nose: Nose normal.      Mouth/Throat:      Pharynx: No oropharyngeal exudate.   Eyes:      General: No scleral icterus.     Conjunctiva/sclera: Conjunctivae normal.   Cardiovascular:      Rate and Rhythm: Normal rate and regular rhythm.      Heart sounds: Normal heart sounds. No murmur heard.     No friction rub. No gallop.   Pulmonary:      Effort: Pulmonary effort is normal. No respiratory distress.      Breath sounds: Normal breath sounds. No wheezing or rales.   Chest:      Chest wall: No tenderness.   Abdominal:      General: Bowel sounds are normal. There is no distension.      Palpations: Abdomen is soft.      Tenderness: There is no abdominal tenderness. There is no guarding.   Musculoskeletal:         General: No tenderness or deformity. Normal range of motion.      Cervical back: Normal range of motion and neck supple.   Skin:     General: Skin is warm and dry.      Findings: No erythema.   Neurological:      Mental Status: He is alert. Mental status is at baseline.          Lines/Drains:            Lab Results: I have reviewed the following results:  Results from last 7 days   Lab Units 10/26/24  0428 10/25/24  1121 10/22/24  0853 10/21/24  1649   WBC Thousand/uL 8.46 11.68*   < > 7.30   HEMOGLOBIN g/dL 9.4* 10.8*   < > 11.0*   HEMATOCRIT % 25.6* 29.4*   < > 33.2*   PLATELETS Thousands/uL 196 256   < > 330   BANDS PCT %  --   --   --  22*   LYMPHO PCT %  --  52*  --  18   MONO PCT %  --  4  --  9   EOS PCT %  --  3  --  1    < > = values in this interval not displayed.     Results from last 7 days   Lab Units 10/26/24  0428    SODIUM mmol/L 130*   POTASSIUM mmol/L 3.3*   CHLORIDE mmol/L 96   CO2 mmol/L 31   BUN mg/dL 7   CREATININE mg/dL 0.56*   ANION GAP mmol/L 3*   CALCIUM mg/dL 7.2*   ALBUMIN g/dL 1.8*   TOTAL BILIRUBIN mg/dL 1.53*   ALK PHOS U/L 171*   ALT U/L 30   AST U/L 73*   GLUCOSE RANDOM mg/dL 88     Results from last 7 days   Lab Units 10/21/24  1649   INR  1.05         Lab Results   Component Value Date    HGBA1C 5.9 (H) 06/18/2024    HGBA1C 5.8 (H) 09/12/2023    HGBA1C 5.8 (H) 06/26/2023     Results from last 7 days   Lab Units 10/26/24  0428 10/25/24  1309 10/25/24  1121 10/23/24  0553 10/21/24  1936 10/21/24  1649   LACTIC ACID mmol/L  --  1.6  --   --  3.5* 4.2*   PROCALCITONIN ng/ml 0.43*  --  0.59* 2.06*  --  4.96*       Imaging Results Review: I personally reviewed the following image studies/reports in PACS and discussed pertinent findings with Radiology: chest xray. My interpretation of the radiology images/reports is:  .  Other Study Results Review: EKG was reviewed.     Administrative Statements   I have spent a total time of 60 minutes in caring for this patient on the day of the visit/encounter including Diagnostic results.    ** Please Note: This note has been constructed using a voice recognition system. **

## 2024-10-26 NOTE — ASSESSMENT & PLAN NOTE
HCTZ currently on hold given adequately controlled blood pressure  BP currently controlled  Continue to monitor

## 2024-10-26 NOTE — PROGRESS NOTES
Progress Note - Hospitalist   Name: Miguel Henderson 84 y.o. male I MRN: [id number]  Unit/Bed#: -01 I Date of Admission: 10/25/2024   Date of Service: 10/26/2024 I Hospital Day: 1    Assessment & Plan  Encephalopathy  Unclear etiology.  CT head negative in the ER  Patient was recently admitted for infectious workup and discharged on 10/23/2024 with recommendation to follow-up with hematology  Patient still with low-grade fevers  Lumbar puncture performed.  Does not appear to be consistent with meningitis per infectious disease  MRI brain performed  Monitor electrolytes  If no improvement can consider neurology consultation  Hypothyroidism  Continue levothyroxine  Primary hypertension  HCTZ on hold as patient has had poor oral intake over the last few days.  Will monitor blood pressure and treat as needed  Fever  This is patient's third hospital visit in the last 2 weeks  Patient has had significant infectious workup over the last 2 weeks including RP 2 panel on 10/21/2024 which was negative  Blood cultures drawn in the ER, will follow-up results  Mild leukocytosis and slight elevation of procalcitonin  Lactate normal  Tylenol as needed  Patient was initially started on ampicillin, ceftriaxone, vancomycin for possible meningitis  LP performed on 10/25/2024 with IR.  Reviewed findings with infectious disease.  Does not appear to be consistent with meningitis.  Will discontinue antibiotics.  ME panel negative for herpes or varicella  Infectious disease recommending hematology evaluation    VTE Pharmacologic Prophylaxis: VTE Score: 4 Moderate Risk (Score 3-4) - Pharmacological DVT Prophylaxis Ordered: Will start Lovenox tomorrow.    Mobility:   Basic Mobility Inpatient Raw Score: 13  JH-HLM Goal: 4: Move to chair/commode  JH-HLM Achieved: 4: Move to chair/commode  JH-HLM Goal achieved. Continue to encourage appropriate mobility.    Patient Centered Rounds: I performed bedside rounds with nursing staff today.    Discussions with Specialists or Other Care Team Provider: yes    Education and Discussions with Family / Patient: Updated  (son) at bedside.    Current Length of Stay: 1 day(s)  Current Patient Status: Inpatient   Certification Statement: The patient will continue to require additional inpatient hospital stay due to encephalopathy, fever  Discharge Plan: Anticipate discharge in 48-72 hrs to home with home services.    Code Status: Level 3 - DNAR and DNI    Subjective   No overnight events noted.  Mental status appears to be slightly improved today.  No pain complaints noted.  Still with decreased appetite    Objective :  Temp:  [98.5 °F (36.9 °C)-101.9 °F (38.8 °C)] 100 °F (37.8 °C)  HR:  [] 97  BP: (108-157)/() 114/59  Resp:  [13-24] 18  SpO2:  [89 %-100 %] 95 %  O2 Device: None (Room air)  Nasal Cannula O2 Flow Rate (L/min):  [1 L/min-4 L/min] 1 L/min    Body mass index is 28.47 kg/m².     Input and Output Summary (last 24 hours):     Intake/Output Summary (Last 24 hours) at 10/26/2024 1035  Last data filed at 10/26/2024 0937  Gross per 24 hour   Intake 510 ml   Output 1850 ml   Net -1340 ml       Physical Exam  Constitutional:       General: He is not in acute distress.  HENT:      Head: Normocephalic and atraumatic.      Nose: Nose normal.      Mouth/Throat:      Mouth: Mucous membranes are dry.   Eyes:      Extraocular Movements: Extraocular movements intact.      Conjunctiva/sclera: Conjunctivae normal.   Cardiovascular:      Rate and Rhythm: Normal rate and regular rhythm.   Pulmonary:      Effort: Pulmonary effort is normal. No respiratory distress.   Abdominal:      Palpations: Abdomen is soft.      Tenderness: There is no abdominal tenderness.   Musculoskeletal:         General: Normal range of motion.      Cervical back: Normal range of motion and neck supple.   Skin:     General: Skin is warm and dry.   Neurological:      Mental Status: He is alert.      Comments: Patient  drowsy but arousable.  Able to follow simple commands.   Psychiatric:         Mood and Affect: Mood normal.         Behavior: Behavior normal.           Lines/Drains:        Lab Results: I have reviewed the following results:   Results from last 7 days   Lab Units 10/26/24  0428 10/25/24  1121 10/22/24  0853 10/21/24  1649   WBC Thousand/uL 8.46 11.68*   < > 7.30   HEMOGLOBIN g/dL 9.4* 10.8*   < > 11.0*   HEMATOCRIT % 25.6* 29.4*   < > 33.2*   PLATELETS Thousands/uL 196 256   < > 330   BANDS PCT %  --   --   --  22*   LYMPHO PCT %  --  52*  --  18   MONO PCT %  --  4  --  9   EOS PCT %  --  3  --  1    < > = values in this interval not displayed.     Results from last 7 days   Lab Units 10/26/24  0428   SODIUM mmol/L 130*   POTASSIUM mmol/L 3.3*   CHLORIDE mmol/L 96   CO2 mmol/L 31   BUN mg/dL 7   CREATININE mg/dL 0.56*   ANION GAP mmol/L 3*   CALCIUM mg/dL 7.2*   ALBUMIN g/dL 1.8*   TOTAL BILIRUBIN mg/dL 1.53*   ALK PHOS U/L 171*   ALT U/L 30   AST U/L 73*   GLUCOSE RANDOM mg/dL 88     Results from last 7 days   Lab Units 10/21/24  1649   INR  1.05             Results from last 7 days   Lab Units 10/26/24  0428 10/25/24  1309 10/25/24  1121 10/23/24  0553 10/21/24  1936 10/21/24  1649   LACTIC ACID mmol/L  --  1.6  --   --  3.5* 4.2*   PROCALCITONIN ng/ml 0.43*  --  0.59* 2.06*  --  4.96*       Recent Cultures (last 7 days):   Results from last 7 days   Lab Units 10/25/24  1704 10/25/24  1309 10/25/24  1252 10/22/24  1015 10/21/24  1649   BLOOD CULTURE   --  Received in Microbiology Lab. Culture in Progress. Received in Microbiology Lab. Culture in Progress.  --  No Growth After 4 Days.  No Growth After 4 Days.   GRAM STAIN RESULT  1+ Mononuclear Cells  No organisms seen  --   --   --   --    C DIFF TOXIN B BY PCR   --   --   --  Negative  --        Imaging Results Review: I reviewed radiology reports from this admission including: MRI brain.  Other Study Results Review: No additional pertinent studies  reviewed.    Last 24 Hours Medication List:     Current Facility-Administered Medications:     acetaminophen (Ofirmev) injection 1,000 mg, Q6H PRN, Last Rate: 1,000 mg (10/25/24 2042)    [START ON 10/27/2024] enoxaparin (LOVENOX) subcutaneous injection 40 mg, Daily    fluticasone (FLONASE) 50 mcg/act nasal spray 1 spray, Daily    [START ON 10/28/2024] levothyroxine tablet 50 mcg, Daily    levothyroxine tablet 75 mcg, Early Morning    multi-electrolyte (PLASMALYTE-A/ISOLYTE-S PH 7.4) IV solution, Continuous, Last Rate: 100 mL/hr (10/26/24 0547)    ondansetron (ZOFRAN) injection 4 mg, Q6H PRN    pantoprazole (PROTONIX) EC tablet 40 mg, Early Morning    potassium chloride (Klor-Con M20) CR tablet 20 mEq, TID With Meals    Administrative Statements   Today, Patient Was Seen By: Haley Borrero MD      **Please Note: This note may have been constructed using a voice recognition system.**

## 2024-10-26 NOTE — NURSING NOTE
[facility] is here to transport pt to [facility].  Attempted to call report to the number provided and no one answered.

## 2024-10-26 NOTE — ASSESSMENT & PLAN NOTE
84-year-old male who initially presented to [facility] noted to have acute confusion unclear etiology  Noted to have intermittent fevers  CT head negative for any acute abnormalities  MRI brain also without any acute abnormalities  Had LP performed which ruled out meningitis thus antibiotics were discontinued  Concern fevers are due to possible lymphoma  Transferred to 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 Maxwell of oncology  Follow-up oncology recommendations

## 2024-10-26 NOTE — CONSULTS
Medical Oncology/Hematology Consult Note  Miguel Henderson, male, 84 y.o., 1940,  PPHP [id number]/PPHP [id number], [id number]     Assessment and Plan  Mr. Henderson is a Amharic-speaking 84 yoM with PMHx of prostate cancer, hypertension, and hypothyroidism with encephalopathy, B symptoms, and lymphadenopathy concerning hyperviscosity syndrome secondary to lymphoma or lymphoproliferative disorder transferred from [facility] to [facility] to be evaluated for plasmapheresis.    1. Concern for hyperviscosity syndrome possibly secondary to lymphoma or lymphoproliferative disorder  2. Worsening R axillary lymphadenopathy  3. Mediastinal, b/l iliac chain, and b/l inguinal lymphadenopathy  4. B symptoms  5. Acute encephalopathy and lethargy  6. Positive cold agglutinin  - Called [place] [facility] ([facility] [phone]) to arrange for plasmapheresis which will be performed for at least two sessions  - Consulted and appreciate IR who agreed to place urgent R IJ catheter  - Ordered 3.5 L of 5% albumin x2 days to be given with plasmapheresis  - Ordered calcium gluconate 2 g IV x2 days to be given with plasmapheresis  - Ordered the following labs:  - Viscosity  - Peripheral smear  - Fibrinogen  - PT-INR  - LDH  - Cryoglobulin  - SPEP  - UPEP  - Immunoglobulins  - Free light chains  - Flow cytometry  - Beta-2 microglobulin  - Type and screen  - We will re-engage IR on Monday for excisional lymph node biopsy and possible BMBx  - We will continue to follow closely  - We will establish outpatient follow up in our office within two weeks of discharge after his acute symptoms have resolved    Reason for consultation:  Lymphadenopathy and encephalopathy    History of present illness:  Mr. Henderson is a Amharic-speaking 84-year-old gentleman with PMHx of prostate cancer (~15 years ago treated with surgery and radiation), hypertension, hypothyroidism, dysphagia, recent COVID and rhinovirus infections who presented to [facility] on 10/25/24 with encephalopathy, B symptoms and lymphadenopathy on imaging concerning for lymphoma with elevated clinical concern for potential hyperviscosity syndrome resulting in a transfer to [facility] to be evaluated for plasmapheresis.    The Patient has had three hospital admissions in the last three weeks.  He has been feeling unwell for several weeks, has had fevers, is very fatigued, has poor appetite and poor PO intake, and has noted weight loss.  Initially, his fevers and lymphadenopathy were attributed to recent COVID and rhinovirus infections.  Imaging did demonstrate lymphadenopathy again thought to be attributed to infection with instructions to follow-up with hematology/medical oncology in the outpatient setting to ensure resolution.  However, his symptoms continued and empiric antibiotics were with concern for meningitis.  Antibiotics were discontinued when LP was negative and ID does not feel there is sufficient evidence for meningitis.     Vitals show Temp 100.2, HR 93, RR 16, /65, and 96% SpO2 on RA.  Labs show strong cold agglutinin, WBC 8.5, Hb 9.4, , , K 3.3, chloride 96, , BUN 7, creatinine 0.56, glucose 88, total protein 6.6, T. bili 1.53, Mg 1.7.    CT CAP w/ (10/25/2024) demonstrates right axillary adenopathy that is worsened compared to previous imaging on 10/11/2024.  Unchanged mediastinal, bilateral iliac chain and bilateral inguinal adenopathy compared to CT on 10/21/2024.  Mild potential pulmonary edema.  Small pleural effusions.  Unchanged cardiomegaly.  Right axillary and subpectoral adenopathy demonstrates a lymph node measuring 21 mm that was previously 12 mm.  Numerous new enlarged nodes unchanged subcentimeter left axillary nodes.  Right external iliac node measures 2.2 cm.  Left external iliac node demonstrates size of 10 mm.  Inguinal lymphadenopathy measures 16 mm on the right and 11 mm on the left.     MRI Brain  (10/25/2024) demonstrates no acute infarct, mass effect or midline shift with mild to moderate chronic microangiopathy.  There is multiple indeterminate bilateral parotid nodes, the largest which measures up to 1 cm on the left, and is incompletely evaluated on this imaging.     Peripheral smear (10/22/24) showed RBCs with marked agglutination limiting further evaluation of morphologic features, slight left shift in WBC showing granulocytes with numerous fragile cells and scattered reactive monocytes and lymphocytes without distinct features of dysplasia or circulating blasts, platelets show normal morphology without significant clumps or satellitosis.  Overall, the combination of findings is not definitively specific, though the red blood cell changes are compatible with a cold agglutinin in the correct clinical setting. The white blood cell changes may raise the possibility of infection, chemotherapy or drug effect, toxin exposure, postvaccination, or other reactive condition. The presence of fragile white blood cells may be an artifact of specimen handling, though a component of drug effect or nonspecific reactive change cannot be entirely excluded. Correlation with clinical impression and other laboratory findings is recommended. If clinical suspicion for more significant disease process such as a lymphoproliferative disorder exists then consideration for repeat sampling with peripheral blood flow cytometry may be of assistance to further evaluate the white blood cell changes as clinically indicated.  Flow cytometry was ordered and is pending.    Dr. Maxwell, hematology/oncology, coordinated care with [facility] primary attending, Dr. Borrero to arrange for urgent transfer to [place].  We called the [place] [facility] to discuss the case and inform them of a possible need for plasmapheresis.  The patient arrived promptly to [facility].  We evaluated the patient at bedside and found him to be  AAOx3, states that he feels slightly improved, but exhibited a degree of encephalopathy.  We contacted the [place] [facility] again ([facility] [phone]) who recommended ordering labs, 3.5 L albumin x2 days, and calcium gluconate 2 g x2 days.  We contacted IR who is planning to place urgent R IJ catheter.    Family history is positive for lymphoma in a brother.    Review of Systems:   ROS was limited 2/2 degree of encephalopathy.  Parts of ROS that could be obtained are detailed in the HPI above.    Past Medical History:   Diagnosis Date    Prostate cancer (HCC)      Past Surgical History:   Procedure Laterality Date    APPENDECTOMY      IR LUMBAR PUNCTURE  10/25/2024     Family History:  Brother with unspecified lymphoma    Social History     Socioeconomic History    Marital status: Unknown     Spouse name: Not on file    Number of children: Not on file    Years of education: Not on file    Highest education level: Not on file   Occupational History    Not on file   Tobacco Use    Smoking status: Never    Smokeless tobacco: Never   Vaping Use    Vaping status: Never Used   Substance and Sexual Activity    Alcohol use: Yes     Comment: Occasional    Drug use: Never    Sexual activity: Not on file   Other Topics Concern    Not on file   Social History Narrative    Not on file     Social Determinants of Health     Financial Resource Strain: Not on file   Food Insecurity: No Food Insecurity (10/25/2024)    Nursing - Inadequate Food Risk Classification     Worried About Running Out of Food in the Last Year: Never true     Ran Out of Food in the Last Year: Never true     Ran Out of Food in the Last Year: 1   Transportation Needs: No Transportation Needs (10/25/2024)    Nursing - Transportation Risk Classification     Lack of Transportation: Not on file     Lack of Transportation: 2   Physical Activity: Not on file   Stress: Not on file   Social Connections: Not on file   Intimate Partner Violence: Unknown (10/25/2024)     Nursing IPS     Feels Physically and Emotionally Safe: Not on file     Physically Hurt by Someone: Not on file     Humiliated or Emotionally Abused by Someone: Not on file     Physically Hurt by Someone: 2     Hurt or Threatened by Someone: 2   Housing Stability: Unknown (10/25/2024)    Nursing: Inadequate Housing Risk Classification     Has Housing: Not on file     Worried About Losing Housing: Not on file     Unable to Get Utilities: Not on file     Unable to Pay for Housing in the Last Year: 2     Has Housin     Current Facility-Administered Medications:     acetaminophen (Ofirmev) injection 1,000 mg, 1,000 mg, Intravenous, Q6H PRN, Joseph Alexander MD    [START ON 10/27/2024] fluticasone (FLONASE) 50 mcg/act nasal spray 1 spray, 1 spray, Nasal, Daily, Joseph Alexander MD    [START ON 10/28/2024] levothyroxine tablet 50 mcg, 50 mcg, Oral, Daily, Joseph Alexander MD    [START ON 10/27/2024] levothyroxine tablet 75 mcg, 75 mcg, Oral, Early Morning, Joseph Alexander MD    multi-electrolyte (PLASMALYTE-A/ISOLYTE-S PH 7.4) IV solution, 100 mL/hr, Intravenous, Continuous, Joseph Alexander MD, Last Rate: 100 mL/hr at 10/26/24 1347, 100 mL/hr at 10/26/24 1347    ondansetron (ZOFRAN) injection 4 mg, 4 mg, Intravenous, Q6H PRN, Joseph Alexander MD    [START ON 10/27/2024] pantoprazole (PROTONIX) EC tablet 40 mg, 40 mg, Oral, Early Morning, Joseph Alexander MD    potassium chloride (Klor-Con M20) CR tablet 20 mEq, 20 mEq, Oral, TID With Meals, Joseph Alexander MD    Medications Prior to Admission:     benzonatate (TESSALON) 200 MG capsule    cyclobenzaprine (FLEXERIL) 5 mg tablet    fluticasone (FLONASE) 50 mcg/act nasal spray    hydroCHLOROthiazide 25 mg tablet    ibuprofen (MOTRIN) 800 mg tablet    levothyroxine 50 mcg tablet    levothyroxine 75 mcg tablet    nirmatrelvir & ritonavir (Paxlovid) tablet therapy pack    ondansetron (ZOFRAN) 4 mg tablet    pantoprazole (PROTONIX) 40 mg tablet    potassium chloride (Klor-Con M10) 10 mEq  tablet    Allergies   Allergen Reactions    Shellfish Allergy - Food Allergy Anaphylaxis    Valtrex [Valacyclovir] Hives     Physical Exam:    /65   Pulse 93   Temp 100.2 °F (37.9 °C)   SpO2 96%     General: AAOx3, very ill-appearing, resting but somewhat distressed  HEENT: PERRLA, moist mucosa, dentures  Respiratory: protecting airway, CTAB w/o wheezes, rales, or rhonchi, no increased work of breathing  Cardiovascular: RRR w/o murmurs, 2+ radial and pedal pulses, no b/l LE edema  Abdomen: soft, non-tender, non-distended, no hepatomegaly or splenomegaly  /Rectal: deferred  Musculoskeletal: moves all four extremities with normal strength and ROM  Integumentary: reticular rash across chest and arms, dry, no bruising  Neurological: no gross or focal deficits identified  Psychiatric: pleasant, confused, cooperative    Recent Results (from the past 48 hour(s))   ECG 12 lead    Collection Time: 10/25/24 11:09 AM   Result Value Ref Range    Ventricular Rate 91 BPM    Atrial Rate 91 BPM    VA Interval 160 ms    QRSD Interval 86 ms    QT Interval 388 ms    QTC Interval 477 ms    P Axis 43 degrees    QRS Axis -35 degrees    T Wave Axis 0 degrees   CBC and differential    Collection Time: 10/25/24 11:21 AM   Result Value Ref Range    WBC 11.68 (H) 4.31 - 10.16 Thousand/uL    RBC 2.92 (L) 3.88 - 5.62 Million/uL    Hemoglobin 10.8 (L) 12.0 - 17.0 g/dL    Hematocrit 29.4 (L) 36.5 - 49.3 %     (H) 82 - 98 fL    MCH 37.0 (H) 26.8 - 34.3 pg    MCHC 36.7 31.4 - 37.4 g/dL    MPV 10.4 8.9 - 12.7 fL    Platelets 256 149 - 390 Thousands/uL   CMP    Collection Time: 10/25/24 11:21 AM   Result Value Ref Range    Sodium 129 (L) 135 - 147 mmol/L    Potassium 3.5 3.5 - 5.3 mmol/L    Chloride 92 (L) 96 - 108 mmol/L    CO2 31 21 - 32 mmol/L    ANION GAP 6 4 - 13 mmol/L    BUN 5 5 - 25 mg/dL    Creatinine 0.59 (L) 0.60 - 1.30 mg/dL    Glucose 117 65 - 140 mg/dL    Calcium 8.0 (L) 8.4 - 10.2 mg/dL    Corrected Calcium 9.4 8.3 -  10.1 mg/dL    AST 96 (H) 13 - 39 U/L    ALT 40 7 - 52 U/L    Alkaline Phosphatase 202 (H) 34 - 104 U/L    Total Protein 7.7 6.4 - 8.4 g/dL    Albumin 2.2 (L) 3.5 - 5.0 g/dL    Total Bilirubin 1.62 (H) 0.20 - 1.00 mg/dL    eGFR 91 ml/min/1.73sq m   Lipase    Collection Time: 10/25/24 11:21 AM   Result Value Ref Range    Lipase 24 11 - 82 u/L   Ammonia    Collection Time: 10/25/24 11:21 AM   Result Value Ref Range    Ammonia 26 18 - 72 umol/L   TSH, 3rd generation with Free T4 reflex    Collection Time: 10/25/24 11:21 AM   Result Value Ref Range    TSH 3RD GENERATON 3.923 0.450 - 4.500 uIU/mL   Ethanol    Collection Time: 10/25/24 11:21 AM   Result Value Ref Range    Ethanol Lvl <10 <10 mg/dL   Salicylate level    Collection Time: 10/25/24 11:21 AM   Result Value Ref Range    Salicylate Lvl <5 3 - 20 mg/dL   Acetaminophen level-If concentration is detectable, please discuss with medical  on call.    Collection Time: 10/25/24 11:21 AM   Result Value Ref Range    Acetaminophen Level <2 (L) 10 - 20 ug/mL   Procalcitonin    Collection Time: 10/25/24 11:21 AM   Result Value Ref Range    Procalcitonin 0.59 (H) <=0.25 ng/ml   Manual Differential(PHLEBS Do Not Order)    Collection Time: 10/25/24 11:21 AM   Result Value Ref Range    Segmented % 35 (L) 43 - 75 %    Lymphocytes % 52 (H) 14 - 44 %    Monocytes % 4 4 - 12 %    Eosinophils % 3 0 - 6 %    Basophils % 0 0 - 1 %    Atypical Lymphocytes % 6 (H) <=0 %    Absolute Neutrophils 4.09 1.85 - 7.62 Thousand/uL    Absolute Lymphocytes 6.77 (H) 0.60 - 4.47 Thousand/uL    Absolute Monocytes 0.47 0.00 - 1.22 Thousand/uL    Absolute Eosinophils 0.35 0.00 - 0.40 Thousand/uL    Absolute Basophils 0.00 0.00 - 0.10 Thousand/uL    Total Counted      RBC Morphology Present     Platelet Estimate Adequate Adequate    Large Platelet Present     Anisocytosis Present    UA w Reflex to Microscopic w Reflex to Culture    Collection Time: 10/25/24 11:32 AM    Specimen: Urine, Clean  Catch   Result Value Ref Range    Color, UA Yellow Yellow, Straw    Clarity, UA Clear Hazy, Clear    Specific Gravity, UA 1.020 >1.005 - <1.030    pH, UA 8.0 (A) 5.0, 5.5, 6.0, 6.5, 7.0, 7.5    Leukocytes, UA Negative Negative    Nitrite, UA Negative Negative    Protein, UA Negative Negative, Interference- unable to analyze mg/dl    Glucose, UA Negative Negative mg/dl    Ketones, UA Negative Negative mg/dl    Urobilinogen, UA 4.0 (A) 0.2, 1.0 E.U./dl E.U./dl    Bilirubin, UA Negative Negative    Occult Blood, UA Trace-Intact (A) Negative   FLU/COVID Rapid Antigen (30 min. TAT) - Preferred screening test in ED    Collection Time: 10/25/24 11:32 AM    Specimen: Nose; Nares   Result Value Ref Range    SARS COV Rapid Antigen Negative Negative    Influenza A Rapid Antigen Negative Negative    Influenza B Rapid Antigen Negative Negative   Urine Microscopic    Collection Time: 10/25/24 11:32 AM   Result Value Ref Range    RBC, UA 0-1 None Seen, 0-1, 1-2, 2-4, 0-5 /hpf    WBC, UA 0-1 None Seen, 0-1, 1-2, 0-5, 2-4 /hpf    Epithelial Cells None Seen None Seen, Occasional /hpf    Bacteria, UA None Seen None Seen, Occasional /hpf    URINE COMMENT Microscopic examined unspun due to limited sample    Blood culture #1    Collection Time: 10/25/24 12:52 PM    Specimen: Arm, Right; Blood   Result Value Ref Range    Blood Culture Received in Microbiology Lab. Culture in Progress.    Lactic acid    Collection Time: 10/25/24  1:09 PM   Result Value Ref Range    LACTIC ACID 1.6 0.5 - 2.0 mmol/L   Blood culture #2    Collection Time: 10/25/24  1:09 PM    Specimen: Arm, Left; Blood   Result Value Ref Range    Blood Culture Received in Microbiology Lab. Culture in Progress.    CSF white cell count with differential    Collection Time: 10/25/24  5:04 PM   Result Value Ref Range    Appearance, CSF Clear/Colorless     Tube Number, CSF 4     WBC,  (HH) 0 - 5 /uL    Xanthochromia No No   Glucose CSF    Collection Time: 10/25/24  5:04 PM    Result Value Ref Range    Glucose, CSF 43 40 - 70 mg/dL   Protein CSF    Collection Time: 10/25/24  5:04 PM   Result Value Ref Range    Protein, CSF 86 (H) 15 - 45 mg/dL   RBC count,CSF    Collection Time: 10/25/24  5:04 PM   Result Value Ref Range    RBC, CSF 3 0 - 10 uL   Gram stain CSF    Collection Time: 10/25/24  5:04 PM    Specimen: Lumbar Puncture; Cerebrospinal Fluid   Result Value Ref Range    Gram Stain Result 1+ Mononuclear Cells     Gram Stain Result No organisms seen    Meningitis/Encephalitis (ME) Panel    Collection Time: 10/25/24  5:04 PM   Result Value Ref Range    C.NEOFORMANS/GATTII Not Detected Not Detected    CYTOMEGALOVIRUS Not Detected Not Detected    ENTEROVIRUS Not Detected Not Detected    E.COLI K1 Not Detected Not Detected    H.INFLUENZAE Not Detected Not Detected    H.SIMPLEX 1 Not Detected Not Detected    H.SIMPLEX 2 Not Detected Not Detected    HERPES VIRUS 6 Not Detected Not Detected    PARECHOVIRUS Not Detected Not Detected    L.MONOCYTOGENES Not Detected Not Detected    N.MENINGITIDIS Not Detected Not Detected    S.AGALACTIAE Not Detected Not Detected    S.PNEUMONIAE Not Detected Not Detected    V.ZOSTER Not Detected Not Detected   Spinal fluid culture and Gram stain    Collection Time: 10/25/24  5:04 PM    Specimen: Lumbar Puncture; Cerebrospinal Fluid   Result Value Ref Range    CSF Culture No growth    CSF Diff    Collection Time: 10/25/24  5:04 PM   Result Value Ref Range    Total Counted 100     Neutrophils % (CSF) 4 %    Lymphs % CSF 85 %    Monocytes % (CSF) 11 %   Procalcitonin, Next Day AM Collection    Collection Time: 10/26/24  4:28 AM   Result Value Ref Range    Procalcitonin 0.43 (H) <=0.25 ng/ml   Comprehensive metabolic panel    Collection Time: 10/26/24  4:28 AM   Result Value Ref Range    Sodium 130 (L) 135 - 147 mmol/L    Potassium 3.3 (L) 3.5 - 5.3 mmol/L    Chloride 96 96 - 108 mmol/L    CO2 31 21 - 32 mmol/L    ANION GAP 3 (L) 4 - 13 mmol/L    BUN 7 5 - 25  mg/dL    Creatinine 0.56 (L) 0.60 - 1.30 mg/dL    Glucose 88 65 - 140 mg/dL    Calcium 7.2 (L) 8.4 - 10.2 mg/dL    Corrected Calcium 9.0 8.3 - 10.1 mg/dL    AST 73 (H) 13 - 39 U/L    ALT 30 7 - 52 U/L    Alkaline Phosphatase 171 (H) 34 - 104 U/L    Total Protein 6.6 6.4 - 8.4 g/dL    Albumin 1.8 (L) 3.5 - 5.0 g/dL    Total Bilirubin 1.53 (H) 0.20 - 1.00 mg/dL    eGFR 93 ml/min/1.73sq m   Magnesium    Collection Time: 10/26/24  4:28 AM   Result Value Ref Range    Magnesium 1.7 (L) 1.9 - 2.7 mg/dL   CBC and differential    Collection Time: 10/26/24  4:28 AM   Result Value Ref Range    WBC 8.46 4.31 - 10.16 Thousand/uL    RBC 2.56 (L) 3.88 - 5.62 Million/uL    Hemoglobin 9.4 (L) 12.0 - 17.0 g/dL    Hematocrit 25.6 (L) 36.5 - 49.3 %     (H) 82 - 98 fL    MCH 36.7 (H) 26.8 - 34.3 pg    MCHC 36.7 31.4 - 37.4 g/dL    MPV 11.3 8.9 - 12.7 fL    Platelets 196 149 - 390 Thousands/uL     MRI brain w wo contrast    Result Date: 10/26/2024  Narrative: MRI BRAIN WITH AND WITHOUT CONTRAST INDICATION: confusion,. COMPARISON: CT head 10/25/2024. TECHNIQUE: Multiplanar, multisequence imaging of the brain was performed before and after gadolinium administration. IV Contrast:  7 mL of Gadobutrol injection (SINGLE-DOSE) IMAGE QUALITY:   Motion-degraded, which reduces diagnostic sensitivity. FINDINGS: BRAIN PARENCHYMA: No acute infarct, mass effect or midline shift. Mild-moderate chronic ischemic changes of the white matter. Postcontrast imaging of the brain demonstrates no abnormal enhancement. VENTRICLES:  Normal for the patient's age. SELLA AND PITUITARY GLAND:  Normal. ORBITS: No acute abnormality. PARANASAL SINUSES: Mild mucosal thickening. VASCULATURE: Please refer to CTA. CALVARIUM AND SKULL BASE: No acute abnormality. EXTRACRANIAL SOFT TISSUES: Multiple bilateral parotid nodules, the largest of which measures up to 1.0 cm in the left parotid gland (series 6, image 1), noting these are not well evaluated.     Impression:  Within the confines of a motion-degraded examination: 1. No acute infarct, mass effect or midline shift. Mild-moderate chronic microangiopathy. 2. Multiple indeterminate bilateral parotid nodules, the largest of which measures up to 1.0 cm on the left, incompletely evaluated. If clinically appropriate, this could be further evaluated with contrast-enhanced neck CT on a nonemergent basis. The study was marked in EPIC for immediate notification. Workstation performed: [id number]     IR lumbar puncture    Result Date: 10/25/2024  Narrative: Lumbar puncture under fluoroscopic control. Clinical History: Confusion and fever Fluoro time: 0.9 minutes Conscious sedation time: 35 minutes Number of Images: 1 Technique: The patient was brought to the interventional radiology suite and identified by wristband. The patient was placed prone on the table, and the L4-5 spinous processes were identified. The skin was then prepped and draped in usual sterile fashion. Lidocaine was administered to the skin, and under fluoroscopic control, a 20 gauge spinal needle was advanced into the subarachnoid space between the L4-5 spinous processes . The table was then tilted into reverse Trendelenburg. 30 cc of clear spinal fluid was removed under it's own pressure into 4 separate sequential tubes. The tubes were sent to laboratory for testing as requested by the referring physician. After the procedure, the needle was removed, and a sterile dressing applied to the site. The patient tolerated the procedure well and suffered no complications.     Impression: Impression: 1. Successful lumbar puncture under fluoroscopic control. 2. 30 cc of clear spinal fluid was removed and sent to laboratory as described above. Workstation performed: [id number]     CT chest abdomen pelvis w contrast    Result Date: 10/25/2024  Narrative: CT CHEST, ABDOMEN AND PELVIS WITH IV CONTRAST INDICATION: Confusion, abdominal pain, emesis, generalized abdominal tenderness.  COMPARISON: CT abdomen pelvis 10/21/2024. CT chest abdomen pelvis 10/11/2024. TECHNIQUE: CT examination of the chest, abdomen and pelvis was performed. Multiplanar 2D reformatted images were created from the source data. This examination, like all CT scans performed in the [facility], was performed utilizing techniques to minimize radiation dose exposure, including the use of iterative reconstruction and automated exposure control. Radiation dose length product (DLP) for this visit: 850.79 mGy-cm IV Contrast: 100 mL of iohexol (OMNIPAQUE) Enteric Contrast: Not administered. FINDINGS: CHEST LUNGS: Mild interlobular septal thickening through both lower lobes may represent mild pulmonary edema. Mild bibasilar atelectasis. No focal consolidation. Unchanged known 4 mm and smaller pulmonary nodules. PLEURA: Small bibasilar pleural effusions. HEART/GREAT VESSELS: Cardiomegaly. Coronary artery calcifications. No thoracic aortic aneurysm. MEDIASTINUM AND JOE: Unchanged mild mediastinal and bilateral parahilar adenopathy. Dominant 16 mm precarinal node #2/49. Reidentified diffuse esophageal distention. Diffuse esophageal fluid may indicate reflux. CHEST WALL AND LOWER NECK: Worsened right axillary and right subpectoral adenopathy. Previously seen dominant 12 mm node now measures 21 mm #2/29. Numerous new enlarged nodes. Unchanged subcentimeter left axillary nodes. ABDOMEN LIVER/BILIARY TREE: Unremarkable. GALLBLADDER: No calcified gallstones. No pericholecystic inflammatory change. SPLEEN: Unremarkable. PANCREAS: Unremarkable. ADRENAL GLANDS: Unremarkable. KIDNEYS/URETERS: No hydronephrosis. Numerous unchanged bilateral simple renal cysts. Well-circumscribed subcentimeter bilateral renal hypoattenuating lesions are also unchanged and likely to represent subcentimeter simple cyst. STOMACH AND BOWEL: Unremarkable. APPENDIX: No findings to suggest appendicitis. ABDOMINOPELVIC CAVITY: No ascites. No  pneumoperitoneum. Unchanged bilateral iliac chain adenopathy. A dominant 2.2 cm right external iliac node #2/199 is unchanged. Dominant 10 mm left external iliac node #2/185 is unchanged. Unchanged 9 mm left pelvic sidewall node #2/210. Numerous unchanged subcentimeter retroperitoneal nodes. 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 PELVIS REPRODUCTIVE ORGANS: Prostatectomy. URINARY BLADDER: Unremarkable. ABDOMINAL WALL/INGUINAL REGIONS: Unchanged bilateral inguinal adenopathy. Dominant 16 mm right inguinal node #2/214. Dominant 11 mm left inguinal node #2/237. BONES: No acute fracture or suspicious osseous lesion.     Impression: Right axillary adenopathy is worsened when compared with CT chest 10/11/2024 Unchanged mediastinal, bilateral iliac chain and bilateral inguinal adenopathy when compared with CT abdomen pelvis 10/21/2024. Potential mild pulmonary edema. Small pleural effusions. Unchanged cardiomegaly. The study was marked in EPIC for immediate notification. Workstation performed: [id number]     CT head without contrast    Result Date: 10/25/2024  Narrative: CT BRAIN - WITHOUT CONTRAST INDICATION:   Confusion, non focal. COMPARISON:  None. TECHNIQUE:  CT examination of the brain was performed.  Multiplanar 2D reformatted images were created from the source data. Radiation dose length product (DLP) for this visit:  870.27 mGy-cm .  This examination, like all CT scans performed in the [facility], was performed utilizing techniques to minimize radiation dose exposure, including the use of iterative  reconstruction and automated exposure control. IMAGE QUALITY:  Diagnostic. FINDINGS: PARENCHYMA: Decreased attenuation is noted in periventricular and subcortical white matter demonstrating an appearance that is statistically most likely to represent moderate microangiopathic change. No CT signs of acute infarction.  No intracranial mass,  mass effect or midline shift.  No acute parenchymal hemorrhage. VENTRICLES AND EXTRA-AXIAL SPACES:  Normal for the patient's age. VISUALIZED ORBITS: Normal visualized orbits. PARANASAL SINUSES: Normal visualized paranasal sinuses. CALVARIUM AND EXTRACRANIAL SOFT TISSUES: Normal.     Impression: No acute intracranial abnormality. Workstation performed: [id number]     XR chest 1 view portable    Result Date: 10/22/2024  Narrative: XR CHEST PORTABLE INDICATION: Cough/fever. COMPARISON: Chest radiograph 10/8/2024; CT chest, abdomen and pelvis 10/11/2024 FINDINGS: Monitoring leads and clips project over the chest. Mild bibasilar atelectasis. No focal consolidation. Blunting of the left costophrenic angle suggesting trace pleural effusion. No pneumothorax. Normal cardiomediastinal silhouette. Degenerative changes of the spine and bilateral shoulders. Normal upper abdomen.     Impression: Mild bibasilar atelectasis and trace left pleural effusion. Resident: MARTHA Whiteside I, the attending radiologist, have reviewed the images and agree with the final report above. Workstation performed: [id number]     CT abdomen pelvis with contrast    Result Date: 10/21/2024  Narrative: CT ABDOMEN AND PELVIS WITH IV CONTRAST INDICATION:, Nausea, vomiting and diarrhea. . COMPARISON: 10/11/2024. TECHNIQUE: CT examination of the abdomen and pelvis was performed. Multiplanar 2D reformatted images were created from the source data. This examination, like all CT scans performed in the [facility], was performed utilizing techniques to minimize radiation dose exposure, including the use of iterative reconstruction and automated exposure control. Radiation dose length product (DLP) for this visit: 585 mGy-cm IV Contrast: 100 mL of iohexol (OMNIPAQUE) Enteric Contrast: Not administered. FINDINGS: ABDOMEN LOWER CHEST: Small left pleural effusion and left basilar subsegmental atelectasis. LIVER/BILIARY TREE: Unremarkable. GALLBLADDER:  No calcified gallstones. No pericholecystic inflammatory change. SPLEEN: Unremarkable. PANCREAS: Unremarkable. ADRENAL GLANDS: Unremarkable. KIDNEYS/URETERS: Symmetric nephrographic phase enhancement the kidneys. Cysts measure up to 6.4 cm. Multiple left renal cysts measuring up to 8.2 cm. No obstructive uropathy. STOMACH AND BOWEL: Unremarkable. APPENDIX: Prior appendectomy. 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 PELVIS REPRODUCTIVE ORGANS: Prior prostatectomy. URINARY BLADDER: Unremarkable. ABDOMINAL WALL/INGUINAL REGIONS: Enlarged bilateral inguinal lymph nodes with normal fatty shen, measuring up to 1.9 cm.. BONES: No acute fracture or suspicious osseous lesion.     Impression: 1. No evidence of bowel obstruction, colitis or diverticulitis. 2. Numerous mildly enlarged para-aortic, iliac chain and inguinal lymph nodes, possibly reactive however lymphoproliferative disorder not excluded. Workstation performed: [id number]     CT chest abdomen pelvis w contrast    Result Date: 10/12/2024  Narrative: CT CHEST, ABDOMEN AND PELVIS WITH IV CONTRAST INDICATION: intermittent persistent fevers. COMPARISON: 10/8/2024. TECHNIQUE: CT examination of the chest, abdomen and pelvis was performed. Multiplanar 2D reformatted images were created from the source data. This examination, like all CT scans performed in the [facility], was performed utilizing techniques to minimize radiation dose exposure, including the use of iterative reconstruction and automated exposure control. Radiation dose length product (DLP) for this visit: 898.73 mGy-cm IV Contrast: 100 mL of iohexol (OMNIPAQUE) Enteric Contrast: Not administered. FINDINGS: Moderately degraded by respiratory motion and retained barium in the colon. CHEST LUNGS: There is bibasilar atelectasis.  Stable 3 mm right apical lung nodule on series 4 image 24. Additional nodules are not well seen due to respiratory motion. No tracheal or endobronchial lesion. PLEURA: There are small bilateral pleural effusions. HEART/GREAT VESSELS: Heart is unremarkable for patient's age. No thoracic aortic aneurysm. MEDIASTINUM AND JOE: There is diffuse distention of the esophagus as seen previously. No obstructing lesion is visualized. CHEST WALL AND LOWER NECK: Again seen is an enlarged right axillary lymph node measuring 1.2 x 1.9 cm, nonspecific. ABDOMEN LIVER/BILIARY TREE: There is a 1.9 x 1.6 cm enhancing lesion in segment 6 of the liver, indeterminate. GALLBLADDER: Not well-visualized due to under distention and beam hardening artifact from adjacent barium. SPLEEN: Unremarkable. PANCREAS: Unremarkable. ADRENAL GLANDS: Unremarkable. KIDNEYS/URETERS: There are numerous bilateral renal cysts. No hydronephrosis. STOMACH AND BOWEL: Unremarkable. APPENDIX: No findings to suggest appendicitis. ABDOMINOPELVIC CAVITY: No ascites. No pneumoperitoneum. No lymphadenopathy. 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 PELVIS REPRODUCTIVE ORGANS: Unremarkable for patient's age. URINARY BLADDER: Unremarkable. ABDOMINAL WALL/INGUINAL REGIONS: Unremarkable. BONES: No acute fracture or suspicious osseous lesion.     Impression: Moderately limited by respiratory motion. 1.  Small bilateral pleural effusions with overlying atelectasis. 2.  3 mm right apical lung nodule. Based on current Fleischner Society 2017 Guidelines on incidental pulmonary nodule, no routine follow-up is needed if the patient is low risk. If the patient is high risk, optional follow-up chest CT at 12 months can be  considered. 3.  Stable diffusely dilated esophagus. This may be related to dysmotility. Obstructing lesion is not visualized. This would be better evaluated with  esophagram. 4.  No acute abnormality in the abdomen or pelvis. 5.  Enhancing liver lesion, indeterminate. Possibilities include hemangioma and vascular malformation though other etiologies including neoplasm not excluded. Follow-up nonemergent MRI is recommended. The study was marked in EPIC for immediate notification. Workstation performed: [id number]     XR knee 3 vw left non injury    Result Date: 10/10/2024  Narrative: XR KNEE 3 VW LEFT NON INJURY INDICATION: left knee pain. COMPARISON: None FINDINGS: No acute fracture or dislocation. No joint effusion. No significant degenerative changes. No lytic or blastic osseous lesion. Unremarkable soft tissues.     Impression: No acute osseous abnormality. Computerized Assisted Algorithm (CAA) may have been used to analyze all applicable images. Workstation performed: [id number]     FL barium swallow video w speech    Result Date: 10/9/2024  Narrative: A video barium swallow study was performed by the Department of Speech Pathology. Please refer to the report for the official interpretation. The images are stored for archival purposes only. Study images were not formally reviewed by the Radiology Department.    FL barium swallow video w speech    Result Date: 10/9/2024  Narrative: JARROD Marie     10/9/2024  3:14 PM                                  Video Swallow Study Patient Name: Miguel Henderson Today's Date: 10/9/2024   Past Medical History Past Medical History: Diagnosis Date  Prostate cancer (HCC)   Thyroid cancer (HCC)   Past Surgical History Past Surgical History: Procedure Laterality Date  APPENDECTOMY   Summary: Images are on PACS for review. Oral Phase : Pt presented with mild oral dysphagia. Mastication was mod prolonged however eventual functional breakdown. Bolus control, formation were WNL. Transfer was piecemeal. Trace posterior base of tongue residue. Pharyngeal Phase :Pt presented with mild pharyngeal dysphagia. Swallow initiation was intermittently  min delayed with solids and prompt with liquids. Spill to the valleculae occurred with solids. Hyoid elevation and laryngeal excursion were WNL. Pharyngeal stripping wave diminished with solid trials. Epiglottic inversion was mostly complete. With nutri grain min epiglottic undercoat present. Trace vallecular retention with thin liquids and mild/mod with solids. Pt intermittently initiated secondary swallow to reduce residue. No pyriform retention. Large CP bar C4-C5 evident however did not impede bolus flow. No aspiration or penetration observed with trials. Per Esophageal screen Slow motility observed with liquids and solids. High retropulsion observed with liquids, pt risk for bottom up aspiration. Stasis occurred in distal esophagus with 13 mm pill however provided with additional sip of thin, ntl, and puree. Provided additional time x4 minutes pt continued with residue with esophagus. Observations: Son present throughout study provided translation. Recommendations: Diet: Dysphagia 3 dental soft Liquids:thin Meds: whole with thin liquids Strategies:slow rate, alternate liquids with solids, swallow prior to additional po Upright position F/u ST tx: yes Therapy Prognosis: fair Prognosis considerations:age, medical status, Intermittent/Distant Supervision Aspiration Precautions Reflux Precautions Consider consult with: GI, rehab Results reviewed with: pt, nursing, physician. ST reviewed images and recommendations reviewed with family at bedside. Aspiration precautions posted. If a dedicated assessment of the esophagus is desired, consider esophagram/barium swallow or EGD. Goals: Dysphagia LTG -Patient will demonstrate safe and effective oral intake (without overt s/s significant oral/pharyngeal dysphagia including s/s penetration or aspiration) for the highest appropriate diet level. Short Term Goals: -Pt will tolerate Dysphagia 3/advanced (dental soft) diet and thin liquid with no significant s/s oral or  pharyngeal dysphagia across 1-3 diagnostic session/s. -Patient will tolerate trials of upgraded food and/or liquid texture with no significant s/s of oral or pharyngeal dysphagia including aspiration across 1-3 diagnostic sessions -Patient will comply with a Video/Modified Barium Swallow study for more complete assessment of swallowing anatomy/physiology/aspiration risk and to assess efficacy of treatment techniques -Patient will demonstrate the ability to adequately self-monitor swallowing skills and perform appropriate compensatory techniques to reduce overt s/sx of penetration/aspiration to safely tolerate least restrictive food/liquid consistencies. Patient's goal: none stated H&P/Admit info, pertinent provider notes/procedures/studies/PMH:(copied and placed in this report) Miguel Henderson is a 84 y.o. male with a PMH of prostate cancer and thyroid cancer who presents with fever x 4 days.  Patient is Urdu-speaking only and history is obtained through assistance of his son who is present at bedside and acting as .  Patient has been having intermittent fevers which have been responsive to Tylenol and Motrin at home but have ranged as high as 103.7 and yesterday was having some apparent respiratory distress but he has no respiratory history and is on no respiratory medications at home.  Patient does have a scant cough which is nonproductive and denies any increased urinary frequency or dysuria though he does complain of some hematuria which has been ongoing for the past month.  Patient additionally has been complaining of a sore throat for the past 4 days with decreased p.o. fluid intake secondary to pain with swallowing.  While in ER patient was noted to meet severe sepsis criteria and was thus referred for admission. Special Studies XR CHEST PORTABLE 10/08/2024 IMPRESSION: Mild tree-in-bud pattern of opacity mostly in the right lung and in the left base again suggesting a mild  infectious/inflammatory condition CT CHEST WITHOUT IV CONTRAST 10/08/2024 IMPRESSION: Mild bibasilar probable atelectatic changes, right greater than left. Otherwise no focal airspace consolidation to suggest pneumonia. Minor tree-in-bud nodularity in the right lung which could relate to infectious versus inflammatory small airways disease. Clinical correlation recommended. Pulmonary nodules as above. Based on current Fleischner Society 2017 Guidelines on incidental pulmonary nodule, no routine follow-up is needed if the patient is low risk. If the patient is high risk, optional follow-up chest CT at 12 months can be considered. Trace right pleural effusion. Mild right axillary lymphadenopathy, nonspecific Code Status: Level 1 full code Previous VBS: none Precautions Contact Precautions Food allergies:  No know  Current diet:  Regular diet and thin liquids  Premorbid diet:  Regular diet and thin liquids  Dentition  Upper and lower dentures Oral Mech  WNL O2 requirements:  Room air  Vocal Quality/Speech WNL Cognitive status:  Alert  Consistencies administered: Barium laden applesauce, nutri grain bar, sandwich bite, thin liquids,ntl and  13mm barium pill. Liquids were administered by cup. Pt was seated laterally at 90 degrees. Pt  unable to be viewed in AP position, due to transfer status      Echo complete w/ contrast if indicated    Result Date: 10/9/2024  Narrative:   Left Ventricle: Left ventricular cavity size is normal. Wall thickness is mildly increased. The left ventricular ejection fraction is 55%. Systolic function is normal. Wall motion is normal.   Aortic Valve: There is aortic valve sclerosis.   Aorta: The aortic root is mildly dilated. The ascending aorta is mildly dilated. The aortic root is 3.80 cm. The ascending aorta is 3.9 cm.     XR chest portable    Result Date: 10/8/2024  Narrative: XR CHEST PORTABLE INDICATION: shortness of breath. COMPARISON: 10/7/2024 FINDINGS: Minimal tree-in-bud pattern of  opacity mostly in the right lung, but also at the left base. No focal dense consolidation. The trace amount of pleural fluid seen on the prior CT is not apparent on this exam. No pneumothorax identified. Stable cardiomediastinal silhouette Degenerative changes noted along the spine. Normal upper abdomen.     Impression: Mild tree-in-bud pattern of opacity mostly in the right lung and in the left base again suggesting a mild infectious/inflammatory condition Workstation performed: [id number]     XR chest portable - 1 view    Result Date: 10/8/2024  Narrative: XR CHEST PORTABLE INDICATION: Fever cough. COMPARISON: None FINDINGS: Clear lungs. No pneumothorax or pleural effusion. Normal cardiomediastinal silhouette. Bones are unremarkable for age. Normal upper abdomen.     Impression: No acute cardiopulmonary disease. Workstation performed: [id number]     CT chest wo contrast    Result Date: 10/8/2024  Narrative: CT CHEST WITHOUT IV CONTRAST INDICATION: Cough shortness of breath with fever. COMPARISON: None. TECHNIQUE: CT examination of the chest was performed without intravenous contrast. Multiplanar 2D reformatted images were created from the source data. This examination, like all CT scans performed in the [facility], was performed utilizing techniques to minimize radiation dose exposure, including the use of iterative reconstruction and automated exposure control. Radiation dose length product (DLP) for this visit: 441.84 mGy-cm FINDINGS: LUNGS: Evaluation somewhat limited by respiratory motion. There is some minor groundglass and tree-in-bud nodularity suggested in the right upper lobe inferior segment and to a lesser degree in the right lower lobe. No lobar consolidation. Trace paraseptal emphysematous changes. Mild basilar atelectatic changes, right greater than left. There is minor scarring in the posteromedial right lower lobe adjacent to bulky paravertebral osteophytes. 4 mm pulmonary nodule  seen in the right upper lobe (3:35). Additional scattered pulmonary nodules are seen measuring on the order of 2 to 3 mm. Based on current Fleischner Society 2017 Guidelines on incidental pulmonary nodule, no routine follow-up is needed if the patient is low risk. If the patient is high risk, optional follow-up chest CT at 12 months can be considered. PLEURA: Trace right pleural effusion with subjacent atelectasis. HEART/GREAT VESSELS: Heart is unremarkable for patient's age. No thoracic aortic aneurysm. Coronary atherosclerosis MEDIASTINUM AND JOE: Shotty subcentimeter mediastinal lymph nodes noted. Evaluation for hilar lymphadenopathy limited in the absence of intravenous contrast. CHEST WALL AND LOWER NECK: Gynecomastia. Few prominent/mildly enlarged lymph nodes seen in the right axillary region, largest measuring up to 1.2 cm,. VISUALIZED STRUCTURES IN THE UPPER ABDOMEN: No acute abnormality. Bilateral renal cysts. OSSEOUS STRUCTURES: Spinal degenerative changes are noted. No acute fracture or destructive osseous lesion.     Impression: Mild bibasilar probable atelectatic changes, right greater than left. Otherwise no focal airspace consolidation to suggest pneumonia. Minor tree-in-bud nodularity in the right lung which could relate to infectious versus inflammatory small airways disease. Clinical correlation recommended. Pulmonary nodules as above. Based on current Fleischner Society 2017 Guidelines on incidental pulmonary nodule, no routine follow-up is needed if the patient is low risk. If the patient is high risk, optional follow-up chest CT at 12 months can be considered. Trace right pleural effusion. Mild right axillary lymphadenopathy, nonspecific. Workstation performed: [id number]     Labs and pertinent reports reviewed.      This note has been generated by voice recognition software system.  Therefore, there may be spelling, grammar, and or syntax errors. Please contact if questions arise.    Additional  recommendations to follow per attending, Dr. Maxwell.    Scottie Ding DO, PGY4  Hematology/Oncology Fellow

## 2024-10-26 NOTE — H&P (VIEW-ONLY)
Medical Oncology/Hematology Consult Note  Miguel Henderson, male, 84 y.o., 1940,  PPHP [id number]/PPHP [id number], [id number]     Assessment and Plan  Mr. Henderson is a Lao-speaking 84 yoM with PMHx of prostate cancer, hypertension, and hypothyroidism with encephalopathy, B symptoms, and lymphadenopathy concerning hyperviscosity syndrome secondary to lymphoma or lymphoproliferative disorder transferred from [facility] to [facility] to be evaluated for plasmapheresis.    1. Concern for hyperviscosity syndrome possibly secondary to lymphoma or lymphoproliferative disorder  2. Worsening R axillary lymphadenopathy  3. Mediastinal, b/l iliac chain, and b/l inguinal lymphadenopathy  4. B symptoms  5. Acute encephalopathy and lethargy  6. Positive cold agglutinin  - Called [facility] ([facility] [phone]) to arrange for plasmapheresis which will be performed for at least two sessions  - Consulted and appreciate IR who agreed to place urgent R IJ catheter  - Ordered 3.5 L of 5% albumin x2 days to be given with plasmapheresis  - Ordered calcium gluconate 2 g IV x2 days to be given with plasmapheresis  - Ordered the following labs:  - Viscosity  - Peripheral smear  - Fibrinogen  - PT-INR  - LDH  - Cryoglobulin  - SPEP  - UPEP  - Immunoglobulins  - Free light chains  - Flow cytometry  - Beta-2 microglobulin  - Type and screen  - We will re-engage IR on Monday for excisional lymph node biopsy and possible BMBx  - We will continue to follow closely  - We will establish outpatient follow up in our office within two weeks of discharge after his acute symptoms have resolved    Reason for consultation:  Lymphadenopathy and encephalopathy    History of present illness:  Mr. Henderson is a Lao-speaking 84-year-old gentleman with PMHx of prostate cancer (~15 years ago treated with surgery and radiation), hypertension, hypothyroidism, dysphagia, recent COVID and rhinovirus infections who presented to [facility] on 10/25/24 with encephalopathy, B symptoms and lymphadenopathy on imaging concerning for lymphoma with elevated clinical concern for potential hyperviscosity syndrome resulting in a transfer to [facility] to be evaluated for plasmapheresis.    The Patient has had three hospital admissions in the last three weeks.  He has been feeling unwell for several weeks, has had fevers, is very fatigued, has poor appetite and poor PO intake, and has noted weight loss.  Initially, his fevers and lymphadenopathy were attributed to recent COVID and rhinovirus infections.  Imaging did demonstrate lymphadenopathy again thought to be attributed to infection with instructions to follow-up with hematology/medical oncology in the outpatient setting to ensure resolution.  However, his symptoms continued and empiric antibiotics were with concern for meningitis.  Antibiotics were discontinued when LP was negative and ID does not feel there is sufficient evidence for meningitis.     Vitals show Temp 100.2, HR 93, RR 16, /65, and 96% SpO2 on RA.  Labs show strong cold agglutinin, WBC 8.5, Hb 9.4, , , K 3.3, chloride 96, , BUN 7, creatinine 0.56, glucose 88, total protein 6.6, T. bili 1.53, Mg 1.7.    CT CAP w/ (10/25/2024) demonstrates right axillary adenopathy that is worsened compared to previous imaging on 10/11/2024.  Unchanged mediastinal, bilateral iliac chain and bilateral inguinal adenopathy compared to CT on 10/21/2024.  Mild potential pulmonary edema.  Small pleural effusions.  Unchanged cardiomegaly.  Right axillary and subpectoral adenopathy demonstrates a lymph node measuring 21 mm that was previously 12 mm.  Numerous new enlarged nodes unchanged subcentimeter left axillary nodes.  Right external iliac node measures 2.2 cm.  Left external iliac node demonstrates size of 10 mm.  Inguinal lymphadenopathy measures 16 mm on the right and 11 mm on the left.     MRI Brain  (10/25/2024) demonstrates no acute infarct, mass effect or midline shift with mild to moderate chronic microangiopathy.  There is multiple indeterminate bilateral parotid nodes, the largest which measures up to 1 cm on the left, and is incompletely evaluated on this imaging.     Peripheral smear (10/22/24) showed RBCs with marked agglutination limiting further evaluation of morphologic features, slight left shift in WBC showing granulocytes with numerous fragile cells and scattered reactive monocytes and lymphocytes without distinct features of dysplasia or circulating blasts, platelets show normal morphology without significant clumps or satellitosis.  Overall, the combination of findings is not definitively specific, though the red blood cell changes are compatible with a cold agglutinin in the correct clinical setting. The white blood cell changes may raise the possibility of infection, chemotherapy or drug effect, toxin exposure, postvaccination, or other reactive condition. The presence of fragile white blood cells may be an artifact of specimen handling, though a component of drug effect or nonspecific reactive change cannot be entirely excluded. Correlation with clinical impression and other laboratory findings is recommended. If clinical suspicion for more significant disease process such as a lymphoproliferative disorder exists then consideration for repeat sampling with peripheral blood flow cytometry may be of assistance to further evaluate the white blood cell changes as clinically indicated.  Flow cytometry was ordered and is pending.    Dr. Maxwell, hematology/oncology, coordinated care with [facility] primary attending, Dr. Borrero to arrange for urgent transfer to [place].  We called the [facility] to discuss the case and inform them of a possible need for plasmapheresis.  The patient arrived promptly to [facility].  We evaluated the patient at bedside and found him to be  AAOx3, states that he feels slightly improved, but exhibited a degree of encephalopathy.  We contacted the [facility] [place] [facility] again ([facility] [phone]) who recommended ordering labs, 3.5 L albumin x2 days, and calcium gluconate 2 g x2 days.  We contacted IR who is planning to place urgent R IJ catheter.    Family history is positive for lymphoma in a brother.    Review of Systems:   ROS was limited 2/2 degree of encephalopathy.  Parts of ROS that could be obtained are detailed in the HPI above.    Past Medical History:   Diagnosis Date    Prostate cancer (HCC)      Past Surgical History:   Procedure Laterality Date    APPENDECTOMY      IR LUMBAR PUNCTURE  10/25/2024     Family History:  Brother with unspecified lymphoma    Social History     Socioeconomic History    Marital status: Unknown     Spouse name: Not on file    Number of children: Not on file    Years of education: Not on file    Highest education level: Not on file   Occupational History    Not on file   Tobacco Use    Smoking status: Never    Smokeless tobacco: Never   Vaping Use    Vaping status: Never Used   Substance and Sexual Activity    Alcohol use: Yes     Comment: Occasional    Drug use: Never    Sexual activity: Not on file   Other Topics Concern    Not on file   Social History Narrative    Not on file     Social Determinants of Health     Financial Resource Strain: Not on file   Food Insecurity: No Food Insecurity (10/25/2024)    Nursing - Inadequate Food Risk Classification     Worried About Running Out of Food in the Last Year: Never true     Ran Out of Food in the Last Year: Never true     Ran Out of Food in the Last Year: 1   Transportation Needs: No Transportation Needs (10/25/2024)    Nursing - Transportation Risk Classification     Lack of Transportation: Not on file     Lack of Transportation: 2   Physical Activity: Not on file   Stress: Not on file   Social Connections: Not on file   Intimate Partner Violence: Unknown (10/25/2024)     Nursing IPS     Feels Physically and Emotionally Safe: Not on file     Physically Hurt by Someone: Not on file     Humiliated or Emotionally Abused by Someone: Not on file     Physically Hurt by Someone: 2     Hurt or Threatened by Someone: 2   Housing Stability: Unknown (10/25/2024)    Nursing: Inadequate Housing Risk Classification     Has Housing: Not on file     Worried About Losing Housing: Not on file     Unable to Get Utilities: Not on file     Unable to Pay for Housing in the Last Year: 2     Has Housin     Current Facility-Administered Medications:     acetaminophen (Ofirmev) injection 1,000 mg, 1,000 mg, Intravenous, Q6H PRN, Joseph Alexander MD    [START ON 10/27/2024] fluticasone (FLONASE) 50 mcg/act nasal spray 1 spray, 1 spray, Nasal, Daily, Joseph Alexander MD    [START ON 10/28/2024] levothyroxine tablet 50 mcg, 50 mcg, Oral, Daily, Joseph Alexander MD    [START ON 10/27/2024] levothyroxine tablet 75 mcg, 75 mcg, Oral, Early Morning, Joseph Alexander MD    multi-electrolyte (PLASMALYTE-A/ISOLYTE-S PH 7.4) IV solution, 100 mL/hr, Intravenous, Continuous, Joseph Alexander MD, Last Rate: 100 mL/hr at 10/26/24 1347, 100 mL/hr at 10/26/24 1347    ondansetron (ZOFRAN) injection 4 mg, 4 mg, Intravenous, Q6H PRN, Joseph Alexander MD    [START ON 10/27/2024] pantoprazole (PROTONIX) EC tablet 40 mg, 40 mg, Oral, Early Morning, Joseph Alexander MD    potassium chloride (Klor-Con M20) CR tablet 20 mEq, 20 mEq, Oral, TID With Meals, Joseph Alexander MD    Medications Prior to Admission:     benzonatate (TESSALON) 200 MG capsule    cyclobenzaprine (FLEXERIL) 5 mg tablet    fluticasone (FLONASE) 50 mcg/act nasal spray    hydroCHLOROthiazide 25 mg tablet    ibuprofen (MOTRIN) 800 mg tablet    levothyroxine 50 mcg tablet    levothyroxine 75 mcg tablet    nirmatrelvir & ritonavir (Paxlovid) tablet therapy pack    ondansetron (ZOFRAN) 4 mg tablet    pantoprazole (PROTONIX) 40 mg tablet    potassium chloride (Klor-Con M10) 10 mEq  tablet    Allergies   Allergen Reactions    Shellfish Allergy - Food Allergy Anaphylaxis    Valtrex [Valacyclovir] Hives     Physical Exam:    /65   Pulse 93   Temp 100.2 °F (37.9 °C)   SpO2 96%     General: AAOx3, very ill-appearing, resting but somewhat distressed  HEENT: PERRLA, moist mucosa, dentures  Respiratory: protecting airway, CTAB w/o wheezes, rales, or rhonchi, no increased work of breathing  Cardiovascular: RRR w/o murmurs, 2+ radial and pedal pulses, no b/l LE edema  Abdomen: soft, non-tender, non-distended, no hepatomegaly or splenomegaly  /Rectal: deferred  Musculoskeletal: moves all four extremities with normal strength and ROM  Integumentary: reticular rash across chest and arms, dry, no bruising  Neurological: no gross or focal deficits identified  Psychiatric: pleasant, confused, cooperative    Recent Results (from the past 48 hour(s))   ECG 12 lead    Collection Time: 10/25/24 11:09 AM   Result Value Ref Range    Ventricular Rate 91 BPM    Atrial Rate 91 BPM    IN Interval 160 ms    QRSD Interval 86 ms    QT Interval 388 ms    QTC Interval 477 ms    P Axis 43 degrees    QRS Axis -35 degrees    T Wave Axis 0 degrees   CBC and differential    Collection Time: 10/25/24 11:21 AM   Result Value Ref Range    WBC 11.68 (H) 4.31 - 10.16 Thousand/uL    RBC 2.92 (L) 3.88 - 5.62 Million/uL    Hemoglobin 10.8 (L) 12.0 - 17.0 g/dL    Hematocrit 29.4 (L) 36.5 - 49.3 %     (H) 82 - 98 fL    MCH 37.0 (H) 26.8 - 34.3 pg    MCHC 36.7 31.4 - 37.4 g/dL    MPV 10.4 8.9 - 12.7 fL    Platelets 256 149 - 390 Thousands/uL   CMP    Collection Time: 10/25/24 11:21 AM   Result Value Ref Range    Sodium 129 (L) 135 - 147 mmol/L    Potassium 3.5 3.5 - 5.3 mmol/L    Chloride 92 (L) 96 - 108 mmol/L    CO2 31 21 - 32 mmol/L    ANION GAP 6 4 - 13 mmol/L    BUN 5 5 - 25 mg/dL    Creatinine 0.59 (L) 0.60 - 1.30 mg/dL    Glucose 117 65 - 140 mg/dL    Calcium 8.0 (L) 8.4 - 10.2 mg/dL    Corrected Calcium 9.4 8.3 -  10.1 mg/dL    AST 96 (H) 13 - 39 U/L    ALT 40 7 - 52 U/L    Alkaline Phosphatase 202 (H) 34 - 104 U/L    Total Protein 7.7 6.4 - 8.4 g/dL    Albumin 2.2 (L) 3.5 - 5.0 g/dL    Total Bilirubin 1.62 (H) 0.20 - 1.00 mg/dL    eGFR 91 ml/min/1.73sq m   Lipase    Collection Time: 10/25/24 11:21 AM   Result Value Ref Range    Lipase 24 11 - 82 u/L   Ammonia    Collection Time: 10/25/24 11:21 AM   Result Value Ref Range    Ammonia 26 18 - 72 umol/L   TSH, 3rd generation with Free T4 reflex    Collection Time: 10/25/24 11:21 AM   Result Value Ref Range    TSH 3RD GENERATON 3.923 0.450 - 4.500 uIU/mL   Ethanol    Collection Time: 10/25/24 11:21 AM   Result Value Ref Range    Ethanol Lvl <10 <10 mg/dL   Salicylate level    Collection Time: 10/25/24 11:21 AM   Result Value Ref Range    Salicylate Lvl <5 3 - 20 mg/dL   Acetaminophen level-If concentration is detectable, please discuss with medical  on call.    Collection Time: 10/25/24 11:21 AM   Result Value Ref Range    Acetaminophen Level <2 (L) 10 - 20 ug/mL   Procalcitonin    Collection Time: 10/25/24 11:21 AM   Result Value Ref Range    Procalcitonin 0.59 (H) <=0.25 ng/ml   Manual Differential(PHLEBS Do Not Order)    Collection Time: 10/25/24 11:21 AM   Result Value Ref Range    Segmented % 35 (L) 43 - 75 %    Lymphocytes % 52 (H) 14 - 44 %    Monocytes % 4 4 - 12 %    Eosinophils % 3 0 - 6 %    Basophils % 0 0 - 1 %    Atypical Lymphocytes % 6 (H) <=0 %    Absolute Neutrophils 4.09 1.85 - 7.62 Thousand/uL    Absolute Lymphocytes 6.77 (H) 0.60 - 4.47 Thousand/uL    Absolute Monocytes 0.47 0.00 - 1.22 Thousand/uL    Absolute Eosinophils 0.35 0.00 - 0.40 Thousand/uL    Absolute Basophils 0.00 0.00 - 0.10 Thousand/uL    Total Counted      RBC Morphology Present     Platelet Estimate Adequate Adequate    Large Platelet Present     Anisocytosis Present    UA w Reflex to Microscopic w Reflex to Culture    Collection Time: 10/25/24 11:32 AM    Specimen: Urine, Clean  Catch   Result Value Ref Range    Color, UA Yellow Yellow, Straw    Clarity, UA Clear Hazy, Clear    Specific Gravity, UA 1.020 >1.005 - <1.030    pH, UA 8.0 (A) 5.0, 5.5, 6.0, 6.5, 7.0, 7.5    Leukocytes, UA Negative Negative    Nitrite, UA Negative Negative    Protein, UA Negative Negative, Interference- unable to analyze mg/dl    Glucose, UA Negative Negative mg/dl    Ketones, UA Negative Negative mg/dl    Urobilinogen, UA 4.0 (A) 0.2, 1.0 E.U./dl E.U./dl    Bilirubin, UA Negative Negative    Occult Blood, UA Trace-Intact (A) Negative   FLU/COVID Rapid Antigen (30 min. TAT) - Preferred screening test in ED    Collection Time: 10/25/24 11:32 AM    Specimen: Nose; Nares   Result Value Ref Range    SARS COV Rapid Antigen Negative Negative    Influenza A Rapid Antigen Negative Negative    Influenza B Rapid Antigen Negative Negative   Urine Microscopic    Collection Time: 10/25/24 11:32 AM   Result Value Ref Range    RBC, UA 0-1 None Seen, 0-1, 1-2, 2-4, 0-5 /hpf    WBC, UA 0-1 None Seen, 0-1, 1-2, 0-5, 2-4 /hpf    Epithelial Cells None Seen None Seen, Occasional /hpf    Bacteria, UA None Seen None Seen, Occasional /hpf    URINE COMMENT Microscopic examined unspun due to limited sample    Blood culture #1    Collection Time: 10/25/24 12:52 PM    Specimen: Arm, Right; Blood   Result Value Ref Range    Blood Culture Received in Microbiology Lab. Culture in Progress.    Lactic acid    Collection Time: 10/25/24  1:09 PM   Result Value Ref Range    LACTIC ACID 1.6 0.5 - 2.0 mmol/L   Blood culture #2    Collection Time: 10/25/24  1:09 PM    Specimen: Arm, Left; Blood   Result Value Ref Range    Blood Culture Received in Microbiology Lab. Culture in Progress.    CSF white cell count with differential    Collection Time: 10/25/24  5:04 PM   Result Value Ref Range    Appearance, CSF Clear/Colorless     Tube Number, CSF 4     WBC,  (HH) 0 - 5 /uL    Xanthochromia No No   Glucose CSF    Collection Time: 10/25/24  5:04 PM    Result Value Ref Range    Glucose, CSF 43 40 - 70 mg/dL   Protein CSF    Collection Time: 10/25/24  5:04 PM   Result Value Ref Range    Protein, CSF 86 (H) 15 - 45 mg/dL   RBC count,CSF    Collection Time: 10/25/24  5:04 PM   Result Value Ref Range    RBC, CSF 3 0 - 10 uL   Gram stain CSF    Collection Time: 10/25/24  5:04 PM    Specimen: Lumbar Puncture; Cerebrospinal Fluid   Result Value Ref Range    Gram Stain Result 1+ Mononuclear Cells     Gram Stain Result No organisms seen    Meningitis/Encephalitis (ME) Panel    Collection Time: 10/25/24  5:04 PM   Result Value Ref Range    C.NEOFORMANS/GATTII Not Detected Not Detected    CYTOMEGALOVIRUS Not Detected Not Detected    ENTEROVIRUS Not Detected Not Detected    E.COLI K1 Not Detected Not Detected    H.INFLUENZAE Not Detected Not Detected    H.SIMPLEX 1 Not Detected Not Detected    H.SIMPLEX 2 Not Detected Not Detected    HERPES VIRUS 6 Not Detected Not Detected    PARECHOVIRUS Not Detected Not Detected    L.MONOCYTOGENES Not Detected Not Detected    N.MENINGITIDIS Not Detected Not Detected    S.AGALACTIAE Not Detected Not Detected    S.PNEUMONIAE Not Detected Not Detected    V.ZOSTER Not Detected Not Detected   Spinal fluid culture and Gram stain    Collection Time: 10/25/24  5:04 PM    Specimen: Lumbar Puncture; Cerebrospinal Fluid   Result Value Ref Range    CSF Culture No growth    CSF Diff    Collection Time: 10/25/24  5:04 PM   Result Value Ref Range    Total Counted 100     Neutrophils % (CSF) 4 %    Lymphs % CSF 85 %    Monocytes % (CSF) 11 %   Procalcitonin, Next Day AM Collection    Collection Time: 10/26/24  4:28 AM   Result Value Ref Range    Procalcitonin 0.43 (H) <=0.25 ng/ml   Comprehensive metabolic panel    Collection Time: 10/26/24  4:28 AM   Result Value Ref Range    Sodium 130 (L) 135 - 147 mmol/L    Potassium 3.3 (L) 3.5 - 5.3 mmol/L    Chloride 96 96 - 108 mmol/L    CO2 31 21 - 32 mmol/L    ANION GAP 3 (L) 4 - 13 mmol/L    BUN 7 5 - 25  mg/dL    Creatinine 0.56 (L) 0.60 - 1.30 mg/dL    Glucose 88 65 - 140 mg/dL    Calcium 7.2 (L) 8.4 - 10.2 mg/dL    Corrected Calcium 9.0 8.3 - 10.1 mg/dL    AST 73 (H) 13 - 39 U/L    ALT 30 7 - 52 U/L    Alkaline Phosphatase 171 (H) 34 - 104 U/L    Total Protein 6.6 6.4 - 8.4 g/dL    Albumin 1.8 (L) 3.5 - 5.0 g/dL    Total Bilirubin 1.53 (H) 0.20 - 1.00 mg/dL    eGFR 93 ml/min/1.73sq m   Magnesium    Collection Time: 10/26/24  4:28 AM   Result Value Ref Range    Magnesium 1.7 (L) 1.9 - 2.7 mg/dL   CBC and differential    Collection Time: 10/26/24  4:28 AM   Result Value Ref Range    WBC 8.46 4.31 - 10.16 Thousand/uL    RBC 2.56 (L) 3.88 - 5.62 Million/uL    Hemoglobin 9.4 (L) 12.0 - 17.0 g/dL    Hematocrit 25.6 (L) 36.5 - 49.3 %     (H) 82 - 98 fL    MCH 36.7 (H) 26.8 - 34.3 pg    MCHC 36.7 31.4 - 37.4 g/dL    MPV 11.3 8.9 - 12.7 fL    Platelets 196 149 - 390 Thousands/uL     MRI brain w wo contrast    Result Date: 10/26/2024  Narrative: MRI BRAIN WITH AND WITHOUT CONTRAST INDICATION: confusion,. COMPARISON: CT head 10/25/2024. TECHNIQUE: Multiplanar, multisequence imaging of the brain was performed before and after gadolinium administration. IV Contrast:  7 mL of Gadobutrol injection (SINGLE-DOSE) IMAGE QUALITY:   Motion-degraded, which reduces diagnostic sensitivity. FINDINGS: BRAIN PARENCHYMA: No acute infarct, mass effect or midline shift. Mild-moderate chronic ischemic changes of the white matter. Postcontrast imaging of the brain demonstrates no abnormal enhancement. VENTRICLES:  Normal for the patient's age. SELLA AND PITUITARY GLAND:  Normal. ORBITS: No acute abnormality. PARANASAL SINUSES: Mild mucosal thickening. VASCULATURE: Please refer to CTA. CALVARIUM AND SKULL BASE: No acute abnormality. EXTRACRANIAL SOFT TISSUES: Multiple bilateral parotid nodules, the largest of which measures up to 1.0 cm in the left parotid gland (series 6, image 1), noting these are not well evaluated.     Impression:  Within the confines of a motion-degraded examination: 1. No acute infarct, mass effect or midline shift. Mild-moderate chronic microangiopathy. 2. Multiple indeterminate bilateral parotid nodules, the largest of which measures up to 1.0 cm on the left, incompletely evaluated. If clinically appropriate, this could be further evaluated with contrast-enhanced neck CT on a nonemergent basis. The study was marked in EPIC for immediate notification. Workstation performed: [id number]     IR lumbar puncture    Result Date: 10/25/2024  Narrative: Lumbar puncture under fluoroscopic control. Clinical History: Confusion and fever Fluoro time: 0.9 minutes Conscious sedation time: 35 minutes Number of Images: 1 Technique: The patient was brought to the interventional radiology suite and identified by wristband. The patient was placed prone on the table, and the L4-5 spinous processes were identified. The skin was then prepped and draped in usual sterile fashion. Lidocaine was administered to the skin, and under fluoroscopic control, a 20 gauge spinal needle was advanced into the subarachnoid space between the L4-5 spinous processes . The table was then tilted into reverse Trendelenburg. 30 cc of clear spinal fluid was removed under it's own pressure into 4 separate sequential tubes. The tubes were sent to laboratory for testing as requested by the referring physician. After the procedure, the needle was removed, and a sterile dressing applied to the site. The patient tolerated the procedure well and suffered no complications.     Impression: Impression: 1. Successful lumbar puncture under fluoroscopic control. 2. 30 cc of clear spinal fluid was removed and sent to laboratory as described above. Workstation performed: [id number]     CT chest abdomen pelvis w contrast    Result Date: 10/25/2024  Narrative: CT CHEST, ABDOMEN AND PELVIS WITH IV CONTRAST INDICATION: Confusion, abdominal pain, emesis, generalized abdominal tenderness.  COMPARISON: CT abdomen pelvis 10/21/2024. CT chest abdomen pelvis 10/11/2024. TECHNIQUE: CT examination of the chest, abdomen and pelvis was performed. Multiplanar 2D reformatted images were created from the source data. This examination, like all CT scans performed in the [facility], was performed utilizing techniques to minimize radiation dose exposure, including the use of iterative reconstruction and automated exposure control. Radiation dose length product (DLP) for this visit: 850.79 mGy-cm IV Contrast: 100 mL of iohexol (OMNIPAQUE) Enteric Contrast: Not administered. FINDINGS: CHEST LUNGS: Mild interlobular septal thickening through both lower lobes may represent mild pulmonary edema. Mild bibasilar atelectasis. No focal consolidation. Unchanged known 4 mm and smaller pulmonary nodules. PLEURA: Small bibasilar pleural effusions. HEART/GREAT VESSELS: Cardiomegaly. Coronary artery calcifications. No thoracic aortic aneurysm. MEDIASTINUM AND JOE: Unchanged mild mediastinal and bilateral parahilar adenopathy. Dominant 16 mm precarinal node #2/49. Reidentified diffuse esophageal distention. Diffuse esophageal fluid may indicate reflux. CHEST WALL AND LOWER NECK: Worsened right axillary and right subpectoral adenopathy. Previously seen dominant 12 mm node now measures 21 mm #2/29. Numerous new enlarged nodes. Unchanged subcentimeter left axillary nodes. ABDOMEN LIVER/BILIARY TREE: Unremarkable. GALLBLADDER: No calcified gallstones. No pericholecystic inflammatory change. SPLEEN: Unremarkable. PANCREAS: Unremarkable. ADRENAL GLANDS: Unremarkable. KIDNEYS/URETERS: No hydronephrosis. Numerous unchanged bilateral simple renal cysts. Well-circumscribed subcentimeter bilateral renal hypoattenuating lesions are also unchanged and likely to represent subcentimeter simple cyst. STOMACH AND BOWEL: Unremarkable. APPENDIX: No findings to suggest appendicitis. ABDOMINOPELVIC CAVITY: No ascites. No  pneumoperitoneum. Unchanged bilateral iliac chain adenopathy. A dominant 2.2 cm right external iliac node #2/199 is unchanged. Dominant 10 mm left external iliac node #2/185 is unchanged. Unchanged 9 mm left pelvic sidewall node #2/210. Numerous unchanged subcentimeter retroperitoneal nodes. 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 PELVIS REPRODUCTIVE ORGANS: Prostatectomy. URINARY BLADDER: Unremarkable. ABDOMINAL WALL/INGUINAL REGIONS: Unchanged bilateral inguinal adenopathy. Dominant 16 mm right inguinal node #2/214. Dominant 11 mm left inguinal node #2/237. BONES: No acute fracture or suspicious osseous lesion.     Impression: Right axillary adenopathy is worsened when compared with CT chest 10/11/2024 Unchanged mediastinal, bilateral iliac chain and bilateral inguinal adenopathy when compared with CT abdomen pelvis 10/21/2024. Potential mild pulmonary edema. Small pleural effusions. Unchanged cardiomegaly. The study was marked in EPIC for immediate notification. Workstation performed: [id number]     CT head without contrast    Result Date: 10/25/2024  Narrative: CT BRAIN - WITHOUT CONTRAST INDICATION:   Confusion, non focal. COMPARISON:  None. TECHNIQUE:  CT examination of the brain was performed.  Multiplanar 2D reformatted images were created from the source data. Radiation dose length product (DLP) for this visit:  870.27 mGy-cm .  This examination, like all CT scans performed in the [facility], was performed utilizing techniques to minimize radiation dose exposure, including the use of iterative  reconstruction and automated exposure control. IMAGE QUALITY:  Diagnostic. FINDINGS: PARENCHYMA: Decreased attenuation is noted in periventricular and subcortical white matter demonstrating an appearance that is statistically most likely to represent moderate microangiopathic change. No CT signs of acute infarction.  No intracranial mass,  mass effect or midline shift.  No acute parenchymal hemorrhage. VENTRICLES AND EXTRA-AXIAL SPACES:  Normal for the patient's age. VISUALIZED ORBITS: Normal visualized orbits. PARANASAL SINUSES: Normal visualized paranasal sinuses. CALVARIUM AND EXTRACRANIAL SOFT TISSUES: Normal.     Impression: No acute intracranial abnormality. Workstation performed: [id number]     XR chest 1 view portable    Result Date: 10/22/2024  Narrative: XR CHEST PORTABLE INDICATION: Cough/fever. COMPARISON: Chest radiograph 10/8/2024; CT chest, abdomen and pelvis 10/11/2024 FINDINGS: Monitoring leads and clips project over the chest. Mild bibasilar atelectasis. No focal consolidation. Blunting of the left costophrenic angle suggesting trace pleural effusion. No pneumothorax. Normal cardiomediastinal silhouette. Degenerative changes of the spine and bilateral shoulders. Normal upper abdomen.     Impression: Mild bibasilar atelectasis and trace left pleural effusion. Resident: MARTHA Whiteside I, the attending radiologist, have reviewed the images and agree with the final report above. Workstation performed: [id number]     CT abdomen pelvis with contrast    Result Date: 10/21/2024  Narrative: CT ABDOMEN AND PELVIS WITH IV CONTRAST INDICATION:, Nausea, vomiting and diarrhea. . COMPARISON: 10/11/2024. TECHNIQUE: CT examination of the abdomen and pelvis was performed. Multiplanar 2D reformatted images were created from the source data. This examination, like all CT scans performed in the [facility], was performed utilizing techniques to minimize radiation dose exposure, including the use of iterative reconstruction and automated exposure control. Radiation dose length product (DLP) for this visit: 585 mGy-cm IV Contrast: 100 mL of iohexol (OMNIPAQUE) Enteric Contrast: Not administered. FINDINGS: ABDOMEN LOWER CHEST: Small left pleural effusion and left basilar subsegmental atelectasis. LIVER/BILIARY TREE: Unremarkable. GALLBLADDER:  No calcified gallstones. No pericholecystic inflammatory change. SPLEEN: Unremarkable. PANCREAS: Unremarkable. ADRENAL GLANDS: Unremarkable. KIDNEYS/URETERS: Symmetric nephrographic phase enhancement the kidneys. Cysts measure up to 6.4 cm. Multiple left renal cysts measuring up to 8.2 cm. No obstructive uropathy. STOMACH AND BOWEL: Unremarkable. APPENDIX: Prior appendectomy. 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 PELVIS REPRODUCTIVE ORGANS: Prior prostatectomy. URINARY BLADDER: Unremarkable. ABDOMINAL WALL/INGUINAL REGIONS: Enlarged bilateral inguinal lymph nodes with normal fatty shen, measuring up to 1.9 cm.. BONES: No acute fracture or suspicious osseous lesion.     Impression: 1. No evidence of bowel obstruction, colitis or diverticulitis. 2. Numerous mildly enlarged para-aortic, iliac chain and inguinal lymph nodes, possibly reactive however lymphoproliferative disorder not excluded. Workstation performed: [id number]     CT chest abdomen pelvis w contrast    Result Date: 10/12/2024  Narrative: CT CHEST, ABDOMEN AND PELVIS WITH IV CONTRAST INDICATION: intermittent persistent fevers. COMPARISON: 10/8/2024. TECHNIQUE: CT examination of the chest, abdomen and pelvis was performed. Multiplanar 2D reformatted images were created from the source data. This examination, like all CT scans [facility], was performed utilizing techniques to minimize radiation dose exposure, including the use of iterative reconstruction and automated exposure control. Radiation dose length product (DLP) for this visit: 898.73 mGy-cm IV Contrast: 100 mL of iohexol (OMNIPAQUE) Enteric Contrast: Not administered. FINDINGS: Moderately degraded by respiratory motion and retained barium in the colon. CHEST LUNGS: There is bibasilar atelectasis.  Stable 3 mm right apical lung nodule on series 4 image 24. Additional nodules are not well seen due to respiratory motion. No tracheal or endobronchial lesion. PLEURA: There are small bilateral pleural effusions. HEART/GREAT VESSELS: Heart is unremarkable for patient's age. No thoracic aortic aneurysm. MEDIASTINUM AND JOE: There is diffuse distention of the esophagus as seen previously. No obstructing lesion is visualized. CHEST WALL AND LOWER NECK: Again seen is an enlarged right axillary lymph node measuring 1.2 x 1.9 cm, nonspecific. ABDOMEN LIVER/BILIARY TREE: There is a 1.9 x 1.6 cm enhancing lesion in segment 6 of the liver, indeterminate. GALLBLADDER: Not well-visualized due to under distention and beam hardening artifact from adjacent barium. SPLEEN: Unremarkable. PANCREAS: Unremarkable. ADRENAL GLANDS: Unremarkable. KIDNEYS/URETERS: There are numerous bilateral renal cysts. No hydronephrosis. STOMACH AND BOWEL: Unremarkable. APPENDIX: No findings to suggest appendicitis. ABDOMINOPELVIC CAVITY: No ascites. No pneumoperitoneum. No lymphadenopathy. 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 PELVIS REPRODUCTIVE ORGANS: Unremarkable for patient's age. URINARY BLADDER: Unremarkable. ABDOMINAL WALL/INGUINAL REGIONS: Unremarkable. BONES: No acute fracture or suspicious osseous lesion.     Impression: Moderately limited by respiratory motion. 1.  Small bilateral pleural effusions with overlying atelectasis. 2.  3 mm right apical lung nodule. Based on current Fleischner Society 2017 Guidelines on incidental pulmonary nodule, no routine follow-up is needed if the patient is low risk. If the patient is high risk, optional follow-up chest CT at 12 months can be  considered. 3.  Stable diffusely dilated esophagus. This may be related to dysmotility. Obstructing lesion is not visualized. This would be better evaluated with  esophagram. 4.  No acute abnormality in the abdomen or pelvis. 5.  Enhancing liver lesion, indeterminate. Possibilities include hemangioma and vascular malformation though other etiologies including neoplasm not excluded. Follow-up nonemergent MRI is recommended. The study was marked in EPIC for immediate notification. Workstation performed: [id number]     XR knee 3 vw left non injury    Result Date: 10/10/2024  Narrative: XR KNEE 3 VW LEFT NON INJURY INDICATION: left knee pain. COMPARISON: None FINDINGS: No acute fracture or dislocation. No joint effusion. No significant degenerative changes. No lytic or blastic osseous lesion. Unremarkable soft tissues.     Impression: No acute osseous abnormality. Computerized Assisted Algorithm (CAA) may have been used to analyze all applicable images. Workstation performed: [id number]     FL barium swallow video w speech    Result Date: 10/9/2024  Narrative: A video barium swallow study was performed by the Department of Speech Pathology. Please refer to the report for the official interpretation. The images are stored for archival purposes only. Study images were not formally reviewed by the Radiology Department.    FL barium swallow video w speech    Result Date: 10/9/2024  Narrative: JARROD Marie     10/9/2024  3:14 PM                                  Video Swallow Study Patient Name: Miguel Henderson Today's Date: 10/9/2024   Past Medical History Past Medical History: Diagnosis Date  Prostate cancer (HCC)   Thyroid cancer (HCC)   Past Surgical History Past Surgical History: Procedure Laterality Date  APPENDECTOMY   Summary: Images are on PACS for review. Oral Phase : Pt presented with mild oral dysphagia. Mastication was mod prolonged however eventual functional breakdown. Bolus control, formation were WNL. Transfer was piecemeal. Trace posterior base of tongue residue. Pharyngeal Phase :Pt presented with mild pharyngeal dysphagia. Swallow initiation was intermittently  min delayed with solids and prompt with liquids. Spill to the valleculae occurred with solids. Hyoid elevation and laryngeal excursion were WNL. Pharyngeal stripping wave diminished with solid trials. Epiglottic inversion was mostly complete. With nutri grain min epiglottic undercoat present. Trace vallecular retention with thin liquids and mild/mod with solids. Pt intermittently initiated secondary swallow to reduce residue. No pyriform retention. Large CP bar C4-C5 evident however did not impede bolus flow. No aspiration or penetration observed with trials. Per Esophageal screen Slow motility observed with liquids and solids. High retropulsion observed with liquids, pt risk for bottom up aspiration. Stasis occurred in distal esophagus with 13 mm pill however provided with additional sip of thin, ntl, and puree. Provided additional time x4 minutes pt continued with residue with esophagus. Observations: Son present throughout study provided translation. Recommendations: Diet: Dysphagia 3 dental soft Liquids:thin Meds: whole with thin liquids Strategies:slow rate, alternate liquids with solids, swallow prior to additional po Upright position F/u ST tx: yes Therapy Prognosis: fair Prognosis considerations:age, medical status, Intermittent/Distant Supervision Aspiration Precautions Reflux Precautions Consider consult with: GI, rehab Results reviewed with: pt, nursing, physician. ST reviewed images and recommendations reviewed with family at bedside. Aspiration precautions posted. If a dedicated assessment of the esophagus is desired, consider esophagram/barium swallow or EGD. Goals: Dysphagia LTG -Patient will demonstrate safe and effective oral intake (without overt s/s significant oral/pharyngeal dysphagia including s/s penetration or aspiration) for the highest appropriate diet level. Short Term Goals: -Pt will tolerate Dysphagia 3/advanced (dental soft) diet and thin liquid with no significant s/s oral or  pharyngeal dysphagia across 1-3 diagnostic session/s. -Patient will tolerate trials of upgraded food and/or liquid texture with no significant s/s of oral or pharyngeal dysphagia including aspiration across 1-3 diagnostic sessions -Patient will comply with a Video/Modified Barium Swallow study for more complete assessment of swallowing anatomy/physiology/aspiration risk and to assess efficacy of treatment techniques -Patient will demonstrate the ability to adequately self-monitor swallowing skills and perform appropriate compensatory techniques to reduce overt s/sx of penetration/aspiration to safely tolerate least restrictive food/liquid consistencies. Patient's goal: none stated H&P/Admit info, pertinent provider notes/procedures/studies/PMH:(copied and placed in this report) Miguel Henderson is a 84 y.o. male with a PMH of prostate cancer and thyroid cancer who presents with fever x 4 days.  Patient is Czech-speaking only and history is obtained through assistance of his son who is present at bedside and acting as .  Patient has been having intermittent fevers which have been responsive to Tylenol and Motrin at home but have ranged as high as 103.7 and yesterday was having some apparent respiratory distress but he has no respiratory history and is on no respiratory medications at home.  Patient does have a scant cough which is nonproductive and denies any increased urinary frequency or dysuria though he does complain of some hematuria which has been ongoing for the past month.  Patient additionally has been complaining of a sore throat for the past 4 days with decreased p.o. fluid intake secondary to pain with swallowing.  While in ER patient was noted to meet severe sepsis criteria and was thus referred for admission. Special Studies XR CHEST PORTABLE 10/08/2024 IMPRESSION: Mild tree-in-bud pattern of opacity mostly in the right lung and in the left base again suggesting a mild  infectious/inflammatory condition CT CHEST WITHOUT IV CONTRAST 10/08/2024 IMPRESSION: Mild bibasilar probable atelectatic changes, right greater than left. Otherwise no focal airspace consolidation to suggest pneumonia. Minor tree-in-bud nodularity in the right lung which could relate to infectious versus inflammatory small airways disease. Clinical correlation recommended. Pulmonary nodules as above. Based on current Fleischner Society 2017 Guidelines on incidental pulmonary nodule, no routine follow-up is needed if the patient is low risk. If the patient is high risk, optional follow-up chest CT at 12 months can be considered. Trace right pleural effusion. Mild right axillary lymphadenopathy, nonspecific Code Status: Level 1 full code Previous VBS: none Precautions Contact Precautions Food allergies:  No know  Current diet:  Regular diet and thin liquids  Premorbid diet:  Regular diet and thin liquids  Dentition  Upper and lower dentures Oral Mech  WNL O2 requirements:  Room air  Vocal Quality/Speech WNL Cognitive status:  Alert  Consistencies administered: Barium laden applesauce, nutri grain bar, sandwich bite, thin liquids,ntl and  13mm barium pill. Liquids were administered by cup. Pt was seated laterally at 90 degrees. Pt  unable to be viewed in AP position, due to transfer status      Echo complete w/ contrast if indicated    Result Date: 10/9/2024  Narrative:   Left Ventricle: Left ventricular cavity size is normal. Wall thickness is mildly increased. The left ventricular ejection fraction is 55%. Systolic function is normal. Wall motion is normal.   Aortic Valve: There is aortic valve sclerosis.   Aorta: The aortic root is mildly dilated. The ascending aorta is mildly dilated. The aortic root is 3.80 cm. The ascending aorta is 3.9 cm.     XR chest portable    Result Date: 10/8/2024  Narrative: XR CHEST PORTABLE INDICATION: shortness of breath. COMPARISON: 10/7/2024 FINDINGS: Minimal tree-in-bud pattern of  opacity mostly in the right lung, but also at the left base. No focal dense consolidation. The trace amount of pleural fluid seen on the prior CT is not apparent on this exam. No pneumothorax identified. Stable cardiomediastinal silhouette Degenerative changes noted along the spine. Normal upper abdomen.     Impression: Mild tree-in-bud pattern of opacity mostly in the right lung and in the left base again suggesting a mild infectious/inflammatory condition Workstation performed: [id number]     XR chest portable - 1 view    Result Date: 10/8/2024  Narrative: XR CHEST PORTABLE INDICATION: Fever cough. COMPARISON: None FINDINGS: Clear lungs. No pneumothorax or pleural effusion. Normal cardiomediastinal silhouette. Bones are unremarkable for age. Normal upper abdomen.     Impression: No acute cardiopulmonary disease. Workstation performed: [id number]     CT chest wo contrast    Result Date: 10/8/2024  Narrative: CT CHEST WITHOUT IV CONTRAST INDICATION: Cough shortness of breath with fever. COMPARISON: None. TECHNIQUE: CT examination of the chest was performed without intravenous contrast. Multiplanar 2D reformatted images were created from the source data. This examination, like all CT scans performed in the [facility], was performed utilizing techniques to minimize radiation dose exposure, including the use of iterative reconstruction and automated exposure control. Radiation dose length product (DLP) for this visit: 441.84 mGy-cm FINDINGS: LUNGS: Evaluation somewhat limited by respiratory motion. There is some minor groundglass and tree-in-bud nodularity suggested in the right upper lobe inferior segment and to a lesser degree in the right lower lobe. No lobar consolidation. Trace paraseptal emphysematous changes. Mild basilar atelectatic changes, right greater than left. There is minor scarring in the posteromedial right lower lobe adjacent to bulky paravertebral osteophytes. 4 mm pulmonary nodule  seen in the right upper lobe (3:35). Additional scattered pulmonary nodules are seen measuring on the order of 2 to 3 mm. Based on current Fleischner Society 2017 Guidelines on incidental pulmonary nodule, no routine follow-up is needed if the patient is low risk. If the patient is high risk, optional follow-up chest CT at 12 months can be considered. PLEURA: Trace right pleural effusion with subjacent atelectasis. HEART/GREAT VESSELS: Heart is unremarkable for patient's age. No thoracic aortic aneurysm. Coronary atherosclerosis MEDIASTINUM AND JOE: Shotty subcentimeter mediastinal lymph nodes noted. Evaluation for hilar lymphadenopathy limited in the absence of intravenous contrast. CHEST WALL AND LOWER NECK: Gynecomastia. Few prominent/mildly enlarged lymph nodes seen in the right axillary region, largest measuring up to 1.2 cm,. VISUALIZED STRUCTURES IN THE UPPER ABDOMEN: No acute abnormality. Bilateral renal cysts. OSSEOUS STRUCTURES: Spinal degenerative changes are noted. No acute fracture or destructive osseous lesion.     Impression: Mild bibasilar probable atelectatic changes, right greater than left. Otherwise no focal airspace consolidation to suggest pneumonia. Minor tree-in-bud nodularity in the right lung which could relate to infectious versus inflammatory small airways disease. Clinical correlation recommended. Pulmonary nodules as above. Based on current Fleischner Society 2017 Guidelines on incidental pulmonary nodule, no routine follow-up is needed if the patient is low risk. If the patient is high risk, optional follow-up chest CT at 12 months can be considered. Trace right pleural effusion. Mild right axillary lymphadenopathy, nonspecific. Workstation performed: [id number]     Labs and pertinent reports reviewed.      This note has been generated by voice recognition software system.  Therefore, there may be spelling, grammar, and or syntax errors. Please contact if questions arise.    Additional  recommendations to follow per attending, Dr. Maxwell.    Scottie Ding DO, PGY4  Hematology/Oncology Fellow

## 2024-10-26 NOTE — DISCHARGE SUMMARY
Discharge Summary - Hospitalist   Name: Miguel Henderson 84 y.o. male I MRN: [id number]  Unit/Bed#: -01 I Date of Admission: 10/25/2024   Date of Service: 10/26/2024 I Hospital Day: 1     Assessment & Plan  Encephalopathy  Unclear etiology.  CT head negative in the ER  Patient was recently admitted for infectious workup and discharged on 10/23/2024 with recommendation to follow-up with hematology  Patient still with low-grade fevers  Lumbar puncture performed.  Does not appear to be consistent with meningitis per infectious disease  MRI brain performed  Monitor electrolytes  Fever  This is patient's third hospital visit in the last 2 weeks  Patient has had significant infectious workup over the last 2 weeks including RP 2 panel on 10/21/2024 which was negative  Blood cultures drawn in the ER, will follow-up results  Mild leukocytosis and slight elevation of procalcitonin  Lactate normal  Tylenol as needed  Patient was initially started on ampicillin, ceftriaxone, vancomycin for possible meningitis  LP performed on 10/25/2024 with IR.  Reviewed findings with infectious disease.  Does not appear to be consistent with meningitis.  Will discontinue antibiotics.  ME panel negative for herpes or varicella  Infectious disease recommending hematology evaluation  Reviewed case with hematology over the phone.  Recommending urgent transfer to [place] for expedited lymphoma workup     Medical Problems       Resolved Problems  Date Reviewed: 10/21/2024   None       Discharging Physician / Practitioner: Haley Borrero MD  PCP: Greg Foster MD  Admission Date:   Admission Orders (From admission, onward)       Ordered        10/25/24 1412  INPATIENT ADMISSION  Once                          Discharge Date: 10/26/24    Consultations During Hospital Stay:  Hematology, infectious disease    Procedures Performed:   IR LP    Significant Findings / Test Results:   Fever with encephalopathy    Incidental Findings:    None    Test Results Pending at Discharge (will require follow up):   Cultures, LP results     Outpatient Tests Requested:  None    Complications: None    Reason for Admission: Encephalopathy/fever    Hospital Course:   Miguel Henderson is a 84 y.o. male patient who originally presented to the hospital on 10/25/2024 due to altered mental status.  Patient found to have encephalopathy.  Workup initiated with MRI brain and LP given fevers.  This is patient's third hospitalization in the last few weeks.  Reviewed case with infectious disease.  Based on LP results, not consistent with meningitis.  Antibiotics were discontinued.  ID recommended discussing case with hematology.  Spoke with hematology today.  Given patient's significant lymphadenopathy and continued fevers, recommending lymphoma workup.  Patient being transferred to [facility] for lymphoma workup    Please see above list of diagnoses and related plan for additional information.     Condition at Discharge: stable    Discharge Day Visit / Exam:   * Please refer to separate progress note for these details *    Discussion with Family: Updated  (son and daughter) at bedside.    Discharge instructions/Information to patient and family:   See after visit summary for information provided to patient and family.      Provisions for Follow-Up Care:  See after visit summary for information related to follow-up care and any pertinent home health orders.      Mobility at time of Discharge:   Basic Mobility Inpatient Raw Score: 13  JH-HLM Goal: 4: Move to chair/commode  JH-HLM Achieved: 4: Move to chair/commode  HLM Goal achieved. Continue to encourage appropriate mobility.     Disposition:   Acute Care Hospital Transfer to [facility]    Planned Readmission: yes    Discharge Medications:  See after visit summary for reconciled discharge medications provided to patient and/or family.      Administrative Statements   Discharge  Statement:  I have spent a total time of 35 minutes in caring for this patient on the day of the visit/encounter. >30 minutes of time was spent on: Diagnostic results, Patient and family education, Counseling / Coordination of care, Documenting in the medical record, Reviewing / ordering tests, medicine, procedures  , and Communicating with other healthcare professionals .    **Please Note: This note may have been constructed using a voice recognition system**

## 2024-10-26 NOTE — PROGRESS NOTES
[place] [facility] at patient bedside to perform plasmapheresis treatment.

## 2024-10-26 NOTE — PROGRESS NOTES
Sepsis alert times zero called on patient via secure chat; pt's nurse rosa rose rn and dr manning aware

## 2024-10-26 NOTE — NURSING NOTE
"Per plasmopheresis RN pts temp 103.3 tympanically, this RN checked temp orally with hospital equipment and temp 101.2. Plasmo RN also stating \"she doesn't think her equipment is working well.\"  Tylenol given, pt packed in ice and Dr. Mondragon made aware of situation. No new orders given at this time.  "

## 2024-10-26 NOTE — QUICK NOTE
ID Plan of Care:    LP results reviewed showed mild lymphocytic pleocytosis with 114 white blood cells, 85% lymphocytes and mild elevation in protein 86.  ME panel is negative.  MRI brain with no acute intracranial abnormalities.  Overall clinical picture and results are not consistent with a bacterial meningitis.  Would discontinue antibiotics.  Consideration for a viral meningitis however given multiple admissions over the past few weeks for various issues and CT with worsening adenopathy, persistent atypical lymphocytosis on CBC differential, I am concerned that CNS results are reflective of another underlying noninfectious process, possibly malignant or autoimmune.  Will follow-up pending CSF studies.  Recommend HIV screening, hematology evaluation.  ID will formally evaluate 10/28/2024, please call questions.

## 2024-10-26 NOTE — UTILIZATION REVIEW
Initial Clinical Review    Admission: Date/Time/Statement:   Admission Orders (From admission, onward)       Ordered        10/25/24 1412  INPATIENT ADMISSION  Once                          Orders Placed This Encounter   Procedures    INPATIENT ADMISSION     Standing Status:   Standing     Number of Occurrences:   1     Order Specific Question:   Level of Care     Answer:   Med Surg [16]     Order Specific Question:   Estimated length of stay     Answer:   More than 2 Midnights     Order Specific Question:   Certification     Answer:   I certify that inpatient services are medically necessary for this patient for a duration of greater than two midnights. See H&P and MD Progress Notes for additional information about the patient's course of treatment.     ED Arrival Information       Expected   -    Arrival   10/25/2024 10:32    Acuity   Urgent              Means of arrival   Walk-In    Escorted by   Family Member    Service   Hospitalist    Admission type   Emergency              Arrival complaint   confused, not talking             Chief Complaint   Patient presents with    Altered Mental Status     Patient having altered mental status that started this morning.         Initial Presentation: 84 y.o. male to ED via walk in from home with Family Member.    Admitted to inpatient with Dx: Encephalopathy / Primary HTN.  Presented to ED with Altered mental status.    This is patient's third visit to the hospital in the last 2 weeks.  First visit was for sepsis which was thought to be secondary to pneumonia versus viral infection.  Infectious disease evaluated patient and patient had extensive infectious workup including RP 2 panel which was initially positive for rhinovirus.  During last admission RP 2 panel was negative.  Suspicion for possible Candida esophagitis, patient completed fluconazole.  Also with recent COVID infection and completed Paxlovid.  Over the last 24 to 48 hours patient has had worsening mental  status.  Unable to recommend family members which is unusual for patient.  Patient complaining of slight pain in neck.  Also had a fever spike today in the ER.  PMHx: hypertension, hypothyroidism.   Date: 10/25/2024   On exam:   + confusion.    GCS 15.   + Chills & + fatigue. + for generalized abd pain.  + for urinary frequency.  + for rash.   Imaging shows CT brain:  No acute intracranial abnormality.  ED treatment labs.  Blood cultures.  IV flds.  IV abx.   EC, NSR.    Plan includes Obtain MRI Brain.  Follow up labs.  Follow up cultures.  Tylenol PRN.  Consult ID.      10/25/2025  IR  Procedure Date:  10/25/2024  Procedure:  IR LUMBAR PUNCTURE   Anesthesia: conscious sedation   Findings: Successful LP.  Specimens: 30 cc clear CSF.     Anticipated Length of Stay/Certification Statement: inpatient services are medically necessary for this patient for a duration of greater than two midnights.    Day 2: 10/26/2024  Patient being transferred to [facility] for lymphoma workup     10/26/2024  Consult Onc-Med:  Mr. Henderson is a 84-year-old gentleman with with B symptoms and lymphadenopathy on imaging concerning for lymphoma.  On further review concern that symptoms are consistent with potential hyperviscosity.   RECOMMENDATIONS:  1.  Lymphadenopathy  2.  B symptoms  3.  Altered mental status and lethargy  4.  Concern for lymphoma, lymphoproliferative disorder, and complications secondary to hyperviscosity    10/26/2024  ID quick note:  LP results reviewed showed mild lymphocytic pleocytosis with 114 white blood cells, 85% lymphocytes and mild elevation in protein 86.  ME panel is negative.  MRI brain with no acute intracranial abnormalities.  Overall clinical picture and results are not consistent with a bacterial meningitis.  Would discontinue antibiotics.  Consideration for a viral meningitis however given multiple admissions over the past few weeks for various issues and CT with worsening adenopathy,  persistent atypical lymphocytosis on CBC differential, I am concerned that CNS results are reflective of another underlying noninfectious process, possibly malignant or autoimmune.  Will follow-up pending CSF studies.  Recommend HIV screening, hematology evaluation.  ID will formally evaluate 10/28/2024, please call questions.     ED Treatment-Medication Administration from 10/25/2024 1032 to 10/25/2024 1500         Date/Time Order Dose Route Action     10/25/2024 1128 sodium chloride 0.9 % bolus 500 mL 500 mL Intravenous New Bag     10/25/2024 1211 iohexol (OMNIPAQUE) 350 MG/ML injection (MULTI-DOSE) 100 mL 100 mL Intravenous Given     10/25/2024 1314 cefTRIAXone (ROCEPHIN) IVPB (premix in dextrose) 1,000 mg 50 mL 1,000 mg Intravenous New Bag     10/25/2024 1348 azithromycin (ZITHROMAX) 500 mg in sodium chloride 0.9 % 250 mL IVPB 500 mg Intravenous New Bag            Scheduled Medications:  [START ON 10/27/2024] enoxaparin, 40 mg, Subcutaneous, Daily  fluticasone, 1 spray, Nasal, Daily  [START ON 10/28/2024] levothyroxine, 50 mcg, Oral, Daily  levothyroxine, 75 mcg, Oral, Early Morning  pantoprazole, 40 mg, Oral, Early Morning  potassium chloride, 20 mEq, Oral, TID With Meals      Continuous IV Infusions:  multi-electrolyte, 100 mL/hr, Intravenous, Continuous      PRN Meds:  acetaminophen, 1,000 mg, Intravenous, Q6H PRN  ondansetron, 4 mg, Intravenous, Q6H PRN      ED Triage Vitals [10/25/24 1042]   Temperature Pulse Respirations Blood Pressure SpO2 Pain Score   99.1 °F (37.3 °C) 90 16 152/86 95 % No Pain     Weight (last 2 days)       None            Vital Signs (last 3 days)       Date/Time Temp Pulse Resp BP MAP (mmHg) SpO2 Calculated FIO2 (%) - Nasal Cannula Nasal Cannula O2 Flow Rate (L/min) O2 Device Patient Position - Orthostatic VS [place] Coma Scale Score Pain    10/26/24 10:52:40 100.9 °F (38.3 °C) 100 -- -- -- 95 % -- -- -- -- -- --    10/26/24 09:32:23 100 °F (37.8 °C) 97 -- -- -- 95 % -- -- -- -- 15 No  Pain    10/26/24 07:09:41 100 °F (37.8 °C) 88 18 114/59 77 95 % -- -- -- -- -- --    10/26/24 06:21:56 99.4 °F (37.4 °C) 84 -- -- -- 94 % -- -- -- -- -- --    10/26/24 05:51:44 99.4 °F (37.4 °C) 89 -- -- -- 95 % -- -- -- -- -- --    10/26/24 01:31:30 99 °F (37.2 °C) 87 -- -- -- 94 % -- -- -- -- -- --    10/25/24 23:27:42 99.2 °F (37.3 °C) 83 -- -- -- 96 % -- -- -- -- -- --    10/25/24 23:06:05 99.2 °F (37.3 °C) 86 19 108/62 77 91 % -- -- -- -- -- --    10/25/24 22:26:20 99.7 °F (37.6 °C) 86 -- -- -- 96 % -- -- -- -- -- --    10/25/24 2024 101.1 °F (38.4 °C) 107 -- -- -- -- -- -- -- -- -- --    10/25/24 2000 -- -- -- -- -- -- 24 1 L/min None (Room air) -- 14 No Pain    10/25/24 19:08:31 98.5 °F (36.9 °C) 96 -- 128/65 86 96 % -- -- -- -- -- --    10/25/24 18:35:13 98.5 °F (36.9 °C) 92 20 131/70 90 89 % 24 1 L/min Nasal cannula Lying -- --    10/25/24 1705 -- 94 20 143/82 109 97 % -- -- None (Room air) -- -- --    10/25/24 1700 99.2 °F (37.3 °C) 91 21 149/94 116 100 % 36 4 L/min Nasal cannula Lying -- --    10/25/24 1657 -- 91 16 149/94 116 100 % 36 4 L/min Nasal cannula -- -- --    10/25/24 1655 -- 91 13 151/95 116 100 % 36 4 L/min Nasal cannula -- -- --    10/25/24 1650 -- 90 19 135/88 104 99 % 36 4 L/min Nasal cannula -- -- --    10/25/24 1645 -- 90 21 137/77 102 91 % -- -- None (Room air) -- -- --    10/25/24 1640 -- 90 15 135/87 -- 94 % -- -- None (Room air) -- -- --    10/25/24 1615 -- -- -- -- -- -- -- -- -- -- -- Med Not Given for Pain - for MAR use only    10/25/24 15:07:12 99.2 °F (37.3 °C) 102 14 128/73 91 92 % -- -- -- -- -- No Pain    10/25/24 1445 101.9 °F (38.8 °C) -- -- -- -- -- -- -- -- -- -- --    10/25/24 1415 -- 114 24 125/69 94 94 % -- -- None (Room air) -- -- --    10/25/24 1345 -- 93 21 149/80 107 92 % -- -- -- -- -- --    10/25/24 1315 -- 90 22 157/107 127 92 % -- -- None (Room air) -- -- --    10/25/24 1129 -- -- -- -- -- -- -- -- -- -- 14 --    10/25/24 1042 99.1 °F (37.3 °C) 90 16 152/86 112  95 % -- -- None (Room air) Sitting -- No Pain              Pertinent Labs/Diagnostic Test Results:   Radiology:  MRI brain w wo contrast   Final Interpretation by Yordan King MD (10/26 0078)   Within the confines of a motion-degraded examination:      1. No acute infarct, mass effect or midline shift. Mild-moderate chronic microangiopathy.      2. Multiple indeterminate bilateral parotid nodules, the largest of which measures up to 1.0 cm on the left, incompletely evaluated. If clinically appropriate, this could be further evaluated with contrast-enhanced neck CT on a nonemergent basis.      IR lumbar puncture   Final Interpretation by Jorge Martinez MD (10/25 0156)   Impression:   1. Successful lumbar puncture under fluoroscopic control.   2. 30 cc of clear spinal fluid was removed and sent to laboratory as described above.      CT chest abdomen pelvis w contrast   Final Interpretation by Bjorn Van MD (10/25 8800)   Right axillary adenopathy is worsened when compared with CT chest 10/11/2024      Unchanged mediastinal, bilateral iliac chain and bilateral inguinal adenopathy when compared with CT abdomen pelvis 10/21/2024.      Potential mild pulmonary edema. Small pleural effusions. Unchanged cardiomegaly.      CT head without contrast   Final Interpretation by Bjorn Van MD (10/25 8147)   No acute intracranial abnormality.        Cardiology:  ECG 12 lead   Final Result by Jorge Frederick DO (10/25 8846)   ** Poor data quality, interpretation may be adversely affected   Sinus rhythm with Fusion complexes and Premature atrial complexes   Left axis deviation   Abnormal ECG   When compared with ECG of 21-OCT-2024 16:36,   Nonspecific T wave abnormality, worse in Inferior leads   Confirmed by Jorge Frederick ([id number]) on 10/25/2024 4:54:52 PM        GI:  No orders to display     Results from last 7 days   Lab Units 10/21/24  1936   SARS-COV-2  Not Detected     Results from  last 7 days   Lab Units 10/26/24  0428 10/25/24  1121 10/23/24  0553 10/22/24  0853 10/21/24  1649   WBC Thousand/uL 8.46 11.68* 14.45* 10.40* 7.30   HEMOGLOBIN g/dL 9.4* 10.8* 9.1* 10.3* 11.0*   HEMATOCRIT % 25.6* 29.4* 25.0* 32.0* 33.2*   PLATELETS Thousands/uL 196 256 255 283 330   BANDS PCT %  --   --   --   --  22*     Results from last 7 days   Lab Units 10/26/24  0428 10/25/24  1121 10/23/24  0553 10/22/24  1708 10/22/24  0853   SODIUM mmol/L 130* 129* 130* 127* 128*  128*   POTASSIUM mmol/L 3.3* 3.5 3.8 4.0 3.7  3.7   CHLORIDE mmol/L 96 92* 102 101 100  100   CO2 mmol/L 31 31 22 19* 21  21   ANION GAP mmol/L 3* 6 6 7 7  7   BUN mg/dL 7 5 11 18 21  21   CREATININE mg/dL 0.56* 0.59* 0.46* 0.54* 0.56*  0.55*   EGFR ml/min/1.73sq m 93 91 101 95 93  94   CALCIUM mg/dL 7.2* 8.0* 7.3* 7.7* 7.6*  7.6*   MAGNESIUM mg/dL 1.7*  --  1.5*  --   --    PHOSPHORUS mg/dL  --   --  3.1  --   --      Results from last 7 days   Lab Units 10/26/24  0428 10/25/24  1121 10/22/24  0853 10/21/24  1649   AST U/L 73* 96* 124* 140*   ALT U/L 30 40 35 42   ALK PHOS U/L 171* 202* 158* 179*   TOTAL PROTEIN g/dL 6.6 7.7 6.1* 6.8   ALBUMIN g/dL 1.8* 2.2* 1.8* 2.0*   TOTAL BILIRUBIN mg/dL 1.53* 1.62* 1.72* 2.04*   AMMONIA umol/L  --  26  --   --      Results from last 7 days   Lab Units 10/26/24  0428 10/25/24  1121 10/23/24  0553 10/22/24  1708 10/22/24  0853 10/22/24  0022 10/21/24  1649   GLUCOSE RANDOM mg/dL 88 117 74 82 97  98 75 99     Results from last 7 days   Lab Units 10/21/24  1649   PROTIME seconds 14.2   INR  1.05   PTT seconds 33     Results from last 7 days   Lab Units 10/25/24  1121 10/23/24  0553   TSH 3RD GENERATON uIU/mL 3.923 6.063*     Results from last 7 days   Lab Units 10/26/24  0428 10/25/24  1121 10/23/24  0553 10/21/24  1649   PROCALCITONIN ng/ml 0.43* 0.59* 2.06* 4.96*     Results from last 7 days   Lab Units 10/25/24  1309 10/21/24  1936 10/21/24  1649   LACTIC ACID mmol/L 1.6 3.5* 4.2*     Results from  last 7 days   Lab Units 10/25/24  1121   LIPASE u/L 24     Results from last 7 days   Lab Units 10/25/24  1132 10/22/24  0202   CLARITY UA  Clear Clear   COLOR UA  Yellow Yellow   SPEC GRAV UA  1.020 1.010   PH UA  8.0* 6.5   GLUCOSE UA mg/dl Negative Negative   KETONES UA mg/dl Negative Negative   BLOOD UA  Trace-Intact* Trace-Intact*   PROTEIN UA mg/dl Negative 1+*   NITRITE UA  Negative Negative   BILIRUBIN UA  Negative 1+*   UROBILINOGEN UA E.U./dl 4.0* 4.0*   LEUKOCYTES UA  Negative Negative   WBC UA /hpf 0-1 0-1   RBC UA /hpf 0-1 4-10*   BACTERIA UA /hpf None Seen Occasional   EPITHELIAL CELLS WET PREP /hpf None Seen Occasional   MUCUS THREADS   --  Occasional*     Results from last 7 days   Lab Units 10/21/24  1936   INFLUENZA B  Not Detected   RESPIRATORY SYNCYTIAL VIRUS  Not Detected     Results from last 7 days   Lab Units 10/21/24  1936   ADENOVIRUS  Not Detected   BORDETELLA PARAPERTUSSIS  Not Detected   BORDETELLA PERTUSSIS  Not Detected   CHLAMYDIA PNEUMONIAE  Not Detected   CORONAVIRUS 229E  Not Detected   CORONAVIRUS HKU1  Not Detected   CORONAVIRUS NL63  Not Detected   CORONAVIRUS OC43  Not Detected   METAPNEUMOVIRUS  Not Detected   RHINOVIRUS  Not Detected   MYCOPLASMA PNEUMONIAE  Not Detected   PARAINFLUENZA 1  Not Detected   PARAINFLUENZA 2  Not Detected   PARAINFLUENZA 3  Not Detected   PARAINFLUENZA 4  Not Detected     Results from last 7 days   Lab Units 10/25/24  1121   ETHANOL LVL mg/dL <10   ACETAMINOPHEN LVL ug/mL <2*   SALICYLATE LVL mg/dL <5     Results from last 7 days   Lab Units 10/22/24  1015   C DIFF TOXIN B BY PCR  Negative     Results from last 7 days   Lab Units 10/22/24  1015   SALMONELLA SP PCR  Negative   SHIGELLA SP/ENTEROINVASIVE E. COLI (EIEC)  Negative   CAMPYLOBACTER SP (JEJUNI AND COLI)  Negative   SHIGA TOXIN 1/SHIGA TOXIN 2  Negative     Results from last 7 days   Lab Units 10/25/24  1704 10/25/24  1309 10/25/24  1252 10/21/24  1649   BLOOD CULTURE   --  Received in  Microbiology Lab. Culture in Progress. Received in Microbiology Lab. Culture in Progress. No Growth After 4 Days.  No Growth After 4 Days.   GRAM STAIN RESULT  1+ Mononuclear Cells  No organisms seen  --   --   --      Results from last 7 days   Lab Units 10/25/24  1704   TOTAL COUNTED  100     Results from last 7 days   Lab Units 10/25/24  1704   APPEARANCE CSF  Clear/Colorless   TUBE NUM CSF  4   WBC CSF /uL 114*   XANTHOCHROMIA  No   NEUTROPHILS % (CSF) % 4   LYMPHS % (CSF) % 85   MONOCYTES % (CSF) % 11   GLUCOSE CSF mg/dL 43   PROTEIN CSF mg/dL 86*   RBC CSF uL 3     Past Medical History:   Diagnosis Date    Prostate cancer (HCC)      Present on Admission:   Fever   Hypothyroidism   Primary hypertension      Admitting Diagnosis: Cardiomegaly [I51.7]  Hypochloremia [E87.8]  Hyponatremia [E87.1]  Leukocytosis [D72.829]  Adenopathy [R59.9]  Altered mental status [R41.82]  Pulmonary edema [J81.1]  Pleural effusion, bilateral [J90]  Acute encephalopathy [G93.40]  Age/Sex: 84 y.o. male    Network Utilization Review Department  ATTENTION: Please call with any questions or concerns to [phone] and carefully listen to the prompts so that you are directed to the right person. All voicemails are confidential.   For Discharge needs, contact Care Management DC Support Team at [phone] opt. 2  Send all requests for admission clinical reviews, approved or denied determinations and any other requests to dedicated fax number below belonging to the campus where the patient is receiving treatment. List of dedicated fax numbers for the Facilities:  FACILITY NAME UR FAX NUMBER   ADMISSION DENIALS (Administrative/Medical Necessity) [phone]   DISCHARGE SUPPORT TEAM (NETWORK) [phone]   PARENT CHILD HEALTH (Maternity/NICU/Pediatrics) [phone]   [facility] [phone]   [facility] [phone]   [facility] [phone]   [facility]  [facility] [phone]   [facility] [phone]   [facility] [phone]   [facility] [phone]   [facility] [phone]   [facility] [phone]   [facility] [phone]   [facility] [phone]   [facility] [phone]

## 2024-10-26 NOTE — CASE MANAGEMENT
Case Management Progress Note    Patient name Miguel Henderson  Location /-01 MRN [id number]  : 1940 Date 10/26/2024       LOS (days): 1  Geometric Mean LOS (GMLOS) (days): 2.7  Days to GMLOS:1.8        OBJECTIVE:        Current admission status: Inpatient  Preferred Pharmacy:   [id number]  [place] - [address] [address]  Phone: [phone] Fax: [phone]    [id number] - [place] - [address]  [place] [address]  Phone: [phone] Fax: [phone]    Primary Care Provider: Greg Foster MD    Primary Insurance: [employer] [facility]  Secondary Insurance:     PROGRESS NOTE:    CM completed Medical Necessity form for pt transfer to [facility].

## 2024-10-26 NOTE — SEPSIS NOTE
Sepsis Note   Miguel Henderson 84 y.o. male MRN: [id number]  [facility]: [id number]       Initial Sepsis Screening       Row Name 10/26/24 1940                Is the patient's history suggestive of a new or worsening infection? No  -AG                  User Key  (r) = Recorded By, (t) = Taken By, (c) = Cosigned By      Initials Name Provider Type    AG Nancy Mondragon Physician                        Body mass index is 28.46 kg/m².  Wt Readings from Last 1 Encounters:   10/26/24 77.6 kg (171 lb)     IBW (Ideal Body Weight): 61.5 kg    Ideal body weight: 61.5 kg (135 lb 9.3 oz)  Adjusted ideal body weight: 67.9 kg (149 lb 12 oz)    Informed of temp of 103F tympanic, then repeat 101.2F oral    Suspect this is due to underlying inflammatory process rather than new infection  Here for expedited lymphoma workup  Infectious workup, including LP with ME panel, negative  Abx were previously discontinued  Monitor off abx  Follow up peripheral smear  Continue isolyte 125 cc/hr

## 2024-10-26 NOTE — ASSESSMENT & PLAN NOTE
This is patient's third hospital visit in the last 2 weeks  Patient has had significant infectious workup over the last 2 weeks including RP 2 panel on 10/21/2024 which was negative  Blood cultures drawn in the ER, will follow-up results  Mild leukocytosis and slight elevation of procalcitonin  Lactate normal  Tylenol as needed  Patient was initially started on ampicillin, ceftriaxone, vancomycin for possible meningitis  LP performed on 10/25/2024 with IR.  Reviewed findings with infectious disease.  Does not appear to be consistent with meningitis.  Will discontinue antibiotics.  ME panel negative for herpes or varicella  Infectious disease recommending hematology evaluation  Reviewed case with hematology over the phone.  Recommending urgent transfer to [place] for expedited lymphoma workup

## 2024-10-26 NOTE — ASSESSMENT & PLAN NOTE
Presented with intermittent fevers  Does not appear to be infectious  LP negative for meningitis  ID was consulted at [facility]  Antibiotics since been discontinued  Possibly secondary to lymphoma versus hyperviscosity syndrome  Follow-up hematology recommendations  Monitor fever curve

## 2024-10-27 PROBLEM — R59.1 LYMPHADENOPATHY: Status: ACTIVE | Noted: 2024-10-27

## 2024-10-27 LAB
ALBUMIN SERPL BCG-MCNC: 3 G/DL (ref 3.5–5)
ALP SERPL-CCNC: 237 U/L (ref 34–104)
ALT SERPL W P-5'-P-CCNC: 52 U/L (ref 7–52)
ANION GAP SERPL CALCULATED.3IONS-SCNC: 6 MMOL/L (ref 4–13)
AST SERPL W P-5'-P-CCNC: 101 U/L (ref 13–39)
BILIRUB SERPL-MCNC: 5.53 MG/DL (ref 0.2–1)
BLD SMEAR INTERP: NORMAL
BUN SERPL-MCNC: 6 MG/DL (ref 5–25)
CALCIUM ALBUM COR SERPL-MCNC: 8.1 MG/DL (ref 8.3–10.1)
CALCIUM SERPL-MCNC: 7.3 MG/DL (ref 8.4–10.2)
CHLORIDE SERPL-SCNC: 100 MMOL/L (ref 96–108)
CO2 SERPL-SCNC: 27 MMOL/L (ref 21–32)
CREAT SERPL-MCNC: 0.52 MG/DL (ref 0.6–1.3)
ERYTHROCYTE [DISTWIDTH] IN BLOOD BY AUTOMATED COUNT: 16.1 % (ref 11.6–15.1)
FERRITIN SERPL-MCNC: 1028 NG/ML (ref 24–336)
GFR SERPL CREATININE-BSD FRML MDRD: 96 ML/MIN/1.73SQ M
GLUCOSE SERPL-MCNC: 109 MG/DL (ref 65–140)
HCT VFR BLD AUTO: 27.1 % (ref 36.5–49.3)
HGB BLD-MCNC: 9.3 G/DL (ref 12–17)
INR PPP: 1.67 (ref 0.85–1.19)
MAGNESIUM SERPL-MCNC: 1.8 MG/DL (ref 1.9–2.7)
MCH RBC QN AUTO: 31.8 PG (ref 26.8–34.3)
MCHC RBC AUTO-ENTMCNC: 34.3 G/DL (ref 31.4–37.4)
MCV RBC AUTO: 93 FL (ref 82–98)
NRBC BLD AUTO-RTO: 0 /100 WBCS
PHOSPHATE SERPL-MCNC: 1.9 MG/DL (ref 2.3–4.1)
PLATELET # BLD AUTO: 211 THOUSANDS/UL (ref 149–390)
PLATELET # BLD AUTO: 223 THOUSANDS/UL (ref 149–390)
PMV BLD AUTO: 10.5 FL (ref 8.9–12.7)
PMV BLD AUTO: 9.7 FL (ref 8.9–12.7)
POTASSIUM SERPL-SCNC: 3.9 MMOL/L (ref 3.5–5.3)
PROT SERPL-MCNC: 5.5 G/DL (ref 6.4–8.4)
PROTHROMBIN TIME: 19.8 SECONDS (ref 12.3–15)
RBC # BLD AUTO: 2.92 MILLION/UL (ref 3.88–5.62)
SODIUM SERPL-SCNC: 133 MMOL/L (ref 135–147)
URATE SERPL-MCNC: 2.5 MG/DL (ref 3.5–8.5)
WBC # BLD AUTO: 8.94 THOUSAND/UL (ref 4.31–10.16)

## 2024-10-27 PROCEDURE — NC001 PR NO CHARGE: Performed by: RADIOLOGY

## 2024-10-27 PROCEDURE — 80053 COMPREHEN METABOLIC PANEL: CPT | Performed by: INTERNAL MEDICINE

## 2024-10-27 PROCEDURE — 85049 AUTOMATED PLATELET COUNT: CPT

## 2024-10-27 PROCEDURE — 84100 ASSAY OF PHOSPHORUS: CPT | Performed by: INTERNAL MEDICINE

## 2024-10-27 PROCEDURE — 85027 COMPLETE CBC AUTOMATED: CPT | Performed by: INTERNAL MEDICINE

## 2024-10-27 PROCEDURE — 99222 1ST HOSP IP/OBS MODERATE 55: CPT | Performed by: STUDENT IN AN ORGANIZED HEALTH CARE EDUCATION/TRAINING PROGRAM

## 2024-10-27 PROCEDURE — 83735 ASSAY OF MAGNESIUM: CPT | Performed by: INTERNAL MEDICINE

## 2024-10-27 PROCEDURE — 87389 HIV-1 AG W/HIV-1&-2 AB AG IA: CPT | Performed by: INTERNAL MEDICINE

## 2024-10-27 PROCEDURE — 85610 PROTHROMBIN TIME: CPT | Performed by: INTERNAL MEDICINE

## 2024-10-27 PROCEDURE — 99232 SBSQ HOSP IP/OBS MODERATE 35: CPT | Performed by: INTERNAL MEDICINE

## 2024-10-27 PROCEDURE — 84550 ASSAY OF BLOOD/URIC ACID: CPT

## 2024-10-27 PROCEDURE — 99232 SBSQ HOSP IP/OBS MODERATE 35: CPT | Performed by: FAMILY MEDICINE

## 2024-10-27 PROCEDURE — 82728 ASSAY OF FERRITIN: CPT | Performed by: INTERNAL MEDICINE

## 2024-10-27 RX ORDER — POLYETHYLENE GLYCOL 3350 17 G/17G
17 POWDER, FOR SOLUTION ORAL DAILY PRN
Status: DISCONTINUED | OUTPATIENT
Start: 2024-10-27 | End: 2024-11-02

## 2024-10-27 RX ADMIN — LEVOTHYROXINE SODIUM 75 MCG: 75 TABLET ORAL at 05:58

## 2024-10-27 RX ADMIN — SODIUM CHLORIDE, SODIUM GLUCONATE, SODIUM ACETATE, POTASSIUM CHLORIDE, MAGNESIUM CHLORIDE, SODIUM PHOSPHATE, DIBASIC, AND POTASSIUM PHOSPHATE 100 ML/HR: .53; .5; .37; .037; .03; .012; .00082 INJECTION, SOLUTION INTRAVENOUS at 20:35

## 2024-10-27 RX ADMIN — PANTOPRAZOLE SODIUM 40 MG: 40 TABLET, DELAYED RELEASE ORAL at 05:58

## 2024-10-27 RX ADMIN — POTASSIUM CHLORIDE 20 MEQ: 1500 TABLET, EXTENDED RELEASE ORAL at 08:35

## 2024-10-27 RX ADMIN — HEPARIN SODIUM 5000 UNITS: 5000 INJECTION INTRAVENOUS; SUBCUTANEOUS at 05:58

## 2024-10-27 RX ADMIN — HEPARIN SODIUM 5000 UNITS: 5000 INJECTION INTRAVENOUS; SUBCUTANEOUS at 13:15

## 2024-10-27 RX ADMIN — HEPARIN SODIUM 5000 UNITS: 5000 INJECTION INTRAVENOUS; SUBCUTANEOUS at 21:43

## 2024-10-27 NOTE — ASSESSMENT & PLAN NOTE
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

## 2024-10-27 NOTE — ASSESSMENT & PLAN NOTE
Improved after pheresis last night.  Discussed with [facility] that the will not be performing pheresis today but planned for tomorrow.  We will reach out to them tomorrow to determine time of next pheresis.  Will hold calcium gluconate and albumin until pheresis tomorrow.  Ongoing workup.  Most blood work pending.  Fibrinogen is decreased.  Minor elevation in coags.  Platelets within normal limits on the low end however.  Will check hemolysis smear for monitoring of DIC.  Bilirubin is continuing to rise and he does have scleral icterus.  AST and ALT mildly improved from yesterday.  Order placed for IR for lymph node biopsy and bone marrow biopsy.  Ferritin elevated in the thousands, although it is an acute phase reactant.    To think his encephalopathy lymphadenopathy and fever are all related to the same process and ongoing workup to evaluate underlying etiology.  Discussed reasoning and rationale behind pheresis to clear what ever factors are causing his encephalopathy, which has improved.  Once identify etiology and have a diagnosis, goal within be to treat the underlying cause to alleviate any symptoms of underlying disease.    Will continue to monitor him and follow closely but please reach out with immediate issues.    Discussed that biopsy does need to be a core biopsy since we are worried about lymphoma versus lymphoproliferative disorder versus hematological malignancy.  I also do think he needs a bone marrow biopsy also.

## 2024-10-27 NOTE — ASSESSMENT & PLAN NOTE
Does not appear to be infectious  Lumbar puncture negative  Appreciate ID recommendations  Follow fever curve  Continue to monitor patient off antibiotics  Management as above

## 2024-10-27 NOTE — ASSESSMENT & PLAN NOTE
Improved after pheresis last night.  Discussed with [facility] that the will not be performing pheresis today but planned for tomorrow.  We will reach out to them tomorrow to determine time of next pheresis.  Will hold calcium gluconate and albumin until pheresis tomorrow.  Ongoing workup.  Most blood work pending.  Fibrinogen is decreased.  Minor elevation in coags.  Platelets within normal limits on the low end however.  Will check hemolysis smear for monitoring of DIC.  Bilirubin is continuing to rise and he does have scleral icterus.  AST and ALT mildly improved from yesterday.  Order placed for IR for lymph node biopsy and bone marrow biopsy.  Ferritin elevated in the thousands, although it is an acute phase reactant.    To think his encephalopathy lymphadenopathy and fever are all related to the same process and ongoing workup to evaluate underlying etiology.  Discussed reasoning and rationale behind pheresis to clear what ever factors are causing his encephalopathy, which has improved.  Once identify etiology and have a diagnosis, goal within be to treat the underlying cause to alleviate any symptoms of underlying disease.    Will continue to monitor him and follow closely but please reach out with immediate issues.

## 2024-10-27 NOTE — ASSESSMENT & PLAN NOTE
BP controlled/soft  Patient previously on hydrochlorothiazide 25 mg daily will hold  Closely monitor

## 2024-10-27 NOTE — CASE MANAGEMENT
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     SIRS (systemic inflammatory response syndrome) (HCC) 10/22/2024     Odynophagia 10/22/2024     Localized edema 10/16/2024     Shortness of breath on exertion 10/16/2024     Cough 10/16/2024     Lesion of oral mucosa 10/15/2024     Liver lesion 10/15/2024     Dysphagia 10/14/2024     Ulceration of oral mucosa 10/14/2024     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     Left knee pain 10/10/2024     Rhinovirus 10/09/2024     Adenocarcinoma of prostate (HCC) 2024     Lumbar radiculopathy 2024     Hypothyroidism 2024     Primary hypertension 2024       LOS (days): 1  Geometric Mean LOS (GMLOS) (days):   Days to GMLOS:     OBJECTIVE:  PATIENT READMITTED TO HOSPITAL  Risk of Unplanned Readmission Score: 23.48         Current admission status: Inpatient       Preferred Pharmacy:   [id number] - [place] - [address]  [address] [address]  Phone: [phone] Fax: [phone]    [id number] - [place] - [address]  [address]  [place] [address]  Phone: [phone] Fax: [phone]    Primary Care Provider: Greg Foster MD    Primary Insurance: [employer] [facility]  Secondary Insurance:     ASSESSMENT:  Active Health Care Proxies       Juan Henderson Health Care Representative - Son   Primary Phone: [phone] (Mobile)                  Readmission Root Cause  30 Day Readmission: Yes  During your hospital stay, did someone (provider, nurse, ) explain your care to you in a way you could understand?: Yes  Did you feel medically stable to leave the hospital?: Yes  Were you able to pay for your medication at the pharmacy?: Yes  Did you have reliable transportation to take you to your appointments?: Yes  During previous admission, was a post-acute recommendation made?: Yes  What post-acute resources were offered?: OP Therapy  Patient was readmitted due to: covid +, encephalopathy  Action Plan: awaiting medical stability, therapy recs    Patient Information  Admitted from:: Home  Mental Status: Confused  During Assessment patient was accompanied by: Not accompanied during assessment  Assessment information provided by:: Son  Primary Caregiver: Self  Support Systems: Self, Spouse/significant other, Family members  County of Residence: [place]  What city do you live in?: [place]  Home entry access options. Select all that apply.: Stairs  Number of steps to enter home.: 3  Type of Current Residence: [facility]  Living Arrangements: Lives w/ Spouse/significant other, Other (Comment) (son and DIL live with patient and patient's spouse)  Is patient a ?: No    Activities of Daily Living Prior to Admission  Functional Status: Independent  Completes ADLs independently?: No  Level of ADL dependence: Assistance  Ambulates independently?: Yes  Does patient use assisted devices?: Yes  Assisted Devices (DME) used: Walker, Bedside Commode  Does patient have a history of Outpatient Therapy (PT/OT)?: Yes  Does the patient have a history of Short-Term Rehab?: No  Does patient have a history of HHC?: No  Does patient currently have HHC?: No    Patient Information Continued  Income Source: Pension/group home  Does patient have prescription coverage?: Yes  Does patient receive dialysis treatments?: No  Does patient have a history of substance abuse?:  No  Does patient have a history of Mental Health Diagnosis?: No    Means of Transportation  Means of Transport to Appts:: Family transport          DISCHARGE DETAILS:    Discharge planning discussed with:: abraham Bauer  Freedom of Choice: Yes  Comments - Freedom of Choice: Discussed FOC  CM contacted family/caregiver?: Yes (Abraham Bauer)    This CM introduced self and role to son, as patient is confused at time of assessment.  Patient lives in ranch style home with spouse, son, and DIL, 3 CIERA.  Needs some assistance since last hospital stay with ADLS, has been using walker and commode since last admission.  No falls in past 6 months (per son).  Son states that patient was doing OP therapy prior to admission, no history of STR or HH

## 2024-10-27 NOTE — ASSESSMENT & PLAN NOTE
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  MRI brain without acute intracranial abnormalities.  Status post LP 10/25 with mild lymphocytic pleocytosis and elevated protein.  ME panel and CSF culture negative.  As above, low concern for CNS infection.  Suspect CSF abnormalities related to possible underlying lymphoproliferative disorder.  Status post 4 session of plasmapheresis 10/26 with improvement in mental status.   -Plasmapheresis per oncology   -Follow-up pending CSF studies   -Monitor mental status

## 2024-10-27 NOTE — PROGRESS NOTES
Progress Note - Oncology-Medical   Name: Miguel Henderson 84 y.o. male I MRN: [id number]  Unit/Bed#: [facility] 817-01 I Date of Admission: 10/26/2024   Date of Service: 10/27/2024 I Hospital Day: 1     Assessment & Plan  Encephalopathy  Improved after pheresis last night.  Discussed with [facility] that the will not be performing pheresis today but planned for tomorrow.  We will reach out to them tomorrow to determine time of next pheresis.  Will hold calcium gluconate and albumin until pheresis tomorrow.  Ongoing workup.  Most blood work pending.  Fibrinogen is decreased.  Minor elevation in coags.  Platelets within normal limits on the low end however.  Will check hemolysis smear for monitoring of DIC.  Bilirubin is continuing to rise and he does have scleral icterus.  AST and ALT mildly improved from yesterday.  Order placed for IR for lymph node biopsy and bone marrow biopsy.  Ferritin elevated in the thousands, although it is an acute phase reactant.    To think his encephalopathy lymphadenopathy and fever are all related to the same process and ongoing workup to evaluate underlying etiology.  Discussed reasoning and rationale behind pheresis to clear what ever factors are causing his encephalopathy, which has improved.  Once identify etiology and have a diagnosis, goal within be to treat the underlying cause to alleviate any symptoms of underlying disease.    Will continue to monitor him and follow closely but please reach out with immediate issues.  Fever  Improved after pheresis last night.  Discussed with [facility] that the will not be performing pheresis today but planned for tomorrow.  We will reach out to them tomorrow to determine time of next pheresis.  Will hold calcium gluconate and albumin until pheresis tomorrow.  Ongoing workup.  Most blood work pending.  Fibrinogen is decreased.  Minor elevation in coags.  Platelets within normal limits on the low end however.  Will check hemolysis smear for  monitoring of DIC.  Bilirubin is continuing to rise and he does have scleral icterus.  AST and ALT mildly improved from yesterday.  Order placed for IR for lymph node biopsy and bone marrow biopsy.  Ferritin elevated in the thousands, although it is an acute phase reactant.    To think his encephalopathy lymphadenopathy and fever are all related to the same process and ongoing workup to evaluate underlying etiology.  Discussed reasoning and rationale behind pheresis to clear what ever factors are causing his encephalopathy, which has improved.  Once identify etiology and have a diagnosis, goal within be to treat the underlying cause to alleviate any symptoms of underlying disease.    Will continue to monitor him and follow closely but please reach out with immediate issues.  Lymphadenopathy  Improved after pheresis last night.  Discussed with [facility] that the will not be performing pheresis today but planned for tomorrow.  We will reach out to them tomorrow to determine time of next pheresis.  Will hold calcium gluconate and albumin until pheresis tomorrow.  Ongoing workup.  Most blood work pending.  Fibrinogen is decreased.  Minor elevation in coags.  Platelets within normal limits on the low end however.  Will check hemolysis smear for monitoring of DIC.  Bilirubin is continuing to rise and he does have scleral icterus.  AST and ALT mildly improved from yesterday.  Order placed for IR for lymph node biopsy and bone marrow biopsy.  Ferritin elevated in the thousands, although it is an acute phase reactant.    To think his encephalopathy lymphadenopathy and fever are all related to the same process and ongoing workup to evaluate underlying etiology.  Discussed reasoning and rationale behind pheresis to clear what ever factors are causing his encephalopathy, which has improved.  Once identify etiology and have a diagnosis, goal within be to treat the underlying cause to alleviate any symptoms of underlying  disease.    Will continue to monitor him and follow closely but please reach out with immediate issues.    Discussed that biopsy does need to be a core biopsy since we are worried about lymphoma versus lymphoproliferative disorder versus hematological malignancy.  I also do think he needs a bone marrow biopsy also.    24 Hour Events : improved mentation, continued low grade fevers; underwent pheresis last night without events  Subjective : family translating as he is [place]ian speaking. He is feeling better, sitting up in chair.  Much clearer today and awake. No specific complaints    Objective :  Temp:  [98 °F (36.7 °C)-103.3 °F (39.6 °C)] 99.9 °F (37.7 °C)  HR:  [] 98  BP: (105-141)/(72-91) 105/72  Resp:  [10-22] 20  SpO2:  [94 %-100 %] 95 %  O2 Device: None (Room air)  FiO2 (%):  [21] 21    Physical Exam  Constitutional:       General: He is not in acute distress.     Appearance: Normal appearance. He is not ill-appearing or toxic-appearing.   HENT:      Head: Normocephalic and atraumatic.      Right Ear: External ear normal.      Left Ear: External ear normal.      Nose: Nose normal.      Mouth/Throat:      Mouth: Mucous membranes are moist.      Pharynx: Oropharynx is clear.   Eyes:      General: Scleral icterus present.         Right eye: No discharge.         Left eye: No discharge.      Conjunctiva/sclera: Conjunctivae normal.   Cardiovascular:      Rate and Rhythm: Normal rate and regular rhythm.      Pulses: Normal pulses.      Heart sounds: No murmur heard.     No friction rub. No gallop.   Pulmonary:      Effort: Pulmonary effort is normal. No respiratory distress.      Breath sounds: Normal breath sounds. No wheezing or rales.   Abdominal:      General: Bowel sounds are normal. There is no distension.      Palpations: There is no mass.      Tenderness: There is no abdominal tenderness. There is no rebound.   Musculoskeletal:         General: No swelling or tenderness.      Cervical back: Normal  "range of motion. No rigidity.      Right lower leg: No edema.      Left lower leg: No edema.   Skin:     General: Skin is warm.      Coloration: Skin is not jaundiced.      Findings: Rash (erythematous edematous rash on chest and back) present. No lesion.      Comments: Well healed surgical scar.  No evidence of recurrence at primary site.   Neurological:      General: No focal deficit present.      Mental Status: He is alert and oriented to person, place, and time.      Cranial Nerves: No cranial nerve deficit.   Psychiatric:         Mood and Affect: Mood normal.         Behavior: Behavior normal.         Thought Content: Thought content normal.         Judgment: Judgment normal.         Lab Results: I have reviewed the following results:CBC/BMP:   .     10/26/24  1457 10/27/24  0639 10/27/24  1318   WBC 6.54 8.94  --    HGB 9.0* 9.3*  --    HCT 25.5* 27.1*  --     223 211   BANDSPCT 3  --   --    SODIUM 129* 133*  --    K 3.6 3.9  --    CL 95* 100  --    CO2 31 27  --    BUN 9 6  --    CREATININE 0.52* 0.52*  --    GLUC 93 109  --    MG  --  1.8*  --    PHOS  --  1.9*  --     , LFTs:   .     10/27/24  0639   *   ALT 52   ALB 3.0*   TBILI 5.53*   ALKPHOS 237*    , PTT/INR:  .     10/27/24  0639   INR 1.67*    , Transferrin: No results found for: \"TRANSFERRIN\"    Imaging Results Review: I personally reviewed the following image studies/reports in PACS and discussed pertinent findings with Radiology: CT abdomen/pelvis. My interpretation of the radiology images/reports is: there is no concerning masses in the liver causing obstruction.  Other Study Results Review: No additional pertinent studies reviewed.    VTE Pharmacologic Prophylaxis: Heparin  VTE Mechanical Prophylaxis: sequential compression device    Administrative Statements   I have spent a total time of 30 minutes in caring for this patient on the day of the visit/encounter including Diagnostic results, Patient and family education, " Impressions, Counseling / Coordination of care, Documenting in the medical record, Reviewing / ordering tests, medicine, procedures  , and Communicating with other healthcare professionals .    Leticia Maxwell MD, PhD

## 2024-10-27 NOTE — PROGRESS NOTES
Progress Note - Hospitalist   Name: Miguel Henderson 84 y.o. male I MRN: [id number]  [facility] I Date of Admission: 10/26/2024   Date of Service: 10/27/2024 I Hospital Day: 1    Assessment & Plan  Encephalopathy  CT head negative for any acute abnormalities  MRI brain also without any acute abnormalities  Had LP performed which ruled out meningitis   Concern fevers are due to possible lymphoma  Transferred to 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    Mobility:   Basic Mobility Inpatient Raw Score: 13  JH-HLM Goal: 4: Move to chair/commode  JH-HLM Achieved: 4: Move to chair/commode  JH-HLM Goal achieved. Continue to encourage appropriate mobility.    Patient Centered Rounds: I performed bedside rounds with nursing staff today.   Discussions with Specialists or Other Care Team Provider: Hematology    Education and Discussions with Family / Patient: Patient declined call to .     Current Length of Stay: 1 day(s)  Current Patient Status: Inpatient   Certification Statement: The patient will continue to require additional inpatient hospital stay due to patient will need plasmapheresis  Discharge Plan:  Pending clinical improvement    Code Status: Level 3 - DNAR and DNI    Subjective   This is a very pleasant 84-year-old male who was seen and evaluated at bedside.  Patient denies any acute complaints at this time.    Objective  :  Temp:  [98 °F (36.7 °C)-103.3 °F (39.6 °C)] 99.9 °F (37.7 °C)  HR:  [] 98  BP: (105-141)/(72-91) 105/72  Resp:  [10-22] 20  SpO2:  [94 %-100 %] 95 %  O2 Device: None (Room air)  FiO2 (%):  [21] 21    Body mass index is 28.46 kg/m².     Input and Output Summary (last 24 hours):     Intake/Output Summary (Last 24 hours) at 10/27/2024 1407  Last data filed at 10/27/2024 0913  Gross per 24 hour   Intake 340 ml   Output 2036 ml   Net -1696 ml       Physical Exam  Vitals reviewed.   HENT:      Head: Normocephalic.      Nose: No congestion.      Mouth/Throat:      Pharynx: No oropharyngeal exudate or posterior oropharyngeal erythema.   Eyes:      Conjunctiva/sclera: Conjunctivae normal.   Cardiovascular:      Rate and Rhythm: Normal rate and regular rhythm.   Pulmonary:      Effort: Pulmonary effort is normal.   Abdominal:      General: Abdomen is flat.      Palpations: Abdomen is soft.   Skin:     General: Skin is warm and dry.   Neurological:      Mental Status: He is alert. Mental status is at baseline. He is disoriented.           Lines/Drains:  Lines/Drains/Airways       Active Status       Name Placement date Placement time Site Days    HD Temporary Double Catheter 10/26/24  1451  Internal jugular  less than 1                            Lab Results: I have reviewed the following results:   Results from last 7 days   Lab Units 10/27/24  0639 10/26/24  1457   WBC Thousand/uL 8.94 6.54   HEMOGLOBIN g/dL 9.3* 9.0*   HEMATOCRIT % 27.1* 25.5*   PLATELETS Thousands/uL 223 161   BANDS PCT %  --  3   LYMPHO PCT %  --  8   MONO PCT %  --  7   EOS PCT %  --  6     Results from last 7 days   Lab Units 10/27/24  0639   SODIUM mmol/L 133*   POTASSIUM mmol/L 3.9   CHLORIDE mmol/L 100   CO2 mmol/L 27   BUN mg/dL 6   CREATININE mg/dL 0.52*   ANION GAP mmol/L 6   CALCIUM mg/dL 7.3*   ALBUMIN g/dL 3.0*   TOTAL BILIRUBIN mg/dL 5.53*   ALK PHOS U/L 237*   ALT U/L 52   AST U/L 101*   GLUCOSE RANDOM mg/dL 109     Results from last  7 days   Lab Units 10/27/24  0639   INR  1.67*             Results from last 7 days   Lab Units 10/26/24  0428 10/25/24  1309 10/25/24  1121 10/23/24  0553 10/21/24  1936 10/21/24  1649   LACTIC ACID mmol/L  --  1.6  --   --  3.5* 4.2*   PROCALCITONIN ng/ml 0.43*  --  0.59* 2.06*  --  4.96*       Recent Cultures (last 7 days):   Results from last 7 days   Lab Units 10/25/24  1704 10/25/24  1309 10/25/24  1252 10/22/24  1015 10/21/24  1649   BLOOD CULTURE   --  No Growth at 24 hrs. No Growth at 24 hrs.  --  No Growth After 5 Days.  No Growth After 5 Days.   GRAM STAIN RESULT  1+ Mononuclear Cells  No organisms seen  --   --   --   --    C DIFF TOXIN B BY PCR   --   --   --  Negative  --        Imaging Results Review: I reviewed radiology reports from this admission including: MRI brain.  Other Study Results Review: EKG was reviewed.     Last 24 Hours Medication List:     Current Facility-Administered Medications:     acetaminophen (Ofirmev) injection 1,000 mg, Q6H PRN, Last Rate: 1,000 mg (10/26/24 1937)    albumin human (FLEXBUMIN) 5 % injection 175 g, Daily    calcium gluconate 2 g in sodium chloride 0.9% 100 mL (premix), Daily, Last Rate: 2 g (10/26/24 1851)    fluticasone (FLONASE) 50 mcg/act nasal spray 1 spray, Daily    heparin (porcine) subcutaneous injection 5,000 Units, Q8H JOE **AND** Platelet count, Once    [START ON 10/28/2024] levothyroxine tablet 50 mcg, Daily    multi-electrolyte (PLASMALYTE-A/ISOLYTE-S PH 7.4) IV solution, Continuous, Last Rate: 100 mL/hr (10/26/24 1347)    ondansetron (ZOFRAN) injection 4 mg, Q6H PRN    pantoprazole (PROTONIX) EC tablet 40 mg, Early Morning    Administrative Statements   Today, Patient Was Seen By: Darrius Gr MD  I have spent a total time of 45 minutes in caring for this patient on the day of the visit/encounter including Diagnostic results, Prognosis, Instructions for management, Risk factor reductions, Counseling / Coordination of care, Obtaining or  reviewing history  , and Communicating with other healthcare professionals .    **Please Note: This note may have been constructed using a voice recognition system.**

## 2024-10-27 NOTE — CONSULTS
Consultation - Infectious Disease   Name: Miguel Henderson 84 y.o. male I MRN: [id number]  Unit/Bed#: [facility] 817-01 I Date of Admission: 10/26/2024   Date of Service: 10/27/2024 I Hospital Day: 1   Inpatient consult to Infectious Diseases  Consult performed by: Sofia Gardner MD  Consult ordered by: Joseph Alexander MD        Physician Requesting Evaluation: Darrius Gr MD   Reason for Evaluation / Principal Problem: fever  Assessment & Plan  Fever  The patient has had intermittent fevers for the past 3 weeks and several admissions to [facility] with fairly unremarkable infectious workup other than positive rhinovirus and COVID.  During his first admission rhinovirus was positive on RP 2 and he was also treated with 5 days of ceftriaxone for superimposed bacterial process although continued to have fevers.  He was discharged on fluconazole for possible Candida esophagitis although this would not be contributing to fevers.  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  Infectious workup was otherwise unremarkable.  He was readmitted and COVID antigen was positive although he reportedly had home positive COVID testing 2 weeks prior to this admission.  CT imaging was noted to show lymphadenopathy and he had atypical lymphocytes on CBC so hematology/oncology consult was recommended although the patient was discharged on Paxlovid with plan for an outpatient referral.  He then returned to the ED with worsening confusion and return of fever.  CT imaging with worsening axillary lymphadenopathy, CBC differential again showed atypical lymphocytes.  Status post LP 10/25 which showed mild elevation in protein and lymphocytic pleocytosis.  ME panel negative culture showed no growth.  Antimicrobials discontinued.  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  Labs show elevated LDH, ferritin,  immunoglobulins.  Low concern for an infectious etiology at this time given negative extensive workup and concern for a lymphoproliferative disorder causing fevers.  Status post first session of plasmapheresis yesterday and mental status is improving.   -Continue to monitor off antimicrobials   -Workup per hematology/oncology   -Follow-up CSF and blood cultures   -Monitor fever curve   -Monitor CBC  Encephalopathy  Oncology concern for hyperviscosity syndrome.  MRI brain without acute intracranial abnormalities.  Status post LP 10/25 with mild lymphocytic pleocytosis and elevated protein.  ME panel and CSF culture negative.  As above, low concern for CNS infection.  Suspect CSF abnormalities related to possible underlying lymphoproliferative disorder.  Status post 4 session of plasmapheresis 10/26 with improvement in mental status.   -Plasmapheresis per oncology   -Follow-up pending CSF studies   -Monitor mental status  Lymphadenopathy  During prior admission imaging showed enlarged periaortic, iliac chain and inguinal lymph nodes.  Repeat imaging showed persistent lymphadenopathy with worsened right axillary adenopathy.  As above, concern for an underlying lymphoproliferative disorder.  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  CBC differential also shows atypical lymphocytes.  Status post plasmapheresis 10/26   -Hematology/oncology following   -Plasmapheresis per hematology/oncology   -Check HIV for completeness  Above management plan to monitor off antibiotics discussed with Dr. Gr.  Discussed with the patient's family at bedside.  ID will follow.    Antibiotics:  Off antibiotics     History of Present Illness   Miguel Henderson is a 84 y.o.-year-old man with hypothyroidism and hypotension transferred to [facility] from 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    The patient has had 2 recent admissions to [facility] in the past  few weeks due to fevers.  He was admitted 10/7 to 10/15 with fever and RP 2 was positive for rhinovirus so viral tracheobronchitis was initially suspected.  He also completed 5 days of ceftriaxone for superimposed bacterial process.  The patient was complaining of dysphagia/odynophagia and VBS was concern for possible bottom of aspiration.  Due to growth of Candida species in sputum culture, GI recommended empiric treatment for possible Candida esophagitis with fluconazole and EGD was deferred.  Oral lesions were more concerning for a viral etiology so HSV swab was performed.  Fevers improved and he was discharged on p.o. fluconazole.  Blood cultures, group A strep PCR, Legionella and strep pneumo urine antigens, COVID PCR were negative.  CBC with differential did show atypical lymphocytes and EBV panel consistent with prior infection.  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  He was readmitted 10/21 to 10/23 due to recurrent fever.  COVID rapid antigen was positive despite multiple negative prior PCR tests, although the patient had a positive home COVID antigen test 2 weeks prior to admission. CT imaging shows mildly enlarged periaortic, iliac chain and inguinal lymph nodes and CBC with differential again showed atypical lymphocytes so ID recommended hematology/oncology evaluation however the patient was discharged home on Paxlovid with plan for outpatient referral.  He returned to the ED again 10/25 with confusion and fevers.  LP was performed which showed mild lymphocytic pleocytosis with 114 WBCs, 85% lymphocytes, protein 86.  He was started on ampicillin, ceftriaxone, vancomycin.  ME panel is negative, blood cultures and CSF cultures show no growth.  Repeat COVID/flu antigen testing was negative.  MRI brain showed no acute intracranial abnormalities. CT C/A/P showed  right axillary adenopathy worsened from prior, unchanged mediastinal, bilateral iliac chain and bilateral inguinal adenopathy.  I discussed with primary team yesterday and recommended hematology/oncology evaluation and discontinuation of antimicrobials.  Case was reviewed by oncology and they were concern for hyperviscosity syndrome possibly secondary to a lymphoproliferative disorder and they recommended transfer to [facility] for plasmapheresis.  The patient arrived to [facility] yesterday.  He continues to have fevers.  The patient underwent the first session of plasmapheresis yesterday.  Per the family at bedside today, his mental status is improved today although he is not quite back at baseline.  He reports dry mouth but no other complaints.  No shortness of breath, abdominal pain, dysuria, headache.    A complete review of systems is negative other than that noted in the HPI.    I have reviewed the patient's PMH, PSH, Social History, Family History, Meds, and Allergies  Historical Information   Past Medical History:   Diagnosis Date    Prostate cancer (HCC)      Past Surgical History:   Procedure Laterality Date    APPENDECTOMY      IR LUMBAR PUNCTURE  10/25/2024    IR TEMPORARY DIALYSIS CATHETER PLACEMENT  10/26/2024     Social History     Tobacco Use    Smoking status: Never    Smokeless tobacco: Never   Vaping Use    Vaping status: Never Used   Substance and Sexual Activity    Alcohol use: Yes     Comment: Occasional    Drug use: Never    Sexual activity: Not on file     E-Cigarette/Vaping    E-Cigarette Use Never User      E-Cigarette/Vaping Substances     Family history non-contributory    Objective   Temp:  [98 °F (36.7 °C)-103.3 °F (39.6 °C)] 99.9 °F (37.7 °C)  HR:  [] 98  BP: (105-141)/(72-91) 105/72  Resp:  [10-22] 20  SpO2:  [94 %-100 %] 95 %  O2 Device: None (Room air)  FiO2 (%):  [21] 21    General:  No acute distress  Psychiatric:  Awake and alert  Cardiac: RRR, no murmurs  Pulmonary:  Normal respiratory  excursion without accessory muscle use  Abdomen:  Soft, nontender  Extremities:  No edema  Skin: Faint erythematous rash on trunk    Lab Results: I have reviewed the following results:  Results from last 7 days   Lab Units 10/27/24  1318 10/27/24  0639 10/26/24  1457 10/26/24  0428   WBC Thousand/uL  --  8.94 6.54 8.46   HEMOGLOBIN g/dL  --  9.3* 9.0* 9.4*   PLATELETS Thousands/uL 211 223 161 196     Results from last 7 days   Lab Units 10/27/24  0639 10/26/24  1457 10/26/24  0428   SODIUM mmol/L 133* 129* 130*   POTASSIUM mmol/L 3.9 3.6 3.3*   CHLORIDE mmol/L 100 95* 96   CO2 mmol/L 27 31 31   BUN mg/dL 6 9 7   CREATININE mg/dL 0.52* 0.52* 0.56*   EGFR ml/min/1.73sq m 96 96 93   CALCIUM mg/dL 7.3* 6.9* 7.2*   AST U/L 101* 151* 73*   ALT U/L 52 61* 30   ALK PHOS U/L 237* 454* 171*   ALBUMIN g/dL 3.0* 1.9* 1.8*     Results from last 7 days   Lab Units 10/25/24  1704 10/25/24  1309 10/25/24  1252 10/22/24  1015 10/21/24  1649   BLOOD CULTURE   --  No Growth at 24 hrs. No Growth at 24 hrs.  --  No Growth After 5 Days.  No Growth After 5 Days.   GRAM STAIN RESULT  1+ Mononuclear Cells  No organisms seen  --   --   --   --    C DIFF TOXIN B BY PCR   --   --   --  Negative  --      Results from last 7 days   Lab Units 10/26/24  0428 10/25/24  1121 10/23/24  0553 10/21/24  1649   PROCALCITONIN ng/ml 0.43* 0.59* 2.06* 4.96*         Results from last 7 days   Lab Units 10/27/24  0639   FERRITIN ng/mL 1,028*           CT C/A/P reviewed and shows worsened right axillary adenopathy, unchanged mediastinal, bilateral iliac chain and bilateral inguinal adenopathy

## 2024-10-27 NOTE — ASSESSMENT & PLAN NOTE
During prior admission imaging showed enlarged periaortic, iliac chain and inguinal lymph nodes.  Repeat imaging showed persistent lymphadenopathy with worsened right axillary adenopathy.  As above, concern for an underlying lymphoproliferative disorder.  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  CBC differential also shows atypical lymphocytes.  Status post plasmapheresis 10/26   -Hematology/oncology following   -Plasmapheresis per hematology/oncology   -Check HIV for completeness

## 2024-10-27 NOTE — ASSESSMENT & PLAN NOTE
The patient has had intermittent fevers for the past 3 weeks and several admissions to [facility] with fairly unremarkable infectious workup other than positive rhinovirus and COVID.  During his first admission rhinovirus was positive on RP 2 and he was also treated with 5 days of ceftriaxone for superimposed bacterial process although continued to have fevers.  He was discharged on fluconazole for possible Candida esophagitis although this would not be contributing to fevers.  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  Infectious workup was otherwise unremarkable.  He was readmitted and COVID antigen was positive although he reportedly had home positive COVID testing 2 weeks prior to this admission.  CT imaging was noted to show lymphadenopathy and he had atypical lymphocytes on CBC so hematology/oncology consult was recommended although the patient was discharged on Paxlovid with plan for an outpatient referral.  He then returned to the ED with worsening confusion and return of fever.  CT imaging with worsening axillary lymphadenopathy, CBC differential again showed atypical lymphocytes.  Status post LP 10/25 which showed mild elevation in protein and lymphocytic pleocytosis.  ME panel negative culture showed no growth.  Antimicrobials discontinued.  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  Labs show elevated LDH, ferritin, immunoglobulins.  Low concern for an infectious etiology at this time given negative extensive workup and concern for a lymphoproliferative disorder causing fevers.  Status post first session of plasmapheresis yesterday and mental status is improving.   -Continue to monitor off antimicrobials   -Workup per hematology/oncology   -Follow-up CSF and blood cultures   -Monitor fever curve   -Monitor CBC

## 2024-10-27 NOTE — CONSULTS
e-Consult ([facility])  - Interventional Radiology  Miguel Henderson 84 y.o. male MRN: [id number]  Unit/Bed#: [facility] 817-01 Encounter: [id number]          Interventional Radiology has been consulted to evaluate Miguel Henderson    Inpatient Consult to IR  Consult performed by: Eros Sotelo MD  Consult ordered by: Leticia Maxwell MD        10/27/24    Assessment/Recommendation:   84 year old male with lymphadenopathy with concerns for lymphoma is referred for lymph node and bone marrow biopsies.    - Plan for enlarged inguinal lymph node and bone marrow biopsies in the upcoming week, pending IR schedule availability.      5-10 minutes, >50% of the total time devoted to medical consultative verbal/EMR discussion between providers. Written report will be generated in the EMR.     Thank you for allowing Interventional Radiology to participate in the care of Miguel Henderson. Please don't hesitate to call or TigerText us with any questions.     Eros Sotelo MD

## 2024-10-28 ENCOUNTER — APPOINTMENT (INPATIENT)
Dept: RADIOLOGY | Facility: HOSPITAL | Age: 84
DRG: 823 | End: 2024-10-28
Payer: COMMERCIAL

## 2024-10-28 ENCOUNTER — APPOINTMENT (INPATIENT)
Dept: RADIOLOGY | Facility: HOSPITAL | Age: 84
DRG: 823 | End: 2024-10-28
Attending: RADIOLOGY
Payer: COMMERCIAL

## 2024-10-28 LAB
ALBUMIN SERPL BCG-MCNC: 2.6 G/DL (ref 3.5–5)
ALBUMIN SERPL ELPH-MCNC: 1.93 G/DL (ref 3.2–5.1)
ALBUMIN SERPL ELPH-MCNC: 28.8 % (ref 48–70)
ALP SERPL-CCNC: 226 U/L (ref 34–104)
ALPHA1 GLOB SERPL ELPH-MCNC: 0.34 G/DL (ref 0.15–0.47)
ALPHA1 GLOB SERPL ELPH-MCNC: 5 % (ref 1.8–7)
ALPHA2 GLOB SERPL ELPH-MCNC: 0.36 G/DL (ref 0.42–1.04)
ALPHA2 GLOB SERPL ELPH-MCNC: 5.3 % (ref 5.9–14.9)
ALT SERPL W P-5'-P-CCNC: 43 U/L (ref 7–52)
ANION GAP SERPL CALCULATED.3IONS-SCNC: 6 MMOL/L (ref 4–13)
AST SERPL W P-5'-P-CCNC: 67 U/L (ref 13–39)
BACTERIA CSF CULT: NO GROWTH
BASOPHILS # BLD AUTO: 0.06 THOUSANDS/ÂΜL (ref 0–0.1)
BASOPHILS NFR BLD AUTO: 1 % (ref 0–1)
BETA GLOB ABNORMAL SERPL ELPH-MCNC: 0.54 G/DL (ref 0.31–0.57)
BETA1 GLOB SERPL ELPH-MCNC: 8 % (ref 4.7–7.7)
BETA2 GLOB SERPL ELPH-MCNC: 10.2 % (ref 3.1–7.9)
BETA2+GAMMA GLOB SERPL ELPH-MCNC: 0.68 G/DL (ref 0.2–0.58)
BILIRUB SERPL-MCNC: 2.55 MG/DL (ref 0.2–1)
BUN SERPL-MCNC: 7 MG/DL (ref 5–25)
CALCIUM ALBUM COR SERPL-MCNC: 8.2 MG/DL (ref 8.3–10.1)
CALCIUM SERPL-MCNC: 7.1 MG/DL (ref 8.4–10.2)
CHLORIDE SERPL-SCNC: 102 MMOL/L (ref 96–108)
CO2 SERPL-SCNC: 28 MMOL/L (ref 21–32)
CREAT SERPL-MCNC: 0.33 MG/DL (ref 0.6–1.3)
EOSINOPHIL # BLD AUTO: 0.4 THOUSAND/ÂΜL (ref 0–0.61)
EOSINOPHIL NFR BLD AUTO: 5 % (ref 0–6)
ERYTHROCYTE [DISTWIDTH] IN BLOOD BY AUTOMATED COUNT: 16 % (ref 11.6–15.1)
GAMMA GLOB ABNORMAL SERPL ELPH-MCNC: 2.86 G/DL (ref 0.4–1.66)
GAMMA GLOB SERPL ELPH-MCNC: 42.7 % (ref 6.9–22.3)
GFR SERPL CREATININE-BSD FRML MDRD: 116 ML/MIN/1.73SQ M
GLUCOSE SERPL-MCNC: 117 MG/DL (ref 65–140)
GLUCOSE SERPL-MCNC: 99 MG/DL (ref 65–140)
HCT VFR BLD AUTO: 27.3 % (ref 36.5–49.3)
HGB BLD-MCNC: 9 G/DL (ref 12–17)
HIV 1+2 AB+HIV1 P24 AG SERPL QL IA: NORMAL
HIV 2 AB SERPL QL IA: NORMAL
HIV1 AB SERPL QL IA: NORMAL
HIV1 P24 AG SERPL QL IA: NORMAL
IGG/ALB SER: 0.4 {RATIO} (ref 1.1–1.8)
IMM GRANULOCYTES # BLD AUTO: 0.13 THOUSAND/UL (ref 0–0.2)
IMM GRANULOCYTES NFR BLD AUTO: 2 % (ref 0–2)
INTERPRETATION UR IFE-IMP: NORMAL
LYMPHOCYTES # BLD AUTO: 3.77 THOUSANDS/ÂΜL (ref 0.6–4.47)
LYMPHOCYTES NFR BLD AUTO: 51 % (ref 14–44)
M PROTEIN 1 MFR SERPL ELPH: 6.1 %
M PROTEIN 1 SERPL ELPH-MCNC: 0.41 G/DL
MAGNESIUM SERPL-MCNC: 2 MG/DL (ref 1.9–2.7)
MCH RBC QN AUTO: 30.7 PG (ref 26.8–34.3)
MCHC RBC AUTO-ENTMCNC: 33 G/DL (ref 31.4–37.4)
MCV RBC AUTO: 93 FL (ref 82–98)
MONOCYTES # BLD AUTO: 0.93 THOUSAND/ÂΜL (ref 0.17–1.22)
MONOCYTES NFR BLD AUTO: 13 % (ref 4–12)
NEUTROPHILS # BLD AUTO: 2.07 THOUSANDS/ÂΜL (ref 1.85–7.62)
NEUTS SEG NFR BLD AUTO: 28 % (ref 43–75)
NRBC BLD AUTO-RTO: 0 /100 WBCS
PLATELET # BLD AUTO: 225 THOUSANDS/UL (ref 149–390)
PMV BLD AUTO: 10.2 FL (ref 8.9–12.7)
POTASSIUM SERPL-SCNC: 3.7 MMOL/L (ref 3.5–5.3)
PROT PATTERN SERPL ELPH-IMP: ABNORMAL
PROT SERPL-MCNC: 5.3 G/DL (ref 6.4–8.4)
PROT SERPL-MCNC: 6.7 G/DL (ref 6.4–8.2)
RBC # BLD AUTO: 2.93 MILLION/UL (ref 3.88–5.62)
SODIUM SERPL-SCNC: 136 MMOL/L (ref 135–147)
WBC # BLD AUTO: 7.36 THOUSAND/UL (ref 4.31–10.16)

## 2024-10-28 PROCEDURE — 70450 CT HEAD/BRAIN W/O DYE: CPT

## 2024-10-28 PROCEDURE — 88185 FLOWCYTOMETRY/TC ADD-ON: CPT | Performed by: INTERNAL MEDICINE

## 2024-10-28 PROCEDURE — 88342 IMHCHEM/IMCYTCHM 1ST ANTB: CPT | Performed by: STUDENT IN AN ORGANIZED HEALTH CARE EDUCATION/TRAINING PROGRAM

## 2024-10-28 PROCEDURE — 88341 IMHCHEM/IMCYTCHM EA ADD ANTB: CPT | Performed by: STUDENT IN AN ORGANIZED HEALTH CARE EDUCATION/TRAINING PROGRAM

## 2024-10-28 PROCEDURE — 99232 SBSQ HOSP IP/OBS MODERATE 35: CPT | Performed by: INTERNAL MEDICINE

## 2024-10-28 PROCEDURE — 88365 INSITU HYBRIDIZATION (FISH): CPT | Performed by: STUDENT IN AN ORGANIZED HEALTH CARE EDUCATION/TRAINING PROGRAM

## 2024-10-28 PROCEDURE — 83735 ASSAY OF MAGNESIUM: CPT | Performed by: FAMILY MEDICINE

## 2024-10-28 PROCEDURE — 99231 SBSQ HOSP IP/OBS SF/LOW 25: CPT | Performed by: PHYSICIAN ASSISTANT

## 2024-10-28 PROCEDURE — 73522 X-RAY EXAM HIPS BI 3-4 VIEWS: CPT

## 2024-10-28 PROCEDURE — 88360 TUMOR IMMUNOHISTOCHEM/MANUAL: CPT | Performed by: STUDENT IN AN ORGANIZED HEALTH CARE EDUCATION/TRAINING PROGRAM

## 2024-10-28 PROCEDURE — 88307 TISSUE EXAM BY PATHOLOGIST: CPT | Performed by: STUDENT IN AN ORGANIZED HEALTH CARE EDUCATION/TRAINING PROGRAM

## 2024-10-28 PROCEDURE — 82948 REAGENT STRIP/BLOOD GLUCOSE: CPT

## 2024-10-28 PROCEDURE — 88364 INSITU HYBRIDIZATION (FISH): CPT | Performed by: STUDENT IN AN ORGANIZED HEALTH CARE EDUCATION/TRAINING PROGRAM

## 2024-10-28 PROCEDURE — 76942 ECHO GUIDE FOR BIOPSY: CPT | Performed by: RADIOLOGY

## 2024-10-28 PROCEDURE — 85025 COMPLETE CBC W/AUTO DIFF WBC: CPT | Performed by: FAMILY MEDICINE

## 2024-10-28 PROCEDURE — 80053 COMPREHEN METABOLIC PANEL: CPT | Performed by: FAMILY MEDICINE

## 2024-10-28 PROCEDURE — 88312 SPECIAL STAINS GROUP 1: CPT | Performed by: STUDENT IN AN ORGANIZED HEALTH CARE EDUCATION/TRAINING PROGRAM

## 2024-10-28 PROCEDURE — 88184 FLOWCYTOMETRY/ TC 1 MARKER: CPT | Performed by: INTERNAL MEDICINE

## 2024-10-28 PROCEDURE — 84165 PROTEIN E-PHORESIS SERUM: CPT | Performed by: PATHOLOGY

## 2024-10-28 PROCEDURE — 38505 NEEDLE BIOPSY LYMPH NODES: CPT | Performed by: RADIOLOGY

## 2024-10-28 PROCEDURE — 99232 SBSQ HOSP IP/OBS MODERATE 35: CPT | Performed by: FAMILY MEDICINE

## 2024-10-28 PROCEDURE — 72125 CT NECK SPINE W/O DYE: CPT

## 2024-10-28 RX ORDER — LIDOCAINE WITH 8.4% SOD BICARB 0.9%(10ML)
SYRINGE (ML) INJECTION AS NEEDED
Status: COMPLETED | OUTPATIENT
Start: 2024-10-28 | End: 2024-10-28

## 2024-10-28 RX ORDER — CALCIUM GLUCONATE 20 MG/ML
2 INJECTION, SOLUTION INTRAVENOUS ONCE
Status: COMPLETED | OUTPATIENT
Start: 2024-10-28 | End: 2024-10-28

## 2024-10-28 RX ORDER — ALBUMIN HUMAN 50 G/1000ML
12.5 SOLUTION INTRAVENOUS ONCE
Status: DISCONTINUED | OUTPATIENT
Start: 2024-10-28 | End: 2024-10-28 | Stop reason: CLARIF

## 2024-10-28 RX ADMIN — HEPARIN SODIUM 5000 UNITS: 5000 INJECTION INTRAVENOUS; SUBCUTANEOUS at 05:15

## 2024-10-28 RX ADMIN — Medication 175 G: at 12:13

## 2024-10-28 RX ADMIN — SODIUM CHLORIDE, SODIUM GLUCONATE, SODIUM ACETATE, POTASSIUM CHLORIDE, MAGNESIUM CHLORIDE, SODIUM PHOSPHATE, DIBASIC, AND POTASSIUM PHOSPHATE 100 ML/HR: .53; .5; .37; .037; .03; .012; .00082 INJECTION, SOLUTION INTRAVENOUS at 05:16

## 2024-10-28 RX ADMIN — FLUTICASONE PROPIONATE 1 SPRAY: 50 SPRAY, METERED NASAL at 09:13

## 2024-10-28 RX ADMIN — SODIUM CHLORIDE, SODIUM GLUCONATE, SODIUM ACETATE, POTASSIUM CHLORIDE, MAGNESIUM CHLORIDE, SODIUM PHOSPHATE, DIBASIC, AND POTASSIUM PHOSPHATE 100 ML/HR: .53; .5; .37; .037; .03; .012; .00082 INJECTION, SOLUTION INTRAVENOUS at 17:13

## 2024-10-28 RX ADMIN — Medication 10 ML: at 16:07

## 2024-10-28 RX ADMIN — LEVOTHYROXINE SODIUM 50 MCG: 50 TABLET ORAL at 09:13

## 2024-10-28 RX ADMIN — HEPARIN SODIUM 5000 UNITS: 5000 INJECTION INTRAVENOUS; SUBCUTANEOUS at 23:01

## 2024-10-28 RX ADMIN — HEPARIN SODIUM 5000 UNITS: 5000 INJECTION INTRAVENOUS; SUBCUTANEOUS at 15:22

## 2024-10-28 RX ADMIN — ACETAMINOPHEN 1000 MG: 10 INJECTION INTRAVENOUS at 21:03

## 2024-10-28 RX ADMIN — CALCIUM GLUCONATE 2 G: 20 INJECTION, SOLUTION INTRAVENOUS at 12:14

## 2024-10-28 RX ADMIN — PANTOPRAZOLE SODIUM 40 MG: 40 TABLET, DELAYED RELEASE ORAL at 05:15

## 2024-10-28 NOTE — SEDATION DOCUMENTATION
Left inguinal lymph node biopsy performed by Dr. Bhatia. Tissue samples sent to pathology with results requested to be sent to Eyal Maxwell and Jorge Bhatia. Pt will return to . Report provided to nurse via [employer] secure chat.

## 2024-10-28 NOTE — ASSESSMENT & PLAN NOTE
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  MRI brain without acute intracranial abnormalities.  Status post LP 10/25 with mild lymphocytic pleocytosis and elevated protein.  ME panel and CSF culture negative.  As above, low concern for CNS infection.  Suspect CSF abnormalities related to possible underlying lymphoproliferative disorder.  Status post first session of plasmapheresis 10/26 with improvement in mental status and fever curve.   -Plasmapheresis per oncology   -Continue to monitor mental status

## 2024-10-28 NOTE — PLAN OF CARE
Problem: PAIN - ADULT  Goal: Verbalizes/displays adequate comfort level or baseline comfort level  Description: Interventions:  - Encourage patient to monitor pain and request assistance  - Assess pain using appropriate pain scale  - Administer analgesics based on type and severity of pain and evaluate response  - Implement non-pharmacological measures as appropriate and evaluate response  - Consider cultural and social influences on pain and pain management  - Notify physician/advanced practitioner if interventions unsuccessful or patient reports new pain  Outcome: Progressing     Problem: INFECTION - ADULT  Goal: Absence or prevention of progression during hospitalization  Description: INTERVENTIONS:  - Assess and monitor for signs and symptoms of infection  - Monitor lab/diagnostic results  - Monitor all insertion sites, i.e. indwelling lines, tubes, and drains  - Monitor endotracheal if appropriate and nasal secretions for changes in amount and color  - [place] appropriate cooling/warming therapies per order  - Administer medications as ordered  - Instruct and encourage patient and family to use good hand hygiene technique  - Identify and instruct in appropriate isolation precautions for identified infection/condition  Outcome: Progressing  Goal: Absence of fever/infection during neutropenic period  Description: INTERVENTIONS:  - Monitor WBC    Outcome: Progressing     Problem: SAFETY ADULT  Goal: Patient will remain free of falls  Description: INTERVENTIONS:  - Educate patient/family on patient safety including physical limitations  - Instruct patient to call for assistance with activity   - Consult OT/PT to assist with strengthening/mobility   - Keep Call bell within reach  - Keep bed low and locked with side rails adjusted as appropriate  - Keep care items and personal belongings within reach  - Initiate and maintain comfort rounds  - Make Fall Risk Sign visible to staff  - Apply yellow socks and bracelet  for high fall risk patients  - Consider moving patient to room near nurses station  Outcome: Progressing  Goal: Maintain or return to baseline ADL function  Description: INTERVENTIONS:  -  Assess patient's ability to carry out ADLs; assess patient's baseline for ADL function and identify physical deficits which impact ability to perform ADLs (bathing, care of mouth/teeth, toileting, grooming, dressing, etc.)  - Assess/evaluate cause of self-care deficits   - Assess range of motion  - Assess patient's mobility; develop plan if impaired  - Assess patient's need for assistive devices and provide as appropriate  - Encourage maximum independence but intervene and supervise when necessary  - Involve family in performance of ADLs  - Assess for home care needs following discharge   - Consider OT consult to assist with ADL evaluation and planning for discharge  - Provide patient education as appropriate  Outcome: Progressing  Goal: Maintains/Returns to pre admission functional level  Description: INTERVENTIONS:  - Perform AM-PAC 6 Click Basic Mobility/ Daily Activity assessment daily.  - Set and communicate daily mobility goal to care team and patient/family/caregiver.   - Collaborate with rehabilitation services on mobility goals if consulted  - Out of bed for toileting  - Record patient progress and toleration of activity level   Outcome: Progressing     Problem: DISCHARGE PLANNING  Goal: Discharge to home or other facility with appropriate resources  Description: INTERVENTIONS:  - Identify barriers to discharge w/patient and caregiver  - Arrange for needed discharge resources and transportation as appropriate  - Identify discharge learning needs (meds, wound care, etc.)  - Arrange for interpretive services to assist at discharge as needed  - Refer to Case Management Department for coordinating discharge planning if the patient needs post-hospital services based on physician/advanced practitioner order or complex needs  related to functional status, cognitive ability, or social support system  Outcome: Progressing     Problem: Knowledge Deficit  Goal: Patient/family/caregiver demonstrates understanding of disease process, treatment plan, medications, and discharge instructions  Description: Complete learning assessment and assess knowledge base.  Interventions:  - Provide teaching at level of understanding  - Provide teaching via preferred learning methods  Outcome: Progressing     Problem: Prexisting or High Potential for Compromised Skin Integrity  Goal: Skin integrity is maintained or improved  Description: INTERVENTIONS:  - Identify patients at risk for skin breakdown  - Assess and monitor skin integrity  - Assess and monitor nutrition and hydration status  - Monitor labs   - Assess for incontinence   - Turn and reposition patient  - Assist with mobility/ambulation  - Relieve pressure over bony prominences  - Avoid friction and shearing  - Provide appropriate hygiene as needed including keeping skin clean and dry  - Evaluate need for skin moisturizer/barrier cream  - Collaborate with interdisciplinary team   - Patient/family teaching  - Consider wound care consult   Outcome: Progressing

## 2024-10-28 NOTE — UTILIZATION REVIEW
Initial Clinical Review    The patient was transferred to [facility] on 10/26/24 from [facility], where care began on 10/25/24. 1 midnight has already been surpassed with active ongoing care.     Admission: Date/Time/Statement:   Admission Orders (From admission, onward)       Ordered        10/26/24 1332  INPATIENT ADMISSION  Once                          Orders Placed This Encounter   Procedures    INPATIENT ADMISSION     Standing Status:   Standing     Number of Occurrences:   1     Order Specific Question:   Level of Care     Answer:   Med Surg [16]     Order Specific Question:   Estimated length of stay     Answer:   More than 2 Midnights     Order Specific Question:   Certification     Answer:   I certify that inpatient services are medically necessary for this patient for a duration of greater than two midnights. See H&P and MD Progress Notes for additional information about the patient's course of treatment.     Initial Presentation: 84 y.o. male with PMHx includes HTN, hypothyroidism, who presented initially to [facility] then transferred to [facility], admitted Inpatient status dt Encephalopathy.  Presented due to confusion.  Underwent infectious workup with MRI brain and LP which were negative. Case was discussed with hematology recommended transfer for lymphoma workup as the possible cause given significant lymphadenopathy.     Plan:  Admit to med surg :  Oncology and IR consults, continue home meds, monitor VS and labs, fever curve.     Anticipated Length of Stay/Certification Statement: Patient will be admitted on an inpatient basis with an anticipated length of stay of greater than 2 midnights secondary to encephalopathy.     10/26 Per Oncology: Pt with lymphadenopathy and B symptoms concerning for lymphoma. However has large cold agglutinins and mental status changes concerning for hyperviscosity. In the immediate see given his  symptoms have been working with [place] [facility] to arrange for plasmapheresis which will begin this evening and is scheduled for 2 sessions. IR placed right IJ hemodialysis catheter.     10/26 Per IR: with B symptoms and lymphadenopathy concerning for lymphoma requires urgent pheresis for hyperviscosity syndrome. Plan for non tunneled pheresis catheter placement.    Date: 10/27  Day 3: Has surpassed a 2nd midnight with active treatments and services. Pt disoriented. Start plasmapheresis, Oncology following. Monitor BP and labs.     Scheduled Medications:  Albumin 5%, 175 g, Intravenous, Once  calcium gluconate, 2 g, Intravenous, Once  fluticasone, 1 spray, Nasal, Daily  heparin (porcine), 5,000 Units, Subcutaneous, Q8H JOE  levothyroxine, 50 mcg, Oral, Daily  pantoprazole, 40 mg, Oral, Early Morning      Continuous IV Infusions:  multi-electrolyte, 100 mL/hr, Intravenous, Continuous      PRN Meds:  acetaminophen, 1,000 mg, Intravenous, Q6H PRN  ondansetron, 4 mg, Intravenous, Q6H PRN  polyethylene glycol, 17 g, Oral, Daily PRN      ED Triage Vitals   Temperature Pulse Respirations Blood Pressure SpO2 Pain Score   10/26/24 1323 10/26/24 1323 10/26/24 1323 10/26/24 1323 10/26/24 1323 10/26/24 1357   100.2 °F (37.9 °C) 93 16 115/65 96 % No Pain     Weight (last 2 days)       Date/Time Weight    10/26/24 1404 77.6 (171)            Vital Signs (last 3 days)       Date/Time Temp Pulse Resp BP MAP (mmHg) SpO2 FiO2 (%) O2 Device [place] Coma Scale Score Pain    10/28/24 0715 -- -- -- -- -- -- -- -- 15 No Pain    10/28/24 02:48:25 98.6 °F (37 °C) 89 24 120/73 89 97 % -- -- -- --    10/27/24 22:40:25 98.8 °F (37.1 °C) 88 16 123/72 89 98 % -- -- -- --    10/27/24 2015 -- -- -- -- -- -- -- None (Room air) 14 No Pain    10/27/24 18:40:15 98.9 °F (37.2 °C) 100 16 131/77 95 94 % -- -- -- --    10/27/24 14:29:44 98.6 °F (37 °C) 91 -- 103/69 80 95 % -- -- -- --    10/27/24 11:18:04 99.9 °F (37.7 °C) 98 -- 105/72 83 95 % -- -- --  --    10/27/24 08:48:13 99.7 °F (37.6 °C) 109 20 137/90 106 99 % -- -- -- --    10/27/24 0731 -- -- -- -- -- -- -- -- 14 No Pain    10/27/24 03:06:54 98.5 °F (36.9 °C) 116 22 141/90 107 97 % -- -- -- --    10/27/24 0248 98.7 °F (37.1 °C) 120 -- -- -- -- -- -- -- --    10/26/24 2300 -- -- 16 -- -- -- -- -- -- --    10/26/24 22:04:01 98.4 °F (36.9 °C) -- 10 117/75 89 -- -- -- -- --    10/26/24 21:28:36 98.4 °F (36.9 °C) 94 -- -- -- 99 % -- -- -- --    10/26/24 2019 -- -- -- -- -- -- -- -- 14 --    10/26/24 1939 101.2 °F (38.4 °C) -- -- -- -- -- -- -- -- --    10/26/24 1900 103.3 °F (39.6 °C) -- -- 128/83 -- -- -- -- -- --    10/26/24 18:06:44 101.2 °F (38.4 °C) 101 20 129/86 100 99 % -- -- -- --    10/26/24 17:00:10 98.8 °F (37.1 °C) 99 20 127/91 103 99 % -- -- -- --    10/26/24 16:31:56 98.7 °F (37.1 °C) 94 18 130/77 95 96 % -- -- -- --    10/26/24 16:00:32 99.4 °F (37.4 °C) 93 18 133/78 96 100 % -- -- -- --    10/26/24 15:46:49 100 °F (37.8 °C) 94 18 133/81 98 99 % -- -- -- --    10/26/24 15:33:33 98 °F (36.7 °C) 89 18 132/81 98 99 % -- -- -- --    10/26/24 15:15:09 100 °F (37.8 °C) 89 18 127/77 94 100 % -- -- -- --    10/26/24 1452 -- 88 16 122/77 -- 94 % 21 -- -- --    10/26/24 1450 -- 87 16 122/77 -- 94 % 21 None (Room air) -- --    10/26/24 1445 -- 90 16 133/82 -- 98 % 21 None (Room air) -- --    10/26/24 1440 -- 91 16 135/84 -- 99 % 21 None (Room air) -- --    10/26/24 1357 -- -- -- -- -- -- -- -- 14 No Pain    10/26/24 13:23:03 100.2 °F (37.9 °C) 93 16 115/65 82 96 % -- -- -- --              Pertinent Labs/Diagnostic Test Results:   Radiology:  IR temporary dialysis catheter placement   Final Interpretation by Eros Sotelo MD (10/26 6016)      Insertion of right IJV non-tunneled dual-lumen plasmapheresis catheter, with tip in the expected location of the superior vena cava.      Plan:      The catheter may be used immediately.      Workstation performed: [id number]         IR biopsy inguinal lymph node     (Results Pending)   IR biopsy bone marrow    (Results Pending)     Cardiology:  No orders to display     GI:  No orders to display       Results from last 7 days   Lab Units 10/21/24  1936   SARS-COV-2  Not Detected     Results from last 7 days   Lab Units 10/28/24  0433 10/27/24  1318 10/27/24  0639 10/26/24  1457 10/26/24  0428 10/25/24  1121 10/22/24  0853 10/21/24  1649   WBC Thousand/uL 7.36  --  8.94 6.54 8.46 11.68*   < > 7.30   HEMOGLOBIN g/dL 9.0*  --  9.3* 9.0* 9.4* 10.8*   < > 11.0*   HEMATOCRIT % 27.3*  --  27.1* 25.5* 25.6* 29.4*   < > 33.2*   PLATELETS Thousands/uL 225 211 223 161 196 256   < > 330   TOTAL NEUT ABS Thousands/µL 2.07  --   --  5.10  --   --   --   --    BANDS PCT %  --   --   --  3  --   --   --  22*    < > = values in this interval not displayed.         Results from last 7 days   Lab Units 10/28/24  0433 10/27/24  0639 10/26/24  1457 10/26/24  0428 10/25/24  1121 10/23/24  0553   SODIUM mmol/L 136 133* 129* 130* 129* 130*   POTASSIUM mmol/L 3.7 3.9 3.6 3.3* 3.5 3.8   CHLORIDE mmol/L 102 100 95* 96 92* 102   CO2 mmol/L 28 27 31 31 31 22   ANION GAP mmol/L 6 6 3* 3* 6 6   BUN mg/dL 7 6 9 7 5 11   CREATININE mg/dL 0.33* 0.52* 0.52* 0.56* 0.59* 0.46*   EGFR ml/min/1.73sq m 116 96 96 93 91 101   CALCIUM mg/dL 7.1* 7.3* 6.9* 7.2* 8.0* 7.3*   MAGNESIUM mg/dL 2.0 1.8*  --  1.7*  --  1.5*   PHOSPHORUS mg/dL  --  1.9*  --   --   --  3.1     Results from last 7 days   Lab Units 10/28/24  0433 10/27/24  0639 10/26/24  1457 10/26/24  0428 10/25/24  1121   AST U/L 67* 101* 151* 73* 96*   ALT U/L 43 52 61* 30 40   ALK PHOS U/L 226* 237* 454* 171* 202*   TOTAL PROTEIN g/dL 5.3* 5.5* 6.8 6.6 7.7   ALBUMIN g/dL 2.6* 3.0* 1.9* 1.8* 2.2*   TOTAL BILIRUBIN mg/dL 2.55* 5.53* 2.47* 1.53* 1.62*   AMMONIA umol/L  --   --   --   --  26         Results from last 7 days   Lab Units 10/28/24  0433 10/27/24  0639 10/26/24  1457 10/26/24  0428 10/25/24  1121 10/23/24  0553 10/22/24  1708 10/22/24  0853  10/22/24  0022 10/21/24  1649   GLUCOSE RANDOM mg/dL 99 109 93 88 117 74 82 97  98 75 99     Results from last 7 days   Lab Units 10/27/24  0639 10/26/24  1457 10/21/24  1649   PROTIME seconds 19.8* 16.7* 14.2   INR  1.67* 1.33* 1.05   PTT seconds  --   --  33     Results from last 7 days   Lab Units 10/25/24  1121 10/23/24  0553   TSH 3RD GENERATON uIU/mL 3.923 6.063*     Results from last 7 days   Lab Units 10/26/24  0428 10/25/24  1121 10/23/24  0553 10/21/24  1649   PROCALCITONIN ng/ml 0.43* 0.59* 2.06* 4.96*     Results from last 7 days   Lab Units 10/25/24  1309 10/21/24  1936 10/21/24  1649   LACTIC ACID mmol/L 1.6 3.5* 4.2*     Results from last 7 days   Lab Units 10/27/24  0639   FERRITIN ng/mL 1,028*     Results from last 7 days   Lab Units 10/25/24  1121   LIPASE u/L 24     Results from last 7 days   Lab Units 10/25/24  1132 10/22/24  0202   CLARITY UA  Clear Clear   COLOR UA  Yellow Yellow   SPEC GRAV UA  1.020 1.010   PH UA  8.0* 6.5   GLUCOSE UA mg/dl Negative Negative   KETONES UA mg/dl Negative Negative   BLOOD UA  Trace-Intact* Trace-Intact*   PROTEIN UA mg/dl Negative 1+*   NITRITE UA  Negative Negative   BILIRUBIN UA  Negative 1+*   UROBILINOGEN UA E.U./dl 4.0* 4.0*   LEUKOCYTES UA  Negative Negative   WBC UA /hpf 0-1 0-1   RBC UA /hpf 0-1 4-10*   BACTERIA UA /hpf None Seen Occasional   EPITHELIAL CELLS WET PREP /hpf None Seen Occasional   MUCUS THREADS   --  Occasional*     Results from last 7 days   Lab Units 10/21/24  1936   INFLUENZA B  Not Detected   RESPIRATORY SYNCYTIAL VIRUS  Not Detected     Results from last 7 days   Lab Units 10/21/24  1936   ADENOVIRUS  Not Detected   BORDETELLA PARAPERTUSSIS  Not Detected   BORDETELLA PERTUSSIS  Not Detected   CHLAMYDIA PNEUMONIAE  Not Detected   CORONAVIRUS 229E  Not Detected   CORONAVIRUS HKU1  Not Detected   CORONAVIRUS NL63  Not Detected   CORONAVIRUS OC43  Not Detected   METAPNEUMOVIRUS  Not Detected   RHINOVIRUS  Not Detected   MYCOPLASMA  PNEUMONIAE  Not Detected   PARAINFLUENZA 1  Not Detected   PARAINFLUENZA 2  Not Detected   PARAINFLUENZA 3  Not Detected   PARAINFLUENZA 4  Not Detected         Results from last 7 days   Lab Units 10/25/24  1121   ETHANOL LVL mg/dL <10   ACETAMINOPHEN LVL ug/mL <2*   SALICYLATE LVL mg/dL <5     Results from last 7 days   Lab Units 10/22/24  1015   C DIFF TOXIN B BY PCR  Negative     Results from last 7 days   Lab Units 10/22/24  1015   SALMONELLA SP PCR  Negative   SHIGELLA SP/ENTEROINVASIVE E. COLI (EIEC)  Negative   CAMPYLOBACTER SP (JEJUNI AND COLI)  Negative   SHIGA TOXIN 1/SHIGA TOXIN 2  Negative         Results from last 7 days   Lab Units 10/25/24  1704 10/25/24  1309 10/25/24  1252 10/21/24  1649   BLOOD CULTURE   --  No Growth at 48 hrs. No Growth at 48 hrs. No Growth After 5 Days.  No Growth After 5 Days.   GRAM STAIN RESULT  1+ Mononuclear Cells  No organisms seen  --   --   --      Results from last 7 days   Lab Units 10/26/24  1457 10/25/24  1704   TOTAL COUNTED  100 100     Results from last 7 days   Lab Units 10/25/24  1704   APPEARANCE CSF  Clear/Colorless   TUBE NUM CSF  4   WBC CSF /uL 114*   XANTHOCHROMIA  No   NEUTROPHILS % (CSF) % 4   LYMPHS % (CSF) % 85   MONOCYTES % (CSF) % 11   GLUCOSE CSF mg/dL 43   PROTEIN CSF mg/dL 86*   RBC CSF uL 3   CSF CULTURE  No growth           Past Medical History:   Diagnosis Date    Prostate cancer (HCC)      Present on Admission:   Hypothyroidism   Primary hypertension      Admitting Diagnosis: Encephalopathy [G93.40]  Age/Sex: 84 y.o. male    Network Utilization Review Department  ATTENTION: Please call with any questions or concerns to [phone] and carefully listen to the prompts so that you are directed to the right person. All voicemails are confidential.   For Discharge needs, contact Care Management DC Support Team at [phone] opt. 2  Send all requests for admission clinical reviews, approved or denied determinations and any other requests to  dedicated fax number below belonging to the campus where the patient is receiving treatment. List of dedicated fax numbers for the Facilities:  FACILITY NAME UR FAX NUMBER   ADMISSION DENIALS (Administrative/Medical Necessity) [phone]   DISCHARGE SUPPORT TEAM (NETWORK) [phone]   PARENT CHILD HEALTH (Maternity/NICU/Pediatrics) [phone]   [facility] [phone]   [facility] [phone]   [facility] [phone]   [facility] [phone]   [facility] [phone]   [facility] [phone]   [facility] [phone]   [facility] [phone]   [facility] [phone]   [facility] [phone]   [facility] [phone]   [facility] [phone]

## 2024-10-28 NOTE — UTILIZATION REVIEW
NOTIFICATION OF ADMISSION DISCHARGE   This is a Notification of Discharge from [facility]. Please be advised that this patient has been discharge from our facility. Below you will find the admission and discharge date and time including the patient’s disposition.   UTILIZATION REVIEW CONTACT:  Jean-Pierre Pino  Utilization   Network Utilization Review Department  Phone: [phone] x carefully listen to the prompts. All voicemails are confidential.  Email: [email or web address]     ADMISSION INFORMATION  PRESENTATION DATE: 10/25/2024 10:39 AM  OBERVATION ADMISSION DATE: N/A  INPATIENT ADMISSION DATE: 10/25/24  2:12 PM   DISCHARGE DATE: 10/26/2024 12:50 PM   DISPOSITION:[facility] Utilization Review Department  ATTENTION: Please call with any questions or concerns to [phone] and carefully listen to the prompts so that you are directed to the right person. All voicemails are confidential.   For Discharge needs, contact Care Management DC Support Team at [phone] opt. 2  Send all requests for admission clinical reviews, approved or denied determinations and any other requests to dedicated fax number below belonging to the campus where the patient is receiving treatment. List of dedicated fax numbers for the Facilities:  FACILITY NAME UR FAX NUMBER   ADMISSION DENIALS (Administrative/Medical Necessity) [phone]   DISCHARGE SUPPORT TEAM ([facility]) [phone]   PARENT CHILD HEALTH (Maternity/NICU/Pediatrics) [phone]   [facility] [phone]   [facility] [phone]   [facility] [phone]   [facility] [phone]   [facility] [phone]   [facility] [phone]   [facility] [phone]   [facility]  [phone]   [facility] [phone]   [facility] [phone]   [facility] [phone]   [facility] [phone]

## 2024-10-28 NOTE — PROGRESS NOTES
Progress Note - Hospitalist   Name: Miguel eHnderson 84 y.o. male I MRN: [id number]  Unit/Bed#: [facility] 817-01 I Date of Admission: 10/26/2024   Date of Service: 10/28/2024 I Hospital Day: 2    Assessment & Plan  Encephalopathy  CT head negative for any acute abnormalities  MRI brain also without any acute abnormalities  Had LP performed which ruled out meningitis   Concern fevers are due to possible lymphoma  Transferred to [facility] for expedited lymphoma workup   Continue plasmapheresis per hematology  Appreciate oncology recommendations    Hypothyroidism  Continue Synthroid  Primary hypertension  BP controlled/soft  Patient previously on hydrochlorothiazide 25 mg daily will hold  Closely monitor  Fever  Does not appear to be infectious  Lumbar puncture negative  Appreciate ID recommendations  Follow fever curve  Continue to monitor patient off antibiotics  Management as above  Lymphadenopathy  Patient will have biopsy done by interventional radiology  Hematology oncology following    VTE Pharmacologic Prophylaxis: VTE Score: 5 High Risk (Score >/= 5) - Pharmacological DVT Prophylaxis Ordered: heparin. Sequential Compression Devices Ordered.    Mobility:   Basic Mobility Inpatient Raw Score: 14  -HL Goal: 4: Move to chair/commode  JH-HLM Achieved: 6: Walk 10 steps or more  JH-HLM Goal achieved. Continue to encourage appropriate mobility.    Patient Centered Rounds: I performed bedside rounds with nursing staff today.   Discussions with Specialists or Other Care Team Provider: Hematology oncology    Education and Discussions with Family / Patient: Updated  (wife and son) at bedside.    Current Length of Stay: 2 day(s)  Current Patient Status: Inpatient   Certification Statement: The patient will continue to require additional inpatient hospital stay due to please see above  Discharge Plan:  Pending results of workup and clinical progression    Code Status: Level 3 - DNAR and  DNKAYLYN [place]   Is a very pleasant 84-year-old gentleman who was seen evaluated today at bedside with son and wife.  Patient denies any acute complaints at this time.  Patient is agreeable to plan.  Patient states that he feels significantly better now when compared to when he first got here.    Objective :  Temp:  [98.6 °F (37 °C)-98.9 °F (37.2 °C)] 98.6 °F (37 °C)  HR:  [] 89  BP: (103-131)/(69-77) 120/73  Resp:  [16-24] 24  SpO2:  [94 %-98 %] 97 %  O2 Device: None (Room air)    Body mass index is 28.46 kg/m².     Input and Output Summary (last 24 hours):     Intake/Output Summary (Last 24 hours) at 10/28/2024 1424  Last data filed at 10/28/2024 0516  Gross per 24 hour   Intake 968.33 ml   Output 1950 ml   Net -981.67 ml       Physical Exam  Vitals reviewed.   HENT:      Head: Normocephalic.      Nose: No congestion.      Mouth/Throat:      Pharynx: No oropharyngeal exudate or posterior oropharyngeal erythema.   Eyes:      Conjunctiva/sclera: Conjunctivae normal.   Cardiovascular:      Rate and Rhythm: Normal rate and regular rhythm.   Pulmonary:      Effort: Pulmonary effort is normal.   Abdominal:      General: Abdomen is flat.      Palpations: Abdomen is soft.   Skin:     General: Skin is warm and dry.   Neurological:      Mental Status: He is alert and oriented to person, place, and time. Mental status is at baseline.           Lines/Drains:  Lines/Drains/Airways       Active Status       Name Placement date Placement time Site Days    HD Temporary Double Catheter 10/26/24  1451  Internal jugular  1                            Lab Results: I have reviewed the following results:   Results from last 7 days   Lab Units 10/28/24  0433 10/27/24  0639 10/26/24  1457   WBC Thousand/uL 7.36   < > 6.54   HEMOGLOBIN g/dL 9.0*   < > 9.0*   HEMATOCRIT % 27.3*   < > 25.5*   PLATELETS Thousands/uL 225   < > 161   BANDS PCT %  --   --  3   SEGS PCT % 28*  --   --    LYMPHO PCT % 51*  --  8   MONO PCT % 13*  --  7    EOS PCT % 5  --  6    < > = values in this interval not displayed.     Results from last 7 days   Lab Units 10/28/24  0433   SODIUM mmol/L 136   POTASSIUM mmol/L 3.7   CHLORIDE mmol/L 102   CO2 mmol/L 28   BUN mg/dL 7   CREATININE mg/dL 0.33*   ANION GAP mmol/L 6   CALCIUM mg/dL 7.1*   ALBUMIN g/dL 2.6*   TOTAL BILIRUBIN mg/dL 2.55*   ALK PHOS U/L 226*   ALT U/L 43   AST U/L 67*   GLUCOSE RANDOM mg/dL 99     Results from last 7 days   Lab Units 10/27/24  0639   INR  1.67*             Results from last 7 days   Lab Units 10/26/24  0428 10/25/24  1309 10/25/24  1121 10/23/24  0553 10/21/24  1936 10/21/24  1649   LACTIC ACID mmol/L  --  1.6  --   --  3.5* 4.2*   PROCALCITONIN ng/ml 0.43*  --  0.59* 2.06*  --  4.96*       Recent Cultures (last 7 days):   Results from last 7 days   Lab Units 10/25/24  1704 10/25/24  1309 10/25/24  1252 10/22/24  1015 10/21/24  1649   BLOOD CULTURE   --  No Growth at 48 hrs. No Growth at 48 hrs.  --  No Growth After 5 Days.  No Growth After 5 Days.   GRAM STAIN RESULT  1+ Mononuclear Cells  No organisms seen  --   --   --   --    C DIFF TOXIN B BY PCR   --   --   --  Negative  --        Imaging Results Review: No pertinent imaging studies reviewed.  Other Study Results Review: No additional pertinent studies reviewed.    Last 24 Hours Medication List:     Current Facility-Administered Medications:     acetaminophen (Ofirmev) injection 1,000 mg, Q6H PRN, Last Rate: 1,000 mg (10/26/24 1937)    fluticasone (FLONASE) 50 mcg/act nasal spray 1 spray, Daily    heparin (porcine) subcutaneous injection 5,000 Units, Q8H JOE **AND** [COMPLETED] Platelet count, Once    levothyroxine tablet 50 mcg, Daily    multi-electrolyte (PLASMALYTE-A/ISOLYTE-S PH 7.4) IV solution, Continuous, Last Rate: 100 mL/hr (10/28/24 0516)    ondansetron (ZOFRAN) injection 4 mg, Q6H PRN    pantoprazole (PROTONIX) EC tablet 40 mg, Early Morning    polyethylene glycol (MIRALAX) packet 17 g, Daily PRN    Administrative  Statements   Today, Patient Was Seen By: Darrius Gr MD  I have spent a total time of 40 minutes in caring for this patient on the day of the visit/encounter including Diagnostic results, Prognosis, Instructions for management, Risk factor reductions, Documenting in the medical record, Obtaining or reviewing history  , and Communicating with other healthcare professionals .    **Please Note: This note may have been constructed using a voice recognition system.**

## 2024-10-28 NOTE — PROGRESS NOTES
Medical Oncology/Hematology Progress Note  Miguel Henderson, male, 84 y.o., 1940,  PPHP 817/PPHP [id number], [id number]     Assessment and Plan:  Mr. Henderson is a Hungarian-speaking 84 yoM with PMHx of prostate cancer, hypertension, and hypothyroidism with encephalopathy, B symptoms, and lymphadenopathy concerning hyperviscosity syndrome secondary to lymphoma or lymphoproliferative disorder transferred from [facility] to [facility] to be evaluated for plasmapheresis.    1. Concern for hyperviscosity syndrome possibly secondary to lymphoma or lymphoproliferative disorder  2. Worsening R axillary lymphadenopathy  3. Mediastinal, b/l iliac chain, and b/l inguinal lymphadenopathy  4. B symptoms  5. Acute encephalopathy and lethargy  6. Positive cold agglutinin  - Called [facility] (Scott [phone]) to arrange for plasmapheresis which will be performed for at least two sessions  - Consulted IR for R IJ catheter (completed), core biopsy (pending), and BMBx (pending)  - Ordered 3.5 L of 5% albumin x2 days to be given with plasmapheresis  - Ordered calcium gluconate 2 g IV x2 days to be given with plasmapheresis  - Ordered the following labs:  - Viscosity  - Peripheral smear: Negative for schistocytes  - Fibrinogen: 132  - PT-INR: 1.67  - LDH: 560  - Cryoglobulin  - SPEP  - UPEP  - Immunoglobulins: IgA 1364, IgG 2330, IgM 565  - Free light chains  - Flow cytometry  - Beta-2 microglobulin  - Type and screen: Completed  - We will continue to follow closely  - We will establish outpatient follow up in our office within two weeks of discharge after his acute symptoms have resolved    Interval History:  NESSA, patient has had significant clinical improvement in neurologic status, discussed case with grandson and answered all questions, reviewed the patient's vitals which showed resolution of temp with prior Tmax of 103.3 on 10/26, and all other vitals roughly WNL, I reviewed the patient's labs which  show improvement in T. bili from 5.5-2.6, WBC 7.4, Hb 9.0, , and all other values for CBC and CMP roughly WNL, some specialty labs have returned as indicated in the assessment and plan, LP studies are negative for pending, BCx is NGTD    History of present illness:  Mr. Henderson is a Occitan-speaking 84-year-old gentleman with PMHx of prostate cancer (~15 years ago treated with surgery and radiation), hypertension, hypothyroidism, dysphagia, recent COVID and rhinovirus infections who presented to [facility] on 10/25/24 with encephalopathy, B symptoms and lymphadenopathy on imaging concerning for lymphoma with elevated clinical concern for potential hyperviscosity syndrome resulting in a transfer to [facility] to be evaluated for plasmapheresis.    The Patient has had three hospital admissions in the last three weeks.  He has been feeling unwell for several weeks, has had fevers, is very fatigued, has poor appetite and poor PO intake, and has noted weight loss.  Initially, his fevers and lymphadenopathy were attributed to recent COVID and rhinovirus infections.  Imaging did demonstrate lymphadenopathy again thought to be attributed to infection with instructions to follow-up with hematology/medical oncology in the outpatient setting to ensure resolution.  However, his symptoms continued and empiric antibiotics were with concern for meningitis.  Antibiotics were discontinued when LP was negative and ID does not feel there is sufficient evidence for meningitis.     Vitals show Temp 100.2, HR 93, RR 16, /65, and 96% SpO2 on RA.  Labs show strong cold agglutinin, WBC 8.5, Hb 9.4, , , K 3.3, chloride 96, , BUN 7, creatinine 0.56, glucose 88, total protein 6.6, T. bili 1.53, Mg 1.7.    CT CAP w/ (10/25/2024) demonstrates right axillary adenopathy that is worsened compared to previous imaging on 10/11/2024.  Unchanged mediastinal, bilateral iliac chain and  bilateral inguinal adenopathy compared to CT on 10/21/2024.  Mild potential pulmonary edema.  Small pleural effusions.  Unchanged cardiomegaly.  Right axillary and subpectoral adenopathy demonstrates a lymph node measuring 21 mm that was previously 12 mm.  Numerous new enlarged nodes unchanged subcentimeter left axillary nodes.  Right external iliac node measures 2.2 cm.  Left external iliac node demonstrates size of 10 mm.  Inguinal lymphadenopathy measures 16 mm on the right and 11 mm on the left.     MRI Brain (10/25/2024) demonstrates no acute infarct, mass effect or midline shift with mild to moderate chronic microangiopathy.  There is multiple indeterminate bilateral parotid nodes, the largest which measures up to 1 cm on the left, and is incompletely evaluated on this imaging.     Peripheral smear (10/22/24) showed RBCs with marked agglutination limiting further evaluation of morphologic features, slight left shift in WBC showing granulocytes with numerous fragile cells and scattered reactive monocytes and lymphocytes without distinct features of dysplasia or circulating blasts, platelets show normal morphology without significant clumps or satellitosis.  Overall, the combination of findings is not definitively specific, though the red blood cell changes are compatible with a cold agglutinin in the correct clinical setting. The white blood cell changes may raise the possibility of infection, chemotherapy or drug effect, toxin exposure, postvaccination, or other reactive condition. The presence of fragile white blood cells may be an artifact of specimen handling, though a component of drug effect or nonspecific reactive change cannot be entirely excluded. Correlation with clinical impression and other laboratory findings is recommended. If clinical suspicion for more significant disease process such as a lymphoproliferative disorder exists then consideration for repeat sampling with peripheral blood flow  cytometry may be of assistance to further evaluate the white blood cell changes as clinically indicated.  Flow cytometry was ordered and is pending.    Dr. Maxwell, hematology/oncology, coordinated care with [facility] primary attending, Dr. Borrero to arrange for urgent transfer to [place].  We called the [facility] to discuss the case and inform them of a possible need for plasmapheresis.  The patient arrived promptly to [facility].  We evaluated the patient at bedside and found him to be AAOx3, states that he feels slightly improved, but exhibited a degree of encephalopathy.  We contacted the [facility] again (Scott [phone]) who recommended ordering labs, 3.5 L albumin x2 days, and calcium gluconate 2 g x2 days.  We contacted IR who is planning to place urgent R IJ catheter.  Family history is positive for lymphoma in a brother.    Review of Systems:   ROS was performed and is negative except as indicated above.    Current Facility-Administered Medications:     acetaminophen (Ofirmev) injection 1,000 mg, 1,000 mg, Intravenous, Q6H PRN, Joseph Alexander MD, Last Rate: 400 mL/hr at 10/26/24 1937, 1,000 mg at 10/26/24 1937    fluticasone (FLONASE) 50 mcg/act nasal spray 1 spray, 1 spray, Nasal, Daily, Joseph Alexander MD, 1 spray at 10/28/24 0913    heparin (porcine) subcutaneous injection 5,000 Units, 5,000 Units, Subcutaneous, Q8H JOE, 5,000 Units at 10/28/24 0515 **AND** [COMPLETED] Platelet count, , , Once, Nancy Mondragon    levothyroxine tablet 50 mcg, 50 mcg, Oral, Daily, Joseph Alexander MD, 50 mcg at 10/28/24 0913    multi-electrolyte (PLASMALYTE-A/ISOLYTE-S PH 7.4) IV solution, 100 mL/hr, Intravenous, Continuous, oJseph Alexander MD, Last Rate: 100 mL/hr at 10/28/24 0516, 100 mL/hr at 10/28/24 0516    ondansetron (ZOFRAN) injection 4 mg, 4 mg, Intravenous, Q6H PRN, Joseph Alexander MD    pantoprazole (PROTONIX) EC tablet 40 mg, 40 mg, Oral, Early Morning, Joseph Alexander MD, 40 mg at  "10/28/24 0515    polyethylene glycol (MIRALAX) packet 17 g, 17 g, Oral, Daily PRN, Darrius Gr MD    Medications Prior to Admission:     benzonatate (TESSALON) 200 MG capsule    cyclobenzaprine (FLEXERIL) 5 mg tablet    fluticasone (FLONASE) 50 mcg/act nasal spray    hydroCHLOROthiazide 25 mg tablet    ibuprofen (MOTRIN) 800 mg tablet    levothyroxine 50 mcg tablet    levothyroxine 75 mcg tablet    [] nirmatrelvir & ritonavir (Paxlovid) tablet therapy pack    ondansetron (ZOFRAN) 4 mg tablet    pantoprazole (PROTONIX) 40 mg tablet    potassium chloride (Klor-Con M10) 10 mEq tablet    Allergies   Allergen Reactions    Shellfish Allergy - Food Allergy Anaphylaxis    Valtrex [Valacyclovir] Hives     Physical Exam:    /73   Pulse 89   Temp 98.6 °F (37 °C)   Resp (!) 24   Ht 5' 5\" (1.651 m)   Wt 77.6 kg (171 lb)   SpO2 97%   BMI 28.46 kg/m²     General: AAOx3, well-appearing, resting comfortably in chair  HEENT: PERRLA, moist mucosa, dentures  Respiratory: CTAB w/o wheezes, rales, or rhonchi, no increased work of breathing  Cardiovascular: RRR w/o murmurs, 2+ radial and pedal pulses, no b/l LE edema  Abdomen: soft, non-tender, non-distended, no hepatomegaly or splenomegaly  /Rectal: deferred  Musculoskeletal: moves all four extremities with normal strength and ROM  Integumentary: reticular rash across chest and arms, dry, no bruising  Neurological: no gross or focal deficits identified  Psychiatric: pleasant, confused, cooperative    Recent Results (from the past 48 hour(s))   LD,Blood    Collection Time: 10/26/24  2:57 PM   Result Value Ref Range     (H) 140 - 271 U/L   CBC and differential    Collection Time: 10/26/24  2:57 PM   Result Value Ref Range    WBC 6.54 4.31 - 10.16 Thousand/uL    RBC 2.66 (L) 3.88 - 5.62 Million/uL    Hemoglobin 9.0 (L) 12.0 - 17.0 g/dL    Hematocrit 25.5 (L) 36.5 - 49.3 %    MCV 96 82 - 98 fL    MCH 33.8 26.8 - 34.3 pg    MCHC 35.3 31.4 - 37.4 g/dL    RDW " 17.8 (H) 11.6 - 15.1 %    MPV 11.0 8.9 - 12.7 fL    Platelets 161 149 - 390 Thousands/uL    nRBC 0 /100 WBCs   Comprehensive metabolic panel    Collection Time: 10/26/24  2:57 PM   Result Value Ref Range    Sodium 129 (L) 135 - 147 mmol/L    Potassium 3.6 3.5 - 5.3 mmol/L    Chloride 95 (L) 96 - 108 mmol/L    CO2 31 21 - 32 mmol/L    ANION GAP 3 (L) 4 - 13 mmol/L    BUN 9 5 - 25 mg/dL    Creatinine 0.52 (L) 0.60 - 1.30 mg/dL    Glucose 93 65 - 140 mg/dL    Calcium 6.9 (L) 8.4 - 10.2 mg/dL    Corrected Calcium 8.6 8.3 - 10.1 mg/dL     (H) 13 - 39 U/L    ALT 61 (H) 7 - 52 U/L    Alkaline Phosphatase 454 (H) 34 - 104 U/L    Total Protein 6.8 6.4 - 8.4 g/dL    Albumin 1.9 (L) 3.5 - 5.0 g/dL    Total Bilirubin 2.47 (H) 0.20 - 1.00 mg/dL    eGFR 96 ml/min/1.73sq m   IgG, IgA, IgM    Collection Time: 10/26/24  2:57 PM   Result Value Ref Range    IGA 1,364 (H) 66 - 433 mg/dL    IGG 2,330 (H) 635 - 1,741 mg/dL     (H) 45 - 281 mg/dL   Protime-INR    Collection Time: 10/26/24  2:57 PM   Result Value Ref Range    Protime 16.7 (H) 12.3 - 15.0 seconds    INR 1.33 (H) 0.85 - 1.19   Peripheral Smear    Collection Time: 10/26/24  2:57 PM   Result Value Ref Range    Total Counted 100     Segmented % 75 %    Bands % 3 0 - 8 %    Lymphocytes % 8 %    Monocytes % 7 4 - 12 %    Eosinophils % 6 0 - 6 %    Metamyelocytes % 1 0 - 1 %    Absolute Neutrophils 5.10 1.81 - 6.82 Thousand/uL    Absolute Lymphocytes 0.52 (L) 0.60 - 4.47 Thousand/uL    Absolute Monocytes 0.46 0.00 - 1.22 Thousand/uL    Absolute Eosinophils 0.39 0.00 - 0.61 Thousand/uL    Absolute Metamyelocytes 0.07 0.00 - 0.10 Thousand/uL    RBC Morphology Present     Platelet Estimate Adequate Adequate    Anisocytosis Present    Fibrinogen    Collection Time: 10/26/24  2:57 PM   Result Value Ref Range    Fibrinogen 132 (L) 206 - 523 mg/dL   Type and screen    Collection Time: 10/26/24  2:57 PM   Result Value Ref Range    ABO Grouping A     Rh Factor Positive      Antibody Screen Negative     Specimen Expiration Date 20241029    [facility] Recheck - Contact Blood Bank Prior to Collection    Collection Time: 10/26/24  4:49 PM   Result Value Ref Range    ABO Grouping A     Rh Factor Positive    CBC and differential    Collection Time: 10/27/24  6:39 AM   Result Value Ref Range    WBC 8.94 4.31 - 10.16 Thousand/uL    RBC 2.92 (L) 3.88 - 5.62 Million/uL    Hemoglobin 9.3 (L) 12.0 - 17.0 g/dL    Hematocrit 27.1 (L) 36.5 - 49.3 %    MCV 93 82 - 98 fL    MCH 31.8 26.8 - 34.3 pg    MCHC 34.3 31.4 - 37.4 g/dL    RDW 16.1 (H) 11.6 - 15.1 %    MPV 10.5 8.9 - 12.7 fL    Platelets 223 149 - 390 Thousands/uL    nRBC 0 /100 WBCs   Comprehensive metabolic panel    Collection Time: 10/27/24  6:39 AM   Result Value Ref Range    Sodium 133 (L) 135 - 147 mmol/L    Potassium 3.9 3.5 - 5.3 mmol/L    Chloride 100 96 - 108 mmol/L    CO2 27 21 - 32 mmol/L    ANION GAP 6 4 - 13 mmol/L    BUN 6 5 - 25 mg/dL    Creatinine 0.52 (L) 0.60 - 1.30 mg/dL    Glucose 109 65 - 140 mg/dL    Calcium 7.3 (L) 8.4 - 10.2 mg/dL    Corrected Calcium 8.1 (L) 8.3 - 10.1 mg/dL     (H) 13 - 39 U/L    ALT 52 7 - 52 U/L    Alkaline Phosphatase 237 (H) 34 - 104 U/L    Total Protein 5.5 (L) 6.4 - 8.4 g/dL    Albumin 3.0 (L) 3.5 - 5.0 g/dL    Total Bilirubin 5.53 (H) 0.20 - 1.00 mg/dL    eGFR 96 ml/min/1.73sq m   Magnesium    Collection Time: 10/27/24  6:39 AM   Result Value Ref Range    Magnesium 1.8 (L) 1.9 - 2.7 mg/dL   Protime-INR    Collection Time: 10/27/24  6:39 AM   Result Value Ref Range    Protime 19.8 (H) 12.3 - 15.0 seconds    INR 1.67 (H) 0.85 - 1.19   Phosphorus    Collection Time: 10/27/24  6:39 AM   Result Value Ref Range    Phosphorus 1.9 (L) 2.3 - 4.1 mg/dL   Uric acid    Collection Time: 10/27/24  6:39 AM   Result Value Ref Range    Uric Acid 2.5 (L) 3.5 - 8.5 mg/dL   Ferritin    Collection Time: 10/27/24  6:39 AM   Result Value Ref Range    Ferritin 1,028 (H) 24 - 336 ng/mL   Platelet count    Collection  Time: 10/27/24  1:18 PM   Result Value Ref Range    Platelets 211 149 - 390 Thousands/uL    MPV 9.7 8.9 - 12.7 fL   Hemolysis Smear    Collection Time: 10/27/24  1:18 PM   Result Value Ref Range    Hemolysis Smear No Schistocytes or Helmet Cells noted    HIV 1/2 AG/AB w Reflex DEVENDRAN for 2 yr old and above    Collection Time: 10/27/24  1:18 PM   Result Value Ref Range    HIV-1 p24 Antigen Non-Reactive Non-Reactive    HIV-1 Antibody Non-Reactive Non-Reactive    HIV-2 Antibody Non-Reactive Non-Reactive    HIV Ag-Ab 5th Gen Non-Reactive Non-Reactive   CBC and differential    Collection Time: 10/28/24  4:33 AM   Result Value Ref Range    WBC 7.36 4.31 - 10.16 Thousand/uL    RBC 2.93 (L) 3.88 - 5.62 Million/uL    Hemoglobin 9.0 (L) 12.0 - 17.0 g/dL    Hematocrit 27.3 (L) 36.5 - 49.3 %    MCV 93 82 - 98 fL    MCH 30.7 26.8 - 34.3 pg    MCHC 33.0 31.4 - 37.4 g/dL    RDW 16.0 (H) 11.6 - 15.1 %    MPV 10.2 8.9 - 12.7 fL    Platelets 225 149 - 390 Thousands/uL    nRBC 0 /100 WBCs    Segmented % 28 (L) 43 - 75 %    Immature Grans % 2 0 - 2 %    Lymphocytes % 51 (H) 14 - 44 %    Monocytes % 13 (H) 4 - 12 %    Eosinophils Relative 5 0 - 6 %    Basophils Relative 1 0 - 1 %    Absolute Neutrophils 2.07 1.85 - 7.62 Thousands/µL    Absolute Immature Grans 0.13 0.00 - 0.20 Thousand/uL    Absolute Lymphocytes 3.77 0.60 - 4.47 Thousands/µL    Absolute Monocytes 0.93 0.17 - 1.22 Thousand/µL    Eosinophils Absolute 0.40 0.00 - 0.61 Thousand/µL    Basophils Absolute 0.06 0.00 - 0.10 Thousands/µL   Comprehensive metabolic panel    Collection Time: 10/28/24  4:33 AM   Result Value Ref Range    Sodium 136 135 - 147 mmol/L    Potassium 3.7 3.5 - 5.3 mmol/L    Chloride 102 96 - 108 mmol/L    CO2 28 21 - 32 mmol/L    ANION GAP 6 4 - 13 mmol/L    BUN 7 5 - 25 mg/dL    Creatinine 0.33 (L) 0.60 - 1.30 mg/dL    Glucose 99 65 - 140 mg/dL    Calcium 7.1 (L) 8.4 - 10.2 mg/dL    Corrected Calcium 8.2 (L) 8.3 - 10.1 mg/dL    AST 67 (H) 13 - 39 U/L    ALT  43 7 - 52 U/L    Alkaline Phosphatase 226 (H) 34 - 104 U/L    Total Protein 5.3 (L) 6.4 - 8.4 g/dL    Albumin 2.6 (L) 3.5 - 5.0 g/dL    Total Bilirubin 2.55 (H) 0.20 - 1.00 mg/dL    eGFR 116 ml/min/1.73sq m   Magnesium    Collection Time: 10/28/24  4:33 AM   Result Value Ref Range    Magnesium 2.0 1.9 - 2.7 mg/dL     MRI brain w wo contrast    Result Date: 10/26/2024  Narrative: MRI BRAIN WITH AND WITHOUT CONTRAST INDICATION: confusion,. COMPARISON: CT head 10/25/2024. TECHNIQUE: Multiplanar, multisequence imaging of the brain was performed before and after gadolinium administration. IV Contrast:  7 mL of Gadobutrol injection (SINGLE-DOSE) IMAGE QUALITY:   Motion-degraded, which reduces diagnostic sensitivity. FINDINGS: BRAIN PARENCHYMA: No acute infarct, mass effect or midline shift. Mild-moderate chronic ischemic changes of the white matter. Postcontrast imaging of the brain demonstrates no abnormal enhancement. VENTRICLES:  Normal for the patient's age. SELLA AND PITUITARY GLAND:  Normal. ORBITS: No acute abnormality. PARANASAL SINUSES: Mild mucosal thickening. VASCULATURE: Please refer to CTA. CALVARIUM AND SKULL BASE: No acute abnormality. EXTRACRANIAL SOFT TISSUES: Multiple bilateral parotid nodules, the largest of which measures up to 1.0 cm in the left parotid gland (series 6, image 1), noting these are not well evaluated.     Impression: Within the confines of a motion-degraded examination: 1. No acute infarct, mass effect or midline shift. Mild-moderate chronic microangiopathy. 2. Multiple indeterminate bilateral parotid nodules, the largest of which measures up to 1.0 cm on the left, incompletely evaluated. If clinically appropriate, this could be further evaluated with contrast-enhanced neck CT on a nonemergent basis. The study was marked in EPIC for immediate notification. Workstation performed: [id number]     IR lumbar puncture    Result Date: 10/25/2024  Narrative: Lumbar puncture under fluoroscopic  control. Clinical History: Confusion and fever Fluoro time: 0.9 minutes Conscious sedation time: 35 minutes Number of Images: 1 Technique: The patient was brought to the interventional radiology suite and identified by wristband. The patient was placed prone on the table, and the L4-5 spinous processes were identified. The skin was then prepped and draped in usual sterile fashion. Lidocaine was administered to the skin, and under fluoroscopic control, a 20 gauge spinal needle was advanced into the subarachnoid space between the L4-5 spinous processes . The table was then tilted into reverse Trendelenburg. 30 cc of clear spinal fluid was removed under it's own pressure into 4 separate sequential tubes. The tubes were sent to laboratory for testing as requested by the referring physician. After the procedure, the needle was removed, and a sterile dressing applied to the site. The patient tolerated the procedure well and suffered no complications.     Impression: Impression: 1. Successful lumbar puncture under fluoroscopic control. 2. 30 cc of clear spinal fluid was removed and sent to laboratory as described above. Workstation performed: [id number]     CT chest abdomen pelvis w contrast    Result Date: 10/25/2024  Narrative: CT CHEST, ABDOMEN AND PELVIS WITH IV CONTRAST INDICATION: Confusion, abdominal pain, emesis, generalized abdominal tenderness. COMPARISON: CT abdomen pelvis 10/21/2024. CT chest abdomen pelvis 10/11/2024. TECHNIQUE: CT examination of the chest, abdomen and pelvis was performed. Multiplanar 2D reformatted images were created from the source data. This examination, like all CT scans performed in the [facility], was performed utilizing techniques to minimize radiation dose exposure, including the use of iterative reconstruction and automated exposure control. Radiation dose length product (DLP) for this visit: 850.79 mGy-cm IV Contrast: 100 mL of iohexol (OMNIPAQUE) Enteric  Contrast: Not administered. FINDINGS: CHEST LUNGS: Mild interlobular septal thickening through both lower lobes may represent mild pulmonary edema. Mild bibasilar atelectasis. No focal consolidation. Unchanged known 4 mm and smaller pulmonary nodules. PLEURA: Small bibasilar pleural effusions. HEART/GREAT VESSELS: Cardiomegaly. Coronary artery calcifications. No thoracic aortic aneurysm. MEDIASTINUM AND JOE: Unchanged mild mediastinal and bilateral parahilar adenopathy. Dominant 16 mm precarinal node #2/49. Reidentified diffuse esophageal distention. Diffuse esophageal fluid may indicate reflux. CHEST WALL AND LOWER NECK: Worsened right axillary and right subpectoral adenopathy. Previously seen dominant 12 mm node now measures 21 mm #2/29. Numerous new enlarged nodes. Unchanged subcentimeter left axillary nodes. ABDOMEN LIVER/BILIARY TREE: Unremarkable. GALLBLADDER: No calcified gallstones. No pericholecystic inflammatory change. SPLEEN: Unremarkable. PANCREAS: Unremarkable. ADRENAL GLANDS: Unremarkable. KIDNEYS/URETERS: No hydronephrosis. Numerous unchanged bilateral simple renal cysts. Well-circumscribed subcentimeter bilateral renal hypoattenuating lesions are also unchanged and likely to represent subcentimeter simple cyst. STOMACH AND BOWEL: Unremarkable. APPENDIX: No findings to suggest appendicitis. ABDOMINOPELVIC CAVITY: No ascites. No pneumoperitoneum. Unchanged bilateral iliac chain adenopathy. A dominant 2.2 cm right external iliac node #2/199 is unchanged. Dominant 10 mm left external iliac node #2/185 is unchanged. Unchanged 9 mm left pelvic sidewall node #2/210. Numerous unchanged subcentimeter retroperitoneal nodes. 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 PELVIS REPRODUCTIVE ORGANS: Prostatectomy. URINARY BLADDER: Unremarkable. ABDOMINAL WALL/INGUINAL REGIONS: Unchanged bilateral inguinal adenopathy. Dominant 16 mm right inguinal node  #2/214. Dominant 11 mm left inguinal node #2/237. BONES: No acute fracture or suspicious osseous lesion.     Impression: Right axillary adenopathy is worsened when compared with CT chest 10/11/2024 Unchanged mediastinal, bilateral iliac chain and bilateral inguinal adenopathy when compared with CT abdomen pelvis 10/21/2024. Potential mild pulmonary edema. Small pleural effusions. Unchanged cardiomegaly. The study was marked in EPIC for immediate notification. Workstation performed: [id number]     CT head without contrast    Result Date: 10/25/2024  Narrative: CT BRAIN - WITHOUT CONTRAST INDICATION:   Confusion, non focal. COMPARISON:  None. TECHNIQUE:  CT examination of the brain was performed.  Multiplanar 2D reformatted images were created from the source data. Radiation dose length product (DLP) for this visit:  870.27 mGy-cm .  This examination, like all CT scans performed in the [facility], was performed utilizing techniques to minimize radiation dose exposure, including the use of iterative  reconstruction and automated exposure control. IMAGE QUALITY:  Diagnostic. FINDINGS: PARENCHYMA: Decreased attenuation is noted in periventricular and subcortical white matter demonstrating an appearance that is statistically most likely to represent moderate microangiopathic change. No CT signs of acute infarction.  No intracranial mass, mass effect or midline shift.  No acute parenchymal hemorrhage. VENTRICLES AND EXTRA-AXIAL SPACES:  Normal for the patient's age. VISUALIZED ORBITS: Normal visualized orbits. PARANASAL SINUSES: Normal visualized paranasal sinuses. CALVARIUM AND EXTRACRANIAL SOFT TISSUES: Normal.     Impression: No acute intracranial abnormality. Workstation performed: [id number]     XR chest 1 view portable    Result Date: 10/22/2024  Narrative: XR CHEST PORTABLE INDICATION: Cough/fever. COMPARISON: Chest radiograph 10/8/2024; CT chest, abdomen and pelvis 10/11/2024 FINDINGS: Monitoring  leads and clips project over the chest. Mild bibasilar atelectasis. No focal consolidation. Blunting of the left costophrenic angle suggesting trace pleural effusion. No pneumothorax. Normal cardiomediastinal silhouette. Degenerative changes of the spine and bilateral shoulders. Normal upper abdomen.     Impression: Mild bibasilar atelectasis and trace left pleural effusion. Resident: MARTHA Whiteside I, the attending radiologist, have reviewed the images and agree with the final report above. Workstation performed: [id number]     CT abdomen pelvis with contrast    Result Date: 10/21/2024  Narrative: CT ABDOMEN AND PELVIS WITH IV CONTRAST INDICATION:, Nausea, vomiting and diarrhea. . COMPARISON: 10/11/2024. TECHNIQUE: CT examination of the abdomen and pelvis was performed. Multiplanar 2D reformatted images were created from the source data. This examination, like all CT scans performed in the [facility], was performed utilizing techniques to minimize radiation dose exposure, including the use of iterative reconstruction and automated exposure control. Radiation dose length product (DLP) for this visit: 585 mGy-cm IV Contrast: 100 mL of iohexol (OMNIPAQUE) Enteric Contrast: Not administered. FINDINGS: ABDOMEN LOWER CHEST: Small left pleural effusion and left basilar subsegmental atelectasis. LIVER/BILIARY TREE: Unremarkable. GALLBLADDER: No calcified gallstones. No pericholecystic inflammatory change. SPLEEN: Unremarkable. PANCREAS: Unremarkable. ADRENAL GLANDS: Unremarkable. KIDNEYS/URETERS: Symmetric nephrographic phase enhancement the kidneys. Cysts measure up to 6.4 cm. Multiple left renal cysts measuring up to 8.2 cm. No obstructive uropathy. STOMACH AND BOWEL: Unremarkable. APPENDIX: Prior appendectomy. ABDOMINOPELVIC CAVITY: There are aortic and bilateral iliac chain lymphadenopathy measuring up to 1.5 cm VESSELS: Mild aneurysmal dilatation of the aorta at the level of the renal arteries measuring  3.5 cm. PELVIS REPRODUCTIVE ORGANS: Prior prostatectomy. URINARY BLADDER: Unremarkable. ABDOMINAL WALL/INGUINAL REGIONS: Enlarged bilateral inguinal lymph nodes with normal fatty joe, measuring up to 1.9 cm.. BONES: No acute fracture or suspicious osseous lesion.     Impression: 1. No evidence of bowel obstruction, colitis or diverticulitis. 2. Numerous mildly enlarged para-aortic, iliac chain and inguinal lymph nodes, possibly reactive however lymphoproliferative disorder not excluded. Workstation performed: [id number]     CT chest abdomen pelvis w contrast    Result Date: 10/12/2024  Narrative: CT CHEST, ABDOMEN AND PELVIS WITH IV CONTRAST INDICATION: intermittent persistent fevers. COMPARISON: 10/8/2024. TECHNIQUE: CT examination of the chest, abdomen and pelvis was performed. Multiplanar 2D reformatted images were created from the source data. This examination, like all CT scans performed in the [facility], was performed utilizing techniques to minimize radiation dose exposure, including the use of iterative reconstruction and automated exposure control. Radiation dose length product (DLP) for this visit: 898.73 mGy-cm IV Contrast: 100 mL of iohexol (OMNIPAQUE) Enteric Contrast: Not administered. FINDINGS: Moderately degraded by respiratory motion and retained barium in the colon. CHEST LUNGS: There is bibasilar atelectasis. Stable 3 mm right apical lung nodule on series 4 image 24. Additional nodules are not well seen due to respiratory motion. No tracheal or endobronchial lesion. PLEURA: There are small bilateral pleural effusions. HEART/GREAT VESSELS: Heart is unremarkable for patient's age. No thoracic aortic aneurysm. MEDIASTINUM AND JOE: There is diffuse distention of the esophagus as seen previously. No obstructing lesion is visualized. CHEST WALL AND LOWER NECK: Again seen is an enlarged right axillary lymph node measuring 1.2 x 1.9 cm, nonspecific. ABDOMEN LIVER/BILIARY TREE: There is  a 1.9 x 1.6 cm enhancing lesion in segment 6 of the liver, indeterminate. GALLBLADDER: Not well-visualized due to under distention and beam hardening artifact from adjacent barium. SPLEEN: Unremarkable. PANCREAS: Unremarkable. ADRENAL GLANDS: Unremarkable. KIDNEYS/URETERS: There are numerous bilateral renal cysts. No hydronephrosis. STOMACH AND BOWEL: Unremarkable. APPENDIX: No findings to suggest appendicitis. ABDOMINOPELVIC CAVITY: No ascites. No pneumoperitoneum. No lymphadenopathy. 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 PELVIS REPRODUCTIVE ORGANS: Unremarkable for patient's age. URINARY BLADDER: Unremarkable. ABDOMINAL WALL/INGUINAL REGIONS: Unremarkable. BONES: No acute fracture or suspicious osseous lesion.     Impression: Moderately limited by respiratory motion. 1.  Small bilateral pleural effusions with overlying atelectasis. 2.  3 mm right apical lung nodule. Based on current Fleischner Society 2017 Guidelines on incidental pulmonary nodule, no routine follow-up is needed if the patient is low risk. If the patient is high risk, optional follow-up chest CT at 12 months can be  considered. 3.  Stable diffusely dilated esophagus. This may be related to dysmotility. Obstructing lesion is not visualized. This would be better evaluated with esophagram. 4.  No acute abnormality in the abdomen or pelvis. 5.  Enhancing liver lesion, indeterminate. Possibilities include hemangioma and vascular malformation though other etiologies including neoplasm not excluded. Follow-up nonemergent MRI is recommended. The study was marked in EPIC for immediate notification. Workstation performed: [id number]     XR knee 3 vw left non injury    Result Date: 10/10/2024  Narrative: XR KNEE 3 VW LEFT NON INJURY INDICATION: left knee pain. COMPARISON: None FINDINGS: No acute fracture or dislocation. No joint effusion. No significant  degenerative changes. No lytic or blastic osseous lesion. Unremarkable soft tissues.     Impression: No acute osseous abnormality. Computerized Assisted Algorithm (CAA) may have been used to analyze all applicable images. Workstation performed: [id number]     FL barium swallow video w speech    Result Date: 10/9/2024  Narrative: A video barium swallow study was performed by the Department of Speech Pathology. Please refer to the report for the official interpretation. The images are stored for archival purposes only. Study images were not formally reviewed by the Radiology Department.    FL barium swallow video w speech    Result Date: 10/9/2024  Narrative: JARROD Marie     10/9/2024  3:14 PM                                  Video Swallow Study Patient Name: Miguel Henderson Today's Date: 10/9/2024   Past Medical History Past Medical History: Diagnosis Date  Prostate cancer (HCC)   Thyroid cancer (HCC)   Past Surgical History Past Surgical History: Procedure Laterality Date  APPENDECTOMY   Summary: Images are on PACS for review. Oral Phase : Pt presented with mild oral dysphagia. Mastication was mod prolonged however eventual functional breakdown. Bolus control, formation were WNL. Transfer was piecemeal. Trace posterior base of tongue residue. Pharyngeal Phase :Pt presented with mild pharyngeal dysphagia. Swallow initiation was intermittently min delayed with solids and prompt with liquids. Spill to the valleculae occurred with solids. Hyoid elevation and laryngeal excursion were WNL. Pharyngeal stripping wave diminished with solid trials. Epiglottic inversion was mostly complete. With nutri grain min epiglottic undercoat present. Trace vallecular retention with thin liquids and mild/mod with solids. Pt intermittently initiated secondary swallow to reduce residue. No pyriform retention. Large CP bar C4-C5 evident however did not impede bolus flow. No aspiration or penetration observed with trials. Per Esophageal  screen Slow motility observed with liquids and solids. High retropulsion observed with liquids, pt risk for bottom up aspiration. Stasis occurred in distal esophagus with 13 mm pill however provided with additional sip of thin, ntl, and puree. Provided additional time x4 minutes pt continued with residue with esophagus. Observations: Son present throughout study provided translation. Recommendations: Diet: Dysphagia 3 dental soft Liquids:thin Meds: whole with thin liquids Strategies:slow rate, alternate liquids with solids, swallow prior to additional po Upright position F/u ST tx: yes Therapy Prognosis: fair Prognosis considerations:age, medical status, Intermittent/Distant Supervision Aspiration Precautions Reflux Precautions Consider consult with: GI, rehab Results reviewed with: pt, nursing, physician. ST reviewed images and recommendations reviewed with family at bedside. Aspiration precautions posted. If a dedicated assessment of the esophagus is desired, consider esophagram/barium swallow or EGD. Goals: Dysphagia LTG -Patient will demonstrate safe and effective oral intake (without overt s/s significant oral/pharyngeal dysphagia including s/s penetration or aspiration) for the highest appropriate diet level. Short Term Goals: -Pt will tolerate Dysphagia 3/advanced (dental soft) diet and thin liquid with no significant s/s oral or pharyngeal dysphagia across 1-3 diagnostic session/s. -Patient will tolerate trials of upgraded food and/or liquid texture with no significant s/s of oral or pharyngeal dysphagia including aspiration across 1-3 diagnostic sessions -Patient will comply with a Video/Modified Barium Swallow study for more complete assessment of swallowing anatomy/physiology/aspiration risk and to assess efficacy of treatment techniques -Patient will demonstrate the ability to adequately self-monitor swallowing skills and perform appropriate compensatory techniques to reduce overt s/sx of  penetration/aspiration to safely tolerate least restrictive food/liquid consistencies. Patient's goal: none stated H&P/Admit info, pertinent provider notes/procedures/studies/PMH:(copied and placed in this report) Miguel Henderson is a 84 y.o. male with a PMH of prostate cancer and thyroid cancer who presents with fever x 4 days.  Patient is Vietnamese-speaking only and history is obtained through assistance of his son who is present at bedside and acting as .  Patient has been having intermittent fevers which have been responsive to Tylenol and Motrin at home but have ranged as high as 103.7 and yesterday was having some apparent respiratory distress but he has no respiratory history and is on no respiratory medications at home.  Patient does have a scant cough which is nonproductive and denies any increased urinary frequency or dysuria though he does complain of some hematuria which has been ongoing for the past month.  Patient additionally has been complaining of a sore throat for the past 4 days with decreased p.o. fluid intake secondary to pain with swallowing.  While in ER patient was noted to meet severe sepsis criteria and was thus referred for admission. Special Studies XR CHEST PORTABLE 10/08/2024 IMPRESSION: Mild tree-in-bud pattern of opacity mostly in the right lung and in the left base again suggesting a mild infectious/inflammatory condition CT CHEST WITHOUT IV CONTRAST 10/08/2024 IMPRESSION: Mild bibasilar probable atelectatic changes, right greater than left. Otherwise no focal airspace consolidation to suggest pneumonia. Minor tree-in-bud nodularity in the right lung which could relate to infectious versus inflammatory small airways disease. Clinical correlation recommended. Pulmonary nodules as above. Based on current Fleischner Society 2017 Guidelines on incidental pulmonary nodule, no routine follow-up is needed if the patient is low risk. If the patient is high risk, optional follow-up  chest CT at 12 months can be considered. Trace right pleural effusion. Mild right axillary lymphadenopathy, nonspecific Code Status: Level 1 full code Previous VBS: none Precautions Contact Precautions Food allergies:  No know  Current diet:  Regular diet and thin liquids  Premorbid diet:  Regular diet and thin liquids  Dentition  Upper and lower dentures Oral [facility]  WNL O2 requirements:  Room air  Vocal Quality/Speech WNL Cognitive status:  Alert  Consistencies administered: Barium laden applesauce, nutri grain bar, sandwich bite, thin liquids,ntl and  13mm barium pill. Liquids were administered by cup. Pt was seated laterally at 90 degrees. Pt  unable to be viewed in AP position, due to transfer status      Echo complete w/ contrast if indicated    Result Date: 10/9/2024  Narrative:   Left Ventricle: Left ventricular cavity size is normal. Wall thickness is mildly increased. The left ventricular ejection fraction is 55%. Systolic function is normal. Wall motion is normal.   Aortic Valve: There is aortic valve sclerosis.   Aorta: The aortic root is mildly dilated. The ascending aorta is mildly dilated. The aortic root is 3.80 cm. The ascending aorta is 3.9 cm.     XR chest portable    Result Date: 10/8/2024  Narrative: XR CHEST PORTABLE INDICATION: shortness of breath. COMPARISON: 10/7/2024 FINDINGS: Minimal tree-in-bud pattern of opacity mostly in the right lung, but also at the left base. No focal dense consolidation. The trace amount of pleural fluid seen on the prior CT is not apparent on this exam. No pneumothorax identified. Stable cardiomediastinal silhouette Degenerative changes noted along the spine. Normal upper abdomen.     Impression: Mild tree-in-bud pattern of opacity mostly in the right lung and in the left base again suggesting a mild infectious/inflammatory condition Workstation performed: [id number]     XR chest portable - 1 view    Result Date: 10/8/2024  Narrative: XR CHEST PORTABLE INDICATION:  Fever cough. COMPARISON: None FINDINGS: Clear lungs. No pneumothorax or pleural effusion. Normal cardiomediastinal silhouette. Bones are unremarkable for age. Normal upper abdomen.     Impression: No acute cardiopulmonary disease. Workstation performed: [id number]     CT chest wo contrast    Result Date: 10/8/2024  Narrative: CT CHEST WITHOUT IV CONTRAST INDICATION: Cough shortness of breath with fever. COMPARISON: None. TECHNIQUE: CT examination of the chest was performed without intravenous contrast. Multiplanar 2D reformatted images were created from the source data. This examination, like all CT scans performed in the [facility], was performed utilizing techniques to minimize radiation dose exposure, including the use of iterative reconstruction and automated exposure control. Radiation dose length product (DLP) for this visit: 441.84 mGy-cm FINDINGS: LUNGS: Evaluation somewhat limited by respiratory motion. There is some minor groundglass and tree-in-bud nodularity suggested in the right upper lobe inferior segment and to a lesser degree in the right lower lobe. No lobar consolidation. Trace paraseptal emphysematous changes. Mild basilar atelectatic changes, right greater than left. There is minor scarring in the posteromedial right lower lobe adjacent to bulky paravertebral osteophytes. 4 mm pulmonary nodule seen in the right upper lobe (3:35). Additional scattered pulmonary nodules are seen measuring on the order of 2 to 3 mm. Based on current Fleischner Society 2017 Guidelines on incidental pulmonary nodule, no routine follow-up is needed if the patient is low risk. If the patient is high risk, optional follow-up chest CT at 12 months can be considered. PLEURA: Trace right pleural effusion with subjacent atelectasis. HEART/GREAT VESSELS: Heart is unremarkable for patient's age. No thoracic aortic aneurysm. Coronary atherosclerosis MEDIASTINUM AND JOE: Shotty subcentimeter mediastinal lymph  nodes noted. Evaluation for hilar lymphadenopathy limited in the absence of intravenous contrast. CHEST WALL AND LOWER NECK: Gynecomastia. Few prominent/mildly enlarged lymph nodes seen in the right axillary region, largest measuring up to 1.2 cm,. VISUALIZED STRUCTURES IN THE UPPER ABDOMEN: No acute abnormality. Bilateral renal cysts. OSSEOUS STRUCTURES: Spinal degenerative changes are noted. No acute fracture or destructive osseous lesion.     Impression: Mild bibasilar probable atelectatic changes, right greater than left. Otherwise no focal airspace consolidation to suggest pneumonia. Minor tree-in-bud nodularity in the right lung which could relate to infectious versus inflammatory small airways disease. Clinical correlation recommended. Pulmonary nodules as above. Based on current Fleischner Society 2017 Guidelines on incidental pulmonary nodule, no routine follow-up is needed if the patient is low risk. If the patient is high risk, optional follow-up chest CT at 12 months can be considered. Trace right pleural effusion. Mild right axillary lymphadenopathy, nonspecific. Workstation performed: [id number]     Labs and pertinent reports reviewed.      This note has been generated by voice recognition software system.  Therefore, there may be spelling, grammar, and or syntax errors. Please contact if questions arise.    Additional recommendations to follow per attending, Dr. Maxwell.    Scottie Ding DO, PGY4  Hematology/Oncology Fellow

## 2024-10-28 NOTE — RAPID RESPONSE
"Rapid Response Note  Miguel Henderson 84 y.o. male MRN: [id number]  [facility] Encounter: [id number]    Rapid Response Notification(s):   Response called date/time:  10/28/2024 5:01 PM  Response team arrival date/time:  10/28/2024 5:02 PM  Response end date/time:  10/28/2024 5:12 PM  [facility]  Rapid response location:  [facility] unit  Primary reason for rapid response call:  Fall    Rapid Response Intervention(s):   Airway:  None  Breathing:  None  Circulation:  None  Fluids administered:  None  Medications administered:  None       Assessment:   Unwitnessed fall    Plan:   CT head, C-spine, pelvis x-ray  Fall prevention strategies  Remainder of care to be continued by primary service     Rapid Response Outcome:   Transfer:  Remain on floor  Primary service notified of transfer: No    Code Status: Level 3 (DNAR and DNI)      Family notified: Primary team to notify Family Member       Background/Situation:   Miguel Henderson is a 84 y.o. male who sustained an [facility]itnessed [facility] Patient attempted to ambulate to restroom and his legs felt \"shaky.\" He claims he fell on his buttock. Denies head strike. He has mid pain in his sacral area without any overt outward signs of trauma. He is on SQH for DVT PPX but not on any blood thinners, no coagulopathy, no thrombocytopenia. He was able to bear weight with assistance and was returned to bed.    Review of Systems   Musculoskeletal:  Positive for arthralgias (Lower back).   All other systems reviewed and are negative.      Objective:   Vitals:    10/27/24 1429 10/27/24 1840 10/27/24 2240 10/28/24 0248   BP: 103/69 131/77 123/72 120/73   Pulse: 91 100 88 89   Resp:  16 16 (!) 24   Temp: 98.6 °F (37 °C) 98.9 °F (37.2 °C) 98.8 °F (37.1 °C) 98.6 °F (37 °C)   TempSrc:       SpO2: 95% 94% 98% 97%   Weight:       Height:         Physical Exam  Vitals and nursing note reviewed. Exam conducted with a chaperone present. "   Constitutional:       General: He is not in acute distress.     Appearance: He is not ill-appearing.   HENT:      Head: Normocephalic and atraumatic.      Nose: Nose normal.      Mouth/Throat:      Mouth: Mucous membranes are moist.   Eyes:      Conjunctiva/sclera: Conjunctivae normal.   Cardiovascular:      Rate and Rhythm: Normal rate and regular rhythm.   Pulmonary:      Breath sounds: Normal breath sounds. No wheezing.      Comments: No bruising, SQ air, or flail segments  Abdominal:      General: Abdomen is flat. Bowel sounds are normal. There is no distension.      Palpations: Abdomen is soft.      Tenderness: There is no abdominal tenderness.   Genitourinary:     Comments: Deferred  Musculoskeletal:      Cervical back: Neck supple.      Comments: No C or T spine tenderness or step-offs; Sacral pain without any bruising   Skin:     General: Skin is warm and dry.      Capillary Refill: Capillary refill takes less than 2 seconds.   Neurological:      General: No focal deficit present.      Mental Status: He is alert and oriented to person, place, and time. Mental status is at baseline.

## 2024-10-28 NOTE — ASSESSMENT & PLAN NOTE
During prior admission imaging showed enlarged periaortic, iliac chain and inguinal lymph nodes.  Repeat imaging showed persistent lymphadenopathy with worsened right axillary adenopathy.  As above, concern for an underlying lymphoproliferative disorder.  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  CBC differential also shows atypical lymphocytes.  Status post plasmapheresis 10/26. HIV screening negative.    -Hematology/oncology following   -Plasmapheresis per hematology/oncology

## 2024-10-28 NOTE — RESTORATIVE TECHNICIAN NOTE
Restorative Technician Note      Patient Name: Miguel Henderson     Restorative Tech Visit Date: 10/28/24  Note Type: Mobility  Patient Position Upon Consult: Bedside chair  Activity Performed: Ambulated  Assistive Device: Standard walker; Other (Comment) (2nd person for chair follow)  Education Provided: Yes  Patient Position at End of Consult: Bedside chair; All needs within reach; Bed/Chair alarm activated    Farnaz Luu  DPT, Restorative Technician

## 2024-10-28 NOTE — QUICK NOTE
Updated family about CT scan findings  They were concerned about urinary retention at this time.  I did reach out to the nurse to try to do a better bladder scan and urinary retention protocol.

## 2024-10-28 NOTE — QUICK NOTE
Rapid was called, patient had fallen.  Unwitnessed.  ICU is currently at bedside evaluating.  Patient complains of some hip pain  Will get CT head noncontrast  C-spine noncontrast  X-ray of the hip.

## 2024-10-28 NOTE — PROGRESS NOTES
Progress Note - Infectious Disease   Name: Miguel Henderson 84 y.o. male I MRN: [id number]  Unit/Bed#: [facility] 817-01 I Date of Admission: 10/26/2024   Date of Service: 10/28/2024 I Hospital Day: 2    Assessment & Plan  Fever  The patient has had intermittent fevers for the past 3 weeks and several admissions to [facility] with fairly unremarkable infectious workup other than positive rhinovirus and COVID.  CT imaging was noted to show lymphadenopathy and he had atypical lymphocytes on CBC so hematology/oncology consult was recommended although the patient was discharged on Paxlovid with plan for an outpatient referral.  He then returned to the ED with worsening confusion and return of fever.  CT imaging with worsening axillary lymphadenopathy, CBC differential again showed atypical lymphocytes.  Status post LP 10/25 which showed mild elevation in protein and lymphocytic pleocytosis.  ME panel negative culture showed no growth.  Antimicrobials discontinued.  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  Labs show elevated LDH, ferritin, immunoglobulins.  Low concern for an infectious etiology at this time given negative extensive workup and concern for a lymphoproliferative disorder causing fevers.  Status post first session of plasmapheresis yesterday with improvement in fever curve and mental status. CSF cultures finalized with no growth.   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  MRI brain without acute intracranial abnormalities.  Status post LP 10/25 with mild lymphocytic pleocytosis and elevated protein.  ME panel and CSF culture negative.  As above, low concern for CNS infection.  Suspect CSF abnormalities related to possible underlying lymphoproliferative  disorder.  Status post first session of plasmapheresis 10/26 with improvement in mental status and fever curve.   -Plasmapheresis per oncology   -Continue to monitor mental status  Lymphadenopathy  During prior admission imaging showed enlarged periaortic, iliac chain and inguinal lymph nodes.  Repeat imaging showed persistent lymphadenopathy with worsened right axillary adenopathy.  As above, concern for an underlying lymphoproliferative disorder.  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  CBC differential also shows atypical lymphocytes.  Status post plasmapheresis 10/26. HIV screening negative.    -Hematology/oncology following   -Plasmapheresis per hematology/oncology      I have discussed the above management plan in detail with the primary service.  I have discussed the above management plan in detail with patient's family members at bedside.    Infectious Disease service will follow.    Antibiotics:  none    Subjective   Patient's son provided translation. Patient states he is feeling better. No fevers or chills this morning. Has no current complaints. Denies nausea, vomiting, diarrhea; no cough, shortness of breath; no pain. No new symptoms.    Objective :  Temp:  [98.6 °F (37 °C)-99.9 °F (37.7 °C)] 98.6 °F (37 °C)  HR:  [] 89  BP: (103-131)/(69-77) 120/73  Resp:  [16-24] 24  SpO2:  [94 %-98 %] 97 %  O2 Device: None (Room air)    General Appearance:  Chronically ill appearing, sitting upright in chair next to bed, alert, interactive, nontoxic, no acute distress.   Throat: Oropharynx moist without lesions.    Lungs:   Clear to auscultation bilaterally; no wheezes, rhonchi or rales; respirations unlabored. On room air.    Heart:  RRR; no murmur, rub or gallop.   Abdomen:   Soft, non-tender, non-distended, positive bowel sounds.     Extremities: No clubbing or cyanosis, no edema.   Skin: No new rashes, lesions, or draining wounds noted on exposed skin.       Lab Results: I have reviewed the  following results:  Results from last 7 days   Lab Units 10/28/24  0433 10/27/24  1318 10/27/24  0639 10/26/24  1457   WBC Thousand/uL 7.36  --  8.94 6.54   HEMOGLOBIN g/dL 9.0*  --  9.3* 9.0*   PLATELETS Thousands/uL 225 211 223 161     Results from last 7 days   Lab Units 10/28/24  0433 10/27/24  0639 10/26/24  1457   SODIUM mmol/L 136 133* 129*   POTASSIUM mmol/L 3.7 3.9 3.6   CHLORIDE mmol/L 102 100 95*   CO2 mmol/L 28 27 31   BUN mg/dL 7 6 9   CREATININE mg/dL 0.33* 0.52* 0.52*   EGFR ml/min/1.73sq m 116 96 96   CALCIUM mg/dL 7.1* 7.3* 6.9*   AST U/L 67* 101* 151*   ALT U/L 43 52 61*   ALK PHOS U/L 226* 237* 454*   ALBUMIN g/dL 2.6* 3.0* 1.9*     Results from last 7 days   Lab Units 10/25/24  1704 10/25/24  1309 10/25/24  1252 10/22/24  1015 10/21/24  1649   BLOOD CULTURE   --  No Growth at 48 hrs. No Growth at 48 hrs.  --  No Growth After 5 Days.  No Growth After 5 Days.   GRAM STAIN RESULT  1+ Mononuclear Cells  No organisms seen  --   --   --   --    C DIFF TOXIN B BY PCR   --   --   --  Negative  --      Results from last 7 days   Lab Units 10/26/24  0428 10/25/24  1121 10/23/24  0553 10/21/24  1649   PROCALCITONIN ng/ml 0.43* 0.59* 2.06* 4.96*         Results from last 7 days   Lab Units 10/27/24  0639   FERRITIN ng/mL 1,028*         Imaging Results Review: No pertinent imaging studies reviewed.  Other Study Results Review: No additional pertinent studies reviewed.      Anais Kerr PA-C  [facility]

## 2024-10-28 NOTE — ASSESSMENT & PLAN NOTE
The patient has had intermittent fevers for the past 3 weeks and several admissions to [facility] with fairly unremarkable infectious workup other than positive rhinovirus and COVID.  CT imaging was noted to show lymphadenopathy and he had atypical lymphocytes on CBC so hematology/oncology consult was recommended although the patient was discharged on Paxlovid with plan for an outpatient referral.  He then returned to the ED with worsening confusion and return of fever.  CT imaging with worsening axillary lymphadenopathy, CBC differential again showed atypical lymphocytes.  Status post LP 10/25 which showed mild elevation in protein and lymphocytic pleocytosis.  ME panel negative culture showed no growth.  Antimicrobials discontinued.  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  Labs show elevated LDH, ferritin, immunoglobulins.  Low concern for an infectious etiology at this time given negative extensive workup and concern for a lymphoproliferative disorder causing fevers.  Status post first session of plasmapheresis yesterday with improvement in fever curve and mental status. CSF cultures finalized with no growth.   -Continue to monitor off antimicrobials   -Workup per hematology/oncology   -Follow-up blood cultures until finalized   -Monitor fever curve   -Monitor CBCd

## 2024-10-28 NOTE — BRIEF OP NOTE (RAD/CATH)
INTERVENTIONAL RADIOLOGY PROCEDURE NOTE    Date: 10/28/2024    Procedure: IR BIOPSY INGUINAL LYMPH NODE     Preoperative diagnosis:   1. Encephalopathy, unspecified type    2. Fever, unspecified fever cause    3. Adenopathy         Postoperative diagnosis: Same.    Surgeon: Jorge Bhatia MD     Assistant: None. No qualified resident was available.    Blood loss: none    Specimens: left inguinal lymph node    Findings: U/S guided core biopsy of left inguinal lymph node x 5 for pathology and flow cytometry.    Complications: None immediate.    Anesthesia: local

## 2024-10-28 NOTE — PLAN OF CARE
Problem: PAIN - ADULT  Goal: Verbalizes/displays adequate comfort level or baseline comfort level  Description: Interventions:  - Encourage patient to monitor pain and request assistance  - Assess pain using appropriate pain scale  - Administer analgesics based on type and severity of pain and evaluate response  - Implement non-pharmacological measures as appropriate and evaluate response  - Consider cultural and social influences on pain and pain management  - Notify physician/advanced practitioner if interventions unsuccessful or patient reports new pain  Outcome: Progressing     Problem: INFECTION - ADULT  Goal: Absence or prevention of progression during hospitalization  Description: INTERVENTIONS:  - Assess and monitor for signs and symptoms of infection  - Monitor lab/diagnostic results  - Monitor all insertion sites, i.e. indwelling lines, tubes, and drains  - Monitor endotracheal if appropriate and nasal secretions for changes in amount and color  - [place] appropriate cooling/warming therapies per order  - Administer medications as ordered  - Instruct and encourage patient and family to use good hand hygiene technique  - Identify and instruct in appropriate isolation precautions for identified infection/condition  Outcome: Progressing  Goal: Absence of fever/infection during neutropenic period  Description: INTERVENTIONS:  - Monitor WBC    Outcome: Progressing     Problem: SAFETY ADULT  Goal: Patient will remain free of falls  Description: INTERVENTIONS:  - Educate patient/family on patient safety including physical limitations  - Instruct patient to call for assistance with activity   - Consult OT/PT to assist with strengthening/mobility   - Keep Call bell within reach  - Keep bed low and locked with side rails adjusted as appropriate  - Keep care items and personal belongings within reach  - Initiate and maintain comfort rounds  - Make Fall Risk Sign visible to staff  - Apply yellow socks and bracelet  for high fall risk patients  - Consider moving patient to room near nurses station  Outcome: Progressing  Goal: Maintain or return to baseline ADL function  Description: INTERVENTIONS:  -  Assess patient's ability to carry out ADLs; assess patient's baseline for ADL function and identify physical deficits which impact ability to perform ADLs (bathing, care of mouth/teeth, toileting, grooming, dressing, etc.)  - Assess/evaluate cause of self-care deficits   - Assess range of motion  - Assess patient's mobility; develop plan if impaired  - Assess patient's need for assistive devices and provide as appropriate  - Encourage maximum independence but intervene and supervise when necessary  - Involve family in performance of ADLs  - Assess for home care needs following discharge   - Consider OT consult to assist with ADL evaluation and planning for discharge  - Provide patient education as appropriate  Outcome: Progressing  Goal: Maintains/Returns to pre admission functional level  Description: INTERVENTIONS:  - Perform AM-PAC 6 Click Basic Mobility/ Daily Activity assessment daily.  - Set and communicate daily mobility goal to care team and patient/family/caregiver.   - Collaborate with rehabilitation services on mobility goals if consulted  - Out of bed for toileting  - Record patient progress and toleration of activity level   Outcome: Progressing     Problem: DISCHARGE PLANNING  Goal: Discharge to home or other facility with appropriate resources  Description: INTERVENTIONS:  - Identify barriers to discharge w/patient and caregiver  - Arrange for needed discharge resources and transportation as appropriate  - Identify discharge learning needs (meds, wound care, etc.)  - Arrange for interpretive services to assist at discharge as needed  - Refer to Case Management Department for coordinating discharge planning if the patient needs post-hospital services based on physician/advanced practitioner order or complex needs  related to functional status, cognitive ability, or social support system  Outcome: Progressing     Problem: Knowledge Deficit  Goal: Patient/family/caregiver demonstrates understanding of disease process, treatment plan, medications, and discharge instructions  Description: Complete learning assessment and assess knowledge base.  Interventions:  - Provide teaching at level of understanding  - Provide teaching via preferred learning methods  Outcome: Progressing     Problem: Prexisting or High Potential for Compromised Skin Integrity  Goal: Skin integrity is maintained or improved  Description: INTERVENTIONS:  - Identify patients at risk for skin breakdown  - Assess and monitor skin integrity  - Assess and monitor nutrition and hydration status  - Monitor labs   - Assess for incontinence   - Turn and reposition patient  - Assist with mobility/ambulation  - Relieve pressure over bony prominences  - Avoid friction and shearing  - Provide appropriate hygiene as needed including keeping skin clean and dry  - Evaluate need for skin moisturizer/barrier cream  - Collaborate with interdisciplinary team   - Patient/family teaching  - Consider wound care consult   Outcome: Progressing

## 2024-10-28 NOTE — ASSESSMENT & PLAN NOTE
CT head negative for any acute abnormalities  MRI brain also without any acute abnormalities  Had LP performed which ruled out meningitis   Concern fevers are due to possible lymphoma  Transferred to [facility] for expedited lymphoma workup   Continue plasmapheresis per hematology  Appreciate oncology recommendations

## 2024-10-28 NOTE — QUICK NOTE
Updated family. They were very upset about the fall. They demanded that every part of his body be ct scanned. I informed them that it is not indicated at this time. He is not complaining of pain in his arms, legs. I informed them that we will be CT scanning his head and C spine. And doing an xray of hip. Will also do the fall prec alarm. I did inform them that I will call back with the results of the xray

## 2024-10-29 ENCOUNTER — APPOINTMENT (INPATIENT)
Dept: RADIOLOGY | Facility: HOSPITAL | Age: 84
DRG: 823 | End: 2024-10-29
Attending: RADIOLOGY
Payer: COMMERCIAL

## 2024-10-29 PROBLEM — R79.89 ABNORMAL LFTS: Status: ACTIVE | Noted: 2024-10-29

## 2024-10-29 LAB
ALBUMIN SERPL BCG-MCNC: 3.5 G/DL (ref 3.5–5)
ALP SERPL-CCNC: 151 U/L (ref 34–104)
ALT SERPL W P-5'-P-CCNC: 23 U/L (ref 7–52)
ANION GAP SERPL CALCULATED.3IONS-SCNC: 5 MMOL/L (ref 4–13)
ANISOCYTOSIS BLD QL SMEAR: PRESENT
AST SERPL W P-5'-P-CCNC: 34 U/L (ref 13–39)
B2 MICROGLOB SERPL-MCNC: 4.7 MG/L (ref 0.6–2.4)
BASOPHILS # BLD MANUAL: 0 THOUSAND/UL (ref 0–0.1)
BASOPHILS NFR MAR MANUAL: 0 % (ref 0–1)
BILIRUB SERPL-MCNC: 1.89 MG/DL (ref 0.2–1)
BUN SERPL-MCNC: 6 MG/DL (ref 5–25)
CALCIUM SERPL-MCNC: 7.4 MG/DL (ref 8.4–10.2)
CHLORIDE SERPL-SCNC: 103 MMOL/L (ref 96–108)
CO2 SERPL-SCNC: 29 MMOL/L (ref 21–32)
CREAT SERPL-MCNC: 0.37 MG/DL (ref 0.6–1.3)
EOSINOPHIL # BLD MANUAL: 0.41 THOUSAND/UL (ref 0–0.4)
EOSINOPHIL NFR BLD MANUAL: 5 % (ref 0–6)
ERYTHROCYTE [DISTWIDTH] IN BLOOD BY AUTOMATED COUNT: 16.6 % (ref 11.6–15.1)
GFR SERPL CREATININE-BSD FRML MDRD: 111 ML/MIN/1.73SQ M
GLUCOSE SERPL-MCNC: 99 MG/DL (ref 65–140)
HCT VFR BLD AUTO: 28.9 % (ref 36.5–49.3)
HGB BLD-MCNC: 9.2 G/DL (ref 12–17)
KAPPA LC FREE SER-MCNC: 122.8 MG/L (ref 3.3–19.4)
KAPPA LC FREE/LAMBDA FREE SER: 0.61 {RATIO} (ref 0.26–1.65)
LAMBDA LC FREE SERPL-MCNC: 199.8 MG/L (ref 5.7–26.3)
LYMPHOCYTES # BLD AUTO: 4.37 THOUSAND/UL (ref 0.6–4.47)
LYMPHOCYTES # BLD AUTO: 51 % (ref 14–44)
MAGNESIUM SERPL-MCNC: 1.9 MG/DL (ref 1.9–2.7)
MCH RBC QN AUTO: 29.5 PG (ref 26.8–34.3)
MCHC RBC AUTO-ENTMCNC: 31.8 G/DL (ref 31.4–37.4)
MCV RBC AUTO: 93 FL (ref 82–98)
MONOCYTES # BLD AUTO: 0.91 THOUSAND/UL (ref 0–1.22)
MONOCYTES NFR BLD: 11 % (ref 4–12)
NEUTROPHILS # BLD MANUAL: 2.56 THOUSAND/UL (ref 1.85–7.62)
NEUTS SEG NFR BLD AUTO: 31 % (ref 43–75)
PLATELET # BLD AUTO: 212 THOUSANDS/UL (ref 149–390)
PLATELET BLD QL SMEAR: ADEQUATE
PMV BLD AUTO: 9.8 FL (ref 8.9–12.7)
POLYCHROMASIA BLD QL SMEAR: PRESENT
POTASSIUM SERPL-SCNC: 3.5 MMOL/L (ref 3.5–5.3)
PROT SERPL-MCNC: 5.4 G/DL (ref 6.4–8.4)
RBC # BLD AUTO: 3.12 MILLION/UL (ref 3.88–5.62)
RBC MORPH BLD: PRESENT
REAGIN AB CSF QL: NON REACTIVE
SCAN RESULT: NORMAL
SODIUM SERPL-SCNC: 137 MMOL/L (ref 135–147)
VARIANT LYMPHS # BLD AUTO: 2 %
WBC # BLD AUTO: 8.25 THOUSAND/UL (ref 4.31–10.16)

## 2024-10-29 PROCEDURE — 88311 DECALCIFY TISSUE: CPT | Performed by: STUDENT IN AN ORGANIZED HEALTH CARE EDUCATION/TRAINING PROGRAM

## 2024-10-29 PROCEDURE — 81340 TRB@ GENE REARRANGE AMPLIFY: CPT | Performed by: INTERNAL MEDICINE

## 2024-10-29 PROCEDURE — 38222 DX BONE MARROW BX & ASPIR: CPT

## 2024-10-29 PROCEDURE — 88365 INSITU HYBRIDIZATION (FISH): CPT | Performed by: STUDENT IN AN ORGANIZED HEALTH CARE EDUCATION/TRAINING PROGRAM

## 2024-10-29 PROCEDURE — 77002 NEEDLE LOCALIZATION BY XRAY: CPT

## 2024-10-29 PROCEDURE — 99232 SBSQ HOSP IP/OBS MODERATE 35: CPT | Performed by: INTERNAL MEDICINE

## 2024-10-29 PROCEDURE — 88305 TISSUE EXAM BY PATHOLOGIST: CPT | Performed by: STUDENT IN AN ORGANIZED HEALTH CARE EDUCATION/TRAINING PROGRAM

## 2024-10-29 PROCEDURE — 88185 FLOWCYTOMETRY/TC ADD-ON: CPT | Performed by: INTERNAL MEDICINE

## 2024-10-29 PROCEDURE — 99152 MOD SED SAME PHYS/QHP 5/>YRS: CPT

## 2024-10-29 PROCEDURE — 81479 UNLISTED MOLECULAR PATHOLOGY: CPT | Performed by: INTERNAL MEDICINE

## 2024-10-29 PROCEDURE — 88341 IMHCHEM/IMCYTCHM EA ADD ANTB: CPT | Performed by: STUDENT IN AN ORGANIZED HEALTH CARE EDUCATION/TRAINING PROGRAM

## 2024-10-29 PROCEDURE — 77002 NEEDLE LOCALIZATION BY XRAY: CPT | Performed by: NURSE PRACTITIONER

## 2024-10-29 PROCEDURE — 85097 BONE MARROW INTERPRETATION: CPT | Performed by: STUDENT IN AN ORGANIZED HEALTH CARE EDUCATION/TRAINING PROGRAM

## 2024-10-29 PROCEDURE — 38222 DX BONE MARROW BX & ASPIR: CPT | Performed by: NURSE PRACTITIONER

## 2024-10-29 PROCEDURE — 83735 ASSAY OF MAGNESIUM: CPT | Performed by: FAMILY MEDICINE

## 2024-10-29 PROCEDURE — 81342 TRG GENE REARRANGEMENT ANAL: CPT | Performed by: INTERNAL MEDICINE

## 2024-10-29 PROCEDURE — 88313 SPECIAL STAINS GROUP 2: CPT | Performed by: STUDENT IN AN ORGANIZED HEALTH CARE EDUCATION/TRAINING PROGRAM

## 2024-10-29 PROCEDURE — 88237 TISSUE CULTURE BONE MARROW: CPT | Performed by: INTERNAL MEDICINE

## 2024-10-29 PROCEDURE — 88360 TUMOR IMMUNOHISTOCHEM/MANUAL: CPT | Performed by: STUDENT IN AN ORGANIZED HEALTH CARE EDUCATION/TRAINING PROGRAM

## 2024-10-29 PROCEDURE — 99152 MOD SED SAME PHYS/QHP 5/>YRS: CPT | Performed by: NURSE PRACTITIONER

## 2024-10-29 PROCEDURE — C1830 POWER BONE MARROW BX NEEDLE: HCPCS

## 2024-10-29 PROCEDURE — 99153 MOD SED SAME PHYS/QHP EA: CPT

## 2024-10-29 PROCEDURE — 88374 M/PHMTRC ALYS ISHQUANT/SEMIQ: CPT | Performed by: INTERNAL MEDICINE

## 2024-10-29 PROCEDURE — 81455 SO/HL 51/>GSAP DNA/DNA&RNA: CPT | Performed by: INTERNAL MEDICINE

## 2024-10-29 PROCEDURE — 88342 IMHCHEM/IMCYTCHM 1ST ANTB: CPT | Performed by: STUDENT IN AN ORGANIZED HEALTH CARE EDUCATION/TRAINING PROGRAM

## 2024-10-29 PROCEDURE — 88184 FLOWCYTOMETRY/ TC 1 MARKER: CPT | Performed by: INTERNAL MEDICINE

## 2024-10-29 PROCEDURE — 85027 COMPLETE CBC AUTOMATED: CPT | Performed by: FAMILY MEDICINE

## 2024-10-29 PROCEDURE — 85007 BL SMEAR W/DIFF WBC COUNT: CPT | Performed by: FAMILY MEDICINE

## 2024-10-29 PROCEDURE — 88264 CHROMOSOME ANALYSIS 20-25: CPT | Performed by: INTERNAL MEDICINE

## 2024-10-29 PROCEDURE — 80053 COMPREHEN METABOLIC PANEL: CPT | Performed by: FAMILY MEDICINE

## 2024-10-29 RX ORDER — FENTANYL CITRATE 50 UG/ML
INJECTION, SOLUTION INTRAMUSCULAR; INTRAVENOUS AS NEEDED
Status: COMPLETED | OUTPATIENT
Start: 2024-10-29 | End: 2024-10-29

## 2024-10-29 RX ORDER — MIDAZOLAM HYDROCHLORIDE 2 MG/2ML
INJECTION, SOLUTION INTRAMUSCULAR; INTRAVENOUS AS NEEDED
Status: COMPLETED | OUTPATIENT
Start: 2024-10-29 | End: 2024-10-29

## 2024-10-29 RX ORDER — LIDOCAINE WITH 8.4% SOD BICARB 0.9%(10ML)
SYRINGE (ML) INJECTION AS NEEDED
Status: COMPLETED | OUTPATIENT
Start: 2024-10-29 | End: 2024-10-29

## 2024-10-29 RX ADMIN — FENTANYL CITRATE 50 MCG: 50 INJECTION INTRAMUSCULAR; INTRAVENOUS at 10:13

## 2024-10-29 RX ADMIN — MIDAZOLAM 1 MG: 1 INJECTION INTRAMUSCULAR; INTRAVENOUS at 10:06

## 2024-10-29 RX ADMIN — MIDAZOLAM 1 MG: 1 INJECTION INTRAMUSCULAR; INTRAVENOUS at 10:03

## 2024-10-29 RX ADMIN — HEPARIN SODIUM 5000 UNITS: 5000 INJECTION INTRAVENOUS; SUBCUTANEOUS at 21:42

## 2024-10-29 RX ADMIN — HEPARIN SODIUM 5000 UNITS: 5000 INJECTION INTRAVENOUS; SUBCUTANEOUS at 15:08

## 2024-10-29 RX ADMIN — SODIUM CHLORIDE, SODIUM GLUCONATE, SODIUM ACETATE, POTASSIUM CHLORIDE, MAGNESIUM CHLORIDE, SODIUM PHOSPHATE, DIBASIC, AND POTASSIUM PHOSPHATE 100 ML/HR: .53; .5; .37; .037; .03; .012; .00082 INJECTION, SOLUTION INTRAVENOUS at 05:03

## 2024-10-29 RX ADMIN — PANTOPRAZOLE SODIUM 40 MG: 40 TABLET, DELAYED RELEASE ORAL at 05:02

## 2024-10-29 RX ADMIN — Medication 10 ML: at 10:15

## 2024-10-29 RX ADMIN — HEPARIN SODIUM 5000 UNITS: 5000 INJECTION INTRAVENOUS; SUBCUTANEOUS at 05:02

## 2024-10-29 RX ADMIN — FLUTICASONE PROPIONATE 1 SPRAY: 50 SPRAY, METERED NASAL at 08:59

## 2024-10-29 RX ADMIN — LEVOTHYROXINE SODIUM 50 MCG: 50 TABLET ORAL at 08:59

## 2024-10-29 RX ADMIN — SODIUM CHLORIDE, SODIUM GLUCONATE, SODIUM ACETATE, POTASSIUM CHLORIDE, MAGNESIUM CHLORIDE, SODIUM PHOSPHATE, DIBASIC, AND POTASSIUM PHOSPHATE 100 ML/HR: .53; .5; .37; .037; .03; .012; .00082 INJECTION, SOLUTION INTRAVENOUS at 15:08

## 2024-10-29 RX ADMIN — FENTANYL CITRATE 50 MCG: 50 INJECTION INTRAMUSCULAR; INTRAVENOUS at 10:03

## 2024-10-29 NOTE — DISCHARGE INSTRUCTIONS
POST BIOPSY    Care after your procedure:    1. Limit your activities for 24 hours after your biopsy.    2. No driving day of biopsy.    3. Return to your normal diet.Small sips of flat soda will help with mild nausea.    4. Remove band-aid or dressing 24 hours after procedure.     Contact Interventional Radiology at [phone] (MADRIGAL PATIENTS: Contact Interventional Radiology at [phone]) (RUIZ PATIENTS: Contact Interventional Radiology at [phone]) if:    1. Difficulty breathing, nausea or vomiting.    2. Chills or fever above 101 degrees F.     3. Pain at biopsy site not relieved by medication.     4. Develop any redness, swelling, heat, unusual drainage, heavy bruising or bleeding from biopsy site.

## 2024-10-29 NOTE — ASSESSMENT & PLAN NOTE
Does not appear to be infectious as workup negative thus far  Appreciate ID recommendations - continue to monitor off antibiotics  Monitor temperature curve

## 2024-10-29 NOTE — SEDATION DOCUMENTATION
Bone marrow biopsy performed by Lilian Bishop. Procedure tolerated well, VSS. IR Procedure Bedrest Start Time is 1025.

## 2024-10-29 NOTE — INTERVAL H&P NOTE
"H&P reviewed. There have been no interval changes since the time the H&P was written.    /83   Pulse 87   Temp 98.8 °F (37.1 °C)   Resp 16   Ht 5' 5\" (1.651 m)   Wt 77.6 kg (171 lb)   SpO2 98%   BMI 28.46 kg/m²     Prior imaging was reviewed. Prior labs reviewed    Informed written consent was obtained.    DARREN Manley   "

## 2024-10-29 NOTE — PROGRESS NOTES
Progress Note - Hospitalist   Name: Miguel Henderson 84 y.o. male I MRN: [id number]  [facility] Date of Admission: 10/26/2024   Date of Service: 10/29/2024 I Hospital Day: 3    Assessment & Plan  Encephalopathy  CT of head and MRI of brain unremarkable for acute etiology/abnormalities  Status post prior lumbar puncture with CSF studies negative for meningitis  Concern noted of etiology of fevers due to possible lymphoma - transferred to the [facility] for expedited lymphoma workup (see below)  Mentation waxing/waning but mildly improved per family status post initiation of plasmapheresis  Appreciate oncology recommendations  Limit/avoid sedating agents if possible  Lymphadenopathy  Status post left inguinal lymph node biopsy on 10/28 -> await pathology  Status post bone marrow biopsy today -> await pathology  Initiated on plasmapheresis course by oncology  Supportive care  Abnormal LFTs  Elevated bilirubin and alkaline phosphatase progressively improving  Suspected to be associated with lymphadenopathy (above)  Limit/avoid hepatotoxins as possible  Hypothyroidism  Continue Synthroid  Primary hypertension  Holding HCTZ due to soft pressures at this time  Fever  Does not appear to be infectious as workup negative thus far  Appreciate ID recommendations - continue to monitor off antibiotics  Monitor temperature curve      VTE Pharmacologic Prophylaxis: VTE Score: 5 High Risk (Score >/= 5) - Pharmacological DVT Prophylaxis Ordered: Heparin. Sequential Compression Devices Ordered.    AM-PAC Basic Mobility:  Basic Mobility Inpatient Raw Score: 14  JH-HLM Goal: 4: Move to chair/commode  JH-HLM Achieved: 4: Move to chair/commode  JH-HLM Goal achieved. Continue to encourage appropriate mobility.    Patient Centered Rounds:  I have performed bedside rounds and discussed plan of care with nursing today.  Discussions with Specialists or Other Care Team Provider:  see above assessments if  "applicable    Education and Discussions with Family / Patient:  Multiple family members, at bedside today    Time Spent for Care:  35 minutes. More than 50% of total time spent on counseling and coordination of care, on one or more of the following: performing physical exam; counseling and coordination of care, obtaining or reviewing history, documenting in the medical record, reviewing/ordering tests/medications/procedures, and communicating with other healthcare professionals.    Current Length of Stay: 3 day(s)  Current Patient Status: Inpatient   Certification Statement:  Patient will continue to require additional hospital stay due to assessments as noted above.    Code Status: Level 3 - DNAR and DNI      Subjective     Encountered earlier in the day with multiple for members present at bedside.  At the time my encounter, patient was sluggish in responses.  Per family, he had a \"good day\" yesterday but is a bit more weaker today.      Objective     Vitals:   Temp (24hrs), Av.5 °F (36.9 °C), Min:98.3 °F (36.8 °C), Max:98.8 °F (37.1 °C)    Temp:  [98.3 °F (36.8 °C)-98.8 °F (37.1 °C)] 98.3 °F (36.8 °C)  HR:  [82-92] 92  Resp:  [16-18] 18  BP: (106-128)/(66-83) 117/80  SpO2:  [98 %-100 %] 99 %  Body mass index is 28.46 kg/m².     Input and Output Summary (last 24 hours):       Intake/Output Summary (Last 24 hours) at 10/29/2024 1802  Last data filed at 10/29/2024 0503  Gross per 24 hour   Intake 2378.33 ml   Output 850 ml   Net 1528.33 ml       Physical Exam:     GENERAL Weak/fatigued and ill-appearing   HEAD   Normocephalic - atraumatic   EYES   PERRL - EOMI    MOUTH   Mucosa moist   NECK   Supple - full range of motion   CARDIAC Rate controlled currently   PULMONARY   Clear breath sounds bilaterally - nonlabored respirations   ABDOMEN Currently nontender/nondistended   MUSCULOSKELETAL   Motor strength/range of motion deconditioned   NEUROLOGIC Sluggish in responses   SKIN   Chronic wrinkles/blemishes  "   PSYCHIATRIC   Mood/affect somewhat flat         Labs & Recent Cultures:  Results from last 7 days   Lab Units 10/29/24  0524 10/28/24  0433 10/27/24  0639 10/26/24  1457   WBC Thousand/uL 8.25 7.36   < > 6.54   HEMOGLOBIN g/dL 9.2* 9.0*   < > 9.0*   HEMATOCRIT % 28.9* 27.3*   < > 25.5*   PLATELETS Thousands/uL 212 225   < > 161   BANDS PCT %  --   --   --  3   SEGS PCT %  --  28*  --   --    LYMPHO PCT % 51* 51*  --  8   MONO PCT % 11 13*  --  7   EOS PCT % 5 5  --  6    < > = values in this interval not displayed.     Results from last 7 days   Lab Units 10/29/24  0524   POTASSIUM mmol/L 3.5   CHLORIDE mmol/L 103   CO2 mmol/L 29   BUN mg/dL 6   CREATININE mg/dL 0.37*   CALCIUM mg/dL 7.4*   ALK PHOS U/L 151*   ALT U/L 23   AST U/L 34     Results from last 7 days   Lab Units 10/27/24  0639   INR  1.67*     Results from last 7 days   Lab Units 10/28/24  1703   POC GLUCOSE mg/dl 117         Results from last 7 days   Lab Units 10/26/24  0428 10/25/24  1309 10/25/24  1121 10/23/24  0553   LACTIC ACID mmol/L  --  1.6  --   --    PROCALCITONIN ng/ml 0.43*  --  0.59* 2.06*         Results from last 7 days   Lab Units 10/25/24  1704 10/25/24  1309 10/25/24  1252   BLOOD CULTURE   --  No Growth at 72 hrs. No Growth at 72 hrs.   GRAM STAIN RESULT  1+ Mononuclear Cells  No organisms seen  --   --          Lines/Drains/Telemetry:  Invasive Devices       Peripheral Intravenous Line  Duration             Peripheral IV 10/25/24 Dorsal (posterior);Left Forearm 4 days              Hemodialysis Catheter  Duration             HD Temporary Double Catheter 3 days                    Last 24 Hours Medication List:   Current Facility-Administered Medications   Medication Dose Route Frequency Provider Last Rate    acetaminophen  1,000 mg Intravenous Q6H PRN Joseph Alexander MD 1,000 mg (10/28/24 2103)    fluticasone  1 spray Nasal Daily Joseph Alexander MD      heparin (porcine)  5,000 Units Subcutaneous Q8H [facility] Nancy Mondragon       levothyroxine  50 mcg Oral Daily Joseph Alexander MD      multi-electrolyte  100 mL/hr Intravenous Continuous Joseph Alexander  mL/hr (10/29/24 4224)    ondansetron  4 mg Intravenous Q6H PRN Joseph Alexander MD      pantoprazole  40 mg Oral Early Morning Joseph Alexander MD      polyethylene glycol  17 g Oral Daily PRN MD JENNIFER Espinosa MD   Hospitalist - [facility] Internal Medicine        ** Please Note:  Documentation is constructed using a voice recognition dictation system.  An occasional wrong word/phrase or “sound-a-like” substitution may have been picked up by dictation device due to the inherent limitations of voice recognition software.  Read the chart carefully and recognize, using reasonable context, where substitutions may have occurred.**

## 2024-10-29 NOTE — PROGRESS NOTES
Progress Note - Infectious Disease   Name: Miguel Henderson 84 y.o. male I MRN: [id number]  Unit/Bed#: [facility] 817-01 I Date of Admission: 10/26/2024   Date of Service: 10/29/2024 I Hospital Day: 3    Assessment & Plan  Fever  The patient has had intermittent fevers for the past 3 weeks and several admissions to [facility] with fairly unremarkable infectious workup other than positive rhinovirus and COVID.  CT imaging was noted to show lymphadenopathy and he had atypical lymphocytes on CBC so hematology/oncology consult was recommended although the patient was discharged on Paxlovid with plan for an outpatient referral.  He then returned to the ED with worsening confusion and return of fever.  CT imaging with worsening axillary lymphadenopathy, CBC differential again showed atypical lymphocytes.  Status post LP 10/25 which showed mild elevation in protein and lymphocytic pleocytosis.  ME panel negative culture showed no growth.  Antimicrobials discontinued.  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  Labs show elevated LDH, ferritin, immunoglobulins.  Low concern for an infectious etiology at this time given negative extensive workup and concern for a lymphoproliferative disorder causing fevers.  Status post plasmapheresis with improvement in fever curve and mental status. CSF cultures finalized with no growth. S/p lymph node and bone marrow biopsies.   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  MRI brain without acute intracranial abnormalities.  Status post LP 10/25 with mild lymphocytic pleocytosis and elevated protein.  ME panel and CSF culture negative.  As above, low concern for CNS infection.  Suspect CSF  abnormalities related to possible underlying lymphoproliferative disorder.  Status post first session of plasmapheresis 10/26 with improvement in mental status and fever curve.   -Plasmapheresis per oncology   -Continue to monitor mental status  Lymphadenopathy  During prior admission imaging showed enlarged periaortic, iliac chain and inguinal lymph nodes.  Repeat imaging showed persistent lymphadenopathy with worsened right axillary adenopathy.  As above, concern for an underlying lymphoproliferative disorder.  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  CBC differential also shows atypical lymphocytes.  Status post plasmapheresis 10/26. HIV screening negative. S/p lymph node and bone marrow biopsies.   -Hematology/oncology following  -Plasmapheresis per hematology/oncology  -Follow-up pathology from biopsies       I have discussed the above management plan in detail with the primary service.  I have discussed the above management plan in detail with patient's family members at bedside.    Infectious Disease service will follow.    Antibiotics:  none    Subjective   Patient's son provided translation. Patient states he is feeling better. States his bone marrow biopsy went well, as did his lymph node biopsy. Patient denies having any pain currently. Fell last night while trying to get to the bathroom. Had bilateral hip x-ray which was unremarkable. CT head and C-spine also without any acute findings. No fevers or chills this morning. Denies nausea, vomiting, diarrhea; no cough, shortness of breath; no pain. No new symptoms.    Objective :  Temp:  [98.8 °F (37.1 °C)] 98.8 °F (37.1 °C)  HR:  [82-88] 87  BP: (114-128)/(66-83) 125/83  Resp:  [16] 16  SpO2:  [98 %-100 %] 98 %  O2 Device: None (Room air)  Nasal Cannula O2 Flow Rate (L/min):  [1 L/min-2 L/min] 1 L/min    General Appearance:  Chronically ill appearing, sitting upright in chair next to bed, alert, interactive, nontoxic, no acute distress.   Throat:  Oropharynx moist without lesions.    Lungs:   Clear to auscultation bilaterally; no wheezes, rhonchi or rales; respirations unlabored. On room air.    Heart:  RRR; no murmur, rub or gallop.   Abdomen:   Soft, non-tender, non-distended, positive bowel sounds.     Extremities: No clubbing or cyanosis, no edema.   Skin: No new rashes, lesions, or draining wounds noted on exposed skin.       Lab Results: I have reviewed the following results:  Results from last 7 days   Lab Units 10/29/24  0524 10/28/24  0433 10/27/24  1318 10/27/24  0639   WBC Thousand/uL 8.25 7.36  --  8.94   HEMOGLOBIN g/dL 9.2* 9.0*  --  9.3*   PLATELETS Thousands/uL 212 225 211 223     Results from last 7 days   Lab Units 10/29/24  0524 10/28/24  0433 10/27/24  0639   SODIUM mmol/L 137 136 133*   POTASSIUM mmol/L 3.5 3.7 3.9   CHLORIDE mmol/L 103 102 100   CO2 mmol/L 29 28 27   BUN mg/dL 6 7 6   CREATININE mg/dL 0.37* 0.33* 0.52*   EGFR ml/min/1.73sq m 111 116 96   CALCIUM mg/dL 7.4* 7.1* 7.3*   AST U/L 34 67* 101*   ALT U/L 23 43 52   ALK PHOS U/L 151* 226* 237*   ALBUMIN g/dL 3.5 2.6* 3.0*     Results from last 7 days   Lab Units 10/25/24  1704 10/25/24  1309 10/25/24  1252   BLOOD CULTURE   --  No Growth at 72 hrs. No Growth at 72 hrs.   GRAM STAIN RESULT  1+ Mononuclear Cells  No organisms seen  --   --      Results from last 7 days   Lab Units 10/26/24  0428 10/25/24  1121 10/23/24  0553   PROCALCITONIN ng/ml 0.43* 0.59* 2.06*         Results from last 7 days   Lab Units 10/27/24  0639   FERRITIN ng/mL 1,028*         Imaging Results Review: No pertinent imaging studies reviewed.  Other Study Results Review: No additional pertinent studies reviewed.      Anais Kerr PA-C  [facility]

## 2024-10-29 NOTE — ASSESSMENT & PLAN NOTE
The patient has had intermittent fevers for the past 3 weeks and several admissions to [facility] with fairly unremarkable infectious workup other than positive rhinovirus and COVID.  CT imaging was noted to show lymphadenopathy and he had atypical lymphocytes on CBC so hematology/oncology consult was recommended although the patient was discharged on Paxlovid with plan for an outpatient referral.  He then returned to the ED with worsening confusion and return of fever.  CT imaging with worsening axillary lymphadenopathy, CBC differential again showed atypical lymphocytes.  Status post LP 10/25 which showed mild elevation in protein and lymphocytic pleocytosis.  ME panel negative culture showed no growth.  Antimicrobials discontinued.  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  Labs show elevated LDH, ferritin, immunoglobulins.  Low concern for an infectious etiology at this time given negative extensive workup and concern for a lymphoproliferative disorder causing fevers.  Status post plasmapheresis with improvement in fever curve and mental status. CSF cultures finalized with no growth. S/p lymph node and bone marrow biopsies.   -Continue to monitor off antimicrobials  -Follow-up lymph node and bone marrow biopsies  -Workup per hematology/oncology  -Follow-up blood cultures until finalized  -Monitor fever curve  -Monitor CBCd

## 2024-10-29 NOTE — ASSESSMENT & PLAN NOTE
Elevated bilirubin and alkaline phosphatase progressively improving  Suspected to be associated with lymphadenopathy (above)  Limit/avoid hepatotoxins as possible

## 2024-10-29 NOTE — ASSESSMENT & PLAN NOTE
Status post left inguinal lymph node biopsy on 10/28 -> await pathology  Status post bone marrow biopsy today -> await pathology  Initiated on plasmapheresis course by oncology  Supportive care

## 2024-10-29 NOTE — PROGRESS NOTES
Patient returned from IR by transport. Upon return, patient's door closed for airborne precautions. Upon return, RN immediately went to assess the patient. While donning PPE outside the patient's door, RN heard the patient fall. Upon entering the room, the patient was found to be laying on the floor on his back. C-spine held manually. Patient is alert and responsive to questioning upon arrival, stating he fell while attempting to use the bathroom because of weakness in his legs. Patient initially denies pain or injury. Rapid response called for unwitnessed fall. Additional nursing and ICU staff arrived to assist. Upon standing at the direction of ICU provider, patient reports pain in his buttocks from where he landed.

## 2024-10-29 NOTE — BRIEF OP NOTE (RAD/CATH)
Procedure Note    PATIENT NAME: Miguel Henderson  : 1940  MRN: [id number]    Pre-op Diagnosis:   1. Encephalopathy, unspecified type    2. Fever, unspecified fever cause    3. Adenopathy      Post-op Diagnosis:   1. Encephalopathy, unspecified type    2. Fever, unspecified fever cause    3. Adenopathy        Provider:   DARREN Manley with [facility]  Assistants:     No qualified resident was available.    Estimated Blood Loss: minimal < 7ml  Findings: bone marrow biopsy and aspirate right iliac    Specimens: 6 ml aspirate and 1 core    Complications:  none immediately    Anesthesia: local    DARREN Manley     Date: 10/29/2024  Time: 10:38 AM

## 2024-10-29 NOTE — INTERVAL H&P NOTE
"H&P reviewed. There have been no interval changes since the time the H&P was written.    /83   Pulse 87   Temp 98.8 °F (37.1 °C)   Resp 16   Ht 5' 5\" (1.651 m)   Wt 77.6 kg (171 lb)   SpO2 98%   BMI 28.46 kg/m²     Prior imaging was reviewed. Labs reviewed    Informed written consent was obtained.    DARREN Manley   "

## 2024-10-29 NOTE — PLAN OF CARE
Problem: PAIN - ADULT  Goal: Verbalizes/displays adequate comfort level or baseline comfort level  Description: Interventions:  - Encourage patient to monitor pain and request assistance  - Assess pain using appropriate pain scale  - Administer analgesics based on type and severity of pain and evaluate response  - Implement non-pharmacological measures as appropriate and evaluate response  - Consider cultural and social influences on pain and pain management  - Notify physician/advanced practitioner if interventions unsuccessful or patient reports new pain  Outcome: Progressing     Problem: INFECTION - ADULT  Goal: Absence or prevention of progression during hospitalization  Description: INTERVENTIONS:  - Assess and monitor for signs and symptoms of infection  - Monitor lab/diagnostic results  - Monitor all insertion sites, i.e. indwelling lines, tubes, and drains  - Monitor endotracheal if appropriate and nasal secretions for changes in amount and color  - [place] appropriate cooling/warming therapies per order  - Administer medications as ordered  - Instruct and encourage patient and family to use good hand hygiene technique  - Identify and instruct in appropriate isolation precautions for identified infection/condition  Outcome: Progressing  Goal: Absence of fever/infection during neutropenic period  Description: INTERVENTIONS:  - Monitor WBC    Outcome: Progressing     Problem: SAFETY ADULT  Goal: Patient will remain free of falls  Description: INTERVENTIONS:  - Educate patient/family on patient safety including physical limitations  - Instruct patient to call for assistance with activity   - Consult OT/PT to assist with strengthening/mobility   - Keep Call bell within reach  - Keep bed low and locked with side rails adjusted as appropriate  - Keep care items and personal belongings within reach  - Initiate and maintain comfort rounds  - Make Fall Risk Sign visible to staff  - Offer Toileting every 2 Hours,  in advance of need  - Initiate/Maintain bed alarm  - Obtain necessary fall risk management equipment: walker  - Apply yellow socks and bracelet for high fall risk patients  - Consider moving patient to room near nurses station  Outcome: Progressing  Goal: Maintain or return to baseline ADL function  Description: INTERVENTIONS:  -  Assess patient's ability to carry out ADLs; assess patient's baseline for ADL function and identify physical deficits which impact ability to perform ADLs (bathing, care of mouth/teeth, toileting, grooming, dressing, etc.)  - Assess/evaluate cause of self-care deficits   - Assess range of motion  - Assess patient's mobility; develop plan if impaired  - Assess patient's need for assistive devices and provide as appropriate  - Encourage maximum independence but intervene and supervise when necessary  - Involve family in performance of ADLs  - Assess for home care needs following discharge   - Consider OT consult to assist with ADL evaluation and planning for discharge  - Provide patient education as appropriate  Outcome: Progressing  Goal: Maintains/Returns to pre admission functional level  Description: INTERVENTIONS:  - Perform AM-PAC 6 Click Basic Mobility/ Daily Activity assessment daily.  - Set and communicate daily mobility goal to care team and patient/family/caregiver.   - Collaborate with rehabilitation services on mobility goals if consulted  - Stand patient 3 times a day  - Ambulate patient 3 times a day  - Out of bed to chair 3 times a day   - Out of bed for meals 3 times a day  - Out of bed for toileting  - Record patient progress and toleration of activity level   Outcome: Progressing     Problem: DISCHARGE PLANNING  Goal: Discharge to home or other facility with appropriate resources  Description: INTERVENTIONS:  - Identify barriers to discharge w/patient and caregiver  - Arrange for needed discharge resources and transportation as appropriate  - Identify discharge learning  needs (meds, wound care, etc.)  - Arrange for interpretive services to assist at discharge as needed  - Refer to Case Management Department for coordinating discharge planning if the patient needs post-hospital services based on physician/advanced practitioner order or complex needs related to functional status, cognitive ability, or social support system  Outcome: Progressing     Problem: Knowledge Deficit  Goal: Patient/family/caregiver demonstrates understanding of disease process, treatment plan, medications, and discharge instructions  Description: Complete learning assessment and assess knowledge base.  Interventions:  - Provide teaching at level of understanding  - Provide teaching via preferred learning methods  Outcome: Progressing     Problem: Prexisting or High Potential for Compromised Skin Integrity  Goal: Skin integrity is maintained or improved  Description: INTERVENTIONS:  - Identify patients at risk for skin breakdown  - Assess and monitor skin integrity  - Assess and monitor nutrition and hydration status  - Monitor labs   - Assess for incontinence   - Turn and reposition patient  - Assist with mobility/ambulation  - Relieve pressure over bony prominences  - Avoid friction and shearing  - Provide appropriate hygiene as needed including keeping skin clean and dry  - Evaluate need for skin moisturizer/barrier cream  - Collaborate with interdisciplinary team   - Patient/family teaching  - Consider wound care consult   Outcome: Progressing

## 2024-10-29 NOTE — PLAN OF CARE
Problem: PAIN - ADULT  Goal: Verbalizes/displays adequate comfort level or baseline comfort level  Description: Interventions:  - Encourage patient to monitor pain and request assistance  - Assess pain using appropriate pain scale  - Administer analgesics based on type and severity of pain and evaluate response  - Implement non-pharmacological measures as appropriate and evaluate response  - Consider cultural and social influences on pain and pain management  - Notify physician/advanced practitioner if interventions unsuccessful or patient reports new pain  Outcome: Progressing     Problem: INFECTION - ADULT  Goal: Absence or prevention of progression during hospitalization  Description: INTERVENTIONS:  - Assess and monitor for signs and symptoms of infection  - Monitor lab/diagnostic results  - Monitor all insertion sites, i.e. indwelling lines, tubes, and drains  - Monitor endotracheal if appropriate and nasal secretions for changes in amount and color  - [place] appropriate cooling/warming therapies per order  - Administer medications as ordered  - Instruct and encourage patient and family to use good hand hygiene technique  - Identify and instruct in appropriate isolation precautions for identified infection/condition  Outcome: Progressing  Goal: Absence of fever/infection during neutropenic period  Description: INTERVENTIONS:  - Monitor WBC    Outcome: Progressing     Problem: SAFETY ADULT  Goal: Patient will remain free of falls  Description: INTERVENTIONS:  - Educate patient/family on patient safety including physical limitations  - Instruct patient to call for assistance with activity   - Consult OT/PT to assist with strengthening/mobility   - Keep Call bell within reach  - Keep bed low and locked with side rails adjusted as appropriate  - Keep care items and personal belongings within reach  - Initiate and maintain comfort rounds  - Make Fall Risk Sign visible to staff  - Apply yellow socks and bracelet  for high fall risk patients  - Consider moving patient to room near nurses station  Outcome: Progressing  Goal: Maintain or return to baseline ADL function  Description: INTERVENTIONS:  -  Assess patient's ability to carry out ADLs; assess patient's baseline for ADL function and identify physical deficits which impact ability to perform ADLs (bathing, care of mouth/teeth, toileting, grooming, dressing, etc.)  - Assess/evaluate cause of self-care deficits   - Assess range of motion  - Assess patient's mobility; develop plan if impaired  - Assess patient's need for assistive devices and provide as appropriate  - Encourage maximum independence but intervene and supervise when necessary  - Involve family in performance of ADLs  - Assess for home care needs following discharge   - Consider OT consult to assist with ADL evaluation and planning for discharge  - Provide patient education as appropriate  Outcome: Progressing  Goal: Maintains/Returns to pre admission functional level  Description: INTERVENTIONS:  - Perform AM-PAC 6 Click Basic Mobility/ Daily Activity assessment daily.  - Set and communicate daily mobility goal to care team and patient/family/caregiver.   - Collaborate with rehabilitation services on mobility goals if consulted  - Out of bed for toileting  - Record patient progress and toleration of activity level   Outcome: Progressing     Problem: DISCHARGE PLANNING  Goal: Discharge to home or other facility with appropriate resources  Description: INTERVENTIONS:  - Identify barriers to discharge w/patient and caregiver  - Arrange for needed discharge resources and transportation as appropriate  - Identify discharge learning needs (meds, wound care, etc.)  - Arrange for interpretive services to assist at discharge as needed  - Refer to Case Management Department for coordinating discharge planning if the patient needs post-hospital services based on physician/advanced practitioner order or complex needs  related to functional status, cognitive ability, or social support system  Outcome: Progressing     Problem: Knowledge Deficit  Goal: Patient/family/caregiver demonstrates understanding of disease process, treatment plan, medications, and discharge instructions  Description: Complete learning assessment and assess knowledge base.  Interventions:  - Provide teaching at level of understanding  - Provide teaching via preferred learning methods  Outcome: Progressing     Problem: Prexisting or High Potential for Compromised Skin Integrity  Goal: Skin integrity is maintained or improved  Description: INTERVENTIONS:  - Identify patients at risk for skin breakdown  - Assess and monitor skin integrity  - Assess and monitor nutrition and hydration status  - Monitor labs   - Assess for incontinence   - Turn and reposition patient  - Assist with mobility/ambulation  - Relieve pressure over bony prominences  - Avoid friction and shearing  - Provide appropriate hygiene as needed including keeping skin clean and dry  - Evaluate need for skin moisturizer/barrier cream  - Collaborate with interdisciplinary team   - Patient/family teaching  - Consider wound care consult   Outcome: Progressing

## 2024-10-29 NOTE — ASSESSMENT & PLAN NOTE
CT of head and MRI of brain unremarkable for acute etiology/abnormalities  Status post prior lumbar puncture with CSF studies negative for meningitis  Concern noted of etiology of fevers due to possible lymphoma - transferred to the [facility] for expedited lymphoma workup (see below)  Mentation waxing/waning but mildly improved per family status post initiation of plasmapheresis  Appreciate oncology recommendations  Limit/avoid sedating agents if possible

## 2024-10-29 NOTE — ASSESSMENT & PLAN NOTE
During prior admission imaging showed enlarged periaortic, iliac chain and inguinal lymph nodes.  Repeat imaging showed persistent lymphadenopathy with worsened right axillary adenopathy.  As above, concern for an underlying lymphoproliferative disorder.  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  CBC differential also shows atypical lymphocytes.  Status post plasmapheresis 10/26. HIV screening negative. S/p lymph node and bone marrow biopsies.   -Hematology/oncology following  -Plasmapheresis per hematology/oncology  -Follow-up pathology from biopsies

## 2024-10-30 ENCOUNTER — TELEPHONE (OUTPATIENT)
Dept: HEMATOLOGY ONCOLOGY | Facility: CLINIC | Age: 84
End: 2024-10-30

## 2024-10-30 PROBLEM — U07.1 COVID: Status: ACTIVE | Noted: 2024-10-30

## 2024-10-30 LAB
ALBUMIN SERPL BCG-MCNC: 3 G/DL (ref 3.5–5)
ALP SERPL-CCNC: 151 U/L (ref 34–104)
ALT SERPL W P-5'-P-CCNC: 22 U/L (ref 7–52)
ANION GAP SERPL CALCULATED.3IONS-SCNC: 7 MMOL/L (ref 4–13)
AST SERPL W P-5'-P-CCNC: 32 U/L (ref 13–39)
B BURGDOR DNA SPEC QL NAA+PROBE: NEGATIVE
BACTERIA BLD CULT: NORMAL
BACTERIA BLD CULT: NORMAL
BILIRUB SERPL-MCNC: 1.32 MG/DL (ref 0.2–1)
BUN SERPL-MCNC: 5 MG/DL (ref 5–25)
CALCIUM ALBUM COR SERPL-MCNC: 8 MG/DL (ref 8.3–10.1)
CALCIUM SERPL-MCNC: 7.2 MG/DL (ref 8.4–10.2)
CHLORIDE SERPL-SCNC: 101 MMOL/L (ref 96–108)
CO2 SERPL-SCNC: 27 MMOL/L (ref 21–32)
CREAT SERPL-MCNC: 0.39 MG/DL (ref 0.6–1.3)
GFR SERPL CREATININE-BSD FRML MDRD: 108 ML/MIN/1.73SQ M
GLUCOSE SERPL-MCNC: 98 MG/DL (ref 65–140)
POTASSIUM SERPL-SCNC: 3.6 MMOL/L (ref 3.5–5.3)
PROT SERPL-MCNC: 5.3 G/DL (ref 6.4–8.4)
SODIUM SERPL-SCNC: 135 MMOL/L (ref 135–147)
VISC SER: 1.9 REL.SALINE (ref 1.4–2.1)

## 2024-10-30 PROCEDURE — 99232 SBSQ HOSP IP/OBS MODERATE 35: CPT | Performed by: INTERNAL MEDICINE

## 2024-10-30 PROCEDURE — 80053 COMPREHEN METABOLIC PANEL: CPT | Performed by: INTERNAL MEDICINE

## 2024-10-30 PROCEDURE — 99233 SBSQ HOSP IP/OBS HIGH 50: CPT | Performed by: INTERNAL MEDICINE

## 2024-10-30 PROCEDURE — 97167 OT EVAL HIGH COMPLEX 60 MIN: CPT

## 2024-10-30 PROCEDURE — 97163 PT EVAL HIGH COMPLEX 45 MIN: CPT

## 2024-10-30 RX ORDER — ALBUMIN HUMAN 50 G/1000ML
175 SOLUTION INTRAVENOUS ONCE
Status: COMPLETED | OUTPATIENT
Start: 2024-10-30 | End: 2024-10-30

## 2024-10-30 RX ORDER — CALCIUM GLUCONATE 20 MG/ML
2 INJECTION, SOLUTION INTRAVENOUS ONCE
Status: COMPLETED | OUTPATIENT
Start: 2024-10-30 | End: 2024-10-30

## 2024-10-30 RX ADMIN — HEPARIN SODIUM 5000 UNITS: 5000 INJECTION INTRAVENOUS; SUBCUTANEOUS at 23:38

## 2024-10-30 RX ADMIN — FLUTICASONE PROPIONATE 1 SPRAY: 50 SPRAY, METERED NASAL at 09:44

## 2024-10-30 RX ADMIN — PANTOPRAZOLE SODIUM 40 MG: 40 TABLET, DELAYED RELEASE ORAL at 05:35

## 2024-10-30 RX ADMIN — SODIUM CHLORIDE, SODIUM GLUCONATE, SODIUM ACETATE, POTASSIUM CHLORIDE, MAGNESIUM CHLORIDE, SODIUM PHOSPHATE, DIBASIC, AND POTASSIUM PHOSPHATE 100 ML/HR: .53; .5; .37; .037; .03; .012; .00082 INJECTION, SOLUTION INTRAVENOUS at 17:05

## 2024-10-30 RX ADMIN — HEPARIN SODIUM 5000 UNITS: 5000 INJECTION INTRAVENOUS; SUBCUTANEOUS at 05:35

## 2024-10-30 RX ADMIN — ALBUMIN (HUMAN) 175 G: 12.5 INJECTION, SOLUTION INTRAVENOUS at 16:57

## 2024-10-30 RX ADMIN — CALCIUM GLUCONATE 2 G: 20 INJECTION, SOLUTION INTRAVENOUS at 16:56

## 2024-10-30 RX ADMIN — HEPARIN SODIUM 5000 UNITS: 5000 INJECTION INTRAVENOUS; SUBCUTANEOUS at 16:58

## 2024-10-30 RX ADMIN — LEVOTHYROXINE SODIUM 50 MCG: 50 TABLET ORAL at 09:44

## 2024-10-30 NOTE — TELEPHONE ENCOUNTER
"Soft Intake Form   Patient Details   Miguel Henderson     1940     Reason For Appointment   Who is Calling? Eugenie Manjarrez   If not patient, Name?  NA   DID YOU CONFIRM INSURANCE WITH PATIENT? E veriifed, Routed to finance   Who is the Referring Doctor? Dr. Maxwell   What is the diagnosis? 1. 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 Worsening R axillary lymphadenopathy 3. Mediastinal, b/l iliac chain, and b/l inguinal lymphadenopathy 4. B symptoms 5. Acute encephalopathy and lethargy 6. Positive cold agglutinin   Has this diagnosis been confirmed by a biopsy/surgery?    If yes, what is the date it was done? LN bx taken on 10/28. BMBX taken on 10/29.   Biopsy done at [facility]?  If not, where was it done? Yes   Was imaging done, and was it done at [facility]?  If not, where was it done? Yes-[facility]   Have you been seen by another Oncologist?  If so, who and where (name of facility, city and state) No   For 2nd Opinions Only: Are you currently undergoing treatment, or are you scheduled to start treatment?  If yes, name of facility, city and state  NA   For \"History Of\" only: Have you completed treatment?  NA   Have you had Genetic Testing done in the past?  If so, advise to bring test results to their visit NA   Record Gathering Information   Did you advise to have records faxed to [phone]?  NA   Did you advise to have disks sent to the proper address with imaging? (\"History of\" Patients)  5 years of imaging for breast patients-Mammos, US etc NA   Scheduling Information   Did you send new patient paperwork?  Email or mail? NA   What is the best call back number?   (If the [facility] is calling, please use their number) NA   Miscellaneous Information      The patient is scheduled for his HFU appointment with Dr. Franco on 11/13 at 1120 in the [facility].      "

## 2024-10-30 NOTE — PLAN OF CARE
Problem: OCCUPATIONAL THERAPY ADULT  Goal: Performs self-care activities at highest level of function for planned discharge setting.  See evaluation for individualized goals.  Description: Treatment Interventions: ADL retraining, Functional transfer training, UE strengthening/ROM, Endurance training, Patient/family training, Cognitive reorientation, Equipment evaluation/education, Fine motor coordination activities, Compensatory technique education, Continued evaluation, Energy conservation, Activityengagement          See flowsheet documentation for full assessment, interventions and recommendations.   Note: Limitation: Decreased ADL status, Decreased cognition, Decreased Safe judgement during ADL, Decreased endurance, Decreased self-care trans, Decreased high-level ADLs  Prognosis: Fair  Assessment: 84 year old pt seen today for an OT evaluation following admission to [facility] 2/2 encephalopathy with present symptoms significant for pain, fatigue, weakness, decreased ADL status, decreased functional mobility. Pt  has a past medical history of Prostate cancer (HCC). Pt is a ?historian, responding inconsistently to questions and unable to provide consistent information. Per chart review, pt lives with family in a 1SH, 3STE. Pt has required some assist from family for ADLs/IADLs since last hospital stay. (-). Supportive family not present in room at this time, [employer]  services utilized. Pt pleasant and responsive t/o session. Pt completed functional bed mobility with mod Ax2. Able to sit EOB with min-mod A 2/2 posterior lean. Pt was mod A for UB ADLs and max A for LB ADLs. The patient's raw score on the AM-PAC Daily Activity Inpatient Short Form is 14. A raw score of less than 19 suggests the patient may benefit from discharge to post-acute rehabilitation services. Please refer to the recommendation of the Occupational Therapist for safe discharge planning. Pt is functioning  below baseline level of function and will continue to benefit from skilled acute OT to promote increased independence and return to PLOF. At this time, current OT recommendation is Level 2 resources - pending level of support/assist at home. Will continue to follow and assess.     Rehab Resource Intensity Level, OT: II (Moderate Resource Intensity) (pending progress and level of family support/assist at home)

## 2024-10-30 NOTE — PROGRESS NOTES
Progress Note - Infectious Disease   Name: Miguel Henderson 84 y.o. male I MRN: [id number]  Unit/Bed#: [facility] 817-01 I Date of Admission: 10/26/2024   Date of Service: 10/30/2024 I Hospital Day: 4    Assessment & Plan  Fever  The patient has had intermittent fevers for the past 3 weeks and several admissions to [facility] with fairly unremarkable infectious workup other than positive rhinovirus and COVID.  CT imaging was noted to show lymphadenopathy and he had atypical lymphocytes on CBC so hematology/oncology consult was recommended although the patient was discharged on Paxlovid with plan for an outpatient referral.  He then returned to the ED with worsening confusion and return of fever.  CT imaging with worsening axillary lymphadenopathy, CBC differential again showed atypical lymphocytes.  Status post LP 10/25 which showed mild elevation in protein and lymphocytic pleocytosis.  ME panel negative culture showed no growth.  Antimicrobials discontinued.  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  Labs show elevated LDH, ferritin, immunoglobulins.  Low concern for an infectious etiology at this time given negative extensive workup and concern for a lymphoproliferative disorder causing fevers.  Status post plasmapheresis with improvement in fever curve and mental status. CSF cultures finalized with no growth. S/p lymph node and bone marrow biopsies. Has remained afebrile and hemodynamically stable.   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  MRI brain without acute intracranial abnormalities.  Status post LP 10/25 with mild lymphocytic pleocytosis and elevated protein.  ME panel and CSF culture negative.  As above,  low concern for CNS infection.  Suspect CSF abnormalities related to possible underlying lymphoproliferative disorder.  Status post first session of plasmapheresis 10/26 with improvement in mental status and fever curve.   -Plasmapheresis per oncology   -Continue to monitor mental status  Lymphadenopathy  During prior admission imaging showed enlarged periaortic, iliac chain and inguinal lymph nodes.  Repeat imaging showed persistent lymphadenopathy with worsened right axillary adenopathy.  As above, concern for an underlying lymphoproliferative disorder.  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  CBC differential also shows atypical lymphocytes.  Status post plasmapheresis 10/26. HIV screening negative. S/p lymph node and bone marrow biopsies.   -Hematology/oncology following  -Plasmapheresis per hematology/oncology  -Follow-up pathology from biopsies and flow cytometry      I have discussed the above management plan in detail with the primary service.  I have discussed the above management plan in detail with patient's family members at bedside.    Infectious Disease service will follow.    Antibiotics:  none    Subjective   Patient's son provided translation. Patient states he is feeling well. Denies having any pain or complaints. Patient's son reporting that his mental status is still waxing and waning. No fevers, chills, N/V/D, CP, SOB, abdominal pain, rashes, urinary symptoms.     Objective :  Temp:  [97.9 °F (36.6 °C)-99.1 °F (37.3 °C)] 99.1 °F (37.3 °C)  HR:  [87-92] 90  BP: (106-135)/(67-86) 135/86  Resp:  [15-18] 17  SpO2:  [98 %-100 %] 100 %  O2 Device: None (Room air)    General Appearance:  Pleasant, elderly male, laying comfortably in bed, interactive, nontoxic, no acute distress.   Throat: Oropharynx moist without lesions.    Lungs:   Clear to auscultation bilaterally; no wheezes, rhonchi or rales; respirations unlabored. On room air.    Heart:  RRR; no murmur, rub or gallop.   Abdomen:    Soft, non-tender, non-distended, positive bowel sounds.     Extremities: No clubbing or cyanosis, no edema.   Skin: No new rashes, lesions, or draining wounds noted on exposed skin.   Neuro: Oriented to person, place, and time       Lab Results: I have reviewed the following results:  Results from last 7 days   Lab Units 10/29/24  0524 10/28/24  0433 10/27/24  1318 10/27/24  0639   WBC Thousand/uL 8.25 7.36  --  8.94   HEMOGLOBIN g/dL 9.2* 9.0*  --  9.3*   PLATELETS Thousands/uL 212 225 211 223     Results from last 7 days   Lab Units 10/30/24  0624 10/29/24  0524 10/28/24  0433   SODIUM mmol/L 135 137 136   POTASSIUM mmol/L 3.6 3.5 3.7   CHLORIDE mmol/L 101 103 102   CO2 mmol/L 27 29 28   BUN mg/dL 5 6 7   CREATININE mg/dL 0.39* 0.37* 0.33*   EGFR ml/min/1.73sq m 108 111 116   CALCIUM mg/dL 7.2* 7.4* 7.1*   AST U/L 32 34 67*   ALT U/L 22 23 43   ALK PHOS U/L 151* 151* 226*   ALBUMIN g/dL 3.0* 3.5 2.6*     Results from last 7 days   Lab Units 10/25/24  1704 10/25/24  1309 10/25/24  1252   BLOOD CULTURE   --  No Growth After 4 Days. No Growth After 4 Days.   GRAM STAIN RESULT  1+ Mononuclear Cells  No organisms seen  --   --      Results from last 7 days   Lab Units 10/26/24  0428 10/25/24  1121   PROCALCITONIN ng/ml 0.43* 0.59*         Results from last 7 days   Lab Units 10/27/24  0639   FERRITIN ng/mL 1,028*         Imaging Results Review: No pertinent imaging studies reviewed.  Other Study Results Review: No additional pertinent studies reviewed.      Anais Kerr PA-C  [facility]

## 2024-10-30 NOTE — ASSESSMENT & PLAN NOTE
CT of head and MRI of brain unremarkable for acute etiology/abnormalities  Status post prior lumbar puncture with CSF studies negative for meningitis  Concern noted of etiology of fevers due to possible lymphoma/hyperviscosity - transferred to the [facility] for expedited lymphoma workup (see below)  Mentation waxing/waning but mildly improved per family status post initiation of plasmapheresis  Appreciate oncology recommendations -> due to slight increase in confusion today, plan for another course of plasmapheresis today per oncology  Limit/avoid sedating agents if possible

## 2024-10-30 NOTE — ASSESSMENT & PLAN NOTE
Status post left inguinal lymph node biopsy on 10/28 -> await pathology  Status post bone marrow biopsy on 10/29 -> await pathology  Initiated on plasmapheresis course by oncology  Supportive care

## 2024-10-30 NOTE — ASSESSMENT & PLAN NOTE
Tested positive on 10/21  Currently oxygenating on baseline room air   Contact/airborne precautions  Supportive care otherwise

## 2024-10-30 NOTE — ASSESSMENT & PLAN NOTE
During prior admission imaging showed enlarged periaortic, iliac chain and inguinal lymph nodes.  Repeat imaging showed persistent lymphadenopathy with worsened right axillary adenopathy.  As above, concern for an underlying lymphoproliferative disorder.  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  CBC differential also shows atypical lymphocytes.  Status post plasmapheresis 10/26. HIV screening negative. S/p lymph node and bone marrow biopsies.   -Hematology/oncology following  -Plasmapheresis per hematology/oncology  -Follow-up pathology from biopsies and flow cytometry

## 2024-10-30 NOTE — PROGRESS NOTES
Progress Note - Hospitalist   Name: Miguel Henderson 84 y.o. male I MRN: [id number]  Unit/Bed#: [facility] 817-01 I Date of Admission: 10/26/2024   Date of Service: 10/30/2024 I Hospital Day: 4    Assessment & Plan  Encephalopathy  CT of head and MRI of brain unremarkable for acute etiology/abnormalities  Status post prior lumbar puncture with CSF studies negative for meningitis  Concern noted of etiology of fevers due to possible lymphoma/hyperviscosity - transferred to the [facility] for expedited lymphoma workup (see below)  Mentation waxing/waning but mildly improved per family status post initiation of plasmapheresis  Appreciate oncology recommendations -> due to slight increase in confusion today, plan for another course of plasmapheresis today per oncology  Limit/avoid sedating agents if possible  Lymphadenopathy  Status post left inguinal lymph node biopsy on 10/28 -> await pathology  Status post bone marrow biopsy on 10/29 -> await pathology  Initiated on plasmapheresis course by oncology  Supportive care  Abnormal LFTs  Elevated bilirubin and alkaline phosphatase progressively improving  Suspected to be associated with lymphadenopathy (above)  Limit/avoid hepatotoxins as possible  Hypothyroidism  Continue Synthroid  Primary hypertension  Holding HCTZ due to soft pressures at this time  Fever  Does not appear to be infectious as workup negative thus far  Appreciate ID recommendations - continue to monitor off antibiotics  Monitor temperature curve  COVID-19 virus infection  Tested positive on 10/21  Currently oxygenating on baseline room air   Contact/airborne precautions  Supportive care otherwise      VTE Pharmacologic Prophylaxis: VTE Score: 5 High Risk (Score >/= 5) - Pharmacological DVT Prophylaxis Ordered: Heparin. Sequential Compression Devices Ordered.    AM-PAC Basic Mobility:  Basic Mobility Inpatient Raw Score: 9  -[facility] Goal: 3: Sit at edge of bed  -[facility] Achieved: 3: Sit at edge of bed  [facility]  Goal achieved. Continue to encourage appropriate mobility.    Patient Centered Rounds:  I have performed bedside rounds and discussed plan of care with nursing today.  Discussions with Specialists or Other Care Team Provider:  see above assessments if applicable    Education and Discussions with Family / Patient:  Patient, along with multiple family members, at bedside today    Time Spent for Care:  35 minutes. More than 50% of total time spent on counseling and coordination of care, on one or more of the following: performing physical exam; counseling and coordination of care, obtaining or reviewing history, documenting in the medical record, reviewing/ordering tests/medications/procedures, and communicating with other healthcare professionals.    Current Length of Stay: 4 day(s)  Current Patient Status: Inpatient   Certification Statement:  Patient will continue to require additional hospital stay due to assessments as noted above.    Code Status: Level 3 - DNAR and DNI      Subjective     Seen and examined earlier today.  Multiple for members at bedside.  Per nursing and family, patient is a bit more agitated today.      Objective     Vitals:   Temp (24hrs), Av.5 °F (36.9 °C), Min:97.9 °F (36.6 °C), Max:99.1 °F (37.3 °C)    Temp:  [97.9 °F (36.6 °C)-99.1 °F (37.3 °C)] 99.1 °F (37.3 °C)  HR:  [90-91] 90  Resp:  [15-17] 17  BP: (126-135)/(73-86) 135/86  SpO2:  [99 %-100 %] 100 %  Body mass index is 28.46 kg/m².     Input and Output Summary (last 24 hours):       Intake/Output Summary (Last 24 hours) at 10/30/2024 1756  Last data filed at 10/30/2024 1247  Gross per 24 hour   Intake 300 ml   Output 773 ml   Net -473 ml       Physical Exam:     GENERAL Ill-appearing and weak/fatigued   HEAD   Normocephalic - atraumatic   EYES   PERRL - EOMI    MOUTH   Mucosa moist   NECK   Supple - full range of motion   CARDIAC Rate controlled   PULMONARY Fairly clear to auscultation without labored respirations   ABDOMEN  Nontender/nondistended   MUSCULOSKELETAL   Motor strength/range of motion remains deconditioned   NEUROLOGIC Mildly sluggish but agitated   SKIN   Chronic wrinkles/blemishes    PSYCHIATRIC   Mood/affect flat         Labs & Recent Cultures:  Results from last 7 days   Lab Units 10/29/24  0524 10/28/24  0433 10/27/24  0639 10/26/24  1457   WBC Thousand/uL 8.25 7.36   < > 6.54   HEMOGLOBIN g/dL 9.2* 9.0*   < > 9.0*   HEMATOCRIT % 28.9* 27.3*   < > 25.5*   PLATELETS Thousands/uL 212 225   < > 161   BANDS PCT %  --   --   --  3   SEGS PCT %  --  28*  --   --    LYMPHO PCT % 51* 51*  --  8   MONO PCT % 11 13*  --  7   EOS PCT % 5 5  --  6    < > = values in this interval not displayed.     Results from last 7 days   Lab Units 10/30/24  0624   POTASSIUM mmol/L 3.6   CHLORIDE mmol/L 101   CO2 mmol/L 27   BUN mg/dL 5   CREATININE mg/dL 0.39*   CALCIUM mg/dL 7.2*   ALK PHOS U/L 151*   ALT U/L 22   AST U/L 32     Results from last 7 days   Lab Units 10/27/24  0639   INR  1.67*     Results from last 7 days   Lab Units 10/28/24  1703   POC GLUCOSE mg/dl 117         Results from last 7 days   Lab Units 10/26/24  0428 10/25/24  1309 10/25/24  1121   LACTIC ACID mmol/L  --  1.6  --    PROCALCITONIN ng/ml 0.43*  --  0.59*         Results from last 7 days   Lab Units 10/25/24  1704 10/25/24  1309 10/25/24  1252   BLOOD CULTURE   --  No Growth After 4 Days. No Growth After 4 Days.   GRAM STAIN RESULT  1+ Mononuclear Cells  No organisms seen  --   --          Lines/Drains/Telemetry:  Invasive Devices       Peripheral Intravenous Line  Duration             Peripheral IV 10/30/24 Left;Upper Arm <1 day              Hemodialysis Catheter  Duration             HD Temporary Double Catheter 4 days                    Last 24 Hours Medication List:   Current Facility-Administered Medications   Medication Dose Route Frequency Provider Last Rate    acetaminophen  1,000 mg Intravenous Q6H PRN Joseph Alexander MD 1,000 mg (10/28/24 2103)     fluticasone  1 spray Nasal Daily Joseph Alexander MD      heparin (porcine)  5,000 Units Subcutaneous Q8H [facility] Nancy Mondragon      levothyroxine  50 mcg Oral Daily Joseph Alexander MD      multi-electrolyte  100 mL/hr Intravenous Continuous Joseph Alexander  mL/hr (10/30/24 1705)    ondansetron  4 mg Intravenous Q6H PRN Joseph Alexander MD      pantoprazole  40 mg Oral Early Morning Joseph Alexander MD      polyethylene glycol  17 g Oral Daily PRN MD JENNIFER Espinosa MD   Hospitalist - [facility] Internal Medicine        ** Please Note:  Documentation is constructed using a voice recognition dictation system.  An occasional wrong word/phrase or “sound-a-like” substitution may have been picked up by dictation device due to the inherent limitations of voice recognition software.  Read the chart carefully and recognize, using reasonable context, where substitutions may have occurred.**

## 2024-10-30 NOTE — PLAN OF CARE
Problem: PAIN - ADULT  Goal: Verbalizes/displays adequate comfort level or baseline comfort level  Description: Interventions:  - Encourage patient to monitor pain and request assistance  - Assess pain using appropriate pain scale  - Administer analgesics based on type and severity of pain and evaluate response  - Implement non-pharmacological measures as appropriate and evaluate response  - Consider cultural and social influences on pain and pain management  - Notify physician/advanced practitioner if interventions unsuccessful or patient reports new pain  Outcome: Progressing     Problem: INFECTION - ADULT  Goal: Absence or prevention of progression during hospitalization  Description: INTERVENTIONS:  - Assess and monitor for signs and symptoms of infection  - Monitor lab/diagnostic results  - Monitor all insertion sites, i.e. indwelling lines, tubes, and drains  - Monitor endotracheal if appropriate and nasal secretions for changes in amount and color  - [place] appropriate cooling/warming therapies per order  - Administer medications as ordered  - Instruct and encourage patient and family to use good hand hygiene technique  - Identify and instruct in appropriate isolation precautions for identified infection/condition  Outcome: Progressing  Goal: Absence of fever/infection during neutropenic period  Description: INTERVENTIONS:  - Monitor WBC    Outcome: Progressing     Problem: SAFETY ADULT  Goal: Patient will remain free of falls  Description: INTERVENTIONS:  - Educate patient/family on patient safety including physical limitations  - Instruct patient to call for assistance with activity   - Consult OT/PT to assist with strengthening/mobility   - Keep Call bell within reach  - Keep bed low and locked with side rails adjusted as appropriate  - Keep care items and personal belongings within reach  - Initiate and maintain comfort rounds  - Make Fall Risk Sign visible to staff  - Offer Toileting every 2 Hours,  in advance of need  - Initiate/Maintain bed/chair alarm  - Apply yellow socks and bracelet for high fall risk patients  - Consider moving patient to room near nurses station  Outcome: Progressing  Goal: Maintain or return to baseline ADL function  Description: INTERVENTIONS:  -  Assess patient's ability to carry out ADLs; assess patient's baseline for ADL function and identify physical deficits which impact ability to perform ADLs (bathing, care of mouth/teeth, toileting, grooming, dressing, etc.)  - Assess/evaluate cause of self-care deficits   - Assess range of motion  - Assess patient's mobility; develop plan if impaired  - Assess patient's need for assistive devices and provide as appropriate  - Encourage maximum independence but intervene and supervise when necessary  - Involve family in performance of ADLs  - Assess for home care needs following discharge   - Consider OT consult to assist with ADL evaluation and planning for discharge  - Provide patient education as appropriate  Outcome: Progressing  Goal: Maintains/Returns to pre admission functional level  Description: INTERVENTIONS:  - Perform AM-PAC 6 Click Basic Mobility/ Daily Activity assessment daily.  - Set and communicate daily mobility goal to care team and patient/family/caregiver.   - Collaborate with rehabilitation services on mobility goals if consulted  - Reposition patient every 2 hours.  - Dangle patient 3 times a day  - Stand patient 3 times a day  - Ambulate patient 3 times a day  - Out of bed to chair 3 times a day   - Out of bed for meals 3 times a day  - Out of bed for toileting  - Record patient progress and toleration of activity level   Outcome: Progressing     Problem: DISCHARGE PLANNING  Goal: Discharge to home or other facility with appropriate resources  Description: INTERVENTIONS:  - Identify barriers to discharge w/patient and caregiver  - Arrange for needed discharge resources and transportation as appropriate  - Identify  discharge learning needs (meds, wound care, etc.)  - Arrange for interpretive services to assist at discharge as needed  - Refer to Case Management Department for coordinating discharge planning if the patient needs post-hospital services based on physician/advanced practitioner order or complex needs related to functional status, cognitive ability, or social support system  Outcome: Progressing     Problem: Knowledge Deficit  Goal: Patient/family/caregiver demonstrates understanding of disease process, treatment plan, medications, and discharge instructions  Description: Complete learning assessment and assess knowledge base.  Interventions:  - Provide teaching at level of understanding  - Provide teaching via preferred learning methods  Outcome: Progressing     Problem: Prexisting or High Potential for Compromised Skin Integrity  Goal: Skin integrity is maintained or improved  Description: INTERVENTIONS:  - Identify patients at risk for skin breakdown  - Assess and monitor skin integrity  - Assess and monitor nutrition and hydration status  - Monitor labs   - Assess for incontinence   - Turn and reposition patient  - Assist with mobility/ambulation  - Relieve pressure over bony prominences  - Avoid friction and shearing  - Provide appropriate hygiene as needed including keeping skin clean and dry  - Evaluate need for skin moisturizer/barrier cream  - Collaborate with interdisciplinary team   - Patient/family teaching  - Consider wound care consult   Outcome: Progressing

## 2024-10-30 NOTE — PHYSICAL THERAPY NOTE
Physical Therapy Evaluation     Patient's Name: Miguel Henderson    Admitting Diagnosis  Encephalopathy [G93.40]    Problem List  Patient Active Problem List   Diagnosis    Adenocarcinoma of prostate (HCC)    Lumbar radiculopathy    Hypothyroidism    Primary hypertension    Rhinovirus    Left knee pain    Candida esophagitis (HCC)    Dysphagia    Ulceration of oral mucosa    Lesion of oral mucosa    Liver lesion    Localized edema    Shortness of breath on exertion    Cough    Febrile illness    Hyponatremia    Lactic acidosis    Oral candidiasis    HSV-1 (herpes simplex virus 1) infection    SIRS (systemic inflammatory response syndrome) (HCC)    Odynophagia    Metabolic encephalopathy    Encephalopathy    Fever    Lymphadenopathy    Abnormal LFTs       Past Medical History  Past Medical History:   Diagnosis Date    Prostate cancer (HCC)        Past Surgical History  Past Surgical History:   Procedure Laterality Date    APPENDECTOMY      IR BIOPSY INGUINAL LYMPH NODE  10/28/2024    IR LUMBAR PUNCTURE  10/25/2024    IR TEMPORARY DIALYSIS CATHETER PLACEMENT  10/26/2024        10/30/24 0858   PT Last Visit   PT Visit Date 10/30/24   Note Type   Note type Evaluation   Education   Education Provided Yes   End of Consult   Patient Position at End of Consult Supine;All needs within reach;Bed/Chair alarm activated   Pain Assessment   Pain Assessment Tool 0-10   Pain Score No Pain   Restrictions/Precautions   Weight Bearing Precautions Per Order No   Other Precautions Contact/isolation;Airborne/isolation;Chair Alarm;Bed Alarm;Cognitive;Multiple lines;Fall Risk  (Pt currently has COVID.)   Home Living   Type of Home House   Home Layout One level;Performs ADLs on one level;Able to live on main level with bedroom/bathroom;Stairs to enter with rails  (Pt currently lives in a ranch style home with 3STE.)   Bathroom Shower/Tub Tub/shower unit   Bathroom Toilet Standard   Bathroom Equipment Commode  (Pt has been utilizing  bedside commode following last admission.)   Bathroom Accessibility Accessible   Home Equipment Walker  (Pt ambulating with walker pta.)   Additional Comments Pt currently lives in a ranch style home with 3STE. Pt was ambulating with walker pta. Home information gathered from prior CM note.   Prior Function   Level of [place] Independent with functional mobility;Needs assistance with ADLs;Needs assistance with IADLS   Lives With Spouse;Family  (Pt lives with his spouse, son, and daughter-in-law.)   Receives Help From Family   IADLs Family/Friend/Other provides transportation;Family/Friend/Other provides medication management;Family/Friend/Other provides meals   Falls in the last 6 months 1 to 4  (Pt had one fall during this admission (10/28))   Vocational Retired   Comments Pt currently lives with his spouse, son, and daughter-in-law, who help with ADLs and IADLs. They note that pt was having some difficulty following last admission and needed assistance. Information gained from prior CM note.   General   Family/Caregiver Present No   Cognition   Overall Cognitive Status (S)  Impaired   Arousal/Participation Responsive   Orientation Level Oriented to person   Following Commands Follows one step commands inconsistently   Comments (S)  Pt was responsive during therapy session, but inconsistent with following verbal commands.  Pt is primarily Maltese speaking and  services were utilized during pt interaction.   RLE Assessment   RLE Assessment X  (Unable to formally assess, pt not following verbal commands.)   LLE Assessment   LLE Assessment X  (Unable to formally assess, pt not following verbal commands.)   Bed Mobility   Supine to Sit 3  Moderate assistance   Additional items Assist x 2;HOB elevated;Bedrails;Increased time required;Verbal cues;LE management   Sit to Supine 3  Moderate assistance   Additional items HOB elevated;Bedrails;Increased time required;Verbal cues;LE management;Assist x 2    Additional Comments Pt was in bed upon therapy arrival and required ModA x2 to come to sitting position at EOB. Pt was able to sit at EOB with ModA x1 for back support, due to pt leaning back. Pt was returned to bed at end of therapy session with bed alarm activated + all needs within reach.   Transfers   Sit to Stand Unable to assess   Stand to Sit Unable to assess   Additional Comments Defer transfer assessment 2' poor sitting balance and inconsistent command following. Pt sat at EOB with ModA x1 for increased back support to maintain sitting position.   Ambulation/Elevation   Gait pattern Not appropriate   Ambulation/Elevation Additional Comments Ambulation assessment deferred  due to pt not following verbal commands and increased confusion.   Balance   Static Sitting Fair -   Dynamic Sitting Poor +   Endurance Deficit   Endurance Deficit Yes   Endurance Deficit Description Due to limited mobility and fatigue.   Activity Tolerance   Activity Tolerance Patient limited by fatigue;Other (Comment)  (Pt unable to respond to verbal commands.)   Medical Staff Made Aware Co-treat with OT due to increased medical complexity and safety concerns.   Nurse Made Aware RN cleared pt for therapy.   Assessment   Prognosis Fair   Problem List Decreased strength;Decreased endurance;Impaired balance;Decreased mobility;Decreased cognition   Assessment Pt is a 84 y.o. male seen for PT evaluation s/p admit to [facility] on 10/26/2024. Pt was admitted with a primary dx of: encephalopathy, lymphadenopathy, and low-grade fever. PT now consulted for assessment of mobility and d/c needs. Pt with Ambulate orders.  Pts current comorbidities effecting treatment include:  has a past medical history of Prostate cancer (HCC).  Pts current clinical presentation is Unstable/ Unpredictable (high complexity) due to Ongoing medical management for primary dx, Increased reliance on more restrictive AD compared to baseline, Decreased activity  tolerance compared to baseline, Fall risk, Increased assistance needed from caregiver at current time, Cog status. Prior to admission, pt was requiring assistance from spouse and son with ADLs and IADLs, but he was independent with functional mobility utilizing his walker. Pt had no difficulty ambulating 3 stairs into his home and ambulating without issue. Upon evaluation, pt currently is requiring ModAx2 for bed mobility. Pt inconsistent to following commands, requiring increased support to come to sitting position. Pt maintained sitting at EOB with ModA x1 for back support. Transfers and ambulation were not attempted today due to increased pt confusion and inability to follow verbal commands. Pt presents at PT eval functioning below baseline and currently w/ overall mobility deficits 2* to: impaired balance, decreased endurance, gait deviations, decreased activity tolerance compared to baseline, decreased functional mobility tolerance compared to baseline, decreased safety awareness, impaired judgement, fall risk, decreased cognition. Pt currently at a fall risk 2* to impairments listed above.  Pt will benefit from skilled acute PT interventions to address stated impairments; to maximize functional mobility; for ongoing pt/ family training; and DME needs. At conclusion of PT session pt returned BTB, bed alarm engaged, all side rails up, all needs in reach, and RN notified of session recommendations with phone and call bell within reach. Pt denies any further questions at this time. The patient's AM-PAC Basic Mobility Inpatient Short Form Raw Score is 9. A Raw score of less than or equal to 16 suggests the patient may benefit from discharge to post-acute rehabilitation services. Please also refer to the recommendation of the Physical Therapist for safe discharge planning. Recommend Level 2 upon hospital D/C, due to patient functioning below baseline and high fall risk.   Barriers to Discharge None   Barriers to  Discharge Comments Pt has good family support from spouse, DIL, and son.   Goals   Patient Goals To go home.   STG Expiration Date 11/13/24   Short Term Goal #1 STG 1. Pt will be able to perform bed mobility tasks with Lance in order to improve overall functional mobility and assist in safe d/c. STG 2. Pt with sit EOB for at least 25 minutes at Sup level in order to strengthen abdominal musculature and assist in future transfers/ ambulation. STG 3. Pt will be able to perform functional transfer with Lance in order to improve overall functional mobility and assist in safe d/c. STG 4. Pt will be able to ambulate at least 50 feet with least restrictive device with Min A in order to improve overall functional mobility and assist in safe d/c. STG 5. Pt will improve sitting/standing static/dynamic balance 1/2 grade in order to improve functional mobility and assist in safe d/c.   PT Treatment Day 0   Plan   Treatment/Interventions Functional transfer training;LE strengthening/ROM;Therapeutic exercise;Endurance training;Patient/family training;Bed mobility;Gait training;Spoke to nursing;Spoke to case management;OT   PT Frequency 3-5x/wk   Discharge Recommendation   Rehab Resource Intensity Level, PT II (Moderate Resource Intensity)   Equipment Recommended Walker  (Pt benefits from using walker for added support and stability)   AM-PAC Basic Mobility Inpatient   Turning in Flat Bed Without Bedrails 2   Lying on Back to Sitting on Edge of Flat Bed Without Bedrails 2   Moving Bed to Chair 1   Standing Up From Chair Using Arms 1   Walk in Room 1   Climb 3-5 Stairs With Railing 1   Basic Mobility Inpatient Raw Score 8   Turning Head Towards Sound 3   Follow Simple Instructions 1   Low Function Basic Mobility Raw Score  12   Low Function Basic Mobility Standardized Score  18.33   [facility] Level Of Mobility   -[facility] Goal 3: Sit at edge of bed   -[facility] Achieved 3: Sit at edge of bed   Modified [place] Scale   Modified  Institute Scale 4   End of Consult   Patient Position at End of Consult Supine;Bed/Chair alarm activated;All needs within reach   End of Consult Comments Pt was back in bed at conclusion of therapy session with all needs within reach + chair alarm activated. RN updated following therapy session.       Tatyana Abdullahi, SPT

## 2024-10-30 NOTE — PLAN OF CARE
Problem: PAIN - ADULT  Goal: Verbalizes/displays adequate comfort level or baseline comfort level  Description: Interventions:  - Encourage patient to monitor pain and request assistance  - Assess pain using appropriate pain scale  - Administer analgesics based on type and severity of pain and evaluate response  - Implement non-pharmacological measures as appropriate and evaluate response  - Consider cultural and social influences on pain and pain management  - Notify physician/advanced practitioner if interventions unsuccessful or patient reports new pain  Outcome: Progressing     Problem: INFECTION - ADULT  Goal: Absence or prevention of progression during hospitalization  Description: INTERVENTIONS:  - Assess and monitor for signs and symptoms of infection  - Monitor lab/diagnostic results  - Monitor all insertion sites, i.e. indwelling lines, tubes, and drains  - Monitor endotracheal if appropriate and nasal secretions for changes in amount and color  - [place] appropriate cooling/warming therapies per order  - Administer medications as ordered  - Instruct and encourage patient and family to use good hand hygiene technique  - Identify and instruct in appropriate isolation precautions for identified infection/condition  Outcome: Progressing  Goal: Absence of fever/infection during neutropenic period  Description: INTERVENTIONS:  - Monitor WBC    Outcome: Progressing     Problem: SAFETY ADULT  Goal: Patient will remain free of falls  Description: INTERVENTIONS:  - Educate patient/family on patient safety including physical limitations  - Instruct patient to call for assistance with activity   - Consult OT/PT to assist with strengthening/mobility   - Keep Call bell within reach  - Keep bed low and locked with side rails adjusted as appropriate  - Keep care items and personal belongings within reach  - Initiate and maintain comfort rounds  - Make Fall Risk Sign visible to staff  - Offer Toileting every 2 Hours,  in advance of need  - Initiate/Maintain bed/chair alarm  - Apply yellow socks and bracelet for high fall risk patients  - Consider moving patient to room near nurses station  Outcome: Progressing  Goal: Maintain or return to baseline ADL function  Description: INTERVENTIONS:  -  Assess patient's ability to carry out ADLs; assess patient's baseline for ADL function and identify physical deficits which impact ability to perform ADLs (bathing, care of mouth/teeth, toileting, grooming, dressing, etc.)  - Assess/evaluate cause of self-care deficits   - Assess range of motion  - Assess patient's mobility; develop plan if impaired  - Assess patient's need for assistive devices and provide as appropriate  - Encourage maximum independence but intervene and supervise when necessary  - Involve family in performance of ADLs  - Assess for home care needs following discharge   - Consider OT consult to assist with ADL evaluation and planning for discharge  - Provide patient education as appropriate  Outcome: Progressing  Goal: Maintains/Returns to pre admission functional level  Description: INTERVENTIONS:  - Perform AM-PAC 6 Click Basic Mobility/ Daily Activity assessment daily.  - Set and communicate daily mobility goal to care team and patient/family/caregiver.   - Collaborate with rehabilitation services on mobility goals if consulted  - Reposition patient every 2 hours.  - Dangle patient 3 times a day  - Stand patient 3 times a day  - Ambulate patient 3 times a day  - Out of bed to chair 3 times a day   - Out of bed for meals 3 times a day  - Out of bed for toileting  - Record patient progress and toleration of activity level   Outcome: Progressing     Problem: DISCHARGE PLANNING  Goal: Discharge to home or other facility with appropriate resources  Description: INTERVENTIONS:  - Identify barriers to discharge w/patient and caregiver  - Arrange for needed discharge resources and transportation as appropriate  - Identify  discharge learning needs (meds, wound care, etc.)  - Arrange for interpretive services to assist at discharge as needed  - Refer to Case Management Department for coordinating discharge planning if the patient needs post-hospital services based on physician/advanced practitioner order or complex needs related to functional status, cognitive ability, or social support system  Outcome: Progressing     Problem: Knowledge Deficit  Goal: Patient/family/caregiver demonstrates understanding of disease process, treatment plan, medications, and discharge instructions  Description: Complete learning assessment and assess knowledge base.  Interventions:  - Provide teaching at level of understanding  - Provide teaching via preferred learning methods  Outcome: Progressing     Problem: Prexisting or High Potential for Compromised Skin Integrity  Goal: Skin integrity is maintained or improved  Description: INTERVENTIONS:  - Identify patients at risk for skin breakdown  - Assess and monitor skin integrity  - Assess and monitor nutrition and hydration status  - Monitor labs   - Assess for incontinence   - Turn and reposition patient  - Assist with mobility/ambulation  - Relieve pressure over bony prominences  - Avoid friction and shearing  - Provide appropriate hygiene as needed including keeping skin clean and dry  - Evaluate need for skin moisturizer/barrier cream  - Collaborate with interdisciplinary team   - Patient/family teaching  - Consider wound care consult   Outcome: Progressing

## 2024-10-30 NOTE — ASSESSMENT & PLAN NOTE
The patient has had intermittent fevers for the past 3 weeks and several admissions to [facility] with fairly unremarkable infectious workup other than positive rhinovirus and COVID.  CT imaging was noted to show lymphadenopathy and he had atypical lymphocytes on CBC so hematology/oncology consult was recommended although the patient was discharged on Paxlovid with plan for an outpatient referral.  He then returned to the ED with worsening confusion and return of fever.  CT imaging with worsening axillary lymphadenopathy, CBC differential again showed atypical lymphocytes.  Status post LP 10/25 which showed mild elevation in protein and lymphocytic pleocytosis.  ME panel negative culture showed no growth.  Antimicrobials discontinued.  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  Labs show elevated LDH, ferritin, immunoglobulins.  Low concern for an infectious etiology at this time given negative extensive workup and concern for a lymphoproliferative disorder causing fevers.  Status post plasmapheresis with improvement in fever curve and mental status. CSF cultures finalized with no growth. S/p lymph node and bone marrow biopsies. Has remained afebrile and hemodynamically stable.   -Continue to monitor off antimicrobials  -Follow-up lymph node and bone marrow biopsies  -Workup per hematology/oncology  -Follow-up blood cultures until finalized  -Monitor fever curve  -Monitor CBCd

## 2024-10-30 NOTE — OCCUPATIONAL THERAPY NOTE
Occupational Therapy Evaluation     Patient Name: Miguel Henderson  Today's Date: 10/30/2024  Problem List  Principal Problem:    Encephalopathy  Active Problems:    Hypothyroidism    Primary hypertension    Fever    Lymphadenopathy    Abnormal LFTs    Past Medical History  Past Medical History:   Diagnosis Date    Prostate cancer (HCC)      Past Surgical History  Past Surgical History:   Procedure Laterality Date    APPENDECTOMY      IR BIOPSY INGUINAL LYMPH NODE  10/28/2024    IR LUMBAR PUNCTURE  10/25/2024    IR TEMPORARY DIALYSIS CATHETER PLACEMENT  10/26/2024             10/30/24 0857   OT Last Visit   OT Visit Date 10/30/24   Note Type   Note type Evaluation   Pain Assessment   Pain Assessment Tool 0-10   Pain Score No Pain   Restrictions/Precautions   Weight Bearing Precautions Per Order No   Other Precautions Contact/isolation;Airborne/isolation;Cognitive;Chair Alarm;Bed Alarm;Multiple lines;Fall Risk;Pain  (COVID+, Primarily Polish speaking)   Home Living   Type of Home House   Home Layout One level;Performs ADLs on one level;Able to live on main level with bedroom/bathroom;Stairs to enter with rails  (3STE)   Bathroom Shower/Tub Tub/shower unit   Bathroom Toilet Standard   Bathroom Equipment Commode   Home Equipment Walker;Cane  (RW used PRN)   Additional Comments Information obtained from chart, from previous recent admission. Pt lives with his Spouse, Son and DIL in a 1SH, 3STE. Per chart, pt has required some A with ADLs since last hospital stay   Prior Function   Level of [place] Needs assistance with ADLs;Independent with functional mobility;Needs assistance with IADLS   Lives With Spouse;Family  (Spouse, son and DIL)   Receives Help From Family   IADLs Family/Friend/Other provides transportation;Family/Friend/Other provides meals;Family/Friend/Other provides medication management   Falls in the last 6 months 1 to 4  [facility]   Vocational Retired   Comments  "Information obtained from chart 2/2 pt unable to provide consistent home information in session   Lifestyle   Autonomy Assist PRN for ADLs/IADLs from family. RW used PRN. (-).   Reciprocal Relationships Lives with supportive family (Spouse, Son, DIL)   Service to Others Retired   Intrinsic Gratification None stated, will continue to assess   General   Additional Pertinent History Pt is a ?historian on this date, responding inconsistently to questions. Lookback  services utilized in session 2/2 pt being primarily [place] speaking. Hx and PLOF obtained from chart. Hope to speak with family when present in room.   Family/Caregiver Present No   Subjective   Subjective \"I need you to have my grandson bring my CD\"   ADL   Where Assessed Edge of bed   Eating Assistance 4  Minimal Assistance   Grooming Assistance 4  Minimal Assistance   UB Bathing Assistance 3  Moderate Assistance   LB Bathing Assistance 2  Maximal Assistance   UB Dressing Assistance 3  Moderate Assistance   LB Dressing Assistance 2  Maximal Assistance   Toileting Assistance  2  Maximal Assistance   Functional Assistance 2  Maximal Assistance   Bed Mobility   Supine to Sit 3  Moderate assistance   Additional items Assist x 2;HOB elevated;Bedrails;Increased time required;Verbal cues;LE management   Sit to Supine 3  Moderate assistance   Additional items Assist x 2;HOB elevated;Bedrails;Increased time required;Verbal cues;LE management   Additional Comments Pt in bed upon arrival. Ax2 to sit EOB with min-mod A to maintain seated EOB for back support 2/2 posterior lean. Pt in bed post session, all needs met, call bell within reach. +bed alarm on   Transfers   Sit to Stand Unable to assess   Stand to Sit Unable to assess   Additional Comments Deferred further functional mobility 2/2 decreased seated balance, limited command following and fatigue.   Balance   Static Sitting Fair -   Dynamic Sitting Poor +   Activity Tolerance   Activity Tolerance " "Patient limited by fatigue;Patient limited by pain;Other (Comment)  (cognition)   Medical Staff Made Aware Co-eval w PT 2/2 increased medical complexity and comorbidities.   Nurse Made Aware RN cleared for therapy   RUE Assessment   RUE Assessment X  (limited command following, able to raise arms to 90* flexion)   LUE Assessment   LUE Assessment X  (limited command following, able to raise arms to 90* flexion)   Hand Function   Gross Motor Coordination Functional   Fine Motor Coordination Functional   Hand Function Comments Pt able to hold onto hands, requires VCs and repetition to release   Cognition   Overall Cognitive Status (S)  Impaired   Arousal/Participation Alert;Arousable   Attention Difficulty attending to directions   Orientation Level Oriented to person;Disoriented to place;Disoriented to time;Disoriented to situation  (states that he is in [place], unable to state building, date, time, situation.)   Memory Decreased recall of precautions;Decreased recall of recent events;Decreased short term memory;Decreased long term memory   Following Commands Follows one step commands inconsistently   Comments Pt alert and responsive. [employer]  services used with live video .  Limited command following noted t/o the session, with repetition and VCs/tactile cues to complete tasks. When asked orientation questions, pt responded, \"I need my grandson to bring my CD\". Anticipate greater participation when family present in room.   Assessment   Limitation Decreased ADL status;Decreased cognition;Decreased Safe judgement during ADL;Decreased endurance;Decreased self-care trans;Decreased high-level ADLs   Prognosis Fair   Assessment 84 year old pt seen today for an OT evaluation following admission to [facility] 2/2 encephalopathy with present symptoms significant for pain, fatigue, weakness, decreased ADL status, decreased functional mobility. Pt  has a past medical history of " Prostate cancer (HCC). Pt is a ?historian, responding inconsistently to questions and unable to provide consistent information. Per chart review, pt lives with family in a 1SH, 3STE. Pt has required some assist from family for ADLs/IADLs since last hospital stay. (-). Supportive family not present in room at this time, [employer]  services utilized. Pt pleasant and responsive t/o session. Pt completed functional bed mobility with mod Ax2. Able to sit EOB with min-mod A 2/2 posterior lean. Pt was mod A for UB ADLs and max A for LB ADLs. The patient's raw score on the AM-PAC Daily Activity Inpatient Short Form is 14. A raw score of less than 19 suggests the patient may benefit from discharge to post-acute rehabilitation services. Please refer to the recommendation of the Occupational Therapist for safe discharge planning. Pt is functioning below baseline level of function and will continue to benefit from skilled acute OT to promote increased independence and return to PLOF. At this time, current OT recommendation is Level 2 resources - pending level of support/assist at home. Will continue to follow and assess.   Goals   Patient Goals none stated   LTG Time Frame 10-14   Long Term Goal #1 See below for goals   Plan   Treatment Interventions ADL retraining;Functional transfer training;UE strengthening/ROM;Endurance training;Patient/family training;Cognitive reorientation;Equipment evaluation/education;Fine motor coordination activities;Compensatory technique education;Continued evaluation;Energy conservation;Activityengagement   Goal Expiration Date 11/13/24   OT Frequency 2-3x/wk   Discharge Recommendation   Rehab Resource Intensity Level, OT II (Moderate Resource Intensity)  (pending progress and level of family support/assist at home)   AM-PAC Daily Activity Inpatient   Lower Body Dressing 2   Bathing 2   Toileting 2   Upper Body Dressing 2   Grooming 3   Eating 3   Daily Activity Raw Score 14   Daily  Activity Standardized Score (Calc for Raw Score >=11) 33.39   AM-PAC Applied Cognition Inpatient   Following a Speech/Presentation 1   Understanding Ordinary Conversation 2   Taking Medications 2   Remembering Where Things Are Placed or Put Away 2   Remembering List of 4-5 Errands 2   Taking Care of Complicated Tasks 1   Applied Cognition Raw Score 10   Applied Cognition Standardized Score 24.98   End of Consult   Education Provided Yes   Patient Position at End of Consult Supine;Bed/Chair alarm activated;All needs within reach   Nurse Communication Nurse aware of consult         Occupational Therapy Goals    Pt will be Min A with bed mobility with good sitting balance/tolerance to engage in self care tasks within this plan of care.      Pt will be Min A for UB dressing and bathing with use of assistive devices PRN within this plan of care.     Pt will be Min A for LB dressing and bathing with use of assistive devices PRN within this plan of care.     Pt will demonstrate standing tolerance of 2-3 minutes increase independence for toileting ADLs/tasks with use of assistive devices PRN within this plan of care.      Pt will demonstrate good carryover of safety awareness with use of appropriate assistive devices during functional tasks within this plan of care.     Pt will demonstrated increased activity tolerance to 30 mins for participation in ADLs and functional tasks within this plan of care.     Pt will complete further functional cognitive assessment with good attention/participation to assist with safe d/c planning recommendations.         Vladislav Sanches, MS, OTR/L     [facility] CERTIFIED RATER ID [id number]

## 2024-10-30 NOTE — PLAN OF CARE
Problem: PHYSICAL THERAPY ADULT  Goal: Performs mobility at highest level of function for planned discharge setting.  See evaluation for individualized goals.  Description: Treatment/Interventions: Functional transfer training, LE strengthening/ROM, Therapeutic exercise, Endurance training, Patient/family training, Bed mobility, Gait training, Spoke to nursing, Spoke to case management, OT  Equipment Recommended: Walker (Pt benefits from using walker for added support and stability)       See flowsheet documentation for full assessment, interventions and recommendations.  Note: Prognosis: Fair  Problem List: Decreased strength, Decreased endurance, Impaired balance, Decreased mobility, Decreased cognition  Assessment: Pt is a 84 y.o. male seen for PT evaluation s/p admit to [facility] on 10/26/2024. Pt was admitted with a primary dx of: encephalopathy, lymphadenopathy, and low-grade fever. PT now consulted for assessment of mobility and d/c needs. Pt with Ambulate orders.  Pts current comorbidities effecting treatment include:  has a past medical history of Prostate cancer (HCC).  Pts current clinical presentation is Unstable/ Unpredictable (high complexity) due to Ongoing medical management for primary dx, Increased reliance on more restrictive AD compared to baseline, Decreased activity tolerance compared to baseline, Fall risk, Increased assistance needed from caregiver at current time, Cog status. Prior to admission, pt was requiring assistance from spouse and son with ADLs and IADLs, but he was independent with functional mobility utilizing his walker. Pt had no difficulty ambulating 3 stairs into his home and ambulating without issue. Upon evaluation, pt currently is requiring ModAx2 for bed mobility. Pt inconsistent to following commands, requiring increased support to come to sitting position. Pt maintained sitting at EOB with ModA x1 for back support. Transfers and ambulation were not attempted  today due to increased pt confusion and inability to follow verbal commands. Pt presents at PT eval functioning below baseline and currently w/ overall mobility deficits 2* to: impaired balance, decreased endurance, gait deviations, decreased activity tolerance compared to baseline, decreased functional mobility tolerance compared to baseline, decreased safety awareness, impaired judgement, fall risk, decreased cognition. Pt currently at a fall risk 2* to impairments listed above.  Pt will benefit from skilled acute PT interventions to address stated impairments; to maximize functional mobility; for ongoing pt/ family training; and DME needs. At conclusion of PT session pt returned BTB, bed alarm engaged, all side rails up, all needs in reach, and RN notified of session recommendations with phone and call bell within reach. Pt denies any further questions at this time. The patient's AM-PAC Basic Mobility Inpatient Short Form Raw Score is 9. A Raw score of less than or equal to 16 suggests the patient may benefit from discharge to post-acute rehabilitation services. Please also refer to the recommendation of the Physical Therapist for safe discharge planning. Recommend Level 2 upon hospital D/C, due to patient functioning below baseline and high fall risk.  Barriers to Discharge: None  Barriers to Discharge Comments: Pt has good family support from spouse, DIL, and son.  Rehab Resource Intensity Level, PT: II (Moderate Resource Intensity)    See flowsheet documentation for full assessment.      Tatyana Abdullahi, SPT

## 2024-10-31 ENCOUNTER — DOCUMENTATION (OUTPATIENT)
Dept: HEMATOLOGY ONCOLOGY | Facility: CLINIC | Age: 84
End: 2024-10-31

## 2024-10-31 ENCOUNTER — APPOINTMENT (INPATIENT)
Dept: NEUROLOGY | Facility: CLINIC | Age: 84
DRG: 823 | End: 2024-10-31
Payer: COMMERCIAL

## 2024-10-31 ENCOUNTER — APPOINTMENT (INPATIENT)
Dept: RADIOLOGY | Facility: HOSPITAL | Age: 84
DRG: 823 | End: 2024-10-31
Payer: COMMERCIAL

## 2024-10-31 PROBLEM — G25.3 MYOCLONIA: Status: ACTIVE | Noted: 2024-10-31

## 2024-10-31 LAB
ALBUMIN SERPL BCG-MCNC: 3.7 G/DL (ref 3.5–5)
ALP SERPL-CCNC: 72 U/L (ref 34–104)
ALT SERPL W P-5'-P-CCNC: 15 U/L (ref 7–52)
AMMONIA PLAS-SCNC: 67 UMOL/L (ref 18–72)
ANION GAP SERPL CALCULATED.3IONS-SCNC: 6 MMOL/L (ref 4–13)
AST SERPL W P-5'-P-CCNC: 21 U/L (ref 13–39)
ATRIAL RATE: 100 BPM
BASOPHILS # BLD AUTO: 0.07 THOUSANDS/ÂΜL (ref 0–0.1)
BASOPHILS NFR BLD AUTO: 1 % (ref 0–1)
BILIRUB SERPL-MCNC: 1.29 MG/DL (ref 0.2–1)
BUN SERPL-MCNC: 5 MG/DL (ref 5–25)
CALCIUM SERPL-MCNC: 7.2 MG/DL (ref 8.4–10.2)
CHLORIDE SERPL-SCNC: 97 MMOL/L (ref 96–108)
CO2 SERPL-SCNC: 28 MMOL/L (ref 21–32)
CREAT SERPL-MCNC: 0.33 MG/DL (ref 0.6–1.3)
CRYOGLOB SER QL 1D COLD INC: POSITIVE
EOSINOPHIL # BLD AUTO: 0.14 THOUSAND/ÂΜL (ref 0–0.61)
EOSINOPHIL NFR BLD AUTO: 1 % (ref 0–6)
ERYTHROCYTE [DISTWIDTH] IN BLOOD BY AUTOMATED COUNT: 16 % (ref 11.6–15.1)
FOLATE SERPL-MCNC: 17.5 NG/ML
GFR SERPL CREATININE-BSD FRML MDRD: 116 ML/MIN/1.73SQ M
GLUCOSE SERPL-MCNC: 124 MG/DL (ref 65–140)
GLUCOSE SERPL-MCNC: 138 MG/DL (ref 65–140)
HCT VFR BLD AUTO: 24.1 % (ref 36.5–49.3)
HGB BLD-MCNC: 8.6 G/DL (ref 12–17)
IMM GRANULOCYTES # BLD AUTO: 0.05 THOUSAND/UL (ref 0–0.2)
IMM GRANULOCYTES NFR BLD AUTO: 1 % (ref 0–2)
LACTATE SERPL-SCNC: 0.7 MMOL/L (ref 0.5–2)
LYMPHOCYTES # BLD AUTO: 4.99 THOUSANDS/ÂΜL (ref 0.6–4.47)
LYMPHOCYTES NFR BLD AUTO: 48 % (ref 14–44)
MCH RBC QN AUTO: 33.1 PG (ref 26.8–34.3)
MCHC RBC AUTO-ENTMCNC: 35.7 G/DL (ref 31.4–37.4)
MCV RBC AUTO: 93 FL (ref 82–98)
MONOCYTES # BLD AUTO: 1.25 THOUSAND/ÂΜL (ref 0.17–1.22)
MONOCYTES NFR BLD AUTO: 12 % (ref 4–12)
NEUTROPHILS # BLD AUTO: 3.76 THOUSANDS/ÂΜL (ref 1.85–7.62)
NEUTS SEG NFR BLD AUTO: 37 % (ref 43–75)
NRBC BLD AUTO-RTO: 0 /100 WBCS
P AXIS: 100 DEGREES
PLATELET # BLD AUTO: 253 THOUSANDS/UL (ref 149–390)
PMV BLD AUTO: 9.6 FL (ref 8.9–12.7)
POTASSIUM SERPL-SCNC: 3.5 MMOL/L (ref 3.5–5.3)
PR INTERVAL: 166 MS
PROT SERPL-MCNC: 5.3 G/DL (ref 6.4–8.4)
QRS AXIS: 6 DEGREES
QRSD INTERVAL: 84 MS
QT INTERVAL: 370 MS
QTC INTERVAL: 477 MS
RBC # BLD AUTO: 2.6 MILLION/UL (ref 3.88–5.62)
SCAN RESULT: NORMAL
SODIUM SERPL-SCNC: 131 MMOL/L (ref 135–147)
T WAVE AXIS: 27 DEGREES
TSH SERPL DL<=0.05 MIU/L-ACNC: 3.28 UIU/ML (ref 0.45–4.5)
VENTRICULAR RATE: 100 BPM
VIT B12 SERPL-MCNC: 360 PG/ML (ref 180–914)
WBC # BLD AUTO: 10.26 THOUSAND/UL (ref 4.31–10.16)
WNV RNA SPEC QL NAA+PROBE: NOT DETECTED

## 2024-10-31 PROCEDURE — 82948 REAGENT STRIP/BLOOD GLUCOSE: CPT

## 2024-10-31 PROCEDURE — 93005 ELECTROCARDIOGRAM TRACING: CPT

## 2024-10-31 PROCEDURE — 99232 SBSQ HOSP IP/OBS MODERATE 35: CPT | Performed by: INTERNAL MEDICINE

## 2024-10-31 PROCEDURE — 85007 BL SMEAR W/DIFF WBC COUNT: CPT | Performed by: FAMILY MEDICINE

## 2024-10-31 PROCEDURE — 95816 EEG AWAKE AND DROWSY: CPT | Performed by: STUDENT IN AN ORGANIZED HEALTH CARE EDUCATION/TRAINING PROGRAM

## 2024-10-31 PROCEDURE — 83605 ASSAY OF LACTIC ACID: CPT

## 2024-10-31 PROCEDURE — 93010 ELECTROCARDIOGRAM REPORT: CPT | Performed by: INTERNAL MEDICINE

## 2024-10-31 PROCEDURE — 70450 CT HEAD/BRAIN W/O DYE: CPT

## 2024-10-31 PROCEDURE — 71045 X-RAY EXAM CHEST 1 VIEW: CPT

## 2024-10-31 PROCEDURE — 84443 ASSAY THYROID STIM HORMONE: CPT

## 2024-10-31 PROCEDURE — 80053 COMPREHEN METABOLIC PANEL: CPT | Performed by: INTERNAL MEDICINE

## 2024-10-31 PROCEDURE — 85025 COMPLETE CBC W/AUTO DIFF WBC: CPT | Performed by: INTERNAL MEDICINE

## 2024-10-31 PROCEDURE — 82746 ASSAY OF FOLIC ACID SERUM: CPT

## 2024-10-31 PROCEDURE — 99223 1ST HOSP IP/OBS HIGH 75: CPT | Performed by: PSYCHIATRY & NEUROLOGY

## 2024-10-31 PROCEDURE — 95816 EEG AWAKE AND DROWSY: CPT

## 2024-10-31 PROCEDURE — 82140 ASSAY OF AMMONIA: CPT

## 2024-10-31 PROCEDURE — 82607 VITAMIN B-12: CPT

## 2024-10-31 RX ORDER — LEVETIRACETAM 500 MG/5ML
2000 INJECTION, SOLUTION, CONCENTRATE INTRAVENOUS ONCE
Status: COMPLETED | OUTPATIENT
Start: 2024-10-31 | End: 2024-10-31

## 2024-10-31 RX ORDER — CALCIUM GLUCONATE 20 MG/ML
2 INJECTION, SOLUTION INTRAVENOUS ONCE
Status: COMPLETED | OUTPATIENT
Start: 2024-10-31 | End: 2024-11-01

## 2024-10-31 RX ORDER — LORAZEPAM 2 MG/ML
2 INJECTION INTRAMUSCULAR ONCE
Status: COMPLETED | OUTPATIENT
Start: 2024-10-31 | End: 2024-10-31

## 2024-10-31 RX ADMIN — LEVOTHYROXINE SODIUM 50 MCG: 50 TABLET ORAL at 10:36

## 2024-10-31 RX ADMIN — LEVETIRACETAM 2000 MG: 100 INJECTION, SOLUTION INTRAVENOUS at 22:53

## 2024-10-31 RX ADMIN — LORAZEPAM 2 MG: 2 INJECTION INTRAMUSCULAR; INTRAVENOUS at 22:22

## 2024-10-31 RX ADMIN — SODIUM CHLORIDE, SODIUM GLUCONATE, SODIUM ACETATE, POTASSIUM CHLORIDE, MAGNESIUM CHLORIDE, SODIUM PHOSPHATE, DIBASIC, AND POTASSIUM PHOSPHATE 100 ML/HR: .53; .5; .37; .037; .03; .012; .00082 INJECTION, SOLUTION INTRAVENOUS at 13:57

## 2024-10-31 RX ADMIN — CALCIUM GLUCONATE 2 G: 20 INJECTION, SOLUTION INTRAVENOUS at 10:36

## 2024-10-31 RX ADMIN — FLUTICASONE PROPIONATE 1 SPRAY: 50 SPRAY, METERED NASAL at 10:36

## 2024-10-31 RX ADMIN — HEPARIN SODIUM 5000 UNITS: 5000 INJECTION INTRAVENOUS; SUBCUTANEOUS at 05:00

## 2024-10-31 RX ADMIN — HEPARIN SODIUM 5000 UNITS: 5000 INJECTION INTRAVENOUS; SUBCUTANEOUS at 13:57

## 2024-10-31 RX ADMIN — HEPARIN SODIUM 5000 UNITS: 5000 INJECTION INTRAVENOUS; SUBCUTANEOUS at 22:24

## 2024-10-31 NOTE — PROGRESS NOTES
Medical Oncology/Hematology Progress Note  Miguel Henderson, male, 84 y.o., 1940,  PPHP 817/PPHP [id number], [id number]     Assessment and Plan:  Mr. Henderson is a Kyrgyz-speaking 84 yoM with PMHx of prostate cancer, hypertension, and hypothyroidism with encephalopathy, B symptoms, and lymphadenopathy concerning hyperviscosity syndrome secondary to lymphoma or lymphoproliferative disorder transferred from [facility] to [facility] to be evaluated for plasmapheresis.    1. Concern for hyperviscosity syndrome possibly secondary to lymphoma or lymphoproliferative disorder  2. Worsening R axillary lymphadenopathy  3. Mediastinal, b/l iliac chain, and b/l inguinal lymphadenopathy  4. B symptoms  5. Acute encephalopathy and lethargy  6. Positive cold agglutinin  - Appreciate [facility] ([facility] [phone]), plasmapheresis day 3 today  - Appreciate IR: s/p R IJ catheter, s/p core biopsy (path pending), and s/p BMBx (path pending)  - Ordered 3.5 L of 5% albumin x3 treatments to be given with plasmapheresis  - Ordered calcium gluconate 2 g IV x2 treatments to be given with plasmapheresis  - Ordered the following labs:  - Viscosity:   - Peripheral smear: negative for schistocytes  - Fibrinogen: 132  - PT-INR: 1.67  - LDH: 560  - Cryoglobulin:   - SPEP: elevated gamma spike  - UPEP  - Immunoglobulins: IgA 1364, IgG 2330, IgM 565  - Free light chains:   - Flow cytometry: inverted CD4/CD8 ratio  - Immunofixation: free lambda light chains  - Beta-2 microglobulin: 4.7 (nml )  - Type and screen: completed  - We will continue to follow closely  - We will establish outpatient follow up in our office within two weeks of discharge after his acute symptoms have resolved    Interval History:  NESSA, patient's son reports some mild confusion which mostly resolved from the morning to the afternoon, vitals showed Tmax 99.1, HR 90s, RR 19, , and adequate SpO2 on RA.  Labs showed updates as indicated above plus  total protein 5.3, Tbili 1.32, Scr 0.39, WBC 8.3, Hb 9.2, and Plt 212.    History of present illness:  Mr. Henderson is a Serbian-speaking 84-year-old gentleman with PMHx of prostate cancer (~15 years ago treated with surgery and radiation), hypertension, hypothyroidism, dysphagia, recent COVID and rhinovirus infections who presented to [facility] on 10/25/24 with encephalopathy, B symptoms and lymphadenopathy on imaging concerning for lymphoma with elevated clinical concern for potential hyperviscosity syndrome resulting in a transfer to [facility] to be evaluated for plasmapheresis.    The Patient has had three hospital admissions in the last three weeks.  He has been feeling unwell for several weeks, has had fevers, is very fatigued, has poor appetite and poor PO intake, and has noted weight loss.  Initially, his fevers and lymphadenopathy were attributed to recent COVID and rhinovirus infections.  Imaging did demonstrate lymphadenopathy again thought to be attributed to infection with instructions to follow-up with hematology/medical oncology in the outpatient setting to ensure resolution.  However, his symptoms continued and empiric antibiotics were with concern for meningitis.  Antibiotics were discontinued when LP was negative and ID does not feel there is sufficient evidence for meningitis.     Vitals show Temp 100.2, HR 93, RR 16, /65, and 96% SpO2 on RA.  Labs show strong cold agglutinin, WBC 8.5, Hb 9.4, , , K 3.3, chloride 96, , BUN 7, creatinine 0.56, glucose 88, total protein 6.6, T. bili 1.53, Mg 1.7.    CT CAP w/ (10/25/2024) demonstrates right axillary adenopathy that is worsened compared to previous imaging on 10/11/2024.  Unchanged mediastinal, bilateral iliac chain and bilateral inguinal adenopathy compared to CT on 10/21/2024.  Mild potential pulmonary edema.  Small pleural effusions.  Unchanged cardiomegaly.  Right axillary and subpectoral  adenopathy demonstrates a lymph node measuring 21 mm that was previously 12 mm.  Numerous new enlarged nodes unchanged subcentimeter left axillary nodes.  Right external iliac node measures 2.2 cm.  Left external iliac node demonstrates size of 10 mm.  Inguinal lymphadenopathy measures 16 mm on the right and 11 mm on the left.     MRI Brain (10/25/2024) demonstrates no acute infarct, mass effect or midline shift with mild to moderate chronic microangiopathy.  There is multiple indeterminate bilateral parotid nodes, the largest which measures up to 1 cm on the left, and is incompletely evaluated on this imaging.     Peripheral smear (10/22/24) showed RBCs with marked agglutination limiting further evaluation of morphologic features, slight left shift in WBC showing granulocytes with numerous fragile cells and scattered reactive monocytes and lymphocytes without distinct features of dysplasia or circulating blasts, platelets show normal morphology without significant clumps or satellitosis.  Overall, the combination of findings is not definitively specific, though the red blood cell changes are compatible with a cold agglutinin in the correct clinical setting. The white blood cell changes may raise the possibility of infection, chemotherapy or drug effect, toxin exposure, postvaccination, or other reactive condition. The presence of fragile white blood cells may be an artifact of specimen handling, though a component of drug effect or nonspecific reactive change cannot be entirely excluded. Correlation with clinical impression and other laboratory findings is recommended. If clinical suspicion for more significant disease process such as a lymphoproliferative disorder exists then consideration for repeat sampling with peripheral blood flow cytometry may be of assistance to further evaluate the white blood cell changes as clinically indicated.  Flow cytometry was ordered and is pending.    Dr. Maxwell,  hematology/oncology, coordinated care with [facility] primary attending, Dr. Borrero to arrange for urgent transfer to [place].  We called the [facility] to discuss the case and inform them of a possible need for plasmapheresis.  The patient arrived promptly to [facility].  We evaluated the patient at bedside and found him to be AAOx3, states that he feels slightly improved, but exhibited a degree of encephalopathy.  We contacted the [facility] again ([facility] [phone]) who recommended ordering labs, 3.5 L albumin x2 days, and calcium gluconate 2 g x2 days.  We contacted IR who is planning to place urgent R IJ catheter.  Family history is positive for lymphoma in a brother.    Review of Systems:   ROS was performed and is negative except as indicated above.    Current Facility-Administered Medications:     acetaminophen (Ofirmev) injection 1,000 mg, 1,000 mg, Intravenous, Q6H PRN, Joseph Alexander MD, Last Rate: 400 mL/hr at 10/28/24 2103, 1,000 mg at 10/28/24 2103    fluticasone (FLONASE) 50 mcg/act nasal spray 1 spray, 1 spray, Nasal, Daily, Joseph Alexander MD, 1 spray at 10/30/24 0944    heparin (porcine) subcutaneous injection 5,000 Units, 5,000 Units, Subcutaneous, Q8H JOE, 5,000 Units at 10/30/24 2338 **AND** [COMPLETED] Platelet count, , , Once, Nancy Mondragon    levothyroxine tablet 50 mcg, 50 mcg, Oral, Daily, Joseph Alexander MD, 50 mcg at 10/30/24 0944    multi-electrolyte (PLASMALYTE-A/ISOLYTE-S PH 7.4) IV solution, 100 mL/hr, Intravenous, Continuous, Joseph Alexander MD, Last Rate: 100 mL/hr at 10/30/24 1705, 100 mL/hr at 10/30/24 1705    ondansetron (ZOFRAN) injection 4 mg, 4 mg, Intravenous, Q6H PRN, Joseph Alexander MD    pantoprazole (PROTONIX) EC tablet 40 mg, 40 mg, Oral, Early Morning, Joseph Alexander MD, 40 mg at 10/30/24 0535    polyethylene glycol (MIRALAX) packet 17 g, 17 g, Oral, Daily PRN, Darrius Gr MD    Medications Prior to Admission:     benzonatate (TESSALON)  "200 MG capsule    cyclobenzaprine (FLEXERIL) 5 mg tablet    fluticasone (FLONASE) 50 mcg/act nasal spray    hydroCHLOROthiazide 25 mg tablet    ibuprofen (MOTRIN) 800 mg tablet    levothyroxine 50 mcg tablet    levothyroxine 75 mcg tablet    [] nirmatrelvir & ritonavir (Paxlovid) tablet therapy pack    ondansetron (ZOFRAN) 4 mg tablet    pantoprazole (PROTONIX) 40 mg tablet    potassium chloride (Klor-Con M10) 10 mEq tablet    Allergies   Allergen Reactions    Shellfish Allergy - Food Allergy Anaphylaxis    Valtrex [Valacyclovir] Hives     Physical Exam:    /75   Pulse 91   Temp 99.1 °F (37.3 °C)   Resp 19   Ht 5' 5\" (1.651 m)   Wt 77.6 kg (171 lb)   SpO2 96%   BMI 28.46 kg/m²     General: AAOx3, well-appearing, resting comfortably in bed  HEENT: PERRLA, moist mucosa, dentures  Respiratory: CTAB w/o wheezes, rales, or rhonchi, no increased work of breathing  Cardiovascular: RRR w/o murmurs, 2+ radial and pedal pulses, no b/l LE edema  Abdomen: soft, non-tender, non-distended, no hepatomegaly or splenomegaly  /Rectal: deferred  Musculoskeletal: moves all four extremities with normal strength and ROM  Integumentary: reticular rash across chest and arms, dry, no bruising  Neurological: no gross or focal deficits identified  Psychiatric: pleasant, confused, cooperative    Recent Results (from the past 48 hour(s))   CBC and differential    Collection Time: 10/29/24  5:24 AM   Result Value Ref Range    WBC 8.25 4.31 - 10.16 Thousand/uL    RBC 3.12 (L) 3.88 - 5.62 Million/uL    Hemoglobin 9.2 (L) 12.0 - 17.0 g/dL    Hematocrit 28.9 (L) 36.5 - 49.3 %    MCV 93 82 - 98 fL    MCH 29.5 26.8 - 34.3 pg    MCHC 31.8 31.4 - 37.4 g/dL    RDW 16.6 (H) 11.6 - 15.1 %    MPV 9.8 8.9 - 12.7 fL    Platelets 212 149 - 390 Thousands/uL   Comprehensive metabolic panel    Collection Time: 10/29/24  5:24 AM   Result Value Ref Range    Sodium 137 135 - 147 mmol/L    Potassium 3.5 3.5 - 5.3 mmol/L    Chloride 103 96 - 108 " mmol/L    CO2 29 21 - 32 mmol/L    ANION GAP 5 4 - 13 mmol/L    BUN 6 5 - 25 mg/dL    Creatinine 0.37 (L) 0.60 - 1.30 mg/dL    Glucose 99 65 - 140 mg/dL    Calcium 7.4 (L) 8.4 - 10.2 mg/dL    AST 34 13 - 39 U/L    ALT 23 7 - 52 U/L    Alkaline Phosphatase 151 (H) 34 - 104 U/L    Total Protein 5.4 (L) 6.4 - 8.4 g/dL    Albumin 3.5 3.5 - 5.0 g/dL    Total Bilirubin 1.89 (H) 0.20 - 1.00 mg/dL    eGFR 111 ml/min/1.73sq m   Magnesium    Collection Time: 10/29/24  5:24 AM   Result Value Ref Range    Magnesium 1.9 1.9 - 2.7 mg/dL   Manual Differential(PHLEBS Do Not Order)    Collection Time: 10/29/24  5:24 AM   Result Value Ref Range    Segmented % 31 (L) 43 - 75 %    Lymphocytes % 51 (H) 14 - 44 %    Monocytes % 11 4 - 12 %    Eosinophils % 5 0 - 6 %    Basophils % 0 0 - 1 %    Atypical Lymphocytes % 2 (H) <=0 %    Absolute Neutrophils 2.56 1.85 - 7.62 Thousand/uL    Absolute Lymphocytes 4.37 0.60 - 4.47 Thousand/uL    Absolute Monocytes 0.91 0.00 - 1.22 Thousand/uL    Absolute Eosinophils 0.41 (H) 0.00 - 0.40 Thousand/uL    Absolute Basophils 0.00 0.00 - 0.10 Thousand/uL    Total Counted      RBC Morphology Present     Platelet Estimate Adequate Adequate    Anisocytosis Present     Polychromasia Present    Leukemia/Lymphoma flow cytometry    Collection Time: 10/29/24 10:08 AM   Result Value Ref Range    Scan Result SEE WRITTEN REPORT    Comprehensive metabolic panel    Collection Time: 10/30/24  6:24 AM   Result Value Ref Range    Sodium 135 135 - 147 mmol/L    Potassium 3.6 3.5 - 5.3 mmol/L    Chloride 101 96 - 108 mmol/L    CO2 27 21 - 32 mmol/L    ANION GAP 7 4 - 13 mmol/L    BUN 5 5 - 25 mg/dL    Creatinine 0.39 (L) 0.60 - 1.30 mg/dL    Glucose 98 65 - 140 mg/dL    Calcium 7.2 (L) 8.4 - 10.2 mg/dL    Corrected Calcium 8.0 (L) 8.3 - 10.1 mg/dL    AST 32 13 - 39 U/L    ALT 22 7 - 52 U/L    Alkaline Phosphatase 151 (H) 34 - 104 U/L    Total Protein 5.3 (L) 6.4 - 8.4 g/dL    Albumin 3.0 (L) 3.5 - 5.0 g/dL    Total  Bilirubin 1.32 (H) 0.20 - 1.00 mg/dL    eGFR 108 ml/min/1.73sq m     MRI brain w wo contrast    Result Date: 10/26/2024  Narrative: MRI BRAIN WITH AND WITHOUT CONTRAST INDICATION: confusion,. COMPARISON: CT head 10/25/2024. TECHNIQUE: Multiplanar, multisequence imaging of the brain was performed before and after gadolinium administration. IV Contrast:  7 mL of Gadobutrol injection (SINGLE-DOSE) IMAGE QUALITY:   Motion-degraded, which reduces diagnostic sensitivity. FINDINGS: BRAIN PARENCHYMA: No acute infarct, mass effect or midline shift. Mild-moderate chronic ischemic changes of the white matter. Postcontrast imaging of the brain demonstrates no abnormal enhancement. VENTRICLES:  Normal for the patient's age. SELLA AND PITUITARY GLAND:  Normal. ORBITS: No acute abnormality. PARANASAL SINUSES: Mild mucosal thickening. VASCULATURE: Please refer to CTA. CALVARIUM AND SKULL BASE: No acute abnormality. EXTRACRANIAL SOFT TISSUES: Multiple bilateral parotid nodules, the largest of which measures up to 1.0 cm in the left parotid gland (series 6, image 1), noting these are not well evaluated.     Impression: Within the confines of a motion-degraded examination: 1. No acute infarct, mass effect or midline shift. Mild-moderate chronic microangiopathy. 2. Multiple indeterminate bilateral parotid nodules, the largest of which measures up to 1.0 cm on the left, incompletely evaluated. If clinically appropriate, this could be further evaluated with contrast-enhanced neck CT on a nonemergent basis. The study was marked in EPIC for immediate notification. Workstation performed: [id number]     IR lumbar puncture    Result Date: 10/25/2024  Narrative: Lumbar puncture under fluoroscopic control. Clinical History: Confusion and fever Fluoro time: 0.9 minutes Conscious sedation time: 35 minutes Number of Images: 1 Technique: The patient was brought to the interventional radiology suite and identified by wristband. The patient was placed  prone on the table, and the L4-5 spinous processes were identified. The skin was then prepped and draped in usual sterile fashion. Lidocaine was administered to the skin, and under fluoroscopic control, a 20 gauge spinal needle was advanced into the subarachnoid space between the L4-5 spinous processes . The table was then tilted into reverse Trendelenburg. 30 cc of clear spinal fluid was removed under it's own pressure into 4 separate sequential tubes. The tubes were sent to laboratory for testing as requested by the referring physician. After the procedure, the needle was removed, and a sterile dressing applied to the site. The patient tolerated the procedure well and suffered no complications.     Impression: Impression: 1. Successful lumbar puncture under fluoroscopic control. 2. 30 cc of clear spinal fluid was removed and sent to laboratory as described above. Workstation performed: [id number]     CT chest abdomen pelvis w contrast    Result Date: 10/25/2024  Narrative: CT CHEST, ABDOMEN AND PELVIS WITH IV CONTRAST INDICATION: Confusion, abdominal pain, emesis, generalized abdominal tenderness. COMPARISON: CT abdomen pelvis 10/21/2024. CT chest abdomen pelvis 10/11/2024. TECHNIQUE: CT examination of the chest, abdomen and pelvis was performed. Multiplanar 2D reformatted images were created from the source data. This examination, like all CT scans performed in the [facility], was performed utilizing techniques to minimize radiation dose exposure, including the use of iterative reconstruction and automated exposure control. Radiation dose length product (DLP) for this visit: 850.79 mGy-cm IV Contrast: 100 mL of iohexol (OMNIPAQUE) Enteric Contrast: Not administered. FINDINGS: CHEST LUNGS: Mild interlobular septal thickening through both lower lobes may represent mild pulmonary edema. Mild bibasilar atelectasis. No focal consolidation. Unchanged known 4 mm and smaller pulmonary nodules. PLEURA:  Small bibasilar pleural effusions. HEART/GREAT VESSELS: Cardiomegaly. Coronary artery calcifications. No thoracic aortic aneurysm. MEDIASTINUM AND JOE: Unchanged mild mediastinal and bilateral parahilar adenopathy. Dominant 16 mm precarinal node #2/49. Reidentified diffuse esophageal distention. Diffuse esophageal fluid may indicate reflux. CHEST WALL AND LOWER NECK: Worsened right axillary and right subpectoral adenopathy. Previously seen dominant 12 mm node now measures 21 mm #2/29. Numerous new enlarged nodes. Unchanged subcentimeter left axillary nodes. ABDOMEN LIVER/BILIARY TREE: Unremarkable. GALLBLADDER: No calcified gallstones. No pericholecystic inflammatory change. SPLEEN: Unremarkable. PANCREAS: Unremarkable. ADRENAL GLANDS: Unremarkable. KIDNEYS/URETERS: No hydronephrosis. Numerous unchanged bilateral simple renal cysts. Well-circumscribed subcentimeter bilateral renal hypoattenuating lesions are also unchanged and likely to represent subcentimeter simple cyst. STOMACH AND BOWEL: Unremarkable. APPENDIX: No findings to suggest appendicitis. ABDOMINOPELVIC CAVITY: No ascites. No pneumoperitoneum. Unchanged bilateral iliac chain adenopathy. A dominant 2.2 cm right external iliac node #2/199 is unchanged. Dominant 10 mm left external iliac node #2/185 is unchanged. Unchanged 9 mm left pelvic sidewall node #2/210. Numerous unchanged subcentimeter retroperitoneal nodes. 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 PELVIS REPRODUCTIVE ORGANS: Prostatectomy. URINARY BLADDER: Unremarkable. ABDOMINAL WALL/INGUINAL REGIONS: Unchanged bilateral inguinal adenopathy. Dominant 16 mm right inguinal node #2/214. Dominant 11 mm left inguinal node #2/237. BONES: No acute fracture or suspicious osseous lesion.     Impression: Right axillary adenopathy is worsened when compared with CT chest 10/11/2024 Unchanged mediastinal, bilateral iliac chain and bilateral inguinal  adenopathy when compared with CT abdomen pelvis 10/21/2024. Potential mild pulmonary edema. Small pleural effusions. Unchanged cardiomegaly. The study was marked in EPIC for immediate notification. Workstation performed: [id number]     CT head without contrast    Result Date: 10/25/2024  Narrative: CT BRAIN - WITHOUT CONTRAST INDICATION:   Confusion, non focal. COMPARISON:  None. TECHNIQUE:  CT examination of the brain was performed.  Multiplanar 2D reformatted images were created from the source data. Radiation dose length product (DLP) for this visit:  870.27 mGy-cm .  This examination, like all CT scans performed [facility], was performed utilizing techniques to minimize radiation dose exposure, including the use of iterative  reconstruction and automated exposure control. IMAGE QUALITY:  Diagnostic. FINDINGS: PARENCHYMA: Decreased attenuation is noted in periventricular and subcortical white matter demonstrating an appearance that is statistically most likely to represent moderate microangiopathic change. No CT signs of acute infarction.  No intracranial mass, mass effect or midline shift.  No acute parenchymal hemorrhage. VENTRICLES AND EXTRA-AXIAL SPACES:  Normal for the patient's age. VISUALIZED ORBITS: Normal visualized orbits. PARANASAL SINUSES: Normal visualized paranasal sinuses. CALVARIUM AND EXTRACRANIAL SOFT TISSUES: Normal.     Impression: No acute intracranial abnormality. Workstation performed: [id number]     XR chest 1 view portable    Result Date: 10/22/2024  Narrative: XR CHEST PORTABLE INDICATION: Cough/fever. COMPARISON: Chest radiograph 10/8/2024; CT chest, abdomen and pelvis 10/11/2024 FINDINGS: Monitoring leads and clips project over the chest. Mild bibasilar atelectasis. No focal consolidation. Blunting of the left costophrenic angle suggesting trace pleural effusion. No pneumothorax. Normal cardiomediastinal silhouette. Degenerative changes of the spine and  bilateral shoulders. Normal upper abdomen.     Impression: Mild bibasilar atelectasis and trace left pleural effusion. Resident: MARTHA Whiteside I, the attending radiologist, have reviewed the images and agree with the final report above. Workstation performed: [id number]     CT abdomen pelvis with contrast    Result Date: 10/21/2024  Narrative: CT ABDOMEN AND PELVIS WITH IV CONTRAST INDICATION:, Nausea, vomiting and diarrhea. . COMPARISON: 10/11/2024. TECHNIQUE: CT examination of the abdomen and pelvis was performed. Multiplanar 2D reformatted images were created from the source data. This examination, like all CT [facility], was performed utilizing techniques to minimize radiation dose exposure, including the use of iterative reconstruction and automated exposure control. Radiation dose length product (DLP) for this visit: 585 mGy-cm IV Contrast: 100 mL of iohexol (OMNIPAQUE) Enteric Contrast: Not administered. FINDINGS: ABDOMEN LOWER CHEST: Small left pleural effusion and left basilar subsegmental atelectasis. LIVER/BILIARY TREE: Unremarkable. GALLBLADDER: No calcified gallstones. No pericholecystic inflammatory change. SPLEEN: Unremarkable. PANCREAS: Unremarkable. ADRENAL GLANDS: Unremarkable. KIDNEYS/URETERS: Symmetric nephrographic phase enhancement the kidneys. Cysts measure up to 6.4 cm. Multiple left renal cysts measuring up to 8.2 cm. No obstructive uropathy. STOMACH AND BOWEL: Unremarkable. APPENDIX: Prior appendectomy. 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 PELVIS REPRODUCTIVE ORGANS: Prior prostatectomy. URINARY BLADDER: Unremarkable. ABDOMINAL WALL/INGUINAL REGIONS: Enlarged bilateral inguinal lymph nodes with normal fatty shen, measuring up to 1.9 cm.. BONES: No acute fracture or suspicious osseous  lesion.     Impression: 1. No evidence of bowel obstruction, colitis or diverticulitis. 2. Numerous mildly enlarged para-aortic, iliac chain and inguinal lymph nodes, possibly reactive however lymphoproliferative disorder not excluded. Workstation performed: [id number]     CT chest abdomen pelvis w contrast    Result Date: 10/12/2024  Narrative: CT CHEST, ABDOMEN AND PELVIS WITH IV CONTRAST INDICATION: intermittent persistent fevers. COMPARISON: 10/8/2024. TECHNIQUE: CT examination of the chest, abdomen and pelvis was performed. Multiplanar 2D reformatted images were created from the source data. This examination, like all CT scans performed in the [facility], was performed utilizing techniques to minimize radiation dose exposure, including the use of iterative reconstruction and automated exposure control. Radiation dose length product (DLP) for this visit: 898.73 mGy-cm IV Contrast: 100 mL of iohexol (OMNIPAQUE) Enteric Contrast: Not administered. FINDINGS: Moderately degraded by respiratory motion and retained barium in the colon. CHEST LUNGS: There is bibasilar atelectasis. Stable 3 mm right apical lung nodule on series 4 image 24. Additional nodules are not well seen due to respiratory motion. No tracheal or endobronchial lesion. PLEURA: There are small bilateral pleural effusions. HEART/GREAT VESSELS: Heart is unremarkable for patient's age. No thoracic aortic aneurysm. MEDIASTINUM AND JOE: There is diffuse distention of the esophagus as seen previously. No obstructing lesion is visualized. CHEST WALL AND LOWER NECK: Again seen is an enlarged right axillary lymph node measuring 1.2 x 1.9 cm, nonspecific. ABDOMEN LIVER/BILIARY TREE: There is a 1.9 x 1.6 cm enhancing lesion in segment 6 of the liver, indeterminate. GALLBLADDER: Not well-visualized due to under distention and beam hardening artifact from adjacent barium. SPLEEN: Unremarkable. PANCREAS: Unremarkable. ADRENAL GLANDS: Unremarkable.  KIDNEYS/URETERS: There are numerous bilateral renal cysts. No hydronephrosis. STOMACH AND BOWEL: Unremarkable. APPENDIX: No findings to suggest appendicitis. ABDOMINOPELVIC CAVITY: No ascites. No pneumoperitoneum. No lymphadenopathy. 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 PELVIS REPRODUCTIVE ORGANS: Unremarkable for patient's age. URINARY BLADDER: Unremarkable. ABDOMINAL WALL/INGUINAL REGIONS: Unremarkable. BONES: No acute fracture or suspicious osseous lesion.     Impression: Moderately limited by respiratory motion. 1.  Small bilateral pleural effusions with overlying atelectasis. 2.  3 mm right apical lung nodule. Based on current Fleischner Society 2017 Guidelines on incidental pulmonary nodule, no routine follow-up is needed if the patient is low risk. If the patient is high risk, optional follow-up chest CT at 12 months can be  considered. 3.  Stable diffusely dilated esophagus. This may be related to dysmotility. Obstructing lesion is not visualized. This would be better evaluated with esophagram. 4.  No acute abnormality in the abdomen or pelvis. 5.  Enhancing liver lesion, indeterminate. Possibilities include hemangioma and vascular malformation though other etiologies including neoplasm not excluded. Follow-up nonemergent MRI is recommended. The study was marked in EPIC for immediate notification. Workstation performed: [id number]     XR knee 3 vw left non injury    Result Date: 10/10/2024  Narrative: XR KNEE 3 VW LEFT NON INJURY INDICATION: left knee pain. COMPARISON: None FINDINGS: No acute fracture or dislocation. No joint effusion. No significant degenerative changes. No lytic or blastic osseous lesion. Unremarkable soft tissues.     Impression: No acute osseous abnormality. Computerized Assisted Algorithm (CAA) may have been used to analyze all applicable images. Workstation performed: [id number]     FL  barium swallow video w speech    Result Date: 10/9/2024  Narrative: A video barium swallow study was performed by the Department of Speech Pathology. Please refer to the report for the official interpretation. The images are stored for archival purposes only. Study images were not formally reviewed by the Radiology Department.    FL barium swallow video w speech    Result Date: 10/9/2024  Narrative: JARROD Marie     10/9/2024  3:14 PM                                  Video Swallow Study Patient Name: Miguel Henderson Today's Date: 10/9/2024   Past Medical History Past Medical History: Diagnosis Date  Prostate cancer (HCC)   Thyroid cancer (HCC)   Past Surgical History Past Surgical History: Procedure Laterality Date  APPENDECTOMY   Summary: Images are on PACS for review. Oral Phase : Pt presented with mild oral dysphagia. Mastication was mod prolonged however eventual functional breakdown. Bolus control, formation were WNL. Transfer was piecemeal. Trace posterior base of tongue residue. Pharyngeal Phase :Pt presented with mild pharyngeal dysphagia. Swallow initiation was intermittently min delayed with solids and prompt with liquids. Spill to the valleculae occurred with solids. Hyoid elevation and laryngeal excursion were WNL. Pharyngeal stripping wave diminished with solid trials. Epiglottic inversion was mostly complete. With nutri grain min epiglottic undercoat present. Trace vallecular retention with thin liquids and mild/mod with solids. Pt intermittently initiated secondary swallow to reduce residue. No pyriform retention. Large CP bar C4-C5 evident however did not impede bolus flow. No aspiration or penetration observed with trials. Per Esophageal screen Slow motility observed with liquids and solids. High retropulsion observed with liquids, pt risk for bottom up aspiration. Stasis occurred in distal esophagus with 13 mm pill however provided with additional sip of thin, ntl, and puree. Provided  additional time x4 minutes pt continued with residue with esophagus. Observations: Son present throughout study provided translation. Recommendations: Diet: Dysphagia 3 dental soft Liquids:thin Meds: whole with thin liquids Strategies:slow rate, alternate liquids with solids, swallow prior to additional po Upright position F/u ST tx: yes Therapy Prognosis: fair Prognosis considerations:age, medical status, Intermittent/Distant Supervision Aspiration Precautions Reflux Precautions Consider consult with: GI, rehab Results reviewed with: pt, nursing, physician. ST reviewed images and recommendations reviewed with family at bedside. Aspiration precautions posted. If a dedicated assessment of the esophagus is desired, consider esophagram/barium swallow or EGD. Goals: Dysphagia LTG -Patient will demonstrate safe and effective oral intake (without overt s/s significant oral/pharyngeal dysphagia including s/s penetration or aspiration) for the highest appropriate diet level. Short Term Goals: -Pt will tolerate Dysphagia 3/advanced (dental soft) diet and thin liquid with no significant s/s oral or pharyngeal dysphagia across 1-3 diagnostic session/s. -Patient will tolerate trials of upgraded food and/or liquid texture with no significant s/s of oral or pharyngeal dysphagia including aspiration across 1-3 diagnostic sessions -Patient will comply with a Video/Modified Barium Swallow study for more complete assessment of swallowing anatomy/physiology/aspiration risk and to assess efficacy of treatment techniques -Patient will demonstrate the ability to adequately self-monitor swallowing skills and perform appropriate compensatory techniques to reduce overt s/sx of penetration/aspiration to safely tolerate least restrictive food/liquid consistencies. Patient's goal: none stated H&P/Admit info, pertinent provider notes/procedures/studies/PMH:(copied and placed in this report) Miguel Henderson is a 84 y.o. male with a PMH of  prostate cancer and thyroid cancer who presents with fever x 4 days.  Patient is Danish-speaking only and history is obtained through assistance of his son who is present at bedside and acting as .  Patient has been having intermittent fevers which have been responsive to Tylenol and Motrin at home but have ranged as high as 103.7 and yesterday was having some apparent respiratory distress but he has no respiratory history and is on no respiratory medications at home.  Patient does have a scant cough which is nonproductive and denies any increased urinary frequency or dysuria though he does complain of some hematuria which has been ongoing for the past month.  Patient additionally has been complaining of a sore throat for the past 4 days with decreased p.o. fluid intake secondary to pain with swallowing.  While in ER patient was noted to meet severe sepsis criteria and was thus referred for admission. Special Studies XR CHEST PORTABLE 10/08/2024 IMPRESSION: Mild tree-in-bud pattern of opacity mostly in the right lung and in the left base again suggesting a mild infectious/inflammatory condition CT CHEST WITHOUT IV CONTRAST 10/08/2024 IMPRESSION: Mild bibasilar probable atelectatic changes, right greater than left. Otherwise no focal airspace consolidation to suggest pneumonia. Minor tree-in-bud nodularity in the right lung which could relate to infectious versus inflammatory small airways disease. Clinical correlation recommended. Pulmonary nodules as above. Based on current Fleischner Society 2017 Guidelines on incidental pulmonary nodule, no routine follow-up is needed if the patient is low risk. If the patient is high risk, optional follow-up chest CT at 12 months can be considered. Trace right pleural effusion. Mild right axillary lymphadenopathy, nonspecific Code Status: Level 1 full code Previous VBS: none Precautions Contact Precautions Food allergies:  No know  Current diet:  Regular diet and  thin liquids  Premorbid diet:  Regular diet and thin liquids  Dentition  Upper and lower dentures [facility]  WNL O2 requirements:  Room air  Vocal Quality/Speech WNL Cognitive status:  Alert  Consistencies administered: Barium laden applesauce, nutri grain bar, sandwich bite, thin liquids,ntl and  13mm barium pill. Liquids were administered by cup. Pt was seated laterally at 90 degrees. Pt  unable to be viewed in AP position, due to transfer status      Echo complete w/ contrast if indicated    Result Date: 10/9/2024  Narrative:   Left Ventricle: Left ventricular cavity size is normal. Wall thickness is mildly increased. The left ventricular ejection fraction is 55%. Systolic function is normal. Wall motion is normal.   Aortic Valve: There is aortic valve sclerosis.   Aorta: The aortic root is mildly dilated. The ascending aorta is mildly dilated. The aortic root is 3.80 cm. The ascending aorta is 3.9 cm.     XR chest portable    Result Date: 10/8/2024  Narrative: XR CHEST PORTABLE INDICATION: shortness of breath. COMPARISON: 10/7/2024 FINDINGS: Minimal tree-in-bud pattern of opacity mostly in the right lung, but also at the left base. No focal dense consolidation. The trace amount of pleural fluid seen on the prior CT is not apparent on this exam. No pneumothorax identified. Stable cardiomediastinal silhouette Degenerative changes noted along the spine. Normal upper abdomen.     Impression: Mild tree-in-bud pattern of opacity mostly in the right lung and in the left base again suggesting a mild infectious/inflammatory condition Workstation performed: [id number]     XR chest portable - 1 view    Result Date: 10/8/2024  Narrative: XR CHEST PORTABLE INDICATION: Fever cough. COMPARISON: None FINDINGS: Clear lungs. No pneumothorax or pleural effusion. Normal cardiomediastinal silhouette. Bones are unremarkable for age. Normal upper abdomen.     Impression: No acute cardiopulmonary disease. Workstation performed:  [id number]     CT chest wo contrast    Result Date: 10/8/2024  Narrative: CT CHEST WITHOUT IV CONTRAST INDICATION: Cough shortness of breath with fever. COMPARISON: None. TECHNIQUE: CT examination of the chest was performed without intravenous contrast. Multiplanar 2D reformatted images were created from the source data. This examination, like all CT scans performed in the [facility], was performed utilizing techniques to minimize radiation dose exposure, including the use of iterative reconstruction and automated exposure control. Radiation dose length product (DLP) for this visit: 441.84 mGy-cm FINDINGS: LUNGS: Evaluation somewhat limited by respiratory motion. There is some minor groundglass and tree-in-bud nodularity suggested in the right upper lobe inferior segment and to a lesser degree in the right lower lobe. No lobar consolidation. Trace paraseptal emphysematous changes. Mild basilar atelectatic changes, right greater than left. There is minor scarring in the posteromedial right lower lobe adjacent to bulky paravertebral osteophytes. 4 mm pulmonary nodule seen in the right upper lobe (3:35). Additional scattered pulmonary nodules are seen measuring on the order of 2 to 3 mm. Based on current Fleischner Society 2017 Guidelines on incidental pulmonary nodule, no routine follow-up is needed if the patient is low risk. If the patient is high risk, optional follow-up chest CT at 12 months can be considered. PLEURA: Trace right pleural effusion with subjacent atelectasis. HEART/GREAT VESSELS: Heart is unremarkable for patient's age. No thoracic aortic aneurysm. Coronary atherosclerosis MEDIASTINUM AND JOE: Shotty subcentimeter mediastinal lymph nodes noted. Evaluation for hilar lymphadenopathy limited in the absence of intravenous contrast. CHEST WALL AND LOWER NECK: Gynecomastia. Few prominent/mildly enlarged lymph nodes seen in the right axillary region, largest measuring up to 1.2 cm,.  VISUALIZED STRUCTURES IN THE UPPER ABDOMEN: No acute abnormality. Bilateral renal cysts. OSSEOUS STRUCTURES: Spinal degenerative changes are noted. No acute fracture or destructive osseous lesion.     Impression: Mild bibasilar probable atelectatic changes, right greater than left. Otherwise no focal airspace consolidation to suggest pneumonia. Minor tree-in-bud nodularity in the right lung which could relate to infectious versus inflammatory small airways disease. Clinical correlation recommended. Pulmonary nodules as above. Based on current Fleischner Society 2017 Guidelines on incidental pulmonary nodule, no routine follow-up is needed if the patient is low risk. If the patient is high risk, optional follow-up chest CT at 12 months can be considered. Trace right pleural effusion. Mild right axillary lymphadenopathy, nonspecific. Workstation performed: [id number]     Labs and pertinent reports reviewed.      This note has been generated by voice recognition software system.  Therefore, there may be spelling, grammar, and or syntax errors. Please contact if questions arise.    Additional recommendations to follow per attending, Dr. Maxwell.    Scottie Ding DO, PGY4  Hematology/Oncology Fellow

## 2024-10-31 NOTE — PROGRESS NOTES
Progress Note - Hospitalist   Name: Miguel Henderson 84 y.o. male I MRN: [id number]  Unit/Bed#: [facility] 817-01 I Date of Admission: 10/26/2024   Date of Service: 10/31/2024 I Hospital Day: 5    Assessment & Plan  Encephalopathy  CT of head and MRI of brain unremarkable for acute etiology/abnormalities  Status post prior lumbar puncture with CSF studies negative for meningitis  Concern noted of etiology of fevers due to possible lymphoma/hyperviscosity - transferred to the [facility] for expedited lymphoma workup (see below)  Mentation waxing/waning but mildly improved per family status post initiation of plasmapheresis  Appreciate oncology recommendations -> underwent a subsequent course of plasmapheresis yesterday, however, confusion remains waxing/waning now with intermittent twitching/myoclonic jerks and asterixis  B12/folate, ammonia, lactic acid, and TSH, all normal  Continue to monitor and replete electrolyte derangements, as necessary  Limit/avoid sedating agents if possible  Await EEG and MRI of brain  Lymphadenopathy  Status post left inguinal lymph node biopsy on 10/28 -> await full pathology, however, oncology note reviewed -> SPEP with elevated gamma spike, elevated IgA/G/M, positive free lambda light chain, elevated beta-2 microglobulin, and high normal viscosity  Status post bone marrow biopsy on 10/29 -> await pathology  Status post courses of plasmapheresis per oncology  Supportive care  Abnormal LFTs  Elevated bilirubin and alkaline phosphatase progressively improving  Suspected to be associated with lymphadenopathy (above)  Limit/avoid hepatotoxins as possible  Hypothyroidism  Continue Synthroid  Primary hypertension  Holding HCTZ due to soft pressures at this time  Fever  Does not appear to be infectious as workup negative thus far  Appreciate ID recommendations - continue to monitor off antibiotics  Monitor temperature curve  COVID-19 virus infection  Tested positive on 10/21  Currently  oxygenating on baseline room air   Contact/airborne precautions -> anticipate discontinuation of precautions tomorrow  Supportive care otherwise      VTE Pharmacologic Prophylaxis: VTE Score: 5 High Risk (Score >/= 5) - Pharmacological DVT Prophylaxis Ordered: Heparin. Sequential Compression Devices Ordered.    AM-PAC Basic Mobility:  Basic Mobility Inpatient Raw Score: 6  -[facility] Goal: 2: Bed activities/Dependent transfer  JH-HLM Achieved: 1: Laying in bed  JH-HLM Goal achieved. Continue to encourage appropriate mobility.    Patient Centered Rounds:  I have performed bedside rounds and discussed plan of care with nursing today.  Discussions with Specialists or Other Care Team Provider:  see above assessments if applicable    Education and Discussions with Family / Patient:  Patient at bedside - spoke with multiple children over the phone, today  Time Spent for Care:  35 minutes. More than 50% of total time spent on counseling and coordination of care, on one or more of the following: performing physical exam; counseling and coordination of care, obtaining or reviewing history, documenting in the medical record, reviewing/ordering tests/medications/procedures, and communicating with other healthcare professionals.    Current Length of Stay: 5 day(s)  Current Patient Status: Inpatient   Certification Statement:  Patient will continue to require additional hospital stay due to assessments as noted above.    Code Status: Level 3 - DNAR and DNI      Subjective     Encountered earlier in the morning with nursing.  Remains weak and fatigued with intermittent confusion.  Mild upper extremity twitching noted at the time my encounter.      Objective     Vitals:   Temp (24hrs), Av.2 °F (37.3 °C), Min:98.7 °F (37.1 °C), Max:99.5 °F (37.5 °C)    Temp:  [98.7 °F (37.1 °C)-99.5 °F (37.5 °C)] 99.4 °F (37.4 °C)  HR:  [81-91] 88  Resp:  [17-19] 17  BP: (109-127)/(72-75) 123/75  SpO2:  [96 %-100 %] 100 %  Body mass index is 28.46  kg/m².     Input and Output Summary (last 24 hours):       Intake/Output Summary (Last 24 hours) at 10/31/2024 1648  Last data filed at 10/31/2024 1247  Gross per 24 hour   Intake 480 ml   Output 923 ml   Net -443 ml       Physical Exam:     GENERAL Weak/fatigued and ill-appearing   HEAD   Normocephalic - atraumatic   EYES   R > L pupillary dilatation present today   MOUTH   Mucosa moist   NECK   Supple - full range of motion   CARDIAC Rate controlled   PULMONARY Fairly clear to auscultation without labored respirations   ABDOMEN Nontender/nondistended   MUSCULOSKELETAL   Motor strength/range of motion remains deconditioned   NEUROLOGIC Awake but sluggish with intermittent agitation - asterixis present with intermittent upper extremity muscle twitching   SKIN   Chronic wrinkles/blemishes    PSYCHIATRIC   Mood/affect remains flat         Labs & Recent Cultures:  Results from last 7 days   Lab Units 10/31/24  0557 10/27/24  0639 10/26/24  1457   WBC Thousand/uL 10.26*   < > 6.54   HEMOGLOBIN g/dL 8.6*   < > 9.0*   HEMATOCRIT % 24.1*   < > 25.5*   PLATELETS Thousands/uL 253   < > 161   BANDS PCT %  --   --  3   SEGS PCT % 37*   < >  --    LYMPHO PCT % 48*   < > 8   MONO PCT % 12   < > 7   EOS PCT % 1   < > 6    < > = values in this interval not displayed.     Results from last 7 days   Lab Units 10/31/24  0557   POTASSIUM mmol/L 3.5   CHLORIDE mmol/L 97   CO2 mmol/L 28   BUN mg/dL 5   CREATININE mg/dL 0.33*   CALCIUM mg/dL 7.2*   ALK PHOS U/L 72   ALT U/L 15   AST U/L 21     Results from last 7 days   Lab Units 10/27/24  0639   INR  1.67*     Results from last 7 days   Lab Units 10/31/24  0307 10/28/24  1703   POC GLUCOSE mg/dl 138 117         Results from last 7 days   Lab Units 10/31/24  0851 10/26/24  0428 10/25/24  1309 10/25/24  1121   LACTIC ACID mmol/L 0.7  --  1.6  --    PROCALCITONIN ng/ml  --  0.43*  --  0.59*         Results from last 7 days   Lab Units 10/25/24  1704 10/25/24  1309 10/25/24  1252   BLOOD  CULTURE   --  No Growth After 5 Days. No Growth After 5 Days.   GRAM STAIN RESULT  1+ Mononuclear Cells  No organisms seen  --   --          Lines/Drains/Telemetry:  Invasive Devices       Peripheral Intravenous Line  Duration             Peripheral IV 10/30/24 Left;Upper Arm 1 day              Hemodialysis Catheter  Duration             HD Temporary Double Catheter 5 days                    Last 24 Hours Medication List:   Current Facility-Administered Medications   Medication Dose Route Frequency Provider Last Rate    acetaminophen  1,000 mg Intravenous Q6H PRN Joseph Alexander MD 1,000 mg (10/28/24 1423)    fluticasone  1 spray Nasal Daily Joseph Alexander MD      heparin (porcine)  5,000 Units Subcutaneous Q8H [facility]      levothyroxine  50 mcg Oral Daily Joseph Alexander MD      multi-electrolyte  100 mL/hr Intravenous Continuous Joseph Alexander  mL/hr (10/31/24 1357)    ondansetron  4 mg Intravenous Q6H PRN Joseph Alexander MD      pantoprazole  40 mg Oral Early Morning Joseph Alexander MD      polyethylene glycol  17 g Oral Daily PRN MD JENNIFER Espinosa MD   Hospitalist - [facility] Internal Medicine        ** Please Note:  Documentation is constructed using a voice recognition dictation system.  An occasional wrong word/phrase or “sound-a-like” substitution may have been picked up by dictation device due to the inherent limitations of voice recognition software.  Read the chart carefully and recognize, using reasonable context, where substitutions may have occurred.**

## 2024-10-31 NOTE — PLAN OF CARE
Problem: PAIN - ADULT  Goal: Verbalizes/displays adequate comfort level or baseline comfort level  Description: Interventions:  - Encourage patient to monitor pain and request assistance  - Assess pain using appropriate pain scale  - Administer analgesics based on type and severity of pain and evaluate response  - Implement non-pharmacological measures as appropriate and evaluate response  - Consider cultural and social influences on pain and pain management  - Notify physician/advanced practitioner if interventions unsuccessful or patient reports new pain  Outcome: Progressing     Problem: INFECTION - ADULT  Goal: Absence or prevention of progression during hospitalization  Description: INTERVENTIONS:  - Assess and monitor for signs and symptoms of infection  - Monitor lab/diagnostic results  - Monitor all insertion sites, i.e. indwelling lines, tubes, and drains  - Monitor endotracheal if appropriate and nasal secretions for changes in amount and color  - [place] appropriate cooling/warming therapies per order  - Administer medications as ordered  - Instruct and encourage patient and family to use good hand hygiene technique  - Identify and instruct in appropriate isolation precautions for identified infection/condition  Outcome: Progressing

## 2024-10-31 NOTE — CONSULTS
Consultation - Neurology   Name: Miguel Henderson 84 y.o. male I MRN: [id number]  Unit/Bed#: [facility] 817-01 I Date of Admission: 10/26/2024   Date of Service: 10/31/2024 I Hospital Day: 5   Inpatient consult to Neurology  Consult performed by: Chuck Valdivia DO  Consult ordered by: DARREN Jaeger        Physician Requesting Evaluation: Juani Luna MD   Reason for Evaluation / Principal Problem: extremities x 4 twitching, anisocoria questionably new    Assessment & Plan  Encephalopathy  Pt w/ a few weeks of fluctuating / gradually worsening AMS currently undergoing significant heme onc workup including BM bx & lymph node bx / & previously having undergone workup w/ ID including LP. Pt sxs include pupil asymmetry / irregular shape to R eye & jerking motions to all 4 extremities of differing severity worse in LUE, which upon assessment matched more w/ asterixis than myoclonic jerking, pointing more toward metabolic cause over neurologic. Confirmed w/ family that the abnl movements are new as of <24 hrs ago w/ no hx of similar sxs & are seen in conjunction w/ overall worsening mentation, suspicion per heme onc of malignant process evidenced by increased IG, , elevated gamma spike SPEP, B sxs, worsening lymphadenopathy. No particular neurologic cause identified at this time, continue working up metabolic derangements that may be contributing to worsening sxs. Concerning the abnl pupils b/l, sluggish response to light b/l w/ R>L in size & signs of past ocular surgeries, which were confirmed by family today. Uncertain time course for the new eye findings, even family unsure as to when the discrepancy first was noticed, although low suspicion for neuro cause will evaluate for neuro etiology / contributing factors by the plan noted below.Of note, pt overall AMS seems to improve w/ plasmapheresis occurrences, although amount of improvement seems to have lessened of late. Covid dx 10 days ago, ID followed. CSF WBC  >100, protein elevated. CT CAP significant for large lymph nodes to various locations of body.  -Repeat MRI brain w/ con in setting of possible new findings in past week  -Routine EEG warranted while I/p  -Continue workup w/ heme onc including follow pending bx /cytometry result  -ID aware, following off abx  -No rec for repeat LP at this time, will await imaging  -Labs ordered including B12, B9, TSH, ammonia returning wnl    I have discussed with Dr. Mcdonald the above plan to treat pt. He agrees with the plan.  Please contact the SecureChat role for the Neurology service with any questions/concerns.    Recommendations for outpatient neurological follow up have yet to be determined.    History of Present Illness   Miguel Henderson is a 84 y.o.  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 Pt is requiring of neuro consult to weigh in on whether new twitch like episodes to all 4 distal extremities are of neuro origin or other source such as toxic vs metabolic. Additionally, neuro to weigh in on asymmetric pupils w/ questionably poor light reactivity for an unknown amount of time & w/o knowledge into prior eye pathology / surgery hx. Concerning the former, the abnl movements seems to be new, presenting sometimes at random but also upon wrist extension consistent w/ asterixis, often associated w/ metabolic derangements which will continue to be worked up, no hx noted of similar sxs until day ago. Concerning the latter, w/o any hx & poor communication from pt at this time even w/ use of translation services, very difficult to tell whether this is longstanding chronic issue related to known pathology vs a surgical hx. Pt does not respond to questions articulately in either english or  [place] outside of likely correct answers to orientation questions. Chart review even from outside sources are limited & do not yield useful new info. Family present later in day report fluctuating but gradually worsening mentation, family unable to get pt past orientation x name only, year 2004. The current sig findings include jerking movements U>L & L>R w/ varying amount of rigid tone to all 4 extremities, & abnl shape w/ poor R eye reactivity / smaller L eye w/ similarly sluggish reactivity.    Review of Systems   Unable to perform ROS: Acuity of condition        I have reviewed the patient's PMH, PSH, Social History, Family History, Meds, and Allergies  Historical Information   Past Medical History:   Diagnosis Date    Prostate cancer (HCC)      Past Surgical History:   Procedure Laterality Date    APPENDECTOMY      IR BIOPSY BONE MARROW  10/29/2024    IR BIOPSY INGUINAL LYMPH NODE  10/28/2024    IR LUMBAR PUNCTURE  10/25/2024    IR TEMPORARY DIALYSIS CATHETER PLACEMENT  10/26/2024     Social History     Tobacco Use    Smoking status: Never    Smokeless tobacco: Never   Vaping Use    Vaping status: Never Used   Substance and Sexual Activity    Alcohol use: Yes     Comment: Occasional    Drug use: Never    Sexual activity: Not on file     E-Cigarette/Vaping    E-Cigarette Use Never User      E-Cigarette/Vaping Substances     No family history on file.  Social History     Tobacco Use    Smoking status: Never    Smokeless tobacco: Never   Vaping Use    Vaping status: Never Used   Substance and Sexual Activity    Alcohol use: Yes     Comment: Occasional    Drug use: Never    Sexual activity: Not on file       Current Facility-Administered Medications:     acetaminophen (Ofirmev) injection 1,000 mg, Q6H PRN, Last Rate: 1,000 mg (10/28/24 2103)    fluticasone (FLONASE) 50 mcg/act nasal spray 1 spray, Daily    heparin (porcine) subcutaneous injection 5,000 Units, Q8H JOE **AND** [COMPLETED] Platelet count, Once     levothyroxine tablet 50 mcg, Daily    multi-electrolyte (PLASMALYTE-A/ISOLYTE-S PH 7.4) IV solution, Continuous, Last Rate: 100 mL/hr (10/31/24 1357)    ondansetron (ZOFRAN) injection 4 mg, Q6H PRN    pantoprazole (PROTONIX) EC tablet 40 mg, Early Morning    polyethylene glycol (MIRALAX) packet 17 g, Daily PRN  Prior to Admission Medications   Prescriptions Last Dose Informant Patient Reported? Taking?   benzonatate (TESSALON) 200 MG capsule   No No   Sig: Take 1 capsule (200 mg total) by mouth 2 (two) times a day as needed for cough   cyclobenzaprine (FLEXERIL) 5 mg tablet   Yes No   Sig: Take 5 mg by mouth 3 (three) times a day as needed   fluticasone (FLONASE) 50 mcg/act nasal spray   No No   Si spray into each nostril daily   hydroCHLOROthiazide 25 mg tablet   Yes No   Sig: Take 1 tablet by mouth daily   ibuprofen (MOTRIN) 800 mg tablet   No No   Sig: Take 1 tablet (800 mg total) by mouth every 8 (eight) hours as needed for moderate pain   levothyroxine 50 mcg tablet   Yes No   Sig: Take 50 mcg by mouth daily   levothyroxine 75 mcg tablet   Yes No   Sig: Take 75 mcg by mouth Contingent Taken every Friday through    nirmatrelvir & ritonavir (Paxlovid) tablet therapy pack   No No   Sig: Take 3 tablets by mouth 2 (two) times a day for 7 doses   ondansetron (ZOFRAN) 4 mg tablet   No No   Sig: Take 1 tablet (4 mg total) by mouth every 8 (eight) hours as needed for nausea or vomiting   pantoprazole (PROTONIX) 40 mg tablet   No No   Sig: Take 1 tablet (40 mg total) by mouth daily in the early morning   potassium chloride (Klor-Con M10) 10 mEq tablet   Yes No   Sig: Take 10 mEq by mouth daily      Facility-Administered Medications: None     Shellfish allergy - food allergy and Valtrex [valacyclovir]    Objective :  Temp:  [98.7 °F (37.1 °C)-99.5 °F (37.5 °C)] 99.4 °F (37.4 °C)  HR:  [81-91] 88  BP: (109-127)/(72-75) 123/75  Resp:  [17-19] 17  SpO2:  [96 %-100 %] 100 %  O2 Device: None (Room air)  Nasal Cannula  O2 Flow Rate (L/min):  [2 L/min] 2 L/min    Physical Exam  Constitutional:       General: He is awake. He is not in acute distress.     Appearance: He is toxic-appearing. He is not diaphoretic.   HENT:      Head: Normocephalic.      Right Ear: External ear normal.      Left Ear: External ear normal.      Nose: Nose normal.      Mouth/Throat:      Pharynx: Oropharynx is clear.   Eyes:      General:         Right eye: No discharge.         Left eye: No discharge.      Extraocular Movements: EOM normal.   Neurological:      Mental Status: He is disoriented.      Cranial Nerves: Cranial nerve deficit and dysarthria present.     Neurological Exam  Mental Status  Awake. Oriented only to person. Moderate dysarthria present. Follows one-step commands.    Cranial Nerves  CN III, IV, VI: Extraocular movements intact bilaterally.   Right pupil: 5 mm. Irregular. Abnormal reactivity: Abnormal accommodation:   Left pupil: 2 mm. Round. Abnormal reactivity: Abnormal accommodation:  Exam lacking due to poor command following, poor reaction to light / asymmetric pupils noted w/ signs of prior surgeries confirmed by family to b/l eyes. .    Motor  Normal muscle bulk throughout. No fasciculations present. Increased muscle tone. The following abnormal movements were seen:  Abnl movements to all 4 extremities to varying degrees more consistent w/ asterixis than myoclonus..    Sensory  Unable to perform accurate exam..    Reflexes  Deep tendon reflexes are 2+ and symmetric except as noted.    Coordination    Unable to assess.    Gait    Defer.        Lab Results: I have reviewed the following results:CBC:   Results from last 7 days   Lab Units 10/31/24  0557 10/29/24  0524 10/28/24  0433   WBC Thousand/uL 10.26* 8.25 7.36   RBC Million/uL 2.60* 3.12* 2.93*   HEMOGLOBIN g/dL 8.6* 9.2* 9.0*   HEMATOCRIT % 24.1* 28.9* 27.3*   MCV fL 93 93 93   PLATELETS Thousands/uL 253 212 225   , BMP/CMP:   Results from last 7 days   Lab Units  10/31/24  0557 10/30/24  0624 10/29/24  0524   SODIUM mmol/L 131* 135 137   POTASSIUM mmol/L 3.5 3.6 3.5   CHLORIDE mmol/L 97 101 103   CO2 mmol/L 28 27 29   BUN mg/dL 5 5 6   CREATININE mg/dL 0.33* 0.39* 0.37*   CALCIUM mg/dL 7.2* 7.2* 7.4*   AST U/L 21 32 34   ALT U/L 15 22 23   ALK PHOS U/L 72 151* 151*   EGFR ml/min/1.73sq m 116 108 111   , TSH:   Results from last 7 days   Lab Units 10/31/24  0852 10/25/24  1121   TSH 3RD GENERATON uIU/mL 3.279 3.923   , Coagulation:   Results from last 7 days   Lab Units 10/27/24  0639   INR  1.67*     Recent Labs     10/29/24  0524 10/30/24  0624 10/31/24  0557   WBC 8.25  --  10.26*   HGB 9.2*  --  8.6*   HCT 28.9*  --  24.1*     --  253   SODIUM 137   < > 131*   K 3.5   < > 3.5      < > 97   CO2 29   < > 28   BUN 6   < > 5   CREATININE 0.37*   < > 0.33*   GLUC 99   < > 124   MG 1.9  --   --     < > = values in this interval not displayed.     Imaging Results Review: I personally reviewed the following image studies in PACS and associated radiology reports: CT head and MRI brain. My interpretation of the radiology images/reports is: Nothing particualr new .  Other Study Results Review: EKG was reviewed.     VTE Prophylaxis: VTE covered by:  heparin (porcine), Subcutaneous, 5,000 Units at 10/31/24 1357        Administrative Statements   I have spent a total time of 50 minutes in caring for this patient on the day of the visit/encounter including Diagnostic results, Counseling / Coordination of care, Documenting in the medical record, Reviewing / ordering tests, medicine, procedures  , Obtaining or reviewing history  , and Communicating with other healthcare professionals .

## 2024-10-31 NOTE — PROGRESS NOTES
Progress Note - Infectious Disease   Name: Miguel Henderson 84 y.o. male I MRN: [id number]  Unit/Bed#: [facility] 817-01 I Date of Admission: 10/26/2024   Date of Service: 10/31/2024 I Hospital Day: 5    Assessment & Plan  Fever  The patient has had intermittent fevers for the past 3 weeks and several admissions to [facility] with fairly unremarkable infectious workup other than positive rhinovirus and COVID.  CT imaging was noted to show lymphadenopathy and he had atypical lymphocytes on CBC so hematology/oncology consult was recommended although the patient was discharged on Paxlovid with plan for an outpatient referral.  He then returned to the ED with worsening confusion and return of fever.  CT imaging with worsening axillary lymphadenopathy, CBC differential again showed atypical lymphocytes.  Status post LP 10/25 which showed mild elevation in protein and lymphocytic pleocytosis.  ME panel negative culture showed no growth.  Antimicrobials discontinued.  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  Labs show elevated LDH, ferritin, immunoglobulins.  Low concern for an infectious etiology at this time given negative extensive workup and concern for a lymphoproliferative disorder causing fevers.  Status post plasmapheresis with improvement in fever curve and mental status. CSF cultures finalized with no growth. S/p lymph node and bone marrow biopsies. Has remained afebrile and hemodynamically stable.   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  MRI brain without acute intracranial abnormalities.  Status post LP 10/25 with mild lymphocytic pleocytosis and elevated protein.  ME panel and CSF culture negative.  As above,  low concern for CNS infection.  Suspect CSF abnormalities related to possible underlying lymphoproliferative disorder.  Status post first session of plasmapheresis 10/26 with improvement in mental status and fever curve.  Seems to have worsened.  Repeat CT of the brain nondiagnostic for source on 10/31/2024   -Plasmapheresis per oncology   -Continue to monitor mental status  -Reconsult neurology  Lymphadenopathy  During prior admission imaging showed enlarged periaortic, iliac chain and inguinal lymph nodes.  Repeat imaging showed persistent lymphadenopathy with worsened right axillary adenopathy.  As above, concern for an underlying lymphoproliferative disorder.  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  CBC differential also shows atypical lymphocytes.  Status post plasmapheresis 10/26. HIV screening negative. S/p lymph node and bone marrow biopsies.  Lymph node biopsy with atypical cells but awaiting flow cytometry.  -Hematology/oncology following  -Plasmapheresis per hematology/oncology  -Follow-up pathology from biopsies and flow cytometry    COVID-19 virus infection  Can discontinue isolation tomorrow 11/1/2024    I have discussed with the primary service the above plan to monitor off all antibiotics.  The primary service agrees with the plan.    Antibiotics:  None    Subjective   Patient's become more encephalopathic and apparently had evidence of discordant pupils.  Repeat imaging was nondiagnostic for source.  No fever chills or sweats, no nausea vomiting or diarrhea reported.    Objective :  Temp:  [98.7 °F (37.1 °C)-99.5 °F (37.5 °C)] 99.5 °F (37.5 °C)  HR:  [81-91] 82  BP: (109-127)/(72-75) 127/72  Resp:  [17-19] 17  SpO2:  [96 %-99 %] 99 %  O2 Device: None (Room air)    General:  No acute distress  Psychiatric: Somnolent, nonverbal  Pulmonary:  Normal respiratory excursion without accessory muscle use  Abdomen:  Soft, nontender  Extremities:  No edema  Skin:  No rashes      Lab Results: I have  reviewed the following results:  Results from last 7 days   Lab Units 10/31/24  0557 10/29/24  0524 10/28/24  0433   WBC Thousand/uL 10.26* 8.25 7.36   HEMOGLOBIN g/dL 8.6* 9.2* 9.0*   PLATELETS Thousands/uL 253 212 225     Results from last 7 days   Lab Units 10/31/24  0557 10/30/24  0624 10/29/24  0524   SODIUM mmol/L 131* 135 137   POTASSIUM mmol/L 3.5 3.6 3.5   CHLORIDE mmol/L 97 101 103   CO2 mmol/L 28 27 29   BUN mg/dL 5 5 6   CREATININE mg/dL 0.33* 0.39* 0.37*   EGFR ml/min/1.73sq m 116 108 111   CALCIUM mg/dL 7.2* 7.2* 7.4*   AST U/L 21 32 34   ALT U/L 15 22 23   ALK PHOS U/L 72 151* 151*   ALBUMIN g/dL 3.7 3.0* 3.5     Results from last 7 days   Lab Units 10/25/24  1704 10/25/24  1309 10/25/24  1252   BLOOD CULTURE   --  No Growth After 5 Days. No Growth After 5 Days.   GRAM STAIN RESULT  1+ Mononuclear Cells  No organisms seen  --   --      Results from last 7 days   Lab Units 10/26/24  0428 10/25/24  1121   PROCALCITONIN ng/ml 0.43* 0.59*         Results from last 7 days   Lab Units 10/27/24  0639   FERRITIN ng/mL 1,028*         Imaging Results Review: I personally reviewed the following image studies in PACS and associated radiology reports: CT head. My interpretation of the radiology images/reports is: No acute intracranial abnormality.

## 2024-10-31 NOTE — PROGRESS NOTES
Medical Oncology/Hematology Progress Note  Miguel Henderson, male, 84 y.o., 1940,  PPHP 817/PPHP [id number], [id number]     Assessment and Plan:  Mr. Henderson is a Turkish-speaking 84 yoM with PMHx of prostate cancer, hypertension, and hypothyroidism with encephalopathy, B symptoms, and lymphadenopathy concerning hyperviscosity syndrome secondary to lymphoma or lymphoproliferative disorder transferred from [facility] to [facility] to be evaluated for plasmapheresis.    1. Concern for hyperviscosity syndrome possibly secondary to lymphoma or lymphoproliferative disorder  2. Worsening R axillary lymphadenopathy  3. Mediastinal, b/l iliac chain, and b/l inguinal lymphadenopathy  4. B symptoms  5. Acute encephalopathy and lethargy  6. Positive cold agglutinin  - s/p plasmapheresis (x3 days), appreciate [facility] ([facility] [phone])  - Appreciate IR: s/p R IJ catheter, s/p core biopsy, and s/p BMBx   - After discussing new developments of worsening mental status and b/l UE and LE clonus with the primary and other specialists, the following work up has been ordered and is pending: MRI Brain, EEG, and ammonia level.  We could consider giving cryoprecipitate to replenish low fibrinogen levels.  - s/p inguinal lymph node biopsy (10/28/2024) showing minute atypical lymph node biopsies, pending stains and flow cytometry  - s/p bone marrow biopsy (10/29/2024) pending  - Lab results:  - Viscosity: 1.9 (nml 1.4-2.1)  - Peripheral smear: negative for schistocytes  - Fibrinogen: 132  - PT-INR: 1.67  - LDH: 560  - Cryoglobulin: pending  - SPEP: elevated gamma spike  - UPEP: pending  - Immunoglobulins: IgA 1364, IgG 2330, IgM 565  - Free light chain ratio: 0.61  - Flow cytometry: inverted CD4/CD8 ratio  - Immunofixation: free lambda light chains  - Beta-2 microglobulin: 4.7 (nml 0.6-2.4)  - Type and screen: completed  - We will continue to follow closely  - We will establish outpatient follow up in our  office within two weeks of discharge after his acute symptoms have resolved    Interval History:  Overnight patient was noted to have increased muscle twitching, confusion, and asymmetric pupils R>L, stat CT head, lactic, B12, folate, ammonia, TSH, UA, and EEG were ordered.  CT head showed only mild chronic small vessel ischemic disease.  Lactic, B12, folate, ammonia, and TSH were WNL.  UA and EEG are pending, patient displayed minimal clonus at my AM exam but significantly more clonus at my PM exam, neuro evaluated and obtained more history regarding multiple ocular surgeries, vitals showed Tmax 99.5, adequate SpO2 on 2L NC, and all other values roughly WNL, labs show decrease in NA from 1 35-1 33, SCR stable at 0.33, most recent corrected calcium 8.0, T. bili stable at 1.29, WBC increased from 8.3-10.3, Hb decreased from 9.2-8.6, and PLT increased from 212-253.  There is no new micro or medication changes.    History of present illness:  Mr. Henderson is a Chilean-speaking 84-year-old gentleman with PMHx of prostate cancer (~15 years ago treated with surgery and radiation), hypertension, hypothyroidism, dysphagia, recent COVID and rhinovirus infections who presented to [facility] on 10/25/24 with encephalopathy, B symptoms and lymphadenopathy on imaging concerning for lymphoma with elevated clinical concern for potential hyperviscosity syndrome resulting in a transfer to [facility] to be evaluated for plasmapheresis.    The Patient has had three hospital admissions in the last three weeks.  He has been feeling unwell for several weeks, has had fevers, is very fatigued, has poor appetite and poor PO intake, and has noted weight loss.  Initially, his fevers and lymphadenopathy were attributed to recent COVID and rhinovirus infections.  Imaging did demonstrate lymphadenopathy again thought to be attributed to infection with instructions to follow-up with hematology/medical oncology in the  outpatient setting to ensure resolution.  However, his symptoms continued and empiric antibiotics were with concern for meningitis.  Antibiotics were discontinued when LP was negative and ID does not feel there is sufficient evidence for meningitis.     Vitals show Temp 100.2, HR 93, RR 16, /65, and 96% SpO2 on RA.  Labs show strong cold agglutinin, WBC 8.5, Hb 9.4, , , K 3.3, chloride 96, , BUN 7, creatinine 0.56, glucose 88, total protein 6.6, T. bili 1.53, Mg 1.7.    CT CAP w/ (10/25/2024) demonstrates right axillary adenopathy that is worsened compared to previous imaging on 10/11/2024.  Unchanged mediastinal, bilateral iliac chain and bilateral inguinal adenopathy compared to CT on 10/21/2024.  Mild potential pulmonary edema.  Small pleural effusions.  Unchanged cardiomegaly.  Right axillary and subpectoral adenopathy demonstrates a lymph node measuring 21 mm that was previously 12 mm.  Numerous new enlarged nodes unchanged subcentimeter left axillary nodes.  Right external iliac node measures 2.2 cm.  Left external iliac node demonstrates size of 10 mm.  Inguinal lymphadenopathy measures 16 mm on the right and 11 mm on the left.     MRI Brain (10/25/2024) demonstrates no acute infarct, mass effect or midline shift with mild to moderate chronic microangiopathy.  There is multiple indeterminate bilateral parotid nodes, the largest which measures up to 1 cm on the left, and is incompletely evaluated on this imaging.     Peripheral smear (10/22/24) showed RBCs with marked agglutination limiting further evaluation of morphologic features, slight left shift in WBC showing granulocytes with numerous fragile cells and scattered reactive monocytes and lymphocytes without distinct features of dysplasia or circulating blasts, platelets show normal morphology without significant clumps or satellitosis.  Overall, the combination of findings is not definitively specific, though the red blood cell  changes are compatible with a cold agglutinin in the correct clinical setting. The white blood cell changes may raise the possibility of infection, chemotherapy or drug effect, toxin exposure, postvaccination, or other reactive condition. The presence of fragile white blood cells may be an artifact of specimen handling, though a component of drug effect or nonspecific reactive change cannot be entirely excluded. Correlation with clinical impression and other laboratory findings is recommended. If clinical suspicion for more significant disease process such as a lymphoproliferative disorder exists then consideration for repeat sampling with peripheral blood flow cytometry may be of assistance to further evaluate the white blood cell changes as clinically indicated.  Flow cytometry was ordered and is pending.    Dr. Maxwell, hematology/oncology, coordinated care with [facility] primary attending, Dr. Borrero to arrange for urgent transfer to [place].  We called the [facility] to discuss the case and inform them of a possible need for plasmapheresis.  The patient arrived promptly to [facility].  We evaluated the patient at bedside and found him to be AAOx3, states that he feels slightly improved, but exhibited a degree of encephalopathy.  We contacted the [facility] [place] [facility] again ([facility] [phone]) who recommended ordering labs, 3.5 L albumin x2 days, and calcium gluconate 2 g x2 days.  We contacted IR who is planning to place urgent R IJ catheter.  Family history is positive for lymphoma in a brother.    Review of Systems:   ROS was performed and is negative except as indicated above.    Current Facility-Administered Medications:     acetaminophen (Ofirmev) injection 1,000 mg, 1,000 mg, Intravenous, Q6H PRN, Joseph Alexander MD, Last Rate: 400 mL/hr at 10/28/24 2103, 1,000 mg at 10/28/24 2103    fluticasone (FLONASE) 50 mcg/act nasal spray 1 spray, 1 spray, Nasal, Daily, Joseph Alexander MD, 1  "spray at 10/31/24 1036    heparin (porcine) subcutaneous injection 5,000 Units, 5,000 Units, Subcutaneous, Q8H JOE, 5,000 Units at 10/31/24 1357 **AND** [COMPLETED] Platelet count, , , Once, Nancy Mondragon    levothyroxine tablet 50 mcg, 50 mcg, Oral, Daily, Joseph Alexander MD, 50 mcg at 10/31/24 1036    multi-electrolyte (PLASMALYTE-A/ISOLYTE-S PH 7.4) IV solution, 100 mL/hr, Intravenous, Continuous, Joseph Alexander MD, Last Rate: 100 mL/hr at 10/31/24 1357, 100 mL/hr at 10/31/24 1357    ondansetron (ZOFRAN) injection 4 mg, 4 mg, Intravenous, Q6H PRN, Joseph Alexander MD    pantoprazole (PROTONIX) EC tablet 40 mg, 40 mg, Oral, Early Morning, Joseph Alexander MD, 40 mg at 10/30/24 0535    polyethylene glycol (MIRALAX) packet 17 g, 17 g, Oral, Daily PRN, Darrius Gr MD    Medications Prior to Admission:     benzonatate (TESSALON) 200 MG capsule    cyclobenzaprine (FLEXERIL) 5 mg tablet    fluticasone (FLONASE) 50 mcg/act nasal spray    hydroCHLOROthiazide 25 mg tablet    ibuprofen (MOTRIN) 800 mg tablet    levothyroxine 50 mcg tablet    levothyroxine 75 mcg tablet    [] nirmatrelvir & ritonavir (Paxlovid) tablet therapy pack    ondansetron (ZOFRAN) 4 mg tablet    pantoprazole (PROTONIX) 40 mg tablet    potassium chloride (Klor-Con M10) 10 mEq tablet    Allergies   Allergen Reactions    Shellfish Allergy - Food Allergy Anaphylaxis    Valtrex [Valacyclovir] Hives     Physical Exam:    /75   Pulse 88   Temp 99.4 °F (37.4 °C)   Resp 17   Ht 5' 5\" (1.651 m)   Wt 77.6 kg (171 lb)   SpO2 100%   BMI 28.46 kg/m²     General: AAOx3, ill-appearing, resting comfortably in bed  HEENT: PERRLA, moist mucosa, dentures  Respiratory: CTAB w/o wheezes, rales, or rhonchi, no increased work of breathing  Cardiovascular: RRR w/o murmurs, 2+ radial and pedal pulses, no b/l LE edema  Abdomen: soft, non-tender, non-distended, no hepatomegaly or splenomegaly  /Rectal: deferred  Musculoskeletal: moves all four extremities " with normal strength and ROM  Integumentary: reticular rash across chest and arms, dry, no bruising  Neurological: clonus in b/l LE and UE  Psychiatric: pleasant, confused, cooperative    Recent Results (from the past 48 hour(s))   Comprehensive metabolic panel    Collection Time: 10/30/24  6:24 AM   Result Value Ref Range    Sodium 135 135 - 147 mmol/L    Potassium 3.6 3.5 - 5.3 mmol/L    Chloride 101 96 - 108 mmol/L    CO2 27 21 - 32 mmol/L    ANION GAP 7 4 - 13 mmol/L    BUN 5 5 - 25 mg/dL    Creatinine 0.39 (L) 0.60 - 1.30 mg/dL    Glucose 98 65 - 140 mg/dL    Calcium 7.2 (L) 8.4 - 10.2 mg/dL    Corrected Calcium 8.0 (L) 8.3 - 10.1 mg/dL    AST 32 13 - 39 U/L    ALT 22 7 - 52 U/L    Alkaline Phosphatase 151 (H) 34 - 104 U/L    Total Protein 5.3 (L) 6.4 - 8.4 g/dL    Albumin 3.0 (L) 3.5 - 5.0 g/dL    Total Bilirubin 1.32 (H) 0.20 - 1.00 mg/dL    eGFR 108 ml/min/1.73sq m   Fingerstick Glucose (POCT)    Collection Time: 10/31/24  3:07 AM   Result Value Ref Range    POC Glucose 138 65 - 140 mg/dl   Comprehensive metabolic panel    Collection Time: 10/31/24  5:57 AM   Result Value Ref Range    Sodium 131 (L) 135 - 147 mmol/L    Potassium 3.5 3.5 - 5.3 mmol/L    Chloride 97 96 - 108 mmol/L    CO2 28 21 - 32 mmol/L    ANION GAP 6 4 - 13 mmol/L    BUN 5 5 - 25 mg/dL    Creatinine 0.33 (L) 0.60 - 1.30 mg/dL    Glucose 124 65 - 140 mg/dL    Calcium 7.2 (L) 8.4 - 10.2 mg/dL    AST 21 13 - 39 U/L    ALT 15 7 - 52 U/L    Alkaline Phosphatase 72 34 - 104 U/L    Total Protein 5.3 (L) 6.4 - 8.4 g/dL    Albumin 3.7 3.5 - 5.0 g/dL    Total Bilirubin 1.29 (H) 0.20 - 1.00 mg/dL    eGFR 116 ml/min/1.73sq m   CBC and differential    Collection Time: 10/31/24  5:57 AM   Result Value Ref Range    WBC 10.26 (H) 4.31 - 10.16 Thousand/uL    RBC 2.60 (L) 3.88 - 5.62 Million/uL    Hemoglobin 8.6 (L) 12.0 - 17.0 g/dL    Hematocrit 24.1 (L) 36.5 - 49.3 %    MCV 93 82 - 98 fL    MCH 33.1 26.8 - 34.3 pg    MCHC 35.7 31.4 - 37.4 g/dL    RDW 16.0  (H) 11.6 - 15.1 %    MPV 9.6 8.9 - 12.7 fL    Platelets 253 149 - 390 Thousands/uL    nRBC 0 /100 WBCs    Segmented % 37 (L) 43 - 75 %    Immature Grans % 1 0 - 2 %    Lymphocytes % 48 (H) 14 - 44 %    Monocytes % 12 4 - 12 %    Eosinophils Relative 1 0 - 6 %    Basophils Relative 1 0 - 1 %    Absolute Neutrophils 3.76 1.85 - 7.62 Thousands/µL    Absolute Immature Grans 0.05 0.00 - 0.20 Thousand/uL    Absolute Lymphocytes 4.99 (H) 0.60 - 4.47 Thousands/µL    Absolute Monocytes 1.25 (H) 0.17 - 1.22 Thousand/µL    Eosinophils Absolute 0.14 0.00 - 0.61 Thousand/µL    Basophils Absolute 0.07 0.00 - 0.10 Thousands/µL   Lactic acid, plasma (w/reflex if result > 2.0)    Collection Time: 10/31/24  8:51 AM   Result Value Ref Range    LACTIC ACID 0.7 0.5 - 2.0 mmol/L   Ammonia    Collection Time: 10/31/24  8:52 AM   Result Value Ref Range    Ammonia 67 18 - 72 umol/L   Vitamin B12    Collection Time: 10/31/24  8:52 AM   Result Value Ref Range    Vitamin B-12 360 180 - 914 pg/mL   Folate    Collection Time: 10/31/24  8:52 AM   Result Value Ref Range    Folate 17.5 >5.9 ng/mL   TSH, 3rd generation with Free T4 reflex    Collection Time: 10/31/24  8:52 AM   Result Value Ref Range    TSH 3RD GENERATON 3.279 0.450 - 4.500 uIU/mL     MRI brain w wo contrast    Result Date: 10/26/2024  Narrative: MRI BRAIN WITH AND WITHOUT CONTRAST INDICATION: confusion,. COMPARISON: CT head 10/25/2024. TECHNIQUE: Multiplanar, multisequence imaging of the brain was performed before and after gadolinium administration. IV Contrast:  7 mL of Gadobutrol injection (SINGLE-DOSE) IMAGE QUALITY:   Motion-degraded, which reduces diagnostic sensitivity. FINDINGS: BRAIN PARENCHYMA: No acute infarct, mass effect or midline shift. Mild-moderate chronic ischemic changes of the white matter. Postcontrast imaging of the brain demonstrates no abnormal enhancement. VENTRICLES:  Normal for the patient's age. SELLA AND PITUITARY GLAND:  Normal. ORBITS: No acute  abnormality. PARANASAL SINUSES: Mild mucosal thickening. VASCULATURE: Please refer to CTA. CALVARIUM AND SKULL BASE: No acute abnormality. EXTRACRANIAL SOFT TISSUES: Multiple bilateral parotid nodules, the largest of which measures up to 1.0 cm in the left parotid gland (series 6, image 1), noting these are not well evaluated.     Impression: Within the confines of a motion-degraded examination: 1. No acute infarct, mass effect or midline shift. Mild-moderate chronic microangiopathy. 2. Multiple indeterminate bilateral parotid nodules, the largest of which measures up to 1.0 cm on the left, incompletely evaluated. If clinically appropriate, this could be further evaluated with contrast-enhanced neck CT on a nonemergent basis. The study was marked in EPIC for immediate notification. Workstation performed: [id number]     IR lumbar puncture    Result Date: 10/25/2024  Narrative: Lumbar puncture under fluoroscopic control. Clinical History: Confusion and fever Fluoro time: 0.9 minutes Conscious sedation time: 35 minutes Number of Images: 1 Technique: The patient was brought to the interventional radiology suite and identified by wristband. The patient was placed prone on the table, and the L4-5 spinous processes were identified. The skin was then prepped and draped in usual sterile fashion. Lidocaine was administered to the skin, and under fluoroscopic control, a 20 gauge spinal needle was advanced into the subarachnoid space between the L4-5 spinous processes . The table was then tilted into reverse Trendelenburg. 30 cc of clear spinal fluid was removed under it's own pressure into 4 separate sequential tubes. The tubes were sent to laboratory for testing as requested by the referring physician. After the procedure, the needle was removed, and a sterile dressing applied to the site. The patient tolerated the procedure well and suffered no complications.     Impression: Impression: 1. Successful lumbar puncture under  fluoroscopic control. 2. 30 cc of clear spinal fluid was removed and sent to laboratory as described above. Workstation performed: [id number]     CT chest abdomen pelvis w contrast    Result Date: 10/25/2024  Narrative: CT CHEST, ABDOMEN AND PELVIS WITH IV CONTRAST INDICATION: Confusion, abdominal pain, emesis, generalized abdominal tenderness. COMPARISON: CT abdomen pelvis 10/21/2024. CT chest abdomen pelvis 10/11/2024. TECHNIQUE: CT examination of the chest, abdomen and pelvis was performed. Multiplanar 2D reformatted images were created from the source data. This examination, like all CT scans performed in the [facility], was performed utilizing techniques to minimize radiation dose exposure, including the use of iterative reconstruction and automated exposure control. Radiation dose length product (DLP) for this visit: 850.79 mGy-cm IV Contrast: 100 mL of iohexol (OMNIPAQUE) Enteric Contrast: Not administered. FINDINGS: CHEST LUNGS: Mild interlobular septal thickening through both lower lobes may represent mild pulmonary edema. Mild bibasilar atelectasis. No focal consolidation. Unchanged known 4 mm and smaller pulmonary nodules. PLEURA: Small bibasilar pleural effusions. HEART/GREAT VESSELS: Cardiomegaly. Coronary artery calcifications. No thoracic aortic aneurysm. MEDIASTINUM AND JOE: Unchanged mild mediastinal and bilateral parahilar adenopathy. Dominant 16 mm precarinal node #2/49. Reidentified diffuse esophageal distention. Diffuse esophageal fluid may indicate reflux. CHEST WALL AND LOWER NECK: Worsened right axillary and right subpectoral adenopathy. Previously seen dominant 12 mm node now measures 21 mm #2/29. Numerous new enlarged nodes. Unchanged subcentimeter left axillary nodes. ABDOMEN LIVER/BILIARY TREE: Unremarkable. GALLBLADDER: No calcified gallstones. No pericholecystic inflammatory change. SPLEEN: Unremarkable. PANCREAS: Unremarkable. ADRENAL GLANDS: Unremarkable.  KIDNEYS/URETERS: No hydronephrosis. Numerous unchanged bilateral simple renal cysts. Well-circumscribed subcentimeter bilateral renal hypoattenuating lesions are also unchanged and likely to represent subcentimeter simple cyst. STOMACH AND BOWEL: Unremarkable. APPENDIX: No findings to suggest appendicitis. ABDOMINOPELVIC CAVITY: No ascites. No pneumoperitoneum. Unchanged bilateral iliac chain adenopathy. A dominant 2.2 cm right external iliac node #2/199 is unchanged. Dominant 10 mm left external iliac node #2/185 is unchanged. Unchanged 9 mm left pelvic sidewall node #2/210. Numerous unchanged subcentimeter retroperitoneal nodes. 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 PELVIS REPRODUCTIVE ORGANS: Prostatectomy. URINARY BLADDER: Unremarkable. ABDOMINAL WALL/INGUINAL REGIONS: Unchanged bilateral inguinal adenopathy. Dominant 16 mm right inguinal node #2/214. Dominant 11 mm left inguinal node #2/237. BONES: No acute fracture or suspicious osseous lesion.     Impression: Right axillary adenopathy is worsened when compared with CT chest 10/11/2024 Unchanged mediastinal, bilateral iliac chain and bilateral inguinal adenopathy when compared with CT abdomen pelvis 10/21/2024. Potential mild pulmonary edema. Small pleural effusions. Unchanged cardiomegaly. The study was marked in EPIC for immediate notification. Workstation performed: [id number]     CT head without contrast    Result Date: 10/25/2024  Narrative: CT BRAIN - WITHOUT CONTRAST INDICATION:   Confusion, non focal. COMPARISON:  None. TECHNIQUE:  CT examination of the brain was performed.  Multiplanar 2D reformatted images were created from the source data. Radiation dose length product (DLP) for this visit:  870.27 mGy-cm .  This examination, like all CT scans performed in the [facility], was performed utilizing techniques to minimize radiation dose exposure, including the use of  iterative  reconstruction and automated exposure control. IMAGE QUALITY:  Diagnostic. FINDINGS: PARENCHYMA: Decreased attenuation is noted in periventricular and subcortical white matter demonstrating an appearance that is statistically most likely to represent moderate microangiopathic change. No CT signs of acute infarction.  No intracranial mass, mass effect or midline shift.  No acute parenchymal hemorrhage. VENTRICLES AND EXTRA-AXIAL SPACES:  Normal for the patient's age. VISUALIZED ORBITS: Normal visualized orbits. PARANASAL SINUSES: Normal visualized paranasal sinuses. CALVARIUM AND EXTRACRANIAL SOFT TISSUES: Normal.     Impression: No acute intracranial abnormality. Workstation performed: [id number]     XR chest 1 view portable    Result Date: 10/22/2024  Narrative: XR CHEST PORTABLE INDICATION: Cough/fever. COMPARISON: Chest radiograph 10/8/2024; CT chest, abdomen and pelvis 10/11/2024 FINDINGS: Monitoring leads and clips project over the chest. Mild bibasilar atelectasis. No focal consolidation. Blunting of the left costophrenic angle suggesting trace pleural effusion. No pneumothorax. Normal cardiomediastinal silhouette. Degenerative changes of the spine and bilateral shoulders. Normal upper abdomen.     Impression: Mild bibasilar atelectasis and trace left pleural effusion. Resident: MARTHA Whiteside I, the attending radiologist, have reviewed the images and agree with the final report above. Workstation performed: [id number]     CT abdomen pelvis with contrast    Result Date: 10/21/2024  Narrative: CT ABDOMEN AND PELVIS WITH IV CONTRAST INDICATION:, Nausea, vomiting and diarrhea. . COMPARISON: 10/11/2024. TECHNIQUE: CT examination of the abdomen and pelvis was performed. Multiplanar 2D reformatted images were created from the source data. This examination, like all CT scans performed in the [facility], was performed utilizing techniques to minimize radiation dose exposure, including the use  of iterative reconstruction and automated exposure control. Radiation dose length product (DLP) for this visit: 585 mGy-cm IV Contrast: 100 mL of iohexol (OMNIPAQUE) Enteric Contrast: Not administered. FINDINGS: ABDOMEN LOWER CHEST: Small left pleural effusion and left basilar subsegmental atelectasis. LIVER/BILIARY TREE: Unremarkable. GALLBLADDER: No calcified gallstones. No pericholecystic inflammatory change. SPLEEN: Unremarkable. PANCREAS: Unremarkable. ADRENAL GLANDS: Unremarkable. KIDNEYS/URETERS: Symmetric nephrographic phase enhancement the kidneys. Cysts measure up to 6.4 cm. Multiple left renal cysts measuring up to 8.2 cm. No obstructive uropathy. STOMACH AND BOWEL: Unremarkable. APPENDIX: Prior appendectomy. 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 PELVIS REPRODUCTIVE ORGANS: Prior prostatectomy. URINARY BLADDER: Unremarkable. ABDOMINAL WALL/INGUINAL REGIONS: Enlarged bilateral inguinal lymph nodes with normal fatty shen, measuring up to 1.9 cm.. BONES: No acute fracture or suspicious osseous lesion.     Impression: 1. No evidence of bowel obstruction, colitis or diverticulitis. 2. Numerous mildly enlarged para-aortic, iliac chain and inguinal lymph nodes, possibly reactive however lymphoproliferative disorder not excluded. Workstation performed: [id number]     CT chest abdomen pelvis w contrast    Result Date: 10/12/2024  Narrative: CT CHEST, ABDOMEN AND PELVIS WITH IV CONTRAST INDICATION: intermittent persistent fevers. COMPARISON: 10/8/2024. TECHNIQUE: CT examination of the chest, abdomen and pelvis was performed. Multiplanar 2D reformatted images were created from the source data. This examination, like all CT scans performed in the [facility], was performed utilizing techniques to minimize radiation dose exposure,  including the use of iterative reconstruction and automated exposure control. Radiation dose length product (DLP) for this visit: 898.73 mGy-cm IV Contrast: 100 mL of iohexol (OMNIPAQUE) Enteric Contrast: Not administered. FINDINGS: Moderately degraded by respiratory motion and retained barium in the colon. CHEST LUNGS: There is bibasilar atelectasis. Stable 3 mm right apical lung nodule on series 4 image 24. Additional nodules are not well seen due to respiratory motion. No tracheal or endobronchial lesion. PLEURA: There are small bilateral pleural effusions. HEART/GREAT VESSELS: Heart is unremarkable for patient's age. No thoracic aortic aneurysm. MEDIASTINUM AND JOE: There is diffuse distention of the esophagus as seen previously. No obstructing lesion is visualized. CHEST WALL AND LOWER NECK: Again seen is an enlarged right axillary lymph node measuring 1.2 x 1.9 cm, nonspecific. ABDOMEN LIVER/BILIARY TREE: There is a 1.9 x 1.6 cm enhancing lesion in segment 6 of the liver, indeterminate. GALLBLADDER: Not well-visualized due to under distention and beam hardening artifact from adjacent barium. SPLEEN: Unremarkable. PANCREAS: Unremarkable. ADRENAL GLANDS: Unremarkable. KIDNEYS/URETERS: There are numerous bilateral renal cysts. No hydronephrosis. STOMACH AND BOWEL: Unremarkable. APPENDIX: No findings to suggest appendicitis. ABDOMINOPELVIC CAVITY: No ascites. No pneumoperitoneum. No lymphadenopathy. 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 PELVIS REPRODUCTIVE ORGANS: Unremarkable for patient's age. URINARY BLADDER: Unremarkable. ABDOMINAL WALL/INGUINAL REGIONS: Unremarkable. BONES: No acute fracture or suspicious osseous lesion.     Impression: Moderately limited by respiratory motion. 1.  Small bilateral pleural effusions with overlying atelectasis. 2.  3 mm right apical lung nodule. Based on current Fleischner  Society 2017 Guidelines on incidental pulmonary nodule, no routine follow-up is needed if the patient is low risk. If the patient is high risk, optional follow-up chest CT at 12 months can be  considered. 3.  Stable diffusely dilated esophagus. This may be related to dysmotility. Obstructing lesion is not visualized. This would be better evaluated with esophagram. 4.  No acute abnormality in the abdomen or pelvis. 5.  Enhancing liver lesion, indeterminate. Possibilities include hemangioma and vascular malformation though other etiologies including neoplasm not excluded. Follow-up nonemergent MRI is recommended. The study was marked in EPIC for immediate notification. Workstation performed: [id number]     XR knee 3 vw left non injury    Result Date: 10/10/2024  Narrative: XR KNEE 3 VW LEFT NON INJURY INDICATION: left knee pain. COMPARISON: None FINDINGS: No acute fracture or dislocation. No joint effusion. No significant degenerative changes. No lytic or blastic osseous lesion. Unremarkable soft tissues.     Impression: No acute osseous abnormality. Computerized Assisted Algorithm (CAA) may have been used to analyze all applicable images. Workstation performed: [id number]     FL barium swallow video w speech    Result Date: 10/9/2024  Narrative: A video barium swallow study was performed by the Department of Speech Pathology. Please refer to the report for the official interpretation. The images are stored for archival purposes only. Study images were not formally reviewed by the Radiology Department.    FL barium swallow video w speech    Result Date: 10/9/2024  Narrative: JARROD Marie     10/9/2024  3:14 PM                                  Video Swallow Study Patient Name: Miguel Henderson Today's Date: 10/9/2024   Past Medical History Past Medical History: Diagnosis Date  Prostate cancer (HCC)   Thyroid cancer (HCC)   Past Surgical History Past Surgical History: Procedure Laterality Date  APPENDECTOMY   Summary:  Images are on PACS for review. Oral Phase : Pt presented with mild oral dysphagia. Mastication was mod prolonged however eventual functional breakdown. Bolus control, formation were WNL. Transfer was piecemeal. Trace posterior base of tongue residue. Pharyngeal Phase :Pt presented with mild pharyngeal dysphagia. Swallow initiation was intermittently min delayed with solids and prompt with liquids. Spill to the valleculae occurred with solids. Hyoid elevation and laryngeal excursion were WNL. Pharyngeal stripping wave diminished with solid trials. Epiglottic inversion was mostly complete. With nutri grain min epiglottic undercoat present. Trace vallecular retention with thin liquids and mild/mod with solids. Pt intermittently initiated secondary swallow to reduce residue. No pyriform retention. Large CP bar C4-C5 evident however did not impede bolus flow. No aspiration or penetration observed with trials. Per Esophageal screen Slow motility observed with liquids and solids. High retropulsion observed with liquids, pt risk for bottom up aspiration. Stasis occurred in distal esophagus with 13 mm pill however provided with additional sip of thin, ntl, and puree. Provided additional time x4 minutes pt continued with residue with esophagus. Observations: Son present throughout study provided translation. Recommendations: Diet: Dysphagia 3 dental soft Liquids:thin Meds: whole with thin liquids Strategies:slow rate, alternate liquids with solids, swallow prior to additional po Upright position F/u ST tx: yes Therapy Prognosis: fair Prognosis considerations:age, medical status, Intermittent/Distant Supervision Aspiration Precautions Reflux Precautions Consider consult with: GI, rehab Results reviewed with: pt, nursing, physician. ST reviewed images and recommendations reviewed with family at bedside. Aspiration precautions posted. If a dedicated assessment of the esophagus is desired, consider esophagram/barium swallow or  EGD. Goals: Dysphagia LTG -Patient will demonstrate safe and effective oral intake (without overt s/s significant oral/pharyngeal dysphagia including s/s penetration or aspiration) for the highest appropriate diet level. Short Term Goals: -Pt will tolerate Dysphagia 3/advanced (dental soft) diet and thin liquid with no significant s/s oral or pharyngeal dysphagia across 1-3 diagnostic session/s. -Patient will tolerate trials of upgraded food and/or liquid texture with no significant s/s of oral or pharyngeal dysphagia including aspiration across 1-3 diagnostic sessions -Patient will comply with a Video/Modified Barium Swallow study for more complete assessment of swallowing anatomy/physiology/aspiration risk and to assess efficacy of treatment techniques -Patient will demonstrate the ability to adequately self-monitor swallowing skills and perform appropriate compensatory techniques to reduce overt s/sx of penetration/aspiration to safely tolerate least restrictive food/liquid consistencies. Patient's goal: none stated H&P/Admit info, pertinent provider notes/procedures/studies/PMH:(copied and placed in this report) Miguel Henderson is a 84 y.o. male with a PMH of prostate cancer and thyroid cancer who presents with fever x 4 days.  Patient is Romanian-speaking only and history is obtained through assistance of his son who is present at bedside and acting as .  Patient has been having intermittent fevers which have been responsive to Tylenol and Motrin at home but have ranged as high as 103.7 and yesterday was having some apparent respiratory distress but he has no respiratory history and is on no respiratory medications at home.  Patient does have a scant cough which is nonproductive and denies any increased urinary frequency or dysuria though he does complain of some hematuria which has been ongoing for the past month.  Patient additionally has been complaining of a sore throat for the past 4 days with  decreased p.o. fluid intake secondary to pain with swallowing.  While in ER patient was noted to meet severe sepsis criteria and was thus referred for admission. Special Studies XR CHEST PORTABLE 10/08/2024 IMPRESSION: Mild tree-in-bud pattern of opacity mostly in the right lung and in the left base again suggesting a mild infectious/inflammatory condition CT CHEST WITHOUT IV CONTRAST 10/08/2024 IMPRESSION: Mild bibasilar probable atelectatic changes, right greater than left. Otherwise no focal airspace consolidation to suggest pneumonia. Minor tree-in-bud nodularity in the right lung which could relate to infectious versus inflammatory small airways disease. Clinical correlation recommended. Pulmonary nodules as above. Based on current Fleischner Society 2017 Guidelines on incidental pulmonary nodule, no routine follow-up is needed if the patient is low risk. If the patient is high risk, optional follow-up chest CT at 12 months can be considered. Trace right pleural effusion. Mild right axillary lymphadenopathy, nonspecific Code Status: Level 1 full code Previous VBS: none Precautions Contact Precautions Food allergies:  No know  Current diet:  Regular diet and thin liquids  Premorbid diet:  Regular diet and thin liquids  Dentition  Upper and lower dentures Oral Mech  WNL O2 requirements:  Room air  Vocal Quality/Speech WNL Cognitive status:  Alert  Consistencies administered: Barium laden applesauce, nutri grain bar, sandwich bite, thin liquids,ntl and  13mm barium pill. Liquids were administered by cup. Pt was seated laterally at 90 degrees. Pt  unable to be viewed in AP position, due to transfer status      Echo complete w/ contrast if indicated    Result Date: 10/9/2024  Narrative:   Left Ventricle: Left ventricular cavity size is normal. Wall thickness is mildly increased. The left ventricular ejection fraction is 55%. Systolic function is normal. Wall motion is normal.   Aortic Valve: There is aortic valve  sclerosis.   Aorta: The aortic root is mildly dilated. The ascending aorta is mildly dilated. The aortic root is 3.80 cm. The ascending aorta is 3.9 cm.     XR chest portable    Result Date: 10/8/2024  Narrative: XR CHEST PORTABLE INDICATION: shortness of breath. COMPARISON: 10/7/2024 FINDINGS: Minimal tree-in-bud pattern of opacity mostly in the right lung, but also at the left base. No focal dense consolidation. The trace amount of pleural fluid seen on the prior CT is not apparent on this exam. No pneumothorax identified. Stable cardiomediastinal silhouette Degenerative changes noted along the spine. Normal upper abdomen.     Impression: Mild tree-in-bud pattern of opacity mostly in the right lung and in the left base again suggesting a mild infectious/inflammatory condition Workstation performed: [id number]     XR chest portable - 1 view    Result Date: 10/8/2024  Narrative: XR CHEST PORTABLE INDICATION: Fever cough. COMPARISON: None FINDINGS: Clear lungs. No pneumothorax or pleural effusion. Normal cardiomediastinal silhouette. Bones are unremarkable for age. Normal upper abdomen.     Impression: No acute cardiopulmonary disease. Workstation performed: [id number]     CT chest wo contrast    Result Date: 10/8/2024  Narrative: CT CHEST WITHOUT IV CONTRAST INDICATION: Cough shortness of breath with fever. COMPARISON: None. TECHNIQUE: CT examination of the chest was performed without intravenous contrast. Multiplanar 2D reformatted images were created from the source data. This examination, like all CT scans performed in the [facility], was performed utilizing techniques to minimize radiation dose exposure, including the use of iterative reconstruction and automated exposure control. Radiation dose length product (DLP) for this visit: 441.84 mGy-cm FINDINGS: LUNGS: Evaluation somewhat limited by respiratory motion. There is some minor groundglass and tree-in-bud nodularity suggested in the right  upper lobe inferior segment and to a lesser degree in the right lower lobe. No lobar consolidation. Trace paraseptal emphysematous changes. Mild basilar atelectatic changes, right greater than left. There is minor scarring in the posteromedial right lower lobe adjacent to bulky paravertebral osteophytes. 4 mm pulmonary nodule seen in the right upper lobe (3:35). Additional scattered pulmonary nodules are seen measuring on the order of 2 to 3 mm. Based on current Fleischner Society 2017 Guidelines on incidental pulmonary nodule, no routine follow-up is needed if the patient is low risk. If the patient is high risk, optional follow-up chest CT at 12 months can be considered. PLEURA: Trace right pleural effusion with subjacent atelectasis. HEART/GREAT VESSELS: Heart is unremarkable for patient's age. No thoracic aortic aneurysm. Coronary atherosclerosis MEDIASTINUM AND JOE: Shotty subcentimeter mediastinal lymph nodes noted. Evaluation for hilar lymphadenopathy limited in the absence of intravenous contrast. CHEST WALL AND LOWER NECK: Gynecomastia. Few prominent/mildly enlarged lymph nodes seen in the right axillary region, largest measuring up to 1.2 cm,. VISUALIZED STRUCTURES IN THE UPPER ABDOMEN: No acute abnormality. Bilateral renal cysts. OSSEOUS STRUCTURES: Spinal degenerative changes are noted. No acute fracture or destructive osseous lesion.     Impression: Mild bibasilar probable atelectatic changes, right greater than left. Otherwise no focal airspace consolidation to suggest pneumonia. Minor tree-in-bud nodularity in the right lung which could relate to infectious versus inflammatory small airways disease. Clinical correlation recommended. Pulmonary nodules as above. Based on current Fleischner Society 2017 Guidelines on incidental pulmonary nodule, no routine follow-up is needed if the patient is low risk. If the patient is high risk, optional follow-up chest CT at 12 months can be considered. Trace right  pleural effusion. Mild right axillary lymphadenopathy, nonspecific. Workstation performed: [id number]     Labs and pertinent reports reviewed.      This note has been generated by voice recognition software system.  Therefore, there may be spelling, grammar, and or syntax errors. Please contact if questions arise.    Additional recommendations to follow per attending, Dr. Maxwell.    Scottie Ding DO, PGY4  Hematology/Oncology Fellow

## 2024-10-31 NOTE — ASSESSMENT & PLAN NOTE
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  MRI brain without acute intracranial abnormalities.  Status post LP 10/25 with mild lymphocytic pleocytosis and elevated protein.  ME panel and CSF culture negative.  As above, low concern for CNS infection.  Suspect CSF abnormalities related to possible underlying lymphoproliferative disorder.  Status post first session of plasmapheresis 10/26 with improvement in mental status and fever curve.  Seems to have worsened.  Repeat CT of the brain nondiagnostic for source on 10/31/2024   -Plasmapheresis per oncology   -Continue to monitor mental status  -Reconsult neurology

## 2024-10-31 NOTE — ASSESSMENT & PLAN NOTE
The patient has had intermittent fevers for the past 3 weeks and several admissions to [facility] with fairly unremarkable infectious workup other than positive rhinovirus and COVID.  CT imaging was noted to show lymphadenopathy and he had atypical lymphocytes on CBC so hematology/oncology consult was recommended although the patient was discharged on Paxlovid with plan for an outpatient referral.  He then returned to the ED with worsening confusion and return of fever.  CT imaging with worsening axillary lymphadenopathy, CBC differential again showed atypical lymphocytes.  Status post LP 10/25 which showed mild elevation in protein and lymphocytic pleocytosis.  ME panel negative culture showed no growth.  Antimicrobials discontinued.  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  Labs show elevated LDH, ferritin, immunoglobulins.  Low concern for an infectious etiology at this time given negative extensive workup and concern for a lymphoproliferative disorder causing fevers.  Status post plasmapheresis with improvement in fever curve and mental status. CSF cultures finalized with no growth. S/p lymph node and bone marrow biopsies. Has remained afebrile and hemodynamically stable.   -Continue to monitor off antimicrobials  -Follow-up lymph node and bone marrow biopsies  -Workup per hematology/oncology  -Follow-up blood cultures until finalized  -Monitor fever curve  -Monitor CBCd

## 2024-10-31 NOTE — ASSESSMENT & PLAN NOTE
During prior admission imaging showed enlarged periaortic, iliac chain and inguinal lymph nodes.  Repeat imaging showed persistent lymphadenopathy with worsened right axillary adenopathy.  As above, concern for an underlying lymphoproliferative disorder.  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  CBC differential also shows atypical lymphocytes.  Status post plasmapheresis 10/26. HIV screening negative. S/p lymph node and bone marrow biopsies.  Lymph node biopsy with atypical cells but awaiting flow cytometry.  -Hematology/oncology following  -Plasmapheresis per hematology/oncology  -Follow-up pathology from biopsies and flow cytometry

## 2024-10-31 NOTE — PLAN OF CARE
Problem: PAIN - ADULT  Goal: Verbalizes/displays adequate comfort level or baseline comfort level  Description: Interventions:  - Encourage patient to monitor pain and request assistance  - Assess pain using appropriate pain scale  - Administer analgesics based on type and severity of pain and evaluate response  - Implement non-pharmacological measures as appropriate and evaluate response  - Consider cultural and social influences on pain and pain management  - Notify physician/advanced practitioner if interventions unsuccessful or patient reports new pain  Outcome: Progressing     Problem: INFECTION - ADULT  Goal: Absence or prevention of progression during hospitalization  Description: INTERVENTIONS:  - Assess and monitor for signs and symptoms of infection  - Monitor lab/diagnostic results  - Monitor all insertion sites, i.e. indwelling lines, tubes, and drains  - Monitor endotracheal if appropriate and nasal secretions for changes in amount and color  - Velma appropriate cooling/warming therapies per order  - Administer medications as ordered  - Instruct and encourage patient and family to use good hand hygiene technique  - Identify and instruct in appropriate isolation precautions for identified infection/condition  Outcome: Progressing  Goal: Absence of fever/infection during neutropenic period  Description: INTERVENTIONS:  - Monitor WBC    Outcome: Progressing     Problem: SAFETY ADULT  Goal: Patient will remain free of falls  Description: INTERVENTIONS:  - Educate patient/family on patient safety including physical limitations  - Instruct patient to call for assistance with activity   - Consult OT/PT to assist with strengthening/mobility   - Keep Call bell within reach  - Keep bed low and locked with side rails adjusted as appropriate  - Keep care items and personal belongings within reach  - Initiate and maintain comfort rounds  - Make Fall Risk Sign visible to staff  - Offer Toileting every 2 Hours,  in advance of need  - Initiate/Maintain bed/chair alarm  - Apply yellow socks and bracelet for high fall risk patients  - Consider moving patient to room near nurses station  Outcome: Progressing  Goal: Maintain or return to baseline ADL function  Description: INTERVENTIONS:  -  Assess patient's ability to carry out ADLs; assess patient's baseline for ADL function and identify physical deficits which impact ability to perform ADLs (bathing, care of mouth/teeth, toileting, grooming, dressing, etc.)  - Assess/evaluate cause of self-care deficits   - Assess range of motion  - Assess patient's mobility; develop plan if impaired  - Assess patient's need for assistive devices and provide as appropriate  - Encourage maximum independence but intervene and supervise when necessary  - Involve family in performance of ADLs  - Assess for home care needs following discharge   - Consider OT consult to assist with ADL evaluation and planning for discharge  - Provide patient education as appropriate  Outcome: Progressing  Goal: Maintains/Returns to pre admission functional level  Description: INTERVENTIONS:  - Perform AM-PAC 6 Click Basic Mobility/ Daily Activity assessment daily.  - Set and communicate daily mobility goal to care team and patient/family/caregiver.   - Collaborate with rehabilitation services on mobility goals if consulted  - Reposition patient every 2 hours.  - Dangle patient 3 times a day  - Stand patient 3 times a day  - Ambulate patient 3 times a day  - Out of bed to chair 3 times a day   - Out of bed for meals 3 times a day  - Out of bed for toileting  - Record patient progress and toleration of activity level   Outcome: Progressing     Problem: DISCHARGE PLANNING  Goal: Discharge to home or other facility with appropriate resources  Description: INTERVENTIONS:  - Identify barriers to discharge w/patient and caregiver  - Arrange for needed discharge resources and transportation as appropriate  - Identify  discharge learning needs (meds, wound care, etc.)  - Arrange for interpretive services to assist at discharge as needed  - Refer to Case Management Department for coordinating discharge planning if the patient needs post-hospital services based on physician/advanced practitioner order or complex needs related to functional status, cognitive ability, or social support system  Outcome: Progressing     Problem: Knowledge Deficit  Goal: Patient/family/caregiver demonstrates understanding of disease process, treatment plan, medications, and discharge instructions  Description: Complete learning assessment and assess knowledge base.  Interventions:  - Provide teaching at level of understanding  - Provide teaching via preferred learning methods  Outcome: Progressing     Problem: Prexisting or High Potential for Compromised Skin Integrity  Goal: Skin integrity is maintained or improved  Description: INTERVENTIONS:  - Identify patients at risk for skin breakdown  - Assess and monitor skin integrity  - Assess and monitor nutrition and hydration status  - Monitor labs   - Assess for incontinence   - Turn and reposition patient  - Assist with mobility/ambulation  - Relieve pressure over bony prominences  - Avoid friction and shearing  - Provide appropriate hygiene as needed including keeping skin clean and dry  - Evaluate need for skin moisturizer/barrier cream  - Collaborate with interdisciplinary team   - Patient/family teaching  - Consider wound care consult   Outcome: Progressing

## 2024-10-31 NOTE — ASSESSMENT & PLAN NOTE
Tested positive on 10/21  Currently oxygenating on baseline room air   Contact/airborne precautions -> anticipate discontinuation of precautions tomorrow  Supportive care otherwise

## 2024-10-31 NOTE — QUICK NOTE
Received message from nursing regarding new muscle twitching, continued confusion, and R pupil larger than L pupil.     At bedside, pt was confused. Would not follow commands or answer questions even while utilizing . R pupil +4 and irregularly shaped - chronicity unknown as this was not documented on prior. D/w pt's son over the phone who was unsure of past eye surgery and/or injury. Obtained stat head CT which was negative and discussed case with neurology resident who evaluated pt as well. Recommended checking lactic, b12, folate, ammonia, TSH, UA and routine EEG. Orders placed. Formal neuro consult to be completed this AM.

## 2024-10-31 NOTE — ASSESSMENT & PLAN NOTE
Pt w/ a few weeks of fluctuating / gradually worsening AMS currently undergoing significant heme onc workup including BM bx & lymph node bx / & previously having undergone workup w/ ID including LP. Pt sxs include pupil asymmetry / irregular shape to R eye & jerking motions to all 4 extremities of differing severity worse in LUE, which upon assessment matched more w/ asterixis than myoclonic jerking, pointing more toward metabolic cause over neurologic. Confirmed w/ family that the abnl movements are new as of <24 hrs ago w/ no hx of similar sxs & are seen in conjunction w/ overall worsening mentation, suspicion per heme onc of malignant process evidenced by increased IG, , elevated gamma spike SPEP, B sxs, worsening lymphadenopathy. No particular neurologic cause identified at this time, continue working up metabolic derangements that may be contributing to worsening sxs. Concerning the abnl pupils b/l, sluggish response to light b/l w/ R>L in size & signs of past ocular surgeries, which were confirmed by family today. Uncertain time course for the new eye findings, even family unsure as to when the discrepancy first was noticed, although low suspicion for neuro cause will evaluate for neuro etiology / contributing factors by the plan noted below.Of note, pt overall AMS seems to improve w/ plasmapheresis occurrences, although amount of improvement seems to have lessened of late. Covid dx 10 days ago, ID followed. CSF WBC >100, protein elevated. CT CAP significant for large lymph nodes to various locations of body.  -Repeat MRI brain w/ con in setting of possible new findings in past week  -Routine EEG warranted while I/p  -Continue workup w/ heme onc including follow pending bx /cytometry result  -ID aware, following off abx  -No rec for repeat LP at this time, will await imaging  -Labs ordered including B12, B9, TSH, ammonia returning wnl

## 2024-10-31 NOTE — ASSESSMENT & PLAN NOTE
Status post left inguinal lymph node biopsy on 10/28 -> await full pathology, however, oncology note reviewed -> SPEP with elevated gamma spike, elevated IgA/G/M, positive free lambda light chain, elevated beta-2 microglobulin, and high normal viscosity  Status post bone marrow biopsy on 10/29 -> await pathology  Status post courses of plasmapheresis per oncology  Supportive care

## 2024-10-31 NOTE — NURSING NOTE
Epic Chat sent to [facility] requesting to evaluate patient.  Patient has new muscle twitching in all extremities. He is confused and not answering my assessment questions via the .  Oriented to self/time.  I assessed patients pupils and his right is about a +3/4 and the left is a 2. No baseline pupil assessment, so unsure if this is a new finding.  See new orders placed.

## 2024-10-31 NOTE — ASSESSMENT & PLAN NOTE
CT of head and MRI of brain unremarkable for acute etiology/abnormalities  Status post prior lumbar puncture with CSF studies negative for meningitis  Concern noted of etiology of fevers due to possible lymphoma/hyperviscosity - transferred to the [facility] for expedited lymphoma workup (see below)  Mentation waxing/waning but mildly improved per family status post initiation of plasmapheresis  Appreciate oncology recommendations -> underwent a subsequent course of plasmapheresis yesterday, however, confusion remains waxing/waning now with intermittent twitching/myoclonic jerks and asterixis  B12/folate, ammonia, lactic acid, and TSH, all normal  Continue to monitor and replete electrolyte derangements, as necessary  Limit/avoid sedating agents if possible  Await EEG and MRI of brain

## 2024-11-01 ENCOUNTER — APPOINTMENT (INPATIENT)
Dept: RADIOLOGY | Facility: HOSPITAL | Age: 84
DRG: 823 | End: 2024-11-01
Payer: COMMERCIAL

## 2024-11-01 ENCOUNTER — APPOINTMENT (INPATIENT)
Dept: NEUROLOGY | Facility: CLINIC | Age: 84
DRG: 823 | End: 2024-11-01
Payer: COMMERCIAL

## 2024-11-01 ENCOUNTER — ANESTHESIA EVENT (INPATIENT)
Dept: PERIOP | Facility: HOSPITAL | Age: 84
End: 2024-11-01
Payer: COMMERCIAL

## 2024-11-01 LAB
ALBUMIN SERPL BCG-MCNC: 3.5 G/DL (ref 3.5–5)
ALBUMIN UR ELPH-MCNC: 100 %
ALP SERPL-CCNC: 76 U/L (ref 34–104)
ALPHA1 GLOB MFR UR ELPH: 0 %
ALPHA2 GLOB MFR UR ELPH: 0 %
ALT SERPL W P-5'-P-CCNC: 15 U/L (ref 7–52)
ANION GAP SERPL CALCULATED.3IONS-SCNC: 5 MMOL/L (ref 4–13)
AST SERPL W P-5'-P-CCNC: 23 U/L (ref 13–39)
B-GLOBULIN MFR UR ELPH: 0 %
BACTERIA UR QL AUTO: ABNORMAL /HPF
BILIRUB SERPL-MCNC: 1.19 MG/DL (ref 0.2–1)
BILIRUB UR QL STRIP: NEGATIVE
BUN SERPL-MCNC: 9 MG/DL (ref 5–25)
CA-I BLD-SCNC: 1.08 MMOL/L (ref 1.12–1.32)
CALCIUM SERPL-MCNC: 7.8 MG/DL (ref 8.4–10.2)
CHLORIDE SERPL-SCNC: 94 MMOL/L (ref 96–108)
CLARITY UR: CLEAR
CO2 SERPL-SCNC: 31 MMOL/L (ref 21–32)
COLOR UR: YELLOW
CREAT SERPL-MCNC: 0.34 MG/DL (ref 0.6–1.3)
ERYTHROCYTE [DISTWIDTH] IN BLOOD BY AUTOMATED COUNT: 16.1 % (ref 11.6–15.1)
GAMMA GLOB MFR UR ELPH: 0 %
GFR SERPL CREATININE-BSD FRML MDRD: 114 ML/MIN/1.73SQ M
GLUCOSE SERPL-MCNC: 120 MG/DL (ref 65–140)
GLUCOSE UR STRIP-MCNC: NEGATIVE MG/DL
HCT VFR BLD AUTO: 24.2 % (ref 36.5–49.3)
HGB BLD-MCNC: 8.4 G/DL (ref 12–17)
HGB UR QL STRIP.AUTO: ABNORMAL
HYALINE CASTS #/AREA URNS LPF: ABNORMAL /LPF
INTERPRETATION UR IFE-IMP: NORMAL
KETONES UR STRIP-MCNC: NEGATIVE MG/DL
LDH SERPL-CCNC: 252 U/L (ref 140–271)
LEUKOCYTE ESTERASE UR QL STRIP: NEGATIVE
MAGNESIUM SERPL-MCNC: 1.9 MG/DL (ref 1.9–2.7)
MCH RBC QN AUTO: 31.9 PG (ref 26.8–34.3)
MCHC RBC AUTO-ENTMCNC: 34.7 G/DL (ref 31.4–37.4)
MCV RBC AUTO: 92 FL (ref 82–98)
MISCELLANEOUS LAB TEST RESULT: NORMAL
MUCOUS THREADS UR QL AUTO: ABNORMAL
NITRITE UR QL STRIP: NEGATIVE
NON-SQ EPI CELLS URNS QL MICRO: ABNORMAL /HPF
PH UR STRIP.AUTO: 6.5 [PH]
PLATELET # BLD AUTO: 297 THOUSANDS/UL (ref 149–390)
PMV BLD AUTO: 9.1 FL (ref 8.9–12.7)
POTASSIUM SERPL-SCNC: 3.8 MMOL/L (ref 3.5–5.3)
PROT PATTERN UR ELPH-IMP: NORMAL
PROT SERPL-MCNC: 5.5 G/DL (ref 6.4–8.4)
PROT UR STRIP-MCNC: ABNORMAL MG/DL
PROT UR-MCNC: 35.7 MG/DL
RBC # BLD AUTO: 2.63 MILLION/UL (ref 3.88–5.62)
RBC #/AREA URNS AUTO: ABNORMAL /HPF
SCAN RESULT: NORMAL
SCAN RESULT: NORMAL
SODIUM SERPL-SCNC: 130 MMOL/L (ref 135–147)
SP GR UR STRIP.AUTO: 1.01 (ref 1–1.03)
UROBILINOGEN UR STRIP-ACNC: 6 MG/DL
WBC # BLD AUTO: 10.4 THOUSAND/UL (ref 4.31–10.16)
WBC #/AREA URNS AUTO: ABNORMAL /HPF

## 2024-11-01 PROCEDURE — 99233 SBSQ HOSP IP/OBS HIGH 50: CPT | Performed by: INTERNAL MEDICINE

## 2024-11-01 PROCEDURE — 85027 COMPLETE CBC AUTOMATED: CPT | Performed by: INTERNAL MEDICINE

## 2024-11-01 PROCEDURE — NC001 PR NO CHARGE: Performed by: SURGERY

## 2024-11-01 PROCEDURE — 83735 ASSAY OF MAGNESIUM: CPT | Performed by: INTERNAL MEDICINE

## 2024-11-01 PROCEDURE — 86738 MYCOPLASMA ANTIBODY: CPT | Performed by: PHYSICIAN ASSISTANT

## 2024-11-01 PROCEDURE — 84166 PROTEIN E-PHORESIS/URINE/CSF: CPT | Performed by: PATHOLOGY

## 2024-11-01 PROCEDURE — 99233 SBSQ HOSP IP/OBS HIGH 50: CPT | Performed by: PSYCHIATRY & NEUROLOGY

## 2024-11-01 PROCEDURE — 86622 BRUCELLA ANTIBODY: CPT | Performed by: PHYSICIAN ASSISTANT

## 2024-11-01 PROCEDURE — 88185 FLOWCYTOMETRY/TC ADD-ON: CPT

## 2024-11-01 PROCEDURE — 99232 SBSQ HOSP IP/OBS MODERATE 35: CPT | Performed by: INTERNAL MEDICINE

## 2024-11-01 PROCEDURE — 86335 IMMUNFIX E-PHORSIS/URINE/CSF: CPT

## 2024-11-01 PROCEDURE — 95816 EEG AWAKE AND DROWSY: CPT

## 2024-11-01 PROCEDURE — 81001 URINALYSIS AUTO W/SCOPE: CPT

## 2024-11-01 PROCEDURE — 82330 ASSAY OF CALCIUM: CPT | Performed by: INTERNAL MEDICINE

## 2024-11-01 PROCEDURE — 83010 ASSAY OF HAPTOGLOBIN QUANT: CPT

## 2024-11-01 PROCEDURE — 83615 LACTATE (LD) (LDH) ENZYME: CPT

## 2024-11-01 PROCEDURE — 71045 X-RAY EXAM CHEST 1 VIEW: CPT

## 2024-11-01 PROCEDURE — 84166 PROTEIN E-PHORESIS/URINE/CSF: CPT

## 2024-11-01 PROCEDURE — 87385 HISTOPLASMA CAPSUL AG IA: CPT | Performed by: PHYSICIAN ASSISTANT

## 2024-11-01 PROCEDURE — 86335 IMMUNFIX E-PHORSIS/URINE/CSF: CPT | Performed by: PATHOLOGY

## 2024-11-01 PROCEDURE — 88184 FLOWCYTOMETRY/ TC 1 MARKER: CPT

## 2024-11-01 PROCEDURE — 80053 COMPREHEN METABOLIC PANEL: CPT | Performed by: INTERNAL MEDICINE

## 2024-11-01 RX ORDER — LEVOTHYROXINE SODIUM 75 UG/1
75 TABLET ORAL
Status: DISCONTINUED | OUTPATIENT
Start: 2024-11-02 | End: 2024-11-19 | Stop reason: HOSPADM

## 2024-11-01 RX ORDER — CALCIUM GLUCONATE 20 MG/ML
2 INJECTION, SOLUTION INTRAVENOUS ONCE
Status: COMPLETED | OUTPATIENT
Start: 2024-11-01 | End: 2024-11-01

## 2024-11-01 RX ORDER — CALCIUM CARBONATE 500 MG/1
500 TABLET, CHEWABLE ORAL 2 TIMES DAILY
Status: DISCONTINUED | OUTPATIENT
Start: 2024-11-01 | End: 2024-11-19 | Stop reason: HOSPADM

## 2024-11-01 RX ORDER — LEVOTHYROXINE SODIUM 50 UG/1
50 TABLET ORAL
Status: DISCONTINUED | OUTPATIENT
Start: 2024-11-04 | End: 2024-11-19 | Stop reason: HOSPADM

## 2024-11-01 RX ADMIN — HEPARIN SODIUM 5000 UNITS: 5000 INJECTION INTRAVENOUS; SUBCUTANEOUS at 13:42

## 2024-11-01 RX ADMIN — HEPARIN SODIUM 5000 UNITS: 5000 INJECTION INTRAVENOUS; SUBCUTANEOUS at 05:00

## 2024-11-01 RX ADMIN — HEPARIN SODIUM 5000 UNITS: 5000 INJECTION INTRAVENOUS; SUBCUTANEOUS at 21:39

## 2024-11-01 RX ADMIN — SODIUM CHLORIDE, SODIUM GLUCONATE, SODIUM ACETATE, POTASSIUM CHLORIDE, MAGNESIUM CHLORIDE, SODIUM PHOSPHATE, DIBASIC, AND POTASSIUM PHOSPHATE 100 ML/HR: .53; .5; .37; .037; .03; .012; .00082 INJECTION, SOLUTION INTRAVENOUS at 22:37

## 2024-11-01 RX ADMIN — CALCIUM CARBONATE (ANTACID) CHEW TAB 500 MG 500 MG: 500 CHEW TAB at 11:16

## 2024-11-01 RX ADMIN — SODIUM CHLORIDE, SODIUM GLUCONATE, SODIUM ACETATE, POTASSIUM CHLORIDE, MAGNESIUM CHLORIDE, SODIUM PHOSPHATE, DIBASIC, AND POTASSIUM PHOSPHATE 100 ML/HR: .53; .5; .37; .037; .03; .012; .00082 INJECTION, SOLUTION INTRAVENOUS at 13:03

## 2024-11-01 RX ADMIN — CALCIUM GLUCONATE 2 G: 20 INJECTION, SOLUTION INTRAVENOUS at 10:55

## 2024-11-01 NOTE — PROGRESS NOTES
Medical Oncology/Hematology Progress Note  Miguel Henderson, male, 84 y.o., 1940,  PPHP 817/PPHP 817-01, [id number]     Assessment and Plan:  Mr. Henderson is a Luxembourgish-speaking 84 yoM with PMHx of prostate cancer, hypertension, and hypothyroidism with encephalopathy, B symptoms, and lymphadenopathy concerning hyperviscosity syndrome secondary to lymphoma or lymphoproliferative disorder transferred from [facility] to [facility] to be evaluated for plasmapheresis.    1. Concern for hyperviscosity syndrome possibly secondary to lymphoma or lymphoproliferative disorder  2. Worsening R axillary lymphadenopathy  3. Mediastinal, b/l iliac chain, and b/l inguinal lymphadenopathy  4. B symptoms  5. Acute encephalopathy and lethargy  6. Positive cold agglutinin  - s/p plasmapheresis (x3 days), appreciate [facility] [place] [facility] (Scott [phone])  - Appreciate IR: s/p R IJ catheter, s/p core biopsy, and s/p BMBx   - Appreciate neuro: EEG w/ near continuous myoclonus, s/p Ativan 2 mg x1 and Keppra 1000 mg BID, repeat routine EEG, MRI Brain when possible, will decide on LP based on results of prior studies  - We could consider giving cryoprecipitate to replenish low fibrinogen levels.  - Pathology results:  - s/p inguinal lymph node biopsy (10/28/2024)  - s/p bone marrow biopsy (10/29/2024)  - Highly limited and near inadequate though likely lymphoproliferative disorder, increased plasma cells and histiocytic aggregates, flow cytometry, stains, and ancillary studies are pending  - Pathology requested excisional lymph node biopsy, so consulted surgical oncology  - Ordered LDH, haptoglobin, PNH panel, and urin hemosiderin  - Defer infectious work up to ID, appreciate ID  - Lab results:  - Viscosity: 1.9 (nml 1.4-2.1)  - Peripheral smear: negative for schistocytes  - Fibrinogen: 132  - PT-INR: 1.67  - LDH: 560  - Cryoglobulin: positive  - SPEP: elevated gamma spike  - UPEP: pending  - Immunoglobulins: IgA 1364,  IgG 2330, IgM 565  - Free light chain ratio: 0.61  - Flow cytometry: inverted CD4/CD8 ratio  - Immunofixation: free lambda light chains  - Beta-2 microglobulin: 4.7 (nml 0.6-2.4)  - We will continue to follow closely  - We will establish outpatient follow up in our office within two weeks of discharge after his acute symptoms have resolved    Interval History:  Yesterday patient's EEG showed nearly continuous myoclonus and he was given Ativan and Keppra with plans for repeat EEG today, patient seen this morning while EEG was being placed, he is AAOx0 and response to voice with groaning which is not entirely unexpected given recent dosing of Ativan and Keppra, decreased myoclonus, I reviewed the patient's vitals which showed Tmax 99.6, HR , SBP 120s, and adequate SpO2 on RA.  I reviewed the patient's labs which show stable leukocytosis of 10.4, stable anemia of Hb 8.4, and , T. bili decreased from 1.29-1.19, SCR 0.34, calcium 7.8, , and UA benign.  There is no new micro or imaging.    History of present illness:  Mr. Henderson is a Togolese-speaking 84-year-old gentleman with PMHx of prostate cancer (~15 years ago treated with surgery and radiation), hypertension, hypothyroidism, dysphagia, recent COVID and rhinovirus infections who presented to [facility] on 10/25/24 with encephalopathy, B symptoms and lymphadenopathy on imaging concerning for lymphoma with elevated clinical concern for potential hyperviscosity syndrome resulting in a transfer to [facility] to be evaluated for plasmapheresis.    The Patient has had three hospital admissions in the last three weeks.  He has been feeling unwell for several weeks, has had fevers, is very fatigued, has poor appetite and poor PO intake, and has noted weight loss.  Initially, his fevers and lymphadenopathy were attributed to recent COVID and rhinovirus infections.  Imaging did demonstrate lymphadenopathy again thought to be  attributed to infection with instructions to follow-up with hematology/medical oncology in the outpatient setting to ensure resolution.  However, his symptoms continued and empiric antibiotics were with concern for meningitis.  Antibiotics were discontinued when LP was negative and ID does not feel there is sufficient evidence for meningitis.     Vitals show Temp 100.2, HR 93, RR 16, /65, and 96% SpO2 on RA.  Labs show strong cold agglutinin, WBC 8.5, Hb 9.4, , , K 3.3, chloride 96, , BUN 7, creatinine 0.56, glucose 88, total protein 6.6, T. bili 1.53, Mg 1.7.    CT CAP w/ (10/25/2024) demonstrates right axillary adenopathy that is worsened compared to previous imaging on 10/11/2024.  Unchanged mediastinal, bilateral iliac chain and bilateral inguinal adenopathy compared to CT on 10/21/2024.  Mild potential pulmonary edema.  Small pleural effusions.  Unchanged cardiomegaly.  Right axillary and subpectoral adenopathy demonstrates a lymph node measuring 21 mm that was previously 12 mm.  Numerous new enlarged nodes unchanged subcentimeter left axillary nodes.  Right external iliac node measures 2.2 cm.  Left external iliac node demonstrates size of 10 mm.  Inguinal lymphadenopathy measures 16 mm on the right and 11 mm on the left.     MRI Brain (10/25/2024) demonstrates no acute infarct, mass effect or midline shift with mild to moderate chronic microangiopathy.  There is multiple indeterminate bilateral parotid nodes, the largest which measures up to 1 cm on the left, and is incompletely evaluated on this imaging.     Peripheral smear (10/22/24) showed RBCs with marked agglutination limiting further evaluation of morphologic features, slight left shift in WBC showing granulocytes with numerous fragile cells and scattered reactive monocytes and lymphocytes without distinct features of dysplasia or circulating blasts, platelets show normal morphology without significant clumps or satellitosis.   Overall, the combination of findings is not definitively specific, though the red blood cell changes are compatible with a cold agglutinin in the correct clinical setting. The white blood cell changes may raise the possibility of infection, chemotherapy or drug effect, toxin exposure, postvaccination, or other reactive condition. The presence of fragile white blood cells may be an artifact of specimen handling, though a component of drug effect or nonspecific reactive change cannot be entirely excluded. Correlation with clinical impression and other laboratory findings is recommended. If clinical suspicion for more significant disease process such as a lymphoproliferative disorder exists then consideration for repeat sampling with peripheral blood flow cytometry may be of assistance to further evaluate the white blood cell changes as clinically indicated.  Flow cytometry was ordered and is pending.    Dr. Maxwell, hematology/oncology, coordinated care with [facility] primary attending, Dr. Borrero to arrange for urgent transfer to [place].  We called the [facility] [place] [facility] to discuss the case and inform them of a possible need for plasmapheresis.  The patient arrived promptly to [facility].  We evaluated the patient at bedside and found him to be AAOx3, states that he feels slightly improved, but exhibited a degree of encephalopathy.  We contacted the [place] [facility] again (Scott [phone]) who recommended ordering labs, 3.5 L albumin x2 days, and calcium gluconate 2 g x2 days.  We contacted IR who is planning to place urgent R IJ catheter.  Family history is positive for lymphoma in a brother.    Review of Systems:   ROS was performed and is negative except as indicated above.    Current Facility-Administered Medications:     acetaminophen (Ofirmev) injection 1,000 mg, 1,000 mg, Intravenous, Q6H PRN, Joseph Alexander MD, Last Rate: 400 mL/hr at 10/28/24 2103, 1,000 mg at 10/28/24 2103     "fluticasone (FLONASE) 50 mcg/act nasal spray 1 spray, 1 spray, Nasal, Daily, Joseph Alexander MD, 1 spray at 10/31/24 1036    heparin (porcine) subcutaneous injection 5,000 Units, 5,000 Units, Subcutaneous, Q8H JOE, 5,000 Units at 24 0500 **AND** [COMPLETED] Platelet count, , , Once, Nancy Mondragon    levothyroxine tablet 50 mcg, 50 mcg, Oral, Daily, Joseph Alexander MD, 50 mcg at 10/31/24 1036    multi-electrolyte (PLASMALYTE-A/ISOLYTE-S PH 7.4) IV solution, 100 mL/hr, Intravenous, Continuous, Joseph Alexander MD, Last Rate: 100 mL/hr at 10/31/24 1357, 100 mL/hr at 10/31/24 1357    ondansetron (ZOFRAN) injection 4 mg, 4 mg, Intravenous, Q6H PRN, Joseph Alexander MD    pantoprazole (PROTONIX) EC tablet 40 mg, 40 mg, Oral, Early Morning, Joseph Alexander MD, 40 mg at 10/30/24 0535    polyethylene glycol (MIRALAX) packet 17 g, 17 g, Oral, Daily PRN, Darrius Gr MD    Medications Prior to Admission:     benzonatate (TESSALON) 200 MG capsule    cyclobenzaprine (FLEXERIL) 5 mg tablet    fluticasone (FLONASE) 50 mcg/act nasal spray    hydroCHLOROthiazide 25 mg tablet    ibuprofen (MOTRIN) 800 mg tablet    levothyroxine 50 mcg tablet    levothyroxine 75 mcg tablet    [] nirmatrelvir & ritonavir (Paxlovid) tablet therapy pack    ondansetron (ZOFRAN) 4 mg tablet    pantoprazole (PROTONIX) 40 mg tablet    potassium chloride (Klor-Con M10) 10 mEq tablet    Allergies   Allergen Reactions    Shellfish Allergy - Food Allergy Anaphylaxis    Valtrex [Valacyclovir] Hives     Physical Exam:    /78   Pulse 101   Temp 99.6 °F (37.6 °C)   Resp 16   Ht 5' 5\" (1.651 m)   Wt 77.6 kg (171 lb)   SpO2 97%   BMI 28.46 kg/m²     General: AAOx0, ill-appearing, sedated, EEG connected  HEENT: PERRLA, moist mucosa, dentures  Respiratory: CTAB w/o wheezes, rales, or rhonchi, no increased work of breathing  Cardiovascular: RRR w/o murmurs, 2+ radial and pedal pulses, no b/l LE edema  Abdomen: soft, non-tender, non-distended, no " hepatomegaly or splenomegaly  /Rectal: deferred  Musculoskeletal: sedated, myoclonus in b/l LE and UE  Integumentary: reticular rash across chest and arms, dry, no bruising  Neurological: myoclonus in b/l LE and UE  Psychiatric: sedated    Recent Results (from the past 48 hour(s))   Fingerstick Glucose (POCT)    Collection Time: 10/31/24  3:07 AM   Result Value Ref Range    POC Glucose 138 65 - 140 mg/dl   Comprehensive metabolic panel    Collection Time: 10/31/24  5:57 AM   Result Value Ref Range    Sodium 131 (L) 135 - 147 mmol/L    Potassium 3.5 3.5 - 5.3 mmol/L    Chloride 97 96 - 108 mmol/L    CO2 28 21 - 32 mmol/L    ANION GAP 6 4 - 13 mmol/L    BUN 5 5 - 25 mg/dL    Creatinine 0.33 (L) 0.60 - 1.30 mg/dL    Glucose 124 65 - 140 mg/dL    Calcium 7.2 (L) 8.4 - 10.2 mg/dL    AST 21 13 - 39 U/L    ALT 15 7 - 52 U/L    Alkaline Phosphatase 72 34 - 104 U/L    Total Protein 5.3 (L) 6.4 - 8.4 g/dL    Albumin 3.7 3.5 - 5.0 g/dL    Total Bilirubin 1.29 (H) 0.20 - 1.00 mg/dL    eGFR 116 ml/min/1.73sq m   CBC and differential    Collection Time: 10/31/24  5:57 AM   Result Value Ref Range    WBC 10.26 (H) 4.31 - 10.16 Thousand/uL    RBC 2.60 (L) 3.88 - 5.62 Million/uL    Hemoglobin 8.6 (L) 12.0 - 17.0 g/dL    Hematocrit 24.1 (L) 36.5 - 49.3 %    MCV 93 82 - 98 fL    MCH 33.1 26.8 - 34.3 pg    MCHC 35.7 31.4 - 37.4 g/dL    RDW 16.0 (H) 11.6 - 15.1 %    MPV 9.6 8.9 - 12.7 fL    Platelets 253 149 - 390 Thousands/uL    nRBC 0 /100 WBCs    Segmented % 37 (L) 43 - 75 %    Immature Grans % 1 0 - 2 %    Lymphocytes % 48 (H) 14 - 44 %    Monocytes % 12 4 - 12 %    Eosinophils Relative 1 0 - 6 %    Basophils Relative 1 0 - 1 %    Absolute Neutrophils 3.76 1.85 - 7.62 Thousands/µL    Absolute Immature Grans 0.05 0.00 - 0.20 Thousand/uL    Absolute Lymphocytes 4.99 (H) 0.60 - 4.47 Thousands/µL    Absolute Monocytes 1.25 (H) 0.17 - 1.22 Thousand/µL    Eosinophils Absolute 0.14 0.00 - 0.61 Thousand/µL    Basophils Absolute 0.07 0.00 -  0.10 Thousands/µL   Lactic acid, plasma (w/reflex if result > 2.0)    Collection Time: 10/31/24  8:51 AM   Result Value Ref Range    LACTIC ACID 0.7 0.5 - 2.0 mmol/L   Ammonia    Collection Time: 10/31/24  8:52 AM   Result Value Ref Range    Ammonia 67 18 - 72 umol/L   Vitamin B12    Collection Time: 10/31/24  8:52 AM   Result Value Ref Range    Vitamin B-12 360 180 - 914 pg/mL   Folate    Collection Time: 10/31/24  8:52 AM   Result Value Ref Range    Folate 17.5 >5.9 ng/mL   TSH, 3rd generation with Free T4 reflex    Collection Time: 10/31/24  8:52 AM   Result Value Ref Range    TSH 3RD GENERATON 3.279 0.450 - 4.500 uIU/mL   ECG 12 lead    Collection Time: 10/31/24  4:53 PM   Result Value Ref Range    Ventricular Rate 100 BPM    Atrial Rate 100 BPM    AL Interval 166 ms    QRSD Interval 84 ms    QT Interval 370 ms    QTC Interval 477 ms    P Axis 100 degrees    QRS Axis 6 degrees    T Wave Axis 27 degrees   Urinalysis with microscopic    Collection Time: 11/01/24  4:35 AM   Result Value Ref Range    Color, UA Yellow     Clarity, UA Clear     Specific Gravity, UA 1.015 1.003 - 1.030    pH, UA 6.5 4.5, 5.0, 5.5, 6.0, 6.5, 7.0, 7.5, 8.0    Leukocytes, UA Negative Negative    Nitrite, UA Negative Negative    Protein, UA Trace (A) Negative mg/dl    Glucose, UA Negative Negative mg/dl    Ketones, UA Negative Negative mg/dl    Urobilinogen, UA 6.0 (A) <2.0 mg/dl mg/dl    Bilirubin, UA Negative Negative    Occult Blood, UA Trace (A) Negative    RBC, UA 2-4 (A) None Seen, 1-2 /hpf    WBC, UA 2-4 (A) None Seen, 1-2 /hpf    Epithelial Cells None Seen None Seen, Occasional /hpf    Bacteria, UA Occasional None Seen, Occasional /hpf    MUCUS THREADS Occasional (A) None Seen    Hyaline Casts, UA 0-3 (A) None Seen /lpf   Calcium, ionized    Collection Time: 11/01/24  4:46 AM   Result Value Ref Range    Calcium, Ionized 1.08 (L) 1.12 - 1.32 mmol/L   CBC and differential    Collection Time: 11/01/24  4:46 AM   Result Value Ref Range     WBC 10.40 (H) 4.31 - 10.16 Thousand/uL    RBC 2.63 (L) 3.88 - 5.62 Million/uL    Hemoglobin 8.4 (L) 12.0 - 17.0 g/dL    Hematocrit 24.2 (L) 36.5 - 49.3 %    MCV 92 82 - 98 fL    MCH 31.9 26.8 - 34.3 pg    MCHC 34.7 31.4 - 37.4 g/dL    RDW 16.1 (H) 11.6 - 15.1 %    MPV 9.1 8.9 - 12.7 fL    Platelets 297 149 - 390 Thousands/uL   Comprehensive metabolic panel    Collection Time: 11/01/24  4:46 AM   Result Value Ref Range    Sodium 130 (L) 135 - 147 mmol/L    Potassium 3.8 3.5 - 5.3 mmol/L    Chloride 94 (L) 96 - 108 mmol/L    CO2 31 21 - 32 mmol/L    ANION GAP 5 4 - 13 mmol/L    BUN 9 5 - 25 mg/dL    Creatinine 0.34 (L) 0.60 - 1.30 mg/dL    Glucose 120 65 - 140 mg/dL    Calcium 7.8 (L) 8.4 - 10.2 mg/dL    AST 23 13 - 39 U/L    ALT 15 7 - 52 U/L    Alkaline Phosphatase 76 34 - 104 U/L    Total Protein 5.5 (L) 6.4 - 8.4 g/dL    Albumin 3.5 3.5 - 5.0 g/dL    Total Bilirubin 1.19 (H) 0.20 - 1.00 mg/dL    eGFR 114 ml/min/1.73sq m     MRI brain w wo contrast    Result Date: 10/26/2024  Narrative: MRI BRAIN WITH AND WITHOUT CONTRAST INDICATION: confusion,. COMPARISON: CT head 10/25/2024. TECHNIQUE: Multiplanar, multisequence imaging of the brain was performed before and after gadolinium administration. IV Contrast:  7 mL of Gadobutrol injection (SINGLE-DOSE) IMAGE QUALITY:   Motion-degraded, which reduces diagnostic sensitivity. FINDINGS: BRAIN PARENCHYMA: No acute infarct, mass effect or midline shift. Mild-moderate chronic ischemic changes of the white matter. Postcontrast imaging of the brain demonstrates no abnormal enhancement. VENTRICLES:  Normal for the patient's age. SELLA AND PITUITARY GLAND:  Normal. ORBITS: No acute abnormality. PARANASAL SINUSES: Mild mucosal thickening. VASCULATURE: Please refer to CTA. CALVARIUM AND SKULL BASE: No acute abnormality. EXTRACRANIAL SOFT TISSUES: Multiple bilateral parotid nodules, the largest of which measures up to 1.0 cm in the left parotid gland (series 6, image 1), noting  these are not well evaluated.     Impression: Within the confines of a motion-degraded examination: 1. No acute infarct, mass effect or midline shift. Mild-moderate chronic microangiopathy. 2. Multiple indeterminate bilateral parotid nodules, the largest of which measures up to 1.0 cm on the left, incompletely evaluated. If clinically appropriate, this could be further evaluated with contrast-enhanced neck CT on a nonemergent basis. The study was marked in EPIC for immediate notification. Workstation performed: [id number]     IR lumbar puncture    Result Date: 10/25/2024  Narrative: Lumbar puncture under fluoroscopic control. Clinical History: Confusion and fever Fluoro time: 0.9 minutes Conscious sedation time: 35 minutes Number of Images: 1 Technique: The patient was brought to the interventional radiology suite and identified by wristband. The patient was placed prone on the table, and the L4-5 spinous processes were identified. The skin was then prepped and draped in usual sterile fashion. Lidocaine was administered to the skin, and under fluoroscopic control, a 20 gauge spinal needle was advanced into the subarachnoid space between the L4-5 spinous processes . The table was then tilted into reverse Trendelenburg. 30 cc of clear spinal fluid was removed under it's own pressure into 4 separate sequential tubes. The tubes were sent to laboratory for testing as requested by the referring physician. After the procedure, the needle was removed, and a sterile dressing applied to the site. The patient tolerated the procedure well and suffered no complications.     Impression: Impression: 1. Successful lumbar puncture under fluoroscopic control. 2. 30 cc of clear spinal fluid was removed and sent to laboratory as described above. Workstation performed: [id number]     CT chest abdomen pelvis w contrast    Result Date: 10/25/2024  Narrative: CT CHEST, ABDOMEN AND PELVIS WITH IV CONTRAST INDICATION: Confusion, abdominal  pain, emesis, generalized abdominal tenderness. COMPARISON: CT abdomen pelvis 10/21/2024. CT chest abdomen pelvis 10/11/2024. TECHNIQUE: CT examination of the chest, abdomen and pelvis was performed. Multiplanar 2D reformatted images were created from the source data. This examination, like all CT scans performed in the [facility], was performed utilizing techniques to minimize radiation dose exposure, including the use of iterative reconstruction and automated exposure control. Radiation dose length product (DLP) for this visit: 850.79 mGy-cm IV Contrast: 100 mL of iohexol (OMNIPAQUE) Enteric Contrast: Not administered. FINDINGS: CHEST LUNGS: Mild interlobular septal thickening through both lower lobes may represent mild pulmonary edema. Mild bibasilar atelectasis. No focal consolidation. Unchanged known 4 mm and smaller pulmonary nodules. PLEURA: Small bibasilar pleural effusions. HEART/GREAT VESSELS: Cardiomegaly. Coronary artery calcifications. No thoracic aortic aneurysm. MEDIASTINUM AND JOE: Unchanged mild mediastinal and bilateral parahilar adenopathy. Dominant 16 mm precarinal node #2/49. Reidentified diffuse esophageal distention. Diffuse esophageal fluid may indicate reflux. CHEST WALL AND LOWER NECK: Worsened right axillary and right subpectoral adenopathy. Previously seen dominant 12 mm node now measures 21 mm #2/29. Numerous new enlarged nodes. Unchanged subcentimeter left axillary nodes. ABDOMEN LIVER/BILIARY TREE: Unremarkable. GALLBLADDER: No calcified gallstones. No pericholecystic inflammatory change. SPLEEN: Unremarkable. PANCREAS: Unremarkable. ADRENAL GLANDS: Unremarkable. KIDNEYS/URETERS: No hydronephrosis. Numerous unchanged bilateral simple renal cysts. Well-circumscribed subcentimeter bilateral renal hypoattenuating lesions are also unchanged and likely to represent subcentimeter simple cyst. STOMACH AND BOWEL: Unremarkable. APPENDIX: No findings to suggest appendicitis.  ABDOMINOPELVIC CAVITY: No ascites. No pneumoperitoneum. Unchanged bilateral iliac chain adenopathy. A dominant 2.2 cm right external iliac node #2/199 is unchanged. Dominant 10 mm left external iliac node #2/185 is unchanged. Unchanged 9 mm left pelvic sidewall node #2/210. Numerous unchanged subcentimeter retroperitoneal nodes. 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 PELVIS REPRODUCTIVE ORGANS: Prostatectomy. URINARY BLADDER: Unremarkable. ABDOMINAL WALL/INGUINAL REGIONS: Unchanged bilateral inguinal adenopathy. Dominant 16 mm right inguinal node #2/214. Dominant 11 mm left inguinal node #2/237. BONES: No acute fracture or suspicious osseous lesion.     Impression: Right axillary adenopathy is worsened when compared with CT chest 10/11/2024 Unchanged mediastinal, bilateral iliac chain and bilateral inguinal adenopathy when compared with CT abdomen pelvis 10/21/2024. Potential mild pulmonary edema. Small pleural effusions. Unchanged cardiomegaly. The study was marked in EPIC for immediate notification. Workstation performed: [id number]     CT head without contrast    Result Date: 10/25/2024  Narrative: CT BRAIN - WITHOUT CONTRAST INDICATION:   Confusion, non focal. COMPARISON:  None. TECHNIQUE:  CT examination of the brain was performed.  Multiplanar 2D reformatted images were created from the source data. Radiation dose length product (DLP) for this visit:  870.27 mGy-cm .  This examination, like all CT scans performed in the [facility], was performed utilizing techniques to minimize radiation dose exposure, including the use of iterative  reconstruction and automated exposure control. IMAGE QUALITY:  Diagnostic. FINDINGS: PARENCHYMA: Decreased attenuation is noted in periventricular and subcortical white matter demonstrating an appearance that is statistically most likely to represent moderate microangiopathic change. No CT signs of  acute infarction.  No intracranial mass, mass effect or midline shift.  No acute parenchymal hemorrhage. VENTRICLES AND EXTRA-AXIAL SPACES:  Normal for the patient's age. VISUALIZED ORBITS: Normal visualized orbits. PARANASAL SINUSES: Normal visualized paranasal sinuses. CALVARIUM AND EXTRACRANIAL SOFT TISSUES: Normal.     Impression: No acute intracranial abnormality. Workstation performed: [id number]     XR chest 1 view portable    Result Date: 10/22/2024  Narrative: XR CHEST PORTABLE INDICATION: Cough/fever. COMPARISON: Chest radiograph 10/8/2024; CT chest, abdomen and pelvis 10/11/2024 FINDINGS: Monitoring leads and clips project over the chest. Mild bibasilar atelectasis. No focal consolidation. Blunting of the left costophrenic angle suggesting trace pleural effusion. No pneumothorax. Normal cardiomediastinal silhouette. Degenerative changes of the spine and bilateral shoulders. Normal upper abdomen.     Impression: Mild bibasilar atelectasis and trace left pleural effusion. Resident: MARTHA Whiteside I, the attending radiologist, have reviewed the images and agree with the final report above. Workstation performed: [id number]     CT abdomen pelvis with contrast    Result Date: 10/21/2024  Narrative: CT ABDOMEN AND PELVIS WITH IV CONTRAST INDICATION:, Nausea, vomiting and diarrhea. . COMPARISON: 10/11/2024. TECHNIQUE: CT examination of the abdomen and pelvis was performed. Multiplanar 2D reformatted images were created from the source data. This examination, like all CT scans performed in the [facility], was performed utilizing techniques to minimize radiation dose exposure, including the use of iterative reconstruction and automated exposure control. Radiation dose length product (DLP) for this visit: 585 mGy-cm IV Contrast: 100 mL of iohexol (OMNIPAQUE) Enteric Contrast: Not administered. FINDINGS: ABDOMEN LOWER CHEST: Small left pleural effusion and left basilar subsegmental atelectasis.  LIVER/BILIARY TREE: Unremarkable. GALLBLADDER: No calcified gallstones. No pericholecystic inflammatory change. SPLEEN: Unremarkable. PANCREAS: Unremarkable. ADRENAL GLANDS: Unremarkable. KIDNEYS/URETERS: Symmetric nephrographic phase enhancement the kidneys. Cysts measure up to 6.4 cm. Multiple left renal cysts measuring up to 8.2 cm. No obstructive uropathy. STOMACH AND BOWEL: Unremarkable. APPENDIX: Prior appendectomy. 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 PELVIS REPRODUCTIVE ORGANS: Prior prostatectomy. URINARY BLADDER: Unremarkable. ABDOMINAL WALL/INGUINAL REGIONS: Enlarged bilateral inguinal lymph nodes with normal fatty shen, measuring up to 1.9 cm.. BONES: No acute fracture or suspicious osseous lesion.     Impression: 1. No evidence of bowel obstruction, colitis or diverticulitis. 2. Numerous mildly enlarged para-aortic, iliac chain and inguinal lymph nodes, possibly reactive however lymphoproliferative disorder not excluded. Workstation performed: [id number]     CT chest abdomen pelvis w contrast    Result Date: 10/12/2024  Narrative: CT CHEST, ABDOMEN AND PELVIS WITH IV CONTRAST INDICATION: intermittent persistent fevers. COMPARISON: 10/8/2024. TECHNIQUE: CT examination of the chest, abdomen and pelvis was performed. Multiplanar 2D reformatted images were created from the source data. This examination, like all CT scans performed in the [facility], was performed utilizing techniques to minimize radiation dose exposure, including the use of iterative reconstruction and automated exposure control. Radiation dose length product (DLP) for this visit: 898.73 mGy-cm IV Contrast: 100 mL of iohexol (OMNIPAQUE) Enteric Contrast: Not administered. FINDINGS: Moderately degraded by respiratory motion and retained barium in the colon.  CHEST LUNGS: There is bibasilar atelectasis. Stable 3 mm right apical lung nodule on series 4 image 24. Additional nodules are not well seen due to respiratory motion. No tracheal or endobronchial lesion. PLEURA: There are small bilateral pleural effusions. HEART/GREAT VESSELS: Heart is unremarkable for patient's age. No thoracic aortic aneurysm. MEDIASTINUM AND JOE: There is diffuse distention of the esophagus as seen previously. No obstructing lesion is visualized. CHEST WALL AND LOWER NECK: Again seen is an enlarged right axillary lymph node measuring 1.2 x 1.9 cm, nonspecific. ABDOMEN LIVER/BILIARY TREE: There is a 1.9 x 1.6 cm enhancing lesion in segment 6 of the liver, indeterminate. GALLBLADDER: Not well-visualized due to under distention and beam hardening artifact from adjacent barium. SPLEEN: Unremarkable. PANCREAS: Unremarkable. ADRENAL GLANDS: Unremarkable. KIDNEYS/URETERS: There are numerous bilateral renal cysts. No hydronephrosis. STOMACH AND BOWEL: Unremarkable. APPENDIX: No findings to suggest appendicitis. ABDOMINOPELVIC CAVITY: No ascites. No pneumoperitoneum. No lymphadenopathy. 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 PELVIS REPRODUCTIVE ORGANS: Unremarkable for patient's age. URINARY BLADDER: Unremarkable. ABDOMINAL WALL/INGUINAL REGIONS: Unremarkable. BONES: No acute fracture or suspicious osseous lesion.     Impression: Moderately limited by respiratory motion. 1.  Small bilateral pleural effusions with overlying atelectasis. 2.  3 mm right apical lung nodule. Based on current Fleischner Society 2017 Guidelines on incidental pulmonary nodule, no routine follow-up is needed if the patient is low risk. If the patient is high risk, optional follow-up chest CT at 12 months can be  considered. 3.  Stable diffusely dilated esophagus. This may be related to dysmotility. Obstructing lesion is not  visualized. This would be better evaluated with esophagram. 4.  No acute abnormality in the abdomen or pelvis. 5.  Enhancing liver lesion, indeterminate. Possibilities include hemangioma and vascular malformation though other etiologies including neoplasm not excluded. Follow-up nonemergent MRI is recommended. The study was marked in EPIC for immediate notification. Workstation performed: [id number]     XR knee 3 vw left non injury    Result Date: 10/10/2024  Narrative: XR KNEE 3 VW LEFT NON INJURY INDICATION: left knee pain. COMPARISON: None FINDINGS: No acute fracture or dislocation. No joint effusion. No significant degenerative changes. No lytic or blastic osseous lesion. Unremarkable soft tissues.     Impression: No acute osseous abnormality. Computerized Assisted Algorithm (CAA) may have been used to analyze all applicable images. Workstation performed: [id number]     FL barium swallow video w speech    Result Date: 10/9/2024  Narrative: A video barium swallow study was performed by the Department of Speech Pathology. Please refer to the report for the official interpretation. The images are stored for archival purposes only. Study images were not formally reviewed by the Radiology Department.    FL barium swallow video w speech    Result Date: 10/9/2024  Narrative: JARROD Marie     10/9/2024  3:14 PM                                  Video Swallow Study Patient Name: Miguel Henderson Today's Date: 10/9/2024   Past Medical History Past Medical History: Diagnosis Date  Prostate cancer (HCC)   Thyroid cancer (HCC)   Past Surgical History Past Surgical History: Procedure Laterality Date  APPENDECTOMY   Summary: Images are on PACS for review. Oral Phase : Pt presented with mild oral dysphagia. Mastication was mod prolonged however eventual functional breakdown. Bolus control, formation were WNL. Transfer was piecemeal. Trace posterior base of tongue residue. Pharyngeal Phase :Pt presented with mild pharyngeal  dysphagia. Swallow initiation was intermittently min delayed with solids and prompt with liquids. Spill to the valleculae occurred with solids. Hyoid elevation and laryngeal excursion were WNL. Pharyngeal stripping wave diminished with solid trials. Epiglottic inversion was mostly complete. With nutri grain min epiglottic undercoat present. Trace vallecular retention with thin liquids and mild/mod with solids. Pt intermittently initiated secondary swallow to reduce residue. No pyriform retention. Large CP bar C4-C5 evident however did not impede bolus flow. No aspiration or penetration observed with trials. Per Esophageal screen Slow motility observed with liquids and solids. High retropulsion observed with liquids, pt risk for bottom up aspiration. Stasis occurred in distal esophagus with 13 mm pill however provided with additional sip of thin, ntl, and puree. Provided additional time x4 minutes pt continued with residue with esophagus. Observations: Son present throughout study provided translation. Recommendations: Diet: Dysphagia 3 dental soft Liquids:thin Meds: whole with thin liquids Strategies:slow rate, alternate liquids with solids, swallow prior to additional po Upright position F/u ST tx: yes Therapy Prognosis: fair Prognosis considerations:age, medical status, Intermittent/Distant Supervision Aspiration Precautions Reflux Precautions Consider consult with: GI, rehab Results reviewed with: pt, nursing, physician. ST reviewed images and recommendations reviewed with family at bedside. Aspiration precautions posted. If a dedicated assessment of the esophagus is desired, consider esophagram/barium swallow or EGD. Goals: Dysphagia LTG -Patient will demonstrate safe and effective oral intake (without overt s/s significant oral/pharyngeal dysphagia including s/s penetration or aspiration) for the highest appropriate diet level. Short Term Goals: -Pt will tolerate Dysphagia 3/advanced (dental soft) diet and thin  liquid with no significant s/s oral or pharyngeal dysphagia across 1-3 diagnostic session/s. -Patient will tolerate trials of upgraded food and/or liquid texture with no significant s/s of oral or pharyngeal dysphagia including aspiration across 1-3 diagnostic sessions -Patient will comply with a Video/Modified Barium Swallow study for more complete assessment of swallowing anatomy/physiology/aspiration risk and to assess efficacy of treatment techniques -Patient will demonstrate the ability to adequately self-monitor swallowing skills and perform appropriate compensatory techniques to reduce overt s/sx of penetration/aspiration to safely tolerate least restrictive food/liquid consistencies. Patient's goal: none stated H&P/Admit info, pertinent provider notes/procedures/studies/PMH:(copied and placed in this report) Miguel Henderson is a 84 y.o. male with a PMH of prostate cancer and thyroid cancer who presents with fever x 4 days.  Patient is Afghan-speaking only and history is obtained through assistance of his son who is present at bedside and acting as .  Patient has been having intermittent fevers which have been responsive to Tylenol and Motrin at home but have ranged as high as 103.7 and yesterday was having some apparent respiratory distress but he has no respiratory history and is on no respiratory medications at home.  Patient does have a scant cough which is nonproductive and denies any increased urinary frequency or dysuria though he does complain of some hematuria which has been ongoing for the past month.  Patient additionally has been complaining of a sore throat for the past 4 days with decreased p.o. fluid intake secondary to pain with swallowing.  While in ER patient was noted to meet severe sepsis criteria and was thus referred for admission. Special Studies XR CHEST PORTABLE 10/08/2024 IMPRESSION: Mild tree-in-bud pattern of opacity mostly in the right lung and in the left base again  suggesting a mild infectious/inflammatory condition CT CHEST WITHOUT IV CONTRAST 10/08/2024 IMPRESSION: Mild bibasilar probable atelectatic changes, right greater than left. Otherwise no focal airspace consolidation to suggest pneumonia. Minor tree-in-bud nodularity in the right lung which could relate to infectious versus inflammatory small airways disease. Clinical correlation recommended. Pulmonary nodules as above. Based on current Fleischner Society 2017 Guidelines on incidental pulmonary nodule, no routine follow-up is needed if the patient is low risk. If the patient is high risk, optional follow-up chest CT at 12 months can be considered. Trace right pleural effusion. Mild right axillary lymphadenopathy, nonspecific Code Status: Level 1 full code Previous VBS: none Precautions Contact Precautions Food allergies:  No know  Current diet:  Regular diet and thin liquids  Premorbid diet:  Regular diet and thin liquids  Dentition  Upper and lower dentures Oral Mech  WNL O2 requirements:  Room air  Vocal Quality/Speech WNL Cognitive status:  Alert  Consistencies administered: Barium laden applesauce, nutri grain bar, sandwich bite, thin liquids,ntl and  13mm barium pill. Liquids were administered by cup. Pt was seated laterally at 90 degrees. Pt  unable to be viewed in AP position, due to transfer status      Echo complete w/ contrast if indicated    Result Date: 10/9/2024  Narrative:   Left Ventricle: Left ventricular cavity size is normal. Wall thickness is mildly increased. The left ventricular ejection fraction is 55%. Systolic function is normal. Wall motion is normal.   Aortic Valve: There is aortic valve sclerosis.   Aorta: The aortic root is mildly dilated. The ascending aorta is mildly dilated. The aortic root is 3.80 cm. The ascending aorta is 3.9 cm.     XR chest portable    Result Date: 10/8/2024  Narrative: XR CHEST PORTABLE INDICATION: shortness of breath. COMPARISON: 10/7/2024 FINDINGS: Minimal  tree-in-bud pattern of opacity mostly in the right lung, but also at the left base. No focal dense consolidation. The trace amount of pleural fluid seen on the prior CT is not apparent on this exam. No pneumothorax identified. Stable cardiomediastinal silhouette Degenerative changes noted along the spine. Normal upper abdomen.     Impression: Mild tree-in-bud pattern of opacity mostly in the right lung and in the left base again suggesting a mild infectious/inflammatory condition Workstation performed: [id number]     XR chest portable - 1 view    Result Date: 10/8/2024  Narrative: XR CHEST PORTABLE INDICATION: Fever cough. COMPARISON: None FINDINGS: Clear lungs. No pneumothorax or pleural effusion. Normal cardiomediastinal silhouette. Bones are unremarkable for age. Normal upper abdomen.     Impression: No acute cardiopulmonary disease. Workstation performed: [id number]     CT chest wo contrast    Result Date: 10/8/2024  Narrative: CT CHEST WITHOUT IV CONTRAST INDICATION: Cough shortness of breath with fever. COMPARISON: None. TECHNIQUE: CT examination of the chest was performed without intravenous contrast. Multiplanar 2D reformatted images were created from the source data. This examination, like all CT scans performed in the [facility], was performed utilizing techniques to minimize radiation dose exposure, including the use of iterative reconstruction and automated exposure control. Radiation dose length product (DLP) for this visit: 441.84 mGy-cm FINDINGS: LUNGS: Evaluation somewhat limited by respiratory motion. There is some minor groundglass and tree-in-bud nodularity suggested in the right upper lobe inferior segment and to a lesser degree in the right lower lobe. No lobar consolidation. Trace paraseptal emphysematous changes. Mild basilar atelectatic changes, right greater than left. There is minor scarring in the posteromedial right lower lobe adjacent to bulky paravertebral osteophytes. 4  mm pulmonary nodule seen in the right upper lobe (3:35). Additional scattered pulmonary nodules are seen measuring on the order of 2 to 3 mm. Based on current Fleischner Society 2017 Guidelines on incidental pulmonary nodule, no routine follow-up is needed if the patient is low risk. If the patient is high risk, optional follow-up chest CT at 12 months can be considered. PLEURA: Trace right pleural effusion with subjacent atelectasis. HEART/GREAT VESSELS: Heart is unremarkable for patient's age. No thoracic aortic aneurysm. Coronary atherosclerosis MEDIASTINUM AND JOE: Shotty subcentimeter mediastinal lymph nodes noted. Evaluation for hilar lymphadenopathy limited in the absence of intravenous contrast. CHEST WALL AND LOWER NECK: Gynecomastia. Few prominent/mildly enlarged lymph nodes seen in the right axillary region, largest measuring up to 1.2 cm,. VISUALIZED STRUCTURES IN THE UPPER ABDOMEN: No acute abnormality. Bilateral renal cysts. OSSEOUS STRUCTURES: Spinal degenerative changes are noted. No acute fracture or destructive osseous lesion.     Impression: Mild bibasilar probable atelectatic changes, right greater than left. Otherwise no focal airspace consolidation to suggest pneumonia. Minor tree-in-bud nodularity in the right lung which could relate to infectious versus inflammatory small airways disease. Clinical correlation recommended. Pulmonary nodules as above. Based on current Fleischner Society 2017 Guidelines on incidental pulmonary nodule, no routine follow-up is needed if the patient is low risk. If the patient is high risk, optional follow-up chest CT at 12 months can be considered. Trace right pleural effusion. Mild right axillary lymphadenopathy, nonspecific. Workstation performed: [id number]     Labs and pertinent reports reviewed.      This note has been generated by voice recognition software system.  Therefore, there may be spelling, grammar, and or syntax errors. Please contact if questions  arise.    Additional recommendations to follow per attending, Dr. Melton.    Scottie Ding DO, PGY4  Hematology/Oncology Fellow

## 2024-11-01 NOTE — QUICK NOTE
Reached out by Dr. Gallardo regarding routine EEG findings    Interpretation:   This routine EEG shows nearly continuous myoclonic jerking, predominantly of the trunk and left hemibody, with frequent eye deviation towards the left. 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 EMG electrodes placed on the left arm would be helpful in correlating whether these slow waves are time-locked to muscle activity. In addition, this recording shows a moderate diffuse encephalopathy.    -Evaluated the patient. He is a bit more awake compared to this morning 6am, but still oriented to self only. He has prominent continuous myoclonic jerks in trunk and bilateral thighs with some involvement of his upper extremities (left more than right)    -Will give him Ativan 2mg x1 and Keppra 2g x1 then continue with maintenance Keppra 1000mg BID  -Will repeat routine EEG tomorrow with EMG electrodes placed on the L arm to time lock the slow waves to muscle activity  -Primary team is notified

## 2024-11-01 NOTE — ASSESSMENT & PLAN NOTE
Tested positive on 10/21  Currently oxygenating on baseline room air   Contact/airborne precautions now discontinued   Supportive care otherwise

## 2024-11-01 NOTE — ASSESSMENT & PLAN NOTE
During prior admission imaging showed enlarged periaortic, iliac chain and inguinal lymph nodes.  Repeat imaging showed persistent lymphadenopathy with worsened right axillary adenopathy.  As above, concern for an underlying lymphoproliferative disorder.  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  CBC differential also shows atypical lymphocytes.  Status post plasmapheresis 10/26. HIV screening negative. S/p lymph node and bone marrow biopsies.  Lymph node biopsy with atypical cells but awaiting flow cytometry.  -Hematology/oncology following  -Plasmapheresis per hematology/oncology  -Follow-up pathology from biopsies and flow cytometry  -Additional infectious work-up as above

## 2024-11-01 NOTE — PROGRESS NOTES
Progress Note - Infectious Disease   Name: Miguel Henderson 84 y.o. male I MRN: [id number]  Unit/Bed#: [facility] 817-01 I Date of Admission: 10/26/2024   Date of Service: 11/1/2024 I Hospital Day: 6    Assessment & Plan  Fever  The patient had intermittent fevers for 3 weeks and several admissions to [facility] with fairly unremarkable infectious workup other than positive rhinovirus and COVID. CT imaging was noted to show lymphadenopathy and he had atypical lymphocytes on CBC so hematology/oncology consult was recommended although the patient was discharged on Paxlovid with plan for an outpatient referral.  He then returned to the ED with worsening confusion and return of fever.  CT imaging with worsening axillary lymphadenopathy, CBC differential again showed atypical lymphocytes.  Status post LP 10/25 which showed mild elevation in protein and lymphocytic pleocytosis.  ME panel negative and culture showed no growth. 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  Labs show elevated LDH, ferritin, immunoglobulins. Status post plasmapheresis with improvement in fever curve and initially improvement in mental status, but now with waxing and waning mental status and development of myoclonic jerking as below. S/p lymph node and bone marrow biopsies. Bone marrow biopsy preliminary study demonstrated histiocytic aggregates, which are nonspecific, and can be seen in a variety of settings including infection. Will broaden infectious work-up to include more atypical infectious causes. Patient is originally from [place], which has a high incidence of TB cases, and travels back every year. Last traveled in May-June 2024. 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 TB could explain the lymphadenopathy and lymphocytic pleocytosis. Would also rule out histoplasma as well. histoplasma urine antigen ordered. Doesn't have clear risk factors necessarily for  brucella (doesn't have contact with farm animals, drink unpasteurized milk, etc.) but does eat feta cheese which can be unpasteurized depending on where it is bought. Given this, will also send brucella antibodies. Mycoplasma is favored less likely given patient already had a RP2 and it was negative. Mycoplasma could explain the altered mental status and lymphocytic pleocytosis, but does not give a great explanation for the lymphadenopathy. Will send mycoplasma antibodies for completeness.  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  MRI brain without acute intracranial abnormalities.  Status post LP 10/25 with mild lymphocytic pleocytosis and elevated protein.  ME panel and CSF culture negative. Suspect CSF abnormalities related to possible underlying lymphoproliferative disorder.  Status post first session of plasmapheresis 10/26 with improvement in fever curve and mental status initially, but now seems to have worsened.  Repeat CT of the brain nondiagnostic for source on 10/31/2024. Neurology consulted. Concern for toxic-metabolic source and a routine EEG was obtained. It showed nearly continuous myoclonic jerking and concern for an epileptic etiology of myoclonus. Patient started on Ativan and Keppra.   -Continue to monitor mental status  -Ongoing management per neurology  -Follow-up repeat EEG findings per neurology  Lymphadenopathy  During prior admission imaging showed enlarged periaortic, iliac chain and inguinal lymph nodes.  Repeat imaging showed persistent  lymphadenopathy with worsened right axillary adenopathy.  As above, concern for an underlying lymphoproliferative disorder.  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  CBC differential also shows atypical lymphocytes.  Status post plasmapheresis 10/26. HIV screening negative. S/p lymph node and bone marrow biopsies.  Lymph node biopsy with atypical cells but awaiting flow cytometry.  -Hematology/oncology following  -Plasmapheresis per hematology/oncology  -Follow-up pathology from biopsies and flow cytometry  -Additional infectious work-up as above    COVID-19 virus infection  Can discontinue isolation as of today (11/1/2024)  Myoclonia  Development of intermittent twitching/myoclonic jerking on 10/31. Neurology consulted. Concern for toxic-metabolic source and a routine EEG was obtained. It showed nearly continuous myoclonic jerking and concern for an epileptic etiology of myoclonus. Patient started on Ativan and Keppra.   -Management per neurology  -Follow-up repeat EEG findings per neurology    I have discussed with the primary service the above plan to monitor off all antibiotics and follow-up additional infectious work-up as above.  The primary service agrees with the plan.    ID consult service will continue to follow. Will formally re-evaluate on Monday, 11/4. Please reach out via secure chat with additional questions or concerns in the interim.     Antibiotics:  None    Subjective   Patient is very somnolent. Discussed with primary team attending. He had gotten Ativan and Keppra last night. Undergoing repeat EEG. Following commands, but otherwise does not endorse having any new symptoms.     Objective :  Temp:  [99.4 °F (37.4 °C)-99.6 °F (37.6 °C)] 99.4 °F (37.4 °C)  HR:  [] 90  BP: (123-127)/(77-79) 123/77  Resp:  [16] 16  SpO2:  [96 %-99 %] 96 %  O2 Device: None (Room air)    General Appearance:  Chronically ill appearing, somnolent   Throat: No perioral lesions or ulcerations    Lungs:    Clear to auscultation bilaterally; no wheezes, rhonchi or rales; respirations unlabored. On room air.    Heart:  RRR; no murmur, rub or gallop.   Abdomen:   Soft, non-tender, non-distended, positive bowel sounds.     Extremities: No clubbing or cyanosis, no edema.   Neuro: Following commands, opens eyes to voice   Skin: No new rashes, lesions, or draining wounds noted on exposed skin.         Lab Results: I have reviewed the following results:  Results from last 7 days   Lab Units 11/01/24  0446 10/31/24  0557 10/29/24  0524   WBC Thousand/uL 10.40* 10.26* 8.25   HEMOGLOBIN g/dL 8.4* 8.6* 9.2*   PLATELETS Thousands/uL 297 253 212     Results from last 7 days   Lab Units 11/01/24  0446 10/31/24  0557 10/30/24  0624   SODIUM mmol/L 130* 131* 135   POTASSIUM mmol/L 3.8 3.5 3.6   CHLORIDE mmol/L 94* 97 101   CO2 mmol/L 31 28 27   BUN mg/dL 9 5 5   CREATININE mg/dL 0.34* 0.33* 0.39*   EGFR ml/min/1.73sq m 114 116 108   CALCIUM mg/dL 7.8* 7.2* 7.2*   AST U/L 23 21 32   ALT U/L 15 15 22   ALK PHOS U/L 76 72 151*   ALBUMIN g/dL 3.5 3.7 3.0*     Results from last 7 days   Lab Units 10/25/24  1704 10/25/24  1309 10/25/24  1252   BLOOD CULTURE   --  No Growth After 5 Days. No Growth After 5 Days.   GRAM STAIN RESULT  1+ Mononuclear Cells  No organisms seen  --   --      Results from last 7 days   Lab Units 10/26/24  0428   PROCALCITONIN ng/ml 0.43*         Results from last 7 days   Lab Units 10/27/24  0639   FERRITIN ng/mL 1,028*         Imaging Results Review: I personally reviewed the following image studies in PACS and associated radiology reports: CT head. My interpretation of the radiology images/reports is: No acute intracranial abnormality.      Anais Kerr PA-C  [facility]

## 2024-11-01 NOTE — ASSESSMENT & PLAN NOTE
CT of head and MRI of brain unremarkable for acute etiology/abnormalities  Status post prior lumbar puncture with CSF studies negative for meningitis  Concern noted of etiology of fevers due to possible lymphoma/hyperviscosity - transferred to the [facility] for expedited lymphoma workup (see below)  Mentation waxing/waning but mildly improved per family status post initiation of plasmapheresis  Appreciate oncology recommendations -> underwent multiple courses of plasmapheresis, however, confusion remains waxing/waning now with intermittent twitching/myoclonic jerks and asterixis  B12/folate, ammonia, lactic acid, and TSH, all normal  Continue to monitor and replete electrolyte derangements, as necessary  Limit/avoid sedating agents if possible  Appreciate ongoing infectious disease input -> per recommendation of pathology, further infectious studies ordered including Brucella Ab, mycoplasma pneumonia AB, and Histoplasma galactomannan antigen  Per oncology, further testing ordered including urine hemosiderin, haptoglobin, PNH profile, and LDH  Await EEG report and MRI of brain

## 2024-11-01 NOTE — ASSESSMENT & PLAN NOTE
Development of intermittent twitching/myoclonic jerking on 10/31. Neurology consulted. Concern for toxic-metabolic source and a routine EEG was obtained. It showed nearly continuous myoclonic jerking and concern for an epileptic etiology of myoclonus. Patient started on Ativan and Keppra.   -Management per neurology  -Follow-up repeat EEG findings per neurology

## 2024-11-01 NOTE — PROGRESS NOTES
Progress Note - Neurology   Name: Miguel Henderson 84 y.o. male I MRN: [id number]  Unit/Bed#: [facility] 817-01 I Date of Admission: 10/26/2024   Date of Service: 11/1/2024 I Hospital Day: 6    Assessment & Plan  Encephalopathy  Pt w/ a few weeks of fluctuating / gradually worsening AMS currently undergoing significant heme onc workup including BM bx & lymph node bx / & previously having undergone workup w/ ID including LP. Pt sxs include pupil asymmetry / irregular shape to R eye & jerking motions to all 4 extremities of differing severity worse in LUE, which upon assessment matched more w/ asterixis than myoclonic jerking, pointing more toward metabolic cause over neurologic. Confirmed w/ family that the abnl movements are new as of <24 hrs ago w/ no hx of similar sxs & are seen in conjunction w/ overall worsening mentation, suspicion per heme onc of malignant process evidenced by increased IG, , elevated gamma spike SPEP, B sxs, worsening lymphadenopathy. No particular neurologic cause identified at this time, continue working up metabolic derangements that may be contributing to worsening sxs. Concerning the abnl pupils b/l, sluggish response to light b/l w/ R>L in size & signs of past ocular surgeries, which were confirmed by family today. Uncertain time course for the new eye findings, even family unsure as to when the discrepancy first was noticed, although low suspicion for neuro cause will evaluate for neuro etiology / contributing factors by the plan noted below.Of note, pt overall AMS seems to improve w/ plasmapheresis occurrences, although amount of improvement seems to have lessened of late. Covid dx 10 days ago, ID followed. CSF WBC >100, protein elevated. CT CAP significant for large lymph nodes to various locations of body. Routine EEG x 2 w/ limb leads: Myoclonus of uncertain source after first EEG, added limb leads to second EEG showed unlikely of epileptic etiology.  -DC keppra as unlikely  helpful  -Repeat MRI brain w/ con in setting of new sxs in past couple days  -Continue workup w/ heme onc including follow pending bx /cytometry result  -ID aware, following off abx, uncommon labs ordered to further eval etiology  -No rec for repeat LP at this time, will await imaging  -Labs ordered including B12, B9, TSH, ammonia returning wnl    Recommendations for outpatient neurological follow up have yet to be determined.  I have discussed with Dr. Mcdonald the above plan to treat pt. He agrees with the plan.  Neurology service will follow.    Subjective   Pt remains similar to day prior, w/ occasional truncal movements similar to limb movements, while in for exam they were infrequent but family report when pt awake & active noticeably worse sxs.    Review of Systems   Unable to perform ROS: Mental status change       Objective :  Temp:  [99.4 °F (37.4 °C)-99.6 °F (37.6 °C)] 99.4 °F (37.4 °C)  HR:  [] 90  BP: (123-127)/(75-79) 123/77  Resp:  [16] 16  SpO2:  [96 %-100 %] 96 %  O2 Device: None (Room air)    Physical Exam  Constitutional:       General: He is not in acute distress.     Appearance: He is toxic-appearing. He is not diaphoretic.   HENT:      Head: Normocephalic.      Right Ear: External ear normal.      Left Ear: External ear normal.      Nose: Nose normal.      Mouth/Throat:      Pharynx: Oropharynx is clear.   Eyes:      General:         Right eye: No discharge.         Left eye: No discharge.      Extraocular Movements: EOM normal.   Neurological:      Mental Status: He is disoriented.      Cranial Nerves: Dysarthria present.     Neurological Exam  Mental Status  Arousable to verbal stimuli and arousable to tactile stimuli. Oriented only to person. Moderate dysarthria present. Follows one-step commands.    Cranial Nerves  CN III, IV, VI: Extraocular movements intact bilaterally.   Right pupil: 5 mm. Irregular. Abnormal reactivity: Abnormal accommodation:   Left pupil: 2 mm. Round. Abnormal  reactivity: Abnormal accommodation:  Exam lacking due to poor command following, poor reaction to light / asymmetric pupils noted w/ signs of prior surgeries confirmed by family to b/l eyes. .    Motor  Normal muscle bulk throughout. No fasciculations present. Increased muscle tone. The following abnormal movements were seen:  Abnl movements to all 4 extremities to varying degrees most consistent w/ asterixis, trunk involved today on occasion..    Sensory  Unable to perform accurate exam..    Reflexes  Clonus vs activated typical myoclonic activity tyo RLE.    Coordination    Unable to assess.    Gait    Defer.        Lab Results: I have reviewed the following results:  Other Study Results Review: Other studies reviewed include: EEG    VTE Pharmacologic Prophylaxis: VTE covered by:  heparin (porcine), Subcutaneous, 5,000 Units at 11/01/24 1342       Administrative Statements   I have spent a total time of 50 minutes in caring for this patient on the day of the visit/encounter including Diagnostic results, Patient and family education, Counseling / Coordination of care, Documenting in the medical record, Reviewing / ordering tests, medicine, procedures  , Obtaining or reviewing history  , and Communicating with other healthcare professionals .

## 2024-11-01 NOTE — PLAN OF CARE
Problem: PAIN - ADULT  Goal: Verbalizes/displays adequate comfort level or baseline comfort level  Description: Interventions:  - Encourage patient to monitor pain and request assistance  - Assess pain using appropriate pain scale  - Administer analgesics based on type and severity of pain and evaluate response  - Implement non-pharmacological measures as appropriate and evaluate response  - Consider cultural and social influences on pain and pain management  - Notify physician/advanced practitioner if interventions unsuccessful or patient reports new pain  Outcome: Progressing     Problem: INFECTION - ADULT  Goal: Absence or prevention of progression during hospitalization  Description: INTERVENTIONS:  - Assess and monitor for signs and symptoms of infection  - Monitor lab/diagnostic results  - Monitor all insertion sites, i.e. indwelling lines, tubes, and drains  - Monitor endotracheal if appropriate and nasal secretions for changes in amount and color  - [place]ully appropriate cooling/warming therapies per order  - Administer medications as ordered  - Instruct and encourage patient and family to use good hand hygiene technique  - Identify and instruct in appropriate isolation precautions for identified infection/condition  Outcome: Progressing  Goal: Absence of fever/infection during neutropenic period  Description: INTERVENTIONS:  - Monitor WBC    Outcome: Progressing     Problem: SAFETY ADULT  Goal: Patient will remain free of falls  Description: INTERVENTIONS:  - Educate patient/family on patient safety including physical limitations  - Instruct patient to call for assistance with activity   - Consult OT/PT to assist with strengthening/mobility   - Keep Call bell within reach  - Keep bed low and locked with side rails adjusted as appropriate  - Keep care items and personal belongings within reach  - Initiate and maintain comfort rounds  - Make Fall Risk Sign visible to staff  - Offer Toileting every 2 Hours,  in advance of need  - Initiate/Maintain bed/chair alarm  - Apply yellow socks and bracelet for high fall risk patients  - Consider moving patient to room near nurses station  Outcome: Progressing  Goal: Maintain or return to baseline ADL function  Description: INTERVENTIONS:  -  Assess patient's ability to carry out ADLs; assess patient's baseline for ADL function and identify physical deficits which impact ability to perform ADLs (bathing, care of mouth/teeth, toileting, grooming, dressing, etc.)  - Assess/evaluate cause of self-care deficits   - Assess range of motion  - Assess patient's mobility; develop plan if impaired  - Assess patient's need for assistive devices and provide as appropriate  - Encourage maximum independence but intervene and supervise when necessary  - Involve family in performance of ADLs  - Assess for home care needs following discharge   - Consider OT consult to assist with ADL evaluation and planning for discharge  - Provide patient education as appropriate  Outcome: Progressing  Goal: Maintains/Returns to pre admission functional level  Description: INTERVENTIONS:  - Perform AM-PAC 6 Click Basic Mobility/ Daily Activity assessment daily.  - Set and communicate daily mobility goal to care team and patient/family/caregiver.   - Collaborate with rehabilitation services on mobility goals if consulted  - Reposition patient every 2 hours.  - Dangle patient 3 times a day  - Stand patient 3 times a day  - Ambulate patient 3 times a day  - Out of bed to chair 3 times a day   - Out of bed for meals 3 times a day  - Out of bed for toileting  - Record patient progress and toleration of activity level   Outcome: Progressing     Problem: DISCHARGE PLANNING  Goal: Discharge to home or other facility with appropriate resources  Description: INTERVENTIONS:  - Identify barriers to discharge w/patient and caregiver  - Arrange for needed discharge resources and transportation as appropriate  - Identify  discharge learning needs (meds, wound care, etc.)  - Arrange for interpretive services to assist at discharge as needed  - Refer to Case Management Department for coordinating discharge planning if the patient needs post-hospital services based on physician/advanced practitioner order or complex needs related to functional status, cognitive ability, or social support system  Outcome: Progressing     Problem: Knowledge Deficit  Goal: Patient/family/caregiver demonstrates understanding of disease process, treatment plan, medications, and discharge instructions  Description: Complete learning assessment and assess knowledge base.  Interventions:  - Provide teaching at level of understanding  - Provide teaching via preferred learning methods  Outcome: Progressing     Problem: Prexisting or High Potential for Compromised Skin Integrity  Goal: Skin integrity is maintained or improved  Description: INTERVENTIONS:  - Identify patients at risk for skin breakdown  - Assess and monitor skin integrity  - Assess and monitor nutrition and hydration status  - Monitor labs   - Assess for incontinence   - Turn and reposition patient  - Assist with mobility/ambulation  - Relieve pressure over bony prominences  - Avoid friction and shearing  - Provide appropriate hygiene as needed including keeping skin clean and dry  - Evaluate need for skin moisturizer/barrier cream  - Collaborate with interdisciplinary team   - Patient/family teaching  - Consider wound care consult   Outcome: Progressing

## 2024-11-01 NOTE — ASSESSMENT & PLAN NOTE
Status post left inguinal lymph node biopsy on 10/28 -> await full pathology, however, oncology note reviewed -> SPEP with elevated gamma spike, elevated IgA/G/M, positive free lambda light chain, elevated beta-2 microglobulin, and high normal viscosity  Per pathologist recommendation, medical oncology has ordered surgical oncology consultation for an excisional biopsy for better sampling  Status post bone marrow biopsy on 10/29 -> await final pathology  Status post courses of plasmapheresis per oncology  Supportive care

## 2024-11-01 NOTE — PROGRESS NOTES
Progress Note - Hospitalist   Name: Miguel Henderson 84 y.o. male I MRN: [id number]  [facility] Date of Admission: 10/26/2024   Date of Service: 11/1/2024 I Hospital Day: 6    Assessment & Plan  Encephalopathy  CT of head and MRI of brain unremarkable for acute etiology/abnormalities  Status post prior lumbar puncture with CSF studies negative for meningitis  Concern noted of etiology of fevers due to possible lymphoma/hyperviscosity - transferred to the [facility] for expedited lymphoma workup (see below)  Mentation waxing/waning but mildly improved per family status post initiation of plasmapheresis  Appreciate oncology recommendations -> underwent multiple courses of plasmapheresis, however, confusion remains waxing/waning now with intermittent twitching/myoclonic jerks and asterixis  B12/folate, ammonia, lactic acid, and TSH, all normal  Continue to monitor and replete electrolyte derangements, as necessary  Limit/avoid sedating agents if possible  Appreciate ongoing infectious disease input -> per recommendation of pathology, further infectious studies ordered including Brucella Ab, mycoplasma pneumonia AB, and Histoplasma galactomannan antigen  Per oncology, further testing ordered including urine hemosiderin, haptoglobin, PNH profile, and LDH  Await EEG report and MRI of brain  Lymphadenopathy  Status post left inguinal lymph node biopsy on 10/28 -> await full pathology, however, oncology note reviewed -> SPEP with elevated gamma spike, elevated IgA/G/M, positive free lambda light chain, elevated beta-2 microglobulin, and high normal viscosity  Per pathologist recommendation, medical oncology has ordered surgical oncology consultation for an excisional biopsy for better sampling  Status post bone marrow biopsy on 10/29 -> await final pathology  Status post courses of plasmapheresis per oncology  Supportive care  Abnormal LFTs  Elevated bilirubin and alkaline phosphatase progressively  improving  Suspected to be associated with lymphadenopathy (above)  Limit/avoid hepatotoxins as possible  Hypothyroidism  Continue Synthroid  Primary hypertension  Holding HCTZ due to soft pressures at this time  Fever  Does not appear to be infectious as workup negative thus far  Appreciate ID recommendations - continue to monitor off antibiotics  Monitor temperature curve  COVID-19 virus infection  Tested positive on 10/21  Currently oxygenating on baseline room air   Contact/airborne precautions now discontinued   Supportive care otherwise      VTE Pharmacologic Prophylaxis: VTE Score: 5 High Risk (Score >/= 5) - Pharmacological DVT Prophylaxis Ordered: Heparin. Sequential Compression Devices Ordered.    AM-PAC Basic Mobility:  Basic Mobility Inpatient Raw Score: 6  JH-HLM Goal: 2: Bed activities/Dependent transfer  JH-HLM Achieved: 2: Bed activities/Dependent transfer  JH-HLM Goal achieved. Continue to encourage appropriate mobility.    Patient Centered Rounds:  I have performed bedside rounds and discussed plan of care with nursing today.  Discussions with Specialists or Other Care Team Provider:  see above assessments if applicable    Education and Discussions with Family / Patient:  Patient, along with multiple for members, at bedside today    Time Spent for Care:  35 minutes. More than 50% of total time spent on counseling and coordination of care, on one or more of the following: performing physical exam; counseling and coordination of care, obtaining or reviewing history, documenting in the medical record, reviewing/ordering tests/medications/procedures, and communicating with other healthcare professionals.    Current Length of Stay: 6 day(s)  Current Patient Status: Inpatient   Certification Statement:  Patient will continue to require additional hospital stay due to assessments as noted above.    Code Status: Level 3 - DNAR and DNI      Subjective     Seen and examined earlier in the day.  Family  numbers present at bedside.  Although he remains weak/fatigued, he is fairly calm today and with improved appetite.      Objective     Vitals:   Temp (24hrs), Av.2 °F (37.3 °C), Min:98.7 °F (37.1 °C), Max:99.6 °F (37.6 °C)    Temp:  [98.7 °F (37.1 °C)-99.6 °F (37.6 °C)] 98.9 °F (37.2 °C)  HR:  [] 89  Resp:  [16] 16  BP: (123-127)/(77-79) 126/79  SpO2:  [96 %-99 %] 99 %  Body mass index is 28.46 kg/m².     Input and Output Summary (last 24 hours):       Intake/Output Summary (Last 24 hours) at 2024 1913  Last data filed at 2024 1331  Gross per 24 hour   Intake 300 ml   Output 750 ml   Net -450 ml       Physical Exam:     GENERAL Ill-appearing and weak/fatigued   HEAD   Normocephalic - atraumatic   EYES   R > L pupillary dilatation present    MOUTH   Mucosa moist   NECK   Supple - full range of motion   CARDIAC Rate controlled   PULMONARY Clear bilateral breath sounds   ABDOMEN Nontender/nondistended   MUSCULOSKELETAL   Motor strength/range of motion remains deconditioned   NEUROLOGIC Awake and sluggish but with improved agitation today - improved muscle twitching   SKIN   Chronic wrinkles/blemishes    PSYCHIATRIC   Mood/affect remains flat         Labs & Recent Cultures:  Results from last 7 days   Lab Units 24  0446 10/31/24  0557 10/27/24  0639 10/26/24  1457   WBC Thousand/uL 10.40* 10.26*   < > 6.54   HEMOGLOBIN g/dL 8.4* 8.6*   < > 9.0*   HEMATOCRIT % 24.2* 24.1*   < > 25.5*   PLATELETS Thousands/uL 297 253   < > 161   BANDS PCT %  --   --   --  3   SEGS PCT %  --  37*   < >  --    LYMPHO PCT %  --  48*   < > 8   MONO PCT %  --  12   < > 7   EOS PCT %  --  1   < > 6    < > = values in this interval not displayed.     Results from last 7 days   Lab Units 24  0446   POTASSIUM mmol/L 3.8   CHLORIDE mmol/L 94*   CO2 mmol/L 31   BUN mg/dL 9   CREATININE mg/dL 0.34*   CALCIUM mg/dL 7.8*   ALK PHOS U/L 76   ALT U/L 15   AST U/L 23     Results from last 7 days   Lab Units 10/27/24  0662    INR  1.67*     Results from last 7 days   Lab Units 10/31/24  0307 10/28/24  1703   POC GLUCOSE mg/dl 138 117         Results from last 7 days   Lab Units 10/31/24  0851 10/26/24  0428   LACTIC ACID mmol/L 0.7  --    PROCALCITONIN ng/ml  --  0.43*                   Lines/Drains/Telemetry:  Invasive Devices       Peripheral Intravenous Line  Duration             Peripheral IV 10/30/24 Left;Upper Arm 2 days              Hemodialysis Catheter  Duration             HD Temporary Double Catheter 6 days              Drain  Duration             External Urinary Catheter <1 day                    Last 24 Hours Medication List:   Current Facility-Administered Medications   Medication Dose Route Frequency Provider Last Rate    acetaminophen  1,000 mg Intravenous Q6H PRN Joseph Alexander MD 1,000 mg (10/28/24 2103)    calcium carbonate  500 mg Oral BID Juani Luna MD      fluticasone  1 spray Nasal Daily Joseph Alexander MD      heparin (porcine)  5,000 [facility]      [START ON 11/4/2024] levothyroxine  50 mcg Oral Once per day on Monday Tuesday Wednesday Thursday Juani Luna MD      [START ON 11/2/2024] levothyroxine  75 mcg Oral Once per day on Sunday Friday Saturday Juani Luna MD      multi-electrolyte  100 mL/hr Intravenous Continuous Joseph Alexander  mL/hr (11/01/24 1303)    ondansetron  4 mg Intravenous Q6H PRN Joseph Alexander MD      pantoprazole  40 mg Oral Early Morning Joseph Alexander MD      polyethylene glycol  17 g Oral Daily PRN MD JUANI Espinosa MD   Hospitalist - [facility] Internal Medicine        ** Please Note:  Documentation is constructed using a voice recognition dictation system.  An occasional wrong word/phrase or “sound-a-like” substitution may have been picked up by dictation device due to the inherent limitations of voice recognition software.  Read the chart carefully and recognize, using reasonable context, where substitutions may  have occurred.**

## 2024-11-01 NOTE — PLAN OF CARE
Problem: PAIN - ADULT  Goal: Verbalizes/displays adequate comfort level or baseline comfort level  Description: Interventions:  - Encourage patient to monitor pain and request assistance  - Assess pain using appropriate pain scale  - Administer analgesics based on type and severity of pain and evaluate response  - Implement non-pharmacological measures as appropriate and evaluate response  - Consider cultural and social influences on pain and pain management  - Notify physician/advanced practitioner if interventions unsuccessful or patient reports new pain  Outcome: Progressing     Problem: INFECTION - ADULT  Goal: Absence or prevention of progression during hospitalization  Description: INTERVENTIONS:  - Assess and monitor for signs and symptoms of infection  - Monitor lab/diagnostic results  - Monitor all insertion sites, i.e. indwelling lines, tubes, and drains  - Monitor endotracheal if appropriate and nasal secretions for changes in amount and color  - [place]art appropriate cooling/warming therapies per order  - Administer medications as ordered  - Instruct and encourage patient and family to use good hand hygiene technique  - Identify and instruct in appropriate isolation precautions for identified infection/condition  Outcome: Progressing  Goal: Absence of fever/infection during neutropenic period  Description: INTERVENTIONS:  - Monitor WBC    Outcome: Progressing     Problem: SAFETY ADULT  Goal: Patient will remain free of falls  Description: INTERVENTIONS:  - Educate patient/family on patient safety including physical limitations  - Instruct patient to call for assistance with activity   - Consult OT/PT to assist with strengthening/mobility   - Keep Call bell within reach  - Keep bed low and locked with side rails adjusted as appropriate  - Keep care items and personal belongings within reach  - Initiate and maintain comfort rounds  - Make Fall Risk Sign visible to staff  - Offer Toileting every 2 Hours,  in advance of need  - Initiate/Maintain bed/chair alarm  - Apply yellow socks and bracelet for high fall risk patients  - Consider moving patient to room near nurses station  Outcome: Progressing  Goal: Maintain or return to baseline ADL function  Description: INTERVENTIONS:  -  Assess patient's ability to carry out ADLs; assess patient's baseline for ADL function and identify physical deficits which impact ability to perform ADLs (bathing, care of mouth/teeth, toileting, grooming, dressing, etc.)  - Assess/evaluate cause of self-care deficits   - Assess range of motion  - Assess patient's mobility; develop plan if impaired  - Assess patient's need for assistive devices and provide as appropriate  - Encourage maximum independence but intervene and supervise when necessary  - Involve family in performance of ADLs  - Assess for home care needs following discharge   - Consider OT consult to assist with ADL evaluation and planning for discharge  - Provide patient education as appropriate  Outcome: Progressing  Goal: Maintains/Returns to pre admission functional level  Description: INTERVENTIONS:  - Perform AM-PAC 6 Click Basic Mobility/ Daily Activity assessment daily.  - Set and communicate daily mobility goal to care team and patient/family/caregiver.   - Collaborate with rehabilitation services on mobility goals if consulted  - Reposition patient every 2 hours.  - Dangle patient 3 times a day  - Stand patient 3 times a day  - Ambulate patient 3 times a day  - Out of bed to chair 3 times a day   - Out of bed for meals 3 times a day  - Out of bed for toileting  - Record patient progress and toleration of activity level   Outcome: Progressing

## 2024-11-01 NOTE — ASSESSMENT & PLAN NOTE
The patient had intermittent fevers for 3 weeks and several admissions to [facility] with fairly unremarkable infectious workup other than positive rhinovirus and COVID. CT imaging was noted to show lymphadenopathy and he had atypical lymphocytes on CBC so hematology/oncology consult was recommended although the patient was discharged on Paxlovid with plan for an outpatient referral.  He then returned to the ED with worsening confusion and return of fever.  CT imaging with worsening axillary lymphadenopathy, CBC differential again showed atypical lymphocytes.  Status post LP 10/25 which showed mild elevation in protein and lymphocytic pleocytosis.  ME panel negative and culture showed no growth. 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  Labs show elevated LDH, ferritin, immunoglobulins. Status post plasmapheresis with improvement in fever curve and initially improvement in mental status, but now with waxing and waning mental status and development of myoclonic jerking as below. S/p lymph node and bone marrow biopsies. Bone marrow biopsy preliminary study demonstrated histiocytic aggregates, which are nonspecific, and can be seen in a variety of settings including infection. Will broaden infectious work-up to include more atypical infectious causes. Patient is originally from [place], which has a high incidence of TB cases, and travels back every year. Last traveled in May-June 2024. 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 TB could explain the lymphadenopathy and lymphocytic pleocytosis. Would also rule out histoplasma as well. histoplasma urine antigen ordered. Doesn't have clear risk factors necessarily for brucella (doesn't have contact with farm animals, drink unpasteurized milk, etc.) but does eat feta cheese which can be unpasteurized depending on where it is bought. Given this, will also send brucella antibodies. Mycoplasma is  favored less likely given patient already had a RP2 and it was negative. Mycoplasma could explain the altered mental status and lymphocytic pleocytosis, but does not give a great explanation for the lymphadenopathy. Will send mycoplasma antibodies for completeness.  -Continue to monitor off antimicrobials  -Follow-up lymph node and bone marrow biopsies until finalized  -Follow-up mTB PCR from CSF  -Follow-up histoplasma urine antigen  -Follow-up brucella antibodies and mycoplasma antibodies for completeness  -Ongoing follow-up with hematology/oncology and neurology as below  -Continue to monitor fever curve/vitals  -Monitor CBCd to continue to closely monitor patient's WBC

## 2024-11-01 NOTE — PHYSICAL THERAPY NOTE
Physical Therapy Cancellation Note     11/01/24 1052   PT Last Visit   PT Visit Date 11/01/24   Note Type   Note Type Cancelled Session   Cancel Reasons Other     Unable to see pt at this time 2* Neuro diagnostic testing. PT will follow and treat as able.  Jen Soto PT DPT

## 2024-11-01 NOTE — ASSESSMENT & PLAN NOTE
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  MRI brain without acute intracranial abnormalities.  Status post LP 10/25 with mild lymphocytic pleocytosis and elevated protein.  ME panel and CSF culture negative. Suspect CSF abnormalities related to possible underlying lymphoproliferative disorder.  Status post first session of plasmapheresis 10/26 with improvement in fever curve and mental status initially, but now seems to have worsened.  Repeat CT of the brain nondiagnostic for source on 10/31/2024. Neurology consulted. Concern for toxic-metabolic source and a routine EEG was obtained. It showed nearly continuous myoclonic jerking and concern for an epileptic etiology of myoclonus. Patient started on Ativan and Keppra.   -Continue to monitor mental status  -Ongoing management per neurology  -Follow-up repeat EEG findings per neurology

## 2024-11-01 NOTE — ASSESSMENT & PLAN NOTE
Pt w/ a few weeks of fluctuating / gradually worsening AMS currently undergoing significant heme onc workup including BM bx & lymph node bx / & previously having undergone workup w/ ID including LP. Pt sxs include pupil asymmetry / irregular shape to R eye & jerking motions to all 4 extremities of differing severity worse in LUE, which upon assessment matched more w/ asterixis than myoclonic jerking, pointing more toward metabolic cause over neurologic. Confirmed w/ family that the abnl movements are new as of <24 hrs ago w/ no hx of similar sxs & are seen in conjunction w/ overall worsening mentation, suspicion per heme onc of malignant process evidenced by increased IG, , elevated gamma spike SPEP, B sxs, worsening lymphadenopathy. No particular neurologic cause identified at this time, continue working up metabolic derangements that may be contributing to worsening sxs. Concerning the abnl pupils b/l, sluggish response to light b/l w/ R>L in size & signs of past ocular surgeries, which were confirmed by family today. Uncertain time course for the new eye findings, even family unsure as to when the discrepancy first was noticed, although low suspicion for neuro cause will evaluate for neuro etiology / contributing factors by the plan noted below.Of note, pt overall AMS seems to improve w/ plasmapheresis occurrences, although amount of improvement seems to have lessened of late. Covid dx 10 days ago, ID followed. CSF WBC >100, protein elevated. CT CAP significant for large lymph nodes to various locations of body. Routine EEG x 2 w/ limb leads: Myoclonus of uncertain source after first EEG, added limb leads to second EEG showed unlikely of epileptic etiology.  -DC keppra as unlikely helpful  -Repeat MRI brain w/ con in setting of new sxs in past couple days  -Continue workup w/ heme onc including follow pending bx /cytometry result  -ID aware, following off abx, uncommon labs ordered to further eval  etiology  -No rec for repeat LP at this time, will await imaging  -Labs ordered including B12, B9, TSH, ammonia returning wnl

## 2024-11-01 NOTE — OCCUPATIONAL THERAPY NOTE
Occupational Therapy Cancel Note         Patient Name: Miguel Henderson  Today's Date: 11/1/2024 11/01/24 1120   OT Last Visit   OT Visit Date 11/01/24   Note Type   Note type Cancelled Session   Cancel Reasons Medical status       OT orders received. Chart reviewed. Per RN, pt not appropriate for session at this time. Will continue to follow and see pt as appropriate and able.     Nhung Crespo MS, OTR/L

## 2024-11-02 LAB
ALBUMIN SERPL BCG-MCNC: 3.2 G/DL (ref 3.5–5)
ALP SERPL-CCNC: 85 U/L (ref 34–104)
ALT SERPL W P-5'-P-CCNC: 17 U/L (ref 7–52)
ANION GAP SERPL CALCULATED.3IONS-SCNC: 6 MMOL/L (ref 4–13)
AST SERPL W P-5'-P-CCNC: 23 U/L (ref 13–39)
BASOPHILS # BLD AUTO: 0.1 THOUSANDS/ÂΜL (ref 0–0.1)
BASOPHILS NFR BLD AUTO: 1 % (ref 0–1)
BILIRUB SERPL-MCNC: 1.29 MG/DL (ref 0.2–1)
BUN SERPL-MCNC: 9 MG/DL (ref 5–25)
CA-I BLD-SCNC: 1.03 MMOL/L (ref 1.12–1.32)
CALCIUM ALBUM COR SERPL-MCNC: 8.1 MG/DL (ref 8.3–10.1)
CALCIUM SERPL-MCNC: 7.5 MG/DL (ref 8.4–10.2)
CHLORIDE SERPL-SCNC: 93 MMOL/L (ref 96–108)
CO2 SERPL-SCNC: 30 MMOL/L (ref 21–32)
CREAT SERPL-MCNC: 0.32 MG/DL (ref 0.6–1.3)
EOSINOPHIL # BLD AUTO: 0.07 THOUSAND/ÂΜL (ref 0–0.61)
EOSINOPHIL NFR BLD AUTO: 1 % (ref 0–6)
ERYTHROCYTE [DISTWIDTH] IN BLOOD BY AUTOMATED COUNT: 16.5 % (ref 11.6–15.1)
GFR SERPL CREATININE-BSD FRML MDRD: 117 ML/MIN/1.73SQ M
GLUCOSE SERPL-MCNC: 113 MG/DL (ref 65–140)
HAPTOGLOB SERPL-MCNC: 29 MG/DL (ref 38–329)
HCT VFR BLD AUTO: 25.3 % (ref 36.5–49.3)
HGB BLD-MCNC: 8.9 G/DL (ref 12–17)
IMM GRANULOCYTES # BLD AUTO: 0.06 THOUSAND/UL (ref 0–0.2)
IMM GRANULOCYTES NFR BLD AUTO: 1 % (ref 0–2)
LYMPHOCYTES # BLD AUTO: 3.81 THOUSANDS/ÂΜL (ref 0.6–4.47)
LYMPHOCYTES NFR BLD AUTO: 41 % (ref 14–44)
M PNEUMO IGG SER IA-ACNC: <100 U/ML (ref 0–99)
M PNEUMO IGM SER IA-ACNC: <770 U/ML (ref 0–769)
MCH RBC QN AUTO: 32.2 PG (ref 26.8–34.3)
MCHC RBC AUTO-ENTMCNC: 35.2 G/DL (ref 31.4–37.4)
MCV RBC AUTO: 92 FL (ref 82–98)
MONOCYTES # BLD AUTO: 1.4 THOUSAND/ÂΜL (ref 0.17–1.22)
MONOCYTES NFR BLD AUTO: 15 % (ref 4–12)
NEUTROPHILS # BLD AUTO: 3.81 THOUSANDS/ÂΜL (ref 1.85–7.62)
NEUTS SEG NFR BLD AUTO: 41 % (ref 43–75)
NRBC BLD AUTO-RTO: 0 /100 WBCS
PLATELET # BLD AUTO: 317 THOUSANDS/UL (ref 149–390)
PMV BLD AUTO: 9 FL (ref 8.9–12.7)
POTASSIUM SERPL-SCNC: 3.5 MMOL/L (ref 3.5–5.3)
PROT SERPL-MCNC: 5.6 G/DL (ref 6.4–8.4)
RBC # BLD AUTO: 2.76 MILLION/UL (ref 3.88–5.62)
SODIUM SERPL-SCNC: 129 MMOL/L (ref 135–147)
WBC # BLD AUTO: 9.25 THOUSAND/UL (ref 4.31–10.16)

## 2024-11-02 PROCEDURE — 99223 1ST HOSP IP/OBS HIGH 75: CPT | Performed by: SURGERY

## 2024-11-02 PROCEDURE — 80053 COMPREHEN METABOLIC PANEL: CPT | Performed by: INTERNAL MEDICINE

## 2024-11-02 PROCEDURE — 85025 COMPLETE CBC W/AUTO DIFF WBC: CPT | Performed by: INTERNAL MEDICINE

## 2024-11-02 PROCEDURE — 99232 SBSQ HOSP IP/OBS MODERATE 35: CPT | Performed by: INTERNAL MEDICINE

## 2024-11-02 PROCEDURE — 92610 EVALUATE SWALLOWING FUNCTION: CPT

## 2024-11-02 PROCEDURE — 82330 ASSAY OF CALCIUM: CPT | Performed by: INTERNAL MEDICINE

## 2024-11-02 RX ORDER — CALCIUM GLUCONATE 20 MG/ML
2 INJECTION, SOLUTION INTRAVENOUS ONCE
Status: COMPLETED | OUTPATIENT
Start: 2024-11-02 | End: 2024-11-02

## 2024-11-02 RX ORDER — DOCUSATE SODIUM 100 MG/1
100 CAPSULE, LIQUID FILLED ORAL 2 TIMES DAILY
Status: DISCONTINUED | OUTPATIENT
Start: 2024-11-02 | End: 2024-11-19 | Stop reason: HOSPADM

## 2024-11-02 RX ORDER — BISACODYL 10 MG
10 SUPPOSITORY, RECTAL RECTAL ONCE
Status: DISCONTINUED | OUTPATIENT
Start: 2024-11-02 | End: 2024-11-19 | Stop reason: HOSPADM

## 2024-11-02 RX ORDER — POLYETHYLENE GLYCOL 3350 17 G/17G
17 POWDER, FOR SOLUTION ORAL DAILY
Status: DISCONTINUED | OUTPATIENT
Start: 2024-11-02 | End: 2024-11-04

## 2024-11-02 RX ORDER — CEFAZOLIN SODIUM 2 G/50ML
2000 SOLUTION INTRAVENOUS
Status: CANCELLED | OUTPATIENT
Start: 2024-11-02

## 2024-11-02 RX ADMIN — SODIUM CHLORIDE, SODIUM GLUCONATE, SODIUM ACETATE, POTASSIUM CHLORIDE, MAGNESIUM CHLORIDE, SODIUM PHOSPHATE, DIBASIC, AND POTASSIUM PHOSPHATE 100 ML/HR: .53; .5; .37; .037; .03; .012; .00082 INJECTION, SOLUTION INTRAVENOUS at 19:54

## 2024-11-02 RX ADMIN — CALCIUM GLUCONATE 2 G: 20 INJECTION, SOLUTION INTRAVENOUS at 15:57

## 2024-11-02 RX ADMIN — HEPARIN SODIUM 5000 UNITS: 5000 INJECTION INTRAVENOUS; SUBCUTANEOUS at 06:19

## 2024-11-02 RX ADMIN — HEPARIN SODIUM 5000 UNITS: 5000 INJECTION INTRAVENOUS; SUBCUTANEOUS at 21:07

## 2024-11-02 RX ADMIN — SODIUM CHLORIDE, SODIUM GLUCONATE, SODIUM ACETATE, POTASSIUM CHLORIDE, MAGNESIUM CHLORIDE, SODIUM PHOSPHATE, DIBASIC, AND POTASSIUM PHOSPHATE 100 ML/HR: .53; .5; .37; .037; .03; .012; .00082 INJECTION, SOLUTION INTRAVENOUS at 09:00

## 2024-11-02 RX ADMIN — HEPARIN SODIUM 5000 UNITS: 5000 INJECTION INTRAVENOUS; SUBCUTANEOUS at 13:00

## 2024-11-02 RX ADMIN — POLYETHYLENE GLYCOL 3350 17 G: 17 POWDER, FOR SOLUTION ORAL at 11:34

## 2024-11-02 NOTE — ASSESSMENT & PLAN NOTE
Elevated bilirubin and alkaline phosphatase generally improving  Suspected to be associated with lymphadenopathy (above)  Limit/avoid hepatotoxins as possible

## 2024-11-02 NOTE — SPEECH THERAPY NOTE
Speech Language/Pathology  Speech-Language Pathology Bedside Swallow Evaluation      Patient Name: Miguel Henderson    Today's Date: 11/2/2024     Problem List  Principal Problem:    Encephalopathy  Active Problems:    Hypothyroidism    Primary hypertension    Fever    Lymphadenopathy    Abnormal LFTs    COVID-19 virus infection    Myoclonia      Past Medical History  Past Medical History:   Diagnosis Date    Prostate cancer (HCC)        Past Surgical History  Past Surgical History:   Procedure Laterality Date    APPENDECTOMY      IR BIOPSY BONE MARROW  10/29/2024    IR BIOPSY INGUINAL LYMPH NODE  10/28/2024    IR LUMBAR PUNCTURE  10/25/2024    IR TEMPORARY DIALYSIS CATHETER PLACEMENT  10/26/2024       Summary   Pt presented with s/s suggestive of moderate oral and suspected minimal and mild pharyngeal dysphaiga.  He currently has minimal mastication of material and appears to only use lingual?palatal mashing to move the solids.  There is mod/severe lingual surface residue w/ all solids and at times R sided retention as well.  Sips thin and tsps pudding will clear the material. Despite mult swallows there is no overt coughing w/ any material today. Pt w/ recent MBS, today has more oral dysphagia than pharyngeal.     Risk/s for Aspiration: risk for spill, whole pieces, risk for poor nutrition is high     Recommended Diet: puree/level 1 diet and thin liquids   Recommended Form of Meds: crushed with puree   Aspiration precautions and swallowing strategies: upright posture, slow rate of feeding, small bites/sips, alternating bites and sips, and offer sips thin and puree to clear if needed  Other Recommendations: Continue frequent oral care, will follow        Current Medical Status  Pt is a 84 y.o. male who presented to [facility] with chills fatigue, abd pain, urinary frequency.  Pt w/ recent adm with covid, HSV   Metabolic encephalopathy noted.  W/u ongoing.        Current Precautions:  Fall  Aspiration   Allergies:  No known food allergies    Past medical history:  Please see H&P for details    Special Studies:  Recent MBS 10/09/24   Summary:   Oral Phase : Pt presented with mild oral dysphagia. Mastication was mod prolonged however eventual functional breakdown. Bolus control, formation were WNL. Transfer was piecemeal. Trace posterior base of tongue residue.   Pharyngeal Phase :Pt presented with mild pharyngeal dysphagia. Swallow initiation was intermittently min delayed with solids and prompt with liquids. Spill to the valleculae occurred with solids. Hyoid elevation and laryngeal excursion were WNL. Pharyngeal stripping wave diminished with solid trials. Epiglottic inversion was mostly complete. With nutri grain min epiglottic undercoat present. Trace vallecular retention with thin liquids and mild/mod with solids. Pt intermittently initiated secondary swallow to reduce residue. No pyriform retention. Large CP bar C4-C5 evident however did not impede bolus flow. No aspiration or penetration observed with trials.   Per Esophageal screen   Slow motility observed with liquids and solids. High retropulsion observed with liquids, pt risk for bottom up aspiration. Stasis occurred in distal esophagus with 13 mm pill however provided with additional sip of thin, ntl, and puree. Provided additional time x4 minutes pt continued with residue with esophagus.    Recommendations:  Diet: Dysphagia 3 dental soft   Liquids:thin   Meds: whole with thin liquids   Strategies:slow rate, alternate liquids with solids, swallow prior to additional po     CXR-Mild bibasilar atelectasis otherwise no focal consolidation, pleural effusion, or pneumothorax.     Social/Education/Vocational Hx:  Pt lives with family    Swallow Information   Current Risks for Dysphagia & Aspiration: known history of dysphagia  Current Symptoms/Concerns:  unable to transfer material this morning  Current Diet: mechanically altered/level 2 diet and thin liquids  -held NPO since am meal   Baseline Diet: soft/level 3 diet and thin liquids      Baseline Assessment   Behavior/Cognition: alert  Speech/Language Status: able to follow commands inconsistently and limited verbal output  Patient Positioning: upright in bed  Pain Status/Interventions/Response to Interventions:   No report of or nonverbal indications of pain.       Swallow Mechanism Exam  Facial: masked facies and face is tight  Labial: bilateral decreased ROM and decreased oral opening  Lingual: decreased ROM-poor protrusion and lateralization  Velum: unable to visualize  Mandible: unable to to open mouth  Dentition: upper dentures and lower dentures  Vocal quality:weak   Volitional Cough: unable to initiate volitional cough   Respiratory Status: on RA       Consistencies Assessed and Performance   Consistencies Administered: thin liquids, puree, mechanical soft solids, and soft solids  Materials administered included fish,mashed potatoes, pudding, attempted panatone the family had in the room.      Oral Stage: moderate, moderate-severe, decreased bolus acceptance, decreased bolus propulsion, and decreased mastication  Min to no mastication and rotary movement noted for the solids, mod severe mid lingual and R sided retention w/ need for pudding to clear or liquids.  Wfl w/ straw sips thin    Pharyngeal Stage: minimal, mild, suspected pharyngeal swallow delay, suspected decreased hyolaryngeal elevation upon palpation, and multiple swallows  Swallow Mechanics:  Swallowing initiation suspected to have a delay.  Laryngeal rise was palpated and judged to be reduced.  No coughing, throat clearing, change in vocal quality or respiratory status noted today. Did well with liquids.      Esophageal Concerns: none reported    Strategies and Efficacy: alternate sips liquids and     Summary and Recommendations (see above)    Results Reviewed with: patient, RN, MD, and family     Treatment Recommended: yes       Frequency of  treatment: as able     Patient Stated Goal:-    Dysphagia LTG  -Patient will demonstrate safe and effective oral intake (without overt s/s significant oral/pharyngeal dysphagia including s/s penetration or aspiration) for the highest appropriate diet level.     Short Term Goals:  -Pt will tolerate Dysphagia 1/pureed diet and - thin liquid with no significant s/s oral or pharyngeal dysphagia across 1-3 diagnostic session/s    --Patient will tolerate trials of upgraded food and/or liquid texture with no significant s/s of oral or pharyngeal dysphagia including aspiration across 1-3 diagnostic sessions     -Patient will comply with a Video/Modified Barium Swallow study for more complete assessment of swallowing anatomy/physiology/aspiration risk and to assess efficacy of treatment techniques so as to best guide treatment plan (though pt just had one can repeat if needed)    Speech Therapy Prognosis   Prognosis: fair    Prognosis Considerations: medical status and medically fragile status

## 2024-11-02 NOTE — PROGRESS NOTES
Progress Note - Hospitalist   Name: Miguel Henderson 84 y.o. male I MRN: [id number]  [facility] I Date of Admission: 10/26/2024   Date of Service: 11/2/2024 I Hospital Day: 7    Assessment & Plan  Encephalopathy  CT of head and MRI of brain unremarkable for acute etiology/abnormalities  Status post prior lumbar puncture with CSF studies negative for meningitis  Concern noted of etiology of fevers due to possible lymphoma/hyperviscosity - transferred to the [facility] for expedited lymphoma workup (see below)  Mentation waxing/waning but mildly improved per family status post initiation of plasmapheresis  Appreciate oncology recommendations -> underwent multiple courses of plasmapheresis, however, confusion remains waxing/waning now with intermittent twitching/myoclonic jerks and asterixis  B12/folate, ammonia, lactic acid, and TSH, all normal  Continue to monitor and replete electrolyte derangements, as necessary  Limit/avoid sedating agents if possible  Appreciate ongoing infectious disease input -> per recommendation of pathology, further infectious studies ordered including Brucella Ab and Histoplasma galactomannan antigen - Mycoplasma pneumonia Ab panel negative  Per oncology, further testing ordered including urine hemosiderin and PNH profile - haptoglobin low and serum LDH normal  Await EEG report and MRI of brain  Lymphadenopathy  Status post left inguinal lymph node biopsy on 10/28 -> await full pathology, however, oncology note reviewed -> SPEP with elevated gamma spike, elevated IgA/G/M, positive free lambda light chain, elevated beta-2 microglobulin, and high normal viscosity  Per pathologist recommendation, medical oncology has ordered surgical oncology consultation for an excisional biopsy (scheduled for tomorrow) for better sampling as 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 disorder  Status post bone marrow  biopsy on 10/29 -> await final pathology, although preliminary report noted some cold agglutinins (see plan for encephalopathy above regarding hemolysis labs)  Status post courses of plasmapheresis per oncology  Supportive care  Abnormal LFTs  Elevated bilirubin and alkaline phosphatase generally improving  Suspected to be associated with lymphadenopathy (above)  Limit/avoid hepatotoxins as possible  Hypothyroidism  Continue Synthroid  Primary hypertension  Holding HCTZ due to soft pressures at this time  Fever  Does not appear to be infectious as workup negative thus far  Appreciate ID recommendations - continue to monitor off antibiotics  Monitor temperature curve  COVID-19 virus infection  Tested positive on 10/21  Currently oxygenating on baseline room air   Contact/airborne precautions now discontinued   Supportive care otherwise      VTE Pharmacologic Prophylaxis: VTE Score: 5 High Risk (Score >/= 5) - Pharmacological DVT Prophylaxis Ordered: Heparin. Sequential Compression Devices Ordered.    AM-PAC Basic Mobility:  Basic Mobility Inpatient Raw Score: 7  JH-HLM Goal: 2: Bed activities/Dependent transfer  JH-HLM Achieved: 2: Bed activities/Dependent transfer  JH-HLM Goal achieved. Continue to encourage appropriate mobility.    Patient Centered Rounds:  I have performed bedside rounds and discussed plan of care with nursing today.  Discussions with Specialists or Other Care Team Provider:  see above assessments if applicable    Education and Discussions with Family / Patient:  Patient, along with multiple for members, at bedside today    Time Spent for Care:  35 minutes. More than 50% of total time spent on counseling and coordination of care, on one or more of the following: performing physical exam; counseling and coordination of care, obtaining or reviewing history, documenting in the medical record, reviewing/ordering tests/medications/procedures, and communicating with other healthcare  professionals.    Current Length of Stay: 7 day(s)  Current Patient Status: Inpatient   Certification Statement:  Patient will continue to require additional hospital stay due to assessments as noted above.    Code Status: Level 3 - DNAR and DNI      Subjective     Encountered earlier in the day with multiple family members, present at bedside.  Generally more awake today and per family, with assistance he is consuming meals.  Less frequency of myoclonic jerks.      Objective     Vitals:   Temp (24hrs), Av.5 °F (36.9 °C), Min:97.6 °F (36.4 °C), Max:99.5 °F (37.5 °C)    Temp:  [97.6 °F (36.4 °C)-99.5 °F (37.5 °C)] 98.2 °F (36.8 °C)  HR:  [85-98] 85  Resp:  [15-20] 18  BP: (122-125)/(72-76) 123/72  SpO2:  [92 %-99 %] 99 %  Body mass index is 28.46 kg/m².     Input and Output Summary (last 24 hours):       Intake/Output Summary (Last 24 hours) at 2024 1600  Last data filed at 2024 1505  Gross per 24 hour   Intake 9286 ml   Output 3350 ml   Net 5936 ml       Physical Exam:     GENERAL Weakness/fatigue persist   HEAD   Normocephalic - atraumatic   EYES   R > L pupillary dilatation noted   MOUTH   Mucosa moist   NECK   Supple - full range of motion   CARDIAC Rate controlled   PULMONARY Mildly diminished at the bases but fairly clear to auscultation otherwise - respirations nonlabored   ABDOMEN Currently nontender/nondistended   MUSCULOSKELETAL   Motor strength/range of motion deconditioned   NEUROLOGIC Mildly sluggish but improved agitation and muscle twitching currently   PSYCHIATRIC   Mood/affect flat         Labs & Recent Cultures:  Results from last 7 days   Lab Units 24  0516   WBC Thousand/uL 9.25   HEMOGLOBIN g/dL 8.9*   HEMATOCRIT % 25.3*   PLATELETS Thousands/uL 317   SEGS PCT % 41*   LYMPHO PCT % 41   MONO PCT % 15*   EOS PCT % 1     Results from last 7 days   Lab Units 24  0516   POTASSIUM mmol/L 3.5   CHLORIDE mmol/L 93*   CO2 mmol/L 30   BUN mg/dL 9   CREATININE mg/dL 0.32*   CALCIUM  mg/dL 7.5*   ALK PHOS U/L 85   ALT U/L 17   AST U/L 23     Results from last 7 days   Lab Units 10/27/24  0639   INR  1.67*     Results from last 7 days   Lab Units 10/31/24  0307 10/28/24  1703   POC GLUCOSE mg/dl 138 117         Results from last 7 days   Lab Units 10/31/24  0851   LACTIC ACID mmol/L 0.7                   Lines/Drains/Telemetry:  Invasive Devices       Peripheral Intravenous Line  Duration             Peripheral IV 10/30/24 Left;Upper Arm 3 days              Hemodialysis Catheter  Duration             HD Temporary Double Catheter 7 days              Drain  Duration             External Urinary Catheter 1 day                    Last 24 Hours Medication List:   Current Facility-Administered Medications   Medication Dose Route Frequency Provider Last Rate    acetaminophen  1,000 mg Intravenous Q6H PRN Joseph Alexander MD 1,000 mg (10/28/24 2103)    bisacodyl  10 mg Rectal Once Juani Luna MD      calcium carbonate  500 mg Oral BID Juani Luna MD      calcium gluconate  2 g Intravenous Once Juani Luna MD 2 g (11/02/24 1557)    docusate sodium  100 mg Oral BID Juani Luna MD      fluticasone  1 spray Nasal Daily Joseph Alexander MD      heparin (porcine)  5,000 Units Subcutaneous Q8H [facility]      [START ON 11/4/2024] levothyroxine  50 mcg Oral Once per day on Monday Tuesday Wednesday Thursday Juani Luna MD      levothyroxine  75 mcg Oral Once per day on Sunday Friday Saturday Juani Luna MD      multi-electrolyte  100 mL/hr Intravenous Continuous Joseph Alexander  mL/hr (11/02/24 0900)    ondansetron  4 mg Intravenous Q6H PRN Joseph Alexander MD      pantoprazole  40 mg Oral Early Morning Joseph Alexander MD      polyethylene glycol  17 g Oral Daily MD JUANI Hager MD   Hospitalist - [facility]        ** Please Note:  Documentation is constructed using a voice recognition dictation system.  An occasional wrong word/phrase or  “sound-a-like” substitution may have been picked up by dictation device due to the inherent limitations of voice recognition software.  Read the chart carefully and recognize, using reasonable context, where substitutions may have occurred.**

## 2024-11-02 NOTE — NURSING NOTE
Patient alert and awake this morning.  PCA attempting to feed breakfast, patient with difficulty swallowing oatmeal and applesauce.  Mouth suctioned, patient comfortable, no signs of distress.  Breakfast tray removed.  Provider made aware.

## 2024-11-02 NOTE — PLAN OF CARE
Problem: PAIN - ADULT  Goal: Verbalizes/displays adequate comfort level or baseline comfort level  Description: Interventions:  - Encourage patient to monitor pain and request assistance  - Assess pain using appropriate pain scale  - Administer analgesics based on type and severity of pain and evaluate response  - Implement non-pharmacological measures as appropriate and evaluate response  - Consider cultural and social influences on pain and pain management  - Notify physician/advanced practitioner if interventions unsuccessful or patient reports new pain  Outcome: Progressing     Problem: INFECTION - ADULT  Goal: Absence or prevention of progression during hospitalization  Description: INTERVENTIONS:  - Assess and monitor for signs and symptoms of infection  - Monitor lab/diagnostic results  - Monitor all insertion sites, i.e. indwelling lines, tubes, and drains  - Monitor endotracheal if appropriate and nasal secretions for changes in amount and color  - [place] appropriate cooling/warming therapies per order  - Administer medications as ordered  - Instruct and encourage patient and family to use good hand hygiene technique  - Identify and instruct in appropriate isolation precautions for identified infection/condition  Outcome: Progressing  Goal: Absence of fever/infection during neutropenic period  Description: INTERVENTIONS:  - Monitor WBC    Outcome: Progressing     Problem: SAFETY ADULT  Goal: Patient will remain free of falls  Description: INTERVENTIONS:  - Educate patient/family on patient safety including physical limitations  - Instruct patient to call for assistance with activity   - Consult OT/PT to assist with strengthening/mobility   - Keep Call bell within reach  - Keep bed low and locked with side rails adjusted as appropriate  - Keep care items and personal belongings within reach  - Initiate and maintain comfort rounds  - Make Fall Risk Sign visible to staff  - Offer Toileting every 2 Hours,  in advance of need  - Initiate/Maintain bed/chair alarm  - Apply yellow socks and bracelet for high fall risk patients  - Consider moving patient to room near nurses station  Outcome: Progressing  Goal: Maintain or return to baseline ADL function  Description: INTERVENTIONS:  -  Assess patient's ability to carry out ADLs; assess patient's baseline for ADL function and identify physical deficits which impact ability to perform ADLs (bathing, care of mouth/teeth, toileting, grooming, dressing, etc.)  - Assess/evaluate cause of self-care deficits   - Assess range of motion  - Assess patient's mobility; develop plan if impaired  - Assess patient's need for assistive devices and provide as appropriate  - Encourage maximum independence but intervene and supervise when necessary  - Involve family in performance of ADLs  - Assess for home care needs following discharge   - Consider OT consult to assist with ADL evaluation and planning for discharge  - Provide patient education as appropriate  Outcome: Progressing  Goal: Maintains/Returns to pre admission functional level  Description: INTERVENTIONS:  - Perform AM-PAC 6 Click Basic Mobility/ Daily Activity assessment daily.  - Set and communicate daily mobility goal to care team and patient/family/caregiver.   - Collaborate with rehabilitation services on mobility goals if consulted  - Reposition patient every 2 hours.  - Dangle patient 3 times a day  - Stand patient 3 times a day  - Ambulate patient 3 times a day  - Out of bed to chair 3 times a day   - Out of bed for meals 3 times a day  - Out of bed for toileting  - Record patient progress and toleration of activity level   Outcome: Progressing

## 2024-11-02 NOTE — ASSESSMENT & PLAN NOTE
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

## 2024-11-02 NOTE — ASSESSMENT & PLAN NOTE
CT of head and MRI of brain unremarkable for acute etiology/abnormalities  Status post prior lumbar puncture with CSF studies negative for meningitis  Concern noted of etiology of fevers due to possible lymphoma/hyperviscosity - transferred to the [facility] for expedited lymphoma workup (see below)  Mentation waxing/waning but mildly improved per family status post initiation of plasmapheresis  Appreciate oncology recommendations -> underwent multiple courses of plasmapheresis, however, confusion remains waxing/waning now with intermittent twitching/myoclonic jerks and asterixis  B12/folate, ammonia, lactic acid, and TSH, all normal  Continue to monitor and replete electrolyte derangements, as necessary  Limit/avoid sedating agents if possible  Appreciate ongoing infectious disease input -> per recommendation of pathology, further infectious studies ordered including Brucella Ab and Histoplasma galactomannan antigen - Mycoplasma pneumonia Ab panel negative  Per oncology, further testing ordered including urine hemosiderin and PNH profile - haptoglobin low and serum LDH normal  Await EEG report and MRI of brain

## 2024-11-03 ENCOUNTER — APPOINTMENT (INPATIENT)
Dept: RADIOLOGY | Facility: HOSPITAL | Age: 84
DRG: 823 | End: 2024-11-03
Payer: COMMERCIAL

## 2024-11-03 ENCOUNTER — ANESTHESIA (INPATIENT)
Dept: PERIOP | Facility: HOSPITAL | Age: 84
End: 2024-11-03
Payer: COMMERCIAL

## 2024-11-03 PROBLEM — D64.9 ANEMIA: Status: ACTIVE | Noted: 2024-11-03

## 2024-11-03 PROBLEM — E83.51 HYPOCALCEMIA: Status: ACTIVE | Noted: 2024-11-03

## 2024-11-03 LAB
ALBUMIN SERPL BCG-MCNC: 3.2 G/DL (ref 3.5–5)
ALP SERPL-CCNC: 93 U/L (ref 34–104)
ALT SERPL W P-5'-P-CCNC: 17 U/L (ref 7–52)
ANION GAP SERPL CALCULATED.3IONS-SCNC: 7 MMOL/L (ref 4–13)
AST SERPL W P-5'-P-CCNC: 25 U/L (ref 13–39)
BASOPHILS # BLD AUTO: 0.09 THOUSANDS/ÂΜL (ref 0–0.1)
BASOPHILS NFR BLD AUTO: 1 % (ref 0–1)
BILIRUB SERPL-MCNC: 1.31 MG/DL (ref 0.2–1)
BUN SERPL-MCNC: 11 MG/DL (ref 5–25)
CA-I BLD-SCNC: 1.03 MMOL/L (ref 1.12–1.32)
CALCIUM ALBUM COR SERPL-MCNC: 8.1 MG/DL (ref 8.3–10.1)
CALCIUM SERPL-MCNC: 7.5 MG/DL (ref 8.4–10.2)
CHLORIDE SERPL-SCNC: 96 MMOL/L (ref 96–108)
CO2 SERPL-SCNC: 29 MMOL/L (ref 21–32)
CREAT SERPL-MCNC: 0.32 MG/DL (ref 0.6–1.3)
EOSINOPHIL # BLD AUTO: 0.07 THOUSAND/ÂΜL (ref 0–0.61)
EOSINOPHIL NFR BLD AUTO: 1 % (ref 0–6)
ERYTHROCYTE [DISTWIDTH] IN BLOOD BY AUTOMATED COUNT: 17.2 % (ref 11.6–15.1)
GFR SERPL CREATININE-BSD FRML MDRD: 117 ML/MIN/1.73SQ M
GLUCOSE SERPL-MCNC: 111 MG/DL (ref 65–140)
HCT VFR BLD AUTO: 27.3 % (ref 36.5–49.3)
HGB BLD-MCNC: 9.8 G/DL (ref 12–17)
IMM GRANULOCYTES # BLD AUTO: 0.07 THOUSAND/UL (ref 0–0.2)
IMM GRANULOCYTES NFR BLD AUTO: 1 % (ref 0–2)
LYMPHOCYTES # BLD AUTO: 3.19 THOUSANDS/ÂΜL (ref 0.6–4.47)
LYMPHOCYTES NFR BLD AUTO: 35 % (ref 14–44)
MCH RBC QN AUTO: 32.9 PG (ref 26.8–34.3)
MCHC RBC AUTO-ENTMCNC: 35.9 G/DL (ref 31.4–37.4)
MCV RBC AUTO: 92 FL (ref 82–98)
MONOCYTES # BLD AUTO: 1.34 THOUSAND/ÂΜL (ref 0.17–1.22)
MONOCYTES NFR BLD AUTO: 15 % (ref 4–12)
NEUTROPHILS # BLD AUTO: 4.32 THOUSANDS/ÂΜL (ref 1.85–7.62)
NEUTS SEG NFR BLD AUTO: 47 % (ref 43–75)
NRBC BLD AUTO-RTO: 0 /100 WBCS
PLATELET # BLD AUTO: 356 THOUSANDS/UL (ref 149–390)
PMV BLD AUTO: 8.9 FL (ref 8.9–12.7)
POTASSIUM SERPL-SCNC: 3.6 MMOL/L (ref 3.5–5.3)
PROT SERPL-MCNC: 6.8 G/DL (ref 6.4–8.4)
RBC # BLD AUTO: 2.98 MILLION/UL (ref 3.88–5.62)
SODIUM SERPL-SCNC: 132 MMOL/L (ref 135–147)
WBC # BLD AUTO: 9.08 THOUSAND/UL (ref 4.31–10.16)

## 2024-11-03 PROCEDURE — 07BH3ZX EXCISION OF RIGHT INGUINAL LYMPHATIC, PERCUTANEOUS APPROACH, DIAGNOSTIC: ICD-10-PCS | Performed by: SURGERY

## 2024-11-03 PROCEDURE — 07DR3ZX EXTRACTION OF ILIAC BONE MARROW, PERCUTANEOUS APPROACH, DIAGNOSTIC: ICD-10-PCS | Performed by: SURGERY

## 2024-11-03 PROCEDURE — 70551 MRI BRAIN STEM W/O DYE: CPT

## 2024-11-03 PROCEDURE — 88185 FLOWCYTOMETRY/TC ADD-ON: CPT | Performed by: SURGERY

## 2024-11-03 PROCEDURE — 88184 FLOWCYTOMETRY/ TC 1 MARKER: CPT | Performed by: SURGERY

## 2024-11-03 PROCEDURE — 80053 COMPREHEN METABOLIC PANEL: CPT | Performed by: INTERNAL MEDICINE

## 2024-11-03 PROCEDURE — 85025 COMPLETE CBC W/AUTO DIFF WBC: CPT | Performed by: INTERNAL MEDICINE

## 2024-11-03 PROCEDURE — 82330 ASSAY OF CALCIUM: CPT | Performed by: INTERNAL MEDICINE

## 2024-11-03 PROCEDURE — 99232 SBSQ HOSP IP/OBS MODERATE 35: CPT | Performed by: INTERNAL MEDICINE

## 2024-11-03 PROCEDURE — 07B53ZX EXCISION OF RIGHT AXILLARY LYMPHATIC, PERCUTANEOUS APPROACH, DIAGNOSTIC: ICD-10-PCS | Performed by: SURGERY

## 2024-11-03 PROCEDURE — 70540 MRI ORBIT/FACE/NECK W/O DYE: CPT

## 2024-11-03 RX ORDER — FENTANYL CITRATE 50 UG/ML
INJECTION, SOLUTION INTRAMUSCULAR; INTRAVENOUS AS NEEDED
Status: DISCONTINUED | OUTPATIENT
Start: 2024-11-03 | End: 2024-11-03

## 2024-11-03 RX ORDER — PROPOFOL 10 MG/ML
INJECTION, EMULSION INTRAVENOUS AS NEEDED
Status: DISCONTINUED | OUTPATIENT
Start: 2024-11-03 | End: 2024-11-03

## 2024-11-03 RX ORDER — PHENYLEPHRINE HCL IN 0.9% NACL 1 MG/10 ML
SYRINGE (ML) INTRAVENOUS AS NEEDED
Status: DISCONTINUED | OUTPATIENT
Start: 2024-11-03 | End: 2024-11-03

## 2024-11-03 RX ORDER — ONDANSETRON 2 MG/ML
INJECTION INTRAMUSCULAR; INTRAVENOUS AS NEEDED
Status: DISCONTINUED | OUTPATIENT
Start: 2024-11-03 | End: 2024-11-03

## 2024-11-03 RX ORDER — LIDOCAINE HYDROCHLORIDE 10 MG/ML
INJECTION, SOLUTION EPIDURAL; INFILTRATION; INTRACAUDAL; PERINEURAL AS NEEDED
Status: DISCONTINUED | OUTPATIENT
Start: 2024-11-03 | End: 2024-11-03

## 2024-11-03 RX ORDER — SODIUM CHLORIDE, SODIUM LACTATE, POTASSIUM CHLORIDE, CALCIUM CHLORIDE 600; 310; 30; 20 MG/100ML; MG/100ML; MG/100ML; MG/100ML
20 INJECTION, SOLUTION INTRAVENOUS CONTINUOUS
Status: DISCONTINUED | OUTPATIENT
Start: 2024-11-03 | End: 2024-11-03

## 2024-11-03 RX ORDER — ONDANSETRON 2 MG/ML
4 INJECTION INTRAMUSCULAR; INTRAVENOUS ONCE AS NEEDED
Status: DISCONTINUED | OUTPATIENT
Start: 2024-11-03 | End: 2024-11-03 | Stop reason: HOSPADM

## 2024-11-03 RX ORDER — HYDROMORPHONE HCL IN WATER/PF 6 MG/30 ML
0.2 PATIENT CONTROLLED ANALGESIA SYRINGE INTRAVENOUS
Status: DISCONTINUED | OUTPATIENT
Start: 2024-11-03 | End: 2024-11-03 | Stop reason: HOSPADM

## 2024-11-03 RX ORDER — BUPIVACAINE HYDROCHLORIDE 2.5 MG/ML
INJECTION, SOLUTION EPIDURAL; INFILTRATION; INTRACAUDAL AS NEEDED
Status: DISCONTINUED | OUTPATIENT
Start: 2024-11-03 | End: 2024-11-03 | Stop reason: HOSPADM

## 2024-11-03 RX ORDER — DEXAMETHASONE SODIUM PHOSPHATE 10 MG/ML
INJECTION, SOLUTION INTRAMUSCULAR; INTRAVENOUS AS NEEDED
Status: DISCONTINUED | OUTPATIENT
Start: 2024-11-03 | End: 2024-11-03

## 2024-11-03 RX ORDER — FENTANYL CITRATE/PF 50 MCG/ML
25 SYRINGE (ML) INJECTION
Status: DISCONTINUED | OUTPATIENT
Start: 2024-11-03 | End: 2024-11-03 | Stop reason: HOSPADM

## 2024-11-03 RX ORDER — MAGNESIUM HYDROXIDE 1200 MG/15ML
LIQUID ORAL AS NEEDED
Status: DISCONTINUED | OUTPATIENT
Start: 2024-11-03 | End: 2024-11-03 | Stop reason: HOSPADM

## 2024-11-03 RX ORDER — CALCIUM GLUCONATE 20 MG/ML
2 INJECTION, SOLUTION INTRAVENOUS ONCE
Status: COMPLETED | OUTPATIENT
Start: 2024-11-03 | End: 2024-11-04

## 2024-11-03 RX ORDER — CEFAZOLIN SODIUM 1 G/3ML
INJECTION, POWDER, FOR SOLUTION INTRAMUSCULAR; INTRAVENOUS AS NEEDED
Status: DISCONTINUED | OUTPATIENT
Start: 2024-11-03 | End: 2024-11-03

## 2024-11-03 RX ADMIN — DEXAMETHASONE SODIUM PHOSPHATE 8 MG: 10 INJECTION, SOLUTION INTRAMUSCULAR; INTRAVENOUS at 08:20

## 2024-11-03 RX ADMIN — SODIUM CHLORIDE, SODIUM GLUCONATE, SODIUM ACETATE, POTASSIUM CHLORIDE, MAGNESIUM CHLORIDE, SODIUM PHOSPHATE, DIBASIC, AND POTASSIUM PHOSPHATE 100 ML/HR: .53; .5; .37; .037; .03; .012; .00082 INJECTION, SOLUTION INTRAVENOUS at 23:38

## 2024-11-03 RX ADMIN — CEFAZOLIN 2000 MG: 1 INJECTION, POWDER, FOR SOLUTION INTRAMUSCULAR; INTRAVENOUS at 08:07

## 2024-11-03 RX ADMIN — Medication 200 MCG: at 08:38

## 2024-11-03 RX ADMIN — FENTANYL CITRATE 25 MCG: 50 INJECTION INTRAMUSCULAR; INTRAVENOUS at 08:06

## 2024-11-03 RX ADMIN — CALCIUM CARBONATE (ANTACID) CHEW TAB 500 MG 500 MG: 500 CHEW TAB at 21:14

## 2024-11-03 RX ADMIN — HEPARIN SODIUM 5000 UNITS: 5000 INJECTION INTRAVENOUS; SUBCUTANEOUS at 21:14

## 2024-11-03 RX ADMIN — LIDOCAINE HYDROCHLORIDE 50 MG: 10 INJECTION, SOLUTION EPIDURAL; INFILTRATION; INTRACAUDAL; PERINEURAL at 08:01

## 2024-11-03 RX ADMIN — Medication 200 MCG: at 08:20

## 2024-11-03 RX ADMIN — ONDANSETRON 4 MG: 2 INJECTION INTRAMUSCULAR; INTRAVENOUS at 08:20

## 2024-11-03 RX ADMIN — SODIUM CHLORIDE, SODIUM GLUCONATE, SODIUM ACETATE, POTASSIUM CHLORIDE, MAGNESIUM CHLORIDE, SODIUM PHOSPHATE, DIBASIC, AND POTASSIUM PHOSPHATE 100 ML/HR: .53; .5; .37; .037; .03; .012; .00082 INJECTION, SOLUTION INTRAVENOUS at 04:27

## 2024-11-03 RX ADMIN — PROPOFOL 506018 MG: 10 INJECTION, EMULSION INTRAVENOUS at 07:29

## 2024-11-03 RX ADMIN — HEPARIN SODIUM 5000 UNITS: 5000 INJECTION INTRAVENOUS; SUBCUTANEOUS at 05:00

## 2024-11-03 RX ADMIN — PROPOFOL 80 MG: 10 INJECTION, EMULSION INTRAVENOUS at 08:01

## 2024-11-03 RX ADMIN — Medication 200 MCG: at 08:13

## 2024-11-03 RX ADMIN — PHENYLEPHRINE HYDROCHLORIDE 20 MCG/MIN: 10 INJECTION INTRAVENOUS at 08:13

## 2024-11-03 RX ADMIN — HEPARIN SODIUM 5000 UNITS: 5000 INJECTION INTRAVENOUS; SUBCUTANEOUS at 14:20

## 2024-11-03 RX ADMIN — FENTANYL CITRATE 25 MCG: 50 INJECTION INTRAMUSCULAR; INTRAVENOUS at 08:42

## 2024-11-03 RX ADMIN — FENTANYL CITRATE 50 MCG: 50 INJECTION INTRAMUSCULAR; INTRAVENOUS at 08:30

## 2024-11-03 RX ADMIN — POLYETHYLENE GLYCOL 3350 17 G: 17 POWDER, FOR SOLUTION ORAL at 12:25

## 2024-11-03 RX ADMIN — PROPOFOL 50 MG: 10 INJECTION, EMULSION INTRAVENOUS at 08:12

## 2024-11-03 RX ADMIN — SODIUM CHLORIDE, SODIUM GLUCONATE, SODIUM ACETATE, POTASSIUM CHLORIDE, MAGNESIUM CHLORIDE, SODIUM PHOSPHATE, DIBASIC, AND POTASSIUM PHOSPHATE 100 ML/HR: .53; .5; .37; .037; .03; .012; .00082 INJECTION, SOLUTION INTRAVENOUS at 10:37

## 2024-11-03 RX ADMIN — CALCIUM GLUCONATE 2 G: 20 INJECTION, SOLUTION INTRAVENOUS at 14:19

## 2024-11-03 NOTE — PLAN OF CARE
Problem: PAIN - ADULT  Goal: Verbalizes/displays adequate comfort level or baseline comfort level  Description: Interventions:  - Encourage patient to monitor pain and request assistance  - Assess pain using appropriate pain scale  - Administer analgesics based on type and severity of pain and evaluate response  - Implement non-pharmacological measures as appropriate and evaluate response  - Consider cultural and social influences on pain and pain management  - Notify physician/advanced practitioner if interventions unsuccessful or patient reports new pain  Outcome: Progressing     Problem: INFECTION - ADULT  Goal: Absence or prevention of progression during hospitalization  Description: INTERVENTIONS:  - Assess and monitor for signs and symptoms of infection  - Monitor lab/diagnostic results  - Monitor all insertion sites, i.e. indwelling lines, tubes, and drains  - Monitor endotracheal if appropriate and nasal secretions for changes in amount and color  - [place] appropriate cooling/warming therapies per order  - Administer medications as ordered  - Instruct and encourage patient and family to use good hand hygiene technique  - Identify and instruct in appropriate isolation precautions for identified infection/condition  Outcome: Progressing  Goal: Absence of fever/infection during neutropenic period  Description: INTERVENTIONS:  - Monitor WBC    Outcome: Progressing

## 2024-11-03 NOTE — QUICK NOTE
Surgery Post-Op Check  Miguel Henderson 84 y.o. male MRN: [id number]  Unit/Bed#: [facility] 817-01 Encounter: [id number]     S: Patient seen and examined bedside with family present. Patient awake watching movies, drinking and eating. No post-op issues.    O:   Vitals:    11/03/24 1219   BP: 135/84   Pulse: 94   Resp: 18   Temp: 98.6 °F (37 °C)   SpO2: 97%     I/O         11/01 0701 11/02 0700 11/02 0701 11/03 0700 11/03 0701 11/04 0700    P.O. 120 120     I.V. (mL/kg) 8866 (114.3) 2300 (29.6) 800 (10.3)    Total Intake(mL/kg) 8986 (115.8) 2420 (31.2) 800 (10.3)    Urine (mL/kg/hr) 2200 (1.2) 2950 (1.6)     Stool       Total Output 2200 2950     Net +6786 -530 +800                 PE:  Gen:  No acute distress  CV:  Warm, well-perfused  Lung:  Normal work of breathing, no respiratory distress  Right Groin:  Soft, no palpable or obvious underlying hematoma, incision c/d/i  Ext:  Moving all extremities  Neuro:  Alert, M/S grossly intact     Lab Results   Component Value Date    WBC 9.08 11/03/2024    HGB 9.8 (L) 11/03/2024    HCT 27.3 (L) 11/03/2024    MCV 92 11/03/2024     11/03/2024     Lab Results   Component Value Date    CALCIUM 7.5 (L) 11/03/2024    K 3.6 11/03/2024    CO2 29 11/03/2024    CL 96 11/03/2024    BUN 11 11/03/2024    CREATININE 0.32 (L) 11/03/2024         A/P: 84 y.o. male Day of Surgery s/p Procedure(s) (LRB):  EXCISION BIOPSY LYMPH NODE INGUINAL (Right)    Plan:  Keep incision clean and dry  F/u pathology  Rest of care per primary and oncology teams      Argelia Mccallum MD  [id number], General Surgery

## 2024-11-03 NOTE — PLAN OF CARE
Problem: PAIN - ADULT  Goal: Verbalizes/displays adequate comfort level or baseline comfort level  Description: Interventions:  - Encourage patient to monitor pain and request assistance  - Assess pain using appropriate pain scale  - Administer analgesics based on type and severity of pain and evaluate response  - Implement non-pharmacological measures as appropriate and evaluate response  - Consider cultural and social influences on pain and pain management  - Notify physician/advanced practitioner if interventions unsuccessful or patient reports new pain  Outcome: Progressing     Problem: DISCHARGE PLANNING  Goal: Discharge to home or other facility with appropriate resources  Description: INTERVENTIONS:  - Identify barriers to discharge w/patient and caregiver  - Arrange for needed discharge resources and transportation as appropriate  - Identify discharge learning needs (meds, wound care, etc.)  - Arrange for interpretive services to assist at discharge as needed  - Refer to Case Management Department for coordinating discharge planning if the patient needs post-hospital services based on physician/advanced practitioner order or complex needs related to functional status, cognitive ability, or social support system  Outcome: Progressing     Problem: Knowledge Deficit  Goal: Patient/family/caregiver demonstrates understanding of disease process, treatment plan, medications, and discharge instructions  Description: Complete learning assessment and assess knowledge base.  Interventions:  - Provide teaching at level of understanding  - Provide teaching via preferred learning methods  Outcome: Progressing     Problem: Prexisting or High Potential for Compromised Skin Integrity  Goal: Skin integrity is maintained or improved  Description: INTERVENTIONS:  - Identify patients at risk for skin breakdown  - Assess and monitor skin integrity  - Assess and monitor nutrition and hydration status  - Monitor labs   - Assess  for incontinence   - Turn and reposition patient  - Assist with mobility/ambulation  - Relieve pressure over bony prominences  - Avoid friction and shearing  - Provide appropriate hygiene as needed including keeping skin clean and dry  - Evaluate need for skin moisturizer/barrier cream  - Collaborate with interdisciplinary team   - Patient/family teaching  - Consider wound care consult   Outcome: Progressing

## 2024-11-03 NOTE — OP NOTE
OPERATIVE REPORT  PATIENT NAME: Miguel Henderson    :  1940  MRN: [id number]  Pt Location: [facility] OR ROOM 08    SURGERY DATE: 11/3/2024    Surgeons and Role:     * Michael Rush MD - Primary     * Argelia Mccallum MD - Assisting    Preop Diagnosis:  Lymphadenopathy [R59.1]    Post-Op Diagnosis Codes:     * Lymphadenopathy [R59.1]    Procedure(s):  Right - EXCISION BIOPSY LYMPH NODE INGUINAL    Specimen(s):  ID Type Source Tests Collected by Time Destination   1 : RIGHT INGUINAL LYMPH NODE FOR LYMPHOMA PROTOCOL Tissue Lymph Node - Lymphoma Prtocol TISSUE EXAM, LEUKEMIA/LYMPHOMA FLOW CYTOMETRY Michael Rush MD 11/3/2024  8:25 AM        Estimated Blood Loss:   Minimal    Drains:  External Urinary Catheter (Active)   Collection Container Standard drainage bag 24   Suction Pressure (mmHg) 40 mmHg 24   Interventions Pericare performed;Device changed;Suction canister changed;Suction tubing changed 24   Output (mL) 1200 mL 24 0441   Number of days: 3       Anesthesia Type:   General    Operative Indications:  Lymphadenopathy [R59.1]  84-year-old male with imaging findings suspicious of lymphoma.  Previous biopsy was nondiagnostic.  It was recommended that he undergo excisional biopsy.  Risks were explained to the patient and his family including bleeding, infection, need for further surgery, wound complications, neurovascular damage and lymphedema.  Informed consent was obtained.  Patient was brought to the operating room.    Operative Findings:  Enlarged right inguinal lymph node      Complications:   None    Procedure and Technique:  After identifying the patient, general anesthesia was induced.  An ultrasound was performed over the inguinal region.  An enlarged, abnormal appearing lymph node was identified.  This skin was then appropriately marked.  Patient was then prepped and draped in the usual sterile fashion.  A timeout was performed.  Local anesthesia was infiltrated into the  wound.  A skin incision was made.  Using sharp dissection this was taken through the skin, subcutaneous tissue and down to the lymph node.  The lymph node was elevated with 2-0 Vicryl suture.  Lymphatics were clamped, divided and ligated with 2-0 Vicryl suture.  Specimen was sent fresh to pathology to rule out lymphoma.  Wound was now copiously irrigated.  There was excellent stasis.  Local anesthesia was infiltrated into the wound and there continues to be excellent hemostasis.  The incision was approximated with 2 deep layers of interrupted 2-0 Vicryl suture.  Skin was approximated with a running 4-0 Monocryl suture in a subtalar fashion.  Skin glue was used on the skin.  Patient was extubated and taken to the recovery room in stable condition having tolerated the procedure well.  I was present and participated in all aspects of this procedure.       I was present for the entire procedure.    Patient Disposition:  PACU     This procedure was not performed to treat cancer              SIGNATURE: Michael Rush MD  DATE: November 3, 2024  TIME: 8:43 AM

## 2024-11-03 NOTE — ANESTHESIA POSTPROCEDURE EVALUATION
Post-Op Assessment Note    CV Status:  Stable  Pain Score: 0    Pain management: adequate    Multimodal analgesia used between 6 hours prior to anesthesia start to PACU discharge    Mental Status:  Awake and sleepy   Hydration Status:  Euvolemic   PONV Controlled:  Controlled   Airway Patency:  Patent  Two or more mitigation strategies used for obstructive sleep apnea   Post Op Vitals Reviewed: Yes    No anethesia notable event occurred.    Staff: CRNA           Last Filed PACU Vitals:  Vitals Value Taken Time   Temp 98.1    Pulse 82 11/03/24 0858   /65 11/03/24 0858   Resp 18 11/03/24 0858   SpO2 99 % 11/03/24 0858   Vitals shown include unfiled device data.    Modified Rosangela:  No data recorded

## 2024-11-03 NOTE — PROGRESS NOTES
Progress Note - Hospitalist   Name: Miguel Henderson 84 y.o. male I MRN: [id number]  [facility] Date of Admission: 10/26/2024   Date of Service: 11/3/2024 I Hospital Day: 8    Assessment & Plan  Encephalopathy  CT of head and MRI of brain unremarkable for acute etiology/abnormalities  Status post prior lumbar puncture with CSF studies negative for meningitis  Concern noted of etiology of fevers due to possible lymphoma/hyperviscosity - transferred to the [facility] for expedited lymphoma workup (see below)  Mentation waxing/waning but mildly improved per family status post initiation of plasmapheresis -> mentation continues to improve today  Appreciate oncology recommendations -> underwent multiple courses of plasmapheresis, however, confusion remains waxing/waning now with intermittent twitching/myoclonic jerks and asterixis  B12/folate, ammonia, lactic acid, and TSH, all normal  Initial EEG noted possibility of association between myoclonic jerks and epileptic etiology, with recommendation of repeat EEG to confirm  Continue to monitor and replete electrolyte derangements, as necessary  Limit/avoid sedating agents if possible  Appreciate ongoing infectious disease input -> per recommendation of pathology, further infectious studies ordered including Brucella Ab and Histoplasma galactomannan antigen - Mycoplasma pneumonia Ab panel negative  Per oncology, further testing ordered including urine hemosiderin and PNH profile - haptoglobin low and serum LDH normal  Await repeat EEG report and MRI of brain  Lymphadenopathy  Status post left inguinal lymph node biopsy on 10/28 -> await full pathology, however, oncology note reviewed -> SPEP with elevated gamma spike, elevated IgA/G/M, positive free lambda light chain, elevated beta-2 microglobulin, and high normal viscosity  Per pathologist recommendation, medical oncology consulted surgical oncology for an excisional biopsy (done this morning) for  better sampling as 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 disorder  Status post bone marrow biopsy on 10/29 -> await final pathology, although preliminary report noted some cold agglutinins (see plan for encephalopathy above regarding hemolysis labs)  Status post courses of plasmapheresis per oncology  Supportive care  Abnormal LFTs  Elevated bilirubin and alkaline phosphatase generally improving  Suspected to be associated with lymphadenopathy (above)  Limit/avoid hepatotoxins as possible  Hypothyroidism  Continue Synthroid  Primary hypertension  Holding HCTZ due to soft pressures at this time  Fever  Does not appear to be infectious as workup negative thus far  Appreciate ID recommendations - continue to monitor off antibiotics  Monitor temperature curve  COVID-19 virus infection  Tested positive on 10/21  Currently oxygenating on baseline room air   Contact/airborne precautions now discontinued   Supportive care otherwise  Anemia  Hemoglobin stable at 9.8  Likely associated with lymphadenopathy  Hypocalcemia  Serum Andrion's calcium low -> continue intravenous and oral supplementation  Consider possible contribution to myoclonus      VTE Pharmacologic Prophylaxis: VTE Score: 5 High Risk (Score >/= 5) - Pharmacological DVT Prophylaxis Ordered: Heparin. Sequential Compression Devices Ordered.    AM-PAC Basic Mobility:  Basic Mobility Inpatient Raw Score: 7  JH-HLM Goal: 2: Bed activities/Dependent transfer  JH-HLM Achieved: 2: Bed activities/Dependent transfer  JH-HLM Goal achieved. Continue to encourage appropriate mobility.    Patient Centered Rounds:  I have performed bedside rounds and discussed plan of care with nursing today.  Discussions with Specialists or Other Care Team Provider:  see above assessments if applicable    Education and Discussions with Family / Patient:  Patient, along with multiple for members, at bedside  today    Time Spent for Care:  35 minutes. More than 50% of total time spent on counseling and coordination of care, on one or more of the following: performing physical exam; counseling and coordination of care, obtaining or reviewing history, documenting in the medical record, reviewing/ordering tests/medications/procedures, and communicating with other healthcare professionals.    Current Length of Stay: 8 day(s)  Current Patient Status: Inpatient   Certification Statement:  Patient will continue to require additional hospital stay due to assessments as noted above.    Code Status: Level 3 - DNAR and DNI      Subjective     Seen and examined earlier in the morning.  Revisited patient later in the day after arrival of family.  With family present in room, patient was sitting upright in bed attempting to consume his own meal with voluntary and coordinated movements, still requiring intermittent assistance.      Objective     Vitals:   Temp (24hrs), Av.3 °F (36.8 °C), Min:96.1 °F (35.6 °C), Max:98.8 °F (37.1 °C)    Temp:  [96.1 °F (35.6 °C)-98.8 °F (37.1 °C)] 98.6 °F (37 °C)  HR:  [77-97] 94  Resp:  [18-22] 18  BP: (103-135)/(58-84) 134/83  SpO2:  [89 %-100 %] 98 %  Body mass index is 28.46 kg/m².     Input and Output Summary (last 24 hours):       Intake/Output Summary (Last 24 hours) at 11/3/2024 1625  Last data filed at 11/3/2024 1412  Gross per 24 hour   Intake 3200 ml   Output 2100 ml   Net 1100 ml       Physical Exam:     GENERAL Waxing/waning weakness/fatigue   HEAD   Normocephalic - atraumatic   EYES   R > L pupillary dilatation present   MOUTH   Mucosa moist   NECK   Supple - full range of motion   CARDIAC Rate controlled   PULMONARY Breath sounds improved at the bases - respirations nonlabored   ABDOMEN Nontender/nondistended currently   MUSCULOSKELETAL   Motor strength/range of motion remains deconditioned   NEUROLOGIC Improving sluggishness   PSYCHIATRIC   Mood/affect flat         Labs & Recent  Cultures:  Results from last 7 days   Lab Units 11/03/24  0511   WBC Thousand/uL 9.08   HEMOGLOBIN g/dL 9.8*   HEMATOCRIT % 27.3*   PLATELETS Thousands/uL 356   SEGS PCT % 47   LYMPHO PCT % 35   MONO PCT % 15*   EOS PCT % 1     Results from last 7 days   Lab Units 11/03/24  0511   POTASSIUM mmol/L 3.6   CHLORIDE mmol/L 96   CO2 mmol/L 29   BUN mg/dL 11   CREATININE mg/dL 0.32*   CALCIUM mg/dL 7.5*   ALK PHOS U/L 93   ALT U/L 17   AST U/L 25           Results from last 7 days   Lab Units 10/31/24  0307 10/28/24  1703   POC GLUCOSE mg/dl 138 117         Results from last 7 days   Lab Units 10/31/24  0851   LACTIC ACID mmol/L 0.7                   Lines/Drains/Telemetry:  Invasive Devices       Peripheral Intravenous Line  Duration             Peripheral IV 11/03/24 Right;Ventral (anterior) Forearm <1 day              Hemodialysis Catheter  Duration             HD Temporary Double Catheter 8 days              Drain  Duration             External Urinary Catheter Medium <1 day                    Last 24 Hours Medication List:   Current Facility-Administered Medications   Medication Dose Route Frequency Provider Last Rate    acetaminophen  1,000 mg Intravenous Q6H PRN Argelia Mccallum MD 1,000 mg (10/28/24 2103)    bisacodyl  10 mg Rectal Once Argelia Mccallum MD      calcium carbonate  500 mg Oral BID Argelia Mccallum MD      docusate sodium  100 mg Oral BID Argelia Mccallum MD      fluticasone  1 spray Nasal Daily Argelia Mccallum MD      heparin (porcine)  5,000 Units Subcutaneous Q8H [facility] Argelia Mccallum MD      [START ON 11/4/2024] levothyroxine  50 mcg Oral Once per day on Monday Tuesday Wednesday Thursday Argelia Mccallum MD      levothyroxine  75 mcg Oral Once per day on Sunday Friday Saturday Argelia Mccallum MD      multi-electrolyte  100 mL/hr Intravenous Continuous Argelia Mccallum  mL/hr (11/03/24 1037)    ondansetron  4 mg Intravenous Q6H PRN Argelia Mccallum MD      pantoprazole  40 mg Oral Early Morning Argelia Mccallum MD      polyethylene glycol  17 g Oral Daily  MD JENNIFER Odom MD   Hospitalist - [facility] Internal Medicine        ** Please Note:  Documentation is constructed using a voice recognition dictation system.  An occasional wrong word/phrase or “sound-a-like” substitution may have been picked up by dictation device due to the inherent limitations of voice recognition software.  Read the chart carefully and recognize, using reasonable context, where substitutions may have occurred.**

## 2024-11-03 NOTE — ANESTHESIA POSTPROCEDURE EVALUATION
Post-Op Assessment Note    CV Status:  Stable    Pain management: adequate    Multimodal analgesia used between 6 hours prior to anesthesia start to PACU discharge    Mental Status:  Awake and somnolent   Hydration Status:  Stable   PONV Controlled:  Controlled   Airway Patency:  Patent  Two or more mitigation strategies used for obstructive sleep apnea   Post Op Vitals Reviewed: Yes    No anethesia notable event occurred.    Staff: Anesthesiologist           Last Filed PACU Vitals:  Vitals Value Taken Time   Temp 98.7 °F (37.1 °C) 11/03/24 0945   Pulse 83 11/03/24 0948   /59 11/03/24 0945   Resp 20 11/03/24 0948   SpO2 93 % 11/03/24 0948   Vitals shown include unfiled device data.    Modified Rosangela:  Activity: 2 (11/3/2024  9:45 AM)  Respiration: 2 (11/3/2024  9:45 AM)  Circulation: 2 (11/3/2024  9:45 AM)  Consciousness: 1 (11/3/2024  9:45 AM)  Oxygen Saturation: 2 (11/3/2024  9:45 AM)  Modified Rosangela Score: 9 (11/3/2024  9:45 AM)

## 2024-11-03 NOTE — ANESTHESIA PREPROCEDURE EVALUATION
"Procedure:  EXCISION BIOPSY LYMPH NODE AXILLARY, possible inguinal (Right: Axilla)    Relevant Problems   CARDIO   (+) Primary hypertension   (+) Shortness of breath on exertion      ENDO   (+) Hypothyroidism      GI/HEPATIC   (+) Dysphagia   (+) Liver lesion      /RENAL   (+) Adenocarcinoma of prostate (HCC)      PULMONARY   (+) Shortness of breath on exertion      Lab Results   Component Value Date    SODIUM 132 (L) 11/03/2024    K 3.6 11/03/2024    BUN 11 11/03/2024    CREATININE 0.32 (L) 11/03/2024    EGFR 117 11/03/2024     Lab Results   Component Value Date    HGBA1C 5.9 (H) 06/18/2024       Lab Results   Component Value Date    HGB 9.8 (L) 11/03/2024    HGB 8.9 (L) 11/02/2024    HGB 8.4 (L) 11/01/2024     11/03/2024     11/02/2024     11/01/2024     Lab Results   Component Value Date    WBC 9.08 11/03/2024       Lab Results   Component Value Date    CREATININE 0.32 (L) 11/03/2024    CREATININE 0.32 (L) 11/02/2024    CREATININE 0.34 (L) 11/01/2024       Lab Results   Component Value Date    INR 1.67 (H) 10/27/2024    INR 1.33 (H) 10/26/2024    INR 1.05 10/21/2024     Lab Results   Component Value Date    PTT 33 10/21/2024    PTT 32 10/07/2024       No results found for: \"LACTATE\"      Type and Screen  A    Study Details    This transthoracic echocardiogram was performed at bedside. This was a routine, inpatient study. Study quality was adequate. A complete 2D, color flow Doppler, spectral Doppler, 2D, color flow Doppler and spectral Doppler transthoracic echocardiogram was performed.  The apical, parasternal, subcostal and suprasternal views were obtained.     History    Hypertension     Interpretation Summary         Left Ventricle: Left ventricular cavity size is normal. Wall thickness is mildly increased. The left ventricular ejection fraction is 55%. Systolic function is normal. Wall motion is normal.    Aortic Valve: There is aortic valve sclerosis.    Aorta: The aortic root is " mildly dilated. The ascending aorta is mildly dilated. The aortic root is 3.80 cm. The ascending aorta is 3.9 cm.     Findings    Left Ventricle Left ventricular cavity size is normal. Wall thickness is mildly increased. The left ventricular ejection fraction is 55%. Systolic function is normal. Wall motion is normal.   Right Ventricle Right ventricular cavity size is normal. Systolic function is normal. Normal tricuspid annular plane systolic excursion (TAPSE) > 1.7 cm.   Left Atrium The atrium is normal in size.   Right Atrium The atrium is normal in size.   Aortic Valve The aortic valve is trileaflet. There is trace regurgitation. There is aortic valve sclerosis.   Mitral Valve There is trace regurgitation. There is no evidence of stenosis.   Tricuspid Valve Tricuspid valve structure is normal. There is trace regurgitation. There is no evidence of stenosis.   Pulmonic Valve There is trace regurgitation. There is no evidence of stenosis.   Ascending Aorta The aortic root is mildly dilated. The ascending aorta is mildly dilated. The aortic root is 3.80 cm. The ascending aorta is 3.9 cm.   IVC/SVC The inferior vena cava is normal in size.   Pericardium There is a trivial-small pericardial effusion circumferential to the heart. There is no echocardiographic evidence of tamponade. The evidence against tamponade includes: no right ventricular compression.                   Physical Exam    Airway    Mallampati score: II  TM Distance: >3 FB       Dental       Cardiovascular      Pulmonary      Other Findings        Anesthesia Plan  ASA Score- 3     Anesthesia Type- general with ASA Monitors.         Additional Monitors:     Airway Plan: LMA.    Comment:  IFaheem M.D., have personally seen and evaluated the patient prior to anesthetic care.  I have reviewed the pre-anesthetic record, and other medical records if appropriate to the anesthetic care.  If a CRNA is involved in the case, I have reviewed the  CRNA assessment, if present, and agree.      Risks/benefits and alternatives discussed with patient including possible PONV, sore throat, and possibility of rare anesthetic and surgical emergencies.  .       Plan Factors-    Chart reviewed.   Existing labs reviewed. Patient summary reviewed.    Patient is not a current smoker.  Patient instructed to abstain from smoking on day of procedure. Patient did not smoke on day of surgery.    There is medical exclusion for perioperative obstructive sleep apnea risk education.        Induction- intravenous.    Postoperative Plan- Plan for postoperative opioid use. Planned trial extubation    Perioperative Resuscitation Plan - Level 1 - Full Code.       Informed Consent- Anesthetic plan and risks discussed with patient.  I personally reviewed this patient with the CRNA. Discussed and agreed on the Anesthesia Plan with the CRNA..

## 2024-11-03 NOTE — PROGRESS NOTES
"Progress Note - Colorectal   Name: Miguel Henderson 84 y.o. male I MRN: [id number]  [facility] Date of Admission: 10[facility]24   Date of Service: 11/3/2024 I Hospital Day: 8     Assessment & Plan  Lymphadenopathy  84 y.o. male with ongoing w/u c/f lymphoma    Plan:  OR today for excisional lymph node biopsy      Subjective :   Patient seen and examined bedside.  Nonverbal. Comfortable appearing.    Objective :  Temp:  [96.1 °F (35.6 °C)-98.6 °F (37 °C)] 98.5 °F (36.9 °C)  HR:  [85-97] 97  BP: (120-125)/(71-76) 120/71  Resp:  [15-20] 20  SpO2:  [92 %-99 %] 97 %  O2 Device: None (Room air)    Physical Exam  Vitals reviewed.   HENT:      Right Ear: External ear normal.      Left Ear: External ear normal.      Nose: Nose normal.      Mouth/Throat:      Mouth: Mucous membranes are moist.      Pharynx: Oropharynx is clear.   Cardiovascular:      Rate and Rhythm: Normal rate.   Pulmonary:      Effort: Pulmonary effort is normal. No respiratory distress.   Genitourinary:     Comments: Right groin with palpable lymphadenopathy  Musculoskeletal:         General: Normal range of motion.      Cervical back: Normal range of motion.   Skin:     General: Skin is warm and dry.         Lab Results: I have reviewed the following results:CBC/BMP:   .     11/03/24  0511   WBC 9.08   HGB 9.8*   HCT 27.3*      SODIUM 132*   K 3.6   CL 96   CO2 29   BUN 11   CREATININE 0.32*   GLUC 111   CAIONIZED 1.03*    , Creatinine Clearance: Estimated Creatinine Clearance: 159.1 mL/min (A) (by C-G formula based on SCr of 0.32 mg/dL (L))., LFTs:   .     11/03/24  0511   AST 25   ALT 17   ALB 3.2*   TBILI 1.31*   ALKPHOS 93    , PTT/INR:No new results in last 24 hours. , Troponin,BNP:No new results in last 24 hours. , Lactic Acid: No new results in last 24 hours. , Procalcitonin: No results found for: \"PROCALCITONI\", ABG: No new results in last 24 hours. , Lipase: No results found for: \"LIPASE\", Amylase: No results found for: " "\"AMYLASE\"    Imaging Results Review: No pertinent imaging studies reviewed.  Other Study Results Review: No additional pertinent studies reviewed.    "

## 2024-11-03 NOTE — ASSESSMENT & PLAN NOTE
Serum Andrion's calcium low -> continue intravenous and oral supplementation  Consider possible contribution to myoclonus

## 2024-11-03 NOTE — ASSESSMENT & PLAN NOTE
CT of head and MRI of brain unremarkable for acute etiology/abnormalities  Status post prior lumbar puncture with CSF studies negative for meningitis  Concern noted of etiology of fevers due to possible lymphoma/hyperviscosity - transferred to the [facility] for expedited lymphoma workup (see below)  Mentation waxing/waning but mildly improved per family status post initiation of plasmapheresis -> mentation continues to improve today  Appreciate oncology recommendations -> underwent multiple courses of plasmapheresis, however, confusion remains waxing/waning now with intermittent twitching/myoclonic jerks and asterixis  B12/folate, ammonia, lactic acid, and TSH, all normal  Initial EEG noted possibility of association between myoclonic jerks and epileptic etiology, with recommendation of repeat EEG to confirm  Continue to monitor and replete electrolyte derangements, as necessary  Limit/avoid sedating agents if possible  Appreciate ongoing infectious disease input -> per recommendation of pathology, further infectious studies ordered including Brucella Ab and Histoplasma galactomannan antigen - Mycoplasma pneumonia Ab panel negative  Per oncology, further testing ordered including urine hemosiderin and PNH profile - haptoglobin low and serum LDH normal  Await repeat EEG report and MRI of brain

## 2024-11-03 NOTE — ASSESSMENT & PLAN NOTE
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

## 2024-11-04 LAB
ALBUMIN SERPL BCG-MCNC: 3.1 G/DL (ref 3.5–5)
ALP SERPL-CCNC: 86 U/L (ref 34–104)
ALT SERPL W P-5'-P-CCNC: 14 U/L (ref 7–52)
ANION GAP SERPL CALCULATED.3IONS-SCNC: 8 MMOL/L (ref 4–13)
AST SERPL W P-5'-P-CCNC: 27 U/L (ref 13–39)
BASOPHILS # BLD AUTO: 0.07 THOUSANDS/ÂΜL (ref 0–0.1)
BASOPHILS NFR BLD AUTO: 1 % (ref 0–1)
BILIRUB SERPL-MCNC: 1.27 MG/DL (ref 0.2–1)
BUN SERPL-MCNC: 12 MG/DL (ref 5–25)
CA-I BLD-SCNC: 1.03 MMOL/L (ref 1.12–1.32)
CALCIUM ALBUM COR SERPL-MCNC: 8.4 MG/DL (ref 8.3–10.1)
CALCIUM SERPL-MCNC: 7.7 MG/DL (ref 8.4–10.2)
CHLORIDE SERPL-SCNC: 101 MMOL/L (ref 96–108)
CO2 SERPL-SCNC: 27 MMOL/L (ref 21–32)
CREAT SERPL-MCNC: 0.37 MG/DL (ref 0.6–1.3)
EOSINOPHIL # BLD AUTO: 0.03 THOUSAND/ÂΜL (ref 0–0.61)
EOSINOPHIL NFR BLD AUTO: 0 % (ref 0–6)
ERYTHROCYTE [DISTWIDTH] IN BLOOD BY AUTOMATED COUNT: 17.4 % (ref 11.6–15.1)
GFR SERPL CREATININE-BSD FRML MDRD: 111 ML/MIN/1.73SQ M
GLUCOSE SERPL-MCNC: 92 MG/DL (ref 65–140)
HCT VFR BLD AUTO: 25.8 % (ref 36.5–49.3)
HGB BLD-MCNC: 8.8 G/DL (ref 12–17)
IMM GRANULOCYTES # BLD AUTO: 0.07 THOUSAND/UL (ref 0–0.2)
IMM GRANULOCYTES NFR BLD AUTO: 1 % (ref 0–2)
LYMPHOCYTES # BLD AUTO: 3.14 THOUSANDS/ÂΜL (ref 0.6–4.47)
LYMPHOCYTES NFR BLD AUTO: 44 % (ref 14–44)
MCH RBC QN AUTO: 31.9 PG (ref 26.8–34.3)
MCHC RBC AUTO-ENTMCNC: 34.1 G/DL (ref 31.4–37.4)
MCV RBC AUTO: 94 FL (ref 82–98)
MISCELLANEOUS LAB TEST RESULT: NORMAL
MONOCYTES # BLD AUTO: 1.13 THOUSAND/ÂΜL (ref 0.17–1.22)
MONOCYTES NFR BLD AUTO: 16 % (ref 4–12)
NEUTROPHILS # BLD AUTO: 2.76 THOUSANDS/ÂΜL (ref 1.85–7.62)
NEUTS SEG NFR BLD AUTO: 38 % (ref 43–75)
NRBC BLD AUTO-RTO: 0 /100 WBCS
PLATELET # BLD AUTO: 320 THOUSANDS/UL (ref 149–390)
PMV BLD AUTO: 9.8 FL (ref 8.9–12.7)
POTASSIUM SERPL-SCNC: 3.6 MMOL/L (ref 3.5–5.3)
PROT SERPL-MCNC: 5.7 G/DL (ref 6.4–8.4)
RBC # BLD AUTO: 2.76 MILLION/UL (ref 3.88–5.62)
SCAN RESULT: NORMAL
SODIUM SERPL-SCNC: 136 MMOL/L (ref 135–147)
WBC # BLD AUTO: 7.2 THOUSAND/UL (ref 4.31–10.16)

## 2024-11-04 PROCEDURE — 99232 SBSQ HOSP IP/OBS MODERATE 35: CPT

## 2024-11-04 PROCEDURE — 82330 ASSAY OF CALCIUM: CPT | Performed by: STUDENT IN AN ORGANIZED HEALTH CARE EDUCATION/TRAINING PROGRAM

## 2024-11-04 PROCEDURE — 95816 EEG AWAKE AND DROWSY: CPT | Performed by: STUDENT IN AN ORGANIZED HEALTH CARE EDUCATION/TRAINING PROGRAM

## 2024-11-04 PROCEDURE — 97530 THERAPEUTIC ACTIVITIES: CPT

## 2024-11-04 PROCEDURE — 99024 POSTOP FOLLOW-UP VISIT: CPT | Performed by: SURGERY

## 2024-11-04 PROCEDURE — 85025 COMPLETE CBC W/AUTO DIFF WBC: CPT | Performed by: STUDENT IN AN ORGANIZED HEALTH CARE EDUCATION/TRAINING PROGRAM

## 2024-11-04 PROCEDURE — 99232 SBSQ HOSP IP/OBS MODERATE 35: CPT | Performed by: INTERNAL MEDICINE

## 2024-11-04 PROCEDURE — 99232 SBSQ HOSP IP/OBS MODERATE 35: CPT | Performed by: PSYCHIATRY & NEUROLOGY

## 2024-11-04 PROCEDURE — 97535 SELF CARE MNGMENT TRAINING: CPT

## 2024-11-04 PROCEDURE — 80053 COMPREHEN METABOLIC PANEL: CPT | Performed by: STUDENT IN AN ORGANIZED HEALTH CARE EDUCATION/TRAINING PROGRAM

## 2024-11-04 RX ORDER — POLYETHYLENE GLYCOL 3350 17 G/17G
17 POWDER, FOR SOLUTION ORAL 2 TIMES DAILY
Status: DISCONTINUED | OUTPATIENT
Start: 2024-11-04 | End: 2024-11-08

## 2024-11-04 RX ADMIN — CALCIUM CARBONATE (ANTACID) CHEW TAB 500 MG 500 MG: 500 CHEW TAB at 21:26

## 2024-11-04 RX ADMIN — LEVOTHYROXINE SODIUM 50 MCG: 75 TABLET ORAL at 05:12

## 2024-11-04 RX ADMIN — HEPARIN SODIUM 5000 UNITS: 5000 INJECTION INTRAVENOUS; SUBCUTANEOUS at 14:03

## 2024-11-04 RX ADMIN — HEPARIN SODIUM 5000 UNITS: 5000 INJECTION INTRAVENOUS; SUBCUTANEOUS at 05:12

## 2024-11-04 RX ADMIN — CALCIUM CARBONATE (ANTACID) CHEW TAB 500 MG 500 MG: 500 CHEW TAB at 09:07

## 2024-11-04 RX ADMIN — HEPARIN SODIUM 5000 UNITS: 5000 INJECTION INTRAVENOUS; SUBCUTANEOUS at 21:26

## 2024-11-04 RX ADMIN — PANTOPRAZOLE SODIUM 40 MG: 40 TABLET, DELAYED RELEASE ORAL at 05:13

## 2024-11-04 RX ADMIN — SODIUM CHLORIDE, SODIUM GLUCONATE, SODIUM ACETATE, POTASSIUM CHLORIDE, MAGNESIUM CHLORIDE, SODIUM PHOSPHATE, DIBASIC, AND POTASSIUM PHOSPHATE 100 ML/HR: .53; .5; .37; .037; .03; .012; .00082 INJECTION, SOLUTION INTRAVENOUS at 09:11

## 2024-11-04 RX ADMIN — SODIUM CHLORIDE, SODIUM GLUCONATE, SODIUM ACETATE, POTASSIUM CHLORIDE, MAGNESIUM CHLORIDE, SODIUM PHOSPHATE, DIBASIC, AND POTASSIUM PHOSPHATE 100 ML/HR: .53; .5; .37; .037; .03; .012; .00082 INJECTION, SOLUTION INTRAVENOUS at 19:50

## 2024-11-04 NOTE — ASSESSMENT & PLAN NOTE
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  MRI brain without acute intracranial abnormalities.  Status post LP 10/25 with mild lymphocytic pleocytosis and elevated protein.  ME panel and CSF culture negative. Suspect CSF abnormalities related to possible underlying lymphoproliferative disorder.  Status post first session of plasmapheresis 10/26 with improvement in fever curve and mental status initially, but now seems to have worsened.  Repeat CT of the brain nondiagnostic for source on 10/31/2024. Neurology consulted. Concern for toxic-metabolic source and a routine EEG was obtained. It showed nearly continuous myoclonic jerking and concern for an epileptic etiology of myoclonus. However, repeat EEG was performed with an EMG on the limbs and there did not appear to be epileptic correlation. 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 MRI brain negative for acute pathology. Remains confused.  -Continue to monitor mental status  -Appreciate neurology evaluation and recommendations

## 2024-11-04 NOTE — ASSESSMENT & PLAN NOTE
CT of head and MRI of brain unremarkable for acute etiology/abnormalities  Status post prior lumbar puncture with CSF studies negative for meningitis  Concern noted of etiology of fevers due to possible lymphoma/hyperviscosity - transferred to the [facility] for expedited lymphoma workup (see below)  Mentation waxing/waning but mildly improved per family status post initiation of plasmapheresis -> mentation continues to improve today  Appreciate oncology recommendations -> underwent multiple courses of plasmapheresis, however, confusion remains waxing/waning now with intermittent twitching/myoclonic jerks and asterixis  B12/folate, ammonia, lactic acid, and TSH, all normal  Initial EEG noted possibility of association between myoclonic jerks and epileptic etiology  Still confused today  Follow-up urine histoplasmosis antigen, TB PCR the CSF, Brucella antibody. Consider workup for causes of arboviral encephalitis if cognition does not quickly recover.   Continue to monitor and replete electrolyte derangements, as necessary  Limit/avoid sedating agents if possible  Appreciate ongoing infectious disease input -> per recommendation of pathology, further infectious studies ordered including Brucella Ab and Histoplasma galactomannan antigen - Mycoplasma pneumonia Ab panel negative  Per oncology, further testing ordered including urine hemosiderin and PNH profile - haptoglobin low and serum LDH normal  Stevo following leukemia and also excision biopsy results.  Awaiting EEG report  MRI negative

## 2024-11-04 NOTE — ASSESSMENT & PLAN NOTE
84 y.o. male with ongoing w/u c/f lymphoma   S/p R inguinal LN excisional bx    Plan:  F/u pathology  Monitor right groin incision  Keep clean and dry  Please reach out for any questions or concerns

## 2024-11-04 NOTE — PROGRESS NOTES
Progress Note - Oncology-Surgical   Name: Miguel Henderson 84 y.o. male I MRN: [id number]  Unit/Bed#: [facility] 817-01 I Date of Admission: 10/26/2024   Date of Service: 11/4/2024 I Hospital Day: 9     Assessment & Plan  Lymphadenopathy  84 y.o. male with ongoing w/u c/f lymphoma   S/p R inguinal LN excisional bx    Plan:  F/u pathology  Monitor right groin incision  Keep clean and dry  Please reach out for any questions or concerns    Subjective :   Patient seen and examined bedside.  No overnight events.  Right groin without obvious underlying hematoma    Objective :  Temp:  [97.5 °F (36.4 °C)-98.8 °F (37.1 °C)] 97.5 °F (36.4 °C)  HR:  [77-97] 89  BP: (103-193)/(58-84) 129/84  Resp:  [16-22] 18  SpO2:  [89 %-100 %] 100 %  O2 Device: None (Room air)    Physical Exam  Vitals reviewed.   HENT:      Right Ear: External ear normal.      Left Ear: External ear normal.      Nose: Nose normal.      Mouth/Throat:      Mouth: Mucous membranes are moist.      Pharynx: Oropharynx is clear.   Cardiovascular:      Rate and Rhythm: Normal rate.   Pulmonary:      Effort: Pulmonary effort is normal. No respiratory distress.   Genitourinary:     Comments: Right groin incision c/d/I without obvious underlying hematoma  Musculoskeletal:         General: Normal range of motion.      Cervical back: Normal range of motion.   Skin:     General: Skin is warm and dry.         Lab Results: I have reviewed the following results:CBC/BMP:   .     11/04/24  0504   WBC 7.20   HGB 8.8*   HCT 25.8*      SODIUM 136   K 3.6      CO2 27   BUN 12   CREATININE 0.37*   GLUC 92   CAIONIZED 1.03*    , Creatinine Clearance: Estimated Creatinine Clearance: 137.6 mL/min (A) (by C-G formula based on SCr of 0.37 mg/dL (L))., LFTs:   .     11/04/24  0504   AST 27   ALT 14   ALB 3.1*   TBILI 1.27*   ALKPHOS 86    , PTT/INR:No new results in last 24 hours. , Troponin,BNP:No new results in last 24 hours. , Lactic Acid: No new results in last 24 hours. ,  "Procalcitonin: No results found for: \"PROCALCITONI\", ABG: No new results in last 24 hours. , Lipase: No results found for: \"LIPASE\", Amylase: No results found for: \"AMYLASE\"    Imaging Results Review: No pertinent imaging studies reviewed.  Other Study Results Review: No additional pertinent studies reviewed.    "

## 2024-11-04 NOTE — ASSESSMENT & PLAN NOTE
During prior admission imaging showed enlarged periaortic, iliac chain and inguinal lymph nodes.  Repeat imaging showed persistent lymphadenopathy with worsened right axillary adenopathy.  As above, concern for an underlying lymphoproliferative disorder.  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  CBC differential also shows atypical lymphocytes.  Status post plasmapheresis 10/26. HIV screening negative. S/p lymph node and bone marrow biopsies. Lymph node biopsy overall findings are atypical and suspicious for a lymphoproliferative disorder. However, the tissue was too scant to further characterize the underlying process. Therefore, patient underwent an excisional biopsy with surgical oncology on 11/3.   -Hematology/oncology following  -Follow-up pathology from excisional biopsy  -Additional infectious work-up as above

## 2024-11-04 NOTE — OCCUPATIONAL THERAPY NOTE
Occupational Therapy Progress Note     Patient Name: Miguel Henderson  Today's Date: 11/4/2024  Problem List  Principal Problem:    Encephalopathy  Active Problems:    Hypothyroidism    Primary hypertension    Fever    Lymphadenopathy    Abnormal LFTs    COVID-19 virus infection    Myoclonia    Anemia    Hypocalcemia              11/04/24 1441   OT Last Visit   OT Visit Date 11/04/24   Note Type   Note Type Treatment   Pain Assessment   Pain Assessment Tool 0-10   Pain Score No Pain   Restrictions/Precautions   Weight Bearing Precautions Per Order No   Other Precautions Cognitive;Chair Alarm;Bed Alarm;Multiple lines;Fall Risk  (h/o recent COVID, primarily Italian speaking, family present in room to assist with translation.)   Lifestyle   Autonomy Assist PRN for ADLs/IADLs from family. RW used PRN. (-).   Reciprocal Relationships Lives with supportive family (Spouse, Son, DIL)   Service to Others Retired   Intrinsic Gratification None stated, will continue to assess   ADL   Where Assessed Edge of bed   Grooming Assistance 5  Supervision/Setup   Grooming Deficit Wash/dry hands;Wash/dry face   UB Dressing Assistance 4  Minimal Assistance   UB Dressing Deficit Pull around back   Toileting Assistance  3  Moderate Assistance   Toileting Deficit Clothing management up;Clothing management down;Perineal hygiene   Bed Mobility   Supine to Sit 3  Moderate assistance   Additional items Assist x 2;HOB elevated;Bedrails;Increased time required;Verbal cues;LE management   Sit to Supine 2  Maximal assistance   Additional items Assist x 2;HOB elevated;Bedrails;Increased time required;Verbal cues;LE management   Additional Comments Pt in bed upon arrival. Able to sit EOB with Ax2, requires mod-max A to maintain seated EOB, pt noted to have significant L lean. Pt OOB in recliner post session, all needs met, call bell within reach. +chair alarm on   Transfers   Sit to Stand 2  Maximal assistance   Additional items Assist x  "2;Increased time required;Verbal cues   Stand to Sit 2  Maximal assistance   Additional items Assist x 2;Increased time required;Verbal cues   Stand pivot 2  Maximal assistance   Additional items Assist x 2;Increased time required;Verbal cues   Additional Comments b/l knee block and HHA, sacral support. SPT to drop arm recliner. HHA   Subjective   Subjective \"Who is this?\"   Cognition   Overall Cognitive Status (S)  Impaired   Arousal/Participation Alert;Responsive   Attention Difficulty attending to directions   Orientation Level Oriented to person;Disoriented to place;Disoriented to time;Disoriented to situation   Memory Decreased recall of precautions;Decreased short term memory;Decreased recall of recent events;Decreased long term memory   Following Commands Follows one step commands with increased time or repetition   Comments Pt overall pleasant. Requires multiple VCs and repetition for command following. Family present in room for translation. Per family, pt still very confused compared with baseline level. Increased level of participation with family present in room.   Activity Tolerance   Activity Tolerance Patient limited by fatigue;Other (Comment)  (cognition)   Medical Staff Made Aware RN cleared for therapy   Assessment   Assessment Patient participated in Skilled OT session this date with interventions consisting of ADL re training with the use of correct body mechnaics, Energy Conservation techniques, safety awareness and fall prevention techniques,  therapeutic activities to: increase activity tolerance, and increase OOB/ sitting tolerance . Patient agreeable to OT treatment session, upon arrival patient was found supine in bed, alert, responsive , and in no apparent distress.  In comparison to previous session, patient with improvements in functional mobility, OOB tolerance, command following. Patient requiring verbal cues for safety, verbal cues for correct technique, verbal cues for pacing thru " activity steps, cognitive assistance to anticipate next step, one step directives, frequent rest periods, and ocassional safety reminders. Patient continues to be functioning below baseline level, occupational performance remains limited secondary to factors listed above and increased risk for falls and injury. The patient's raw score on the AM-PAC Daily Activity Inpatient Short Form is 14. A raw score of less than 19 suggests the patient may benefit from discharge to post-acute rehabilitation services. Please refer to the recommendation of the Occupational Therapist for safe discharge planning. From OT standpoint, recommendation at time of d/c would be Level 2 resources.  Patient to benefit from continued Occupational Therapy treatment while in the hospital to address deficits as defined above and maximize level of functional independence with ADLs and functional mobility.   Plan   Treatment Interventions ADL retraining;Functional transfer training;Endurance training;Cognitive reorientation;Patient/family training;Equipment evaluation/education;Fine motor coordination activities;Compensatory technique education;Continued evaluation;Energy conservation;Activityengagement   Goal Expiration Date 11/13/24   OT Treatment Day 1   OT Frequency 2-3x/wk   Discharge Recommendation   Rehab Resource Intensity Level, OT II (Moderate Resource Intensity)   AM-PAC Daily Activity Inpatient   Lower Body Dressing 2   Bathing 2   Toileting 2   Upper Body Dressing 2   Grooming 3   Eating 3   Daily Activity Raw Score 14   Daily Activity Standardized Score (Calc for Raw Score >=11) 33.39   -PAC Applied Cognition Inpatient   Following a Speech/Presentation 1   Understanding Ordinary Conversation 2   Taking Medications 2   Remembering Where Things Are Placed or Put Away 2   Remembering List of 4-5 Errands 1   Taking Care of Complicated Tasks 1   Applied Cognition Raw Score 9   Applied Cognition Standardized Score 22.48   End of Consult    Education Provided Yes;Family or social support of family present for education by provider   Patient Position at End of Consult Bedside chair;Bed/Chair alarm activated;All needs within reach   Nurse Communication Nurse aware of consult         Vladislav Sanches MS, OTR/L     M[facility] CERTIFIED RATER ID [id number]

## 2024-11-04 NOTE — PLAN OF CARE
Problem: OCCUPATIONAL THERAPY ADULT  Goal: Performs self-care activities at highest level of function for planned discharge setting.  See evaluation for individualized goals.  Description: Treatment Interventions: ADL retraining, Functional transfer training, UE strengthening/ROM, Endurance training, Patient/family training, Cognitive reorientation, Equipment evaluation/education, Fine motor coordination activities, Compensatory technique education, Continued evaluation, Energy conservation, Activityengagement          See flowsheet documentation for full assessment, interventions and recommendations.   Note: Limitation: Decreased ADL status, Decreased cognition, Decreased Safe judgement during ADL, Decreased endurance, Decreased self-care trans, Decreased high-level ADLs  Prognosis: Fair  Assessment: Patient participated in Skilled OT session this date with interventions consisting of ADL re training with the use of correct body mechnaics, Energy Conservation techniques, safety awareness and fall prevention techniques,  therapeutic activities to: increase activity tolerance, and increase OOB/ sitting tolerance . Patient agreeable to OT treatment session, upon arrival patient was found supine in bed, alert, responsive , and in no apparent distress.  In comparison to previous session, patient with improvements in functional mobility, OOB tolerance, command following. Patient requiring verbal cues for safety, verbal cues for correct technique, verbal cues for pacing thru activity steps, cognitive assistance to anticipate next step, one step directives, frequent rest periods, and ocassional safety reminders. Patient continues to be functioning below baseline level, occupational performance remains limited secondary to factors listed above and increased risk for falls and injury. The patient's raw score on the -PAC Daily Activity Inpatient Short Form is 14. A raw score of less than 19 suggests the patient may  benefit from discharge to post-acute rehabilitation services. Please refer to the recommendation of the Occupational Therapist for safe discharge planning. From OT standpoint, recommendation at time of d/c would be Level 2 resources.  Patient to benefit from continued Occupational Therapy treatment while in the hospital to address deficits as defined above and maximize level of functional independence with ADLs and functional mobility.     Rehab Resource Intensity Level, OT: II (Moderate Resource Intensity)

## 2024-11-04 NOTE — PHYSICAL THERAPY NOTE
PHYSICAL THERAPY NOTE          Patient Name: Miguel Henderson  Today's Date: 11/4/2024 11/04/24 1440   PT Last Visit   PT Visit Date 11/04/24   Note Type   Note Type Treatment   Education   Education Provided Yes   Pain Assessment   Pain Assessment Tool 0-10   Pain Score No Pain   Restrictions/Precautions   Weight Bearing Precautions Per Order No   Other Precautions Chair Alarm;Bed Alarm;Cognitive;Multiple lines;Fall Risk  (h/o recent COVID, primarily French speaking, family present  to assist with translation)   General   Chart Reviewed Yes   Family/Caregiver Present Yes   Cognition   Overall Cognitive Status (S)  Impaired   Arousal/Participation Responsive   Attention Attends with cues to redirect   Orientation Level Oriented to person;Oriented to place;Disoriented to situation;Disoriented to time   Following Commands Follows one step commands with increased time or repetition   Comments Family present to assist with translation. Pt more alert today. Per family still confused compared to baseline, improved commanxd following, but does require repetition/TCs for completion   Bed Mobility   Supine to Sit 3  Moderate assistance   Additional items Assist x 2;HOB elevated;Bedrails;Increased time required;Verbal cues;LE management   Sit to Supine 2  Maximal assistance   Additional items Assist x 2;Increased time required;Verbal cues;LE management   Additional Comments Pt in bed upon arrival. Pt able to get to EOB with AX 2, maintains sitting with Mod /Max AX 2 required for trunk management as pt noted to have significant left lean.   Transfers   Sit to Stand 2  Maximal assistance   Additional items Assist x 2;Increased time required;Verbal cues   Stand to Sit 2  Maximal assistance   Additional items Assist x 2;Increased time required;Verbal cues   Stand pivot 2  Maximal assistance   Additional items Assist x 2;Increased time  Post-Op Instructions: The patient was instructed in the proper use of post-operative eye drops: Ofloxacin in the surgical eye 4 times per day for 2 weeks, then discontinue; Ketorolac 4 times per day for 2 weeks, then decrease to 2 times per day for 2 weeks, then discontinue; Prednisolone 4 times per day, then reduce to 2 times per day for 2 weeks, then discontinue. Call back instructions, retinal detachment and endophthalmitis precautions given. required;Verbal cues   Additional Comments SPS completed with Matias HHA, sacral support and initial knee block for standing   Ambulation/Elevation   Gait pattern Not appropriate   Balance   Static Sitting Poor +   Dynamic Sitting Poor   Static Standing Poor -   Endurance Deficit   Endurance Deficit Yes   Activity Tolerance   Activity Tolerance Patient limited by fatigue   Medical Staff Made Aware OT Vladislav   Nurse Made Aware RN cleared pt   Exercises   Balance training  EOB sitting ~ 5 minutes, STS   Assessment   Prognosis Fair   Problem List Decreased strength;Decreased endurance;Impaired balance;Decreased mobility;Decreased cognition;Decreased safety awareness   Assessment Pt seen for PT treatment session this date. Therapy session focused on bed mobility, functional transfers,  in order to improve overall mobility and independence. Pt requires Modf /Max Assist x 2 as detailed in above flowsheet for all mobility tasks. Pt s family present to assist with translation, but continues to requires repetition and TC . Pt was left back in bed at the end of PT session with all needs in reach. Pt would benefit from continued PT services while in hospital to address remaining limitations.  The patient's AM-PAC Basic Mobility Inpatient Short Form Raw Score is 10. A Raw score of less than or equal to 16 suggests the patient may benefit from discharge to post-acute rehabilitation services. Please also refer to the recommendation of the Physical Therapist for safe discharge planning. Post dc recommendation is Level 2 at this time   Barriers to Discharge Decreased caregiver support   Goals   Patient Goals none stated   STG Expiration Date 11/13/24   PT Treatment Day 1   Plan   Treatment/Interventions Functional transfer training;Therapeutic exercise;Bed mobility;Gait training;Spoke to nursing;Spoke to case management;OT   Progress Slow progress, decreased activity tolerance   PT Frequency 3-5x/wk   Discharge Recommendation   Rehab  Resource Intensity Level, PT II (Moderate Resource Intensity)   AM-PAC Basic Mobility Inpatient   Turning in Flat Bed Without Bedrails 2   Lying on Back to Sitting on Edge of Flat Bed Without Bedrails 2   Moving Bed to Chair 2   Standing Up From Chair Using Arms 2   Walk in Room 1   Climb 3-5 Stairs With Railing 1   Basic Mobility Inpatient Raw Score 10   Turning Head Towards Sound 2   Follow Simple Instructions 2   Low Function Basic Mobility Raw Score  14   Low Function Basic Mobility Standardized Score  22.01   [facility] Highest Level Of Mobility   -HLM Goal 4: Move to chair/commode   -HLM Achieved 4: Move to chair/commode   Education   Education Provided Mobility training   Patient Reinforcement needed   End of Consult   Patient Position at End of Consult All needs within reach;Bed/Chair alarm activated;Bedside chair   Jen Soto PT DPT

## 2024-11-04 NOTE — PROGRESS NOTES
Progress Note - Hospitalist   Name: Miguel Henderson 84 y.o. male I MRN: [id number]  [facility] I Date of Admission: 10/26/2024   Date of Service: 11/4/2024 I Hospital Day: 9    Assessment & Plan  Encephalopathy  CT of head and MRI of brain unremarkable for acute etiology/abnormalities  Status post prior lumbar puncture with CSF studies negative for meningitis  Concern noted of etiology of fevers due to possible lymphoma/hyperviscosity - transferred to the [facility] for expedited lymphoma workup (see below)  Mentation waxing/waning but mildly improved per family status post initiation of plasmapheresis -> mentation continues to improve today  Appreciate oncology recommendations -> underwent multiple courses of plasmapheresis, however, confusion remains waxing/waning now with intermittent twitching/myoclonic jerks and asterixis  B12/folate, ammonia, lactic acid, and TSH, all normal  Initial EEG noted possibility of association between myoclonic jerks and epileptic etiology  Still confused today  Follow-up urine histoplasmosis antigen, TB PCR the CSF, Brucella antibody. Consider workup for causes of arboviral encephalitis if cognition does not quickly recover.   Continue to monitor and replete electrolyte derangements, as necessary  Limit/avoid sedating agents if possible  Appreciate ongoing infectious disease input -> per recommendation of pathology, further infectious studies ordered including Brucella Ab and Histoplasma galactomannan antigen - Mycoplasma pneumonia Ab panel negative  Per oncology, further testing ordered including urine hemosiderin and PNH profile - haptoglobin low and serum LDH normal  Stevo following leukemia and also excision biopsy results.  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 disorder  Status post bone marrow biopsy on 10/29 -> await final pathology, although preliminary report noted some cold agglutinins (see plan for encephalopathy above regarding hemolysis labs)  Status post courses of plasmapheresis per oncology  Supportive care  Awaiting excision bx results  Abnormal LFTs  Elevated bilirubin and alkaline phosphatase generally improving  Suspected to be associated with lymphadenopathy (above)  Limit/avoid hepatotoxins as possible  Hypothyroidism  Continue Synthroid  Primary hypertension  Holding HCTZ due to soft pressures at this time  Fever  Does not appear to be infectious as workup negative thus far  Appreciate ID recommendations - continue to monitor off antibiotics  Monitor temperature curve  COVID-19 virus infection  Tested positive on 10/21  Currently oxygenating on baseline room air   Contact/airborne precautions now discontinued   Supportive care otherwise  Anemia  Hemoglobin stable at 9.8  Likely associated with lymphadenopathy  Hypocalcemia  Serum Andrion's calcium low -> continue intravenous and oral supplementation  Consider possible contribution to myoclonus  Myoclonia        VTE Pharmacologic Prophylaxis: VTE Score: 5 High Risk (Score >/= 5) - Pharmacological DVT Prophylaxis Ordered: Heparin. Sequential Compression Devices Ordered.    AM-PAC Basic Mobility:  Basic Mobility Inpatient Raw Score: 7  JH-HLM Goal: 2: Bed activities/Dependent transfer  JH-HLM Achieved: 2: Bed activities/Dependent transfer  JH-HLM Goal achieved. Continue to encourage appropriate mobility.    Patient Centered Rounds:  I have performed bedside rounds and discussed  plan of care with nursing today.  Discussions with Specialists or Other Care Team Provider:  see above assessments if applicable    Education and Discussions with Family / Patient:  Patient, along with multiple for members, at bedside today    Time Spent for Care:  45minutes. More than 50% of total time spent on counseling and coordination of care, on one or more of the following: performing physical exam; counseling and coordination of care, obtaining or reviewing history, documenting in the medical record, reviewing/ordering tests/medications/procedures, and communicating with other healthcare professionals.    Current Length of Stay: 9 day(s)  Current Patient Status: Inpatient   Certification Statement:  Patient will continue to require additional hospital stay due to assessments as noted above.    Code Status: Level 3 - DNAR and DNI      Subjective     Patient confused. Not oriented. Used translatory. No pain, no subjective complaints      Objective     Vitals:   Temp (24hrs), Av.2 °F (36.8 °C), Min:97.5 °F (36.4 °C), Max:98.6 °F (37 °C)    Temp:  [97.5 °F (36.4 °C)-98.6 °F (37 °C)] 98.6 °F (37 °C)  HR:  [78-89] 78  Resp:  [16-18] 17  BP: (105-193)/(63-84) 105/63  SpO2:  [99 %-100 %] 99 %  Body mass index is 28.46 kg/m².     Input and Output Summary (last 24 hours):       Intake/Output Summary (Last 24 hours) at 2024 1541  Last data filed at 2024 0517  Gross per 24 hour   Intake 1200 ml   Output 800 ml   Net 400 ml       Physical Exam:     Physical Exam  Vitals reviewed.   Constitutional:       General: He is not in acute distress.     Appearance: He is not ill-appearing or diaphoretic.   HENT:      Nose: Nose normal.      Mouth/Throat:      Mouth: Mucous membranes are moist.   Eyes:      Pupils: Pupils are equal, round, and reactive to light.   Cardiovascular:      Rate and Rhythm: Regular rhythm. Tachycardia present.      Pulses: Normal pulses.      Heart sounds: No murmur heard.  Pulmonary:       Effort: Pulmonary effort is normal.      Breath sounds: Normal breath sounds. No wheezing or rales.   Abdominal:      General: Abdomen is flat. Bowel sounds are normal.      Palpations: Abdomen is soft.   Musculoskeletal:      Right lower leg: No edema.      Left lower leg: No edema.   Skin:     General: Skin is warm.   Neurological:      General: No focal deficit present.      Mental Status: He is alert. He is disoriented.          Labs & Recent Cultures:  Results from last 7 days   Lab Units 11/04/24  0504   WBC Thousand/uL 7.20   HEMOGLOBIN g/dL 8.8*   HEMATOCRIT % 25.8*   PLATELETS Thousands/uL 320   SEGS PCT % 38*   LYMPHO PCT % 44   MONO PCT % 16*   EOS PCT % 0     Results from last 7 days   Lab Units 11/04/24  0504   POTASSIUM mmol/L 3.6   CHLORIDE mmol/L 101   CO2 mmol/L 27   BUN mg/dL 12   CREATININE mg/dL 0.37*   CALCIUM mg/dL 7.7*   ALK PHOS U/L 86   ALT U/L 14   AST U/L 27           Results from last 7 days   Lab Units 10/31/24  0307 10/28/24  1703   POC GLUCOSE mg/dl 138 117         Results from last 7 days   Lab Units 10/31/24  0851   LACTIC ACID mmol/L 0.7                   Lines/Drains/Telemetry:  Invasive Devices       Peripheral Intravenous Line  Duration             Peripheral IV 11/03/24 Right;Ventral (anterior) Forearm 1 day              Hemodialysis Catheter  Duration             HD Temporary Double Catheter 9 days              Drain  Duration             External Urinary Catheter Medium 1 day                    Last 24 Hours Medication List:   Current Facility-Administered Medications   Medication Dose Route Frequency Provider Last Rate    acetaminophen  1,000 mg Intravenous Q6H PRN Argelia Mccallum MD 1,000 mg (10/28/24 2103)    bisacodyl  10 mg Rectal Once Argelia Mccallum MD      calcium carbonate  500 mg Oral BID Argelia Mccallum MD      docusate sodium  100 mg Oral BID Argelia Mccallum MD      fluticasone  1 spray Nasal Daily Argelia Mccallum MD      heparin (porcine)  5,000 Units Subcutaneous Q8H [facility] Argelia Mccallum MD       levothyroxine  50 mcg Oral Once per day on Monday Tuesday Wednesday Thursday Argelia Mccallum MD      levothyroxine  75 mcg Oral Once per day on Sunday Friday Saturday Argelia Mccallum MD      multi-electrolyte  100 mL/hr Intravenous Continuous Argelia Mccallum  mL/hr (11/04/24 0911)    ondansetron  4 mg Intravenous Q6H PRN Argelia Mccallum MD      pantoprazole  40 mg Oral Early Morning Argelia Mccallum MD      polyethylene glycol  17 g Oral Daily MD Bunny Oodm MD   Hospitalist - [facility]        ** Please Note:  Documentation is constructed using a voice recognition dictation system.  An occasional wrong word/phrase or “sound-a-like” substitution may have been picked up by dictation device due to the inherent limitations of voice recognition software.  Read the chart carefully and recognize, using reasonable context, where substitutions may have occurred.**

## 2024-11-04 NOTE — PROGRESS NOTES
Progress Note - Neurology   Name: Miguel Henderson 84 y.o. male I MRN: [id number]  Unit/Bed#: [facility] 817-01 I Date of Admission: 10/26/2024   Date of Service: 11/4/2024 I Hospital Day: 9    Assessment & Plan  Encephalopathy  Pt w/ a few weeks of fluctuating / gradually worsening AMS currently undergoing significant heme onc workup including BM bx & lymph node bx / & previously having undergone workup w/ ID including LP. Pt sxs include pupil asymmetry / irregular shape to R eye & jerking motions to all 4 extremities of differing severity worse in LUE, which upon assessment matched more w/ asterixis than myoclonic jerking, pointing more toward metabolic cause over neurologic. Confirmed w/ family that the abnl movements are new as of <24 hrs ago w/ no hx of similar sxs & are seen in conjunction w/ overall worsening mentation, suspicion per heme onc of malignant process evidenced by increased IG, , elevated gamma spike SPEP, B sxs, worsening lymphadenopathy. No particular neurologic cause identified at this time, continue working up metabolic derangements that may be contributing to worsening sxs. Concerning the abnl pupils b/l, sluggish response to light b/l w/ R>L in size & signs of past ocular surgeries, which were confirmed by family today. Uncertain time course for the new eye findings, even family unsure as to when the discrepancy first was noticed, although low suspicion for neuro cause will evaluate for neuro etiology / contributing factors by the plan noted below.Of note, pt overall AMS seems to improve w/ plasmapheresis occurrences, although amount of improvement seems to have lessened of late. Covid dx 10 days ago, ID followed. CSF WBC >100, protein elevated. CT CAP significant for large lymph nodes to various locations of body. Routine EEG x 2 w/ limb leads: Myoclonus of uncertain source after first EEG, added limb leads to second EEG showed unlikely of epileptic etiology. MRI unrevealing of any neuro  etiology. Labs ordered including B12, B9, TSH, ammonia returning wnl.  -Continue workup w/ heme onc including follow pending bx / cytometry results  -ID aware, following off abx  -No rec for repeat LP at this time    Miguel Henderson will not need outpatient follow up with Neurology. He will not require outpatient neurological testing.  I have discussed with Dr. Willis the above plan to treat pt. She agrees with the plan.  Neurology service signing off.    Subjective   Pt AMS improved in past 24-48 hrs, mood improved, no jerking movements. NAEO, NAD, no new complaints. Danced to a little music in chair.    Review of Systems   Unable to perform ROS: Mental status change         Objective :  Temp:  [97.5 °F (36.4 °C)-98.8 °F (37.1 °C)] 97.5 °F (36.4 °C)  HR:  [77-97] 89  BP: (129-193)/(77-84) 129/84  Resp:  [16-20] 18  SpO2:  [95 %-100 %] 100 %    Physical Exam  Constitutional:       General: He is not in acute distress.     Appearance: He is not ill-appearing, toxic-appearing or diaphoretic.   HENT:      Head: Normocephalic.      Right Ear: External ear normal.      Left Ear: External ear normal.      Nose: Nose normal.      Mouth/Throat:      Pharynx: Oropharynx is clear.   Eyes:      General: Lids are normal.         Right eye: No discharge.         Left eye: No discharge.      Extraocular Movements: Extraocular movements intact.      Pupils: Pupils are equal, round, and reactive to light.   Neurological:      Mental Status: He is alert. He is disoriented.      Cranial Nerves: Dysarthria present. No cranial nerve deficit.      Sensory: No sensory deficit.      Deep Tendon Reflexes: Reflexes abnormal.     Neurological Exam  Mental Status  Alert. Oriented only to person. Mild dysarthria present. Able to name objects. Follows one-step commands.    Cranial Nerves  CN I: Sense of smell is normal.  CN II: Visual acuity is normal. Visual fields full to confrontation.  CN III, IV, VI: Extraocular movements intact  bilaterally. Normal lids and orbits bilaterally. Pupils equal round and reactive to light bilaterally.  CN V: Facial sensation is normal.  CN VII: Full and symmetric facial movement.  CN VIII: Hearing is normal.  CN IX, X: Palate elevates symmetrically. Normal gag reflex.  CN XI: Shoulder shrug strength is normal.  CN XII: Tongue midline without atrophy or fasciculations.  Exam lacking due to poor command following, poor reaction to light / asymmetric pupils noted w/ signs of prior surgeries confirmed by family to b/l eyes. .    Motor  Normal muscle bulk throughout. No fasciculations present. Normal muscle tone. No abnormal involuntary movements.  No abnl movements seen today, all extremities against gravity at minimum.    Sensory  Sensation is intact to light touch, pinprick, vibration and proprioception in all four extremities.    Reflexes  Clonus vs activated typical myoclonic activity RLE, less beat than last week.    Coordination    Unable to assess..    Gait    Defer.        Lab Results: I have reviewed the following results:  Imaging Results Review: I personally reviewed the following image studies in PACS and associated radiology reports: MRI brain. My interpretation of the radiology images/reports is: NAIA.    VTE Pharmacologic Prophylaxis: VTE covered by:  heparin (porcine), Subcutaneous, 5,000 Units at 11/04/24 0512       Administrative Statements   I have spent a total time of 50 minutes in caring for this patient on the day of the visit/encounter including Diagnostic results, Patient and family education, Impressions, Counseling / Coordination of care, Documenting in the medical record, Reviewing / ordering tests, medicine, procedures  , Obtaining or reviewing history  , and Communicating with other healthcare professionals .

## 2024-11-04 NOTE — ASSESSMENT & PLAN NOTE
Pt w/ a few weeks of fluctuating / gradually worsening AMS currently undergoing significant heme onc workup including BM bx & lymph node bx / & previously having undergone workup w/ ID including LP. Pt sxs include pupil asymmetry / irregular shape to R eye & jerking motions to all 4 extremities of differing severity worse in LUE, which upon assessment matched more w/ asterixis than myoclonic jerking, pointing more toward metabolic cause over neurologic. Confirmed w/ family that the abnl movements are new as of <24 hrs ago w/ no hx of similar sxs & are seen in conjunction w/ overall worsening mentation, suspicion per heme onc of malignant process evidenced by increased IG, , elevated gamma spike SPEP, B sxs, worsening lymphadenopathy. No particular neurologic cause identified at this time, continue working up metabolic derangements that may be contributing to worsening sxs. Concerning the abnl pupils b/l, sluggish response to light b/l w/ R>L in size & signs of past ocular surgeries, which were confirmed by family today. Uncertain time course for the new eye findings, even family unsure as to when the discrepancy first was noticed, although low suspicion for neuro cause will evaluate for neuro etiology / contributing factors by the plan noted below.Of note, pt overall AMS seems to improve w/ plasmapheresis occurrences, although amount of improvement seems to have lessened of late. Covid dx 10 days ago, ID followed. CSF WBC >100, protein elevated. CT CAP significant for large lymph nodes to various locations of body. Routine EEG x 2 w/ limb leads: Myoclonus of uncertain source after first EEG, added limb leads to second EEG showed unlikely of epileptic etiology. MRI unrevealing of any neuro etiology. Labs ordered including B12, B9, TSH, ammonia returning wnl.  -Continue workup w/ heme onc including follow pending bx / cytometry results  -ID aware, following off abx  -No rec for repeat LP at this time

## 2024-11-04 NOTE — ASSESSMENT & PLAN NOTE
The patient had intermittent fevers for 3 weeks and several admissions to [facility] with fairly unremarkable infectious workup other than positive rhinovirus and COVID. CT imaging was noted to show lymphadenopathy and he had atypical lymphocytes on CBC so hematology/oncology consult was recommended although the patient was discharged on Paxlovid with plan for an outpatient referral.  He then returned to the ED with worsening confusion and return of fever.  CT imaging with worsening axillary lymphadenopathy, CBC differential again showed atypical lymphocytes.  Status post LP 10/25 which showed mild elevation in protein and lymphocytic pleocytosis.  ME panel negative and culture showed no growth. 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  Labs show elevated LDH, ferritin, immunoglobulins. Status post plasmapheresis with improvement in fever curve and initially improvement in mental status, but now with waxing and waning mental status and development of myoclonic jerking as below. S/p lymph node and bone marrow biopsies. Bone marrow biopsy preliminary study demonstrated histiocytic aggregates, which are nonspecific, and can be seen in a variety of settings including infection. Broadened infectious work-up based on risk factors to include TB PCR from CSF given patient is initially from [place] and traveled to [place] in [place], histoplasma urine antigen, brucella antibodies, and mycoplasma antibodies. Mycoplasma antibodies negative. Histoplasma urine antigen, TB PCR from CSF, and brucella antibodies pending.   -Continue to monitor off antimicrobials  -Follow-up lymph node and bone marrow biopsies until finalized  -Follow-up mTB PCR from CSF  -Follow-up histoplasma urine antigen  -Follow-up brucella antibodies for completeness  -Ongoing follow-up with hematology/oncology and neurology as below  -Continue to monitor fever curve/vitals  -Monitor  CBCd to continue to closely monitor patient's WBC

## 2024-11-04 NOTE — ASSESSMENT & PLAN NOTE
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

## 2024-11-04 NOTE — ASSESSMENT & PLAN NOTE
Development of intermittent twitching/myoclonic jerking on 10/31. Neurology consulted. Concern for toxic-metabolic source and a routine EEG was obtained. It showed nearly continuous myoclonic jerking and concern for an epileptic etiology of myoclonus. Patient started on Ativan and Keppra. However, repeat EEG was performed with an EMG on the limbs and there did not appear to be epileptic correlation. 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 MRI brain negative for acute pathology.   -Appreciate neurology evaluation and recommendations

## 2024-11-04 NOTE — PROGRESS NOTES
Progress Note - Infectious Disease   Name: Miguel Henderson 84 y.o. male I MRN: [id number]  Unit/Bed#: [facility] 817-01 I Date of Admission: 10/26/2024   Date of Service: 11/4/2024 I Hospital Day: 9    Assessment & Plan  Fever  The patient had intermittent fevers for 3 weeks and several admissions to [facility] with fairly unremarkable infectious workup other than positive rhinovirus and COVID. CT imaging was noted to show lymphadenopathy and he had atypical lymphocytes on CBC so hematology/oncology consult was recommended although the patient was discharged on Paxlovid with plan for an outpatient referral.  He then returned to the ED with worsening confusion and return of fever.  CT imaging with worsening axillary lymphadenopathy, CBC differential again showed atypical lymphocytes.  Status post LP 10/25 which showed mild elevation in protein and lymphocytic pleocytosis.  ME panel negative and culture showed no growth. 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  Labs show elevated LDH, ferritin, immunoglobulins. Status post plasmapheresis with improvement in fever curve and initially improvement in mental status, but now with waxing and waning mental status and development of myoclonic jerking as below. S/p lymph node and bone marrow biopsies. Bone marrow biopsy preliminary study demonstrated histiocytic aggregates, which are nonspecific, and can be seen in a variety of settings including infection. Broadened infectious work-up based on risk factors to include TB PCR from CSF given patient is initially from [place] and traveled to [place] in [place], histoplasma urine antigen, brucella antibodies, and mycoplasma antibodies. Mycoplasma antibodies negative. Histoplasma urine antigen, TB PCR from CSF, and brucella antibodies pending.   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  MRI brain without acute intracranial abnormalities.  Status post LP 10/25 with mild lymphocytic pleocytosis and elevated protein.  ME panel and CSF culture negative. Suspect CSF abnormalities related to possible underlying lymphoproliferative disorder.  Status post first session of plasmapheresis 10/26 with improvement in fever curve and mental status initially, but now seems to have worsened.  Repeat CT of the brain nondiagnostic for source on 10/31/2024. Neurology consulted. Concern for toxic-metabolic source and a routine EEG was obtained. It showed nearly continuous myoclonic jerking and concern for an epileptic etiology of myoclonus. However, repeat EEG was performed with an EMG on the limbs and there did not appear to be epileptic correlation. 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 MRI brain negative for acute pathology. Remains confused.  -Continue to monitor mental status  -Appreciate neurology evaluation and recommendations  Lymphadenopathy  During prior admission imaging showed enlarged periaortic, iliac chain and inguinal lymph nodes.  Repeat imaging showed persistent lymphadenopathy with worsened right axillary adenopathy.  As above, concern for an underlying lymphoproliferative disorder.  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  CBC differential also shows atypical lymphocytes.  Status post plasmapheresis 10/26. HIV screening negative. S/p lymph node and bone marrow biopsies. Lymph node biopsy overall findings are atypical and suspicious for a lymphoproliferative disorder. However, the tissue was too scant to  further characterize the underlying process. Therefore, patient underwent an excisional biopsy with surgical oncology on 11/3.   -Hematology/oncology following  -Follow-up pathology from excisional biopsy  -Additional infectious work-up as above    COVID-19 virus infection  Isolation discontinued on 11/1/2024  Myoclonia  Development of intermittent twitching/myoclonic jerking on 10/31. Neurology consulted. Concern for toxic-metabolic source and a routine EEG was obtained. It showed nearly continuous myoclonic jerking and concern for an epileptic etiology of myoclonus. Patient started on Ativan and Keppra. However, repeat EEG was performed with an EMG on the limbs and there did not appear to be epileptic correlation. 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 MRI brain negative for acute pathology.   -Appreciate neurology evaluation and recommendations    I have discussed with the primary service the above plan to monitor off all antibiotics and follow-up additional infectious work-up as above.  The primary service agrees with the plan.    ID consult service will continue to follow.    Antibiotics:  None    Subjective   Awake and interactive. Patient's son, Domenic, provided translation. Patient denies having any current pain or complaints. However, per patient's son, he remains confused. Was only oriented to self, not time or place. Remains otherwise afebrile and hemodynamically stable.     Objective :  Temp:  [97.5 °F (36.4 °C)-98.8 °F (37.1 °C)] 97.5 °F (36.4 °C)  HR:  [77-97] 89  BP: (129-193)/(76-84) 129/84  Resp:  [16-20] 18  SpO2:  [89 %-100 %] 100 %    General Appearance:  Chronically ill appearing, awake, interactive. No acute distress. Remains confused.    Throat: No perioral lesions or ulcerations    Lungs:   Clear to auscultation bilaterally; no wheezes, rhonchi or rales; respirations unlabored. On room air.    Heart:  RRR; no murmur, rub or gallop.   Abdomen:   Soft,  non-tender, non-distended, positive bowel sounds.     Extremities: No clubbing or cyanosis, no edema.   Neuro: Following commands, AxO x1.   Skin: No new rashes, lesions, or draining wounds noted on exposed skin. Right groin incision CDI.          Lab Results: I have reviewed the following results:  Results from last 7 days   Lab Units 11/04/24  0504 11/03/24  0511 11/02/24  0516   WBC Thousand/uL 7.20 9.08 9.25   HEMOGLOBIN g/dL 8.8* 9.8* 8.9*   PLATELETS Thousands/uL 320 356 317     Results from last 7 days   Lab Units 11/04/24  0504 11/03/24  0511 11/02/24  0516   SODIUM mmol/L 136 132* 129*   POTASSIUM mmol/L 3.6 3.6 3.5   CHLORIDE mmol/L 101 96 93*   CO2 mmol/L 27 29 30   BUN mg/dL 12 11 9   CREATININE mg/dL 0.37* 0.32* 0.32*   EGFR ml/min/1.73sq m 111 117 117   CALCIUM mg/dL 7.7* 7.5* 7.5*   AST U/L 27 25 23   ALT U/L 14 17 17   ALK PHOS U/L 86 93 85   ALBUMIN g/dL 3.1* 3.2* 3.2*                               Imaging Results Review: I personally reviewed the following image studies in PACS and associated radiology reports: CT head and MRI brain. My interpretation of the radiology images/reports is: No acute intracranial pathology.      Anais Kerr PA-C  [facility]

## 2024-11-05 ENCOUNTER — APPOINTMENT (INPATIENT)
Dept: RADIOLOGY | Facility: HOSPITAL | Age: 84
DRG: 823 | End: 2024-11-05
Payer: COMMERCIAL

## 2024-11-05 LAB
ALBUMIN SERPL BCG-MCNC: 2.9 G/DL (ref 3.5–5)
ALP SERPL-CCNC: 84 U/L (ref 34–104)
ALT SERPL W P-5'-P-CCNC: 16 U/L (ref 7–52)
ANION GAP SERPL CALCULATED.3IONS-SCNC: 6 MMOL/L (ref 4–13)
AST SERPL W P-5'-P-CCNC: 27 U/L (ref 13–39)
BASOPHILS # BLD AUTO: 0.09 THOUSANDS/ÂΜL (ref 0–0.1)
BASOPHILS NFR BLD AUTO: 1 % (ref 0–1)
BILIRUB SERPL-MCNC: 0.94 MG/DL (ref 0.2–1)
BRUCELLA IGG SER QL IA: NEGATIVE
BRUCELLA IGM SER QL: NEGATIVE
BUN SERPL-MCNC: 12 MG/DL (ref 5–25)
CA-I BLD-SCNC: 1.04 MMOL/L (ref 1.12–1.32)
CALCIUM ALBUM COR SERPL-MCNC: 8.4 MG/DL (ref 8.3–10.1)
CALCIUM SERPL-MCNC: 7.5 MG/DL (ref 8.4–10.2)
CHLORIDE SERPL-SCNC: 103 MMOL/L (ref 96–108)
CO2 SERPL-SCNC: 29 MMOL/L (ref 21–32)
CREAT SERPL-MCNC: 0.33 MG/DL (ref 0.6–1.3)
EOSINOPHIL # BLD AUTO: 0.09 THOUSAND/ÂΜL (ref 0–0.61)
EOSINOPHIL NFR BLD AUTO: 1 % (ref 0–6)
ERYTHROCYTE [DISTWIDTH] IN BLOOD BY AUTOMATED COUNT: 18.6 % (ref 11.6–15.1)
GFR SERPL CREATININE-BSD FRML MDRD: 116 ML/MIN/1.73SQ M
GLUCOSE SERPL-MCNC: 105 MG/DL (ref 65–140)
H CAPSUL AG UR IA-ACNC: NEGATIVE
HCT VFR BLD AUTO: 26.2 % (ref 36.5–49.3)
HGB BLD-MCNC: 8.7 G/DL (ref 12–17)
IMM GRANULOCYTES # BLD AUTO: 0.04 THOUSAND/UL (ref 0–0.2)
IMM GRANULOCYTES NFR BLD AUTO: 1 % (ref 0–2)
LYMPHOCYTES # BLD AUTO: 3.68 THOUSANDS/ÂΜL (ref 0.6–4.47)
LYMPHOCYTES NFR BLD AUTO: 56 % (ref 14–44)
MCH RBC QN AUTO: 30.6 PG (ref 26.8–34.3)
MCHC RBC AUTO-ENTMCNC: 33.2 G/DL (ref 31.4–37.4)
MCV RBC AUTO: 92 FL (ref 82–98)
MISCELLANEOUS LAB TEST RESULT: NORMAL
MONOCYTES # BLD AUTO: 0.99 THOUSAND/ÂΜL (ref 0.17–1.22)
MONOCYTES NFR BLD AUTO: 15 % (ref 4–12)
NEUTROPHILS # BLD AUTO: 1.71 THOUSANDS/ÂΜL (ref 1.85–7.62)
NEUTS SEG NFR BLD AUTO: 26 % (ref 43–75)
NRBC BLD AUTO-RTO: 0 /100 WBCS
PLATELET # BLD AUTO: 305 THOUSANDS/UL (ref 149–390)
PMV BLD AUTO: 8.4 FL (ref 8.9–12.7)
POTASSIUM SERPL-SCNC: 3.6 MMOL/L (ref 3.5–5.3)
PROT SERPL-MCNC: 5.3 G/DL (ref 6.4–8.4)
RBC # BLD AUTO: 2.84 MILLION/UL (ref 3.88–5.62)
SODIUM SERPL-SCNC: 138 MMOL/L (ref 135–147)
WBC # BLD AUTO: 6.6 THOUSAND/UL (ref 4.31–10.16)

## 2024-11-05 PROCEDURE — 80053 COMPREHEN METABOLIC PANEL: CPT | Performed by: STUDENT IN AN ORGANIZED HEALTH CARE EDUCATION/TRAINING PROGRAM

## 2024-11-05 PROCEDURE — 85025 COMPLETE CBC W/AUTO DIFF WBC: CPT | Performed by: STUDENT IN AN ORGANIZED HEALTH CARE EDUCATION/TRAINING PROGRAM

## 2024-11-05 PROCEDURE — 99233 SBSQ HOSP IP/OBS HIGH 50: CPT

## 2024-11-05 PROCEDURE — 82330 ASSAY OF CALCIUM: CPT | Performed by: STUDENT IN AN ORGANIZED HEALTH CARE EDUCATION/TRAINING PROGRAM

## 2024-11-05 PROCEDURE — 74018 RADEX ABDOMEN 1 VIEW: CPT

## 2024-11-05 PROCEDURE — 99232 SBSQ HOSP IP/OBS MODERATE 35: CPT | Performed by: INTERNAL MEDICINE

## 2024-11-05 RX ORDER — HYDROCORTISONE 25 MG/G
CREAM TOPICAL 4 TIMES DAILY PRN
Status: DISCONTINUED | OUTPATIENT
Start: 2024-11-05 | End: 2024-11-11

## 2024-11-05 RX ORDER — BISACODYL 10 MG
10 SUPPOSITORY, RECTAL RECTAL DAILY PRN
Status: DISCONTINUED | OUTPATIENT
Start: 2024-11-05 | End: 2024-11-11

## 2024-11-05 RX ADMIN — PANTOPRAZOLE SODIUM 40 MG: 40 TABLET, DELAYED RELEASE ORAL at 06:15

## 2024-11-05 RX ADMIN — SODIUM CHLORIDE, SODIUM GLUCONATE, SODIUM ACETATE, POTASSIUM CHLORIDE, MAGNESIUM CHLORIDE, SODIUM PHOSPHATE, DIBASIC, AND POTASSIUM PHOSPHATE 100 ML/HR: .53; .5; .37; .037; .03; .012; .00082 INJECTION, SOLUTION INTRAVENOUS at 18:00

## 2024-11-05 RX ADMIN — LEVOTHYROXINE SODIUM 50 MCG: 75 TABLET ORAL at 06:15

## 2024-11-05 RX ADMIN — DOCUSATE SODIUM 100 MG: 100 CAPSULE, LIQUID FILLED ORAL at 08:25

## 2024-11-05 RX ADMIN — HEPARIN SODIUM 5000 UNITS: 5000 INJECTION INTRAVENOUS; SUBCUTANEOUS at 22:05

## 2024-11-05 RX ADMIN — HEPARIN SODIUM 5000 UNITS: 5000 INJECTION INTRAVENOUS; SUBCUTANEOUS at 06:15

## 2024-11-05 RX ADMIN — HYDROCORTISONE: 25 CREAM TOPICAL at 18:18

## 2024-11-05 RX ADMIN — HEPARIN SODIUM 5000 UNITS: 5000 INJECTION INTRAVENOUS; SUBCUTANEOUS at 13:55

## 2024-11-05 RX ADMIN — POLYETHYLENE GLYCOL 3350 17 G: 17 POWDER, FOR SOLUTION ORAL at 17:19

## 2024-11-05 RX ADMIN — SODIUM CHLORIDE, SODIUM GLUCONATE, SODIUM ACETATE, POTASSIUM CHLORIDE, MAGNESIUM CHLORIDE, SODIUM PHOSPHATE, DIBASIC, AND POTASSIUM PHOSPHATE 100 ML/HR: .53; .5; .37; .037; .03; .012; .00082 INJECTION, SOLUTION INTRAVENOUS at 06:15

## 2024-11-05 NOTE — PROGRESS NOTES
Progress Note - Infectious Disease   Name: Miguel Henderson 84 y.o. male I MRN: [id number]  Unit/Bed#: [facility] 817-01 I Date of Admission: 10/26/2024   Date of Service: 11/5/2024 I Hospital Day: 10    Assessment & Plan  Fever  The patient had intermittent fevers for 3 weeks and several admissions to [facility] with fairly unremarkable infectious workup other than positive rhinovirus and COVID. CT imaging was noted to show lymphadenopathy and he had atypical lymphocytes on CBC so hematology/oncology consult was recommended although the patient was discharged on Paxlovid with plan for an outpatient referral.  He then returned to the ED with worsening confusion and return of fever.  CT imaging with worsening axillary lymphadenopathy, CBC differential again showed atypical lymphocytes.  Status post LP 10/25 which showed mild elevation in protein and lymphocytic pleocytosis.  ME panel negative and culture showed no growth. 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  Labs show elevated LDH, ferritin, immunoglobulins. Status post plasmapheresis with improvement in fever curve and initially improvement in mental status, but now with waxing and waning mental status and development of myoclonic jerking as below. S/p lymph node and bone marrow biopsies. Bone marrow biopsy preliminary study demonstrated histiocytic aggregates, which are nonspecific, and can be seen in a variety of settings including infection. Broadened infectious work-up based on risk factors to include TB PCR from CSF (given patient is initially from [place] and [place] to [place] in [place]), histoplasma urine antigen, brucella antibodies, and mycoplasma antibodies. Mycoplasma antibodies negative. Histoplasma urine antigen, TB PCR from CSF, and brucella antibodies pending.   -Continue to monitor off antimicrobials  -Follow-up lymph node and bone marrow biopsies until finalized  -Follow-up  mTB PCR from CSF  -Follow-up histoplasma urine antigen  -Follow-up brucella antibodies for completeness  -Ongoing follow-up with hematology/oncology  -Continue to monitor fever curve/vitals  -Monitor CBCd to continue to closely monitor patient's WBC  Encephalopathy  Oncology initially with concern for hyperviscosity syndrome.  MRI brain without acute intracranial abnormalities.  Status post LP 10/25 with mild lymphocytic pleocytosis and elevated protein.  ME panel and CSF culture negative. Suspect CSF abnormalities related to possible underlying lymphoproliferative disorder.  Status post first session of plasmapheresis 10/26 with improvement in fever curve and mental status initially, but now seems to have worsened.  Repeat CT of the brain nondiagnostic for source on 10/31/2024. Neurology consulted. Concern for toxic-metabolic source and a routine EEG was obtained. It showed nearly continuous myoclonic jerking and concern for an epileptic etiology of myoclonus. However, repeat EEG was performed with an EMG on the limbs and there did not appear to be epileptic correlation. 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 MRI brain negative for acute pathology. Remains confused.  -Continue to monitor mental status  -Appreciate neurology evaluation and recommendations  Lymphadenopathy  During prior admission imaging showed enlarged periaortic, iliac chain and inguinal lymph nodes.  Repeat imaging showed persistent lymphadenopathy with worsened right axillary adenopathy.  As above, concern for an underlying lymphoproliferative disorder.  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  CBC differential also shows atypical lymphocytes.  Status post plasmapheresis 10/26. HIV screening negative. S/p lymph node and bone marrow biopsies. Lymph node biopsy overall findings are atypical and suspicious for a lymphoproliferative disorder. However, the tissue was too scant to further characterize the  underlying process. Therefore, patient underwent an excisional biopsy with surgical oncology on 11/3.   -Hematology/oncology following  -Follow-up pathology from excisional biopsy  -Additional infectious work-up as above    COVID-19 virus infection  Isolation discontinued on 11/1/2024  Myoclonia  Development of intermittent twitching/myoclonic jerking on 10/31. Neurology consulted. Concern for toxic-metabolic source and a routine EEG was obtained. It showed nearly continuous myoclonic jerking and concern for an epileptic etiology of myoclonus. Patient started on Ativan and Keppra. However, repeat EEG was performed with an EMG on the limbs and there did not appear to be epileptic correlation. 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 MRI brain negative for acute pathology.   -Appreciate neurology evaluation and recommendations    I have discussed with the primary service the above plan to monitor off all antibiotics and follow-up additional infectious work-up as above.  The primary service agrees with the plan.    ID consult service will continue to follow.    Antibiotics:  None    Subjective   Awake and interactive. Patient's wife at bedside. Patient denies having any current pain. Only complaint currently is some anal itching. Remains otherwise afebrile and hemodynamically stable off antibiotics.     Objective :  Temp:  [98.6 °F (37 °C)-99.8 °F (37.7 °C)] 98.8 °F (37.1 °C)  HR:  [78-83] 81  BP: (105-126)/(62-86) 126/86  Resp:  [16-22] 16  SpO2:  [99 %] 99 %  O2 Device: None (Room air)    General Appearance:  Chronically ill appearing, awake, interactive. No acute distress. Intermittently confused.    Throat: No perioral lesions or ulcerations    Lungs:   Clear to auscultation bilaterally; no wheezes, rhonchi or rales; respirations unlabored. On room air.    Heart:  RRR; no murmur, rub or gallop.   Abdomen:   Soft, non-tender, non-distended, positive bowel sounds.     Extremities: No  clubbing or cyanosis, no edema.   Skin: No new rashes, lesions, or draining wounds noted on exposed skin. Right groin incision CDI.          Lab Results: I have reviewed the following results:  Results from last 7 days   Lab Units 11/05/24  0546 11/04/24  0504 11/03/24  0511   WBC Thousand/uL 6.60 7.20 9.08   HEMOGLOBIN g/dL 8.7* 8.8* 9.8*   PLATELETS Thousands/uL 305 320 356     Results from last 7 days   Lab Units 11/05/24  0546 11/04/24  0504 11/03/24  0511   SODIUM mmol/L 138 136 132*   POTASSIUM mmol/L 3.6 3.6 3.6   CHLORIDE mmol/L 103 101 96   CO2 mmol/L 29 27 29   BUN mg/dL 12 12 11   CREATININE mg/dL 0.33* 0.37* 0.32*   EGFR ml/min/1.73sq m 116 111 117   CALCIUM mg/dL 7.5* 7.7* 7.5*   AST U/L 27 27 25   ALT U/L 16 14 17   ALK PHOS U/L 84 86 93   ALBUMIN g/dL 2.9* 3.1* 3.2*                               Imaging Results Review: No pertinent imaging studies reviewed.      Anais Kerr PA-C  [facility]

## 2024-11-05 NOTE — SPEECH THERAPY NOTE
Attempted to see pt for dysphagia tx, with goal of assessing for possible diet advancement. Spoke with family in room that assisted pt with eating lunch. They report that while he is tolerating the pureed food, there is still some oral holding/pocketing, and pt needs cues to swallow. Pt sitting in recliner after lunch and appears to be falling asleep. They declined to try any solids foods at this time, but would like to try another day. ST to follow up.

## 2024-11-05 NOTE — PLAN OF CARE
Problem: PAIN - ADULT  Goal: Verbalizes/displays adequate comfort level or baseline comfort level  Description: Interventions:  - Encourage patient to monitor pain and request assistance  - Assess pain using appropriate pain scale  - Administer analgesics based on type and severity of pain and evaluate response  - Implement non-pharmacological measures as appropriate and evaluate response  - Consider cultural and social influences on pain and pain management  - Notify physician/advanced practitioner if interventions unsuccessful or patient reports new pain  Outcome: Progressing     Problem: INFECTION - ADULT  Goal: Absence or prevention of progression during hospitalization  Description: INTERVENTIONS:  - Assess and monitor for signs and symptoms of infection  - Monitor lab/diagnostic results  - Monitor all insertion sites, i.e. indwelling lines, tubes, and drains  - Monitor endotracheal if appropriate and nasal secretions for changes in amount and color  - [place] appropriate cooling/warming therapies per order  - Administer medications as ordered  - Instruct and encourage patient and family to use good hand hygiene technique  - Identify and instruct in appropriate isolation precautions for identified infection/condition  Outcome: Progressing  Goal: Absence of fever/infection during neutropenic period  Description: INTERVENTIONS:  - Monitor WBC    Outcome: Progressing     Problem: SAFETY ADULT  Goal: Patient will remain free of falls  Description: INTERVENTIONS:  - Educate patient/family on patient safety including physical limitations  - Instruct patient to call for assistance with activity   - Consult OT/PT to assist with strengthening/mobility   - Keep Call bell within reach  - Keep bed low and locked with side rails adjusted as appropriate  - Keep care items and personal belongings within reach  - Initiate and maintain comfort rounds  - Make Fall Risk Sign visible to staff  - Offer Toileting every 2 Hours,  in advance of need  - Initiate/Maintain bed/chair alarm  - Apply yellow socks and bracelet for high fall risk patients  - Consider moving patient to room near nurses station  Outcome: Progressing  Goal: Maintain or return to baseline ADL function  Description: INTERVENTIONS:  -  Assess patient's ability to carry out ADLs; assess patient's baseline for ADL function and identify physical deficits which impact ability to perform ADLs (bathing, care of mouth/teeth, toileting, grooming, dressing, etc.)  - Assess/evaluate cause of self-care deficits   - Assess range of motion  - Assess patient's mobility; develop plan if impaired  - Assess patient's need for assistive devices and provide as appropriate  - Encourage maximum independence but intervene and supervise when necessary  - Involve family in performance of ADLs  - Assess for home care needs following discharge   - Consider OT consult to assist with ADL evaluation and planning for discharge  - Provide patient education as appropriate  Outcome: Progressing  Goal: Maintains/Returns to pre admission functional level  Description: INTERVENTIONS:  - Perform AM-PAC 6 Click Basic Mobility/ Daily Activity assessment daily.  - Set and communicate daily mobility goal to care team and patient/family/caregiver.   - Collaborate with rehabilitation services on mobility goals if consulted  - Reposition patient every 2 hours.  - Dangle patient 3 times a day  - Stand patient 3 times a day  - Ambulate patient 3 times a day  - Out of bed to chair 3 times a day   - Out of bed for meals 3 times a day  - Out of bed for toileting  - Record patient progress and toleration of activity level   Outcome: Progressing     Problem: DISCHARGE PLANNING  Goal: Discharge to home or other facility with appropriate resources  Description: INTERVENTIONS:  - Identify barriers to discharge w/patient and caregiver  - Arrange for needed discharge resources and transportation as appropriate  - Identify  discharge learning needs (meds, wound care, etc.)  - Arrange for interpretive services to assist at discharge as needed  - Refer to Case Management Department for coordinating discharge planning if the patient needs post-hospital services based on physician/advanced practitioner order or complex needs related to functional status, cognitive ability, or social support system  Outcome: Progressing     Problem: Knowledge Deficit  Goal: Patient/family/caregiver demonstrates understanding of disease process, treatment plan, medications, and discharge instructions  Description: Complete learning assessment and assess knowledge base.  Interventions:  - Provide teaching at level of understanding  - Provide teaching via preferred learning methods  Outcome: Progressing     Problem: Prexisting or High Potential for Compromised Skin Integrity  Goal: Skin integrity is maintained or improved  Description: INTERVENTIONS:  - Identify patients at risk for skin breakdown  - Assess and monitor skin integrity  - Assess and monitor nutrition and hydration status  - Monitor labs   - Assess for incontinence   - Turn and reposition patient  - Assist with mobility/ambulation  - Relieve pressure over bony prominences  - Avoid friction and shearing  - Provide appropriate hygiene as needed including keeping skin clean and dry  - Evaluate need for skin moisturizer/barrier cream  - Collaborate with interdisciplinary team   - Patient/family teaching  - Consider wound care consult   Outcome: Progressing     Problem: Nutrition/Hydration-ADULT  Goal: Nutrient/Hydration intake appropriate for improving, restoring or maintaining nutritional needs  Description: Monitor and assess patient's nutrition/hydration status for malnutrition. Collaborate with interdisciplinary team and initiate plan and interventions as ordered.  Monitor patient's weight and dietary intake as ordered or per policy. Utilize nutrition screening tool and intervene as necessary.  Determine patient's food preferences and provide high-protein, high-caloric foods as appropriate.     INTERVENTIONS:  - Monitor oral intake, urinary output, labs, and treatment plans  - Assess nutrition and hydration status and recommend course of action  - Evaluate amount of meals eaten  - Assist patient with eating if necessary   - Allow adequate time for meals  - Recommend/ encourage appropriate diets, oral nutritional supplements, and vitamin/mineral supplements  - Order, calculate, and assess calorie counts as needed  - Recommend, monitor, and adjust tube feedings and TPN/PPN based on assessed needs  - Assess need for intravenous fluids  - Provide specific nutrition/hydration education as appropriate  - Include patient/family/caregiver in decisions related to nutrition  Outcome: Progressing

## 2024-11-05 NOTE — ASSESSMENT & PLAN NOTE
The patient had intermittent fevers for 3 weeks and several admissions to [facility] with fairly unremarkable infectious workup other than positive rhinovirus and COVID. CT imaging was noted to show lymphadenopathy and he had atypical lymphocytes on CBC so hematology/oncology consult was recommended although the patient was discharged on Paxlovid with plan for an outpatient referral.  He then returned to the ED with worsening confusion and return of fever.  CT imaging with worsening axillary lymphadenopathy, CBC differential again showed atypical lymphocytes.  Status post LP 10/25 which showed mild elevation in protein and lymphocytic pleocytosis.  ME panel negative and culture showed no growth. 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  Labs show elevated LDH, ferritin, immunoglobulins. Status post plasmapheresis with improvement in fever curve and initially improvement in mental status, but now with waxing and waning mental status and development of myoclonic jerking as below. S/p lymph node and bone marrow biopsies. Bone marrow biopsy preliminary study demonstrated histiocytic aggregates, which are nonspecific, and can be seen in a variety of settings including infection. Broadened infectious work-up based on risk factors to include TB PCR from CSF (given patient is initially from [place] and [place] in [place]), histoplasma urine antigen, brucella antibodies, and mycoplasma antibodies. Mycoplasma antibodies negative. Histoplasma urine antigen, TB PCR from CSF, and brucella antibodies pending.   -Continue to monitor off antimicrobials  -Follow-up lymph node and bone marrow biopsies until finalized  -Follow-up mTB PCR from CSF  -Follow-up histoplasma urine antigen  -Follow-up brucella antibodies for completeness  -Ongoing follow-up with hematology/oncology  -Continue to monitor fever curve/vitals  -Monitor CBCd to continue to  closely monitor patient's WBC

## 2024-11-05 NOTE — PLAN OF CARE
Problem: PAIN - ADULT  Goal: Verbalizes/displays adequate comfort level or baseline comfort level  Description: Interventions:  - Encourage patient to monitor pain and request assistance  - Assess pain using appropriate pain scale  - Administer analgesics based on type and severity of pain and evaluate response  - Implement non-pharmacological measures as appropriate and evaluate response  - Consider cultural and social influences on pain and pain management  - Notify physician/advanced practitioner if interventions unsuccessful or patient reports new pain  Outcome: Progressing     Problem: INFECTION - ADULT  Goal: Absence or prevention of progression during hospitalization  Description: INTERVENTIONS:  - Assess and monitor for signs and symptoms of infection  - Monitor lab/diagnostic results  - Monitor all insertion sites, i.e. indwelling lines, tubes, and drains  - Monitor endotracheal if appropriate and nasal secretions for changes in amount and color  - [place] appropriate cooling/warming therapies per order  - Administer medications as ordered  - Instruct and encourage patient and family to use good hand hygiene technique  - Identify and instruct in appropriate isolation precautions for identified infection/condition  Outcome: Progressing  Goal: Absence of fever/infection during neutropenic period  Description: INTERVENTIONS:  - Monitor WBC    Outcome: Progressing     Problem: SAFETY ADULT  Goal: Patient will remain free of falls  Description: INTERVENTIONS:  - Educate patient/family on patient safety including physical limitations  - Instruct patient to call for assistance with activity   - Consult OT/PT to assist with strengthening/mobility   - Keep Call bell within reach  - Keep bed low and locked with side rails adjusted as appropriate  - Keep care items and personal belongings within reach  - Initiate and maintain comfort rounds  - Make Fall Risk Sign visible to staff  - Offer Toileting every 2 Hours,  in advance of need  - Initiate/Maintain bed/chair alarm  - Apply yellow socks and bracelet for high fall risk patients  - Consider moving patient to room near nurses station  Outcome: Progressing

## 2024-11-05 NOTE — RESTORATIVE TECHNICIAN NOTE
Restorative Technician Note      Patient Name: Miguel Henderson     Restorative Tech Visit Date: 11/05/24  Note Type: Mobility  Patient Position Upon Consult: Supine  Activity Performed: Transferred; Stood; Dangled; Range of motion; Repositioned; Other (Comment) (significant time for incontinence care; sit to stands[facility]  Education Provided: Yes  Patient Position at End of Consult: Bedside chair; All needs within reach; Bed/Chair alarm activated    Smooth  back to bed     Farnaz Luu  DPT, Restorative Technician

## 2024-11-05 NOTE — ASSESSMENT & PLAN NOTE
Cussed with family at bedside about the course of the patient's illness.  Patient had fevers on October 7, was diagnosed with viral infection.  Sent home and had rapid decline at home.  At baseline patient is very physical and able to work in the yard and cuts down trees, very sharp handles his finances.  Now patient is baseline very confused according to the family.  Patient's last travel history was to [place] in May of last year  Brother passed away of some sort of leukemia  No dementia history  No previous stroke or traumatic brain.  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    Lyme was negative on 10/21/2024.   Rhinovirus was initially positive on 10/8/2024.  Respiratory panel was redone and was negative.    Peripheral blood smear was done and there was no parasites seen on 10/22/2024.  TSH, ammonia, coma panel largely unremarkable  CSF was positive for elevated WBC on 10/25/2024, VDRL negative, glucose normal, protein CSF elevated 86, Lyme PCR negative, meningitis encephalitis negative, West Nile negative  Brucella negative 11/01.2024: , Mycoplasma negativem histoplasma negative  HIV negative    Heme onc workup  Bone marrow BX  Pending excisional BX  and leukemia/lymphoma   Patient had plasmapheresis x3 and noted some improvement per the family.  Neuro was consulted and EEG was done which did not show epilepsy   Concern noted of etiology of fevers due to possible lymphoma/hyperviscosity - transferred to the [facility] for expedited lymphoma workup (see below)  Still confused today  Follow-up urine histoplasmosis antigen, TB PCR the CSF, Brucella antibody. Consider workup for causes of arboviral encephalitis if cognition does not quickly recover.   Continue to monitor and replete electrolyte derangements, as necessary  Will discuss with the following specialists  tomorrow.

## 2024-11-05 NOTE — PROGRESS NOTES
Progress Note - Hospitalist   Name: Miguel Henderson 84 y.o. male I MRN: [id number]  Unit/Bed#: [facility] 817-01 I Date of Admission: 10/26/2024   Date of Service: 11/5/2024 I Hospital Day: 10    Assessment & Plan  Encephalopathy  Cussed with family at bedside about the course of the patient's illness.  Patient had fevers on October 7, was diagnosed with viral infection.  Sent home and had rapid decline at home.  At baseline patient is very physical and able to work in the yard and cuts down trees, very sharp handles his finances.  Now patient is baseline very confused according to the family.  Patient's last travel history was to [place] in May of last year  Brother passed away of some sort of leukemia  No dementia history  No previous stroke or traumatic brain.  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    Lyme was negative on 10/21/2024.   Rhinovirus was initially positive on 10/8/2024.  Respiratory panel was redone and was negative.    Peripheral blood smear was done and there was no parasites seen on 10/22/2024.  TSH, ammonia, coma panel largely unremarkable  CSF was positive for elevated WBC on 10/25/2024, VDRL negative, glucose normal, protein CSF elevated 86, Lyme PCR negative, meningitis encephalitis negative, West Nile negative  Brucella negative 11/01.2024: , Mycoplasma negativem histoplasma negative  HIV negative    Heme onc workup  Bone marrow BX  Pending excisional BX  and leukemia/lymphoma   Patient had plasmapheresis x3 and noted some improvement per the family.  Neuro was consulted and EEG was done which did not show epilepsy   Concern noted of etiology of fevers due to possible lymphoma/hyperviscosity - transferred to the [facility] for expedited lymphoma workup (see below)  Still confused today  Follow-up urine histoplasmosis antigen, TB PCR the CSF,  Brucella antibody. Consider workup for causes of arboviral encephalitis if cognition does not quickly recover.   Continue to monitor and replete electrolyte derangements, as necessary  Will discuss with the following specialists tomorrow.     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  Status post bone marrow biopsy on 10/29 -> await final pathology, although preliminary report noted some cold agglutinins (see plan for encephalopathy above regarding hemolysis labs)  Status post courses of plasmapheresis per oncology  Supportive care  Awaiting excision bx results  Abnormal LFTs  Elevated bilirubin and alkaline phosphatase generally improving  Suspected to be associated with lymphadenopathy (above)  Limit/avoid hepatotoxins as possible  Hypothyroidism  Continue Synthroid  Primary hypertension  Holding HCTZ due to soft pressures at this time  Fever  Does not appear to be infectious as workup negative thus far  Appreciate ID recommendations - continue to monitor off antibiotics  Monitor temperature curve  COVID-19 virus infection  Tested positive on 10/21  Currently oxygenating on baseline room air   Contact/airborne precautions now discontinued   Supportive care otherwise  Anemia  Hemoglobin stable at 9.8  Likely associated with lymphadenopathy  Hypocalcemia  Serum Andrion's calcium low -> continue intravenous and oral supplementation  Consider possible contribution to myoclonus  Myoclonia      VTE Pharmacologic Prophylaxis: VTE  Score: 5 Moderate Risk (Score 3-4) - Pharmacological DVT Prophylaxis Ordered: heparin.    Mobility:   Basic Mobility Inpatient Raw Score: 10  JH-HLM Goal: 4: Move to chair/commode  JH-HLM Achieved: 4: Move to chair/commode  JH-HLM Goal achieved. Continue to encourage appropriate mobility.    Patient Centered Rounds: I performed bedside rounds with nursing staff today.   Discussions with Specialists or Other Care Team Provider: ID    Education and Discussions with Family / Patient: Updated  (daughter) at bedside.    Current Length of Stay: 10 day(s)  Current Patient Status: Inpatient   Certification Statement: The patient will continue to require additional inpatient hospital stay due to Altered mental status  Discharge Plan: Anticipate discharge in >72 hrs to rehab facility.    Code Status: Level 3 - DNAR and DNI    Subjective   Patient does not report and headaches, shortness of breath, chest pain, abdominal pain, nausea vomiting, lower leg edema. Also reports good sleep      Objective :  Temp:  [98.8 °F (37.1 °C)-99.8 °F (37.7 °C)] 99.2 °F (37.3 °C)  HR:  [81-90] 90  BP: (105-127)/(62-86) 127/66  Resp:  [16-22] 20  SpO2:  [99 %] 99 %  O2 Device: None (Room air)    Body mass index is 28.46 kg/m².     Input and Output Summary (last 24 hours):     Intake/Output Summary (Last 24 hours) at 11/5/2024 1712  Last data filed at 11/5/2024 0615  Gross per 24 hour   Intake 3061.67 ml   Output 1650 ml   Net 1411.67 ml       Physical Exam  Vitals and nursing note reviewed.   Constitutional:       Appearance: He is well-developed and normal weight.   HENT:      Head: Normocephalic and atraumatic.      Nose: Nose normal.      Mouth/Throat:      Mouth: Mucous membranes are moist.   Eyes:      Conjunctiva/sclera: Conjunctivae normal.   Cardiovascular:      Rate and Rhythm: Normal rate and regular rhythm.      Heart sounds: Normal heart sounds. No murmur heard.  Pulmonary:      Effort: Pulmonary effort is normal. No  respiratory distress.      Breath sounds: Normal breath sounds.   Abdominal:      General: Abdomen is flat.      Palpations: Abdomen is soft.      Tenderness: There is no abdominal tenderness.   Musculoskeletal:         General: No swelling.      Cervical back: Neck supple.      Right lower leg: No edema.      Left lower leg: No edema.   Skin:     General: Skin is warm and dry.      Capillary Refill: Capillary refill takes less than 2 seconds.   Neurological:      General: No focal deficit present.      Mental Status: He is alert. Mental status is at baseline. He is disoriented.   Psychiatric:         Mood and Affect: Mood normal.           Lines/Drains:  Lines/Drains/Airways       Active Status       Name Placement date Placement time Site Days    HD Temporary Double Catheter 10/26/24  1451  Internal jugular  10                            Lab Results: I have reviewed the following results:   Results from last 7 days   Lab Units 11/05/24  0546   WBC Thousand/uL 6.60   HEMOGLOBIN g/dL 8.7*   HEMATOCRIT % 26.2*   PLATELETS Thousands/uL 305   SEGS PCT % 26*   LYMPHO PCT % 56*   MONO PCT % 15*   EOS PCT % 1     Results from last 7 days   Lab Units 11/05/24  0546   SODIUM mmol/L 138   POTASSIUM mmol/L 3.6   CHLORIDE mmol/L 103   CO2 mmol/L 29   BUN mg/dL 12   CREATININE mg/dL 0.33*   ANION GAP mmol/L 6   CALCIUM mg/dL 7.5*   ALBUMIN g/dL 2.9*   TOTAL BILIRUBIN mg/dL 0.94   ALK PHOS U/L 84   ALT U/L 16   AST U/L 27   GLUCOSE RANDOM mg/dL 105         Results from last 7 days   Lab Units 10/31/24  0307   POC GLUCOSE mg/dl 138         Results from last 7 days   Lab Units 10/31/24  0851   LACTIC ACID mmol/L 0.7       Recent Cultures (last 7 days):         Imaging Results Review: I personally reviewed the following image studies/reports in PACS and discussed pertinent findings with Radiology: CT abdomen/pelvis and CT head. My interpretation of the radiology images/reports is: Normal.  Other Study Results Review: EKG was  reviewed.     Last 24 Hours Medication List:     Current Facility-Administered Medications:     acetaminophen (Ofirmev) injection 1,000 mg, Q6H PRN, Last Rate: 1,000 mg (10/28/24 2103)    bisacodyl (DULCOLAX) rectal suppository 10 mg, Once    bisacodyl (DULCOLAX) rectal suppository 10 mg, Daily PRN    calcium carbonate (TUMS) chewable tablet 500 mg, BID    docusate sodium (COLACE) capsule 100 mg, BID    fluticasone (FLONASE) 50 mcg/act nasal spray 1 spray, Daily    heparin (porcine) subcutaneous injection 5,000 Units, Q8H JOE **AND** [COMPLETED] Platelet count, Once    hydrocortisone (ANUSOL-HC) 2.5 % rectal cream, 4x Daily PRN    levothyroxine tablet 50 mcg, Once per day on Monday Tuesday Wednesday Thursday    levothyroxine tablet 75 mcg, Once per day on Sunday Friday Saturday    multi-electrolyte (PLASMALYTE-A/ISOLYTE-S PH 7.4) IV solution, Continuous, Last Rate: 100 mL/hr (11/05/24 0615)    ondansetron (ZOFRAN) injection 4 mg, Q6H PRN    pantoprazole (PROTONIX) EC tablet 40 mg, Early Morning    polyethylene glycol (MIRALAX) packet 17 g, BID    Administrative Statements   Today, Patient Was Seen By: Bunny Pearson MD  I have spent a total time of 65 minutes in caring for this patient on the day of the visit/encounter including Diagnostic results, Prognosis, Risks and benefits of tx options, Instructions for management, Patient and family education, Importance of tx compliance, Risk factor reductions, Impressions, Counseling / Coordination of care, Documenting in the medical record, Reviewing / ordering tests, medicine, procedures  , Obtaining or reviewing history  , and Communicating with other healthcare professionals .    **Please Note: This note may have been constructed using a voice recognition system.**

## 2024-11-06 LAB
MISCELLANEOUS LAB TEST RESULT: NORMAL
SCAN RESULT: NORMAL
SCAN RESULT: NORMAL

## 2024-11-06 PROCEDURE — 97116 GAIT TRAINING THERAPY: CPT

## 2024-11-06 PROCEDURE — 99233 SBSQ HOSP IP/OBS HIGH 50: CPT

## 2024-11-06 PROCEDURE — 97535 SELF CARE MNGMENT TRAINING: CPT

## 2024-11-06 PROCEDURE — 99232 SBSQ HOSP IP/OBS MODERATE 35: CPT | Performed by: INTERNAL MEDICINE

## 2024-11-06 PROCEDURE — 97530 THERAPEUTIC ACTIVITIES: CPT

## 2024-11-06 PROCEDURE — 92526 ORAL FUNCTION THERAPY: CPT

## 2024-11-06 RX ADMIN — HEPARIN SODIUM 5000 UNITS: 5000 INJECTION INTRAVENOUS; SUBCUTANEOUS at 14:00

## 2024-11-06 RX ADMIN — LEVOTHYROXINE SODIUM 50 MCG: 75 TABLET ORAL at 05:51

## 2024-11-06 RX ADMIN — SODIUM CHLORIDE, SODIUM GLUCONATE, SODIUM ACETATE, POTASSIUM CHLORIDE, MAGNESIUM CHLORIDE, SODIUM PHOSPHATE, DIBASIC, AND POTASSIUM PHOSPHATE 100 ML/HR: .53; .5; .37; .037; .03; .012; .00082 INJECTION, SOLUTION INTRAVENOUS at 16:03

## 2024-11-06 RX ADMIN — DOCUSATE SODIUM 100 MG: 100 CAPSULE, LIQUID FILLED ORAL at 10:24

## 2024-11-06 RX ADMIN — HEPARIN SODIUM 5000 UNITS: 5000 INJECTION INTRAVENOUS; SUBCUTANEOUS at 05:51

## 2024-11-06 RX ADMIN — CALCIUM CARBONATE (ANTACID) CHEW TAB 500 MG 500 MG: 500 CHEW TAB at 10:24

## 2024-11-06 RX ADMIN — DOCUSATE SODIUM 100 MG: 100 CAPSULE, LIQUID FILLED ORAL at 17:02

## 2024-11-06 RX ADMIN — SODIUM CHLORIDE, SODIUM GLUCONATE, SODIUM ACETATE, POTASSIUM CHLORIDE, MAGNESIUM CHLORIDE, SODIUM PHOSPHATE, DIBASIC, AND POTASSIUM PHOSPHATE 100 ML/HR: .53; .5; .37; .037; .03; .012; .00082 INJECTION, SOLUTION INTRAVENOUS at 04:59

## 2024-11-06 RX ADMIN — PANTOPRAZOLE SODIUM 40 MG: 40 TABLET, DELAYED RELEASE ORAL at 05:50

## 2024-11-06 RX ADMIN — HEPARIN SODIUM 5000 UNITS: 5000 INJECTION INTRAVENOUS; SUBCUTANEOUS at 22:12

## 2024-11-06 RX ADMIN — FLUTICASONE PROPIONATE 1 SPRAY: 50 SPRAY, METERED NASAL at 10:24

## 2024-11-06 NOTE — ASSESSMENT & PLAN NOTE
Disussed with family at bedside about the course of the patient's illness.  Patient had fevers on October 7, was diagnosed with viral infection.  Sent home and had rapid decline at home.    At baseline patient is very physical and able to work in the yard and cuts down trees, very sharp handles his finances.  Now patient is baseline very confused according to the family.  Patient's last travel history was to [place] in May of last year  Brother passed away of some sort of leukemia  No dementia history  No previous stroke or traumatic brain.  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    Lyme was negative on 10/21/2024.   Rhinovirus was initially positive on 10/8/2024.  Respiratory panel was redone and was negative.    Peripheral blood smear was done and there was no parasites seen on 10/22/2024.    TSH, ammonia, coma panel unremarkable  CSF was positive for elevated WBC on 10/25/2024, VDRL negative, glucose normal, protein CSF elevated 86, Lyme PCR negative, meningitis encephalitis negative, West Nile negative  Brucella negative 11/01.2024  Mycoplasma negativem histoplasma negative  HIV negative  TB PCR Pending 10/25    Heme onc workup  Labs show an elevated gamma spike on SPEP, elevated IgA, IgG, IgM. Positive free lambda light chain on immunofixation in the urine and plasma. Flow is unremarkable. Elevated beta-2 microglobulin. High normal viscosity.   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    NEURO workup  MRI brain with no significant acute pathology  EEG with no evidence of epileptiform discharges thus no need for AED at this time       Concern noted of etiology of fevers due to  possible lymphoma/hyperviscosity - transferred to the [facility] for expedited lymphoma workup (see below)  Still confused today  Discussed with ID and unlikely infectious,   Pending workup with hematology and oncology

## 2024-11-06 NOTE — PLAN OF CARE
Problem: PAIN - ADULT  Goal: Verbalizes/displays adequate comfort level or baseline comfort level  Description: Interventions:  - Encourage patient to monitor pain and request assistance  - Assess pain using appropriate pain scale  - Administer analgesics based on type and severity of pain and evaluate response  - Implement non-pharmacological measures as appropriate and evaluate response  - Consider cultural and social influences on pain and pain management  - Notify physician/advanced practitioner if interventions unsuccessful or patient reports new pain  Outcome: Progressing     Problem: INFECTION - ADULT  Goal: Absence or prevention of progression during hospitalization  Description: INTERVENTIONS:  - Assess and monitor for signs and symptoms of infection  - Monitor lab/diagnostic results  - Monitor all insertion sites, i.e. indwelling lines, tubes, and drains  - Monitor endotracheal if appropriate and nasal secretions for changes in amount and color  - [place] appropriate cooling/warming therapies per order  - Administer medications as ordered  - Instruct and encourage patient and family to use good hand hygiene technique  - Identify and instruct in appropriate isolation precautions for identified infection/condition  Outcome: Progressing  Goal: Absence of fever/infection during neutropenic period  Description: INTERVENTIONS:  - Monitor WBC    Outcome: Progressing     Problem: SAFETY ADULT  Goal: Patient will remain free of falls  Description: INTERVENTIONS:  - Educate patient/family on patient safety including physical limitations  - Instruct patient to call for assistance with activity   - Consult OT/PT to assist with strengthening/mobility   - Keep Call bell within reach  - Keep bed low and locked with side rails adjusted as appropriate  - Keep care items and personal belongings within reach  - Initiate and maintain comfort rounds  - Make Fall Risk Sign visible to staff  - Offer Toileting every 2 Hours,  in advance of need  - Initiate/Maintain bed/chair alarm  - Apply yellow socks and bracelet for high fall risk patients  - Consider moving patient to room near nurses station  Outcome: Progressing  Goal: Maintain or return to baseline ADL function  Description: INTERVENTIONS:  -  Assess patient's ability to carry out ADLs; assess patient's baseline for ADL function and identify physical deficits which impact ability to perform ADLs (bathing, care of mouth/teeth, toileting, grooming, dressing, etc.)  - Assess/evaluate cause of self-care deficits   - Assess range of motion  - Assess patient's mobility; develop plan if impaired  - Assess patient's need for assistive devices and provide as appropriate  - Encourage maximum independence but intervene and supervise when necessary  - Involve family in performance of ADLs  - Assess for home care needs following discharge   - Consider OT consult to assist with ADL evaluation and planning for discharge  - Provide patient education as appropriate  Outcome: Progressing  Goal: Maintains/Returns to pre admission functional level  Description: INTERVENTIONS:  - Perform AM-PAC 6 Click Basic Mobility/ Daily Activity assessment daily.  - Set and communicate daily mobility goal to care team and patient/family/caregiver.   - Collaborate with rehabilitation services on mobility goals if consulted  - Reposition patient every 2 hours.  - Dangle patient 3 times a day  - Stand patient 3 times a day  - Ambulate patient 3 times a day  - Out of bed to chair 3 times a day   - Out of bed for meals 3 times a day  - Out of bed for toileting  - Record patient progress and toleration of activity level   Outcome: Progressing     Problem: DISCHARGE PLANNING  Goal: Discharge to home or other facility with appropriate resources  Description: INTERVENTIONS:  - Identify barriers to discharge w/patient and caregiver  - Arrange for needed discharge resources and transportation as appropriate  - Identify  discharge learning needs (meds, wound care, etc.)  - Arrange for interpretive services to assist at discharge as needed  - Refer to Case Management Department for coordinating discharge planning if the patient needs post-hospital services based on physician/advanced practitioner order or complex needs related to functional status, cognitive ability, or social support system  Outcome: Progressing     Problem: Knowledge Deficit  Goal: Patient/family/caregiver demonstrates understanding of disease process, treatment plan, medications, and discharge instructions  Description: Complete learning assessment and assess knowledge base.  Interventions:  - Provide teaching at level of understanding  - Provide teaching via preferred learning methods  Outcome: Progressing     Problem: Prexisting or High Potential for Compromised Skin Integrity  Goal: Skin integrity is maintained or improved  Description: INTERVENTIONS:  - Identify patients at risk for skin breakdown  - Assess and monitor skin integrity  - Assess and monitor nutrition and hydration status  - Monitor labs   - Assess for incontinence   - Turn and reposition patient  - Assist with mobility/ambulation  - Relieve pressure over bony prominences  - Avoid friction and shearing  - Provide appropriate hygiene as needed including keeping skin clean and dry  - Evaluate need for skin moisturizer/barrier cream  - Collaborate with interdisciplinary team   - Patient/family teaching  - Consider wound care consult   Outcome: Progressing     Problem: Nutrition/Hydration-ADULT  Goal: Nutrient/Hydration intake appropriate for improving, restoring or maintaining nutritional needs  Description: Monitor and assess patient's nutrition/hydration status for malnutrition. Collaborate with interdisciplinary team and initiate plan and interventions as ordered.  Monitor patient's weight and dietary intake as ordered or per policy. Utilize nutrition screening tool and intervene as necessary.  Determine patient's food preferences and provide high-protein, high-caloric foods as appropriate.     INTERVENTIONS:  - Monitor oral intake, urinary output, labs, and treatment plans  - Assess nutrition and hydration status and recommend course of action  - Evaluate amount of meals eaten  - Assist patient with eating if necessary   - Allow adequate time for meals  - Recommend/ encourage appropriate diets, oral nutritional supplements, and vitamin/mineral supplements  - Order, calculate, and assess calorie counts as needed  - Recommend, monitor, and adjust tube feedings and TPN/PPN based on assessed needs  - Assess need for intravenous fluids  - Provide specific nutrition/hydration education as appropriate  - Include patient/family/caregiver in decisions related to nutrition  Outcome: Progressing

## 2024-11-06 NOTE — ASSESSMENT & PLAN NOTE
The patient had intermittent fevers for 3 weeks and several admissions to [facility] with fairly unremarkable infectious workup other than positive rhinovirus and COVID. CT imaging was noted to show lymphadenopathy and he had atypical lymphocytes on CBC so hematology/oncology consult was recommended although the patient was discharged on Paxlovid with plan for an outpatient referral.  He then returned to the ED with worsening confusion and return of fever.  CT imaging with worsening axillary lymphadenopathy, CBC differential again showed atypical lymphocytes.  Status post LP 10/25 which showed mild elevation in protein and lymphocytic pleocytosis.  ME panel negative and culture showed no growth. 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  Labs show elevated LDH, ferritin, immunoglobulins. Status post plasmapheresis with improvement in fever curve and initially improvement in mental status, but now with waxing and waning mental status and development of myoclonic jerking as below. S/p lymph node and bone marrow biopsies. Bone marrow biopsy preliminary study demonstrated histiocytic aggregates, which are nonspecific, and can be seen in a variety of settings including infection. Broadened infectious work-up based on risk factors to include TB PCR from CSF (given patient is initially from [place] and traveled to [place] in June), histoplasma urine antigen, brucella antibodies, and mycoplasma antibodies. Mycoplasma antibodies, histoplasma urine antigen, and brucella antibodies negative. TB PCR from CSF is currently pending. Overall, low concern for infectious source given extensive infectious work-up that has remained unremarkable.   -Continue to monitor off antimicrobials  -Follow-up lymph node and bone marrow biopsies until finalized  -Follow-up mTB PCR from CSF  -Ongoing follow-up with hematology/oncology  -Continue to monitor fever  curve/vitals  -Monitor CBCd to continue to closely monitor patient's WBC

## 2024-11-06 NOTE — CASE MANAGEMENT
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/31/2024     COVID-19 virus infection 10/30/2024     Abnormal LFTs 10/29/2024     Lymphadenopathy 10/27/2024     Encephalopathy 10/26/2024     Fever 10/26/2024     Metabolic encephalopathy 10/25/2024     Febrile illness 10/22/2024     Hyponatremia 10/22/2024     Lactic acidosis 10/22/2024     Oral candidiasis 10/22/2024     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     SIRS (systemic inflammatory response syndrome) (HCC) 10/22/2024     Odynophagia 10/22/2024     Localized edema 10/16/2024     Shortness of breath on exertion 10/16/2024     Cough 10/16/2024     Lesion of oral mucosa 10/15/2024     Liver lesion 10/15/2024     Dysphagia 10/14/2024     Ulceration of oral mucosa 10/14/2024     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     Left knee pain 10/10/2024     Rhinovirus 10/09/2024     Adenocarcinoma of prostate (HCC) 2024     Lumbar radiculopathy 2024     Hypothyroidism 2024     Primary hypertension 2024       LOS (days): 11  Geometric Mean LOS (GMLOS) (days): 7.5  Days to GMLOS:-3.4     OBJECTIVE:  Risk of Unplanned Readmission Score: 23.33      Current admission status: Inpatient   Preferred Pharmacy:   [id number] - [place] - [address]  [address] [address]  Phone: [phone] Fax: [phone]    [id number] - [place] - [address]  [address]  [place] [address]  Phone: [phone] Fax: [phone]    Primary Care Provider: Greg Foster MD    Primary Insurance: [facility] REP  Secondary Insurance:     DISCHARGE  DETAILS:    Discharge planning discussed with:: pt and pt's son Juan at bedside  Freedom of Choice: Yes     CM contacted family/caregiver?: Yes  Were Treatment Team discharge recommendations reviewed with patient/caregiver?: Yes  Did patient/caregiver verbalize understanding of patient care needs?: Yes  Were patient/caregiver advised of the risks associated with not following Treatment Team discharge recommendations?: Yes    Contacts  Patient Contacts: Juan Santiago - son  Relationship to Patient:: Family  Contact Method: In Person  Reason/Outcome: Discharge Planning    DME Referral Provided  Referral made for DME?: No    Additional Comments: CM met with pt and pt's son Juan to discuss d/c plan.  Per Juan, he would like this CM to send referrals to [facility].  Juan states that he will talk with the rest of the family today and give this CM a final decision today.

## 2024-11-06 NOTE — PROGRESS NOTES
Progress Note - Infectious Disease   Name: Miguel Henderson 84 y.o. male I MRN: [id number]  Unit/Bed#: [facility] 817-01 I Date of Admission: 10/26/2024   Date of Service: 11/6/2024 I Hospital Day: 11    Assessment & Plan  Fever  The patient had intermittent fevers for 3 weeks and several admissions to [facility] with fairly unremarkable infectious workup other than positive rhinovirus and COVID. CT imaging was noted to show lymphadenopathy and he had atypical lymphocytes on CBC so hematology/oncology consult was recommended although the patient was discharged on Paxlovid with plan for an outpatient referral.  He then returned to the ED with worsening confusion and return of fever.  CT imaging with worsening axillary lymphadenopathy, CBC differential again showed atypical lymphocytes.  Status post LP 10/25 which showed mild elevation in protein and lymphocytic pleocytosis.  ME panel negative and culture showed no growth. 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  Labs show elevated LDH, ferritin, immunoglobulins. Status post plasmapheresis with improvement in fever curve and initially improvement in mental status, but now with waxing and waning mental status and development of myoclonic jerking as below. S/p lymph node and bone marrow biopsies. Bone marrow biopsy preliminary study demonstrated histiocytic aggregates, which are nonspecific, and can be seen in a variety of settings including infection. Broadened infectious work-up based on risk factors to include TB PCR from CSF (given patient is initially from [place] and traveled to [place] in June), histoplasma urine antigen, brucella antibodies, and mycoplasma antibodies. Mycoplasma antibodies, histoplasma urine antigen, and brucella antibodies negative. TB PCR from CSF is currently pending. Overall, low concern for infectious source given extensive infectious work-up that has remained  unremarkable.   -Continue to monitor off antimicrobials  -Follow-up lymph node and bone marrow biopsies until finalized  -Follow-up mTB PCR from CSF  -Ongoing follow-up with hematology/oncology  -Continue to monitor fever curve/vitals  -Monitor CBCd to continue to closely monitor patient's WBC  Encephalopathy  Oncology initially with concern for hyperviscosity syndrome.  MRI brain without acute intracranial abnormalities.  Status post LP 10/25 with mild lymphocytic pleocytosis and elevated protein.  ME panel and CSF culture negative. Suspect CSF abnormalities related to possible underlying lymphoproliferative disorder.  Status post first session of plasmapheresis 10/26 with improvement in fever curve and mental status initially, but now seems to have worsened.  Repeat CT of the brain nondiagnostic for source on 10/31/2024. Neurology consulted. Concern for toxic-metabolic source and a routine EEG was obtained. It showed nearly continuous myoclonic jerking and concern for an epileptic etiology of myoclonus. However, repeat EEG was performed with an EMG on the limbs and there did not appear to be epileptic correlation. 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 MRI brain negative for acute pathology. Remains confused.  -Continue to monitor mental status  -Appreciate neurology evaluation and recommendations  Lymphadenopathy  During prior admission imaging showed enlarged periaortic, iliac chain and inguinal lymph nodes.  Repeat imaging showed persistent lymphadenopathy with worsened right axillary adenopathy.  As above, concern for an underlying lymphoproliferative disorder.  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  CBC differential also shows atypical lymphocytes.  Status post plasmapheresis 10/26. HIV screening negative. S/p lymph node and bone marrow biopsies. Lymph node biopsy overall findings are atypical and suspicious for a lymphoproliferative disorder. However, the  tissue was too scant to further characterize the underlying process. Therefore, patient underwent an excisional biopsy with surgical oncology on 11/3.   -Hematology/oncology following  -Follow-up pathology from excisional biopsy  -Additional infectious work-up as above    COVID-19 virus infection  Isolation discontinued on 11/1/2024  Myoclonia  Development of intermittent twitching/myoclonic jerking on 10/31. Neurology consulted. Concern for toxic-metabolic source and a routine EEG was obtained. It showed nearly continuous myoclonic jerking and concern for an epileptic etiology of myoclonus. Patient started on Ativan and Keppra. However, repeat EEG was performed with an EMG on the limbs and there did not appear to be epileptic correlation. 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 MRI brain negative for acute pathology.   -Appreciate neurology evaluation and recommendations    I have discussed with the primary service the above plan to monitor off all antibiotics and follow-up additional infectious work-up as above.  The primary service agrees with the plan.    ID consult service will continue to follow.    Antibiotics:  None    Subjective   Awake and interactive. No acute complaints. Remains otherwise afebrile and hemodynamically stable off antibiotics.     Objective :  Temp:  [98.8 °F (37.1 °C)-99.2 °F (37.3 °C)] 99 °F (37.2 °C)  HR:  [76-90] 76  BP: (127-136)/(66-88) 131/73  Resp:  [18-20] 18  SpO2:  [99 %-100 %] 100 %  O2 Device: None (Room air)    General Appearance:  Chronically ill appearing, awake, interactive. No acute distress. Ongoing confusion.    Throat: No perioral lesions or ulcerations    Lungs:   Clear to auscultation bilaterally; no wheezes, rhonchi or rales; respirations unlabored. On room air.    Heart:  RRR; no murmur, rub or gallop.   Abdomen:   Soft, non-tender, non-distended, positive bowel sounds.     Extremities: No clubbing or cyanosis, no edema.   Skin: No  new rashes, lesions, or draining wounds noted on exposed skin.         Lab Results: I have reviewed the following results:  Results from last 7 days   Lab Units 11/05/24  0546 11/04/24  0504 11/03/24  0511   WBC Thousand/uL 6.60 7.20 9.08   HEMOGLOBIN g/dL 8.7* 8.8* 9.8*   PLATELETS Thousands/uL 305 320 356     Results from last 7 days   Lab Units 11/05/24  0546 11/04/24  0504 11/03/24  0511   SODIUM mmol/L 138 136 132*   POTASSIUM mmol/L 3.6 3.6 3.6   CHLORIDE mmol/L 103 101 96   CO2 mmol/L 29 27 29   BUN mg/dL 12 12 11   CREATININE mg/dL 0.33* 0.37* 0.32*   EGFR ml/min/1.73sq m 116 111 117   CALCIUM mg/dL 7.5* 7.7* 7.5*   AST U/L 27 27 25   ALT U/L 16 14 17   ALK PHOS U/L 84 86 93   ALBUMIN g/dL 2.9* 3.1* 3.2*                               Imaging Results Review: No pertinent imaging studies reviewed.      Anais Kerr PA-C  [facility]

## 2024-11-06 NOTE — SPEECH THERAPY NOTE
"Speech Language/Pathology  Speech-Language Pathology Progress Note      Patient Name: Miguel Henderson    Today's Date: 11/6/2024      Subjective:  Pt was awake and alert. He was sitting up in chair at bedside. Patient stated \" \".    Objective:  Pt was seen today for dysphagia therapy. Current diet is puree with thin liquids. Pt was on room air. Oral care had already been completed. Focus of today's session was determine potential for diet texture advancement. Textures offered today included cookies, straw sip thin.    Swallow function:   Today the pt self fed the trials- broke and masticated the pieces of cookie.  At times he will take a drink of his juice with material in the oral cavity.  Slow but improved mastication of the material offered.  Today able to clear material with 2 swallows.  The family states he has had improved manipulation over all and is asking for upgraded material.      Assessment:  Pt with improved manipulation of solids today as well as fully alert.  Able to breakdown and transfer a harder solid.     Plan:  Change diet texture to dysphagia 3 dental soft.   Will follow with full meal.  Level 3 is baseline for the pt.   "

## 2024-11-06 NOTE — PHYSICAL THERAPY NOTE
PHYSICAL THERAPY NOTE          Patient Name: Miguel Henderson  Today's Date: 11/6/2024 11/06/24 1202   PT Last Visit   PT Visit Date 11/06/24   Note Type   Note Type Treatment   Pain Assessment   Pain Assessment Tool 0-10   Pain Score No Pain   Restrictions/Precautions   Weight Bearing Precautions Per Order No   Other Precautions Chair Alarm;Bed Alarm;Cognitive;Multiple lines;Fall Risk  (Primarily Nepali speaking, family assists with translation)   General   Chart Reviewed Yes   Cognition   Overall Cognitive Status Impaired   Arousal/Participation Responsive;Alert   Attention Attends with cues to redirect   Orientation Level Oriented to person;Oriented to place  (Pt knows month and year, appproximate date)   Following Commands Follows one step commands with increased time or repetition   Comments Pt cooperative during session. Family assists with translation, pt does need cueing to complete task   Bed Mobility   Supine to Sit 3  Moderate assistance   Additional items Assist x 2;Increased time required;HOB elevated;Verbal cues;LE management   Sit to Supine Unable to assess   Additional Comments Pt in bed upon arrival. Continues to have posterior and L lean upon sitting, required Min/Mod A to maintain balance   Transfers   Sit to Stand 3  Moderate assistance   Additional items Assist x 2;Increased time required;Verbal cues   Stand to Sit 3  Moderate assistance   Additional items Assist x 2;Increased time required;Verbal cues   Stand pivot 3  Moderate assistance   Additional items Assist x 2;Increased time required;Verbal cues   Additional Comments Matias Knee block + Matias HHA- Completes 5 STS during session. Difficulty transferring when using RW, needed increased cueing for use of AD and posture correction   Ambulation/Elevation   Gait pattern Improper Weight shift;Step to;Short stride;L Knee Jillian   Gait Assistance 3  Moderate  assist   Additional items Assist x 2;Verbal cues;Tactile cues   Assistive Device Other (Comment)  (HHA +hallway railing on R side)   Distance 2 ft ,12 ft   Ambulation/Elevation Additional Comments Short stepped gait pattern, difficulty advancing L LE , but completes with cueing. Improved ability as session progressed. Did require assist X 2 for ambulation with close chair follow for safety. Limited 2* bladder incontinence   Balance   Static Sitting Fair -   Dynamic Sitting Poor +   Static Standing Poor   Dynamic Standing Poor -   Ambulatory Poor -   Endurance Deficit   Endurance Deficit Yes   Activity Tolerance   Activity Tolerance Patient limited by fatigue   Medical Staff Made Aware OT Tatyana Loomis SPT   Nurse Made Aware RN cleared pt for therapy   Exercises   Neuro re-ed STS , EOB sitting ~6 minutes   Balance training  EOB sitting with UE support , dynamic balance training with UE raises + reaches -forward , lateral . Requires assist for trunk management.   Assessment   Prognosis Good   Problem List Decreased endurance;Impaired balance;Decreased mobility;Decreased safety awareness   Assessment Pt seen for PT treatment session this date. Pt cooperative and pleasant, translation assistance provided by family. Pt making progress with therapy, completing multiple functional transfers as detailed in above flowsheet. Difficulty transferring with RW- required increased cueing and assistance from chair. Pt was able to ambulate during session today with Assist x 2 and close chair follow. Pt continues with L Lean requiring assist for trunk management. Pt demonstrates improvement in overall mobility and making gains .Pt was left back at the end of PT session with all needs in reach. Pt would benefit from continued PT services while in hospital to address remaining limitations. The patient's AM-PAC Basic Mobility Inpatient Short Form Raw Score is 11. A Raw score of less than or equal to 16 suggests the patient may benefit  from discharge to post-acute rehabilitation services. Please also refer to the recommendation of the Physical Therapist for safe discharge planning. Post dc recommendation is Level II at this time.   Goals   Patient Goals none stated   STG Expiration Date 11/13/24   PT Treatment Day 2   Plan   Treatment/Interventions Functional transfer training;Therapeutic exercise;Bed mobility;Gait training;Spoke to nursing;Spoke to case management;OT   Progress Progressing toward goals   PT Frequency 3-5x/wk   Discharge Recommendation   Rehab Resource Intensity Level, PT II (Moderate Resource Intensity)   Equipment Recommended   (TBD)   AM-PAC Basic Mobility Inpatient   Turning in Flat Bed Without Bedrails 2   Lying on Back to Sitting on Edge of Flat Bed Without Bedrails 2   Moving Bed to Chair 2   Standing Up From Chair Using Arms 2   Walk in Room 2   Climb 3-5 Stairs With Railing 1   Basic Mobility Inpatient Raw Score 11   Basic Mobility Standardized Score 30.25   [facility] Highest Level Of Mobility   -HLM Goal 4: Move to chair/commode   -HLM Achieved 6: Walk 10 steps or more   Education   Education Provided Mobility training   Patient Reinforcement needed   End of Consult   Patient Position at End of Consult All needs within reach;Bed/Chair alarm activated;Bedside chair   Jen Soto PT DPT

## 2024-11-06 NOTE — PLAN OF CARE
Problem: PAIN - ADULT  Goal: Verbalizes/displays adequate comfort level or baseline comfort level  Description: Interventions:  - Encourage patient to monitor pain and request assistance  - Assess pain using appropriate pain scale  - Administer analgesics based on type and severity of pain and evaluate response  - Implement non-pharmacological measures as appropriate and evaluate response  - Consider cultural and social influences on pain and pain management  - Notify physician/advanced practitioner if interventions unsuccessful or patient reports new pain  Outcome: Progressing     Problem: INFECTION - ADULT  Goal: Absence or prevention of progression during hospitalization  Description: INTERVENTIONS:  - Assess and monitor for signs and symptoms of infection  - Monitor lab/diagnostic results  - Monitor all insertion sites, i.e. indwelling lines, tubes, and drains  - Monitor endotracheal if appropriate and nasal secretions for changes in amount and color  - Emerson appropriate cooling/warming therapies per order  - Administer medications as ordered  - Instruct and encourage patient and family to use good hand hygiene technique  - Identify and instruct in appropriate isolation precautions for identified infection/condition  Outcome: Progressing  Goal: Absence of fever/infection during neutropenic period  Description: INTERVENTIONS:  - Monitor WBC    Outcome: Progressing     Problem: SAFETY ADULT  Goal: Patient will remain free of falls  Description: INTERVENTIONS:  - Educate patient/family on patient safety including physical limitations  - Instruct patient to call for assistance with activity   - Consult OT/PT to assist with strengthening/mobility   - Keep Call bell within reach  - Keep bed low and locked with side rails adjusted as appropriate  - Keep care items and personal belongings within reach  - Initiate and maintain comfort rounds  - Make Fall Risk Sign visible to staff  - Offer Toileting every 2 Hours,  in advance of need  - Initiate/Maintain bed/chair alarm  - Apply yellow socks and bracelet for high fall risk patients  - Consider moving patient to room near nurses station  Outcome: Progressing  Goal: Maintain or return to baseline ADL function  Description: INTERVENTIONS:  -  Assess patient's ability to carry out ADLs; assess patient's baseline for ADL function and identify physical deficits which impact ability to perform ADLs (bathing, care of mouth/teeth, toileting, grooming, dressing, etc.)  - Assess/evaluate cause of self-care deficits   - Assess range of motion  - Assess patient's mobility; develop plan if impaired  - Assess patient's need for assistive devices and provide as appropriate  - Encourage maximum independence but intervene and supervise when necessary  - Involve family in performance of ADLs  - Assess for home care needs following discharge   - Consider OT consult to assist with ADL evaluation and planning for discharge  - Provide patient education as appropriate  Outcome: Progressing  Goal: Maintains/Returns to pre admission functional level  Description: INTERVENTIONS:  - Perform AM-PAC 6 Click Basic Mobility/ Daily Activity assessment daily.  - Set and communicate daily mobility goal to care team and patient/family/caregiver.   - Collaborate with rehabilitation services on mobility goals if consulted  - Reposition patient every 2 hours.  - Dangle patient 3 times a day  - Stand patient 3 times a day  - Ambulate patient 3 times a day  - Out of bed to chair 3 times a day   - Out of bed for meals 3 times a day  - Out of bed for toileting  - Record patient progress and toleration of activity level   Outcome: Progressing     Problem: DISCHARGE PLANNING  Goal: Discharge to home or other facility with appropriate resources  Description: INTERVENTIONS:  - Identify barriers to discharge w/patient and caregiver  - Arrange for needed discharge resources and transportation as appropriate  - Identify  discharge learning needs (meds, wound care, etc.)  - Arrange for interpretive services to assist at discharge as needed  - Refer to Case Management Department for coordinating discharge planning if the patient needs post-hospital services based on physician/advanced practitioner order or complex needs related to functional status, cognitive ability, or social support system  Outcome: Progressing     Problem: Knowledge Deficit  Goal: Patient/family/caregiver demonstrates understanding of disease process, treatment plan, medications, and discharge instructions  Description: Complete learning assessment and assess knowledge base.  Interventions:  - Provide teaching at level of understanding  - Provide teaching via preferred learning methods  Outcome: Progressing     Problem: Prexisting or High Potential for Compromised Skin Integrity  Goal: Skin integrity is maintained or improved  Description: INTERVENTIONS:  - Identify patients at risk for skin breakdown  - Assess and monitor skin integrity  - Assess and monitor nutrition and hydration status  - Monitor labs   - Assess for incontinence   - Turn and reposition patient  - Assist with mobility/ambulation  - Relieve pressure over bony prominences  - Avoid friction and shearing  - Provide appropriate hygiene as needed including keeping skin clean and dry  - Evaluate need for skin moisturizer/barrier cream  - Collaborate with interdisciplinary team   - Patient/family teaching  - Consider wound care consult   Outcome: Progressing     Problem: Nutrition/Hydration-ADULT  Goal: Nutrient/Hydration intake appropriate for improving, restoring or maintaining nutritional needs  Description: Monitor and assess patient's nutrition/hydration status for malnutrition. Collaborate with interdisciplinary team and initiate plan and interventions as ordered.  Monitor patient's weight and dietary intake as ordered or per policy. Utilize nutrition screening tool and intervene as necessary.  Determine patient's food preferences and provide high-protein, high-caloric foods as appropriate.     INTERVENTIONS:  - Monitor oral intake, urinary output, labs, and treatment plans  - Assess nutrition and hydration status and recommend course of action  - Evaluate amount of meals eaten  - Assist patient with eating if necessary   - Allow adequate time for meals  - Recommend/ encourage appropriate diets, oral nutritional supplements, and vitamin/mineral supplements  - Order, calculate, and assess calorie counts as needed  - Recommend, monitor, and adjust tube feedings and TPN/PPN based on assessed needs  - Assess need for intravenous fluids  - Provide specific nutrition/hydration education as appropriate  - Include patient/family/caregiver in decisions related to nutrition  Outcome: Progressing

## 2024-11-06 NOTE — PROGRESS NOTES
Progress Note - Hospitalist   Name: Miguel Henderson 84 y.o. male I MRN: [id number]  [facility] Date of Admission: 10/26/2024   Date of Service: 11/6/2024 I Hospital Day: 11    Assessment & Plan  Encephalopathy  Disussed with family at bedside about the course of the patient's illness.  Patient had fevers on October 7, was diagnosed with viral infection.  Sent home and had rapid decline at home.    At baseline patient is very physical and able to work in the yard and cuts down trees, very sharp handles his finances.  Now patient is baseline very confused according to the family.  Patient's last travel history was to [place] in May of last year  Brother passed away of some sort of leukemia  No dementia history  No previous stroke or traumatic brain.  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    Lyme was negative on 10/21/2024.   Rhinovirus was initially positive on 10/8/2024.  Respiratory panel was redone and was negative.    Peripheral blood smear was done and there was no parasites seen on 10/22/2024.    TSH, ammonia, coma panel unremarkable  CSF was positive for elevated WBC on 10/25/2024, VDRL negative, glucose normal, protein CSF elevated 86, Lyme PCR negative, meningitis encephalitis negative, West Nile negative  Brucella negative 11/01.2024  Mycoplasma negativem histoplasma negative  HIV negative  TB PCR Pending 10/25    Heme onc workup  Labs show an elevated gamma spike on SPEP, elevated IgA, IgG, IgM. Positive free lambda light chain on immunofixation in the urine and plasma. Flow is unremarkable. Elevated beta-2 microglobulin. High normal viscosity.   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    NEURO workup  MRI brain with no significant acute pathology  EEG with no evidence of epileptiform discharges thus no need for AED at this time       Concern noted of etiology of fevers due to possible lymphoma/hyperviscosity - transferred to the [facility] for expedited lymphoma workup (see below)  Still confused today  Discussed with ID and unlikely infectious,   Pending workup with hematology and oncology    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  Status post bone marrow biopsy on 10/29 -> await final pathology, although preliminary report noted some cold agglutinins (see plan for encephalopathy above regarding hemolysis labs)  Status post courses of plasmapheresis per oncology  Supportive care  Awaiting excision bx results  Abnormal LFTs  Elevated bilirubin and alkaline phosphatase generally improving  Suspected to be associated with lymphadenopathy (above)  Limit/avoid hepatotoxins as possible  Hypothyroidism  Continue Synthroid  Primary hypertension  Holding HCTZ due to soft pressures at this time  Fever  Does not appear to be infectious as workup negative thus far  Appreciate ID recommendations - continue to monitor off antibiotics  Monitor temperature curve  COVID-19 virus infection  Tested positive on 10/21  Currently oxygenating on baseline room air   Contact/airborne precautions now discontinued   Supportive care otherwise  Anemia  Hemoglobin stable at 9.8  Likely  associated with lymphadenopathy  Hypocalcemia  Serum Andrion's calcium low -> continue intravenous and oral supplementation  Consider possible contribution to myoclonus  Myoclonia      VTE Pharmacologic Prophylaxis: VTE Score: 5 Moderate Risk (Score 3-4) - Pharmacological DVT Prophylaxis Ordered: heparin.    Mobility:   Basic Mobility Inpatient Raw Score: 10  JH-HLM Goal: 4: Move to chair/commode  JH-HLM Achieved: 4: Move to chair/commode  JH-HLM Goal achieved. Continue to encourage appropriate mobility.    Patient Centered Rounds: I performed bedside rounds with nursing staff today.   Discussions with Specialists or Other Care Team Provider: ID    Education and Discussions with Family / Patient: Updated  (daughter) at bedside.    Current Length of Stay: 11 day(s)  Current Patient Status: Inpatient   Certification Statement: The patient will continue to require additional inpatient hospital stay due to Altered mental status  Discharge Plan: Anticipate discharge in >72 hrs to rehab facility.    Code Status: Level 3 - DNAR and DNI    Subjective   Patient still confused.Patient does not report and headaches, shortness of breath, chest pain, abdominal pain, nausea vomiting, lower leg edema. Also reports good sleep        Objective :  Temp:  [98.8 °F (37.1 °C)-99.2 °F (37.3 °C)] 99 °F (37.2 °C)  HR:  [76-90] 76  BP: (127-136)/(66-88) 131/73  Resp:  [18-20] 18  SpO2:  [99 %-100 %] 100 %  O2 Device: None (Room air)    Body mass index is 28.46 kg/m².     Input and Output Summary (last 24 hours):     Intake/Output Summary (Last 24 hours) at 11/6/2024 0942  Last data filed at 11/6/2024 0459  Gross per 24 hour   Intake 1220 ml   Output 250 ml   Net 970 ml       Physical Exam  Vitals and nursing note reviewed.   Constitutional:       Appearance: He is well-developed and normal weight.   HENT:      Head: Normocephalic and atraumatic.      Nose: Nose normal.      Mouth/Throat:      Mouth: Mucous membranes are moist.    Eyes:      Conjunctiva/sclera: Conjunctivae normal.   Cardiovascular:      Rate and Rhythm: Normal rate and regular rhythm.      Heart sounds: Normal heart sounds. No murmur heard.  Pulmonary:      Effort: Pulmonary effort is normal. No respiratory distress.      Breath sounds: Normal breath sounds.   Abdominal:      General: Abdomen is flat.      Palpations: Abdomen is soft.      Tenderness: There is no abdominal tenderness.   Musculoskeletal:         General: No swelling.      Cervical back: Neck supple.      Right lower leg: No edema.      Left lower leg: No edema.   Skin:     General: Skin is warm and dry.      Capillary Refill: Capillary refill takes less than 2 seconds.   Neurological:      General: No focal deficit present.      Mental Status: He is alert. Mental status is at baseline. He is disoriented.   Psychiatric:         Mood and Affect: Mood normal.           Lines/Drains:  Lines/Drains/Airways       Active Status       Name Placement date Placement time Site Days    HD Temporary Double Catheter 10/26/24  1451  Internal jugular  10                            Lab Results: I have reviewed the following results:   Results from last 7 days   Lab Units 11/05/24  0546   WBC Thousand/uL 6.60   HEMOGLOBIN g/dL 8.7*   HEMATOCRIT % 26.2*   PLATELETS Thousands/uL 305   SEGS PCT % 26*   LYMPHO PCT % 56*   MONO PCT % 15*   EOS PCT % 1     Results from last 7 days   Lab Units 11/05/24  0546   SODIUM mmol/L 138   POTASSIUM mmol/L 3.6   CHLORIDE mmol/L 103   CO2 mmol/L 29   BUN mg/dL 12   CREATININE mg/dL 0.33*   ANION GAP mmol/L 6   CALCIUM mg/dL 7.5*   ALBUMIN g/dL 2.9*   TOTAL BILIRUBIN mg/dL 0.94   ALK PHOS U/L 84   ALT U/L 16   AST U/L 27   GLUCOSE RANDOM mg/dL 105         Results from last 7 days   Lab Units 10/31/24  0307   POC GLUCOSE mg/dl 138         Results from last 7 days   Lab Units 10/31/24  0851   LACTIC ACID mmol/L 0.7       Recent Cultures (last 7 days):         Imaging Results Review: I personally  reviewed the following image studies/reports in PACS and discussed pertinent findings with Radiology: CT abdomen/pelvis and CT head. My interpretation of the radiology images/reports is: Normal.  Other Study Results Review: EKG was reviewed.     Last 24 Hours Medication List:     Current Facility-Administered Medications:     acetaminophen (Ofirmev) injection 1,000 mg, Q6H PRN, Last Rate: 1,000 mg (10/28/24 1698)    bisacodyl (DULCOLAX) rectal suppository 10 mg, Once    bisacodyl (DULCOLAX) rectal suppository 10 mg, Daily PRN    calcium carbonate (TUMS) chewable tablet 500 mg, BID    docusate sodium (COLACE) capsule 100 mg, BID    fluticasone (FLONASE) 50 mcg/act nasal spray 1 spray, Daily    heparin (porcine) subcutaneous injection 5,000 Units, Q8H JOE **AND** [COMPLETED] Platelet count, Once    hydrocortisone (ANUSOL-HC) 2.5 % rectal cream, 4x Daily PRN    levothyroxine tablet 50 mcg, Once per day on Monday Tuesday Wednesday Thursday    levothyroxine tablet 75 mcg, Once per day on Sunday Friday Saturday    multi-electrolyte (PLASMALYTE-A/ISOLYTE-S PH 7.4) IV solution, Continuous, Last Rate: 100 mL/hr (11/06/24 3467)    ondansetron (ZOFRAN) injection 4 mg, Q6H PRN    pantoprazole (PROTONIX) EC tablet 40 mg, Early Morning    polyethylene glycol (MIRALAX) packet 17 g, BID    Administrative Statements   Today, Patient Was Seen By: Bunny Pearson MD  I have spent a total time of 60 minutes in caring for this patient on the day of the visit/encounter including Diagnostic results, Prognosis, Risks and benefits of tx options, Instructions for management, Patient and family education, Importance of tx compliance, Risk factor reductions, Impressions, Counseling / Coordination of care, Documenting in the medical record, Reviewing / ordering tests, medicine, procedures  , Obtaining or reviewing history  , and Communicating with other healthcare professionals .    **Please Note: This note may have been constructed  using a voice recognition system.**

## 2024-11-06 NOTE — PLAN OF CARE
Problem: OCCUPATIONAL THERAPY ADULT  Goal: Performs self-care activities at highest level of function for planned discharge setting.  See evaluation for individualized goals.  Description: Treatment Interventions: ADL retraining, Functional transfer training, UE strengthening/ROM, Endurance training, Patient/family training, Cognitive reorientation, Equipment evaluation/education, Fine motor coordination activities, Compensatory technique education, Continued evaluation, Energy conservation, Activityengagement          See flowsheet documentation for full assessment, interventions and recommendations.   Note: Limitation: Decreased ADL status, Decreased cognition, Decreased Safe judgement during ADL, Decreased endurance, Decreased self-care trans, Decreased high-level ADLs  Prognosis: Fair  Assessment: Patient participated in Skilled OT session this date with interventions consisting of ADL re training with the use of correct body mechnaics, Energy Conservation techniques, safety awareness and fall prevention techniques,  therapeutic activities to: increase activity tolerance, and increase OOB/ sitting tolerance . Patient agreeable to OT treatment session, upon arrival patient was found supine in bed, alert, responsive , and in no apparent distress.  In comparison to previous session, patient with improvements in ADL completion, functional mobility, OOB tolerance, orientation and alertness. Patient requiring frequent re direction, verbal cues for safety, verbal cues for correct technique, verbal cues for pacing thru activity steps, cognitive assistance to anticipate next step, and one step directives. Patient continues to be functioning below baseline level, occupational performance remains limited secondary to factors listed above and increased risk for falls and injury. The patient's raw score on the -PAC Daily Activity Inpatient Short Form is 14. A raw score of less than 19 suggests the patient may benefit  from discharge to post-acute rehabilitation services. Please refer to the recommendation of the Occupational Therapist for safe discharge planning. From OT standpoint, recommendation at time of d/c would be Level 1 resources.  Patient to benefit from continued Occupational Therapy treatment while in the hospital to address deficits as defined above and maximize level of functional independence with ADLs and functional mobility.     Rehab Resource Intensity Level, OT: I (Maximum Resource Intensity)

## 2024-11-06 NOTE — OCCUPATIONAL THERAPY NOTE
Occupational Therapy Progress Note     Patient Name: Miguel Henderson  Today's Date: 11/6/2024  Problem List  Principal Problem:    Encephalopathy  Active Problems:    Hypothyroidism    Primary hypertension    Fever    Lymphadenopathy    Abnormal LFTs    COVID-19 virus infection    Myoclonia    Anemia    Hypocalcemia            11/06/24 1201   OT Last Visit   OT Visit Date 11/06/24   Note Type   Note Type Treatment   Pain Assessment   Pain Assessment Tool 0-10   Pain Score No Pain   Restrictions/Precautions   Weight Bearing Precautions Per Order No   Other Precautions Cognitive;Chair Alarm;Bed Alarm;Multiple lines;Fall Risk;Pain  (Primarily Bahamian speaking, family present in session to assist with translation)   Lifestyle   Autonomy Assist PRN for ADLs/IADLs from family. RW used PRN. (-).   Reciprocal Relationships Lives with supportive family (Spouse, Son, DIL)   Service to Others Retired   Intrinsic Gratification Pt enjoys spending time with family   ADL   Where Assessed Edge of bed   UB Dressing Assistance 3  Moderate Assistance   UB Dressing Deficit Thread RUE;Thread LUE;Pull around back   LB Dressing Assistance 2  Maximal Assistance   LB Dressing Deficit Don/doff R sock;Don/doff L sock   Toileting Assistance  3  Moderate Assistance   Toileting Deficit Clothing management up;Clothing management down;Perineal hygiene   Toileting Comments aleida hygiene following bladder incontinence   Bed Mobility   Supine to Sit 3  Moderate assistance   Additional items Assist x 2;HOB elevated;Increased time required;Verbal cues;LE management   Sit to Supine Unable to assess   Additional Comments Pt in bed upon arrival. Ax2 to sit EOB with continued posterior and L lean seated EOB. Pt OOB in recliner post session, all needs met, call bell within reach. +chair alarm on   Transfers   Sit to Stand 3  Moderate assistance   Additional items Assist x 2;Increased time required;Verbal cues   Stand to Sit 3  Moderate assistance  "  Additional items Assist x 2;Increased time required;Verbal cues   Stand pivot 3  Moderate assistance   Additional items Assist x 2;Increased time required;Verbal cues   Additional Comments b/l knee block, + b/l HHA. Increased difficulty with RW, recommend HHA for txfs   Functional Mobility   Functional Mobility 3  Moderate assistance   Additional Comments Ax2, steps in hallway, +chair follow.   Additional items Hand hold assistance   Subjective   Subjective \"I'm in the hospital\"   Cognition   Overall Cognitive Status (S)  Impaired   Arousal/Participation Alert;Responsive   Attention Attends with cues to redirect   Orientation Level Oriented to person;Oriented to place;Oriented to situation  (knows month/year, approximate date, generally oriented to situation.)   Memory Decreased recall of precautions;Decreased short term memory;Decreased recall of recent events;Decreased long term memory   Following Commands Follows one step commands with increased time or repetition   Comments Pt very pleasant and cooperative. Increased orientation and alertness on this date compared with previous sessions. Increased participation with family present. Pt still requiring VCs for task completion and repetition. Still partially confused.   Activity Tolerance   Activity Tolerance Patient limited by fatigue;Patient limited by pain   Medical Staff Made Aware RN cleared for therapy, PT Jen   Assessment   Assessment Patient participated in Skilled OT session this date with interventions consisting of ADL re training with the use of correct body mechnaics, Energy Conservation techniques, safety awareness and fall prevention techniques,  therapeutic activities to: increase activity tolerance, and increase OOB/ sitting tolerance . Patient agreeable to OT treatment session, upon arrival patient was found supine in bed, alert, responsive , and in no apparent distress.  In comparison to previous session, patient with improvements in ADL " completion, functional mobility, OOB tolerance, orientation and alertness. Patient requiring frequent re direction, verbal cues for safety, verbal cues for correct technique, verbal cues for pacing thru activity steps, cognitive assistance to anticipate next step, and one step directives. Patient continues to be functioning below baseline level, occupational performance remains limited secondary to factors listed above and increased risk for falls and injury. The patient's raw score on the AM-PAC Daily Activity Inpatient Short Form is 14. A raw score of less than 19 suggests the patient may benefit from discharge to post-acute rehabilitation services. Please refer to the recommendation of the Occupational Therapist for safe discharge planning. From OT standpoint, recommendation at time of d/c would be Level 1 resources.  Patient to benefit from continued Occupational Therapy treatment while in the hospital to address deficits as defined above and maximize level of functional independence with ADLs and functional mobility.   Plan   Treatment Interventions ADL retraining;Functional transfer training;Cognitive reorientation;Patient/family training;Equipment evaluation/education;Compensatory technique education;Continued evaluation;Energy conservation;Activityengagement   Goal Expiration Date 11/13/24   OT Treatment Day 2   OT Frequency 2-3x/wk   Discharge Recommendation   Rehab Resource Intensity Level, OT I (Maximum Resource Intensity)   AM-PAC Daily Activity Inpatient   Lower Body Dressing 2   Bathing 2   Toileting 2   Upper Body Dressing 2   Grooming 3   Eating 3   Daily Activity Raw Score 14   Daily Activity Standardized Score (Calc for Raw Score >=11) 33.39   AM-PAC Applied Cognition Inpatient   Following a Speech/Presentation 2   Understanding Ordinary Conversation 3   Taking Medications 2   Remembering Where Things Are Placed or Put Away 2   Remembering List of 4-5 Errands 2   Taking Care of Complicated Tasks 1    Applied Cognition Raw Score 12   Applied Cognition Standardized Score 28.82   End of Consult   Education Provided Yes;Family or social support of family present for education by provider   Patient Position at End of Consult Supine;Bed/Chair alarm activated;All needs within reach   Nurse Communication Nurse aware of consult         Vladislav Sanches MS, OTR/L     [facility] CERTIFIED RATER ID [id number]

## 2024-11-07 PROCEDURE — 99233 SBSQ HOSP IP/OBS HIGH 50: CPT | Performed by: INTERNAL MEDICINE

## 2024-11-07 PROCEDURE — NC001 PR NO CHARGE: Performed by: NURSE PRACTITIONER

## 2024-11-07 PROCEDURE — 99232 SBSQ HOSP IP/OBS MODERATE 35: CPT | Performed by: INTERNAL MEDICINE

## 2024-11-07 PROCEDURE — 92526 ORAL FUNCTION THERAPY: CPT

## 2024-11-07 PROCEDURE — 99233 SBSQ HOSP IP/OBS HIGH 50: CPT

## 2024-11-07 RX ADMIN — SODIUM CHLORIDE, SODIUM GLUCONATE, SODIUM ACETATE, POTASSIUM CHLORIDE, MAGNESIUM CHLORIDE, SODIUM PHOSPHATE, DIBASIC, AND POTASSIUM PHOSPHATE 100 ML/HR: .53; .5; .37; .037; .03; .012; .00082 INJECTION, SOLUTION INTRAVENOUS at 14:16

## 2024-11-07 RX ADMIN — HEPARIN SODIUM 5000 UNITS: 5000 INJECTION INTRAVENOUS; SUBCUTANEOUS at 20:48

## 2024-11-07 RX ADMIN — PANTOPRAZOLE SODIUM 40 MG: 40 TABLET, DELAYED RELEASE ORAL at 06:08

## 2024-11-07 RX ADMIN — SODIUM CHLORIDE, SODIUM GLUCONATE, SODIUM ACETATE, POTASSIUM CHLORIDE, MAGNESIUM CHLORIDE, SODIUM PHOSPHATE, DIBASIC, AND POTASSIUM PHOSPHATE 100 ML/HR: .53; .5; .37; .037; .03; .012; .00082 INJECTION, SOLUTION INTRAVENOUS at 02:17

## 2024-11-07 RX ADMIN — CALCIUM CARBONATE (ANTACID) CHEW TAB 500 MG 500 MG: 500 CHEW TAB at 09:23

## 2024-11-07 RX ADMIN — LEVOTHYROXINE SODIUM 50 MCG: 75 TABLET ORAL at 06:08

## 2024-11-07 RX ADMIN — HEPARIN SODIUM 5000 UNITS: 5000 INJECTION INTRAVENOUS; SUBCUTANEOUS at 14:13

## 2024-11-07 RX ADMIN — HEPARIN SODIUM 5000 UNITS: 5000 INJECTION INTRAVENOUS; SUBCUTANEOUS at 06:08

## 2024-11-07 RX ADMIN — FLUTICASONE PROPIONATE 1 SPRAY: 50 SPRAY, METERED NASAL at 09:23

## 2024-11-07 RX ADMIN — CALCIUM CARBONATE (ANTACID) CHEW TAB 500 MG 500 MG: 500 CHEW TAB at 20:48

## 2024-11-07 NOTE — ASSESSMENT & PLAN NOTE
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  MRI brain without acute intracranial abnormalities.  Status post LP 10/25 with mild lymphocytic pleocytosis and elevated protein.  ME panel and CSF culture negative. Suspect CSF abnormalities related to possible underlying lymphoproliferative disorder.  Status post first session of plasmapheresis 10/26 with improvement in fever curve and mental status initially, but now seems to have worsened.  Repeat CT of the brain nondiagnostic for source on 10/31/2024. Neurology consulted. Concern for toxic-metabolic source and a routine EEG was obtained. It showed nearly continuous myoclonic jerking and concern for an epileptic etiology of myoclonus. However, repeat EEG was performed with an EMG on the limbs and there did not appear to be epileptic correlation. 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 MRI brain negative for acute pathology.  He seemed to improved cognitively although still a bit confused  -Continue to monitor mental status  -Neurology follow-up and workup

## 2024-11-07 NOTE — ASSESSMENT & PLAN NOTE
Development of intermittent twitching/myoclonic jerking on 10/31. Neurology consulted. Concern for toxic-metabolic source and a routine EEG was obtained. It showed nearly continuous myoclonic jerking and concern for an epileptic etiology of myoclonus. Patient started on Ativan and Keppra. However, repeat EEG was performed with an EMG on the limbs and there did not appear to be epileptic correlation. 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 MRI brain negative for acute pathology.  He seems improved.  -Neurology workup and follow-up

## 2024-11-07 NOTE — PROGRESS NOTES
Progress Note - Hospitalist   Name: Miguel Henderson 84 y.o. male I MRN: [id number]  [facility] Date of Admission: 10/26/2024   Date of Service: 11/7/2024 I Hospital Day: 12    Assessment & Plan  Encephalopathy  Disussed with family at bedside about the course of the patient's illness.  Patient had fevers on October 7, was diagnosed with viral infection.  Sent home and had rapid decline at home.    At baseline patient is very physical and able to work in the yard and cuts down trees, very sharp handles his finances.  Now patient is baseline very confused according to the family.  Patient's last travel history was to [place] in May of last year  Brother passed away of some sort of leukemia  No dementia history  No previous stroke or traumatic brain.  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    Lyme was negative on 10/21/2024.   Rhinovirus was initially positive on 10/8/2024.  Respiratory panel was redone and was negative.    Peripheral blood smear was done and there was no parasites seen on 10/22/2024.    TSH, ammonia, coma panel unremarkable  CSF was positive for elevated WBC on 10/25/2024, VDRL negative, glucose normal, protein CSF elevated 86, Lyme PCR negative, meningitis encephalitis negative, West Nile negative  Brucella negative 11/01.2024  Mycoplasma negativem histoplasma negative  HIV negative  TB PCR Pending 10/25    Heme onc workup  Labs show an elevated gamma spike on SPEP, elevated IgA, IgG, IgM. Positive free lambda light chain on immunofixation in the urine and plasma. Flow is unremarkable. Elevated beta-2 microglobulin. High normal viscosity.   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    NEURO workup  MRI brain with no significant acute pathology  EEG with no evidence of epileptiform discharges thus no need for AED at this time       Concern noted of etiology of fevers due to possible lymphoma/hyperviscosity   Discussed with Hematology/oncology, and they believe the patient should stay inpatient until his excision bx results are noted to rule out lymphoma.   They said they will follow up with pathology  Discussed with ID as well. Will continue to wait for TB PCR from 10/25.   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  Status post bone marrow biopsy on 10/29 -> await final pathology, although preliminary report noted some cold agglutinins (see plan for encephalopathy above regarding hemolysis labs)  Status post courses of plasmapheresis per oncology  Supportive care  Awaiting excision bx results  Abnormal LFTs  Elevated bilirubin and alkaline phosphatase generally improving  Suspected to be associated with lymphadenopathy (above)  Limit/avoid hepatotoxins as possible  Hypothyroidism  Continue Synthroid  Primary hypertension  Holding HCTZ due to soft pressures at this time  Fever  Does not appear to be infectious as workup negative thus far  Appreciate ID recommendations - continue to monitor off antibiotics  Monitor temperature curve  COVID-19 virus infection  Tested positive on 10/21  Currently oxygenating on baseline room  air   Contact/airborne precautions now discontinued   Supportive care otherwise  Anemia  Hemoglobin stable at 9.8  Likely associated with lymphadenopathy  Hypocalcemia  Serum Andrion's calcium low -> continue intravenous and oral supplementation  Consider possible contribution to myoclonus  Myoclonia      VTE Pharmacologic Prophylaxis: VTE Score: 5 Moderate Risk (Score 3-4) - Pharmacological DVT Prophylaxis Ordered: heparin.    Mobility:   Basic Mobility Inpatient Raw Score: 11  JH-HL Goal: 4: Move to chair/commode  JH-HLM Achieved: 3: Sit at edge of bed  JH-HLM Goal achieved. Continue to encourage appropriate mobility.    Patient Centered Rounds: I performed bedside rounds with nursing staff today.   Discussions with Specialists or Other Care Team Provider: ID    Education and Discussions with Family / Patient: Updated  (daughter) at bedside.    Current Length of Stay: 12 day(s)  Current Patient Status: Inpatient   Certification Statement: The patient will continue to require additional inpatient hospital stay due to Altered mental status  Discharge Plan: Anticipate discharge in >72 hrs to rehab facility.    Code Status: Level 3 - DNAR and DNI    Subjective   Still confused. Patient is working with PTOT        Objective :  Temp:  [98.1 °F (36.7 °C)-98.5 °F (36.9 °C)] 98.4 °F (36.9 °C)  HR:  [76-87] 81  BP: (127-143)/(69-74) 139/71  Resp:  [18-22] 18  SpO2:  [100 %] 100 %  O2 Device: None (Room air)    Body mass index is 28.46 kg/m².     Input and Output Summary (last 24 hours):     Intake/Output Summary (Last 24 hours) at 11/7/2024 1030  Last data filed at 11/7/2024 0900  Gross per 24 hour   Intake 2485 ml   Output 250 ml   Net 2235 ml       Physical Exam  Vitals and nursing note reviewed.   Constitutional:       Appearance: He is well-developed and normal weight.   HENT:      Head: Normocephalic and atraumatic.      Nose: Nose normal.      Mouth/Throat:      Mouth: Mucous membranes are moist.    Eyes:      Conjunctiva/sclera: Conjunctivae normal.   Cardiovascular:      Rate and Rhythm: Normal rate and regular rhythm.      Heart sounds: Normal heart sounds. No murmur heard.  Pulmonary:      Effort: Pulmonary effort is normal. No respiratory distress.      Breath sounds: Normal breath sounds.   Abdominal:      General: Abdomen is flat.      Palpations: Abdomen is soft.      Tenderness: There is no abdominal tenderness.   Musculoskeletal:         General: No swelling.      Cervical back: Neck supple.      Right lower leg: No edema.      Left lower leg: No edema.   Skin:     General: Skin is warm and dry.      Capillary Refill: Capillary refill takes less than 2 seconds.   Neurological:      General: No focal deficit present.      Mental Status: He is alert. Mental status is at baseline. He is disoriented.   Psychiatric:         Mood and Affect: Mood normal.           Lines/Drains:  Lines/Drains/Airways       Active Status       Name Placement date Placement time Site Days    HD Temporary Double Catheter 10/26/24  1451  Internal jugular  11                            Lab Results: I have reviewed the following results:   Results from last 7 days   Lab Units 11/05/24  0546   WBC Thousand/uL 6.60   HEMOGLOBIN g/dL 8.7*   HEMATOCRIT % 26.2*   PLATELETS Thousands/uL 305   SEGS PCT % 26*   LYMPHO PCT % 56*   MONO PCT % 15*   EOS PCT % 1     Results from last 7 days   Lab Units 11/05/24  0546   SODIUM mmol/L 138   POTASSIUM mmol/L 3.6   CHLORIDE mmol/L 103   CO2 mmol/L 29   BUN mg/dL 12   CREATININE mg/dL 0.33*   ANION GAP mmol/L 6   CALCIUM mg/dL 7.5*   ALBUMIN g/dL 2.9*   TOTAL BILIRUBIN mg/dL 0.94   ALK PHOS U/L 84   ALT U/L 16   AST U/L 27   GLUCOSE RANDOM mg/dL 105                           Recent Cultures (last 7 days):         Imaging Results Review: I personally reviewed the following image studies/reports in PACS and discussed pertinent findings with Radiology: CT abdomen/pelvis and CT head. My  interpretation of the radiology images/reports is: Normal.  Other Study Results Review: EKG was reviewed.     Last 24 Hours Medication List:     Current Facility-Administered Medications:     acetaminophen (Ofirmev) injection 1,000 mg, Q6H PRN, Last Rate: 1,000 mg (10/28/24 2103)    bisacodyl (DULCOLAX) rectal suppository 10 mg, Once    bisacodyl (DULCOLAX) rectal suppository 10 mg, Daily PRN    calcium carbonate (TUMS) chewable tablet 500 mg, BID    docusate sodium (COLACE) capsule 100 mg, BID    fluticasone (FLONASE) 50 mcg/act nasal spray 1 spray, Daily    heparin (porcine) subcutaneous injection 5,000 Units, Q8H JOE **AND** [COMPLETED] Platelet count, Once    hydrocortisone (ANUSOL-HC) 2.5 % rectal cream, 4x Daily PRN    levothyroxine tablet 50 mcg, Once per day on Monday Tuesday Wednesday Thursday    levothyroxine tablet 75 mcg, Once per day on Sunday Friday Saturday    multi-electrolyte (PLASMALYTE-A/ISOLYTE-S PH 7.4) IV solution, Continuous, Last Rate: 100 mL/hr (11/07/24 0217)    ondansetron (ZOFRAN) injection 4 mg, Q6H PRN    pantoprazole (PROTONIX) EC tablet 40 mg, Early Morning    polyethylene glycol (MIRALAX) packet 17 g, BID    Administrative Statements   Today, Patient Was Seen By: Bunny Pearson MD  I have spent a total time of 60 minutes in caring for this patient on the day of the visit/encounter including Diagnostic results, Prognosis, Risks and benefits of tx options, Instructions for management, Patient and family education, Importance of tx compliance, Risk factor reductions, Impressions, Counseling / Coordination of care, Documenting in the medical record, Reviewing / ordering tests, medicine, procedures  , Obtaining or reviewing history  , and Communicating with other healthcare professionals .    **Please Note: This note may have been constructed using a voice recognition system.**

## 2024-11-07 NOTE — [facility] ADMISSION
[facility] community Liaison called  family- introduced self, explained role, reviewed [facility] program, services offered, acute rehab criteria, review of referral by [facility] Medical Director, insurance authorization process, the three [facility] locations and anticipated rehab length of stay.; all questions answered. family first choice is [facility]. family made aware [facility] reviewer will communicate with the  who will keep patient updated on referral status. Made pt's son aware of insurance benefits.

## 2024-11-07 NOTE — PROGRESS NOTES
Progress Note - Infectious Disease   Name: Miguel Henderson 84 y.o. male I MRN: [id number]  Unit/Bed#: [facility] 817-01 I Date of Admission: 10/26/2024   Date of Service: 11/7/2024 I Hospital Day: 12    Assessment & Plan  Fever  The patient had intermittent fevers for 3 weeks and several admissions to [facility] with fairly unremarkable infectious workup other than positive rhinovirus and COVID. CT imaging was noted to show lymphadenopathy and he had atypical lymphocytes on CBC so hematology/oncology consult was recommended although the patient was discharged on Paxlovid with plan for an outpatient referral.  He then returned to the ED with worsening confusion and return of fever.  CT imaging with worsening axillary lymphadenopathy, CBC differential again showed atypical lymphocytes.  Status post LP 10/25 which showed mild elevation in protein and lymphocytic pleocytosis.  ME panel negative and culture showed no growth. 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  Labs show elevated LDH, ferritin, immunoglobulins. Status post plasmapheresis with improvement in fever curve and initially improvement in mental status, but now with waxing and waning mental status and development of myoclonic jerking as below. S/p lymph node and bone marrow biopsies. Bone marrow biopsy preliminary study demonstrated histiocytic aggregates, which are nonspecific, and can be seen in a variety of settings including infection. Broadened infectious work-up based on risk factors to include TB PCR from CSF (given patient is initially from [place] and traveled to [place] in June), histoplasma urine antigen, brucella antibodies, and mycoplasma antibodies. Mycoplasma antibodies, histoplasma urine antigen, and brucella antibodies negative. TB PCR from CSF is currently pending. Overall, low concern for infectious source given extensive infectious work-up that has remained  unremarkable.   -Continue to monitor off antimicrobials  -Follow-up lymph node and bone marrow biopsies until finalized  -Follow-up mTB PCR from CSF  -Ongoing follow-up with hematology/oncology  -Continue to monitor fever curve/vitals  -Monitor CBCd to continue to closely monitor patient's WBC  Encephalopathy  Oncology initially with concern for hyperviscosity syndrome.  MRI brain without acute intracranial abnormalities.  Status post LP 10/25 with mild lymphocytic pleocytosis and elevated protein.  ME panel and CSF culture negative. Suspect CSF abnormalities related to possible underlying lymphoproliferative disorder.  Status post first session of plasmapheresis 10/26 with improvement in fever curve and mental status initially, but now seems to have worsened.  Repeat CT of the brain nondiagnostic for source on 10/31/2024. Neurology consulted. Concern for toxic-metabolic source and a routine EEG was obtained. It showed nearly continuous myoclonic jerking and concern for an epileptic etiology of myoclonus. However, repeat EEG was performed with an EMG on the limbs and there did not appear to be epileptic correlation. 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 MRI brain negative for acute pathology.  He seemed to improved cognitively although still a bit confused  -Continue to monitor mental status  -Neurology follow-up and workup  Lymphadenopathy  During prior admission imaging showed enlarged periaortic, iliac chain and inguinal lymph nodes.  Repeat imaging showed persistent lymphadenopathy with worsened right axillary adenopathy.  As above, concern for an underlying lymphoproliferative disorder.  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  CBC differential also shows atypical lymphocytes.  Status post plasmapheresis 10/26. HIV screening negative. S/p lymph node and bone marrow biopsies. Lymph node biopsy overall findings are atypical and suspicious for a lymphoproliferative  disorder. However, the tissue was too scant to further characterize the underlying process. Therefore, patient underwent an excisional biopsy with surgical oncology on 11/3.   -Hematology/oncology following  -Follow-up pathology from excisional biopsy  -Additional infectious work-up as above    COVID-19 virus infection  Isolation discontinued on 11/1/2024  Myoclonia  Development of intermittent twitching/myoclonic jerking on 10/31. Neurology consulted. Concern for toxic-metabolic source and a routine EEG was obtained. It showed nearly continuous myoclonic jerking and concern for an epileptic etiology of myoclonus. Patient started on Ativan and Keppra. However, repeat EEG was performed with an EMG on the limbs and there did not appear to be epileptic correlation. 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 MRI brain negative for acute pathology.  He seems improved.  -Neurology workup and follow-up    I have discussed with the primary service the above plan to her off all antibiotics.  The primary service agrees with the plan.    Antibiotics:  None    Subjective   Patient not having any reported fever chills or sweats, no reported diarrhea or vomiting.  No respiratory symptoms.    Objective :  Temp:  [98.1 °F (36.7 °C)-98.5 °F (36.9 °C)] 98.4 °F (36.9 °C)  HR:  [76-87] 81  BP: (127-143)/(69-74) 139/71  Resp:  [18-22] 18  SpO2:  [100 %] 100 %  O2 Device: None (Room air)    General:  No acute distress  Psychiatric:  Awake and alert  Pulmonary:  Normal respiratory excursion without accessory muscle use  Abdomen:  Soft, nontender  Extremities:  No edema  Skin:  No rashes      Lab Results: I have reviewed the following results:  Results from last 7 days   Lab Units 11/05/24 0546 11/04/24 0504 11/03/24  0511   WBC Thousand/uL 6.60 7.20 9.08   HEMOGLOBIN g/dL 8.7* 8.8* 9.8*   PLATELETS Thousands/uL 305 320 356     Results from last 7 days   Lab Units 11/05/24 0546 11/04/24  0504  11/03/24  0511   SODIUM mmol/L 138 136 132*   POTASSIUM mmol/L 3.6 3.6 3.6   CHLORIDE mmol/L 103 101 96   CO2 mmol/L 29 27 29   BUN mg/dL 12 12 11   CREATININE mg/dL 0.33* 0.37* 0.32*   EGFR ml/min/1.73sq m 116 111 117   CALCIUM mg/dL 7.5* 7.7* 7.5*   AST U/L 27 27 25   ALT U/L 16 14 17   ALK PHOS U/L 84 86 93   ALBUMIN g/dL 2.9* 3.1* 3.2*                                  (3) no apparent problem

## 2024-11-07 NOTE — ASSESSMENT & PLAN NOTE
Disussed with family at bedside about the course of the patient's illness.  Patient had fevers on October 7, was diagnosed with viral infection.  Sent home and had rapid decline at home.    At baseline patient is very physical and able to work in the yard and cuts down trees, very sharp handles his finances.  Now patient is baseline very confused according to the family.  Patient's last travel history was to [place] in May of last year  Brother passed away of some sort of leukemia  No dementia history  No previous stroke or traumatic brain.  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    Lyme was negative on 10/21/2024.   Rhinovirus was initially positive on 10/8/2024.  Respiratory panel was redone and was negative.    Peripheral blood smear was done and there was no parasites seen on 10/22/2024.    TSH, ammonia, coma panel unremarkable  CSF was positive for elevated WBC on 10/25/2024, VDRL negative, glucose normal, protein CSF elevated 86, Lyme PCR negative, meningitis encephalitis negative, West Nile negative  Brucella negative 11/01.2024  Mycoplasma negativem histoplasma negative  HIV negative  TB PCR Pending 10/25    Heme onc workup  Labs show an elevated gamma spike on SPEP, elevated IgA, IgG, IgM. Positive free lambda light chain on immunofixation in the urine and plasma. Flow is unremarkable. Elevated beta-2 microglobulin. High normal viscosity.   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    NEURO workup  MRI brain with no significant acute pathology  EEG with no evidence of epileptiform discharges thus no need for AED at this time       Concern noted of etiology of fevers due to  possible lymphoma/hyperviscosity   Discussed with Hematology/oncology, and they believe the patient should stay inpatient until his excision bx results are noted to rule out lymphoma.   They said they will follow up with pathology  Discussed with ID as well. Will continue to wait for TB PCR from 10/25.   Still confused today  Patient approved per [facility]

## 2024-11-07 NOTE — CONSULTS
e-Consult (IPC)  - Interventional Radiology  Miguel Henderson 84 y.o. male MRN: [id number]  Unit/Bed#: [facility] 817-01 Encounter: [id number]      Interventional Radiology has been consulted to evaluate Miguel Henderson    We were consulted by Medical Oncology concerning this patient with Newly diagnosed unspecified lymphoproliferative disorder, prior core and excisional biopsies have been necrotic and not completely diagnostic, concern for lymphoma and HLH.     Inpatient Consult to IR  Consult performed by: DARREN Brock  Consult ordered by: Scottie Ding DO        11/07/24    Assessment/Recommendation:     84-year-old male with a past medical history of prostate cancer, hypertension, hypothyroidism presenting to the hospital with B symptoms and lymphadenopathy with concerns for hyperviscosity syndrome secondary to lymphoma or lymphoproliferative disorder.    Patient is status post bone marrow biopsy performed 10/29/2024.  Findings as follows:  Highly limited and near inadequate though likely lymphoproliferative disorder, increased plasma cells and histiocytic aggregates, flow cytometry, stains,  Ancillary studies pending     On 11/3/2024, patient had right inguinal excisional lymph node biopsy performed with findings of near complete necrotic tissue concerned with rapid progressive malignancy with clonal T cells and scattered EBV positive cells.     Since patient is having worsening lymphadenopathy interventional radiology has been consulted for repeat bone marrow biopsy and right axillary lymph node biopsy.    Component      Latest Ref Rng 11/5/2024   Platelet Count      149 - 390 Thousands/uL 305      Component      Latest Ref Rng 10/27/2024   POCT INR      0.85 - 1.19  1.67 (H)           Reviewed diagnostic imaging and laboratory findings  Discussed case with Dr. [place]  Plan for IR bone marrow biopsy and right axillary lymph node biopsy tomorrow, 11/8/2024.  Time to be determined per IR availability  NPO  after midnight  Hold a.m. dose SQ heparin 11/8/2024  Remainder of care per primary care service team  Please do not hesitate to contact interventional radiology for questions/concerns     21-30 minutes, >50% of the total time devoted to medical consultative verbal/EMR discussion between providers. Written report will be generated in the EMR.     Thank you for allowing Interventional Radiology to participate in the care of Miguel Henderson.      DARREN Brock

## 2024-11-07 NOTE — PROGRESS NOTES
Patient:    MRN:  [id number]    Aidin Request ID:  [id number]    Level of care reserved:  Inpatient Rehab Facility    Partner Reserved:  [facility] ([place]), [place] [id number] [phone]    Clinical needs requested:    Geography searched:  10 miles around [phone]    Start of Service:    Request sent:  11:40am EST on 11/6/2024 by Ashley Romero    Partner reserved:  10:29am EST on 11/7/2024 by Ashley Romero    Choice list shared:  10:29am EST on 11/7/2024 by Ashley Romero

## 2024-11-07 NOTE — PLAN OF CARE
Problem: PAIN - ADULT  Goal: Verbalizes/displays adequate comfort level or baseline comfort level  Description: Interventions:  - Encourage patient to monitor pain and request assistance  - Assess pain using appropriate pain scale  - Administer analgesics based on type and severity of pain and evaluate response  - Implement non-pharmacological measures as appropriate and evaluate response  - Consider cultural and social influences on pain and pain management  - Notify physician/advanced practitioner if interventions unsuccessful or patient reports new pain  Outcome: Progressing     Problem: INFECTION - ADULT  Goal: Absence or prevention of progression during hospitalization  Description: INTERVENTIONS:  - Assess and monitor for signs and symptoms of infection  - Monitor lab/diagnostic results  - Monitor all insertion sites, i.e. indwelling lines, tubes, and drains  - Monitor endotracheal if appropriate and nasal secretions for changes in amount and color  - [place] appropriate cooling/warming therapies per order  - Administer medications as ordered  - Instruct and encourage patient and family to use good hand hygiene technique  - Identify and instruct in appropriate isolation precautions for identified infection/condition  Outcome: Progressing  Goal: Absence of fever/infection during neutropenic period  Description: INTERVENTIONS:  - Monitor WBC    Outcome: Progressing     Problem: SAFETY ADULT  Goal: Patient will remain free of falls  Description: INTERVENTIONS:  - Educate patient/family on patient safety including physical limitations  - Instruct patient to call for assistance with activity   - Consult OT/PT to assist with strengthening/mobility   - Keep Call bell within reach  - Keep bed low and locked with side rails adjusted as appropriate  - Keep care items and personal belongings within reach  - Initiate and maintain comfort rounds  - Make Fall Risk Sign visible to staff  - Offer Toileting every 2 Hours,  in advance of need  - Initiate/Maintain bed/chair alarm  - Apply yellow socks and bracelet for high fall risk patients  - Consider moving patient to room near nurses station  Outcome: Progressing  Goal: Maintain or return to baseline ADL function  Description: INTERVENTIONS:  -  Assess patient's ability to carry out ADLs; assess patient's baseline for ADL function and identify physical deficits which impact ability to perform ADLs (bathing, care of mouth/teeth, toileting, grooming, dressing, etc.)  - Assess/evaluate cause of self-care deficits   - Assess range of motion  - Assess patient's mobility; develop plan if impaired  - Assess patient's need for assistive devices and provide as appropriate  - Encourage maximum independence but intervene and supervise when necessary  - Involve family in performance of ADLs  - Assess for home care needs following discharge   - Consider OT consult to assist with ADL evaluation and planning for discharge  - Provide patient education as appropriate  Outcome: Progressing  Goal: Maintains/Returns to pre admission functional level  Description: INTERVENTIONS:  - Perform AM-PAC 6 Click Basic Mobility/ Daily Activity assessment daily.  - Set and communicate daily mobility goal to care team and patient/family/caregiver.   - Collaborate with rehabilitation services on mobility goals if consulted  - Reposition patient every 2 hours.  - Dangle patient 3 times a day  - Stand patient 3 times a day  - Ambulate patient 3 times a day  - Out of bed to chair 3 times a day   - Out of bed for meals 3 times a day  - Out of bed for toileting  - Record patient progress and toleration of activity level   Outcome: Progressing     Problem: DISCHARGE PLANNING  Goal: Discharge to home or other facility with appropriate resources  Description: INTERVENTIONS:  - Identify barriers to discharge w/patient and caregiver  - Arrange for needed discharge resources and transportation as appropriate  - Identify  discharge learning needs (meds, wound care, etc.)  - Arrange for interpretive services to assist at discharge as needed  - Refer to Case Management Department for coordinating discharge planning if the patient needs post-hospital services based on physician/advanced practitioner order or complex needs related to functional status, cognitive ability, or social support system  Outcome: Progressing     Problem: Knowledge Deficit  Goal: Patient/family/caregiver demonstrates understanding of disease process, treatment plan, medications, and discharge instructions  Description: Complete learning assessment and assess knowledge base.  Interventions:  - Provide teaching at level of understanding  - Provide teaching via preferred learning methods  Outcome: Progressing     Problem: Prexisting or High Potential for Compromised Skin Integrity  Goal: Skin integrity is maintained or improved  Description: INTERVENTIONS:  - Identify patients at risk for skin breakdown  - Assess and monitor skin integrity  - Assess and monitor nutrition and hydration status  - Monitor labs   - Assess for incontinence   - Turn and reposition patient  - Assist with mobility/ambulation  - Relieve pressure over bony prominences  - Avoid friction and shearing  - Provide appropriate hygiene as needed including keeping skin clean and dry  - Evaluate need for skin moisturizer/barrier cream  - Collaborate with interdisciplinary team   - Patient/family teaching  - Consider wound care consult   Outcome: Progressing     Problem: Nutrition/Hydration-ADULT  Goal: Nutrient/Hydration intake appropriate for improving, restoring or maintaining nutritional needs  Description: Monitor and assess patient's nutrition/hydration status for malnutrition. Collaborate with interdisciplinary team and initiate plan and interventions as ordered.  Monitor patient's weight and dietary intake as ordered or per policy. Utilize nutrition screening tool and intervene as necessary.  Determine patient's food preferences and provide high-protein, high-caloric foods as appropriate.     INTERVENTIONS:  - Monitor oral intake, urinary output, labs, and treatment plans  - Assess nutrition and hydration status and recommend course of action  - Evaluate amount of meals eaten  - Assist patient with eating if necessary   - Allow adequate time for meals  - Recommend/ encourage appropriate diets, oral nutritional supplements, and vitamin/mineral supplements  - Order, calculate, and assess calorie counts as needed  - Recommend, monitor, and adjust tube feedings and TPN/PPN based on assessed needs  - Assess need for intravenous fluids  - Provide specific nutrition/hydration education as appropriate  - Include patient/family/caregiver in decisions related to nutrition  Outcome: Progressing

## 2024-11-07 NOTE — PLAN OF CARE
Problem: PAIN - ADULT  Goal: Verbalizes/displays adequate comfort level or baseline comfort level  Description: Interventions:  - Encourage patient to monitor pain and request assistance  - Assess pain using appropriate pain scale  - Administer analgesics based on type and severity of pain and evaluate response  - Implement non-pharmacological measures as appropriate and evaluate response  - Consider cultural and social influences on pain and pain management  - Notify physician/advanced practitioner if interventions unsuccessful or patient reports new pain  Outcome: Progressing     Problem: INFECTION - ADULT  Goal: Absence or prevention of progression during hospitalization  Description: INTERVENTIONS:  - Assess and monitor for signs and symptoms of infection  - Monitor lab/diagnostic results  - Monitor all insertion sites, i.e. indwelling lines, tubes, and drains  - Monitor endotracheal if appropriate and nasal secretions for changes in amount and color  - [place] appropriate cooling/warming therapies per order  - Administer medications as ordered  - Instruct and encourage patient and family to use good hand hygiene technique  - Identify and instruct in appropriate isolation precautions for identified infection/condition  Outcome: Progressing  Goal: Absence of fever/infection during neutropenic period  Description: INTERVENTIONS:  - Monitor WBC    Outcome: Progressing     Problem: SAFETY ADULT  Goal: Patient will remain free of falls  Description: INTERVENTIONS:  - Educate patient/family on patient safety including physical limitations  - Instruct patient to call for assistance with activity   - Consult OT/PT to assist with strengthening/mobility   - Keep Call bell within reach  - Keep bed low and locked with side rails adjusted as appropriate  - Keep care items and personal belongings within reach  - Initiate and maintain comfort rounds  - Make Fall Risk Sign visible to staff  - Offer Toileting every 2 Hours,  in advance of need  - Initiate/Maintain bed/chair alarm  - Apply yellow socks and bracelet for high fall risk patients  - Consider moving patient to room near nurses station  Outcome: Progressing  Goal: Maintain or return to baseline ADL function  Description: INTERVENTIONS:  -  Assess patient's ability to carry out ADLs; assess patient's baseline for ADL function and identify physical deficits which impact ability to perform ADLs (bathing, care of mouth/teeth, toileting, grooming, dressing, etc.)  - Assess/evaluate cause of self-care deficits   - Assess range of motion  - Assess patient's mobility; develop plan if impaired  - Assess patient's need for assistive devices and provide as appropriate  - Encourage maximum independence but intervene and supervise when necessary  - Involve family in performance of ADLs  - Assess for home care needs following discharge   - Consider OT consult to assist with ADL evaluation and planning for discharge  - Provide patient education as appropriate  Outcome: Progressing  Goal: Maintains/Returns to pre admission functional level  Description: INTERVENTIONS:  - Perform AM-PAC 6 Click Basic Mobility/ Daily Activity assessment daily.  - Set and communicate daily mobility goal to care team and patient/family/caregiver.   - Collaborate with rehabilitation services on mobility goals if consulted  - Reposition patient every 2 hours.  - Dangle patient 3 times a day  - Stand patient 3 times a day  - Ambulate patient 3 times a day  - Out of bed to chair 3 times a day   - Out of bed for meals 3 times a day  - Out of bed for toileting  - Record patient progress and toleration of activity level   Outcome: Progressing     Problem: DISCHARGE PLANNING  Goal: Discharge to home or other facility with appropriate resources  Description: INTERVENTIONS:  - Identify barriers to discharge w/patient and caregiver  - Arrange for needed discharge resources and transportation as appropriate  - Identify  discharge learning needs (meds, wound care, etc.)  - Arrange for interpretive services to assist at discharge as needed  - Refer to Case Management Department for coordinating discharge planning if the patient needs post-hospital services based on physician/advanced practitioner order or complex needs related to functional status, cognitive ability, or social support system  Outcome: Progressing     Problem: Knowledge Deficit  Goal: Patient/family/caregiver demonstrates understanding of disease process, treatment plan, medications, and discharge instructions  Description: Complete learning assessment and assess knowledge base.  Interventions:  - Provide teaching at level of understanding  - Provide teaching via preferred learning methods  Outcome: Progressing     Problem: Prexisting or High Potential for Compromised Skin Integrity  Goal: Skin integrity is maintained or improved  Description: INTERVENTIONS:  - Identify patients at risk for skin breakdown  - Assess and monitor skin integrity  - Assess and monitor nutrition and hydration status  - Monitor labs   - Assess for incontinence   - Turn and reposition patient  - Assist with mobility/ambulation  - Relieve pressure over bony prominences  - Avoid friction and shearing  - Provide appropriate hygiene as needed including keeping skin clean and dry  - Evaluate need for skin moisturizer/barrier cream  - Collaborate with interdisciplinary team   - Patient/family teaching  - Consider wound care consult   Outcome: Progressing     Problem: Nutrition/Hydration-ADULT  Goal: Nutrient/Hydration intake appropriate for improving, restoring or maintaining nutritional needs  Description: Monitor and assess patient's nutrition/hydration status for malnutrition. Collaborate with interdisciplinary team and initiate plan and interventions as ordered.  Monitor patient's weight and dietary intake as ordered or per policy. Utilize nutrition screening tool and intervene as necessary.  Determine patient's food preferences and provide high-protein, high-caloric foods as appropriate.     INTERVENTIONS:  - Monitor oral intake, urinary output, labs, and treatment plans  - Assess nutrition and hydration status and recommend course of action  - Evaluate amount of meals eaten  - Assist patient with eating if necessary   - Allow adequate time for meals  - Recommend/ encourage appropriate diets, oral nutritional supplements, and vitamin/mineral supplements  - Order, calculate, and assess calorie counts as needed  - Recommend, monitor, and adjust tube feedings and TPN/PPN based on assessed needs  - Assess need for intravenous fluids  - Provide specific nutrition/hydration education as appropriate  - Include patient/family/caregiver in decisions related to nutrition  Outcome: Progressing

## 2024-11-07 NOTE — [facility] ADMISSION
Patient preapproved for [facility] pending medical readiness / continued functional need and participation / insurance auth / bed avail. Cm made aware in [facility]

## 2024-11-07 NOTE — PROGRESS NOTES
Medical Oncology/Hematology Progress Note  Miguel Henderson, male, 84 y.o., 1940,  PPHP 817/PPHP [id number], [id number]     Assessment and Plan:  Mr. Henderson is a Lithuanian-speaking 84 yoM with PMHx of prostate cancer, hypertension, and hypothyroidism with encephalopathy, B symptoms, and lymphadenopathy concerning hyperviscosity syndrome secondary to lymphoma or lymphoproliferative disorder transferred from [facility] to [facility] to be evaluated for plasmapheresis.    1. Concern for hyperviscosity syndrome possibly secondary to lymphoma or lymphoproliferative disorder  2. Worsening R axillary lymphadenopathy  3. Mediastinal, b/l iliac chain, and b/l inguinal lymphadenopathy  4. B symptoms  5. Acute encephalopathy and lethargy  6. Positive cold agglutinin  - s/p plasmapheresis (x3 days), appreciate [facility] [place] [facility] ([facility] [phone])  - Appreciate IR: s/p R IJ catheter, s/p core biopsy, and s/p BMBx   - Appreciate neuro: EEG w/ near continuous myoclonus, s/p Ativan 2 mg x1 and Keppra 1000 mg BID, repeat routine EEG neg, MRI Brain neg, no need for LP, AEDs discontinued, neuro signed off  - Appreciate ID: infectious work has been negative, signed off  - LDH wnl, haptoglobin mildly low, PNH wnl, and urin hemosiderin pending   - We could consider giving cryoprecipitate to replenish low fibrinogen levels  - Pathology results:  - s/p inguinal lymph node biopsy (10/28/2024)  - s/p bone marrow biopsy (10/29/2024)  - Highly limited and near inadequate though likely lymphoproliferative disorder, increased plasma cells and histiocytic aggregates, flow cytometry, stains, and ancillary studies are pending  - s/p R inguinal excisional lymph node biopsy (11/3/2024)  - Near completely necrotic tissue c/w rapidly progressive malignancy with clonal T-cells and scattered EBV-positive cells  - Consulted IR for repeat BMBx and R axillary lymph node biopsy, prioritize BMBx if only room for one procedure  - Lab  results:  - Viscosity: 1.9 (nml 1.4-2.1)  - Peripheral smear: negative for schistocytes  - Fibrinogen: 132  - PT-INR: 1.67  - LDH: 560  - Cryoglobulin: positive  - SPEP: elevated gamma spike  - UPEP: wnl  - Immunoglobulins: IgA 1364, IgG 2330, IgM 565  - Free light chain ratio: 0.61  - Flow cytometry: inverted CD4/CD8 ratio  - Immunofixation: free lambda light chains  - Beta-2 microglobulin: 4.7 (nml 0.6-2.4)  - We will continue to follow closely  - We will establish outpatient follow up in our office within two weeks of discharge after his acute symptoms have resolved    Interval History:  NAEON, patient has had significant improvement in his mental status over the last three days, some waxing and waning but sitting in chair eating lunch in no acute distress, VSS, labs show Hb 8.7, MCV 92, Tbili newly wnl, Scr 0.33, CorrCa 8.4, and all other values for CMP and CBC roughly wnl, there is no new micro of imaging    History of present illness:  Mr. Henderson is a Armenian-speaking 84-year-old gentleman with PMHx of prostate cancer (~15 years ago treated with surgery and radiation), hypertension, hypothyroidism, dysphagia, recent COVID and rhinovirus infections who presented to [facility] on 10/25/24 with encephalopathy, B symptoms and lymphadenopathy on imaging concerning for lymphoma with elevated clinical concern for potential hyperviscosity syndrome resulting in a transfer to [facility] to be evaluated for plasmapheresis.    The Patient has had three hospital admissions in the last three weeks.  He has been feeling unwell for several weeks, has had fevers, is very fatigued, has poor appetite and poor PO intake, and has noted weight loss.  Initially, his fevers and lymphadenopathy were attributed to recent COVID and rhinovirus infections.  Imaging did demonstrate lymphadenopathy again thought to be attributed to infection with instructions to follow-up with hematology/medical oncology in  the outpatient setting to ensure resolution.  However, his symptoms continued and empiric antibiotics were with concern for meningitis.  Antibiotics were discontinued when LP was negative and ID does not feel there is sufficient evidence for meningitis.     Vitals show Temp 100.2, HR 93, RR 16, /65, and 96% SpO2 on RA.  Labs show strong cold agglutinin, WBC 8.5, Hb 9.4, , , K 3.3, chloride 96, , BUN 7, creatinine 0.56, glucose 88, total protein 6.6, T. bili 1.53, Mg 1.7.    CT CAP w/ (10/25/2024) demonstrates right axillary adenopathy that is worsened compared to previous imaging on 10/11/2024.  Unchanged mediastinal, bilateral iliac chain and bilateral inguinal adenopathy compared to CT on 10/21/2024.  Mild potential pulmonary edema.  Small pleural effusions.  Unchanged cardiomegaly.  Right axillary and subpectoral adenopathy demonstrates a lymph node measuring 21 mm that was previously 12 mm.  Numerous new enlarged nodes unchanged subcentimeter left axillary nodes.  Right external iliac node measures 2.2 cm.  Left external iliac node demonstrates size of 10 mm.  Inguinal lymphadenopathy measures 16 mm on the right and 11 mm on the left.     MRI Brain (10/25/2024) demonstrates no acute infarct, mass effect or midline shift with mild to moderate chronic microangiopathy.  There is multiple indeterminate bilateral parotid nodes, the largest which measures up to 1 cm on the left, and is incompletely evaluated on this imaging.     Peripheral smear (10/22/24) showed RBCs with marked agglutination limiting further evaluation of morphologic features, slight left shift in WBC showing granulocytes with numerous fragile cells and scattered reactive monocytes and lymphocytes without distinct features of dysplasia or circulating blasts, platelets show normal morphology without significant clumps or satellitosis.  Overall, the combination of findings is not definitively specific, though the red blood  cell changes are compatible with a cold agglutinin in the correct clinical setting. The white blood cell changes may raise the possibility of infection, chemotherapy or drug effect, toxin exposure, postvaccination, or other reactive condition. The presence of fragile white blood cells may be an artifact of specimen handling, though a component of drug effect or nonspecific reactive change cannot be entirely excluded. Correlation with clinical impression and other laboratory findings is recommended. If clinical suspicion for more significant disease process such as a lymphoproliferative disorder exists then consideration for repeat sampling with peripheral blood flow cytometry may be of assistance to further evaluate the white blood cell changes as clinically indicated.  Flow cytometry was ordered and is pending.    Dr. Maxwell, hematology/oncology, coordinated care with [facility] primary attending, Dr. Borrero to arrange for urgent transfer to [place].  We called the [place] [facility] to discuss the case and inform them of a possible need for plasmapheresis.  The patient arrived promptly to [facility].  We evaluated the patient at bedside and found him to be AAOx3, states that he feels slightly improved, but exhibited a degree of encephalopathy.  We contacted the [place] [facility] again ([facility] [phone]) who recommended ordering labs, 3.5 L albumin x2 days, and calcium gluconate 2 g x2 days.  We contacted IR who is planning to place urgent R IJ catheter.  Family history is positive for lymphoma in a brother.    Review of Systems:   ROS was performed and is negative except as indicated above.    Current Facility-Administered Medications:     acetaminophen (Ofirmev) injection 1,000 mg, 1,000 mg, Intravenous, Q6H PRN, Argelia Mccallum MD, Last Rate: 400 mL/hr at 10/28/24 2103, 1,000 mg at 10/28/24 2103    bisacodyl (DULCOLAX) rectal suppository 10 mg, 10 mg, Rectal, Once, Argelia Mccallum MD    bisacodyl  (DULCOLAX) rectal suppository 10 mg, 10 mg, Rectal, Daily PRN, Bunny Pearson MD    calcium carbonate (TUMS) chewable tablet 500 mg, 500 mg, Oral, BID, Argelia Mccallum MD, 500 mg at 24    docusate sodium (COLACE) capsule 100 mg, 100 mg, Oral, BID, Argelia Mccallum MD, 100 mg at 24 170    fluticasone (FLONASE) 50 mcg/act nasal spray 1 spray, 1 spray, Nasal, Daily, Argelia Mccallum MD, 1 spray at 24    heparin (porcine) subcutaneous injection 5,000 Units, 5,000 Units, Subcutaneous, Q8H JOE, 5,000 Units at 24 **AND** [COMPLETED] Platelet count, , , Once, Nancy Mondragon    hydrocortisone (ANUSOL-HC) 2.5 % rectal cream, , Topical, 4x Daily PRN, Bunny Pearson MD, Given at 24    levothyroxine tablet 50 mcg, 50 mcg, Oral, Once per day on , Argelia Mccallum MD, 50 mcg at 24    levothyroxine tablet 75 mcg, 75 mcg, Oral, Once per day on , Argelia Mccallum MD    multi-electrolyte (PLASMALYTE-A/ISOLYTE-S PH 7.4) IV solution, 100 mL/hr, Intravenous, Continuous, Argelia Mccallum MD, Last Rate: 100 mL/hr at 24, 100 mL/hr at 24    ondansetron (ZOFRAN) injection 4 mg, 4 mg, Intravenous, Q6H PRN, Argelia Mccallum MD    pantoprazole (PROTONIX) EC tablet 40 mg, 40 mg, Oral, Early Morning, Argelia Mccallum MD, 40 mg at 24    polyethylene glycol (MIRALAX) packet 17 g, 17 g, Oral, BID, Bunny Pearson MD, 17 g at 24 1719    Medications Prior to Admission:     benzonatate (TESSALON) 200 MG capsule    cyclobenzaprine (FLEXERIL) 5 mg tablet    fluticasone (FLONASE) 50 mcg/act nasal spray    hydroCHLOROthiazide 25 mg tablet    ibuprofen (MOTRIN) 800 mg tablet    levothyroxine 50 mcg tablet    levothyroxine 75 mcg tablet    [] nirmatrelvir & ritonavir (Paxlovid) tablet therapy pack    ondansetron (ZOFRAN) 4 mg tablet    pantoprazole (PROTONIX) 40 mg tablet    potassium chloride (Klor-Con M10) 10 mEq  "tablet    Allergies   Allergen Reactions    Shellfish Allergy - Food Allergy Anaphylaxis    Valtrex [Valacyclovir] Hives     Physical Exam:    /71   Pulse 81   Temp 98.4 °F (36.9 °C)   Resp 18   Ht 5' 5\" (1.651 m)   Wt 77.6 kg (171 lb)   SpO2 100%   BMI 28.46 kg/m²     General: AAOx4, improved-appearing  HEENT: PERRLA, moist mucosa, dentures  Respiratory: CTAB w/o wheezes, rales, or rhonchi, no increased work of breathing  Cardiovascular: RRR w/o murmurs, 2+ radial and pedal pulses, no b/l LE edema  Abdomen: soft, non-tender, non-distended, no hepatomegaly or splenomegaly  /Rectal: deferred  Musculoskeletal: sedated, myoclonus in b/l LE and UE  Integumentary: reticular rash across chest and arms, dry, no bruising  Neurological: myoclonus in b/l LE and UE  Psychiatric: sedated    No results found for this or any previous visit (from the past 48 hour(s)).    MRI brain w wo contrast    Result Date: 10/26/2024  Narrative: MRI BRAIN WITH AND WITHOUT CONTRAST INDICATION: confusion,. COMPARISON: CT head 10/25/2024. TECHNIQUE: Multiplanar, multisequence imaging of the brain was performed before and after gadolinium administration. IV Contrast:  7 mL of Gadobutrol injection (SINGLE-DOSE) IMAGE QUALITY:   Motion-degraded, which reduces diagnostic sensitivity. FINDINGS: BRAIN PARENCHYMA: No acute infarct, mass effect or midline shift. Mild-moderate chronic ischemic changes of the white matter. Postcontrast imaging of the brain demonstrates no abnormal enhancement. VENTRICLES:  Normal for the patient's age. SELLA AND PITUITARY GLAND:  Normal. ORBITS: No acute abnormality. PARANASAL SINUSES: Mild mucosal thickening. VASCULATURE: Please refer to CTA. CALVARIUM AND SKULL BASE: No acute abnormality. EXTRACRANIAL SOFT TISSUES: Multiple bilateral parotid nodules, the largest of which measures up to 1.0 cm in the left parotid gland (series 6, image 1), noting these are not well evaluated.     Impression: Within the " confines of a motion-degraded examination: 1. No acute infarct, mass effect or midline shift. Mild-moderate chronic microangiopathy. 2. Multiple indeterminate bilateral parotid nodules, the largest of which measures up to 1.0 cm on the left, incompletely evaluated. If clinically appropriate, this could be further evaluated with contrast-enhanced neck CT on a nonemergent basis. The study was marked in EPIC for immediate notification. Workstation performed: [id number]     IR lumbar puncture    Result Date: 10/25/2024  Narrative: Lumbar puncture under fluoroscopic control. Clinical History: Confusion and fever Fluoro time: 0.9 minutes Conscious sedation time: 35 minutes Number of Images: 1 Technique: The patient was brought to the interventional radiology suite and identified by wristband. The patient was placed prone on the table, and the L4-5 spinous processes were identified. The skin was then prepped and draped in usual sterile fashion. Lidocaine was administered to the skin, and under fluoroscopic control, a 20 gauge spinal needle was advanced into the subarachnoid space between the L4-5 spinous processes . The table was then tilted into reverse Trendelenburg. 30 cc of clear spinal fluid was removed under it's own pressure into 4 separate sequential tubes. The tubes were sent to laboratory for testing as requested by the referring physician. After the procedure, the needle was removed, and a sterile dressing applied to the site. The patient tolerated the procedure well and suffered no complications.     Impression: Impression: 1. Successful lumbar puncture under fluoroscopic control. 2. 30 cc of clear spinal fluid was removed and sent to laboratory as described above. Workstation performed: [id number]     CT chest abdomen pelvis w contrast    Result Date: 10/25/2024  Narrative: CT CHEST, ABDOMEN AND PELVIS WITH IV CONTRAST INDICATION: Confusion, abdominal pain, emesis, generalized abdominal tenderness. COMPARISON:  CT abdomen pelvis 10/21/2024. CT chest abdomen pelvis 10/11/2024. TECHNIQUE: CT examination of the chest, abdomen and pelvis was performed. Multiplanar 2D reformatted images were created from the source data. This examination, like all CT scans performed in the [facility], was performed utilizing techniques to minimize radiation dose exposure, including the use of iterative reconstruction and automated exposure control. Radiation dose length product (DLP) for this visit: 850.79 mGy-cm IV Contrast: 100 mL of iohexol (OMNIPAQUE) Enteric Contrast: Not administered. FINDINGS: CHEST LUNGS: Mild interlobular septal thickening through both lower lobes may represent mild pulmonary edema. Mild bibasilar atelectasis. No focal consolidation. Unchanged known 4 mm and smaller pulmonary nodules. PLEURA: Small bibasilar pleural effusions. HEART/GREAT VESSELS: Cardiomegaly. Coronary artery calcifications. No thoracic aortic aneurysm. MEDIASTINUM AND JOE: Unchanged mild mediastinal and bilateral parahilar adenopathy. Dominant 16 mm precarinal node #2/49. Reidentified diffuse esophageal distention. Diffuse esophageal fluid may indicate reflux. CHEST WALL AND LOWER NECK: Worsened right axillary and right subpectoral adenopathy. Previously seen dominant 12 mm node now measures 21 mm #2/29. Numerous new enlarged nodes. Unchanged subcentimeter left axillary nodes. ABDOMEN LIVER/BILIARY TREE: Unremarkable. GALLBLADDER: No calcified gallstones. No pericholecystic inflammatory change. SPLEEN: Unremarkable. PANCREAS: Unremarkable. ADRENAL GLANDS: Unremarkable. KIDNEYS/URETERS: No hydronephrosis. Numerous unchanged bilateral simple renal cysts. Well-circumscribed subcentimeter bilateral renal hypoattenuating lesions are also unchanged and likely to represent subcentimeter simple cyst. STOMACH AND BOWEL: Unremarkable. APPENDIX: No findings to suggest appendicitis. ABDOMINOPELVIC CAVITY: No ascites. No pneumoperitoneum.  Unchanged bilateral iliac chain adenopathy. A dominant 2.2 cm right external iliac node #2/199 is unchanged. Dominant 10 mm left external iliac node #2/185 is unchanged. Unchanged 9 mm left pelvic sidewall node #2/210. Numerous unchanged subcentimeter retroperitoneal nodes. 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 PELVIS REPRODUCTIVE ORGANS: Prostatectomy. URINARY BLADDER: Unremarkable. ABDOMINAL WALL/INGUINAL REGIONS: Unchanged bilateral inguinal adenopathy. Dominant 16 mm right inguinal node #2/214. Dominant 11 mm left inguinal node #2/237. BONES: No acute fracture or suspicious osseous lesion.     Impression: Right axillary adenopathy is worsened when compared with CT chest 10/11/2024 Unchanged mediastinal, bilateral iliac chain and bilateral inguinal adenopathy when compared with CT abdomen pelvis 10/21/2024. Potential mild pulmonary edema. Small pleural effusions. Unchanged cardiomegaly. The study was marked in EPIC for immediate notification. Workstation performed: [id number]     CT head without contrast    Result Date: 10/25/2024  Narrative: CT BRAIN - WITHOUT CONTRAST INDICATION:   Confusion, non focal. COMPARISON:  None. TECHNIQUE:  CT examination of the brain was performed.  Multiplanar 2D reformatted images were created from the source data. Radiation dose length product (DLP) for this visit:  870.27 mGy-cm .  This examination, like all CT scans performed in the [facility], was performed utilizing techniques to minimize radiation dose exposure, including the use of iterative  reconstruction and automated exposure control. IMAGE QUALITY:  Diagnostic. FINDINGS: PARENCHYMA: Decreased attenuation is noted in periventricular and subcortical white matter demonstrating an appearance that is statistically most likely to represent moderate microangiopathic change. No CT signs of acute infarction.  No intracranial mass, mass effect or  midline shift.  No acute parenchymal hemorrhage. VENTRICLES AND EXTRA-AXIAL SPACES:  Normal for the patient's age. VISUALIZED ORBITS: Normal visualized orbits. PARANASAL SINUSES: Normal visualized paranasal sinuses. CALVARIUM AND EXTRACRANIAL SOFT TISSUES: Normal.     Impression: No acute intracranial abnormality. Workstation performed: [id number]     XR chest 1 view portable    Result Date: 10/22/2024  Narrative: XR CHEST PORTABLE INDICATION: Cough/fever. COMPARISON: Chest radiograph 10/8/2024; CT chest, abdomen and pelvis 10/11/2024 FINDINGS: Monitoring leads and clips project over the chest. Mild bibasilar atelectasis. No focal consolidation. Blunting of the left costophrenic angle suggesting trace pleural effusion. No pneumothorax. Normal cardiomediastinal silhouette. Degenerative changes of the spine and bilateral shoulders. Normal upper abdomen.     Impression: Mild bibasilar atelectasis and trace left pleural effusion. Resident: MARTHA Whiteside I, the attending radiologist, have reviewed the images and agree with the final report above. Workstation performed: [id number]     CT abdomen pelvis with contrast    Result Date: 10/21/2024  Narrative: CT ABDOMEN AND PELVIS WITH IV CONTRAST INDICATION:, Nausea, vomiting and diarrhea. . COMPARISON: 10/11/2024. TECHNIQUE: CT examination of the abdomen and pelvis was performed. Multiplanar 2D reformatted images were created from the source data. This examination, like all CT scans performed in the [facility], was performed utilizing techniques to minimize radiation dose exposure, including the use of iterative reconstruction and automated exposure control. Radiation dose length product (DLP) for this visit: 585 mGy-cm IV Contrast: 100 mL of iohexol (OMNIPAQUE) Enteric Contrast: Not administered. FINDINGS: ABDOMEN LOWER CHEST: Small left pleural effusion and left basilar subsegmental atelectasis. LIVER/BILIARY TREE: Unremarkable. GALLBLADDER: No calcified  gallstones. No pericholecystic inflammatory change. SPLEEN: Unremarkable. PANCREAS: Unremarkable. ADRENAL GLANDS: Unremarkable. KIDNEYS/URETERS: Symmetric nephrographic phase enhancement the kidneys. Cysts measure up to 6.4 cm. Multiple left renal cysts measuring up to 8.2 cm. No obstructive uropathy. STOMACH AND BOWEL: Unremarkable. APPENDIX: Prior appendectomy. 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 PELVIS REPRODUCTIVE ORGANS: Prior prostatectomy. URINARY BLADDER: Unremarkable. ABDOMINAL WALL/INGUINAL REGIONS: Enlarged bilateral inguinal lymph nodes with normal fatty shen, measuring up to 1.9 cm.. BONES: No acute fracture or suspicious osseous lesion.     Impression: 1. No evidence of bowel obstruction, colitis or diverticulitis. 2. Numerous mildly enlarged para-aortic, iliac chain and inguinal lymph nodes, possibly reactive however lymphoproliferative disorder not excluded. Workstation performed: [id number]     CT chest abdomen pelvis w contrast    Result Date: 10/12/2024  Narrative: CT CHEST, ABDOMEN AND PELVIS WITH IV CONTRAST INDICATION: intermittent persistent fevers. COMPARISON: 10/8/2024. TECHNIQUE: CT examination of the chest, abdomen and pelvis was performed. Multiplanar 2D reformatted images were created from the source data. This examination, like all CT scans performed in the [facility], was performed utilizing techniques to minimize radiation dose exposure, including the use of iterative reconstruction and automated exposure control. Radiation dose length product (DLP) for this visit: 898.73 mGy-cm IV Contrast: 100 mL of iohexol (OMNIPAQUE) Enteric Contrast: Not administered. FINDINGS: Moderately degraded by respiratory motion and retained barium in the colon. CHEST LUNGS: There is bibasilar atelectasis. Stable 3 mm  right apical lung nodule on series 4 image 24. Additional nodules are not well seen due to respiratory motion. No tracheal or endobronchial lesion. PLEURA: There are small bilateral pleural effusions. HEART/GREAT VESSELS: Heart is unremarkable for patient's age. No thoracic aortic aneurysm. MEDIASTINUM AND JOE: There is diffuse distention of the esophagus as seen previously. No obstructing lesion is visualized. CHEST WALL AND LOWER NECK: Again seen is an enlarged right axillary lymph node measuring 1.2 x 1.9 cm, nonspecific. ABDOMEN LIVER/BILIARY TREE: There is a 1.9 x 1.6 cm enhancing lesion in segment 6 of the liver, indeterminate. GALLBLADDER: Not well-visualized due to under distention and beam hardening artifact from adjacent barium. SPLEEN: Unremarkable. PANCREAS: Unremarkable. ADRENAL GLANDS: Unremarkable. KIDNEYS/URETERS: There are numerous bilateral renal cysts. No hydronephrosis. STOMACH AND BOWEL: Unremarkable. APPENDIX: No findings to suggest appendicitis. ABDOMINOPELVIC CAVITY: No ascites. No pneumoperitoneum. No lymphadenopathy. 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 PELVIS REPRODUCTIVE ORGANS: Unremarkable for patient's age. URINARY BLADDER: Unremarkable. ABDOMINAL WALL/INGUINAL REGIONS: Unremarkable. BONES: No acute fracture or suspicious osseous lesion.     Impression: Moderately limited by respiratory motion. 1.  Small bilateral pleural effusions with overlying atelectasis. 2.  3 mm right apical lung nodule. Based on current Fleischner Society 2017 Guidelines on incidental pulmonary nodule, no routine follow-up is needed if the patient is low risk. If the patient is high risk, optional follow-up chest CT at 12 months can be  considered. 3.  Stable diffusely dilated esophagus. This may be related to dysmotility. Obstructing lesion is not visualized. This would be better evaluated with esophagram.  4.  No acute abnormality in the abdomen or pelvis. 5.  Enhancing liver lesion, indeterminate. Possibilities include hemangioma and vascular malformation though other etiologies including neoplasm not excluded. Follow-up nonemergent MRI is recommended. The study was marked in EPIC for immediate notification. Workstation performed: [id number]     XR knee 3 vw left non injury    Result Date: 10/10/2024  Narrative: XR KNEE 3 VW LEFT NON INJURY INDICATION: left knee pain. COMPARISON: None FINDINGS: No acute fracture or dislocation. No joint effusion. No significant degenerative changes. No lytic or blastic osseous lesion. Unremarkable soft tissues.     Impression: No acute osseous abnormality. Computerized Assisted Algorithm (CAA) may have been used to analyze all applicable images. Workstation performed: [id number]     FL barium swallow video w speech    Result Date: 10/9/2024  Narrative: A video barium swallow study was performed by the Department of Speech Pathology. Please refer to the report for the official interpretation. The images are stored for archival purposes only. Study images were not formally reviewed by the Radiology Department.    FL barium swallow video w speech    Result Date: 10/9/2024  Narrative: JARROD Marie     10/9/2024  3:14 PM                                  Video Swallow Study Patient Name: Miguel Henderson Today's Date: 10/9/2024   Past Medical History Past Medical History: Diagnosis Date  Prostate cancer (HCC)   Thyroid cancer (HCC)   Past Surgical History Past Surgical History: Procedure Laterality Date  APPENDECTOMY   Summary: Images are on PACS for review. Oral Phase : Pt presented with mild oral dysphagia. Mastication was mod prolonged however eventual functional breakdown. Bolus control, formation were WNL. Transfer was piecemeal. Trace posterior base of tongue residue. Pharyngeal Phase :Pt presented with mild pharyngeal dysphagia. Swallow initiation was intermittently min delayed  with solids and prompt with liquids. Spill to the valleculae occurred with solids. Hyoid elevation and laryngeal excursion were WNL. Pharyngeal stripping wave diminished with solid trials. Epiglottic inversion was mostly complete. With nutri grain min epiglottic undercoat present. Trace vallecular retention with thin liquids and mild/mod with solids. Pt intermittently initiated secondary swallow to reduce residue. No pyriform retention. Large CP bar C4-C5 evident however did not impede bolus flow. No aspiration or penetration observed with trials. Per Esophageal screen Slow motility observed with liquids and solids. High retropulsion observed with liquids, pt risk for bottom up aspiration. Stasis occurred in distal esophagus with 13 mm pill however provided with additional sip of thin, ntl, and puree. Provided additional time x4 minutes pt continued with residue with esophagus. Observations: Son present throughout study provided translation. Recommendations: Diet: Dysphagia 3 dental soft Liquids:thin Meds: whole with thin liquids Strategies:slow rate, alternate liquids with solids, swallow prior to additional po Upright position F/u ST tx: yes Therapy Prognosis: fair Prognosis considerations:age, medical status, Intermittent/Distant Supervision Aspiration Precautions Reflux Precautions Consider consult with: GI, rehab Results reviewed with: pt, nursing, physician. ST reviewed images and recommendations reviewed with family at bedside. Aspiration precautions posted. If a dedicated assessment of the esophagus is desired, consider esophagram/barium swallow or EGD. Goals: Dysphagia LTG -Patient will demonstrate safe and effective oral intake (without overt s/s significant oral/pharyngeal dysphagia including s/s penetration or aspiration) for the highest appropriate diet level. Short Term Goals: -Pt will tolerate Dysphagia 3/advanced (dental soft) diet and thin liquid with no significant s/s oral or pharyngeal dysphagia  across 1-3 diagnostic session/s. -Patient will tolerate trials of upgraded food and/or liquid texture with no significant s/s of oral or pharyngeal dysphagia including aspiration across 1-3 diagnostic sessions -Patient will comply with a Video/Modified Barium Swallow study for more complete assessment of swallowing anatomy/physiology/aspiration risk and to assess efficacy of treatment techniques -Patient will demonstrate the ability to adequately self-monitor swallowing skills and perform appropriate compensatory techniques to reduce overt s/sx of penetration/aspiration to safely tolerate least restrictive food/liquid consistencies. Patient's goal: none stated H&P/Admit info, pertinent provider notes/procedures/studies/PMH:(copied and placed in this report) Miguel Henderson is a 84 y.o. male with a PMH of prostate cancer and thyroid cancer who presents with fever x 4 days.  Patient is Czech-speaking only and history is obtained through assistance of his son who is present at bedside and acting as .  Patient has been having intermittent fevers which have been responsive to Tylenol and Motrin at home but have ranged as high as 103.7 and yesterday was having some apparent respiratory distress but he has no respiratory history and is on no respiratory medications at home.  Patient does have a scant cough which is nonproductive and denies any increased urinary frequency or dysuria though he does complain of some hematuria which has been ongoing for the past month.  Patient additionally has been complaining of a sore throat for the past 4 days with decreased p.o. fluid intake secondary to pain with swallowing.  While in ER patient was noted to meet severe sepsis criteria and was thus referred for admission. Special Studies XR CHEST PORTABLE 10/08/2024 IMPRESSION: Mild tree-in-bud pattern of opacity mostly in the right lung and in the left base again suggesting a mild infectious/inflammatory condition CT  CHEST WITHOUT IV CONTRAST 10/08/2024 IMPRESSION: Mild bibasilar probable atelectatic changes, right greater than left. Otherwise no focal airspace consolidation to suggest pneumonia. Minor tree-in-bud nodularity in the right lung which could relate to infectious versus inflammatory small airways disease. Clinical correlation recommended. Pulmonary nodules as above. Based on current Fleischner Society 2017 Guidelines on incidental pulmonary nodule, no routine follow-up is needed if the patient is low risk. If the patient is high risk, optional follow-up chest CT at 12 months can be considered. Trace right pleural effusion. Mild right axillary lymphadenopathy, nonspecific Code Status: Level 1 full code Previous VBS: none Precautions Contact Precautions Food allergies:  No know  Current diet:  Regular diet and thin liquids  Premorbid diet:  Regular diet and thin liquids  Dentition  Upper and lower dentures Oral Mech  WNL O2 requirements:  Room air  Vocal Quality/Speech WNL Cognitive status:  Alert  Consistencies administered: Barium laden applesauce, nutri grain bar, sandwich bite, thin liquids,ntl and  13mm barium pill. Liquids were administered by cup. Pt was seated laterally at 90 degrees. Pt  unable to be viewed in AP position, due to transfer status      Echo complete w/ contrast if indicated    Result Date: 10/9/2024  Narrative:   Left Ventricle: Left ventricular cavity size is normal. Wall thickness is mildly increased. The left ventricular ejection fraction is 55%. Systolic function is normal. Wall motion is normal.   Aortic Valve: There is aortic valve sclerosis.   Aorta: The aortic root is mildly dilated. The ascending aorta is mildly dilated. The aortic root is 3.80 cm. The ascending aorta is 3.9 cm.     XR chest portable    Result Date: 10/8/2024  Narrative: XR CHEST PORTABLE INDICATION: shortness of breath. COMPARISON: 10/7/2024 FINDINGS: Minimal tree-in-bud pattern of opacity mostly in the right lung, but  also at the left base. No focal dense consolidation. The trace amount of pleural fluid seen on the prior CT is not apparent on this exam. No pneumothorax identified. Stable cardiomediastinal silhouette Degenerative changes noted along the spine. Normal upper abdomen.     Impression: Mild tree-in-bud pattern of opacity mostly in the right lung and in the left base again suggesting a mild infectious/inflammatory condition Workstation performed: [id number]     XR chest portable - 1 view    Result Date: 10/8/2024  Narrative: XR CHEST PORTABLE INDICATION: Fever cough. COMPARISON: None FINDINGS: Clear lungs. No pneumothorax or pleural effusion. Normal cardiomediastinal silhouette. Bones are unremarkable for age. Normal upper abdomen.     Impression: No acute cardiopulmonary disease. Workstation performed: [id number]     CT chest wo contrast    Result Date: 10/8/2024  Narrative: CT CHEST WITHOUT IV CONTRAST INDICATION: Cough shortness of breath with fever. COMPARISON: None. TECHNIQUE: CT examination of the chest was performed without intravenous contrast. Multiplanar 2D reformatted images were created from the source data. This examination, like all CT scans performed in the [facility], was performed utilizing techniques to minimize radiation dose exposure, including the use of iterative reconstruction and automated exposure control. Radiation dose length product (DLP) for this visit: 441.84 mGy-cm FINDINGS: LUNGS: Evaluation somewhat limited by respiratory motion. There is some minor groundglass and tree-in-bud nodularity suggested in the right upper lobe inferior segment and to a lesser degree in the right lower lobe. No lobar consolidation. Trace paraseptal emphysematous changes. Mild basilar atelectatic changes, right greater than left. There is minor scarring in the posteromedial right lower lobe adjacent to bulky paravertebral osteophytes. 4 mm pulmonary nodule seen in the right upper lobe (3:35).  Additional scattered pulmonary nodules are seen measuring on the order of 2 to 3 mm. Based on current Fleischner Society 2017 Guidelines on incidental pulmonary nodule, no routine follow-up is needed if the patient is low risk. If the patient is high risk, optional follow-up chest CT at 12 months can be considered. PLEURA: Trace right pleural effusion with subjacent atelectasis. HEART/GREAT VESSELS: Heart is unremarkable for patient's age. No thoracic aortic aneurysm. Coronary atherosclerosis MEDIASTINUM AND JOE: Shotty subcentimeter mediastinal lymph nodes noted. Evaluation for hilar lymphadenopathy limited in the absence of intravenous contrast. CHEST WALL AND LOWER NECK: Gynecomastia. Few prominent/mildly enlarged lymph nodes seen in the right axillary region, largest measuring up to 1.2 cm,. VISUALIZED STRUCTURES IN THE UPPER ABDOMEN: No acute abnormality. Bilateral renal cysts. OSSEOUS STRUCTURES: Spinal degenerative changes are noted. No acute fracture or destructive osseous lesion.     Impression: Mild bibasilar probable atelectatic changes, right greater than left. Otherwise no focal airspace consolidation to suggest pneumonia. Minor tree-in-bud nodularity in the right lung which could relate to infectious versus inflammatory small airways disease. Clinical correlation recommended. Pulmonary nodules as above. Based on current Fleischner Society 2017 Guidelines on incidental pulmonary nodule, no routine follow-up is needed if the patient is low risk. If the patient is high risk, optional follow-up chest CT at 12 months can be considered. Trace right pleural effusion. Mild right axillary lymphadenopathy, nonspecific. Workstation performed: [id number]     Labs and pertinent reports reviewed.      This note has been generated by voice recognition software system.  Therefore, there may be spelling, grammar, and or syntax errors. Please contact if questions arise.    Additional recommendations to follow per  attending, Dr. Melton.    Scottie Ding DO, PGY4  Hematology/Oncology Fellow

## 2024-11-07 NOTE — SPEECH THERAPY NOTE
"Speech Language/Pathology  Speech-Language Pathology Progress Note      Patient Name: Miguel Henderson    Today's Date: 11/7/2024      Subjective:  Pt was awake and alert. He was sitting up in chair at bedside. Patient stated \"You feed me(in [place]) \". Pt family encouraging him to self feed.     Objective:  Pt was seen today for dysphagia therapy. Current diet is dysphagia 3 dental soft with thin liquids. Pt was on room air. Oral care had already been completed. Focus of today's session was to maximize PO intake safety, improve pt's self monitoring, and improve use of strategies to minimize risk of aspiration. Textures offered today included level 3 chicken chopped up mixed in mashed potatoes, applesauce to clear, thin juice by straw.    Swallow function:   The pt has mild/mod oral residue w/ the chicken- R lingual surface especially.  The pt uses mult swallows to clear.  Cued to take tsps applesauce which he is able to complete and clear but then he began to put the applesauce on the chicken.  He is requesting others feed him but is able to do so himself.  Family is encouraging him to feed himself.      Assessment:  Pt is able to tolerate the level 3 -continues w/ oral residue on the R, noted ability to clear w/ applesauce but still needs time to swallows.     Plan:  Continue level 3 with thin  Alternate bites w/ applesauce as needed  Allow time to swallow  "

## 2024-11-08 ENCOUNTER — APPOINTMENT (INPATIENT)
Dept: RADIOLOGY | Facility: HOSPITAL | Age: 84
DRG: 823 | End: 2024-11-08
Payer: COMMERCIAL

## 2024-11-08 LAB
ALBUMIN SERPL BCG-MCNC: 3.7 G/DL (ref 3.5–5)
ALP SERPL-CCNC: 120 U/L (ref 34–104)
ALT SERPL W P-5'-P-CCNC: 24 U/L (ref 7–52)
ANION GAP SERPL CALCULATED.3IONS-SCNC: 12 MMOL/L (ref 4–13)
AST SERPL W P-5'-P-CCNC: 33 U/L (ref 13–39)
BILIRUB SERPL-MCNC: 0.88 MG/DL (ref 0.2–1)
BUN SERPL-MCNC: 8 MG/DL (ref 5–25)
CALCIUM SERPL-MCNC: 9 MG/DL (ref 8.4–10.2)
CHLORIDE SERPL-SCNC: 106 MMOL/L (ref 96–108)
CO2 SERPL-SCNC: 25 MMOL/L (ref 21–32)
CREAT SERPL-MCNC: 0.39 MG/DL (ref 0.6–1.3)
FERRITIN SERPL-MCNC: 184 NG/ML (ref 24–336)
GFR SERPL CREATININE-BSD FRML MDRD: 108 ML/MIN/1.73SQ M
GLUCOSE SERPL-MCNC: 95 MG/DL (ref 65–140)
POTASSIUM SERPL-SCNC: 4.2 MMOL/L (ref 3.5–5.3)
PROT SERPL-MCNC: 7.5 G/DL (ref 6.4–8.4)
SODIUM SERPL-SCNC: 143 MMOL/L (ref 135–147)
TRIGL SERPL-MCNC: 183 MG/DL

## 2024-11-08 PROCEDURE — 80053 COMPREHEN METABOLIC PANEL: CPT

## 2024-11-08 PROCEDURE — 77002 NEEDLE LOCALIZATION BY XRAY: CPT

## 2024-11-08 PROCEDURE — 81342 TRG GENE REARRANGEMENT ANAL: CPT

## 2024-11-08 PROCEDURE — 88237 TISSUE CULTURE BONE MARROW: CPT

## 2024-11-08 PROCEDURE — 76942 ECHO GUIDE FOR BIOPSY: CPT

## 2024-11-08 PROCEDURE — NC001 PR NO CHARGE: Performed by: STUDENT IN AN ORGANIZED HEALTH CARE EDUCATION/TRAINING PROGRAM

## 2024-11-08 PROCEDURE — 99233 SBSQ HOSP IP/OBS HIGH 50: CPT

## 2024-11-08 PROCEDURE — 88184 FLOWCYTOMETRY/ TC 1 MARKER: CPT

## 2024-11-08 PROCEDURE — 99232 SBSQ HOSP IP/OBS MODERATE 35: CPT | Performed by: INTERNAL MEDICINE

## 2024-11-08 PROCEDURE — C1830 POWER BONE MARROW BX NEEDLE: HCPCS

## 2024-11-08 PROCEDURE — 99153 MOD SED SAME PHYS/QHP EA: CPT

## 2024-11-08 PROCEDURE — 88185 FLOWCYTOMETRY/TC ADD-ON: CPT

## 2024-11-08 PROCEDURE — 88264 CHROMOSOME ANALYSIS 20-25: CPT

## 2024-11-08 PROCEDURE — 38505 NEEDLE BIOPSY LYMPH NODES: CPT

## 2024-11-08 PROCEDURE — 99152 MOD SED SAME PHYS/QHP 5/>YRS: CPT

## 2024-11-08 PROCEDURE — 84478 ASSAY OF TRIGLYCERIDES: CPT

## 2024-11-08 PROCEDURE — 38221 DX BONE MARROW BIOPSIES: CPT

## 2024-11-08 PROCEDURE — 82728 ASSAY OF FERRITIN: CPT

## 2024-11-08 RX ORDER — LIDOCAINE WITH 8.4% SOD BICARB 0.9%(10ML)
SYRINGE (ML) INJECTION AS NEEDED
Status: COMPLETED | OUTPATIENT
Start: 2024-11-08 | End: 2024-11-08

## 2024-11-08 RX ORDER — FENTANYL CITRATE 50 UG/ML
INJECTION, SOLUTION INTRAMUSCULAR; INTRAVENOUS AS NEEDED
Status: COMPLETED | OUTPATIENT
Start: 2024-11-08 | End: 2024-11-08

## 2024-11-08 RX ORDER — MIDAZOLAM HYDROCHLORIDE 2 MG/2ML
INJECTION, SOLUTION INTRAMUSCULAR; INTRAVENOUS AS NEEDED
Status: COMPLETED | OUTPATIENT
Start: 2024-11-08 | End: 2024-11-08

## 2024-11-08 RX ORDER — POLYETHYLENE GLYCOL 3350 17 G/17G
17 POWDER, FOR SOLUTION ORAL 2 TIMES DAILY
Status: DISCONTINUED | OUTPATIENT
Start: 2024-11-08 | End: 2024-11-19 | Stop reason: HOSPADM

## 2024-11-08 RX ADMIN — SODIUM CHLORIDE, SODIUM GLUCONATE, SODIUM ACETATE, POTASSIUM CHLORIDE, MAGNESIUM CHLORIDE, SODIUM PHOSPHATE, DIBASIC, AND POTASSIUM PHOSPHATE 100 ML/HR: .53; .5; .37; .037; .03; .012; .00082 INJECTION, SOLUTION INTRAVENOUS at 01:06

## 2024-11-08 RX ADMIN — SODIUM CHLORIDE, SODIUM GLUCONATE, SODIUM ACETATE, POTASSIUM CHLORIDE, MAGNESIUM CHLORIDE, SODIUM PHOSPHATE, DIBASIC, AND POTASSIUM PHOSPHATE 100 ML/HR: .53; .5; .37; .037; .03; .012; .00082 INJECTION, SOLUTION INTRAVENOUS at 10:31

## 2024-11-08 RX ADMIN — CALCIUM CARBONATE (ANTACID) CHEW TAB 500 MG 500 MG: 500 CHEW TAB at 21:58

## 2024-11-08 RX ADMIN — MIDAZOLAM 1 MG: 1 INJECTION INTRAMUSCULAR; INTRAVENOUS at 14:03

## 2024-11-08 RX ADMIN — HEPARIN SODIUM 5000 UNITS: 5000 INJECTION INTRAVENOUS; SUBCUTANEOUS at 21:58

## 2024-11-08 RX ADMIN — MIDAZOLAM 1 MG: 1 INJECTION INTRAMUSCULAR; INTRAVENOUS at 14:40

## 2024-11-08 RX ADMIN — FENTANYL CITRATE 50 MCG: 50 INJECTION INTRAMUSCULAR; INTRAVENOUS at 14:54

## 2024-11-08 RX ADMIN — Medication 10 ML: at 14:49

## 2024-11-08 RX ADMIN — FENTANYL CITRATE 50 MCG: 50 INJECTION INTRAMUSCULAR; INTRAVENOUS at 14:03

## 2024-11-08 RX ADMIN — FENTANYL CITRATE 50 MCG: 50 INJECTION INTRAMUSCULAR; INTRAVENOUS at 14:40

## 2024-11-08 RX ADMIN — DOCUSATE SODIUM 100 MG: 100 CAPSULE, LIQUID FILLED ORAL at 17:22

## 2024-11-08 RX ADMIN — Medication 10 ML: at 14:09

## 2024-11-08 NOTE — PROGRESS NOTES
responded to a request for prayer and the patient was asleep.  Patient would not wake up for the nurse to do vitals.   will pass this to the day shift  to pray with him before his procedure.  remains available.      11/07/24 2200   Clinical Encounter Type   Visited With Patient not available   Referral From Nurse   Referral To    Faith Encounters   Faith Needs Prayer

## 2024-11-08 NOTE — ASSESSMENT & PLAN NOTE
Disussed with family at bedside about the course of the patient's illness.  Patient had fevers on October 7, was diagnosed with viral infection.  Sent home and had rapid decline at home.    At baseline patient is very physical and able to work in the yard and cuts down trees, very sharp handles his finances.  Now patient is baseline very confused according to the family.  Patient's last travel history was to [place] in May of last year  Brother passed away of some sort of leukemia  No dementia history  No previous stroke or traumatic brain.  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    Lyme was negative on 10/21/2024.   Rhinovirus was initially positive on 10/8/2024.  Respiratory panel was redone and was negative.    Peripheral blood smear was done and there was no parasites seen on 10/22/2024.    TSH, ammonia, coma panel unremarkable  CSF was positive for elevated WBC on 10/25/2024, VDRL negative, glucose normal, protein CSF elevated 86, Lyme PCR negative, meningitis encephalitis negative, West Nile negative  Brucella negative 11/01.2024  Mycoplasma negativem histoplasma negative  HIV negative  TB PCR Pending 10/25    Heme onc workup  Labs show an elevated gamma spike on SPEP, elevated IgA, IgG, IgM. Positive free lambda light chain on immunofixation in the urine and plasma. Flow is unremarkable. Elevated beta-2 microglobulin. High normal viscosity.   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    NEURO workup  MRI brain with no significant acute pathology  EEG with no evidence of epileptiform discharges thus no need for AED at this time       Concern noted of etiology of fevers due to  possible lymphoma/hyperviscosity   Discussed with Hematology/oncology, and they believe the patient should stay inpatient until his excision bx results are noted   Discussed with Heme onc. BX results seems to have resulted. Appears nonspecific with granulocyte predominance (77%). High degree of necrosis noted  Awaiting recommendations from Heme oncology  A and O x2.   ARC placement pending. Did consul PMNR

## 2024-11-08 NOTE — BRIEF OP NOTE (RAD/CATH)
INTERVENTIONAL RADIOLOGY PROCEDURE NOTE    Date: 11/8/2024    Procedure: IR BIOPSY LYMPH NODE     Preoperative diagnosis:   1. Encephalopathy, unspecified type    2. Fever, unspecified fever cause    3. Adenopathy    4. Myoclonia    5. Lymphadenopathy         Postoperative diagnosis: Same.    Surgeon: Kenn Lamb MD     Assistant: None. No qualified resident was available.    Blood loss: minimal    Specimens: 18G core x 4 (1 in RPMI and 3 in formalin)    Findings: successful right axillary lymph node biopsy.    Complications: None immediate.    Anesthesia: local

## 2024-11-08 NOTE — OCCUPATIONAL THERAPY NOTE
Occupational Therapy         Patient Name: Miguel Henderson  Today's Date: 11/8/2024 11/08/24 1343   OT Last Visit   OT Visit Date 11/08/24   Note Type   Note type Cancelled Session   Cancel Reasons Patient off floor/test         Pt chart reviewed. Pt off the floor at this time with IR. Will hold OT tx and continue to follow and treat as appropriate.     Vladislav Sanches MS, OTR/L     [facility] CERTIFIED RATER ID [id number]

## 2024-11-08 NOTE — PHYSICAL THERAPY NOTE
Physical Therapy Cancellation Note     11/08/24 1441   PT Last Visit   PT Visit Date 11/08/24   Note Type   Note Type Cancelled Session   Cancel Reasons Patient off floor/test     Pt off the floor for biopsy today. PT will follow and treat as able.  Jen Soto PT DPT

## 2024-11-08 NOTE — ASSESSMENT & PLAN NOTE
Development of intermittent twitching/myoclonic jerking on 10/31. Neurology consulted. Concern for toxic-metabolic source and a routine EEG was obtained. It showed nearly continuous myoclonic jerking and concern for an epileptic etiology of myoclonus. Patient started on Ativan and Keppra. However, repeat EEG was performed with an EMG on the limbs and there did not appear to be epileptic correlation. 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 MRI brain negative for acute pathology.  He seems improved.  -Appreciate neurology workup and follow-up

## 2024-11-08 NOTE — PROGRESS NOTES
"Interventional Radiology Preprocedure Note    History/Indication for procedure:   Miguel Henderson is a 84 y.o. male with concern for hemologic malignancy who presents for US guided right axillary LN biopsy as well as repeat BMB.  Both prior biopsies were previously inconclusive.    Relevant past medical history:    Past Medical History:   Diagnosis Date    Prostate cancer (HCC)      Patient Active Problem List   Diagnosis    Adenocarcinoma of prostate (HCC)    Lumbar radiculopathy    Hypothyroidism    Primary hypertension    Rhinovirus    Left knee pain    Candida esophagitis (HCC)    Dysphagia    Ulceration of oral mucosa    Lesion of oral mucosa    Liver lesion    Localized edema    Shortness of breath on exertion    Cough    Febrile illness    Hyponatremia    Lactic acidosis    Oral candidiasis    HSV-1 (herpes simplex virus 1) infection    SIRS (systemic inflammatory response syndrome) (HCC)    Odynophagia    Metabolic encephalopathy    Encephalopathy    Fever    Lymphadenopathy    Abnormal LFTs    COVID-19 virus infection    Myoclonia    Anemia    Hypocalcemia       /79   Pulse 79   Temp 98.5 °F (36.9 °C)   Resp 15   Ht 5' 5\" (1.651 m)   Wt 77.6 kg (171 lb)   SpO2 98%   BMI 28.46 kg/m²     Medications:    Inpatient Medications:     Scheduled Medications:  Current Facility-Administered Medications   Medication Dose Route Frequency Provider Last Rate    acetaminophen  1,000 mg Intravenous Q6H PRN Argelia Mccallum MD 1,000 mg (10/28/24 2103)    bisacodyl  10 mg Rectal Once Argelia Mccallum MD      bisacodyl  10 mg Rectal Daily PRN Bunny Pearson MD      calcium carbonate  500 mg Oral BID Argelia Mccallum MD      docusate sodium  100 mg Oral BID Argelia Mccallum MD      fluticasone  1 spray Nasal Daily Argelia Mccallum MD      heparin (porcine)  5,000 Units Subcutaneous Q8H [facility] Argelia Mccallum MD      hydrocortisone   Topical 4x Daily PRN Bunny Pearson MD      levothyroxine  50 mcg Oral Once per day on Monday " Tuesday Wednesday Thursday Argelia Mccallum MD      levothyroxine  75 mcg Oral Once per day on Sunday Friday Saturday Argelia Mccallum MD      multi-electrolyte  100 mL/hr Intravenous Continuous Argelia Mccallum MD Stopped (11/08/24 1143)    ondansetron  4 mg Intravenous Q6H PRN Argelia Mccallum MD      pantoprazole  40 mg Oral Early Morning Argelia Mccallum MD      polyethylene glycol  17 g Oral BID Bunny Pearson MD         Infusions:  multi-electrolyte, 100 mL/hr, Last Rate: Stopped (11/08/24 1143)        PRN:    acetaminophen    bisacodyl    hydrocortisone    ondansetron    Outpatient Medications:  No current facility-administered medications on file prior to encounter.     Current Outpatient Medications on File Prior to Encounter   Medication Sig Dispense Refill    benzonatate (TESSALON) 200 MG capsule Take 1 capsule (200 mg total) by mouth 2 (two) times a day as needed for cough 40 capsule 2    cyclobenzaprine (FLEXERIL) 5 mg tablet Take 5 mg by mouth 3 (three) times a day as needed      fluticasone (FLONASE) 50 mcg/act nasal spray 1 spray into each nostril daily 11.1 mL 1    hydroCHLOROthiazide 25 mg tablet Take 1 tablet by mouth daily      ibuprofen (MOTRIN) 800 mg tablet Take 1 tablet (800 mg total) by mouth every 8 (eight) hours as needed for moderate pain 60 tablet 2    levothyroxine 50 mcg tablet Take 50 mcg by mouth daily      levothyroxine 75 mcg tablet Take 75 mcg by mouth Contingent Taken every Friday through Sunday      ondansetron (ZOFRAN) 4 mg tablet Take 1 tablet (4 mg total) by mouth every 8 (eight) hours as needed for nausea or vomiting 20 tablet 0    pantoprazole (PROTONIX) 40 mg tablet Take 1 tablet (40 mg total) by mouth daily in the early morning 30 tablet 0    potassium chloride (Klor-Con M10) 10 mEq tablet Take 10 mEq by mouth daily         Allergies   Allergen Reactions    Shellfish Allergy - Food Allergy Anaphylaxis    Valtrex [Valacyclovir] Hives       Anticoagulants: none    ASA classification: ASA 3 -  Patient with moderate systemic disease with functional limitations    Airway Assessment: II (hard and soft palate, upper portion of tonsils anduvula visible)    Relevant family history: None    Relevant review of systems: None    Prior sedation/anesthesia: yes    Can the patient lie flat? Yes     NPO Status: yes    Labs:   CBC with diff:   Lab Results   Component Value Date    WBC 6.60 11/05/2024    HGB 8.7 (L) 11/05/2024    HCT 26.2 (L) 11/05/2024    MCV 92 11/05/2024     11/05/2024    RBC 2.84 (L) 11/05/2024    MCH 30.6 11/05/2024    MCHC 33.2 11/05/2024    RDW 18.6 (H) 11/05/2024    MPV 8.4 (L) 11/05/2024    NRBC 0 11/05/2024     BMP/CMP:  Lab Results   Component Value Date    K 4.2 11/08/2024    K 3.8 06/18/2024     11/08/2024     06/18/2024    CO2 25 11/08/2024    CO2 30 06/18/2024    BUN 8 11/08/2024    BUN 15 06/18/2024    CREATININE 0.39 (L) 11/08/2024    CREATININE 0.66 06/18/2024    CALCIUM 9.0 11/08/2024    CALCIUM 8.3 (L) 06/18/2024    AST 33 11/08/2024    AST 19 06/18/2024    ALT 24 11/08/2024    ALT 16 06/18/2024    ALKPHOS 120 (H) 11/08/2024    ALKPHOS 58 06/18/2024    EGFR 108 11/08/2024    EGFR 91 06/18/2024    EGFR 91 12/05/2019   ,     Coags:   Lab Results   Component Value Date    PTT 33 10/21/2024    INR 1.67 (H) 10/27/2024   ,          Relevant imaging studies:   Reviewed.    Directed physical examination:  I agree with the physical exam performed on 11/7/24 and there are no additional changes.    Assessment/Plan:   Right axillary LN biopsy  BMB/asp.    Sedation/Anesthesia plan:  Moderate sedation will be used as needed for procedure.    Consent with alternatives to the procedure, risks and benefits have been explained and discussed with the patient/patient's family: yes, consent from son was obtained for both procedures.

## 2024-11-08 NOTE — CONSULTS
"PHYSICAL MEDICINE AND REHABILITATION CONSULT NOTE  Miguel Henderson 84 y.o. male MRN: [id number]  Unit/Bed#: [facility] 817-01 Encounter: [id number]    Requested by:  Bunny Pearson MD  Reason for Consultation:  Encephalopathy, unspecified type   Primary insurance listed on facesheet:  [employer]     Assessment and Recommendations:  84-year-old male with past medical history of diabetes, hypertension, hypothyroidism due to Hashimoto's thyroiditis, Gilbert's syndrome, chronic kidney disease, prostate cancer, low back pain, lumbar radiculopathy recent hospitalizations (10/7-10/15) last month for initially severe sepsis, positive for rhinovirus, right sided pneumonia, possible herpes simplex virus, Candida esophagitis and than briefer hospitalization (10/21-23) for fever, Covid+, lactic acidosis who was readmitted 2 days later 10/25 fever and encephalopathy.  ID consulted and noted overall hx c/w 3 week hx of intermittent fevers in context of rhinovirus+, Covid+ testing, CT imaging showing R axillary lymphadenopathy (per H/O lymphadenopathy worsening on imaging mediastinal, bilateral iliac and bilateral inguinal lymphadenopathy), LP 10/25 showing mild elevation protein and lymphocytic pleocytosis.  Serology showed atypical lymphocytes, large cold agglutinins. H/O consulted and were concerned for underlying lymphoproliferative d/o such as lymphoma and hyperviscosity syndrome and was provided PLEX.  Patient had 2 yariel bx's which were suspicous for lymphoproliferative d/o but unable to d/x subtype.  Per H/O \"The excisional biopsy (R inguinal LB by surg onc on 11/3) done later showed near completely necrotic tissue consistent with rapidly progressive malignancy with clonal T-cell and scattered EBV positive cells. Patient also had a bone marrow aspirate and biopsy which was a very limited sample, and was nondiagnostic but again suspicious for lymphoproliferative disorder, increased plasma cells and histiocytic " "aggregates\"  Per last H-O note 11/7, they felt possibility for hemophagocytic lymphohistiocytosis (HLH-per my chart review - severe life-threatening condition with dysregulated immune response triggered by underlying CA such as lymphomas and leukemias with initial tx to tx underlying CA with meds like dexamethasone, Etoposide, Cylosporine, as well as mgmt of organ dysfunction, transfusions, BM Tx in refractory cases) 2/2 malignancy but they noted mental status improving spontanously and Ferritin 1000 which is not as high as expected with HLH.  They recommended establishing dx prior dx and recommendation for repeat BM aspirate/bx with large sample.  If this is aggressive type of lymphoma he may need IP tx.  11/8 Tissue bx of axillar LN collection, bone marrow bx with additional BM testing, and additional H-O serology pending as of 11/11 am.      Of note, neuro was c/s;d earlier in course and dx pt with TME and metabolic myoclonus significantly improving with med mgmt specifically PLEX in setting of underlying hematologic d/o.  MRI brain without significant acute path and EEG without evidence of epileptiform discharges.      Patient with higher fever and some tachycardia 11/11 and ID may start Vanco.    Miguel Henderson was evaluated by PT, OT and now by PMR physician and found to have new and significant deficits in ADLs and mobility.      Prior Level of Function and Social history:    Patient lives in home 1 level with 3 CIERA with spouse, son, DIL  Did require some assist with ADLs but independent for others and for mobility     Current Level of Function:    11/10 - transfers and bed mobility max assist x2; Max assist LBB, UBD, LBD, toileting; mod assist UBB    Encephalopathy  Impaired mobility and ADLs  Lymphoproliferative d/o  Anemia, lymphocytosis    Lymphadenopathy  Fever  Myoclonus  History of HTN   History of Covid 19 infection  VTE risk     Plan:    Disposition recommendation:  Too be determined - [facility] v " SNF  The patient with following culmination of conditions MAYBE an appropriate candidate for transfer to [facility] pending:   - Medical clearance and short-term management plan by oncology   - Improved activity tolerance during acute course to enable adequate participation in [facility] program  - Medical stability/clearance by primary team   - Facility acceptance, insurance auth, bed availability     Encephalopathy  - GCS V4 - mild confusion - oriented to person, month, year, not fully to situation, able to follow simple commands most of the time, drowsy after recent 25mg PO atarax   - Per family was up in chair participating and wakeful well yesterday     - Med/surgical management:   Possibly related to underlying lymphoproliferative, hx of hyperviscosity syndrome d/o s/p PLEX - improving overall but appears somewhat worse after recent atarax 25mg - await follow-up mgmt by H-O    Risk of impaired sleep-wake cycle; hospital induced delirium    Monitor for other causes - overall mgmt per IM, HO (seen by neuro who signed off earlier - MRI/EEG without concerning findings)  - Neuropsych Med review:    Atarax 25mg Q6H PRN > consider decreasing to 10mg Q6H PRN for intractable itching (would also consider trialing Anusol supp BID for 3 days for rectal itching and hx of hemorrhoids as well as Dyan cream for possible fungal skin infection > monitor)    (Last received fentanyl IV x3 11/8 for bx )  - Status:    Impaired cognition, impaired balance, wakefulness  - Continue PT, OT in acute setting to improve ADLs, mobility, strength, conditioning, and decrease risks of immobility as well as to assist with dispo recommendations   - Decrease risks of complications related to decreased mobility status and monitor for them during course  - MSK - atrophy, contractures, bone demineralization, CV - DVT/PE, orthostasis, decreased CO, Resp - atelectasis, PNA, GI - constipation, reduced appetite,  - retention, incontinence, Skin - pressure  injuries  - Optimal bowel/bladder mgmt/hygiene - monitor for retention, incontinence, infection     - Turn patient Q2H, skin checks minimum Qshift  - ROM of major joints 2-3 times per day  (if unable to do it on own)  - Supportive counseling and updates to family (as appropriate)   - Fall precautions - if needed increase rounding or consider virtual sitter or in-person sitter  - Overall mgmt per primary team currently, recommend optimal mgmt during rehab process as well  - Remains at risk for increased confusion/delirium, restlessness, agitation, and fall - continue to monitor for concerns/changes  - Monitor neuro-exam, wakefulness, mood, cognition, insight into deficits and safety awareness   - Monitor and ensure optimal management electrolytes, nutrition, and hydration  - Monitor for signs or symptoms of infection, medication intolerances, other systemic etiologies  - Additional labs, imaging, specialist follow-up as needed per primary team currently   - Overstimulation precautions, frequent re-orientation, re-direction, re-assurance  - Optimal mood, pain, and sleep management  - If impaired sleep or behavior recommend sleep log and agitation monitoring    - Limit sedating medications when possible  - For routine restlessness, anxiety, irritability focus on non-pharmacologic management    - Hold benzo's with increased risk paradoxical reaction and possibility of limiting cognitive recovery    Impaired mobility and ADLs  Multifactorial: lymphoproliferative d/o, encephalopathy, deconditioning, impaired intake, pruritus, fever  - Optimal management of each as listed   - Continue PT, OT in acute rehab setting  - Optimal nutrition, hydration, and medical management  - Monitor vitals closely with and without activity  - Fall precautions     Lymphoproliferative d/o, Anemia, lymphocytosis, Lymphadenopathy  - Mgmt per H-O  - Final bx's and serologies pending  - Risk patient will need steroids/chemo if significant result  -  Monitor labs/vitals closely    Fever  - Mgmt per IM, ID, H-O  - Possible vanco per ID   - Monitor vitals, labs, exam closely     Myoclonus  - Improved, felt to be related heme d/o per neuro    History of HTN   - Current management at discretion of primary team   - Ensure optimal management during rehab process  - Monitor vitals with and without activity; monitor for orthostasis  - Monitor hemoglobin, electrolytes, kidney function, hydration status   - Current meds: per other providers    History of Covid 19 infection  - Monitor for PASC     VTE prophylaxis   As outlined by primary team      Other medical issues:     Per primary service (and relevant consultants if applicable)    ==================================================================    Chief Complaint:  Itching     History of Present Illness:   84-year-old male with past medical history of diabetes, hypertension, hypothyroidism due to Hashimoto's thyroiditis, Gilbert's syndrome, chronic kidney disease, prostate cancer, low back pain, lumbar radiculopathy recent hospitalizations (10/7-10/15) last month for initially severe sepsis, positive for rhinovirus, right sided pneumonia, possible herpes simplex virus, Candida esophagitis and than briefer hospitalization (10/21-23) for fever, Covid+, lactic acidosis who was readmitted 2 days later 10/25 fever and encephalopathy.  ID consulted and noted overall hx c/w 3 week hx of intermittent fevers in context of rhinovirus+, Covid+ testing, CT imaging showing R axillary lymphadenopathy (per H/O lymphadenopathy worsening on imaging mediastinal, bilateral iliac and bilateral inguinal lymphadenopathy), LP 10/25 showing mild elevation protein and lymphocytic pleocytosis.  Serology showed atypical lymphocytes, large cold agglutinins. H/O consulted and were concerned for underlying lymphoproliferative d/o such as lymphoma and hyperviscosity syndrome and was provided PLEX.  Patient had 2 yariel bx's which were suspicous  "for lymphoproliferative d/o but unable to d/x subtype.  Per H/O \"The excisional biopsy (R inguinal LB by surg onc on 11/3) done later showed near completely necrotic tissue consistent with rapidly progressive malignancy with clonal T-cell and scattered EBV positive cells. Patient also had a bone marrow aspirate and biopsy which was a very limited sample, and was nondiagnostic but again suspicious for lymphoproliferative disorder, increased plasma cells and histiocytic aggregates\"  Per last H-O note 11/7, they felt possibility for hemophagocytic lymphohistiocytosis (HLH-per my chart review - severe life-threatening condition with dysregulated immune response triggered by underlying CA such as lymphomas and leukemias with initial tx to tx underlying CA with meds like dexamethasone, Etoposide, Cylosporine, as well as mgmt of organ dysfunction, transfusions, BM Tx in refractory cases) 2/2 malignancy but they noted mental status improving spontanously and Ferritin 1000 which is not as high as expected with HLH.  They recommended establishing dx prior dx and recommendation for repeat BM aspirate/bx with large sample.  If this is aggressive type of lymphoma he may need IP tx.  11/8 Tissue bx of axillar LN collection, bone marrow bx with additional BM testing, and additional H-O serology pending as of 11/11 am.      Of note, neuro was c/s;d earlier in course and dx pt with TME and metabolic myoclonus significantly improving with med mgmt specifically PLEX in setting of underlying hematologic d/o.  MRI brain without significant acute path and EEG without evidence of epileptiform discharges.      Patient with higher fever and some tachycardia 11/11 and ID may start Vanco.    Review of Systems:     Complete review of systems obtained.    Please see HPI for details with other significant symptoms or history listed here:    Otherwise, 14 point review of systems completed and was otherwise unremarkable.    Allergies   Allergen " Reactions    Shellfish Allergy - Food Allergy Anaphylaxis    Valtrex [Valacyclovir] Hives       Current Facility-Administered Medications:     acetaminophen (Ofirmev) injection 1,000 mg, 1,000 mg, Intravenous, Q6H PRN, Argelia Mccallum MD, Last Rate: 400 mL/hr at 11/11/24 1018, 1,000 mg at 11/11/24 1018    bisacodyl (DULCOLAX) rectal suppository 10 mg, 10 mg, Rectal, Once, Argelia Mccallum MD    bisacodyl (DULCOLAX) rectal suppository 10 mg, 10 mg, Rectal, Daily PRN, Bunny Pearson MD    calcium carbonate (TUMS) chewable tablet 500 mg, 500 mg, Oral, BID, Argelia Mccallum MD, 500 mg at 11/11/24 0819    docusate sodium (COLACE) capsule 100 mg, 100 mg, Oral, BID, Argelia Mccallum MD, 100 mg at 11/11/24 0819    fluticasone (FLONASE) 50 mcg/act nasal spray 1 spray, 1 spray, Nasal, Daily, Argelia Mccallum MD, 1 spray at 11/07/24 0923    gabapentin (NEURONTIN) capsule 100 mg, 100 mg, Oral, HS, Nancy Mondragon, 100 mg at 11/10/24 1814    heparin (porcine) subcutaneous injection 5,000 Units, 5,000 Units, Subcutaneous, Q8H JOE, 5,000 Units at 11/11/24 1337 **AND** [COMPLETED] Platelet count, , , Once, Nancy Mondragon    hydrocortisone (ANUSOL-HC) 2.5 % rectal cream, , Topical, TID, Bunny Pearson MD    hydrOXYzine HCL (ATARAX) tablet 10 mg, 10 mg, Oral, Q6H PRN, Bunny Pearson MD    ketorolac (TORADOL) injection 30 mg, 30 mg, Intravenous, Q8H, Bunny Pearson MD, 30 mg at 11/11/24 1035    levothyroxine tablet 50 mcg, 50 mcg, Oral, Once per day on Monday Tuesday Wednesday Thursday, Argelia Mccallum MD, 50 mcg at 11/11/24 0500    levothyroxine tablet 75 mcg, 75 mcg, Oral, Once per day on Sunday Friday Saturday, Argelia Mccallum MD, 75 mcg at 11/10/24 0543    moisture barrier miconazole 2% cream (aka SAMEER MOISTURE BARRIER ANTIFUNGAL CREAM), , Topical, BID, Jonatan Campos MD    multi-electrolyte (PLASMALYTE-A/ISOLYTE-S PH 7.4) IV solution, 100 mL/hr, Intravenous, Continuous, Bunny Pearson MD, Last Rate: 100 mL/hr at  11/11/24 1207, 100 mL/hr at 11/11/24 1207    ondansetron (ZOFRAN) injection 4 mg, 4 mg, Intravenous, Q6H PRN, Argelia Mccallum MD    oxyCODONE (ROXICODONE) IR tablet 5 mg, 5 mg, Oral, Q6H PRN, Bunny Pearson MD    pantoprazole (PROTONIX) EC tablet 40 mg, 40 mg, Oral, Early Morning, Argelia Mccallum MD, 40 mg at 11/11/24 0500    polyethylene glycol (MIRALAX) packet 17 g, 17 g, Oral, BID, Bunny Pearson MD, 17 g at 11/11/24 0819    vancomycin (VANCOCIN) IVPB (premix in dextrose) 1,000 mg 200 mL, 1,000 mg, Intravenous, Q12H, Bunny Pearson MD    Past Medical History:   Diagnosis Date    Prostate cancer (HCC)        Past Surgical History:   Procedure Laterality Date    APPENDECTOMY      IR BIOPSY BONE MARROW  10/29/2024    IR BIOPSY INGUINAL LYMPH NODE  10/28/2024    IR LUMBAR PUNCTURE  10/25/2024    IR TEMPORARY DIALYSIS CATHETER PLACEMENT  10/26/2024    LYMPH NODE BIOPSY Right 11/3/2024    Procedure: EXCISION BIOPSY LYMPH NODE INGUINAL;  Surgeon: Michael Rush MD;  Location: [facility];  Service: Surgical Oncology      History reviewed. No pertinent family history.    Social History available currently in EMR:  (See additional SH as outlined above)   Social History     Socioeconomic History    Marital status: Unknown     Spouse name: None    Number of children: None    Years of education: None    Highest education level: None   Occupational History    None   Tobacco Use    Smoking status: Never    Smokeless tobacco: Never   Vaping Use    Vaping status: Never Used   Substance and Sexual Activity    Alcohol use: Yes     Comment: Occasional    Drug use: Never    Sexual activity: None   Other Topics Concern    None   Social History Narrative    None     Social Determinants of Health     Financial Resource Strain: Not on file   Food Insecurity: No Food Insecurity (10/26/2024)    Nursing - Inadequate Food Risk Classification     Worried About Running Out of Food in the Last Year: Never true     Ran Out of  Food in the Last Year: Never true     Ran Out of Food in the Last Year: 1   Transportation Needs: No Transportation Needs (10/26/2024)    Nursing - Transportation Risk Classification     Lack of Transportation: Not on file     Lack of Transportation: 2   Physical Activity: Not on file   Stress: Not on file   Social Connections: Not on file   Intimate Partner Violence: Unknown (10/26/2024)    Nursing IPS     Feels Physically and Emotionally Safe: Not on file     Physically Hurt by Someone: Not on file     Humiliated or Emotionally Abused by Someone: Not on file     Physically Hurt by Someone: 2     Hurt or Threatened by Someone: 2   Housing Stability: Unknown (10/26/2024)    Nursing: Inadequate Housing Risk Classification     Has Housing: Not on file     Worried About Losing Housing: Not on file     Unable to Get Utilities: Not on file     Unable to Pay for Housing in the Last Year: 2     Has Housin       Physical Examination:   Temp:  [98.2 °F (36.8 °C)-102.1 °F (38.9 °C)] 98.2 °F (36.8 °C)  HR:  [] 97  BP: (100-123)/(64-88) 123/88  Resp:  [18-22] 18  SpO2:  [96 %-100 %] 100 %  O2 Device: None (Room air)  General: Awake initially but became more sleepy during encounter  Respiratory: Unlabored breathing, breath sounds equal, Lungs CTA, no wheezes, rales, or rhonchi  Cardiovascular: Regular rate and rhythm, no murmurs, rubs, or gallops  Gastrointestinal: Soft, non-tender, non-distended, normoactive bowel sounds  Genitourinary: No mitchell  SkiN/MSK/Extremities:  Distal extremities appropriately warm, normal cap refill distally, no cyanosis or calf edema, no calf tenderness to palpation  Possible fungal like rash on buttocks/sacrum  Rectal: (cleared by pt/family-in presence of nurse and AP); no aleida-anal sores or erythema, small possible internal hemorrhoids without acute tenderness or palpable ulcerations; adequate tone; reports itching in this area   Neurologic/Psych:   MENTAL STATUS: drowsy, oriented to  person, place, time but not fully to situation; able to follow most simple commands  CN II-XII: grossly intact   Strength/MMT:  Limited testing due to tiredness; BUE near full, BLE prox 3 to 4, distal 4 to 5    Data:  Lab Results   Component Value Date    HGB 10.3 (L) 11/11/2024    HCT 32.6 (L) 11/11/2024    WBC 8.29 11/11/2024    K 3.9 11/11/2024    K 3.8 06/18/2024     11/11/2024     06/18/2024    CREATININE 0.50 (L) 11/11/2024    CREATININE 0.66 06/18/2024    BUN 13 11/11/2024    BUN 15 06/18/2024         XR chest portable    Result Date: 11/1/2024  Impression: Stable bibasilar atelectasis. No pneumothorax. Workstation performed: [id number]     CT head wo contrast    Result Date: 10/31/2024  Impression: No acute intracranial abnormality. Mild chronic small vessel ischemic changes. Workstation performed: [id number]     IR biopsy bone marrow    Result Date: 10/30/2024  Impression: Bone marrow aspiration and core biopsy. PERFORMED BY: DARREN Prajapati SUPERVISING PHYSICIAN: Dr. Anton Perez Workstation performed: [id number]     XR hips bilateral 3-4 vw w pelvis if performed    Result Date: 10/29/2024  Impression: No acute osseous abnormality. Computerized Assisted Algorithm (CAA) may have been used to analyze all applicable images. Workstation performed: [id number]     CT spine cervical wo contrast    Result Date: 10/28/2024  Impression: No cervical spine fracture or traumatic malalignment. Workstation performed: [id number]     CT head wo contrast    Result Date: 10/28/2024  Impression: No acute intracranial abnormality.  Stable chronic microangiopathic changes within the brain. Workstation performed: [id number]     IR biopsy inguinal lymph node    Result Date: 10/28/2024  Impression: Impression: Successful biopsy of the left inguinal lymph node. Workstation performed: [id number]     IR temporary dialysis catheter placement    Result Date: 10/26/2024  Impression: Insertion of right IJV non-tunneled  dual-lumen plasmapheresis catheter, with tip in the expected location of the superior vena cava. Plan: The catheter may be used immediately. Workstation performed: [id number]       Pertinent labs and imaging reviewed.      Patient seen on date of service listed.    80 minutes or greater spent for this encounter which included a combination of face-to-face time with patient and non-face-to-face time which in part specifically includes management of encephalopathy, heme d/o.  Face-to-face time included extended discussion with patient regarding current condition, medical history, mood, medical/rehabilitation management, and disposition.  Non face-to-face time included coordination of care with patient's co-managing AP and/or physician(s) thru communication and/or review of their recent documentation as well as reviewing vitals, bowel/bladder function, recent labs, diagnostic imaging, and notes from therapy, CM, and nursing.      Thank you for allowing the PM&R service to participate in the care of this patient. We will continue to follow Miguel Henderson's progress with you. Please do not hesitate to call with questions or concerns.    Jonatan Campos MD, MS  [facility]  Physical Medicine and Rehabilitation  Brain Injury Medicine

## 2024-11-08 NOTE — PLAN OF CARE
Problem: PAIN - ADULT  Goal: Verbalizes/displays adequate comfort level or baseline comfort level  Description: Interventions:  - Encourage patient to monitor pain and request assistance  - Assess pain using appropriate pain scale  - Administer analgesics based on type and severity of pain and evaluate response  - Implement non-pharmacological measures as appropriate and evaluate response  - Consider cultural and social influences on pain and pain management  - Notify physician/advanced practitioner if interventions unsuccessful or patient reports new pain  Outcome: Progressing     Problem: SAFETY ADULT  Goal: Patient will remain free of falls  Description: INTERVENTIONS:  - Educate patient/family on patient safety including physical limitations  - Instruct patient to call for assistance with activity   - Consult OT/PT to assist with strengthening/mobility   - Keep Call bell within reach  - Keep bed low and locked with side rails adjusted as appropriate  - Keep care items and personal belongings within reach  - Initiate and maintain comfort rounds  - Make Fall Risk Sign visible to staff  - Offer Toileting every 2 Hours, in advance of need  - Initiate/Maintain bed/chair alarm  - Apply yellow socks and bracelet for high fall risk patients  - Consider moving patient to room near nurses station  Outcome: Progressing     Problem: INFECTION - ADULT  Goal: Absence or prevention of progression during hospitalization  Description: INTERVENTIONS:  - Assess and monitor for signs and symptoms of infection  - Monitor lab/diagnostic results  - Monitor all insertion sites, i.e. indwelling lines, tubes, and drains  - Monitor endotracheal if appropriate and nasal secretions for changes in amount and color  - [place] appropriate cooling/warming therapies per order  - Administer medications as ordered  - Instruct and encourage patient and family to use good hand hygiene technique  - Identify and instruct in appropriate isolation  precautions for identified infection/condition  Outcome: Progressing     Problem: Prexisting or High Potential for Compromised Skin Integrity  Goal: Skin integrity is maintained or improved  Description: INTERVENTIONS:  - Identify patients at risk for skin breakdown  - Assess and monitor skin integrity  - Assess and monitor nutrition and hydration status  - Monitor labs   - Assess for incontinence   - Turn and reposition patient  - Assist with mobility/ambulation  - Relieve pressure over bony prominences  - Avoid friction and shearing  - Provide appropriate hygiene as needed including keeping skin clean and dry  - Evaluate need for skin moisturizer/barrier cream  - Collaborate with interdisciplinary team   - Patient/family teaching  - Consider wound care consult   Outcome: Progressing     Problem: Nutrition/Hydration-ADULT  Goal: Nutrient/Hydration intake appropriate for improving, restoring or maintaining nutritional needs  Description: Monitor and assess patient's nutrition/hydration status for malnutrition. Collaborate with interdisciplinary team and initiate plan and interventions as ordered.  Monitor patient's weight and dietary intake as ordered or per policy. Utilize nutrition screening tool and intervene as necessary. Determine patient's food preferences and provide high-protein, high-caloric foods as appropriate.     INTERVENTIONS:  - Monitor oral intake, urinary output, labs, and treatment plans  - Assess nutrition and hydration status and recommend course of action  - Evaluate amount of meals eaten  - Assist patient with eating if necessary   - Allow adequate time for meals  - Recommend/ encourage appropriate diets, oral nutritional supplements, and vitamin/mineral supplements  - Order, calculate, and assess calorie counts as needed  - Recommend, monitor, and adjust tube feedings and TPN/PPN based on assessed needs  - Assess need for intravenous fluids  - Provide specific nutrition/hydration education as  appropriate  - Include patient/family/caregiver in decisions related to nutrition  Outcome: Progressing     Problem: Knowledge Deficit  Goal: Patient/family/caregiver demonstrates understanding of disease process, treatment plan, medications, and discharge instructions  Description: Complete learning assessment and assess knowledge base.  Interventions:  - Provide teaching at level of understanding  - Provide teaching via preferred learning methods  Outcome: Progressing     Problem: DISCHARGE PLANNING  Goal: Discharge to home or other facility with appropriate resources  Description: INTERVENTIONS:  - Identify barriers to discharge w/patient and caregiver  - Arrange for needed discharge resources and transportation as appropriate  - Identify discharge learning needs (meds, wound care, etc.)  - Arrange for interpretive services to assist at discharge as needed  - Refer to Case Management Department for coordinating discharge planning if the patient needs post-hospital services based on physician/advanced practitioner order or complex needs related to functional status, cognitive ability, or social support system  Outcome: Progressing

## 2024-11-08 NOTE — BRIEF OP NOTE (RAD/CATH)
INTERVENTIONAL RADIOLOGY PROCEDURE NOTE    Date: 11/8/2024    Procedure: IR BIOPSY BONE MARROW     Preoperative diagnosis:   1. Encephalopathy, unspecified type    2. Fever, unspecified fever cause    3. Adenopathy    4. Myoclonia    5. Lymphadenopathy         Postoperative diagnosis: Same.    Surgeon: Kenn Lamb MD     Assistant: None. No qualified resident was available.    Blood loss: minimal    Specimens: bone marrow    Findings:     Successful bone marrow biopsy.    Complications: None immediate.    Anesthesia: conscious sedation

## 2024-11-08 NOTE — PROGRESS NOTES
Progress Note - Infectious Disease   Name: Miguel Henderson 84 y.o. male I MRN: [id number]  Unit/Bed#: [facility] 817-01 I Date of Admission: 10/26/2024   Date of Service: 11/8/2024 I Hospital Day: 13    Assessment & Plan  Fever  The patient had intermittent fevers for 3 weeks and several admissions to [facility] with fairly unremarkable infectious workup other than positive rhinovirus and COVID. CT imaging was noted to show lymphadenopathy and he had atypical lymphocytes on CBC so hematology/oncology consult was recommended although the patient was discharged on Paxlovid with plan for an outpatient referral.  He then returned to the ED with worsening confusion and return of fever.  CT imaging with worsening axillary lymphadenopathy, CBC differential again showed atypical lymphocytes.  Status post LP 10/25 which showed mild elevation in protein and lymphocytic pleocytosis.  ME panel negative and culture showed no growth. 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  Labs show elevated LDH, ferritin, immunoglobulins. Status post plasmapheresis with improvement in fever curve and initially improvement in mental status, but now with waxing and waning mental status and development of myoclonic jerking as below. S/p lymph node and bone marrow biopsies. Bone marrow biopsy preliminary study demonstrated histiocytic aggregates, which are nonspecific, and can be seen in a variety of settings including infection. Broadened infectious work-up based on risk factors to include TB PCR from CSF (given patient is initially from [place] and traveled to [place] in June), histoplasma urine antigen, brucella antibodies, and mycoplasma antibodies. Mycoplasma antibodies, histoplasma urine antigen, and brucella antibodies negative. TB PCR from CSF is currently pending. Overall, low concern for infectious source given extensive infectious work-up that has remained  unremarkable.   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  MRI brain without acute intracranial abnormalities.  Status post LP 10/25 with mild lymphocytic pleocytosis and elevated protein.  ME panel and CSF culture negative. Suspect CSF abnormalities related to possible underlying lymphoproliferative disorder.  Status post first session of plasmapheresis 10/26 with improvement in fever curve and mental status initially, but now seems to have worsened.  Repeat CT of the brain nondiagnostic for source on 10/31/2024. Neurology consulted. Concern for toxic-metabolic source and a routine EEG was obtained. It showed nearly continuous myoclonic jerking and concern for an epileptic etiology of myoclonus. However, repeat EEG was performed with an EMG on the limbs and there did not appear to be epileptic correlation. 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 MRI brain negative for acute pathology.  He seemed to improved cognitively although still a bit confused.  -Continue to monitor mental status  -Neurology follow-up and workup  Lymphadenopathy  During prior admission imaging showed enlarged periaortic, iliac chain and inguinal lymph nodes.  Repeat imaging showed persistent lymphadenopathy with worsened right axillary adenopathy.  As above, concern for an underlying lymphoproliferative disorder.  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  CBC differential also shows atypical lymphocytes.  Status post plasmapheresis 10/26. HIV screening negative. S/p lymph node and bone marrow biopsies. Lymph node biopsy overall findings are atypical and suspicious for a lymphoproliferative  disorder. However, the tissue was too scant to further characterize the underlying process. Therefore, patient underwent an excisional biopsy with surgical oncology on 11/3.   -Hematology/oncology following  -Follow-up pathology from excisional biopsy  -Additional infectious work-up as above    COVID-19 virus infection  Isolation discontinued on 11/1/2024  Myoclonia  Development of intermittent twitching/myoclonic jerking on 10/31. Neurology consulted. Concern for toxic-metabolic source and a routine EEG was obtained. It showed nearly continuous myoclonic jerking and concern for an epileptic etiology of myoclonus. Patient started on Ativan and Keppra. However, repeat EEG was performed with an EMG on the limbs and there did not appear to be epileptic correlation. 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 MRI brain negative for acute pathology.  He seems improved.  -Appreciate neurology workup and follow-up    I have discussed with the primary service the above plan to monitor off all antibiotics and follow-up additional infectious work-up and lymph node biopsies as above.  The primary service agrees with the plan.    ID consult service will continue to follow. Will formally re-evaluate patient on Monday, 11/11. Please reach out via secure chat with questions or concerns in the interim.     Antibiotics:  None    Subjective   Resting comfortably. No acute complaints. Remains otherwise afebrile and hemodynamically stable off antibiotics.     Objective :  Temp:  [98.5 °F (36.9 °C)-99.6 °F (37.6 °C)] 98.5 °F (36.9 °C)  HR:  [79-99] 79  BP: (107-126)/(61-79) 126/79  Resp:  [15-18] 15  SpO2:  [98 %-99 %] 98 %  O2 Device: None (Room air)    General Appearance:  Chronically ill appearing, awake, interactive. No acute distress. Ongoing intermittent confusion.    Throat: No perioral lesions or ulcerations    Lungs:   respirations unlabored. On room air.    Heart:  RRR   Abdomen:   Soft,  non-tender, non-distended   Extremities: No edema.   Skin: No new rashes, lesions, or draining wounds noted on exposed skin.         Lab Results: I have reviewed the following results:  Results from last 7 days   Lab Units 11/05/24  0546 11/04/24  0504 11/03/24  0511   WBC Thousand/uL 6.60 7.20 9.08   HEMOGLOBIN g/dL 8.7* 8.8* 9.8*   PLATELETS Thousands/uL 305 320 356     Results from last 7 days   Lab Units 11/05/24  0546 11/04/24  0504 11/03/24  0511   SODIUM mmol/L 138 136 132*   POTASSIUM mmol/L 3.6 3.6 3.6   CHLORIDE mmol/L 103 101 96   CO2 mmol/L 29 27 29   BUN mg/dL 12 12 11   CREATININE mg/dL 0.33* 0.37* 0.32*   EGFR ml/min/1.73sq m 116 111 117   CALCIUM mg/dL 7.5* 7.7* 7.5*   AST U/L 27 27 25   ALT U/L 16 14 17   ALK PHOS U/L 84 86 93   ALBUMIN g/dL 2.9* 3.1* 3.2*                     Results from last 7 days   Lab Units 11/08/24  0559   FERRITIN ng/mL 184           Imaging Results Review: No pertinent imaging studies reviewed.      Anais Kerr PA-C  [facility]

## 2024-11-08 NOTE — ASSESSMENT & PLAN NOTE
The patient had intermittent fevers for 3 weeks and several admissions to [facility] with fairly unremarkable infectious workup other than positive rhinovirus and COVID. CT imaging was noted to show lymphadenopathy and he had atypical lymphocytes on CBC so hematology/oncology consult was recommended although the patient was discharged on Paxlovid with plan for an outpatient referral.  He then returned to the ED with worsening confusion and return of fever.  CT imaging with worsening axillary lymphadenopathy, CBC differential again showed atypical lymphocytes.  Status post LP 10/25 which showed mild elevation in protein and lymphocytic pleocytosis.  ME panel negative and culture showed no growth. 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  Labs show elevated LDH, ferritin, immunoglobulins. Status post plasmapheresis with improvement in fever curve and initially improvement in mental status, but now with waxing and waning mental status and development of myoclonic jerking as below. S/p lymph node and bone marrow biopsies. Bone marrow biopsy preliminary study demonstrated histiocytic aggregates, which are nonspecific, and can be seen in a variety of settings including infection. Broadened infectious work-up based on risk factors to include TB PCR from CSF (given patient is initially from [place] and traveled to [place] in June), histoplasma urine antigen, brucella antibodies, and mycoplasma antibodies. Mycoplasma antibodies, histoplasma urine antigen, and brucella antibodies negative. TB PCR from CSF is currently pending. Overall, low concern for infectious source given extensive infectious work-up that has remained unremarkable.   -Continue to monitor off antimicrobials  -Follow-up excisional lymph node biopsy until finalized  -Follow-up mTB PCR from CSF  -Ongoing follow-up with hematology/oncology  -Continue to monitor fever  curve/vitals  -Monitor CBCd to continue to closely monitor patient's WBC

## 2024-11-08 NOTE — ASSESSMENT & PLAN NOTE
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  MRI brain without acute intracranial abnormalities.  Status post LP 10/25 with mild lymphocytic pleocytosis and elevated protein.  ME panel and CSF culture negative. Suspect CSF abnormalities related to possible underlying lymphoproliferative disorder.  Status post first session of plasmapheresis 10/26 with improvement in fever curve and mental status initially, but now seems to have worsened.  Repeat CT of the brain nondiagnostic for source on 10/31/2024. Neurology consulted. Concern for toxic-metabolic source and a routine EEG was obtained. It showed nearly continuous myoclonic jerking and concern for an epileptic etiology of myoclonus. However, repeat EEG was performed with an EMG on the limbs and there did not appear to be epileptic correlation. 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 MRI brain negative for acute pathology.  He seemed to improved cognitively although still a bit confused.  -Continue to monitor mental status  -Neurology follow-up and workup

## 2024-11-08 NOTE — PROGRESS NOTES
Progress Note - Hospitalist   Name: Miguel Henderson 84 y.o. male I MRN: [id number]  [facility] I Date of Admission: 10/26/2024   Date of Service: 11/8/2024 I Hospital Day: 13    Assessment & Plan  Encephalopathy  Disussed with family at bedside about the course of the patient's illness.  Patient had fevers on October 7, was diagnosed with viral infection.  Sent home and had rapid decline at home.    At baseline patient is very physical and able to work in the yard and cuts down trees, very sharp handles his finances.  Now patient is baseline very confused according to the family.  Patient's last travel history was to [place] in May of last year  Brother passed away of some sort of leukemia  No dementia history  No previous stroke or traumatic brain.  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    Lyme was negative on 10/21/2024.   Rhinovirus was initially positive on 10/8/2024.  Respiratory panel was redone and was negative.    Peripheral blood smear was done and there was no parasites seen on 10/22/2024.    TSH, ammonia, coma panel unremarkable  CSF was positive for elevated WBC on 10/25/2024, VDRL negative, glucose normal, protein CSF elevated 86, Lyme PCR negative, meningitis encephalitis negative, West Nile negative  Brucella negative 11/01.2024  Mycoplasma negativem histoplasma negative  HIV negative  TB PCR Pending 10/25    Heme onc workup  Labs show an elevated gamma spike on SPEP, elevated IgA, IgG, IgM. Positive free lambda light chain on immunofixation in the urine and plasma. Flow is unremarkable. Elevated beta-2 microglobulin. High normal viscosity.   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    NEURO workup  MRI brain with no significant acute pathology  EEG with no evidence of epileptiform discharges thus no need for AED at this time       Concern noted of etiology of fevers due to possible lymphoma/hyperviscosity   Discussed with Hematology/oncology, and they believe the patient should stay inpatient until his excision bx results are noted   Discussed with Heme onc. BX results seems to have resulted. Appears nonspecific with granulocyte predominance (77%). High degree of necrosis noted  Awaiting recommendations from [facility] oncology  A and O x2.   ARC placement pending. 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   Abnormal LFTs  Elevated bilirubin and alkaline phosphatase generally improving  Suspected to be associated with lymphadenopathy (above)  Limit/avoid hepatotoxins as possible  Hypothyroidism  Continue Synthroid  Primary hypertension  Holding HCTZ due to soft pressures at this time  Fever  Does not appear to be infectious as workup negative thus far  Appreciate ID recommendations - continue to monitor off antibiotics  Monitor temperature  curve  COVID-19 virus infection  Tested positive on 10/21  Currently oxygenating on baseline room air   Contact/airborne precautions now discontinued   Supportive care otherwise  Anemia  Hemoglobin stable at 9.8  Likely associated with lymphadenopathy  Hypocalcemia  Serum Andrion's calcium low -> continue intravenous and oral supplementation  Consider possible contribution to myoclonus  Myoclonia      VTE Pharmacologic Prophylaxis: VTE Score: 5 Moderate Risk (Score 3-4) - Pharmacological DVT Prophylaxis Ordered: heparin.    Mobility:   Basic Mobility Inpatient Raw Score: 11  JH-HLM Goal: 4: Move to chair/commode  JH-HLM Achieved: 4: Move to chair/commode  JH-HLM Goal achieved. Continue to encourage appropriate mobility.    Patient Centered Rounds: I performed bedside rounds with nursing staff today.   Discussions with Specialists or Other Care Team Provider: ID    Education and Discussions with Family / Patient: Updated  (daughter) at bedside.    Current Length of Stay: 13 day(s)  Current Patient Status: Inpatient   Certification Statement: The patient will continue to require additional inpatient hospital stay due to Altered mental status  Discharge Plan: Anticipate discharge in >72 hrs to rehab facility.    Code Status: Level 3 - DNAR and DNI    Subjective   Still confused. Patient is working with PTOT        Objective :  Temp:  [98.5 °F (36.9 °C)-99.6 °F (37.6 °C)] 98.5 °F (36.9 °C)  HR:  [79-99] 79  BP: (107-126)/(61-79) 126/79  Resp:  [15-18] 15  SpO2:  [98 %-99 %] 98 %  O2 Device: None (Room air)    Body mass index is 28.46 kg/m².     Input and Output Summary (last 24 hours):     Intake/Output Summary (Last 24 hours) at 11/8/2024 1048  Last data filed at 11/8/2024 1022  Gross per 24 hour   Intake 3946.67 ml   Output --   Net 3946.67 ml       Physical Exam  Vitals and nursing note reviewed.   Constitutional:       Appearance: He is well-developed and normal weight.   HENT:      Head: Normocephalic  and atraumatic.      Nose: Nose normal.      Mouth/Throat:      Mouth: Mucous membranes are moist.   Eyes:      Conjunctiva/sclera: Conjunctivae normal.   Cardiovascular:      Rate and Rhythm: Normal rate and regular rhythm.      Heart sounds: Normal heart sounds. No murmur heard.  Pulmonary:      Effort: Pulmonary effort is normal. No respiratory distress.      Breath sounds: Normal breath sounds.   Abdominal:      General: Abdomen is flat.      Palpations: Abdomen is soft.      Tenderness: There is no abdominal tenderness.   Musculoskeletal:         General: No swelling.      Cervical back: Neck supple.      Right lower leg: No edema.      Left lower leg: No edema.   Skin:     General: Skin is warm and dry.      Capillary Refill: Capillary refill takes less than 2 seconds.   Neurological:      General: No focal deficit present.      Mental Status: He is alert. Mental status is at baseline. He is disoriented.   Psychiatric:         Mood and Affect: Mood normal.           Lines/Drains:  Lines/Drains/Airways       Active Status       Name Placement date Placement time Site Days    HD Temporary Double Catheter 10/26/24  1451  Internal jugular  12                            Lab Results: I have reviewed the following results:   Results from last 7 days   Lab Units 11/05/24  0546   WBC Thousand/uL 6.60   HEMOGLOBIN g/dL 8.7*   HEMATOCRIT % 26.2*   PLATELETS Thousands/uL 305   SEGS PCT % 26*   LYMPHO PCT % 56*   MONO PCT % 15*   EOS PCT % 1     Results from last 7 days   Lab Units 11/08/24  0559   SODIUM mmol/L 143   POTASSIUM mmol/L 4.2   CHLORIDE mmol/L 106   CO2 mmol/L 25   BUN mg/dL 8   CREATININE mg/dL 0.39*   ANION GAP mmol/L 12   CALCIUM mg/dL 9.0   ALBUMIN g/dL 3.7   TOTAL BILIRUBIN mg/dL 0.88   ALK PHOS U/L 120*   ALT U/L 24   AST U/L 33   GLUCOSE RANDOM mg/dL 95                           Recent Cultures (last 7 days):         Imaging Results Review: I personally reviewed the following image studies/reports in  PACS and discussed pertinent findings with Radiology: CT abdomen/pelvis and CT head. My interpretation of the radiology images/reports is: Normal.  Other Study Results Review: EKG was reviewed.     Last 24 Hours Medication List:     Current Facility-Administered Medications:     acetaminophen (Ofirmev) injection 1,000 mg, Q6H PRN, Last Rate: 1,000 mg (10/28/24 2103)    bisacodyl (DULCOLAX) rectal suppository 10 mg, Once    bisacodyl (DULCOLAX) rectal suppository 10 mg, Daily PRN    calcium carbonate (TUMS) chewable tablet 500 mg, BID    docusate sodium (COLACE) capsule 100 mg, BID    fluticasone (FLONASE) 50 mcg/act nasal spray 1 spray, Daily    heparin (porcine) subcutaneous injection 5,000 Units, Q8H JOE **AND** [COMPLETED] Platelet count, Once    hydrocortisone (ANUSOL-HC) 2.5 % rectal cream, 4x Daily PRN    levothyroxine tablet 50 mcg, Once per day on Monday Tuesday Wednesday Thursday    levothyroxine tablet 75 mcg, Once per day on Sunday Friday Saturday    multi-electrolyte (PLASMALYTE-A/ISOLYTE-S PH 7.4) IV solution, Continuous, Last Rate: 100 mL/hr (11/08/24 1031)    ondansetron (ZOFRAN) injection 4 mg, Q6H PRN    pantoprazole (PROTONIX) EC tablet 40 mg, Early Morning    polyethylene glycol (MIRALAX) packet 17 g, BID    Administrative Statements   Today, Patient Was Seen By: Bunny Pearson MD  I have spent a total time of 60 minutes in caring for this patient on the day of the visit/encounter including Diagnostic results, Prognosis, Risks and benefits of tx options, Instructions for management, Patient and family education, Importance of tx compliance, Risk factor reductions, Impressions, Counseling / Coordination of care, Documenting in the medical record, Reviewing / ordering tests, medicine, procedures  , Obtaining or reviewing history  , and Communicating with other healthcare professionals .    **Please Note: This note may have been constructed using a voice recognition system.**

## 2024-11-08 NOTE — PROGRESS NOTES
Pastoral Care Progress Note             11/08/24 1500   Clinical Encounter Type   Visited With Patient      attempt to visit pt on four occasions and found pt unavailable.  will both pass need for pray to next  and attempt to visit next week.

## 2024-11-08 NOTE — ASSESSMENT & PLAN NOTE
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

## 2024-11-08 NOTE — SEDATION DOCUMENTATION
Bone marrow biopsy performed by Dr Lamb. Procedure tolerated well, VSS. IR Procedure Bedrest Start Time is 1500

## 2024-11-09 LAB
BASOPHILS # BLD AUTO: 0.06 THOUSANDS/ÂΜL (ref 0–0.1)
BASOPHILS NFR BLD AUTO: 1 % (ref 0–1)
EOSINOPHIL # BLD AUTO: 0.29 THOUSAND/ÂΜL (ref 0–0.61)
EOSINOPHIL NFR BLD AUTO: 4 % (ref 0–6)
ERYTHROCYTE [DISTWIDTH] IN BLOOD BY AUTOMATED COUNT: 18.4 % (ref 11.6–15.1)
HCT VFR BLD AUTO: 30.5 % (ref 36.5–49.3)
HGB BLD-MCNC: 9.8 G/DL (ref 12–17)
IMM GRANULOCYTES # BLD AUTO: 0.06 THOUSAND/UL (ref 0–0.2)
IMM GRANULOCYTES NFR BLD AUTO: 1 % (ref 0–2)
LYMPHOCYTES # BLD AUTO: 3.65 THOUSANDS/ÂΜL (ref 0.6–4.47)
LYMPHOCYTES NFR BLD AUTO: 46 % (ref 14–44)
MCH RBC QN AUTO: 30.9 PG (ref 26.8–34.3)
MCHC RBC AUTO-ENTMCNC: 32.1 G/DL (ref 31.4–37.4)
MCV RBC AUTO: 96 FL (ref 82–98)
MONOCYTES # BLD AUTO: 0.98 THOUSAND/ÂΜL (ref 0.17–1.22)
MONOCYTES NFR BLD AUTO: 12 % (ref 4–12)
NEUTROPHILS # BLD AUTO: 2.89 THOUSANDS/ÂΜL (ref 1.85–7.62)
NEUTS SEG NFR BLD AUTO: 36 % (ref 43–75)
NRBC BLD AUTO-RTO: 0 /100 WBCS
PLATELET # BLD AUTO: 293 THOUSANDS/UL (ref 149–390)
PMV BLD AUTO: 9.2 FL (ref 8.9–12.7)
RBC # BLD AUTO: 3.17 MILLION/UL (ref 3.88–5.62)
WBC # BLD AUTO: 7.93 THOUSAND/UL (ref 4.31–10.16)

## 2024-11-09 PROCEDURE — 85025 COMPLETE CBC W/AUTO DIFF WBC: CPT

## 2024-11-09 PROCEDURE — 86611 BARTONELLA ANTIBODY: CPT | Performed by: PHYSICIAN ASSISTANT

## 2024-11-09 PROCEDURE — 99233 SBSQ HOSP IP/OBS HIGH 50: CPT

## 2024-11-09 RX ADMIN — HEPARIN SODIUM 5000 UNITS: 5000 INJECTION INTRAVENOUS; SUBCUTANEOUS at 06:13

## 2024-11-09 RX ADMIN — DOCUSATE SODIUM 100 MG: 100 CAPSULE, LIQUID FILLED ORAL at 17:17

## 2024-11-09 RX ADMIN — HEPARIN SODIUM 5000 UNITS: 5000 INJECTION INTRAVENOUS; SUBCUTANEOUS at 13:19

## 2024-11-09 RX ADMIN — POLYETHYLENE GLYCOL 3350 17 G: 17 POWDER, FOR SOLUTION ORAL at 09:02

## 2024-11-09 RX ADMIN — PANTOPRAZOLE SODIUM 40 MG: 40 TABLET, DELAYED RELEASE ORAL at 06:09

## 2024-11-09 RX ADMIN — DOCUSATE SODIUM 100 MG: 100 CAPSULE, LIQUID FILLED ORAL at 08:56

## 2024-11-09 RX ADMIN — CALCIUM CARBONATE (ANTACID) CHEW TAB 500 MG 500 MG: 500 CHEW TAB at 08:56

## 2024-11-09 RX ADMIN — LEVOTHYROXINE SODIUM 75 MCG: 75 TABLET ORAL at 06:09

## 2024-11-09 RX ADMIN — HEPARIN SODIUM 5000 UNITS: 5000 INJECTION INTRAVENOUS; SUBCUTANEOUS at 21:25

## 2024-11-09 NOTE — ASSESSMENT & PLAN NOTE
Disussed with family at bedside about the course of the patient's illness.  Patient had fevers on October 7, was diagnosed with viral infection.  Sent home and had rapid decline at home.    At baseline patient is very physical and able to work in the yard and cuts down trees, very sharp handles his finances.  Now patient is baseline very confused according to the family.  Patient's last travel history was to [place] in May of last year  Brother passed away of some sort of leukemia  No dementia history  No previous stroke or traumatic brain.  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    Lyme was negative on 10/21/2024.   Rhinovirus was initially positive on 10/8/2024.  Respiratory panel was redone and was negative.    Peripheral blood smear was done and there was no parasites seen on 10/22/2024.    TSH, ammonia, coma panel unremarkable  CSF was positive for elevated WBC on 10/25/2024, VDRL negative, glucose normal, protein CSF elevated 86, Lyme PCR negative, meningitis encephalitis negative, West Nile negative  Brucella negative 11/01.2024  Mycoplasma negativem histoplasma negative  HIV negative  TB PCR Pending 10/25    Heme onc workup  Labs show an elevated gamma spike on SPEP, elevated IgA, IgG, IgM. Positive free lambda light chain on immunofixation in the urine and plasma. Flow is unremarkable. Elevated beta-2 microglobulin. High normal viscosity.   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    NEURO workup  MRI brain with no significant acute pathology  EEG with no evidence of epileptiform discharges thus no need for AED at this time       Concern noted of etiology of fevers due to  possible lymphoma/hyperviscosity   Discussed with Hematology/oncology, and they believe the patient should stay inpatient until his excision bx results are noted   Discussed with Heme onc. BX results seems to have resulted. Appears nonspecific with granulocyte predominance (77%). High degree of necrosis noted  S/p Axillary bx and bone marrow bx on 11/8. Awaiting results  A and O x2.   ARC placement pending. Did consul PMR

## 2024-11-09 NOTE — PROGRESS NOTES
Progress Note - Hospitalist   Name: Miguel Henderson 84 y.o. male I MRN: [id number]  [facility] Date of Admission: 10/26/2024   Date of Service: 11/9/2024 I Hospital Day: 14     Assessment & Plan  Encephalopathy  Disussed with family at bedside about the course of the patient's illness.  Patient had fevers on October 7, was diagnosed with viral infection.  Sent home and had rapid decline at home.    At baseline patient is very physical and able to work in the yard and cuts down trees, very sharp handles his finances.  Now patient is baseline very confused according to the family.  Patient's last travel history was to [place] in May of last year  Brother passed away of some sort of leukemia  No dementia history  No previous stroke or traumatic brain.  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    Lyme was negative on 10/21/2024.   Rhinovirus was initially positive on 10/8/2024.  Respiratory panel was redone and was negative.    Peripheral blood smear was done and there was no parasites seen on 10/22/2024.    TSH, ammonia, coma panel unremarkable  CSF was positive for elevated WBC on 10/25/2024, VDRL negative, glucose normal, protein CSF elevated 86, Lyme PCR negative, meningitis encephalitis negative, West Nile negative  Brucella negative 11/01.2024  Mycoplasma negativem histoplasma negative  HIV negative  TB PCR Pending 10/25    Heme onc workup  Labs show an elevated gamma spike on SPEP, elevated IgA, IgG, IgM. Positive free lambda light chain on immunofixation in the urine and plasma. Flow is unremarkable. Elevated beta-2 microglobulin. High normal viscosity.   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    NEURO workup  MRI brain with no significant acute pathology  EEG with no evidence of epileptiform discharges thus no need for AED at this time       Concern noted of etiology of fevers due to possible lymphoma/hyperviscosity   Discussed with Hematology/oncology, and they believe the patient should stay inpatient until his excision bx results are noted   Discussed with Heme onc. BX results seems to have resulted. Appears nonspecific with granulocyte predominance (77%). High degree of necrosis noted  S/p Axillary bx and bone marrow bx on 11/8. Awaiting results  A and O x2.   ARC placement pending. 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   Abnormal LFTs  Elevated bilirubin and alkaline phosphatase generally improving  Suspected to be associated with lymphadenopathy (above)  Limit/avoid hepatotoxins as possible  Hypothyroidism  Continue Synthroid  Primary hypertension  Holding HCTZ due to soft pressures at this time  Fever  Does not appear to be infectious as workup negative thus far  Appreciate ID recommendations - continue to monitor off antibiotics  Monitor  temperature curve  COVID-19 virus infection  Tested positive on 10/21  Currently oxygenating on baseline room air   Contact/airborne precautions now discontinued   Supportive care otherwise  Anemia  Hemoglobin stable at 9.8  Likely associated with lymphadenopathy  Hypocalcemia  Serum Andrion's calcium low -> continue intravenous and oral supplementation  Consider possible contribution to myoclonus  Myoclonia      VTE Pharmacologic Prophylaxis: VTE Score: 5 Moderate Risk (Score 3-4) - Pharmacological DVT Prophylaxis Ordered: heparin.    Mobility:   Basic Mobility Inpatient Raw Score: 11  JH-HLM Goal: 4: Move to chair/commode  JH-HLM Achieved: 4: Move to chair/commode  JH-HLM Goal achieved. Continue to encourage appropriate mobility.    Patient Centered Rounds: I performed bedside rounds with nursing staff today.   Discussions with Specialists or Other Care Team Provider: ID    Education and Discussions with Family / Patient: Updated  (daughter) at bedside.    Current Length of Stay: 14 day(s)  Current Patient Status: Inpatient   Certification Statement: The patient will continue to require additional inpatient hospital stay due to Altered mental status  Discharge Plan: Anticipate discharge in >72 hrs to rehab facility.    Code Status: Level 3 - DNAR and DNI    Subjective   Still confused. Patient is working with PTOT        Objective :  Temp:  [97.5 °F (36.4 °C)-98.7 °F (37.1 °C)] 98 °F (36.7 °C)  HR:  [70-96] 88  BP: ()/(46-89) 105/60  Resp:  [16-18] 16  SpO2:  [96 %-100 %] 99 %  O2 Device: None (Room air)  Nasal Cannula O2 Flow Rate (L/min):  [2 L/min] 2 L/min    Body mass index is 28.46 kg/m².     Input and Output Summary (last 24 hours):     Intake/Output Summary (Last 24 hours) at 11/9/2024 0950  Last data filed at 11/9/2024 0715  Gross per 24 hour   Intake 3442.67 ml   Output 535 ml   Net 2907.67 ml       Physical Exam  Vitals and nursing note reviewed.   Constitutional:       Appearance: He  is well-developed and normal weight.   HENT:      Head: Normocephalic and atraumatic.      Nose: Nose normal.      Mouth/Throat:      Mouth: Mucous membranes are moist.   Eyes:      Conjunctiva/sclera: Conjunctivae normal.   Cardiovascular:      Rate and Rhythm: Normal rate and regular rhythm.      Heart sounds: Normal heart sounds. No murmur heard.  Pulmonary:      Effort: Pulmonary effort is normal. No respiratory distress.      Breath sounds: Normal breath sounds.   Abdominal:      General: Abdomen is flat.      Palpations: Abdomen is soft.      Tenderness: There is no abdominal tenderness.   Musculoskeletal:         General: No swelling.      Cervical back: Neck supple.      Right lower leg: No edema.      Left lower leg: No edema.   Skin:     General: Skin is warm and dry.      Capillary Refill: Capillary refill takes less than 2 seconds.   Neurological:      General: No focal deficit present.      Mental Status: He is alert. Mental status is at baseline. He is disoriented.   Psychiatric:         Mood and Affect: Mood normal.           Lines/Drains:  Lines/Drains/Airways       Active Status       Name Placement date Placement time Site Days    HD Temporary Double Catheter 10/26/24  1451  Internal jugular  13                            Lab Results: I have reviewed the following results:   Results from last 7 days   Lab Units 11/09/24  0529   WBC Thousand/uL 7.93   HEMOGLOBIN g/dL 9.8*   HEMATOCRIT % 30.5*   PLATELETS Thousands/uL 293   SEGS PCT % 36*   LYMPHO PCT % 46*   MONO PCT % 12   EOS PCT % 4     Results from last 7 days   Lab Units 11/08/24  0559   SODIUM mmol/L 143   POTASSIUM mmol/L 4.2   CHLORIDE mmol/L 106   CO2 mmol/L 25   BUN mg/dL 8   CREATININE mg/dL 0.39*   ANION GAP mmol/L 12   CALCIUM mg/dL 9.0   ALBUMIN g/dL 3.7   TOTAL BILIRUBIN mg/dL 0.88   ALK PHOS U/L 120*   ALT U/L 24   AST U/L 33   GLUCOSE RANDOM mg/dL 95                           Recent Cultures (last 7 days):         Imaging Results  Review: I personally reviewed the following image studies/reports in PACS and discussed pertinent findings with Radiology: CT abdomen/pelvis and CT head. My interpretation of the radiology images/reports is: Normal.  Other Study Results Review: EKG was reviewed.     Last 24 Hours Medication List:     Current Facility-Administered Medications:     acetaminophen (Ofirmev) injection 1,000 mg, Q6H PRN, Last Rate: 1,000 mg (10/28/24 2103)    bisacodyl (DULCOLAX) rectal suppository 10 mg, Once    bisacodyl (DULCOLAX) rectal suppository 10 mg, Daily PRN    calcium carbonate (TUMS) chewable tablet 500 mg, BID    docusate sodium (COLACE) capsule 100 mg, BID    fluticasone (FLONASE) 50 mcg/act nasal spray 1 spray, Daily    heparin (porcine) subcutaneous injection 5,000 Units, Q8H JOE **AND** [COMPLETED] Platelet count, Once    hydrocortisone (ANUSOL-HC) 2.5 % rectal cream, 4x Daily PRN    levothyroxine tablet 50 mcg, Once per day on Monday Tuesday Wednesday Thursday    levothyroxine tablet 75 mcg, Once per day on Sunday Friday Saturday    ondansetron (ZOFRAN) injection 4 mg, Q6H PRN    pantoprazole (PROTONIX) EC tablet 40 mg, Early Morning    polyethylene glycol (MIRALAX) packet 17 g, BID    Administrative Statements   Today, Patient Was Seen By: Bunny Pearson MD  I have spent a total time of 60 minutes in caring for this patient on the day of the visit/encounter including Diagnostic results, Prognosis, Risks and benefits of tx options, Instructions for management, Patient and family education, Importance of tx compliance, Risk factor reductions, Impressions, Counseling / Coordination of care, Documenting in the medical record, Reviewing / ordering tests, medicine, procedures  , Obtaining or reviewing history  , and Communicating with other healthcare professionals .    **Please Note: This note may have been constructed using a voice recognition system.**

## 2024-11-09 NOTE — PLAN OF CARE
Problem: PAIN - ADULT  Goal: Verbalizes/displays adequate comfort level or baseline comfort level  Description: Interventions:  - Encourage patient to monitor pain and request assistance  - Assess pain using appropriate pain scale  - Administer analgesics based on type and severity of pain and evaluate response  - Implement non-pharmacological measures as appropriate and evaluate response  - Consider cultural and social influences on pain and pain management  - Notify physician/advanced practitioner if interventions unsuccessful or patient reports new pain  Outcome: Progressing     Problem: INFECTION - ADULT  Goal: Absence or prevention of progression during hospitalization  Description: INTERVENTIONS:  - Assess and monitor for signs and symptoms of infection  - Monitor lab/diagnostic results  - Monitor all insertion sites, i.e. indwelling lines, tubes, and drains  - Monitor endotracheal if appropriate and nasal secretions for changes in amount and color  - [place] appropriate cooling/warming therapies per order  - Administer medications as ordered  - Instruct and encourage patient and family to use good hand hygiene technique  - Identify and instruct in appropriate isolation precautions for identified infection/condition  Outcome: Progressing

## 2024-11-10 PROCEDURE — 97535 SELF CARE MNGMENT TRAINING: CPT

## 2024-11-10 PROCEDURE — 97112 NEUROMUSCULAR REEDUCATION: CPT

## 2024-11-10 PROCEDURE — 99233 SBSQ HOSP IP/OBS HIGH 50: CPT

## 2024-11-10 PROCEDURE — 97530 THERAPEUTIC ACTIVITIES: CPT

## 2024-11-10 RX ORDER — GABAPENTIN 100 MG/1
100 CAPSULE ORAL
Status: DISCONTINUED | OUTPATIENT
Start: 2024-11-10 | End: 2024-11-19 | Stop reason: HOSPADM

## 2024-11-10 RX ORDER — GABAPENTIN 100 MG/1
100 CAPSULE ORAL
Status: DISCONTINUED | OUTPATIENT
Start: 2024-11-10 | End: 2024-11-10

## 2024-11-10 RX ADMIN — DOCUSATE SODIUM 100 MG: 100 CAPSULE, LIQUID FILLED ORAL at 08:17

## 2024-11-10 RX ADMIN — HYDROCORTISONE: 25 CREAM TOPICAL at 08:18

## 2024-11-10 RX ADMIN — GABAPENTIN 100 MG: 100 CAPSULE ORAL at 18:14

## 2024-11-10 RX ADMIN — CALCIUM CARBONATE (ANTACID) CHEW TAB 500 MG 500 MG: 500 CHEW TAB at 08:17

## 2024-11-10 RX ADMIN — ACETAMINOPHEN 1000 MG: 10 INJECTION INTRAVENOUS at 23:00

## 2024-11-10 RX ADMIN — HEPARIN SODIUM 5000 UNITS: 5000 INJECTION INTRAVENOUS; SUBCUTANEOUS at 05:43

## 2024-11-10 RX ADMIN — HEPARIN SODIUM 5000 UNITS: 5000 INJECTION INTRAVENOUS; SUBCUTANEOUS at 21:06

## 2024-11-10 RX ADMIN — POLYETHYLENE GLYCOL 3350 17 G: 17 POWDER, FOR SOLUTION ORAL at 08:17

## 2024-11-10 RX ADMIN — PANTOPRAZOLE SODIUM 40 MG: 40 TABLET, DELAYED RELEASE ORAL at 05:43

## 2024-11-10 RX ADMIN — LEVOTHYROXINE SODIUM 75 MCG: 75 TABLET ORAL at 05:43

## 2024-11-10 RX ADMIN — HEPARIN SODIUM 5000 UNITS: 5000 INJECTION INTRAVENOUS; SUBCUTANEOUS at 13:28

## 2024-11-10 RX ADMIN — DOCUSATE SODIUM 100 MG: 100 CAPSULE, LIQUID FILLED ORAL at 18:14

## 2024-11-10 NOTE — PLAN OF CARE
Problem: PHYSICAL THERAPY ADULT  Goal: Performs mobility at highest level of function for planned discharge setting.  See evaluation for individualized goals.  Description: Treatment/Interventions: Functional transfer training, LE strengthening/ROM, Elevations, Endurance training, Therapeutic exercise, Patient/family training, Equipment eval/education, Bed mobility, Gait training  Equipment Recommended:  (TBD)       See flowsheet documentation for full assessment, interventions and recommendations.  Outcome: Progressing  Note: Prognosis: Good  Problem List: Decreased endurance, Impaired balance, Decreased mobility, Decreased safety awareness  Assessment: Pt continues to require Ax2 for all functional transfers primarily due to impaired balance, motor planning & cognitive status that impair his ability to participate in tasks as instructed & reduce requisite support to complete all maneuvers safely.  He demonstrates constant R posteriolateral lean while sitting EOB & while upright in recliner, requiring physical assist for balance while EOB & bolstering while in recliner to prevent LOB in both positions.  He demonstrates adequate strength & capacity to participate in all transfers, but is unable to do so safely without significant assist for balance with BUE support vs RW.  Time taken in static stance for pericare after he was observed to be incontinent of bowel & bladder during session, during which time he demonstrated sufficient endurance to maintain standing without complaints.  Pt assisted with repositioning post activity to maintain upright posture & reduce risk for slouching out of recliner.  Alarm on & all needs in reach post session.  PT POC & d/c recommendations remain appropriate at this time to address deficits & reduce burden of care as he continues to recover.  Barriers to Discharge: Decreased caregiver support  Barriers to Discharge Comments: Pt has good family support from spouse, DIL, and  son.  Rehab Resource Intensity Level, PT: II (Moderate Resource Intensity)    See flowsheet documentation for full assessment.

## 2024-11-10 NOTE — PROGRESS NOTES
Progress Note - Hospitalist   Name: Miguel Henderson 84 y.o. male I MRN: [id number]  [facility] Date of Admission: 10/26/2024   Date of Service: 11/10/2024 I Hospital Day: 15     Assessment & Plan  Encephalopathy  Disussed with family at bedside about the course of the patient's illness.  Patient had fevers on October 7, was diagnosed with viral infection.  Sent home and had rapid decline at home.    At baseline patient is very physical and able to work in the yard and cuts down trees, very sharp handles his finances.  Now patient is baseline very confused according to the family.  Patient's last travel history was to [place] in May of last year  Brother passed away of some sort of leukemia  No dementia history  No previous stroke or traumatic brain.  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    Lyme was negative on 10/21/2024.   Rhinovirus was initially positive on 10/8/2024.  Respiratory panel was redone and was negative.    Peripheral blood smear was done and there was no parasites seen on 10/22/2024.    TSH, ammonia, coma panel unremarkable  CSF was positive for elevated WBC on 10/25/2024, VDRL negative, glucose normal, protein CSF elevated 86, Lyme PCR negative, meningitis encephalitis negative, West Nile negative  Brucella negative 11/01.2024  Mycoplasma negativem histoplasma negative  HIV negative  TB PCR Pending 10/25    Heme onc workup  Labs show an elevated gamma spike on SPEP, elevated IgA, IgG, IgM. Positive free lambda light chain on immunofixation in the urine and plasma. Flow is unremarkable. Elevated beta-2 microglobulin. High normal viscosity.   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    NEURO workup  MRI brain with no significant acute pathology  EEG with no evidence of epileptiform discharges thus no need for AED at this time       Concern noted of etiology of fevers due to possible lymphoma/hyperviscosity   Discussed with Hematology/oncology, and they believe the patient should stay inpatient until his excision bx results are noted   Discussed with Heme onc. BX results seems to have resulted. Appears nonspecific with granulocyte predominance (77%). High degree of necrosis noted  S/p Axillary bx and bone marrow bx on 11/8. Awaiting results  A and O x2.   ARC placement pending. 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   Abnormal LFTs  Elevated bilirubin and alkaline phosphatase generally improving  Suspected to be associated with lymphadenopathy (above)  Limit/avoid hepatotoxins as possible  Hypothyroidism  Continue Synthroid  Primary hypertension  Holding HCTZ due to soft pressures at this time  Fever  Does not appear to be infectious as workup negative thus far  Appreciate ID recommendations - continue to monitor off antibiotics  Monitor  temperature curve  COVID-19 virus infection  Tested positive on 10/21  Currently oxygenating on baseline room air   Contact/airborne precautions now discontinued   Supportive care otherwise  Anemia  Hemoglobin stable at 9.8  Likely associated with lymphadenopathy  Hypocalcemia  Serum Andrion's calcium low -> continue intravenous and oral supplementation  Consider possible contribution to myoclonus  Myoclonia      VTE Pharmacologic Prophylaxis: VTE Score: 5 Moderate Risk (Score 3-4) - Pharmacological DVT Prophylaxis Ordered: heparin.    Mobility:   Basic Mobility Inpatient Raw Score: 7  -HLM Goal: 2: Bed activities/Dependent transfer  JH-HLM Achieved: 5: Stand (1 or more minutes)  JH-HLM Goal achieved. Continue to encourage appropriate mobility.    Patient Centered Rounds: I performed bedside rounds with nursing staff today.   Discussions with Specialists or Other Care Team Provider: ID    Education and Discussions with Family / Patient: Updated  (daughter) at bedside.    Current Length of Stay: 15 day(s)  Current Patient Status: Inpatient   Certification Statement: The patient will continue to require additional inpatient hospital stay due to Altered mental status  Discharge Plan: Anticipate discharge in >72 hrs to rehab facility.    Code Status: Level 3 - DNAR and DNI    Subjective   Still confused. Patient is working with PTOT        Objective :  Temp:  [98.4 °F (36.9 °C)-99.6 °F (37.6 °C)] 99.6 °F (37.6 °C)  HR:  [] 97  BP: (106-108)/(67-68) 106/68  Resp:  [14-22] 14  SpO2:  [99 %-100 %] 99 %  O2 Device: None (Room air)    Body mass index is 28.46 kg/m².     Input and Output Summary (last 24 hours):     Intake/Output Summary (Last 24 hours) at 11/10/2024 1447  Last data filed at 11/10/2024 1426  Gross per 24 hour   Intake 240 ml   Output 2550 ml   Net -2310 ml       Physical Exam  Vitals and nursing note reviewed.   Constitutional:       Appearance: He is well-developed and normal weight.    HENT:      Head: Normocephalic and atraumatic.      Nose: Nose normal.      Mouth/Throat:      Mouth: Mucous membranes are moist.   Eyes:      Conjunctiva/sclera: Conjunctivae normal.   Cardiovascular:      Rate and Rhythm: Normal rate and regular rhythm.      Heart sounds: Normal heart sounds. No murmur heard.  Pulmonary:      Effort: Pulmonary effort is normal. No respiratory distress.      Breath sounds: Normal breath sounds.   Abdominal:      General: Abdomen is flat.      Palpations: Abdomen is soft.      Tenderness: There is no abdominal tenderness.   Musculoskeletal:         General: No swelling.      Cervical back: Neck supple.      Right lower leg: No edema.      Left lower leg: No edema.   Skin:     General: Skin is warm and dry.      Capillary Refill: Capillary refill takes less than 2 seconds.   Neurological:      General: No focal deficit present.      Mental Status: He is alert. Mental status is at baseline. He is disoriented.   Psychiatric:         Mood and Affect: Mood normal.           Lines/Drains:  Lines/Drains/Airways       Active Status       Name Placement date Placement time Site Days    HD Temporary Double Catheter 10/26/24  1451  Internal jugular  15                            Lab Results: I have reviewed the following results:   Results from last 7 days   Lab Units 11/09/24  0529   WBC Thousand/uL 7.93   HEMOGLOBIN g/dL 9.8*   HEMATOCRIT % 30.5*   PLATELETS Thousands/uL 293   SEGS PCT % 36*   LYMPHO PCT % 46*   MONO PCT % 12   EOS PCT % 4     Results from last 7 days   Lab Units 11/08/24  0559   SODIUM mmol/L 143   POTASSIUM mmol/L 4.2   CHLORIDE mmol/L 106   CO2 mmol/L 25   BUN mg/dL 8   CREATININE mg/dL 0.39*   ANION GAP mmol/L 12   CALCIUM mg/dL 9.0   ALBUMIN g/dL 3.7   TOTAL BILIRUBIN mg/dL 0.88   ALK PHOS U/L 120*   ALT U/L 24   AST U/L 33   GLUCOSE RANDOM mg/dL 95                           Recent Cultures (last 7 days):         Imaging Results Review: I personally reviewed the  following image studies/reports in PACS and discussed pertinent findings with Radiology: CT abdomen/pelvis and CT head. My interpretation of the radiology images/reports is: Normal.  Other Study Results Review: EKG was reviewed.     Last 24 Hours Medication List:     Current Facility-Administered Medications:     acetaminophen (Ofirmev) injection 1,000 mg, Q6H PRN, Last Rate: 1,000 mg (10/28/24 2103)    bisacodyl (DULCOLAX) rectal suppository 10 mg, Once    bisacodyl (DULCOLAX) rectal suppository 10 mg, Daily PRN    calcium carbonate (TUMS) chewable tablet 500 mg, BID    docusate sodium (COLACE) capsule 100 mg, BID    fluticasone (FLONASE) 50 mcg/act nasal spray 1 spray, Daily    heparin (porcine) subcutaneous injection 5,000 Units, Q8H JOE **AND** [COMPLETED] Platelet count, Once    hydrocortisone (ANUSOL-HC) 2.5 % rectal cream, 4x Daily PRN    levothyroxine tablet 50 mcg, Once per day on Monday Tuesday Wednesday Thursday    levothyroxine tablet 75 mcg, Once per day on Sunday Friday Saturday    ondansetron (ZOFRAN) injection 4 mg, Q6H PRN    pantoprazole (PROTONIX) EC tablet 40 mg, Early Morning    polyethylene glycol (MIRALAX) packet 17 g, BID    Administrative Statements   Today, Patient Was Seen By: Bunny Pearson MD  I have spent a total time of 60 minutes in caring for this patient on the day of the visit/encounter including Diagnostic results, Prognosis, Risks and benefits of tx options, Instructions for management, Patient and family education, Importance of tx compliance, Risk factor reductions, Impressions, Counseling / Coordination of care, Documenting in the medical record, Reviewing / ordering tests, medicine, procedures  , Obtaining or reviewing history  , and Communicating with other healthcare professionals .    **Please Note: This note may have been constructed using a voice recognition system.**

## 2024-11-10 NOTE — PHYSICAL THERAPY NOTE
Physical Therapy Progress Note     11/10/24 1050   PT Last Visit   PT Visit Date 11/10/24   Note Type   Note Type Treatment for insurance authorization   Pain Assessment   Pain Score No Pain   Restrictions/Precautions   Other Precautions Cognitive;Chair Alarm;Bed Alarm;Fall Risk  ([place] catheter)   Subjective   Subjective Pt encountered supine in bed with spouse present initially upon entering the room.  he reports itchiness in low back area, and denies pain when prompted.  Pt talks continuously in [place] during session depsite spouse leaving room & no  present.  He is difficult to direct at times despite simple instructions given in [place] by staff prn.  Pt had urinary & BM incontinence during mobiilty today, but does not report urgency prior to these events.   Bed Mobility   Supine to Sit 2  Maximal assistance   Additional items Assist x 2   Transfers   Sit to Stand 2  Maximal assistance   Additional items Assist x 2   Stand to Sit 2  Maximal assistance   Additional items Assist x 2   Stand pivot 2  Maximal assistance   Additional items Assist x 2   Balance   Static Sitting Poor   Dynamic Sitting Poor   Static Standing Poor   Dynamic Standing Poor -   Activity Tolerance   Activity Tolerance Patient tolerated treatment well   Nurse Made Aware yes   Assessment   Prognosis Good   Problem List Decreased endurance;Impaired balance;Decreased mobility;Decreased safety awareness   Assessment Pt continues to require Ax2 for all functional transfers primarily due to impaired balance, motor planning & cognitive status that impair his ability to participate in tasks as instructed & reduce requisite support to complete all maneuvers safely.  He demonstrates constant R posteriolateral lean while sitting EOB & while upright in recliner, requiring physical assist for balance while EOB & bolstering while in recliner to prevent LOB in both positions.  He demonstrates adequate strength & capacity to participate in all  transfers, but is unable to do so safely without significant assist for balance with BUE support vs RW.  Time taken in static stance for pericare after he was observed to be incontinent of bowel & bladder during session, during which time he demonstrated sufficient endurance to maintain standing without complaints.  Pt assisted with repositioning post activity to maintain upright posture & reduce risk for slouching out of recliner.  Alarm on & all needs in reach post session.  PT POC & d/c recommendations remain appropriate at this time to address deficits & reduce burden of care as he continues to recover.   Goals   Patient Goals none stated due to cog status   STG Expiration Date 11/13/24   PT Treatment Day 3   Plan   Treatment/Interventions Functional transfer training;LE strengthening/ROM;Elevations;Endurance training;Therapeutic exercise;Patient/family training;Equipment eval/education;Bed mobility;Gait training   Progress Progressing toward goals   PT Frequency 3-5x/wk   Discharge Recommendation   Rehab Resource Intensity Level, PT II (Moderate Resource Intensity)   AM-PAC Basic Mobility Inpatient   Turning in Flat Bed Without Bedrails 2   Lying on Back to Sitting on Edge of Flat Bed Without Bedrails 1   Moving Bed to Chair 1   Standing Up From Chair Using Arms 1   Walk in Room 1   Climb 3-5 Stairs With Railing 1   Basic Mobility Inpatient Raw Score 7   Turning Head Towards Sound 3   Follow Simple Instructions 2   Low Function Basic Mobility Raw Score  12   Low Function Basic Mobility Standardized Score  18.33   [facility] Highest Level Of Mobility   -HLM Goal 2: Bed activities/Dependent transfer   JH-HLM Achieved 5: Stand (1 or more minutes)       Edgard Walter PTA    An [facility] Basic Mobility Raw Score less than 17 suggests pt would benefit from post acute rehab.  Please also refer to the recommendation of the Physical Therapist for safe discharge planning.

## 2024-11-10 NOTE — PLAN OF CARE
Problem: OCCUPATIONAL THERAPY ADULT  Goal: Performs self-care activities at highest level of function for planned discharge setting.  See evaluation for individualized goals.  Description: Treatment Interventions: ADL retraining, Functional transfer training, UE strengthening/ROM, Endurance training, Patient/family training, Cognitive reorientation, Equipment evaluation/education, Fine motor coordination activities, Compensatory technique education, Continued evaluation, Energy conservation, Activityengagement          See flowsheet documentation for full assessment, interventions and recommendations.   Outcome: Progressing  Note: Limitation: Decreased ADL status, Decreased cognition, Decreased Safe judgement during ADL, Decreased endurance, Decreased self-care trans, Decreased high-level ADLs  Prognosis: Fair  Assessment: pt participated in am ot session and was seen focusing on dorection followiing, bed mobility, activity tolerence adls and spt's s a.d.  pt remains confused and difficult to redirect to task. reports itching in buttocks and low back. + bm and incontinence which was cleaned . pt with significant r sided push/lean and has poor awareness of same. pt with poor sitting balance at times because of same. pt is max asst adls overall and requires increased time to process and perform tasks. pts wife was present who assisted in transulating minimally, question her understnading of english     Rehab Resource Intensity Level, OT: I (Maximum Resource Intensity)     April A Storm

## 2024-11-10 NOTE — OCCUPATIONAL THERAPY NOTE
Occupational Therapy Treatment Note:      11/10/24 1052   OT Last Visit   OT Visit Date 11/10/24   Note Type   Note Type Treatment   Pain Assessment   Pain Assessment Tool 0-10  (reports of itching on low back and buttocks)   Pain Score No Pain   Restrictions/Precautions   Other Precautions Fall Risk;Bed Alarm;Chair Alarm;Cognitive   ADL   Where Assessed Edge of bed   Grooming Assistance 3  Moderate Assistance   UB Bathing Assistance 3  Moderate Assistance   LB Bathing Assistance 2  Maximal Assistance   UB Dressing Assistance 2  Maximal Assistance   LB Dressing Assistance 2  Maximal Assistance   Toileting Assistance  2  Maximal Assistance   Toileting Comments incontinent of bowel and bladder. [place] catheter fell off (external)   Functional Standing Tolerance   Time poor- balance. difficulty weightshifting. significant r sided lean in sitting and standing, limited initiaition and motorplanning, often does opposite of what is requested of him   Bed Mobility   Supine to Sit 2  Maximal assistance   Additional items Assist x 2   Transfers   Sit to Stand 2  Maximal assistance   Additional items Assist x 2   Stand to Sit 2  Maximal assistance   Additional items Assist x 2   Stand pivot 2  Maximal assistance   Additional items Assist x 2  (much increased time and asst to weightshift)   Cognition   Overall Cognitive Status Impaired   Arousal/Participation Cooperative   Attention Attends with cues to redirect   Memory Decreased recall of precautions;Decreased recall of recent events;Decreased short term memory   Following Commands Follows one step commands with increased time or repetition   Comments pt was repeatedly c/o itching in back and buttocks   Activity Tolerance   Activity Tolerance Patient tolerated treatment well   Assessment   Assessment pt participated in am ot session and was seen focusing on dorection followiing, bed mobility, activity tolerence adls and spt's s a.d.  pt remains confused and difficult to  redirect to task. reports itching in buttocks and low back. + bm and incontinence which was cleaned . pt with significant r sided push/lean and has poor awareness of same. pt with poor sitting balance at times because of same. pt is max asst adls overall and requires increased time to process and perform tasks. pts wife was present who assisted in transulating minimally, question her understnading of english   Plan   Treatment Interventions Functional transfer training;Endurance training;Patient/family training;Cognitive reorientation;Equipment evaluation/education;Activityengagement   Goal Expiration Date 11/13/24   OT Treatment Day 3   OT Frequency 2-3x/wk   Discharge Recommendation   Rehab Resource Intensity Level, OT I (Maximum Resource Intensity)   AM-PAC Daily Activity Inpatient   Lower Body Dressing 2   Bathing 2   Toileting 2   Upper Body Dressing 2   Grooming 2   Eating 3   Daily Activity Raw Score 13   Daily Activity Standardized Score (Calc for Raw Score >=11) 32.03   AM-PAC Applied Cognition Inpatient   Following a Speech/Presentation 2   Understanding Ordinary Conversation 3   Taking Medications 2   Remembering Where Things Are Placed or Put Away 2   Remembering List of 4-5 Errands 2   Taking Care of Complicated Tasks 2   Applied Cognition Raw Score 13   Applied Cognition Standardized Score 30.46     April A Storm

## 2024-11-10 NOTE — PLAN OF CARE
Problem: PAIN - ADULT  Goal: Verbalizes/displays adequate comfort level or baseline comfort level  Description: Interventions:  - Encourage patient to monitor pain and request assistance  - Assess pain using appropriate pain scale  - Administer analgesics based on type and severity of pain and evaluate response  - Implement non-pharmacological measures as appropriate and evaluate response  - Consider cultural and social influences on pain and pain management  - Notify physician/advanced practitioner if interventions unsuccessful or patient reports new pain  Outcome: Progressing     Problem: INFECTION - ADULT  Goal: Absence or prevention of progression during hospitalization  Description: INTERVENTIONS:  - Assess and monitor for signs and symptoms of infection  - Monitor lab/diagnostic results  - Monitor all insertion sites, i.e. indwelling lines, tubes, and drains  - Monitor endotracheal if appropriate and nasal secretions for changes in amount and color  - [place] appropriate cooling/warming therapies per order  - Administer medications as ordered  - Instruct and encourage patient and family to use good hand hygiene technique  - Identify and instruct in appropriate isolation precautions for identified infection/condition  Outcome: Progressing

## 2024-11-11 ENCOUNTER — APPOINTMENT (INPATIENT)
Dept: NON INVASIVE DIAGNOSTICS | Facility: HOSPITAL | Age: 84
DRG: 823 | End: 2024-11-11
Payer: COMMERCIAL

## 2024-11-11 ENCOUNTER — APPOINTMENT (INPATIENT)
Dept: RADIOLOGY | Facility: HOSPITAL | Age: 84
DRG: 823 | End: 2024-11-11
Payer: COMMERCIAL

## 2024-11-11 PROBLEM — A41.9 SEPSIS (HCC): Status: ACTIVE | Noted: 2024-10-26

## 2024-11-11 LAB
ANION GAP SERPL CALCULATED.3IONS-SCNC: 7 MMOL/L (ref 4–13)
ATRIAL RATE: 128 BPM
BUN SERPL-MCNC: 13 MG/DL (ref 5–25)
CALCIUM SERPL-MCNC: 8.7 MG/DL (ref 8.4–10.2)
CHLORIDE SERPL-SCNC: 100 MMOL/L (ref 96–108)
CO2 SERPL-SCNC: 28 MMOL/L (ref 21–32)
CREAT SERPL-MCNC: 0.5 MG/DL (ref 0.6–1.3)
ERYTHROCYTE [DISTWIDTH] IN BLOOD BY AUTOMATED COUNT: 18.3 % (ref 11.6–15.1)
GFR SERPL CREATININE-BSD FRML MDRD: 98 ML/MIN/1.73SQ M
GLUCOSE SERPL-MCNC: 119 MG/DL (ref 65–140)
HCT VFR BLD AUTO: 32.6 % (ref 36.5–49.3)
HGB BLD-MCNC: 10.3 G/DL (ref 12–17)
LACTATE SERPL-SCNC: 1.4 MMOL/L (ref 0.5–2)
LACTATE SERPL-SCNC: 2.5 MMOL/L (ref 0.5–2)
MCH RBC QN AUTO: 30.2 PG (ref 26.8–34.3)
MCHC RBC AUTO-ENTMCNC: 31.6 G/DL (ref 31.4–37.4)
MCV RBC AUTO: 96 FL (ref 82–98)
P AXIS: 30 DEGREES
PLATELET # BLD AUTO: 284 THOUSANDS/UL (ref 149–390)
PMV BLD AUTO: 8.7 FL (ref 8.9–12.7)
POTASSIUM SERPL-SCNC: 3.9 MMOL/L (ref 3.5–5.3)
PR INTERVAL: 154 MS
PROCALCITONIN SERPL-MCNC: 0.07 NG/ML
QRS AXIS: -84 DEGREES
QRSD INTERVAL: 76 MS
QT INTERVAL: 312 MS
QTC INTERVAL: 456 MS
RBC # BLD AUTO: 3.41 MILLION/UL (ref 3.88–5.62)
SODIUM SERPL-SCNC: 135 MMOL/L (ref 135–147)
T WAVE AXIS: 63 DEGREES
VENTRICULAR RATE: 128 BPM
WBC # BLD AUTO: 8.29 THOUSAND/UL (ref 4.31–10.16)

## 2024-11-11 PROCEDURE — 99223 1ST HOSP IP/OBS HIGH 75: CPT

## 2024-11-11 PROCEDURE — 99233 SBSQ HOSP IP/OBS HIGH 50: CPT

## 2024-11-11 PROCEDURE — 83070 ASSAY OF HEMOSIDERIN QUAL: CPT | Performed by: STUDENT IN AN ORGANIZED HEALTH CARE EDUCATION/TRAINING PROGRAM

## 2024-11-11 PROCEDURE — 93005 ELECTROCARDIOGRAM TRACING: CPT

## 2024-11-11 PROCEDURE — 83605 ASSAY OF LACTIC ACID: CPT

## 2024-11-11 PROCEDURE — 71045 X-RAY EXAM CHEST 1 VIEW: CPT

## 2024-11-11 PROCEDURE — 93971 EXTREMITY STUDY: CPT

## 2024-11-11 PROCEDURE — 85027 COMPLETE CBC AUTOMATED: CPT

## 2024-11-11 PROCEDURE — 84145 PROCALCITONIN (PCT): CPT

## 2024-11-11 PROCEDURE — 87040 BLOOD CULTURE FOR BACTERIA: CPT

## 2024-11-11 PROCEDURE — 93010 ELECTROCARDIOGRAM REPORT: CPT | Performed by: INTERNAL MEDICINE

## 2024-11-11 PROCEDURE — 80048 BASIC METABOLIC PNL TOTAL CA: CPT

## 2024-11-11 PROCEDURE — 99232 SBSQ HOSP IP/OBS MODERATE 35: CPT | Performed by: INTERNAL MEDICINE

## 2024-11-11 RX ORDER — HYDROXYZINE HYDROCHLORIDE 25 MG/1
25 TABLET, FILM COATED ORAL EVERY 6 HOURS PRN
Status: DISCONTINUED | OUTPATIENT
Start: 2024-11-11 | End: 2024-11-11

## 2024-11-11 RX ORDER — HYDROXYZINE HYDROCHLORIDE 10 MG/1
10 TABLET, FILM COATED ORAL EVERY 6 HOURS PRN
Status: DISCONTINUED | OUTPATIENT
Start: 2024-11-11 | End: 2024-11-17

## 2024-11-11 RX ORDER — BISACODYL 10 MG
10 SUPPOSITORY, RECTAL RECTAL 3 TIMES DAILY
Status: DISCONTINUED | OUTPATIENT
Start: 2024-11-11 | End: 2024-11-11

## 2024-11-11 RX ORDER — SODIUM CHLORIDE, SODIUM GLUCONATE, SODIUM ACETATE, POTASSIUM CHLORIDE, MAGNESIUM CHLORIDE, SODIUM PHOSPHATE, DIBASIC, AND POTASSIUM PHOSPHATE .53; .5; .37; .037; .03; .012; .00082 G/100ML; G/100ML; G/100ML; G/100ML; G/100ML; G/100ML; G/100ML
100 INJECTION, SOLUTION INTRAVENOUS CONTINUOUS
Status: DISCONTINUED | OUTPATIENT
Start: 2024-11-11 | End: 2024-11-15

## 2024-11-11 RX ORDER — HYDROCORTISONE 25 MG/G
CREAM TOPICAL 3 TIMES DAILY
Status: DISCONTINUED | OUTPATIENT
Start: 2024-11-11 | End: 2024-11-18

## 2024-11-11 RX ORDER — VANCOMYCIN HYDROCHLORIDE 1 G/200ML
1000 INJECTION, SOLUTION INTRAVENOUS EVERY 12 HOURS
Status: DISCONTINUED | OUTPATIENT
Start: 2024-11-11 | End: 2024-11-13

## 2024-11-11 RX ORDER — BISACODYL 10 MG
10 SUPPOSITORY, RECTAL RECTAL DAILY PRN
Status: DISCONTINUED | OUTPATIENT
Start: 2024-11-11 | End: 2024-11-19 | Stop reason: HOSPADM

## 2024-11-11 RX ORDER — KETOROLAC TROMETHAMINE 30 MG/ML
30 INJECTION, SOLUTION INTRAMUSCULAR; INTRAVENOUS EVERY 8 HOURS
Status: DISPENSED | OUTPATIENT
Start: 2024-11-11 | End: 2024-11-14

## 2024-11-11 RX ORDER — OXYCODONE HYDROCHLORIDE 5 MG/1
5 TABLET ORAL EVERY 6 HOURS PRN
Status: DISCONTINUED | OUTPATIENT
Start: 2024-11-11 | End: 2024-11-19 | Stop reason: HOSPADM

## 2024-11-11 RX ORDER — MICONAZOLE NITRATE 20 MG/G
CREAM TOPICAL 2 TIMES DAILY
Status: DISCONTINUED | OUTPATIENT
Start: 2024-11-11 | End: 2024-11-19 | Stop reason: HOSPADM

## 2024-11-11 RX ADMIN — VANCOMYCIN HYDROCHLORIDE 2000 MG: 10 INJECTION, POWDER, LYOPHILIZED, FOR SOLUTION INTRAVENOUS at 11:59

## 2024-11-11 RX ADMIN — KETOROLAC TROMETHAMINE 30 MG: 30 INJECTION, SOLUTION INTRAMUSCULAR; INTRAVENOUS at 10:35

## 2024-11-11 RX ADMIN — PANTOPRAZOLE SODIUM 40 MG: 40 TABLET, DELAYED RELEASE ORAL at 05:00

## 2024-11-11 RX ADMIN — SODIUM CHLORIDE, SODIUM GLUCONATE, SODIUM ACETATE, POTASSIUM CHLORIDE, MAGNESIUM CHLORIDE, SODIUM PHOSPHATE, DIBASIC, AND POTASSIUM PHOSPHATE 100 ML/HR: .53; .5; .37; .037; .03; .012; .00082 INJECTION, SOLUTION INTRAVENOUS at 22:30

## 2024-11-11 RX ADMIN — ACETAMINOPHEN 1000 MG: 10 INJECTION INTRAVENOUS at 22:42

## 2024-11-11 RX ADMIN — SODIUM CHLORIDE, SODIUM GLUCONATE, SODIUM ACETATE, POTASSIUM CHLORIDE, MAGNESIUM CHLORIDE, SODIUM PHOSPHATE, DIBASIC, AND POTASSIUM PHOSPHATE 100 ML/HR: .53; .5; .37; .037; .03; .012; .00082 INJECTION, SOLUTION INTRAVENOUS at 12:07

## 2024-11-11 RX ADMIN — POLYETHYLENE GLYCOL 3350 17 G: 17 POWDER, FOR SOLUTION ORAL at 08:19

## 2024-11-11 RX ADMIN — ACETAMINOPHEN 1000 MG: 10 INJECTION INTRAVENOUS at 16:40

## 2024-11-11 RX ADMIN — HYDROXYZINE HYDROCHLORIDE 10 MG: 10 TABLET, FILM COATED ORAL at 17:32

## 2024-11-11 RX ADMIN — HEPARIN SODIUM 5000 UNITS: 5000 INJECTION INTRAVENOUS; SUBCUTANEOUS at 05:01

## 2024-11-11 RX ADMIN — CALCIUM CARBONATE (ANTACID) CHEW TAB 500 MG 500 MG: 500 CHEW TAB at 08:19

## 2024-11-11 RX ADMIN — CEFEPIME 2000 MG: 2 INJECTION, POWDER, FOR SOLUTION INTRAVENOUS at 17:25

## 2024-11-11 RX ADMIN — GABAPENTIN 100 MG: 100 CAPSULE ORAL at 21:11

## 2024-11-11 RX ADMIN — HYDROCORTISONE: 25 CREAM TOPICAL at 21:12

## 2024-11-11 RX ADMIN — DOCUSATE SODIUM 100 MG: 100 CAPSULE, LIQUID FILLED ORAL at 17:25

## 2024-11-11 RX ADMIN — KETOROLAC TROMETHAMINE 30 MG: 30 INJECTION, SOLUTION INTRAMUSCULAR; INTRAVENOUS at 17:32

## 2024-11-11 RX ADMIN — HYDROXYZINE HYDROCHLORIDE 25 MG: 25 TABLET, FILM COATED ORAL at 10:36

## 2024-11-11 RX ADMIN — SODIUM CHLORIDE, SODIUM GLUCONATE, SODIUM ACETATE, POTASSIUM CHLORIDE, MAGNESIUM CHLORIDE, SODIUM PHOSPHATE, DIBASIC, AND POTASSIUM PHOSPHATE 100 ML/HR: .53; .5; .37; .037; .03; .012; .00082 INJECTION, SOLUTION INTRAVENOUS at 10:32

## 2024-11-11 RX ADMIN — DOCUSATE SODIUM 100 MG: 100 CAPSULE, LIQUID FILLED ORAL at 08:19

## 2024-11-11 RX ADMIN — HYDROCORTISONE: 25 CREAM TOPICAL at 17:25

## 2024-11-11 RX ADMIN — ACETAMINOPHEN 1000 MG: 10 INJECTION INTRAVENOUS at 10:18

## 2024-11-11 RX ADMIN — LEVOTHYROXINE SODIUM 50 MCG: 75 TABLET ORAL at 05:00

## 2024-11-11 RX ADMIN — CALCIUM CARBONATE (ANTACID) CHEW TAB 500 MG 500 MG: 500 CHEW TAB at 21:12

## 2024-11-11 RX ADMIN — HEPARIN SODIUM 5000 UNITS: 5000 INJECTION INTRAVENOUS; SUBCUTANEOUS at 21:11

## 2024-11-11 RX ADMIN — HEPARIN SODIUM 5000 UNITS: 5000 INJECTION INTRAVENOUS; SUBCUTANEOUS at 13:37

## 2024-11-11 NOTE — ASSESSMENT & PLAN NOTE
The patient had intermittent fevers for 3 weeks and several admissions to [facility] with fairly unremarkable infectious workup other than positive rhinovirus and COVID. CT imaging was noted to show lymphadenopathy and he had atypical lymphocytes on CBC so hematology/oncology consult was recommended although the patient was discharged on Paxlovid with plan for an outpatient referral.  He then returned to the ED with worsening confusion and return of fever.  CT imaging with worsening axillary lymphadenopathy, CBC differential again showed atypical lymphocytes.  Status post LP 10/25 which showed mild elevation in protein and lymphocytic pleocytosis.  ME panel negative and culture showed no growth. 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  Labs show elevated LDH, ferritin, immunoglobulins. Status post plasmapheresis with improvement in fever curve and initially improvement in mental status, but now with waxing and waning mental status and development of myoclonic jerking as below. S/p lymph node and bone marrow biopsies. Bone marrow biopsy preliminary study demonstrated histiocytic aggregates, which are nonspecific, and can be seen in a variety of settings including infection. Broadened infectious work-up based on risk factors to include TB PCR from CSF (given patient is initially from [place] and traveled to [place] in June), histoplasma urine antigen, brucella antibodies, and mycoplasma antibodies. Mycoplasma antibodies, histoplasma urine antigen, and brucella antibodies negative. TB PCR from CSF is currently pending.  Patient had been without a fever for quite some time but now began spiking fever on 11/10/2024 with some chills.  -Continue to monitor off antimicrobials  -Recheck blood cultures x 2 sets  -If patient would become more toxic or unstable would begin the patient on vancomycin and cefepime  -Follow-up repeat bone marrow  biopsy  -Follow-up repeat excisional lymph node biopsy   -Follow-up mTB PCR from CSF  -Ongoing follow-up with hematology/oncology  -Continue to monitor fever curve/vitals  -Monitor CBCd to continue to closely monitor patient's WBC

## 2024-11-11 NOTE — ASSESSMENT & PLAN NOTE
During prior admission imaging showed enlarged periaortic, iliac chain and inguinal lymph nodes.  Repeat imaging showed persistent lymphadenopathy with worsened right axillary adenopathy.  As above, concern for an underlying lymphoproliferative disorder.  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  CBC differential also shows atypical lymphocytes.  Status post plasmapheresis 10/26. HIV screening negative. S/p lymph node and bone marrow biopsies. Lymph node biopsy overall findings are atypical and suspicious for a lymphoproliferative disorder. However, the tissue was too scant to further characterize the underlying process. Therefore, patient underwent an excisional biopsy with surgical oncology on 11/3 which was nondiagnostic.  He underwent repeat lymph node biopsy on 11/8/2024 the results of which are pending.  -Hematology/oncology following  -Follow-up pathology from excisional biopsy  -Additional infectious work-up as above

## 2024-11-11 NOTE — ASSESSMENT & PLAN NOTE
Disussed with family at bedside about the course of the patient's illness.  Patient had fevers on October 7, was diagnosed with viral infection.  Sent home and had rapid decline at home.    At baseline patient is very physical and able to work in the yard and cuts down trees, very sharp handles his finances.  Now patient is baseline very confused according to the family.  Patient's last travel history was to [place] in May of last year  Brother passed away of some sort of leukemia  No dementia history  No previous stroke or traumatic brain.  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    Lyme was negative on 10/21/2024.   Rhinovirus was initially positive on 10/8/2024.  Respiratory panel was redone and was negative.    Peripheral blood smear was done and there was no parasites seen on 10/22/2024.    TSH, ammonia, coma panel unremarkable  CSF was positive for elevated WBC on 10/25/2024, VDRL negative, glucose normal, protein CSF elevated 86, Lyme PCR negative, meningitis encephalitis negative, West Nile negative  Brucella negative 11/01.2024  Mycoplasma negativem histoplasma negative  HIV negative  TB PCR Pending 10/25    Heme onc workup  Labs show an elevated gamma spike on SPEP, elevated IgA, IgG, IgM. Positive free lambda light chain on immunofixation in the urine and plasma. Flow is unremarkable. Elevated beta-2 microglobulin. High normal viscosity.   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    NEURO workup  MRI brain with no significant acute pathology  EEG with no evidence of epileptiform discharges thus no need for AED at this time       Concern noted of etiology of fevers due to  possible lymphoma/hyperviscosity   Discussed with Hematology/oncology, and they believe the patient should stay inpatient until his excision bx results are noted   Discussed with Heme onc. BX results seems to have resulted. Appears nonspecific with granulocyte predominance (77%). High degree of necrosis noted  S/p Axillary bx and bone marrow bx on 11/8. Awaiting results  A and O x2.   Pending ARC placement: Arc physician evaluated today 11/11/2024. They said that in order to place to arc they need a chemo plan.Will need to reach out to them after getting biopsy results and having treatment plan

## 2024-11-11 NOTE — ASSESSMENT & PLAN NOTE
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

## 2024-11-11 NOTE — PROGRESS NOTES
Miguel Henderson is a 84 y.o. male who is currently ordered Vancomycin IV with management by the Pharmacy Consult service.  Relevant clinical data and objective / subjective history reviewed.  Vancomycin Assessment:  Indication and Goal AUC/Trough: Soft tissue (goal -600, trough >10), -600, trough >10  Clinical Status: stable  Micro:     Renal Function:  SCr: 0.5 mg/dL  CrCl: 100 mL/min  Renal replacement: Not on dialysis  Days of Therapy: 1  Current Dose: 750 q8  Vancomycin Plan:  New Dosingm q12  Estimated AUC: 424 mcg*hr/mL  Estimated Trough: 10.9 mcg/mL  Next Level: 11-13  Renal Function Monitoring: Daily BMP and UOP  Pharmacy will continue to follow closely for s/sx of nephrotoxicity, infusion reactions and appropriateness of therapy.  BMP and CBC will be ordered per protocol. We will continue to follow the patient’s culture results and clinical progress daily.    Carlos Tavares, Pharmacist

## 2024-11-11 NOTE — PLAN OF CARE
Problem: PAIN - ADULT  Goal: Verbalizes/displays adequate comfort level or baseline comfort level  Description: Interventions:  - Encourage patient to monitor pain and request assistance  - Assess pain using appropriate pain scale  - Administer analgesics based on type and severity of pain and evaluate response  - Implement non-pharmacological measures as appropriate and evaluate response  - Consider cultural and social influences on pain and pain management  - Notify physician/advanced practitioner if interventions unsuccessful or patient reports new pain  Outcome: Progressing     Problem: INFECTION - ADULT  Goal: Absence or prevention of progression during hospitalization  Description: INTERVENTIONS:  - Assess and monitor for signs and symptoms of infection  - Monitor lab/diagnostic results  - Monitor all insertion sites, i.e. indwelling lines, tubes, and drains  - Monitor endotracheal if appropriate and nasal secretions for changes in amount and color  - [place] appropriate cooling/warming therapies per order  - Administer medications as ordered  - Instruct and encourage patient and family to use good hand hygiene technique  - Identify and instruct in appropriate isolation precautions for identified infection/condition  Outcome: Progressing

## 2024-11-11 NOTE — ASSESSMENT & PLAN NOTE
Today 11/11/2024 patient had sepsis criteria.  Fever and tachycardia and rigors  Also noted to have a cellulitis like picture in the right arm.  We did karen its borders  Ultrasound done and thrombophlebitis was noted in the right arm.    Follow cultures from 11/11/2024  X-ray was unremarkable.  Patient was having some shortness of breath.  Likely due to anxiety during that time  Did do Atarax per family request for anxiety.  Continue IV fluids for now.  Continue to monitor  Patient has diffuse bodyaches while moving around.  Started Toradol  11/11/2024 for a few days.  Creatinine is stable.  ID on board, thank you for recommendations.  Continue vancomycin.  Go up on this if patient has worsening sepsis.  No other clear source for now the cellulitis.

## 2024-11-11 NOTE — PROGRESS NOTES
Progress Note - Infectious Disease   Name: Miguel Henderson 84 y.o. male I MRN: [id number]  Unit/Bed#: [facility] 817-01 I Date of Admission: 10/26/2024   Date of Service: 11/11/2024 I Hospital Day: 16    Assessment & Plan  Fever  The patient had intermittent fevers for 3 weeks and several admissions to [facility] with fairly unremarkable infectious workup other than positive rhinovirus and COVID. CT imaging was noted to show lymphadenopathy and he had atypical lymphocytes on CBC so hematology/oncology consult was recommended although the patient was discharged on Paxlovid with plan for an outpatient referral.  He then returned to the ED with worsening confusion and return of fever.  CT imaging with worsening axillary lymphadenopathy, CBC differential again showed atypical lymphocytes.  Status post LP 10/25 which showed mild elevation in protein and lymphocytic pleocytosis.  ME panel negative and culture showed no growth. 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  Labs show elevated LDH, ferritin, immunoglobulins. Status post plasmapheresis with improvement in fever curve and initially improvement in mental status, but now with waxing and waning mental status and development of myoclonic jerking as below. S/p lymph node and bone marrow biopsies. Bone marrow biopsy preliminary study demonstrated histiocytic aggregates, which are nonspecific, and can be seen in a variety of settings including infection. Broadened infectious work-up based on risk factors to include TB PCR from CSF (given patient is initially from [place] and traveled to [place] in June), histoplasma urine antigen, brucella antibodies, and mycoplasma antibodies. Mycoplasma antibodies, histoplasma urine antigen, and brucella antibodies negative. TB PCR from CSF is currently pending.  Patient had been without a fever for quite some time but now began spiking fever on 11/10/2024 with  some chills.  -Continue to monitor off antimicrobials  -Recheck blood cultures x 2 sets  -If patient would become more toxic or unstable would begin the patient on vancomycin and cefepime  -Follow-up repeat bone marrow biopsy  -Follow-up repeat excisional lymph node biopsy   -Follow-up mTB PCR from CSF  -Ongoing follow-up with hematology/oncology  -Continue to monitor fever curve/vitals  -Monitor CBCd to continue to closely monitor patient's WBC  Encephalopathy  Oncology initially with concern for hyperviscosity syndrome.  MRI brain without acute intracranial abnormalities.  Status post LP 10/25 with mild lymphocytic pleocytosis and elevated protein.  ME panel and CSF culture negative. Suspect CSF abnormalities related to possible underlying lymphoproliferative disorder.  Status post first session of plasmapheresis 10/26 with improvement in fever curve and mental status initially, but now seems to have worsened.  Repeat CT of the brain nondiagnostic for source on 10/31/2024. Neurology consulted. Concern for toxic-metabolic source and a routine EEG was obtained. It showed nearly continuous myoclonic jerking and concern for an epileptic etiology of myoclonus. However, repeat EEG was performed with an EMG on the limbs and there did not appear to be epileptic correlation. 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 MRI brain negative for acute pathology.  He is much improved cognitively although still a bit confused.  -Continue to monitor mental status  -Neurology follow-up and workup  Lymphadenopathy  During prior admission imaging showed enlarged periaortic, iliac chain and inguinal lymph nodes.  Repeat imaging showed persistent lymphadenopathy with worsened right axillary adenopathy.  As above, concern for an underlying lymphoproliferative disorder.  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  CBC differential also shows atypical lymphocytes.  Status post plasmapheresis 10/26.  HIV screening negative. S/p lymph node and bone marrow biopsies. Lymph node biopsy overall findings are atypical and suspicious for a lymphoproliferative disorder. However, the tissue was too scant to further characterize the underlying process. Therefore, patient underwent an excisional biopsy with surgical oncology on 11/3 which was nondiagnostic.  He underwent repeat lymph node biopsy on 11/8/2024 the results of which are pending.  -Hematology/oncology following  -Follow-up pathology from excisional biopsy  -Additional infectious work-up as above  COVID-19 virus infection  Isolation discontinued on 11/1/2024  Myoclonia  Development of intermittent twitching/myoclonic jerking on 10/31. Neurology consulted. Concern for toxic-metabolic source and a routine EEG was obtained. It showed nearly continuous myoclonic jerking and concern for an epileptic etiology of myoclonus. Patient started on Ativan and Keppra. However, repeat EEG was performed with an EMG on the limbs and there did not appear to be epileptic correlation. 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 MRI brain negative for acute pathology.  He seems improved.  -Appreciate neurology workup and follow-up    I have discussed with with the primary service the above plan to obtain blood cultures x 2 sets and monitor closely off antibiotics.  The primary service agrees with the plan.    Antibiotics:  None    Subjective   Now having fever and shaking chills; no nausea, vomiting, diarrhea; no cough, shortness of breath; no pain. No other new symptoms.    Objective :  Temp:  [99 °F (37.2 °C)-100.9 °F (38.3 °C)] 99 °F (37.2 °C)  HR:  [] 103  BP: (100-123)/(64-88) 123/88  Resp:  [18-22] 18  SpO2:  [96 %-99 %] 96 %  O2 Device: None (Room air)    General: Having shaking chills  Psychiatric:  Awake and alert  Pulmonary:  Normal respiratory excursion without accessory muscle use  Abdomen:  Soft, nontender  Extremities:  No  edema  Skin:  No rashes.  Right subclavian plasmapheresis catheter in place without erythema or drainage.      Lab Results: I have reviewed the following results:  Results from last 7 days   Lab Units 11/11/24  0608 11/09/24  0529 11/05/24  0546   WBC Thousand/uL 8.29 7.93 6.60   HEMOGLOBIN g/dL 10.3* 9.8* 8.7*   PLATELETS Thousands/uL 284 293 305     Results from last 7 days   Lab Units 11/11/24  0608 11/08/24  0559 11/05/24  0546   SODIUM mmol/L 135 143 138   POTASSIUM mmol/L 3.9 4.2 3.6   CHLORIDE mmol/L 100 106 103   CO2 mmol/L 28 25 29   BUN mg/dL 13 8 12   CREATININE mg/dL 0.50* 0.39* 0.33*   EGFR ml/min/1.73sq m 98 108 116   CALCIUM mg/dL 8.7 9.0 7.5*   AST U/L  --  33 27   ALT U/L  --  24 16   ALK PHOS U/L  --  120* 84   ALBUMIN g/dL  --  3.7 2.9*                 Results from last 7 days   Lab Units 11/08/24  0559   FERRITIN ng/mL 184

## 2024-11-11 NOTE — PROGRESS NOTES
Progress Note - Hospitalist   Name: Miguel Henderson 84 y.o. male I MRN: [id number]  [facility] Date of Admission: 10/26/2024   Date of Service: 11/11/2024 I Hospital Day: 16     Assessment & Plan  Encephalopathy  Disussed with family at bedside about the course of the patient's illness.  Patient had fevers on October 7, was diagnosed with viral infection.  Sent home and had rapid decline at home.    At baseline patient is very physical and able to work in the yard and cuts down trees, very sharp handles his finances.  Now patient is baseline very confused according to the family.  Patient's last travel history was to [place] in May of last year  Brother passed away of some sort of leukemia  No dementia history  No previous stroke or traumatic brain.  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    Lyme was negative on 10/21/2024.   Rhinovirus was initially positive on 10/8/2024.  Respiratory panel was redone and was negative.    Peripheral blood smear was done and there was no parasites seen on 10/22/2024.    TSH, ammonia, coma panel unremarkable  CSF was positive for elevated WBC on 10/25/2024, VDRL negative, glucose normal, protein CSF elevated 86, Lyme PCR negative, meningitis encephalitis negative, West Nile negative  Brucella negative 11/01.2024  Mycoplasma negativem histoplasma negative  HIV negative  TB PCR Pending 10/25    Heme onc workup  Labs show an elevated gamma spike on SPEP, elevated IgA, IgG, IgM. Positive free lambda light chain on immunofixation in the urine and plasma. Flow is unremarkable. Elevated beta-2 microglobulin. High normal viscosity.   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    NEURO workup  MRI brain with no significant acute pathology  EEG with no evidence of epileptiform discharges thus no need for AED at this time       Concern noted of etiology of fevers due to possible lymphoma/hyperviscosity   Discussed with Hematology/oncology, and they believe the patient should stay inpatient until his excision bx results are noted   Discussed with Heme onc. BX results seems to have resulted. Appears nonspecific with granulocyte predominance (77%). High degree of necrosis noted  S/p Axillary bx and bone marrow bx on 11/8. Awaiting results  A and O x2.   Pending ARC placement: Arc physician evaluated today 11/11/2024. 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   Abnormal LFTs  Elevated bilirubin and alkaline phosphatase generally improving  Suspected to be associated with lymphadenopathy (above)  Limit/avoid hepatotoxins as possible  Hypothyroidism  Continue  Synthroid  Primary hypertension  Holding HCTZ due to soft pressures at this time  Sepsis (HCC)  Today 11/11/2024 patient had sepsis criteria.  Fever and tachycardia and rigors  Also noted to have a cellulitis like picture in the right arm.  We did karen its borders  Ultrasound done and thrombophlebitis was noted in the right arm.    Follow cultures from 11/11/2024  X-ray was unremarkable.  Patient was having some shortness of breath.  Likely due to anxiety during that time  Did do Atarax per family request for anxiety.  Continue IV fluids for now.  Continue to monitor  Patient has diffuse bodyaches while moving around.  Started Toradol  11/11/2024 for a few days.  Creatinine is stable.  ID on board, thank you for recommendations.  Continue vancomycin.  Go up on this if patient has worsening sepsis.  No other clear source for now the cellulitis.  COVID-19 virus infection  Tested positive on 10/21  Currently oxygenating on baseline room air   Contact/airborne precautions now discontinued   Supportive care otherwise  Anemia  Hemoglobin stable at 9.8  Likely associated with lymphadenopathy  Hypocalcemia  Serum Andrion's calcium low -> continue intravenous and oral supplementation  Consider possible contribution to myoclonus  Myoclonia      VTE Pharmacologic Prophylaxis: VTE Score: 5 Moderate Risk (Score 3-4) - Pharmacological DVT Prophylaxis Ordered: heparin.    Mobility:   Basic Mobility Inpatient Raw Score: 7  JH-HLM Goal: 2: Bed activities/Dependent transfer  JH-HLM Achieved: 4: Move to chair/commode  JH-HLM Goal achieved. Continue to encourage appropriate mobility.    Patient Centered Rounds: I performed bedside rounds with nursing staff today.   Discussions with Specialists or Other Care Team Provider: ID    Education and Discussions with Family / Patient: Updated  (daughter) at bedside.    Current Length of Stay: 16 day(s)  Current Patient Status: Inpatient   Certification Statement: The patient will  continue to require additional inpatient hospital stay due to Altered mental status  Discharge Plan: Anticipate discharge in >72 hrs to rehab facility.    Code Status: Level 3 - DNAR and DNI    Subjective   Patient today had rigors, chills, painful right arm with a rash.  No other subjective complaints.  No dysuria, no shortness of breath, no lower leg edema.  No other subjective complaints noted.        Objective :  Temp:  [98.2 °F (36.8 °C)-102.1 °F (38.9 °C)] 100.4 °F (38 °C)  HR:  [] 116  BP: (100-134)/(64-88) 134/82  Resp:  [18-22] 18  SpO2:  [96 %-100 %] 100 %  O2 Device: None (Room air)    Body mass index is 28.46 kg/m².     Input and Output Summary (last 24 hours):     Intake/Output Summary (Last 24 hours) at 11/11/2024 1507  Last data filed at 11/11/2024 0705  Gross per 24 hour   Intake --   Output 1450 ml   Net -1450 ml       Physical Exam  Vitals and nursing note reviewed.   Constitutional:       Appearance: He is well-developed and normal weight.   HENT:      Head: Normocephalic and atraumatic.      Nose: Nose normal.      Mouth/Throat:      Mouth: Mucous membranes are moist.   Eyes:      Conjunctiva/sclera: Conjunctivae normal.   Cardiovascular:      Rate and Rhythm: Normal rate and regular rhythm.      Heart sounds: Normal heart sounds. No murmur heard.  Pulmonary:      Effort: Pulmonary effort is normal. No respiratory distress.      Breath sounds: Normal breath sounds.   Abdominal:      General: Abdomen is flat.      Palpations: Abdomen is soft.      Tenderness: There is no abdominal tenderness.   Musculoskeletal:         General: No swelling.      Cervical back: Neck supple.      Right lower leg: No edema.      Left lower leg: No edema.   Skin:     General: Skin is warm and dry.      Capillary Refill: Capillary refill takes less than 2 seconds.   Neurological:      General: No focal deficit present.      Mental Status: He is alert. Mental status is at baseline. He is disoriented.    Psychiatric:         Mood and Affect: Mood normal.           Lines/Drains:  Lines/Drains/Airways       Active Status       Name Placement date Placement time Site Days    HD Temporary Double Catheter 10/26/24  1451  Internal jugular  16                            Lab Results: I have reviewed the following results:   Results from last 7 days   Lab Units 11/11/24  0608 11/09/24  0529   WBC Thousand/uL 8.29 7.93   HEMOGLOBIN g/dL 10.3* 9.8*   HEMATOCRIT % 32.6* 30.5*   PLATELETS Thousands/uL 284 293   SEGS PCT %  --  36*   LYMPHO PCT %  --  46*   MONO PCT %  --  12   EOS PCT %  --  4     Results from last 7 days   Lab Units 11/11/24  0608 11/08/24  0559   SODIUM mmol/L 135 143   POTASSIUM mmol/L 3.9 4.2   CHLORIDE mmol/L 100 106   CO2 mmol/L 28 25   BUN mg/dL 13 8   CREATININE mg/dL 0.50* 0.39*   ANION GAP mmol/L 7 12   CALCIUM mg/dL 8.7 9.0   ALBUMIN g/dL  --  3.7   TOTAL BILIRUBIN mg/dL  --  0.88   ALK PHOS U/L  --  120*   ALT U/L  --  24   AST U/L  --  33   GLUCOSE RANDOM mg/dL 119 95                   Results from last 7 days   Lab Units 11/11/24  1316 11/11/24  1039   LACTIC ACID mmol/L 1.4 2.5*   PROCALCITONIN ng/ml  --  0.07         Recent Cultures (last 7 days):   Results from last 7 days   Lab Units 11/11/24  1047 11/11/24  1040   BLOOD CULTURE  Received in Microbiology Lab. Culture in Progress. Received in Microbiology Lab. Culture in Progress.       Imaging Results Review: I personally reviewed the following image studies/reports in PACS and discussed pertinent findings with Radiology: CT abdomen/pelvis and CT head. My interpretation of the radiology images/reports is: Normal.  Other Study Results Review: EKG was reviewed.     Last 24 Hours Medication List:     Current Facility-Administered Medications:     acetaminophen (Ofirmev) injection 1,000 mg, Q6H PRN, Last Rate: 1,000 mg (11/11/24 1018)    bisacodyl (DULCOLAX) rectal suppository 10 mg, Once    bisacodyl (DULCOLAX) rectal suppository 10 mg, Daily  PRN    calcium carbonate (TUMS) chewable tablet 500 mg, BID    docusate sodium (COLACE) capsule 100 mg, BID    fluticasone (FLONASE) 50 mcg/act nasal spray 1 spray, Daily    gabapentin (NEURONTIN) capsule 100 mg, HS    heparin (porcine) subcutaneous injection 5,000 Units, Q8H JOE **AND** [COMPLETED] Platelet count, Once    hydrocortisone (ANUSOL-HC) 2.5 % rectal cream, TID    hydrOXYzine HCL (ATARAX) tablet 10 mg, Q6H PRN    ketorolac (TORADOL) injection 30 mg, Q8H    levothyroxine tablet 50 mcg, Once per day on Monday Tuesday Wednesday Thursday    levothyroxine tablet 75 mcg, Once per day on Sunday Friday Saturday    moisture barrier miconazole 2% cream (aka SAMEER MOISTURE BARRIER ANTIFUNGAL CREAM), BID    multi-electrolyte (PLASMALYTE-A/ISOLYTE-S PH 7.4) IV solution, Continuous, Last Rate: 100 mL/hr (11/11/24 1207)    ondansetron (ZOFRAN) injection 4 mg, Q6H PRN    oxyCODONE (ROXICODONE) IR tablet 5 mg, Q6H PRN    pantoprazole (PROTONIX) EC tablet 40 mg, Early Morning    polyethylene glycol (MIRALAX) packet 17 g, BID    vancomycin (VANCOCIN) IVPB (premix in dextrose) 1,000 mg 200 mL, Q12H    Administrative Statements   Today, Patient Was Seen By: Bunny Pearson MD  I have spent a total time of 60 minutes in caring for this patient on the day of the visit/encounter including Diagnostic results, Prognosis, Risks and benefits of tx options, Instructions for management, Patient and family education, Importance of tx compliance, Risk factor reductions, Impressions, Counseling / Coordination of care, Documenting in the medical record, Reviewing / ordering tests, medicine, procedures  , Obtaining or reviewing history  , and Communicating with other healthcare professionals .    **Please Note: This note may have been constructed using a voice recognition system.**

## 2024-11-11 NOTE — ASSESSMENT & PLAN NOTE
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  MRI brain without acute intracranial abnormalities.  Status post LP 10/25 with mild lymphocytic pleocytosis and elevated protein.  ME panel and CSF culture negative. Suspect CSF abnormalities related to possible underlying lymphoproliferative disorder.  Status post first session of plasmapheresis 10/26 with improvement in fever curve and mental status initially, but now seems to have worsened.  Repeat CT of the brain nondiagnostic for source on 10/31/2024. Neurology consulted. Concern for toxic-metabolic source and a routine EEG was obtained. It showed nearly continuous myoclonic jerking and concern for an epileptic etiology of myoclonus. However, repeat EEG was performed with an EMG on the limbs and there did not appear to be epileptic correlation. 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 MRI brain negative for acute pathology.  He is much improved cognitively although still a bit confused.  -Continue to monitor mental status  -Neurology follow-up and workup

## 2024-11-12 PROBLEM — I80.8 SUPERFICIAL THROMBOPHLEBITIS OF RIGHT UPPER EXTREMITY: Status: ACTIVE | Noted: 2024-11-12

## 2024-11-12 PROBLEM — R50.9 FEVER: Status: ACTIVE | Noted: 2024-11-12

## 2024-11-12 LAB
ANION GAP SERPL CALCULATED.3IONS-SCNC: 8 MMOL/L (ref 4–13)
B HENSELAE IGG TITR SER IF: NEGATIVE TITER
B HENSELAE IGM TITR SER IF: NEGATIVE TITER
B QUINTANA IGG TITR SER IF: NEGATIVE TITER
B QUINTANA IGM TITR SER IF: NEGATIVE TITER
BUN SERPL-MCNC: 18 MG/DL (ref 5–25)
CALCIUM SERPL-MCNC: 7.7 MG/DL (ref 8.4–10.2)
CHLORIDE SERPL-SCNC: 100 MMOL/L (ref 96–108)
CO2 SERPL-SCNC: 22 MMOL/L (ref 21–32)
CREAT SERPL-MCNC: 0.62 MG/DL (ref 0.6–1.3)
ERYTHROCYTE [DISTWIDTH] IN BLOOD BY AUTOMATED COUNT: 18.6 % (ref 11.6–15.1)
GFR SERPL CREATININE-BSD FRML MDRD: 89 ML/MIN/1.73SQ M
GLUCOSE SERPL-MCNC: 169 MG/DL (ref 65–140)
HCT VFR BLD AUTO: 29.5 % (ref 36.5–49.3)
HGB BLD-MCNC: 9.6 G/DL (ref 12–17)
MCH RBC QN AUTO: 30.8 PG (ref 26.8–34.3)
MCHC RBC AUTO-ENTMCNC: 32.5 G/DL (ref 31.4–37.4)
MCV RBC AUTO: 95 FL (ref 82–98)
PLATELET # BLD AUTO: 241 THOUSANDS/UL (ref 149–390)
PMV BLD AUTO: 9.3 FL (ref 8.9–12.7)
POTASSIUM SERPL-SCNC: 4 MMOL/L (ref 3.5–5.3)
RBC # BLD AUTO: 3.12 MILLION/UL (ref 3.88–5.62)
SODIUM SERPL-SCNC: 130 MMOL/L (ref 135–147)
WBC # BLD AUTO: 13.58 THOUSAND/UL (ref 4.31–10.16)

## 2024-11-12 PROCEDURE — 99233 SBSQ HOSP IP/OBS HIGH 50: CPT | Performed by: INTERNAL MEDICINE

## 2024-11-12 PROCEDURE — 85027 COMPLETE CBC AUTOMATED: CPT

## 2024-11-12 PROCEDURE — 93971 EXTREMITY STUDY: CPT | Performed by: SURGERY

## 2024-11-12 PROCEDURE — 99232 SBSQ HOSP IP/OBS MODERATE 35: CPT | Performed by: STUDENT IN AN ORGANIZED HEALTH CARE EDUCATION/TRAINING PROGRAM

## 2024-11-12 PROCEDURE — 80048 BASIC METABOLIC PNL TOTAL CA: CPT

## 2024-11-12 RX ADMIN — PANTOPRAZOLE SODIUM 40 MG: 40 TABLET, DELAYED RELEASE ORAL at 05:32

## 2024-11-12 RX ADMIN — LEVOTHYROXINE SODIUM 50 MCG: 75 TABLET ORAL at 05:32

## 2024-11-12 RX ADMIN — HYDROCORTISONE: 25 CREAM TOPICAL at 18:34

## 2024-11-12 RX ADMIN — HEPARIN SODIUM 5000 UNITS: 5000 INJECTION INTRAVENOUS; SUBCUTANEOUS at 05:32

## 2024-11-12 RX ADMIN — HEPARIN SODIUM 5000 UNITS: 5000 INJECTION INTRAVENOUS; SUBCUTANEOUS at 14:42

## 2024-11-12 RX ADMIN — HEPARIN SODIUM 5000 UNITS: 5000 INJECTION INTRAVENOUS; SUBCUTANEOUS at 22:55

## 2024-11-12 RX ADMIN — CEFEPIME 2000 MG: 2 INJECTION, POWDER, FOR SOLUTION INTRAVENOUS at 05:32

## 2024-11-12 RX ADMIN — SODIUM CHLORIDE, SODIUM GLUCONATE, SODIUM ACETATE, POTASSIUM CHLORIDE, MAGNESIUM CHLORIDE, SODIUM PHOSPHATE, DIBASIC, AND POTASSIUM PHOSPHATE 100 ML/HR: .53; .5; .37; .037; .03; .012; .00082 INJECTION, SOLUTION INTRAVENOUS at 09:16

## 2024-11-12 RX ADMIN — VANCOMYCIN HYDROCHLORIDE 1000 MG: 1 INJECTION, SOLUTION INTRAVENOUS at 22:47

## 2024-11-12 RX ADMIN — HYDROCORTISONE: 25 CREAM TOPICAL at 22:56

## 2024-11-12 RX ADMIN — DOCUSATE SODIUM 100 MG: 100 CAPSULE, LIQUID FILLED ORAL at 18:34

## 2024-11-12 RX ADMIN — KETOROLAC TROMETHAMINE 30 MG: 30 INJECTION, SOLUTION INTRAMUSCULAR; INTRAVENOUS at 09:19

## 2024-11-12 RX ADMIN — VANCOMYCIN HYDROCHLORIDE 1000 MG: 1 INJECTION, SOLUTION INTRAVENOUS at 12:14

## 2024-11-12 RX ADMIN — CEFEPIME 2000 MG: 2 INJECTION, POWDER, FOR SOLUTION INTRAVENOUS at 18:35

## 2024-11-12 RX ADMIN — KETOROLAC TROMETHAMINE 30 MG: 30 INJECTION, SOLUTION INTRAMUSCULAR; INTRAVENOUS at 18:34

## 2024-11-12 RX ADMIN — MICONAZOLE NITRATE: 20 CREAM TOPICAL at 18:35

## 2024-11-12 RX ADMIN — VANCOMYCIN HYDROCHLORIDE 1000 MG: 1 INJECTION, SOLUTION INTRAVENOUS at 00:07

## 2024-11-12 RX ADMIN — HYDROCORTISONE: 25 CREAM TOPICAL at 11:16

## 2024-11-12 RX ADMIN — ACETAMINOPHEN 1000 MG: 10 INJECTION INTRAVENOUS at 07:35

## 2024-11-12 RX ADMIN — CALCIUM CARBONATE (ANTACID) CHEW TAB 500 MG 500 MG: 500 CHEW TAB at 22:55

## 2024-11-12 RX ADMIN — GABAPENTIN 100 MG: 100 CAPSULE ORAL at 22:55

## 2024-11-12 NOTE — ASSESSMENT & PLAN NOTE
The patient had intermittent fevers for 3 weeks and several admissions to [facility] with fairly unremarkable infectious workup other than positive rhinovirus and COVID. CT imaging was noted to show lymphadenopathy and he had atypical lymphocytes on CBC so hematology/oncology consult was recommended although the patient was discharged on Paxlovid with plan for an outpatient referral.  He then returned to the ED with worsening confusion and return of fever.  CT imaging with worsening axillary lymphadenopathy, CBC differential again showed atypical lymphocytes.  Status post LP 10/25 which showed mild elevation in protein and lymphocytic pleocytosis.  ME panel negative and culture showed no growth. 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  Labs show elevated LDH, ferritin, immunoglobulins. Status post plasmapheresis with improvement in fever curve and initially improvement in mental status, but now with waxing and waning mental status and development of myoclonic jerking as below. S/p lymph node and bone marrow biopsies. Bone marrow biopsy preliminary study demonstrated histiocytic aggregates, which are nonspecific, and can be seen in a variety of settings including infection. Broadened infectious work-up based on risk factors to include TB PCR from CSF (given patient is initially from [place] and traveled to [place] in June), histoplasma urine antigen, brucella antibodies, and mycoplasma antibodies. Mycoplasma antibodies, histoplasma urine antigen, and brucella antibodies negative. TB PCR from CSF is currently pending.  Patient had been without a fever for quite some time but now began spiking fever on 11/10/2024 with some chills as above.   -Antibiotics as above  -Follow-up repeat blood cultures obtained 11/11  -Follow-up repeat bone marrow biopsy  -Follow-up repeat excisional lymph node biopsy   -Follow-up mTB PCR from CSF  -Ongoing  follow-up with hematology/oncology  -Continue to monitor fever curve/vitals

## 2024-11-12 NOTE — PHYSICAL THERAPY NOTE
Physical Therapy Cancellation Note     11/12/24 1210   PT Last Visit   PT Visit Date 11/12/24   Note Type   Note Type Cancelled Session   Cancel Reasons Other     Pt with fever today. PT will follow and treat as appropriate.  Jen Soto PT DPT

## 2024-11-12 NOTE — ASSESSMENT & PLAN NOTE
Today 11/11/2024 patient had sepsis criteria.  Fever and tachycardia and rigors  Possibly related to right upper extremity cellulitis with superficial thrombophlebitis  Blood cultures negative at 24 hours  Chest x-ray unremarkable  ID consulted, pressure recommendations  Continue empiric vancomycin and cefepime while awaiting infectious workup

## 2024-11-12 NOTE — ASSESSMENT & PLAN NOTE
Demonstrated on ultrasound of the RUE 11/11. In cephalic vein from wrist to distal upper arm. Potential etiology of acute sepsis as above. Blood cultures pending.  -Continue antibiotics as above  -Follow-up blood cultures  -Serial examinations of the RUE

## 2024-11-12 NOTE — ASSESSMENT & PLAN NOTE
Met sepsis criteria on 11/10 when patient started spiking fevers up to 103F with associated tachycardia and rigors. Fortunately has remained otherwise hemodynamically stable. Procal WNL at 0.07. CXR without evidence of acute cardiopulmonary disease. Patient noted to have RUE swelling and erythema. An ultrasound revealed an acute occlusive superficial thrombophlebitis in the cephalic vein from wrist to the distal upper arm which could be the etiology of patient's sepsis. Blood cultures are pending. Patient was started on IV vancomycin and cefepime. WBC uptrended from 8.29 to 13.5 today.   -Continue IV vancomycin (dosing per pharmacy) and IV cefepime for now  -Follow-up blood cultures  -Continue to monitor temperature/vitals  -Serial examinations  -Continue to follow CBCD and CMP with AM labs to monitor for potential antimicrobial toxicities and assess for clinical response to antibiotics.

## 2024-11-12 NOTE — ASSESSMENT & PLAN NOTE
During prior admission imaging showed enlarged periaortic, iliac chain and inguinal lymph nodes.  Repeat imaging showed persistent lymphadenopathy with worsened right axillary adenopathy.  As above, concern for an underlying lymphoproliferative disorder.  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  CBC differential also shows atypical lymphocytes.  Status post plasmapheresis 10/26. HIV screening negative. S/p lymph node and bone marrow biopsies. Lymph node biopsy overall findings are atypical and suspicious for a lymphoproliferative disorder. However, the tissue was too scant to further characterize the underlying process. Therefore, patient underwent an excisional biopsy with surgical oncology on 11/3 which was nondiagnostic.  He underwent repeat lymph node biopsy on 11/8/2024 the results of which are pending.  -Hematology/oncology following  -Follow-up pathology from excisional biopsy and bone marrow biopsy  -Additional infectious work-up as above

## 2024-11-12 NOTE — PROGRESS NOTES
Progress Note - Hospitalist   Name: Miguel Henderson 84 y.o. male I MRN: [id number]  [facility] I Date of Admission: 10/26/2024   Date of Service: 11/12/2024 I Hospital Day: 17    Assessment & Plan  Encephalopathy  Disussed with family at bedside about the course of the patient's illness.  Patient had fevers on October 7, was diagnosed with viral infection.  Sent home and had rapid decline at home.    At baseline patient is very physical and able to work in the yard and cuts down trees, very sharp handles his finances.  Now patient is baseline very confused according to the family.  Patient's last travel history was to [place] in May of last year  Brother passed away of some sort of leukemia  No dementia history  No previous stroke or traumatic brain.  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    Lyme was negative on 10/21/2024.   Rhinovirus was initially positive on 10/8/2024.  Respiratory panel was redone and was negative.    Peripheral blood smear was done and there was no parasites seen on 10/22/2024.    TSH, ammonia, coma panel unremarkable  CSF was positive for elevated WBC on 10/25/2024, VDRL negative, glucose normal, protein CSF elevated 86, Lyme PCR negative, meningitis encephalitis negative, West Nile negative  Brucella negative 11/01.2024  Mycoplasma negativem histoplasma negative  HIV negative  TB PCR Pending 10/25    Heme onc workup  Labs show an elevated gamma spike on SPEP, elevated IgA, IgG, IgM. Positive free lambda light chain on immunofixation in the urine and plasma. Flow is unremarkable. Elevated beta-2 microglobulin. High normal viscosity.   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    NEURO workup  MRI brain with no significant acute pathology  EEG with no evidence of epileptiform discharges thus no need for AED at this time       Concern noted of etiology of fevers due to possible lymphoma/hyperviscosity   Discussed with Hematology/oncology, and they believe the patient should stay inpatient until his excision bx results are noted   Discussed with Heme onc. BX results seems to have resulted. Appears nonspecific with granulocyte predominance (77%). High degree of necrosis noted  S/p Axillary bx and bone marrow bx on 11/8. Awaiting results  Pending ARC placement: Arc physician evaluated today 11/11/2024. They said that in order to place to arc they need a chemo plan.Will need to reach out to them after getting biopsy results and having treatment plan  Sepsis (HCC)  Today 11/11/2024 patient had sepsis criteria.  Fever and tachycardia and rigors  Possibly related to right upper extremity cellulitis with superficial thrombophlebitis  Blood cultures negative at 24 hours  Chest x-ray unremarkable  ID consulted, pressure recommendations  Continue empiric vancomycin and cefepime while awaiting infectious workup  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   Abnormal LFTs  Elevated bilirubin and alkaline phosphatase generally improving  Suspected to be associated with lymphadenopathy (above)  Limit/avoid hepatotoxins as possible  Hypothyroidism  Continue Synthroid  Primary hypertension  Holding HCTZ due to soft pressures at this time  COVID-19 virus infection  Tested positive on 10/21  Currently oxygenating on baseline room air   Contact/airborne precautions now discontinued   Supportive care otherwise  Anemia  Hemoglobin stable at 9.8  Likely associated with lymphadenopathy  Hypocalcemia  Serum Andrion's calcium low -> continue intravenous and oral supplementation  Consider possible contribution to myoclonus  Myoclonia  Neurology suspects toxic metabolic etiology given unremarkable EEG  Superficial thrombophlebitis of right upper extremity  Supportive care  Fever  See above    VTE Pharmacologic Prophylaxis: VTE Score: 5 High Risk (Score >/= 5) - Pharmacological DVT Prophylaxis Ordered: heparin. Sequential Compression Devices Ordered.    Mobility:   Basic Mobility Inpatient Raw Score: 7  JH-HLM Goal: 2: Bed activities/Dependent transfer  JH-HLM Achieved: 2: Bed activities/Dependent transfer  JH-HLM Goal achieved. Continue to encourage appropriate mobility.    Patient Centered Rounds: I performed bedside rounds with nursing staff today.   Discussions with Specialists or Other Care Team Provider: ID    Education and Discussions with Family / Patient: Updated  (son) via phone.    Current Length of Stay: 17 day(s)  Current Patient Status: Inpatient   Certification Statement: The patient will continue to require additional inpatient hospital stay due to sepsis, IV antibiotics  Discharge Plan: Anticipate discharge in 48-72 hrs to rehab facility.    Code Status: Level 3 - DNAR and DNI    Subjective   Patient assessed at  bedside.  Minimally interactive today but opens eyes to voice.  Reported having RUE pain and myalgias earlier.  Continues to have fevers overnight with Tmax of 103 this morning.    Objective :  Temp:  [98.2 °F (36.8 °C)-103.2 °F (39.6 °C)] 101 °F (38.3 °C)  HR:  [] 114  BP: (114-122)/(65-77) 114/65  Resp:  [18-24] 18  SpO2:  [97 %-100 %] 100 %    Body mass index is 28.46 kg/m².     Input and Output Summary (last 24 hours):     Intake/Output Summary (Last 24 hours) at 11/12/2024 1730  Last data filed at 11/12/2024 1443  Gross per 24 hour   Intake 1375 ml   Output 250 ml   Net 1125 ml       Physical Exam  Vitals reviewed.   Constitutional:       General: He is not in acute distress.     Appearance: Normal appearance. He is not ill-appearing.   HENT:      Head: Normocephalic and atraumatic.   Cardiovascular:      Rate and Rhythm: Regular rhythm. Tachycardia present.      Heart sounds: No murmur heard.  Pulmonary:      Effort: Pulmonary effort is normal. No respiratory distress.      Breath sounds: No wheezing or rales.   Abdominal:      General: Bowel sounds are normal.      Tenderness: There is no abdominal tenderness.   Musculoskeletal:      Right lower leg: No edema.      Left lower leg: No edema.   Neurological:      Mental Status: He is alert. He is disoriented.           Lines/Drains:  Lines/Drains/Airways       Active Status       Name Placement date Placement time Site Days    HD Temporary Double Catheter 10/26/24  1451  Internal jugular  17    External Urinary Catheter 11/12/24  1445  -- less than 1                            Lab Results: I have reviewed the following results:   Results from last 7 days   Lab Units 11/12/24  0909 11/11/24  0608 11/09/24  0529   WBC Thousand/uL 13.58*   < > 7.93   HEMOGLOBIN g/dL 9.6*   < > 9.8*   HEMATOCRIT % 29.5*   < > 30.5*   PLATELETS Thousands/uL 241   < > 293   SEGS PCT %  --   --  36*   LYMPHO PCT %  --   --  46*   MONO PCT %  --   --  12   EOS PCT %  --   --  4     < > = values in this interval not displayed.     Results from last 7 days   Lab Units 11/12/24  0909 11/11/24  0608 11/08/24  0559   SODIUM mmol/L 130*   < > 143   POTASSIUM mmol/L 4.0   < > 4.2   CHLORIDE mmol/L 100   < > 106   CO2 mmol/L 22   < > 25   BUN mg/dL 18   < > 8   CREATININE mg/dL 0.62   < > 0.39*   ANION GAP mmol/L 8   < > 12   CALCIUM mg/dL 7.7*   < > 9.0   ALBUMIN g/dL  --   --  3.7   TOTAL BILIRUBIN mg/dL  --   --  0.88   ALK PHOS U/L  --   --  120*   ALT U/L  --   --  24   AST U/L  --   --  33   GLUCOSE RANDOM mg/dL 169*   < > 95    < > = values in this interval not displayed.                 Results from last 7 days   Lab Units 11/11/24  1316 11/11/24  1039   LACTIC ACID mmol/L 1.4 2.5*   PROCALCITONIN ng/ml  --  0.07       Recent Cultures (last 7 days):   Results from last 7 days   Lab Units 11/11/24  1047 11/11/24  1040   BLOOD CULTURE  No Growth at 24 hrs. No Growth at 24 hrs.       Imaging Results Review: I reviewed radiology reports from this admission including: chest xray, MRI brain, and Ultrasound(s).  Other Study Results Review: No additional pertinent studies reviewed.    Last 24 Hours Medication List:     Current Facility-Administered Medications:     acetaminophen (Ofirmev) injection 1,000 mg, Q6H PRN, Last Rate: 1,000 mg (11/12/24 0735)    bisacodyl (DULCOLAX) rectal suppository 10 mg, Once    bisacodyl (DULCOLAX) rectal suppository 10 mg, Daily PRN    calcium carbonate (TUMS) chewable tablet 500 mg, BID    ceFEPime (MAXIPIME) 2,000 mg in dextrose 5 % 50 mL IVPB, Q12H, Last Rate: 2,000 mg (11/12/24 0532)    docusate sodium (COLACE) capsule 100 mg, BID    fluticasone (FLONASE) 50 mcg/act nasal spray 1 spray, Daily    gabapentin (NEURONTIN) capsule 100 mg, HS    heparin (porcine) subcutaneous injection 5,000 Units, Q8H OJE **AND** [COMPLETED] Platelet count, Once    hydrocortisone (ANUSOL-HC) 2.5 % rectal cream, TID    hydrOXYzine HCL (ATARAX) tablet 10 mg, Q6H PRN    ketorolac  (TORADOL) injection 30 mg, Q8H    levothyroxine tablet 50 mcg, Once per day on Monday Tuesday Wednesday Thursday    levothyroxine tablet 75 mcg, Once per day on Sunday Friday Saturday    moisture barrier miconazole 2% cream (aka SAMEER MOISTURE BARRIER ANTIFUNGAL CREAM), BID    multi-electrolyte (PLASMALYTE-A/ISOLYTE-S PH 7.4) IV solution, Continuous, Last Rate: 100 mL/hr (11/12/24 0916)    ondansetron (ZOFRAN) injection 4 mg, Q6H PRN    oxyCODONE (ROXICODONE) IR tablet 5 mg, Q6H PRN    pantoprazole (PROTONIX) EC tablet 40 mg, Early Morning    polyethylene glycol (MIRALAX) packet 17 g, BID    vancomycin (VANCOCIN) IVPB (premix in dextrose) 1,000 mg 200 mL, Q12H, Last Rate: 1,000 mg (11/12/24 1214)    Administrative Statements   Today, Patient Was Seen By: Tejas Ramirez, DO  I have spent a total time of 25 minutes in caring for this patient on the day of the visit/encounter including Impressions, Counseling / Coordination of care, Documenting in the medical record, Reviewing / ordering tests, medicine, procedures  , Obtaining or reviewing history  , and Communicating with other healthcare professionals .    **Please Note: This note may have been constructed using a voice recognition system.**

## 2024-11-12 NOTE — PROGRESS NOTES
Progress Note - Infectious Disease   Name: Miguel Henderson 84 y.o. male I MRN: [id number]  [facility] Date of Admission: 10/26/2024   Date of Service: 11/12/2024 I Hospital Day: 17    Assessment & Plan  Sepsis (HCC)  Met sepsis criteria on 11/10 when patient started spiking fevers up to 103F with associated tachycardia and rigors. Fortunately has remained otherwise hemodynamically stable. Procal WNL at 0.07. CXR without evidence of acute cardiopulmonary disease. Patient noted to have RUE swelling and erythema. An ultrasound revealed an acute occlusive superficial thrombophlebitis in the cephalic vein from wrist to the distal upper arm which could be the etiology of patient's sepsis. Blood cultures are pending. Patient was started on IV vancomycin and cefepime. WBC uptrended from 8.29 to 13.5 today.   -Continue IV vancomycin (dosing per pharmacy) and IV cefepime for now  -Follow-up blood cultures  -Continue to monitor temperature/vitals  -Serial examinations  -Continue to follow CBCD and CMP with AM labs to monitor for potential antimicrobial toxicities and assess for clinical response to antibiotics.  Superficial thrombophlebitis of right upper extremity  Demonstrated on ultrasound of the RUE 11/11. In cephalic vein from wrist to distal upper arm. Potential etiology of acute sepsis as above. Blood cultures pending.  -Continue antibiotics as above  -Follow-up blood cultures  -Serial examinations of the RUE  Fever  The patient had intermittent fevers for 3 weeks and several admissions to [facility] with fairly unremarkable infectious workup other than positive rhinovirus and COVID. CT imaging was noted to show lymphadenopathy and he had atypical lymphocytes on CBC so hematology/oncology consult was recommended although the patient was discharged on Paxlovid with plan for an outpatient referral.  He then returned to the ED with worsening confusion and return of fever.  CT imaging with  worsening axillary lymphadenopathy, CBC differential again showed atypical lymphocytes.  Status post LP 10/25 which showed mild elevation in protein and lymphocytic pleocytosis.  ME panel negative and culture showed no growth. 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  Labs show elevated LDH, ferritin, immunoglobulins. Status post plasmapheresis with improvement in fever curve and initially improvement in mental status, but now with waxing and waning mental status and development of myoclonic jerking as below. S/p lymph node and bone marrow biopsies. Bone marrow biopsy preliminary study demonstrated histiocytic aggregates, which are nonspecific, and can be seen in a variety of settings including infection. Broadened infectious work-up based on risk factors to include TB PCR from CSF (given patient is initially from [place] and traveled to [place] in June), histoplasma urine antigen, brucella antibodies, and mycoplasma antibodies. Mycoplasma antibodies, histoplasma urine antigen, and brucella antibodies negative. TB PCR from CSF is currently pending.  Patient had been without a fever for quite some time but now began spiking fever on 11/10/2024 with some chills as above.   -Antibiotics as above  -Follow-up repeat blood cultures obtained 11/11  -Follow-up repeat bone marrow biopsy  -Follow-up repeat excisional lymph node biopsy   -Follow-up mTB PCR from CSF  -Ongoing follow-up with hematology/oncology  -Continue to monitor fever curve/vitals  Encephalopathy  Oncology initially with concern for hyperviscosity syndrome.  MRI brain without acute intracranial abnormalities.  Status post LP 10/25 with mild lymphocytic pleocytosis and elevated protein.  ME panel and CSF culture negative. Suspect CSF abnormalities related to possible underlying lymphoproliferative disorder.  Status post first session of plasmapheresis 10/26 with improvement in fever curve and  mental status initially, but now seems to have worsened.  Repeat CT of the brain nondiagnostic for source on 10/31/2024. Neurology consulted. Concern for toxic-metabolic source and a routine EEG was obtained. It showed nearly continuous myoclonic jerking and concern for an epileptic etiology of myoclonus. However, repeat EEG was performed with an EMG on the limbs and there did not appear to be epileptic correlation. 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 MRI brain negative for acute pathology.  He is much improved cognitively although still a bit confused.  -Continue to monitor mental status  -Appreciate neurology evaluation and recommendations  Lymphadenopathy  During prior admission imaging showed enlarged periaortic, iliac chain and inguinal lymph nodes.  Repeat imaging showed persistent lymphadenopathy with worsened right axillary adenopathy.  As above, concern for an underlying lymphoproliferative disorder.  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  CBC differential also shows atypical lymphocytes.  Status post plasmapheresis 10/26. HIV screening negative. S/p lymph node and bone marrow biopsies. Lymph node biopsy overall findings are atypical and suspicious for a lymphoproliferative disorder. However, the tissue was too scant to further characterize the underlying process. Therefore, patient underwent an excisional biopsy with surgical oncology on 11/3 which was nondiagnostic.  He underwent repeat lymph node biopsy on 11/8/2024 the results of which are pending.  -Hematology/oncology following  -Follow-up pathology from excisional biopsy and bone marrow biopsy  -Additional infectious work-up as above  COVID-19 virus infection  Isolation discontinued on 11/1/2024  Myoclonia  Development of intermittent twitching/myoclonic jerking on 10/31. Neurology consulted. Concern for toxic-metabolic source and a routine EEG was obtained. It showed nearly continuous myoclonic  jerking and concern for an epileptic etiology of myoclonus. Patient started on Ativan and Keppra. However, repeat EEG was performed with an EMG on the limbs and there did not appear to be epileptic correlation. 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 MRI brain negative for acute pathology.  He seems improved.  -Appreciate neurology workup and follow-up    I have discussed with primary service the above plan to continue IV cefepime and IV vancomycin while following up repeat blood cultures. They agree with the plan.  Infectious Disease service will follow.    Antibiotics:  Vancomycin D#2  Cefepime D#2    Antibiotic D#2    Subjective   Somnolent, minimally interactive, but opens eyes to voice. Family member at bedside reported he was having RUE pain and myalgias. Now with leukocytosis, continues to spike fevers, tmax 103F.     Objective :  Temp:  [98.2 °F (36.8 °C)-103.2 °F (39.6 °C)] 103.2 °F (39.6 °C)  HR:  [] 115  BP: (119-155)/(75-98) 122/75  Resp:  [17-24] 20  SpO2:  [97 %-100 %] 97 %    General Appearance:  Ill appearing, somnolent, opens eyes to voice, but otherwise minimally interactive today   Throat: Oropharynx moist without lesions.    Lungs:   Clear to auscultation bilaterally; no wheezes, rhonchi or rales; respirations unlabored. On room air.    Heart:  RRR; no murmur, rub or gallop.   Abdomen:   Soft, non-tender, non-distended, positive bowel sounds.     Extremities: No clubbing or cyanosis. RUE erythematous and edematous.    Skin: No new rashes, lesions, or draining wounds noted on exposed skin.       Lab Results: I have reviewed the following results:  Results from last 7 days   Lab Units 11/12/24  0909 11/11/24  0608 11/09/24  0529   WBC Thousand/uL 13.58* 8.29 7.93   HEMOGLOBIN g/dL 9.6* 10.3* 9.8*   PLATELETS Thousands/uL 241 284 293     Results from last 7 days   Lab Units 11/12/24  0909 11/11/24  0608 11/08/24  0559   SODIUM mmol/L 130* 135 143   POTASSIUM  mmol/L 4.0 3.9 4.2   CHLORIDE mmol/L 100 100 106   CO2 mmol/L 22 28 25   BUN mg/dL 18 13 8   CREATININE mg/dL 0.62 0.50* 0.39*   EGFR ml/min/1.73sq m 89 98 108   CALCIUM mg/dL 7.7* 8.7 9.0   AST U/L  --   --  33   ALT U/L  --   --  24   ALK PHOS U/L  --   --  120*   ALBUMIN g/dL  --   --  3.7     Results from last 7 days   Lab Units 11/11/24  1047 11/11/24  1040   BLOOD CULTURE  Received in Microbiology Lab. Culture in Progress. Received in Microbiology Lab. Culture in Progress.     Results from last 7 days   Lab Units 11/11/24  1039   PROCALCITONIN ng/ml 0.07         Results from last 7 days   Lab Units 11/08/24  0559   FERRITIN ng/mL 184               Anais Kerr PA-C  [facility]

## 2024-11-12 NOTE — ASSESSMENT & PLAN NOTE
Disussed with family at bedside about the course of the patient's illness.  Patient had fevers on October 7, was diagnosed with viral infection.  Sent home and had rapid decline at home.    At baseline patient is very physical and able to work in the yard and cuts down trees, very sharp handles his finances.  Now patient is baseline very confused according to the family.  Patient's last travel history was to [place] in May of last year  Brother passed away of some sort of leukemia  No dementia history  No previous stroke or traumatic brain.  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    Lyme was negative on 10/21/2024.   Rhinovirus was initially positive on 10/8/2024.  Respiratory panel was redone and was negative.    Peripheral blood smear was done and there was no parasites seen on 10/22/2024.    TSH, ammonia, coma panel unremarkable  CSF was positive for elevated WBC on 10/25/2024, VDRL negative, glucose normal, protein CSF elevated 86, Lyme PCR negative, meningitis encephalitis negative, West Nile negative  Brucella negative 11/01.2024  Mycoplasma negativem histoplasma negative  HIV negative  TB PCR Pending 10/25    Heme onc workup  Labs show an elevated gamma spike on SPEP, elevated IgA, IgG, IgM. Positive free lambda light chain on immunofixation in the urine and plasma. Flow is unremarkable. Elevated beta-2 microglobulin. High normal viscosity.   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    NEURO workup  MRI brain with no significant acute pathology  EEG with no evidence of epileptiform discharges thus no need for AED at this time       Concern noted of etiology of fevers due to  possible lymphoma/hyperviscosity   Discussed with Hematology/oncology, and they believe the patient should stay inpatient until his excision bx results are noted   Discussed with Heme onc. BX results seems to have resulted. Appears nonspecific with granulocyte predominance (77%). High degree of necrosis noted  S/p Axillary bx and bone marrow bx on 11/8. Awaiting results  Pending ARC placement: Arc physician evaluated today 11/11/2024. They said that in order to place to arc they need a chemo plan.Will need to reach out to them after getting biopsy results and having treatment plan

## 2024-11-12 NOTE — PROGRESS NOTES
Miguel Henderson is a 84 y.o. male who is currently ordered Vancomycin IV with management by the Pharmacy Consult service.  Relevant clinical data and objective / subjective history reviewed.  Vancomycin Assessment:  Indication and Goal AUC/Trough: Soft tissue (goal -600, trough >10), -600, trough >10  Clinical Status: stable  Micro:   None yet  Renal Function:  SCr: 0.6 mg/dL  CrCl: 82 mL/min  Renal replacement: Not on dialysis  Days of Therapy: 2  Current Dose: 750 q8  Vancomycin Plan:  New Dosingm q12  Estimated AUC: 522 mcg*hr/mL  Estimated Trough: 14.8 mcg/mL  Next Level: 11-13  Renal Function Monitoring: Daily BMP and UOP  Pharmacy will continue to follow closely for s/sx of nephrotoxicity, infusion reactions and appropriateness of therapy.  BMP and CBC will be ordered per protocol. We will continue to follow the patient’s culture results and clinical progress daily.    Carlos Tavares, Pharmacist

## 2024-11-12 NOTE — OCCUPATIONAL THERAPY NOTE
Occupational Therapy         Patient Name: Miguel Henderson  Today's Date: 11/12/2024 11/12/24 1232   Note Type   Note type Cancelled Session   Cancel Reasons Medical status         Pt chart reviewed and report provided by [facility]. Pt with high temperature this AM, 103.2 and pt tachycardic. Pending further workup and biopsy results. Will hold OT tx at this time and continue to follow and treat as appropriate.     Vladislav Sanches MS, OTR/L

## 2024-11-12 NOTE — ASSESSMENT & PLAN NOTE
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  MRI brain without acute intracranial abnormalities.  Status post LP 10/25 with mild lymphocytic pleocytosis and elevated protein.  ME panel and CSF culture negative. Suspect CSF abnormalities related to possible underlying lymphoproliferative disorder.  Status post first session of plasmapheresis 10/26 with improvement in fever curve and mental status initially, but now seems to have worsened.  Repeat CT of the brain nondiagnostic for source on 10/31/2024. Neurology consulted. Concern for toxic-metabolic source and a routine EEG was obtained. It showed nearly continuous myoclonic jerking and concern for an epileptic etiology of myoclonus. However, repeat EEG was performed with an EMG on the limbs and there did not appear to be epileptic correlation. 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 MRI brain negative for acute pathology.  He is much improved cognitively although still a bit confused.  -Continue to monitor mental status  -Appreciate neurology evaluation and recommendations

## 2024-11-13 LAB
ALBUMIN SERPL BCG-MCNC: 2.7 G/DL (ref 3.5–5)
ALP SERPL-CCNC: 138 U/L (ref 34–104)
ALT SERPL W P-5'-P-CCNC: 33 U/L (ref 7–52)
ANION GAP SERPL CALCULATED.3IONS-SCNC: 4 MMOL/L (ref 4–13)
AST SERPL W P-5'-P-CCNC: 36 U/L (ref 13–39)
BASOPHILS # BLD AUTO: 0.03 THOUSANDS/ÂΜL (ref 0–0.1)
BASOPHILS NFR BLD AUTO: 1 % (ref 0–1)
BILIRUB SERPL-MCNC: 0.81 MG/DL (ref 0.2–1)
BUN SERPL-MCNC: 15 MG/DL (ref 5–25)
CALCIUM ALBUM COR SERPL-MCNC: 8.8 MG/DL (ref 8.3–10.1)
CALCIUM SERPL-MCNC: 7.8 MG/DL (ref 8.4–10.2)
CHLORIDE SERPL-SCNC: 101 MMOL/L (ref 96–108)
CO2 SERPL-SCNC: 29 MMOL/L (ref 21–32)
CREAT SERPL-MCNC: 0.57 MG/DL (ref 0.6–1.3)
EOSINOPHIL # BLD AUTO: 0.2 THOUSAND/ÂΜL (ref 0–0.61)
EOSINOPHIL NFR BLD AUTO: 4 % (ref 0–6)
ERYTHROCYTE [DISTWIDTH] IN BLOOD BY AUTOMATED COUNT: 18 % (ref 11.6–15.1)
GFR SERPL CREATININE-BSD FRML MDRD: 92 ML/MIN/1.73SQ M
GLUCOSE SERPL-MCNC: 150 MG/DL (ref 65–140)
HCT VFR BLD AUTO: 27.5 % (ref 36.5–49.3)
HGB BLD-MCNC: 8.8 G/DL (ref 12–17)
IMM GRANULOCYTES # BLD AUTO: 0.07 THOUSAND/UL (ref 0–0.2)
IMM GRANULOCYTES NFR BLD AUTO: 2 % (ref 0–2)
LYMPHOCYTES # BLD AUTO: 1.56 THOUSANDS/ÂΜL (ref 0.6–4.47)
LYMPHOCYTES NFR BLD AUTO: 35 % (ref 14–44)
MCH RBC QN AUTO: 30.1 PG (ref 26.8–34.3)
MCHC RBC AUTO-ENTMCNC: 32 G/DL (ref 31.4–37.4)
MCV RBC AUTO: 94 FL (ref 82–98)
MONOCYTES # BLD AUTO: 0.27 THOUSAND/ÂΜL (ref 0.17–1.22)
MONOCYTES NFR BLD AUTO: 6 % (ref 4–12)
NEUTROPHILS # BLD AUTO: 2.37 THOUSANDS/ÂΜL (ref 1.85–7.62)
NEUTS SEG NFR BLD AUTO: 52 % (ref 43–75)
NRBC BLD AUTO-RTO: 0 /100 WBCS
PLATELET # BLD AUTO: 243 THOUSANDS/UL (ref 149–390)
PMV BLD AUTO: 9.4 FL (ref 8.9–12.7)
POTASSIUM SERPL-SCNC: 3.9 MMOL/L (ref 3.5–5.3)
PROT SERPL-MCNC: 5.8 G/DL (ref 6.4–8.4)
RBC # BLD AUTO: 2.92 MILLION/UL (ref 3.88–5.62)
SCAN RESULT: NORMAL
SCAN RESULT: NORMAL
SODIUM SERPL-SCNC: 134 MMOL/L (ref 135–147)
WBC # BLD AUTO: 4.5 THOUSAND/UL (ref 4.31–10.16)

## 2024-11-13 PROCEDURE — 99232 SBSQ HOSP IP/OBS MODERATE 35: CPT | Performed by: STUDENT IN AN ORGANIZED HEALTH CARE EDUCATION/TRAINING PROGRAM

## 2024-11-13 PROCEDURE — 80053 COMPREHEN METABOLIC PANEL: CPT | Performed by: STUDENT IN AN ORGANIZED HEALTH CARE EDUCATION/TRAINING PROGRAM

## 2024-11-13 PROCEDURE — 99233 SBSQ HOSP IP/OBS HIGH 50: CPT | Performed by: INTERNAL MEDICINE

## 2024-11-13 PROCEDURE — 85025 COMPLETE CBC W/AUTO DIFF WBC: CPT | Performed by: STUDENT IN AN ORGANIZED HEALTH CARE EDUCATION/TRAINING PROGRAM

## 2024-11-13 RX ORDER — VANCOMYCIN HYDROCHLORIDE 750 MG/150ML
750 INJECTION, SOLUTION INTRAVENOUS EVERY 12 HOURS
Status: DISCONTINUED | OUTPATIENT
Start: 2024-11-13 | End: 2024-11-14 | Stop reason: DRUGHIGH

## 2024-11-13 RX ADMIN — HYDROXYZINE HYDROCHLORIDE 10 MG: 10 TABLET, FILM COATED ORAL at 20:32

## 2024-11-13 RX ADMIN — DOCUSATE SODIUM 100 MG: 100 CAPSULE, LIQUID FILLED ORAL at 10:01

## 2024-11-13 RX ADMIN — POLYETHYLENE GLYCOL 3350 17 G: 17 POWDER, FOR SOLUTION ORAL at 22:05

## 2024-11-13 RX ADMIN — DOCUSATE SODIUM 100 MG: 100 CAPSULE, LIQUID FILLED ORAL at 17:52

## 2024-11-13 RX ADMIN — SODIUM CHLORIDE, SODIUM GLUCONATE, SODIUM ACETATE, POTASSIUM CHLORIDE, MAGNESIUM CHLORIDE, SODIUM PHOSPHATE, DIBASIC, AND POTASSIUM PHOSPHATE 100 ML/HR: .53; .5; .37; .037; .03; .012; .00082 INJECTION, SOLUTION INTRAVENOUS at 06:15

## 2024-11-13 RX ADMIN — CALCIUM CARBONATE (ANTACID) CHEW TAB 500 MG 500 MG: 500 CHEW TAB at 22:05

## 2024-11-13 RX ADMIN — POLYETHYLENE GLYCOL 3350 17 G: 17 POWDER, FOR SOLUTION ORAL at 10:07

## 2024-11-13 RX ADMIN — HEPARIN SODIUM 5000 UNITS: 5000 INJECTION INTRAVENOUS; SUBCUTANEOUS at 14:15

## 2024-11-13 RX ADMIN — OXYCODONE HYDROCHLORIDE 5 MG: 5 TABLET ORAL at 22:05

## 2024-11-13 RX ADMIN — HYDROCORTISONE: 25 CREAM TOPICAL at 17:52

## 2024-11-13 RX ADMIN — KETOROLAC TROMETHAMINE 30 MG: 30 INJECTION, SOLUTION INTRAMUSCULAR; INTRAVENOUS at 03:19

## 2024-11-13 RX ADMIN — VANCOMYCIN HYDROCHLORIDE 750 MG: 750 INJECTION, SOLUTION INTRAVENOUS at 12:14

## 2024-11-13 RX ADMIN — MICONAZOLE NITRATE: 20 CREAM TOPICAL at 17:52

## 2024-11-13 RX ADMIN — MICONAZOLE NITRATE: 20 CREAM TOPICAL at 10:02

## 2024-11-13 RX ADMIN — GABAPENTIN 100 MG: 100 CAPSULE ORAL at 22:05

## 2024-11-13 RX ADMIN — HYDROCORTISONE: 25 CREAM TOPICAL at 22:05

## 2024-11-13 RX ADMIN — KETOROLAC TROMETHAMINE 30 MG: 30 INJECTION, SOLUTION INTRAMUSCULAR; INTRAVENOUS at 10:01

## 2024-11-13 RX ADMIN — CEFEPIME 2000 MG: 2 INJECTION, POWDER, FOR SOLUTION INTRAVENOUS at 06:17

## 2024-11-13 RX ADMIN — HEPARIN SODIUM 5000 UNITS: 5000 INJECTION INTRAVENOUS; SUBCUTANEOUS at 22:05

## 2024-11-13 RX ADMIN — HYDROCORTISONE: 25 CREAM TOPICAL at 10:02

## 2024-11-13 RX ADMIN — CALCIUM CARBONATE (ANTACID) CHEW TAB 500 MG 500 MG: 500 CHEW TAB at 10:12

## 2024-11-13 NOTE — ASSESSMENT & PLAN NOTE
Demonstrated on ultrasound of the RUE 11/11. In cephalic vein from wrist to distal upper arm. Potential etiology of acute sepsis as above. Blood cultures NGTD x 24h.  -Continue antibiotics as above  -Follow-up blood cultures  -Serial examinations of the RUE

## 2024-11-13 NOTE — PROGRESS NOTES
Miguel Henderson is a 84 y.o. male who is currently ordered Vancomycin IV with management by the Pharmacy Consult service.  Relevant clinical data and objective / subjective history reviewed.  Vancomycin Assessment:  Indication and Goal AUC/Trough: Soft tissue (goal -600, trough >10), -600, trough >10  Clinical Status: stable  Micro:   No new  Renal Function:  SCr: 0.6 mg/dL  CrCl: 82 mL/min  Renal replacement: Not on dialysis  Days of Therapy: 3  Current Dose: 1gm q12  Vancomycin Plan:  New Dosinmg q12  Estimated AUC: 405 mcg*hr/mL  Estimated Trough: 11.6 mcg/mL  Next Level: 11-14,  level not obtained despite order today  Renal Function Monitoring: Daily BMP and UOP  Pharmacy will continue to follow closely for s/sx of nephrotoxicity, infusion reactions and appropriateness of therapy.  BMP and CBC will be ordered per protocol. We will continue to follow the patient’s culture results and clinical progress daily.    Carlos Tavares, Pharmacist

## 2024-11-13 NOTE — ASSESSMENT & PLAN NOTE
11/11/2024 patient had sepsis criteria.  Fever and tachycardia and rigors.  Possibly related to right upper extremity cellulitis with superficial thrombophlebitis  Blood cultures negative at 48 hours  Chest x-ray unremarkable  ID consulted, appreciate recommendations  Continue empiric vancomycin for skin infection and cefepime discontinued today   Awaiting morning labs today

## 2024-11-13 NOTE — PROGRESS NOTES
Progress Note - Hospitalist   Name: Miguel Henderson 84 y.o. male I MRN: [id number]  [facility] I Date of Admission: 10/26/2024   Date of Service: 11/13/2024 I Hospital Day: 18    Assessment & Plan  Encephalopathy  Disussed with family at bedside about the course of the patient's illness.  Patient had fevers on October 7, was diagnosed with viral infection.  Sent home and had rapid decline at home.    At baseline patient is very physical and able to work in the yard and cuts down trees, very sharp handles his finances.  Now patient is baseline very confused according to the family.  Patient's last travel history was to [place] in May of last year  Brother passed away of some sort of leukemia  No dementia history  No previous stroke or traumatic brain.  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    Lyme was negative on 10/21/2024.   Rhinovirus was initially positive on 10/8/2024.  Respiratory panel was redone and was negative.    Peripheral blood smear was done and there was no parasites seen on 10/22/2024.    TSH, ammonia, coma panel unremarkable  CSF was positive for elevated WBC on 10/25/2024, VDRL negative, glucose normal, protein CSF elevated 86, Lyme PCR negative, meningitis encephalitis negative, West Nile negative  Brucella negative 11/01.2024  Mycoplasma negativem histoplasma negative  HIV negative  TB PCR Pending 10/25    Heme onc workup  Labs show an elevated gamma spike on SPEP, elevated IgA, IgG, IgM. Positive free lambda light chain on immunofixation in the urine and plasma. Flow is unremarkable. Elevated beta-2 microglobulin. High normal viscosity.   s/p bone marrow biopsy (10/29/2024) - Highly limited sample but felt to be a lymphoproliferative disorder,   Pending excisional BX and leukemia/lymphoma   Patient had plasmapheresis x3 and noted  some improvement per the family.    NEURO workup  MRI brain with no significant acute pathology  EEG with no evidence of epileptiform discharges thus no need for AED at this time       Concern noted of etiology of fevers due to possible lymphoma/hyperviscosity   Discussed with Hematology/oncology, and they believe the patient should stay inpatient until his excision bx results are noted   Discussed with Heme onc. BX results seems to have resulted. Appears nonspecific with granulocyte predominance (77%). High degree of necrosis noted  S/p Axillary bx and bone marrow bx on 11/8. Awaiting results  [facility] physician evaluated 11/11/2024. They said that in order to place to [facility] they need a chemo plan.Will need to reach out to them after getting biopsy results and having treatment plan  Sepsis (HCC)  11/11/2024 patient had sepsis criteria.  Fever and tachycardia and rigors.  Possibly related to right upper extremity cellulitis with superficial thrombophlebitis  Blood cultures negative at 48 hours  Chest x-ray unremarkable  ID consulted, appreciate recommendations  Continue empiric vancomycin for skin infection and cefepime discontinued today   Awaiting morning labs today  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   Abnormal LFTs  Elevated bilirubin and alkaline phosphatase generally improving  Suspected to be associated with lymphadenopathy (above)  Limit/avoid hepatotoxins as possible  Hypothyroidism  Continue Synthroid  Primary hypertension  Holding HCTZ due to soft pressures at this time  COVID-19 virus infection  Tested positive on 10/21  Currently oxygenating on baseline room air   Contact/airborne precautions now discontinued   Supportive care otherwise  Anemia  Hemoglobin stable at 9.8  Likely associated with lymphadenopathy  Hypocalcemia  Serum Andrion's calcium low -> continue intravenous and oral supplementation  Consider possible contribution to myoclonus  Myoclonia  Neurology suspects toxic metabolic etiology given unremarkable EEG  Superficial thrombophlebitis of right upper extremity  Supportive care  Fever  See above    VTE Pharmacologic Prophylaxis: VTE Score: 5 High Risk (Score >/= 5) - Pharmacological DVT Prophylaxis Ordered: heparin. Sequential Compression Devices Ordered.    Mobility:   Basic Mobility Inpatient Raw Score: 9  JH-HLM Goal: 3: Sit at edge of bed  JH-HLM Achieved: 2: Bed activities/Dependent transfer  JH-HLM Goal NOT achieved. Continue with multidisciplinary rounding and encourage appropriate mobility to improve upon JH-HLM goals.    Patient Centered Rounds: I performed bedside rounds with nursing staff today.   Discussions with Specialists or Other Care Team Provider: ID    Education and Discussions with Family / Patient: Updated  (son) at bedside.    Current Length of Stay: 18 day(s)  Current Patient Status: Inpatient   Certification Statement: The patient will continue to require additional inpatient hospital stay due to IV antibiotics  Discharge Plan: Anticipate discharge in 48-72 hrs to rehab facility.    Code Status: Level 3 -  DNAR and DNI    Subjective   No distress. Patient received atarax prior to my evaluation and seems more sleepy. Per family at bedside, patient was more awake this morning and doing better overall. Patient wakes up to voice and touch and states no complaints but falls asleep quickly. Family reports no BM in 3 days. No fevers.     Objective :  Temp:  [97.9 °F (36.6 °C)-101.2 °F (38.4 °C)] 97.9 °F (36.6 °C)  HR:  [] 90  BP: ()/(50-71) 109/63  Resp:  [17-20] 17  SpO2:  [98 %-100 %] 99 %    Body mass index is 28.46 kg/m².     Input and Output Summary (last 24 hours):     Intake/Output Summary (Last 24 hours) at 11/13/2024 1524  Last data filed at 11/13/2024 1214  Gross per 24 hour   Intake 3175 ml   Output 1451 ml   Net 1724 ml       Physical Exam  Vitals reviewed.   Constitutional:       General: He is not in acute distress.     Appearance: He is ill-appearing.   Cardiovascular:      Rate and Rhythm: Normal rate and regular rhythm.   Pulmonary:      Effort: Pulmonary effort is normal. No respiratory distress.   Abdominal:      General: Bowel sounds are normal.      Tenderness: There is no abdominal tenderness.   Musculoskeletal:      Right lower leg: No edema.      Left lower leg: No edema.   Neurological:      Mental Status: He is disoriented.           Lines/Drains:  Lines/Drains/Airways       Active Status       Name Placement date Placement time Site Days    HD Temporary Double Catheter 10/26/24  1451  Internal jugular  18    External Urinary Catheter 11/12/24  1445  -- 1                            Lab Results: I have reviewed the following results:   Results from last 7 days   Lab Units 11/12/24  0909 11/11/24  0608 11/09/24  0529   WBC Thousand/uL 13.58*   < > 7.93   HEMOGLOBIN g/dL 9.6*   < > 9.8*   HEMATOCRIT % 29.5*   < > 30.5*   PLATELETS Thousands/uL 241   < > 293   SEGS PCT %  --   --  36*   LYMPHO PCT %  --   --  46*   MONO PCT %  --   --  12   EOS PCT %  --   --  4    < > = values in this  interval not displayed.     Results from last 7 days   Lab Units 11/12/24  0909 11/11/24  0608 11/08/24  0559   SODIUM mmol/L 130*   < > 143   POTASSIUM mmol/L 4.0   < > 4.2   CHLORIDE mmol/L 100   < > 106   CO2 mmol/L 22   < > 25   BUN mg/dL 18   < > 8   CREATININE mg/dL 0.62   < > 0.39*   ANION GAP mmol/L 8   < > 12   CALCIUM mg/dL 7.7*   < > 9.0   ALBUMIN g/dL  --   --  3.7   TOTAL BILIRUBIN mg/dL  --   --  0.88   ALK PHOS U/L  --   --  120*   ALT U/L  --   --  24   AST U/L  --   --  33   GLUCOSE RANDOM mg/dL 169*   < > 95    < > = values in this interval not displayed.                 Results from last 7 days   Lab Units 11/11/24  1316 11/11/24  1039   LACTIC ACID mmol/L 1.4 2.5*   PROCALCITONIN ng/ml  --  0.07       Recent Cultures (last 7 days):   Results from last 7 days   Lab Units 11/11/24  1047 11/11/24  1040   BLOOD CULTURE  No Growth at 48 hrs. No Growth at 48 hrs.       Imaging Results Review: No pertinent imaging studies reviewed.  Other Study Results Review: No additional pertinent studies reviewed.    Last 24 Hours Medication List:     Current Facility-Administered Medications:     acetaminophen (Ofirmev) injection 1,000 mg, Q6H PRN, Last Rate: 1,000 mg (11/12/24 0735)    bisacodyl (DULCOLAX) rectal suppository 10 mg, Once    bisacodyl (DULCOLAX) rectal suppository 10 mg, Daily PRN    calcium carbonate (TUMS) chewable tablet 500 mg, BID    docusate sodium (COLACE) capsule 100 mg, BID    fluticasone (FLONASE) 50 mcg/act nasal spray 1 spray, Daily    gabapentin (NEURONTIN) capsule 100 mg, HS    heparin (porcine) subcutaneous injection 5,000 Units, Q8H JOE **AND** [COMPLETED] Platelet count, Once    hydrocortisone (ANUSOL-HC) 2.5 % rectal cream, TID    hydrOXYzine HCL (ATARAX) tablet 10 mg, Q6H PRN    ketorolac (TORADOL) injection 30 mg, Q8H    levothyroxine tablet 50 mcg, Once per day on Monday Tuesday Wednesday Thursday    levothyroxine tablet 75 mcg, Once per day on Sunday Friday Saturday    moisture  barrier miconazole 2% cream (aka SAMEER MOISTURE BARRIER ANTIFUNGAL CREAM), BID    multi-electrolyte (PLASMALYTE-A/ISOLYTE-S PH 7.4) IV solution, Continuous, Last Rate: 100 mL/hr (11/13/24 1415)    ondansetron (ZOFRAN) injection 4 mg, Q6H PRN    oxyCODONE (ROXICODONE) IR tablet 5 mg, Q6H PRN    pantoprazole (PROTONIX) EC tablet 40 mg, Early Morning    polyethylene glycol (MIRALAX) packet 17 g, BID    vancomycin (VANCOCIN) IVPB (premix in dextrose) 750 mg 150 mL, Q12H, Last Rate: 750 mg (11/13/24 1214)    Administrative Statements   Today, Patient Was Seen By: Tejas Ramirez, DO  I have spent a total time of 25 minutes in caring for this patient on the day of the visit/encounter including Counseling / Coordination of care, Documenting in the medical record, Reviewing / ordering tests, medicine, procedures  , Obtaining or reviewing history  , and Communicating with other healthcare professionals .    **Please Note: This note may have been constructed using a voice recognition system.**

## 2024-11-13 NOTE — CASE MANAGEMENT
Case Management Discharge Planning Note    Patient name Miguel Henderson  Location [facility] 817/[facility] 817-01 MRN [id number]  : 1940 Date 2024       Current Admission Date: 10/26/2024  Current Admission Diagnosis:Encephalopathy   Patient Active Problem List    Diagnosis Date Noted Date Diagnosed    Superficial thrombophlebitis of right upper extremity 2024     Fever 2024     Anemia 2024     Hypocalcemia 2024     Myoclonia 10/31/2024     COVID-19 virus infection 10/30/2024     Abnormal LFTs 10/29/2024     Lymphadenopathy 10/27/2024     Encephalopathy 10/26/2024     Sepsis (HCC) 10/26/2024     Metabolic encephalopathy 10/25/2024     Febrile illness 10/22/2024     Hyponatremia 10/22/2024     Lactic acidosis 10/22/2024     Oral candidiasis 10/22/2024     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     SIRS (systemic inflammatory response syndrome) (HCC) 10/22/2024     Odynophagia 10/22/2024     Localized edema 10/16/2024     Shortness of breath on exertion 10/16/2024     Cough 10/16/2024     Lesion of oral mucosa 10/15/2024     Liver lesion 10/15/2024     Dysphagia 10/14/2024     Ulceration of oral mucosa 10/14/2024     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     Left knee pain 10/10/2024     Rhinovirus 10/09/2024     Adenocarcinoma of prostate (HCC) 2024     Lumbar radiculopathy 2024     Hypothyroidism 2024     Primary hypertension 2024       LOS (days): 18  Geometric Mean LOS (GMLOS) (days): 7.5  Days to GMLOS:-10.6     OBJECTIVE:  Risk of Unplanned Readmission Score: 27.1         Current admission status: Inpatient   Preferred Pharmacy:   [id number] - [place] - [address]  [address] [place] [address]  Phone: [phone] Fax: [phone]    [id number] - [place] - [address]  [address]  [place] [address]  Phone: [phone] Fax: [phone]    Primary Care Provider: Greg Foster  MD    Primary Insurance: [facility]  Secondary Insurance:     DISCHARGE DETAILS:    Additional Comments: Patient reviewed during care coordination rounds, pt being treated with IV ABX for cellulitis and biopsy results are pending, pt anticipated to require an additional 72+ hours inpatient. [facility] continues to follow, will likely require updated physician and therapy notes closer to discharge. CM department to follow.

## 2024-11-13 NOTE — PLAN OF CARE
Problem: PAIN - ADULT  Goal: Verbalizes/displays adequate comfort level or baseline comfort level  Description: Interventions:  - Encourage patient to monitor pain and request assistance  - Assess pain using appropriate pain scale  - Administer analgesics based on type and severity of pain and evaluate response  - Implement non-pharmacological measures as appropriate and evaluate response  - Consider cultural and social influences on pain and pain management  - Notify physician/advanced practitioner if interventions unsuccessful or patient reports new pain  Outcome: Progressing     Problem: INFECTION - ADULT  Goal: Absence or prevention of progression during hospitalization  Description: INTERVENTIONS:  - Assess and monitor for signs and symptoms of infection  - Monitor lab/diagnostic results  - Monitor all insertion sites, i.e. indwelling lines, tubes, and drains  - Monitor endotracheal if appropriate and nasal secretions for changes in amount and color  - [place] appropriate cooling/warming therapies per order  - Administer medications as ordered  - Instruct and encourage patient and family to use good hand hygiene technique  - Identify and instruct in appropriate isolation precautions for identified infection/condition  Outcome: Progressing  Goal: Absence of fever/infection during neutropenic period  Description: INTERVENTIONS:  - Monitor WBC    Outcome: Progressing     Problem: SAFETY ADULT  Goal: Patient will remain free of falls  Description: INTERVENTIONS:  - Educate patient/family on patient safety including physical limitations  - Instruct patient to call for assistance with activity   - Consult OT/PT to assist with strengthening/mobility   - Keep Call bell within reach  - Keep bed low and locked with side rails adjusted as appropriate  - Keep care items and personal belongings within reach  - Initiate and maintain comfort rounds  - Make Fall Risk Sign visible to staff  - Offer Toileting every 2 Hours,  in advance of need  - Initiate/Maintain bed/chair alarm  - Apply yellow socks and bracelet for high fall risk patients  - Consider moving patient to room near nurses station  Outcome: Progressing  Goal: Maintain or return to baseline ADL function  Description: INTERVENTIONS:  -  Assess patient's ability to carry out ADLs; assess patient's baseline for ADL function and identify physical deficits which impact ability to perform ADLs (bathing, care of mouth/teeth, toileting, grooming, dressing, etc.)  - Assess/evaluate cause of self-care deficits   - Assess range of motion  - Assess patient's mobility; develop plan if impaired  - Assess patient's need for assistive devices and provide as appropriate  - Encourage maximum independence but intervene and supervise when necessary  - Involve family in performance of ADLs  - Assess for home care needs following discharge   - Consider OT consult to assist with ADL evaluation and planning for discharge  - Provide patient education as appropriate  Outcome: Progressing  Goal: Maintains/Returns to pre admission functional level  Description: INTERVENTIONS:  - Perform AM-PAC 6 Click Basic Mobility/ Daily Activity assessment daily.  - Set and communicate daily mobility goal to care team and patient/family/caregiver.   - Collaborate with rehabilitation services on mobility goals if consulted  - Reposition patient every 2 hours.  - Dangle patient 3 times a day  - Stand patient 3 times a day  - Ambulate patient 3 times a day  - Out of bed to chair 3 times a day   - Out of bed for meals 3 times a day  - Out of bed for toileting  - Record patient progress and toleration of activity level   Outcome: Progressing     Problem: DISCHARGE PLANNING  Goal: Discharge to home or other facility with appropriate resources  Description: INTERVENTIONS:  - Identify barriers to discharge w/patient and caregiver  - Arrange for needed discharge resources and transportation as appropriate  - Identify  discharge learning needs (meds, wound care, etc.)  - Arrange for interpretive services to assist at discharge as needed  - Refer to Case Management Department for coordinating discharge planning if the patient needs post-hospital services based on physician/advanced practitioner order or complex needs related to functional status, cognitive ability, or social support system  Outcome: Progressing     Problem: Knowledge Deficit  Goal: Patient/family/caregiver demonstrates understanding of disease process, treatment plan, medications, and discharge instructions  Description: Complete learning assessment and assess knowledge base.  Interventions:  - Provide teaching at level of understanding  - Provide teaching via preferred learning methods  Outcome: Progressing     Problem: Prexisting or High Potential for Compromised Skin Integrity  Goal: Skin integrity is maintained or improved  Description: INTERVENTIONS:  - Identify patients at risk for skin breakdown  - Assess and monitor skin integrity  - Assess and monitor nutrition and hydration status  - Monitor labs   - Assess for incontinence   - Turn and reposition patient  - Assist with mobility/ambulation  - Relieve pressure over bony prominences  - Avoid friction and shearing  - Provide appropriate hygiene as needed including keeping skin clean and dry  - Evaluate need for skin moisturizer/barrier cream  - Collaborate with interdisciplinary team   - Patient/family teaching  - Consider wound care consult   Outcome: Progressing     Problem: Nutrition/Hydration-ADULT  Goal: Nutrient/Hydration intake appropriate for improving, restoring or maintaining nutritional needs  Description: Monitor and assess patient's nutrition/hydration status for malnutrition. Collaborate with interdisciplinary team and initiate plan and interventions as ordered.  Monitor patient's weight and dietary intake as ordered or per policy. Utilize nutrition screening tool and intervene as necessary.  Determine patient's food preferences and provide high-protein, high-caloric foods as appropriate.     INTERVENTIONS:  - Monitor oral intake, urinary output, labs, and treatment plans  - Assess nutrition and hydration status and recommend course of action  - Evaluate amount of meals eaten  - Assist patient with eating if necessary   - Allow adequate time for meals  - Recommend/ encourage appropriate diets, oral nutritional supplements, and vitamin/mineral supplements  - Order, calculate, and assess calorie counts as needed  - Recommend, monitor, and adjust tube feedings and TPN/PPN based on assessed needs  - Assess need for intravenous fluids  - Provide specific nutrition/hydration education as appropriate  - Include patient/family/caregiver in decisions related to nutrition  Outcome: Progressing

## 2024-11-13 NOTE — PLAN OF CARE
Problem: Nutrition/Hydration-ADULT  Goal: Nutrient/Hydration intake appropriate for improving, restoring or maintaining nutritional needs  Description: Monitor and assess patient's nutrition/hydration status for malnutrition. Collaborate with interdisciplinary team and initiate plan and interventions as ordered.  Monitor patient's weight and dietary intake as ordered or per policy. Utilize nutrition screening tool and intervene as necessary. Determine patient's food preferences and provide high-protein, high-caloric foods as appropriate.     INTERVENTIONS:  - Monitor oral intake, urinary output, labs, and treatment plans  - Assess nutrition and hydration status and recommend course of action  - Evaluate amount of meals eaten  - Assist patient with eating if necessary   - Allow adequate time for meals  - Recommend/ encourage appropriate diets, oral nutritional supplements, and vitamin/mineral supplements  - Order, calculate, and assess calorie counts as needed  - Recommend, monitor, and adjust tube feedings and TPN/PPN based on assessed needs  - Assess need for intravenous fluids  - Provide specific nutrition/hydration education as appropriate  - Include patient/family/caregiver in decisions related to nutrition  11/13/2024 1333 by Beronica Griffin RD  Outcome: Progressing  11/13/2024 1319 by Beronica Griffin RD  Outcome: Progressing

## 2024-11-13 NOTE — ASSESSMENT & PLAN NOTE
The patient had intermittent fevers for 3 weeks and several admissions to [facility] with fairly unremarkable infectious workup other than positive rhinovirus and COVID. CT imaging was noted to show lymphadenopathy and he had atypical lymphocytes on CBC so hematology/oncology consult was recommended although the patient was discharged on Paxlovid with plan for an outpatient referral.  He then returned to the ED with worsening confusion and return of fever.  CT imaging with worsening axillary lymphadenopathy, CBC differential again showed atypical lymphocytes.  Status post LP 10/25 which showed mild elevation in protein and lymphocytic pleocytosis.  ME panel negative and culture showed no growth. 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  Labs show elevated LDH, ferritin, immunoglobulins. Status post plasmapheresis with improvement in fever curve and initially improvement in mental status, but now with waxing and waning mental status and development of myoclonic jerking as below. S/p lymph node and bone marrow biopsies. Bone marrow biopsy preliminary study demonstrated histiocytic aggregates, which are nonspecific, and can be seen in a variety of settings including infection. Broadened infectious work-up based on risk factors to include TB PCR from CSF (given patient is initially from [place] and traveled to [place] in June), histoplasma urine antigen, brucella antibodies, and mycoplasma antibodies. Mycoplasma antibodies, histoplasma urine antigen, and brucella antibodies negative. TB PCR from CSF is currently pending.  Patient had been without a fever for quite some time but now began spiking fever on 11/10/2024 with some chills as above.   -Antibiotics as above  -Follow-up repeat blood cultures obtained 11/11  -Follow-up repeat bone marrow biopsy  -Follow-up repeat excisional lymph node biopsy  -Follow-up mTB PCR from CSF  -Ongoing  follow-up with hematology/oncology  -Continue to monitor fever curve/vitals

## 2024-11-13 NOTE — SPEECH THERAPY NOTE
Speech Language/Pathology  Pt asleep, not wanting to eat at this time.  Diet changed to regular- family reports the pt still requires the Level 3 diet with thin.  Will change diet back to Level 3 and f/u when the pt is awake and able to take po.

## 2024-11-13 NOTE — PLAN OF CARE
Problem: PAIN - ADULT  Goal: Verbalizes/displays adequate comfort level or baseline comfort level  Description: Interventions:  - Encourage patient to monitor pain and request assistance  - Assess pain using appropriate pain scale  - Administer analgesics based on type and severity of pain and evaluate response  - Implement non-pharmacological measures as appropriate and evaluate response  - Consider cultural and social influences on pain and pain management  - Notify physician/advanced practitioner if interventions unsuccessful or patient reports new pain  Outcome: Progressing     Problem: INFECTION - ADULT  Goal: Absence or prevention of progression during hospitalization  Description: INTERVENTIONS:  - Assess and monitor for signs and symptoms of infection  - Monitor lab/diagnostic results  - Monitor all insertion sites, i.e. indwelling lines, tubes, and drains  - Monitor endotracheal if appropriate and nasal secretions for changes in amount and color  - [place] appropriate cooling/warming therapies per order  - Administer medications as ordered  - Instruct and encourage patient and family to use good hand hygiene technique  - Identify and instruct in appropriate isolation precautions for identified infection/condition  Outcome: Progressing  Goal: Absence of fever/infection during neutropenic period  Description: INTERVENTIONS:  - Monitor WBC    Outcome: Progressing     Problem: SAFETY ADULT  Goal: Patient will remain free of falls  Description: INTERVENTIONS:  - Educate patient/family on patient safety including physical limitations  - Instruct patient to call for assistance with activity   - Consult OT/PT to assist with strengthening/mobility   - Keep Call bell within reach  - Keep bed low and locked with side rails adjusted as appropriate  - Keep care items and personal belongings within reach  - Initiate and maintain comfort rounds  - Make Fall Risk Sign visible to staff  - Offer Toileting every 2 Hours,  in advance of need  - Initiate/Maintain bed/chair alarm  - Apply yellow socks and bracelet for high fall risk patients  - Consider moving patient to room near nurses station  Outcome: Progressing  Goal: Maintain or return to baseline ADL function  Description: INTERVENTIONS:  -  Assess patient's ability to carry out ADLs; assess patient's baseline for ADL function and identify physical deficits which impact ability to perform ADLs (bathing, care of mouth/teeth, toileting, grooming, dressing, etc.)  - Assess/evaluate cause of self-care deficits   - Assess range of motion  - Assess patient's mobility; develop plan if impaired  - Assess patient's need for assistive devices and provide as appropriate  - Encourage maximum independence but intervene and supervise when necessary  - Involve family in performance of ADLs  - Assess for home care needs following discharge   - Consider OT consult to assist with ADL evaluation and planning for discharge  - Provide patient education as appropriate  Outcome: Progressing  Goal: Maintains/Returns to pre admission functional level  Description: INTERVENTIONS:  - Perform AM-PAC 6 Click Basic Mobility/ Daily Activity assessment daily.  - Set and communicate daily mobility goal to care team and patient/family/caregiver.   - Collaborate with rehabilitation services on mobility goals if consulted  - Reposition patient every 2 hours.  - Dangle patient 3 times a day  - Stand patient 3 times a day  - Ambulate patient 3 times a day  - Out of bed to chair 3 times a day   - Out of bed for meals 3 times a day  - Out of bed for toileting  - Record patient progress and toleration of activity level   Outcome: Progressing     Problem: DISCHARGE PLANNING  Goal: Discharge to home or other facility with appropriate resources  Description: INTERVENTIONS:  - Identify barriers to discharge w/patient and caregiver  - Arrange for needed discharge resources and transportation as appropriate  - Identify  discharge learning needs (meds, wound care, etc.)  - Arrange for interpretive services to assist at discharge as needed  - Refer to Case Management Department for coordinating discharge planning if the patient needs post-hospital services based on physician/advanced practitioner order or complex needs related to functional status, cognitive ability, or social support system  Outcome: Progressing     Problem: Knowledge Deficit  Goal: Patient/family/caregiver demonstrates understanding of disease process, treatment plan, medications, and discharge instructions  Description: Complete learning assessment and assess knowledge base.  Interventions:  - Provide teaching at level of understanding  - Provide teaching via preferred learning methods  Outcome: Progressing     Problem: Prexisting or High Potential for Compromised Skin Integrity  Goal: Skin integrity is maintained or improved  Description: INTERVENTIONS:  - Identify patients at risk for skin breakdown  - Assess and monitor skin integrity  - Assess and monitor nutrition and hydration status  - Monitor labs   - Assess for incontinence   - Turn and reposition patient  - Assist with mobility/ambulation  - Relieve pressure over bony prominences  - Avoid friction and shearing  - Provide appropriate hygiene as needed including keeping skin clean and dry  - Evaluate need for skin moisturizer/barrier cream  - Collaborate with interdisciplinary team   - Patient/family teaching  - Consider wound care consult   Outcome: Progressing     Problem: Nutrition/Hydration-ADULT  Goal: Nutrient/Hydration intake appropriate for improving, restoring or maintaining nutritional needs  Description: Monitor and assess patient's nutrition/hydration status for malnutrition. Collaborate with interdisciplinary team and initiate plan and interventions as ordered.  Monitor patient's weight and dietary intake as ordered or per policy. Utilize nutrition screening tool and intervene as necessary.  Determine patient's food preferences and provide high-protein, high-caloric foods as appropriate.     INTERVENTIONS:  - Monitor oral intake, urinary output, labs, and treatment plans  - Assess nutrition and hydration status and recommend course of action  - Evaluate amount of meals eaten  - Assist patient with eating if necessary   - Allow adequate time for meals  - Recommend/ encourage appropriate diets, oral nutritional supplements, and vitamin/mineral supplements  - Order, calculate, and assess calorie counts as needed  - Recommend, monitor, and adjust tube feedings and TPN/PPN based on assessed needs  - Assess need for intravenous fluids  - Provide specific nutrition/hydration education as appropriate  - Include patient/family/caregiver in decisions related to nutrition  Outcome: Progressing

## 2024-11-13 NOTE — ASSESSMENT & PLAN NOTE
Met sepsis criteria on 11/10 when patient started spiking fevers up to 103F with associated tachycardia and rigors. Fortunately has remained otherwise hemodynamically stable. Procal WNL at 0.07. CXR without evidence of acute cardiopulmonary disease. Patient noted to have RUE swelling and erythema. An ultrasound revealed an acute occlusive superficial thrombophlebitis in the cephalic vein from wrist to the distal upper arm which is the likely etiology of patient's sepsis. Blood cultures are currently NGTD x 24h. Patient was started on IV vancomycin and cefepime. Fever curve appears to be down-trending. Will stop cefepime at this time and continue on IV vancomycin for coverage of gram positive skin latanya.   -Continue IV vancomycin (dosing per pharmacy)   -Stop cefepime  -Follow-up blood cultures until finalized  -Continue to monitor temperature/vitals  -Serial examinations  -Continue to follow CBCD and CMP with AM labs to monitor for potential antimicrobial toxicities and assess for clinical response to antibiotics.

## 2024-11-13 NOTE — PLAN OF CARE
Problem: PAIN - ADULT  Goal: Verbalizes/displays adequate comfort level or baseline comfort level  Description: Interventions:  - Encourage patient to monitor pain and request assistance  - Assess pain using appropriate pain scale  - Administer analgesics based on type and severity of pain and evaluate response  - Implement non-pharmacological measures as appropriate and evaluate response  - Consider cultural and social influences on pain and pain management  - Notify physician/advanced practitioner if interventions unsuccessful or patient reports new pain  Outcome: Progressing     Problem: INFECTION - ADULT  Goal: Absence or prevention of progression during hospitalization  Description: INTERVENTIONS:  - Assess and monitor for signs and symptoms of infection  - Monitor lab/diagnostic results  - Monitor all insertion sites, i.e. indwelling lines, tubes, and drains  - Monitor endotracheal if appropriate and nasal secretions for changes in amount and color  - [place] appropriate cooling/warming therapies per order  - Administer medications as ordered  - Instruct and encourage patient and family to use good hand hygiene technique  - Identify and instruct in appropriate isolation precautions for identified infection/condition  Outcome: Progressing  Goal: Absence of fever/infection during neutropenic period  Description: INTERVENTIONS:  - Monitor WBC    Outcome: Progressing     Problem: SAFETY ADULT  Goal: Patient will remain free of falls  Description: INTERVENTIONS:  - Educate patient/family on patient safety including physical limitations  - Instruct patient to call for assistance with activity   - Consult OT/PT to assist with strengthening/mobility   - Keep Call bell within reach  - Keep bed low and locked with side rails adjusted as appropriate  - Keep care items and personal belongings within reach  - Initiate and maintain comfort rounds  - Make Fall Risk Sign visible to staff  - Offer Toileting every 2 Hours,  in advance of need  - Initiate/Maintain bed/chair alarm  - Apply yellow socks and bracelet for high fall risk patients  - Consider moving patient to room near nurses station  Outcome: Progressing  Goal: Maintain or return to baseline ADL function  Description: INTERVENTIONS:  -  Assess patient's ability to carry out ADLs; assess patient's baseline for ADL function and identify physical deficits which impact ability to perform ADLs (bathing, care of mouth/teeth, toileting, grooming, dressing, etc.)  - Assess/evaluate cause of self-care deficits   - Assess range of motion  - Assess patient's mobility; develop plan if impaired  - Assess patient's need for assistive devices and provide as appropriate  - Encourage maximum independence but intervene and supervise when necessary  - Involve family in performance of ADLs  - Assess for home care needs following discharge   - Consider OT consult to assist with ADL evaluation and planning for discharge  - Provide patient education as appropriate  Outcome: Progressing  Goal: Maintains/Returns to pre admission functional level  Description: INTERVENTIONS:  - Perform AM-PAC 6 Click Basic Mobility/ Daily Activity assessment daily.  - Set and communicate daily mobility goal to care team and patient/family/caregiver.   - Collaborate with rehabilitation services on mobility goals if consulted  - Reposition patient every 2 hours.  - Dangle patient 3 times a day  - Stand patient 3 times a day  - Ambulate patient 3 times a day  - Out of bed to chair 3 times a day   - Out of bed for meals 3 times a day  - Out of bed for toileting  - Record patient progress and toleration of activity level   Outcome: Progressing     Problem: DISCHARGE PLANNING  Goal: Discharge to home or other facility with appropriate resources  Description: INTERVENTIONS:  - Identify barriers to discharge w/patient and caregiver  - Arrange for needed discharge resources and transportation as appropriate  - Identify  discharge learning needs (meds, wound care, etc.)  - Arrange for interpretive services to assist at discharge as needed  - Refer to Case Management Department for coordinating discharge planning if the patient needs post-hospital services based on physician/advanced practitioner order or complex needs related to functional status, cognitive ability, or social support system  Outcome: Progressing     Problem: Knowledge Deficit  Goal: Patient/family/caregiver demonstrates understanding of disease process, treatment plan, medications, and discharge instructions  Description: Complete learning assessment and assess knowledge base.  Interventions:  - Provide teaching at level of understanding  - Provide teaching via preferred learning methods  Outcome: Progressing     Problem: Prexisting or High Potential for Compromised Skin Integrity  Goal: Skin integrity is maintained or improved  Description: INTERVENTIONS:  - Identify patients at risk for skin breakdown  - Assess and monitor skin integrity  - Assess and monitor nutrition and hydration status  - Monitor labs   - Assess for incontinence   - Turn and reposition patient  - Assist with mobility/ambulation  - Relieve pressure over bony prominences  - Avoid friction and shearing  - Provide appropriate hygiene as needed including keeping skin clean and dry  - Evaluate need for skin moisturizer/barrier cream  - Collaborate with interdisciplinary team   - Patient/family teaching  - Consider wound care consult   Outcome: Progressing     Problem: Nutrition/Hydration-ADULT  Goal: Nutrient/Hydration intake appropriate for improving, restoring or maintaining nutritional needs  Description: Monitor and assess patient's nutrition/hydration status for malnutrition. Collaborate with interdisciplinary team and initiate plan and interventions as ordered.  Monitor patient's weight and dietary intake as ordered or per policy. Utilize nutrition screening tool and intervene as necessary.  Determine patient's food preferences and provide high-protein, high-caloric foods as appropriate.     INTERVENTIONS:  - Monitor oral intake, urinary output, labs, and treatment plans  - Assess nutrition and hydration status and recommend course of action  - Evaluate amount of meals eaten  - Assist patient with eating if necessary   - Allow adequate time for meals  - Recommend/ encourage appropriate diets, oral nutritional supplements, and vitamin/mineral supplements  - Order, calculate, and assess calorie counts as needed  - Recommend, monitor, and adjust tube feedings and TPN/PPN based on assessed needs  - Assess need for intravenous fluids  - Provide specific nutrition/hydration education as appropriate  - Include patient/family/caregiver in decisions related to nutrition  Outcome: Progressing

## 2024-11-13 NOTE — PROGRESS NOTES
Progress Note - Infectious Disease   Name: Miguel Henderson 84 y.o. male I MRN: [id number]  [facility] I Date of Admission: 10/26/2024   Date of Service: 11/13/2024 I Hospital Day: 18    Assessment & Plan  Sepsis (HCC)  Met sepsis criteria on 11/10 when patient started spiking fevers up to 103F with associated tachycardia and rigors. Fortunately has remained otherwise hemodynamically stable. Procal WNL at 0.07. CXR without evidence of acute cardiopulmonary disease. Patient noted to have RUE swelling and erythema. An ultrasound revealed an acute occlusive superficial thrombophlebitis in the cephalic vein from wrist to the distal upper arm which is the likely etiology of patient's sepsis. Blood cultures are currently NGTD x 24h. Patient was started on IV vancomycin and cefepime. Fever curve appears to be down-trending. Will stop cefepime at this time and continue on IV vancomycin for coverage of gram positive skin latanya.   -Continue IV vancomycin (dosing per pharmacy)   -Stop cefepime  -Follow-up blood cultures until finalized  -Continue to monitor temperature/vitals  -Serial examinations  -Continue to follow CBCD and CMP with AM labs to monitor for potential antimicrobial toxicities and assess for clinical response to antibiotics.  Superficial thrombophlebitis of right upper extremity  Demonstrated on ultrasound of the RUE 11/11. In cephalic vein from wrist to distal upper arm. Potential etiology of acute sepsis as above. Blood cultures NGTD x 24h.  -Continue antibiotics as above  -Follow-up blood cultures  -Serial examinations of the RUE  Fever  The patient had intermittent fevers for 3 weeks and several admissions to [facility] with fairly unremarkable infectious workup other than positive rhinovirus and COVID. CT imaging was noted to show lymphadenopathy and he had atypical lymphocytes on CBC so hematology/oncology consult was recommended although the patient was discharged on Paxlovid  with plan for an outpatient referral.  He then returned to the ED with worsening confusion and return of fever.  CT imaging with worsening axillary lymphadenopathy, CBC differential again showed atypical lymphocytes.  Status post LP 10/25 which showed mild elevation in protein and lymphocytic pleocytosis.  ME panel negative and culture showed no growth. 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  Labs show elevated LDH, ferritin, immunoglobulins. Status post plasmapheresis with improvement in fever curve and initially improvement in mental status, but now with waxing and waning mental status and development of myoclonic jerking as below. S/p lymph node and bone marrow biopsies. Bone marrow biopsy preliminary study demonstrated histiocytic aggregates, which are nonspecific, and can be seen in a variety of settings including infection. Broadened infectious work-up based on risk factors to include TB PCR from CSF (given patient is initially from [place] and traveled to [place] in June), histoplasma urine antigen, brucella antibodies, and mycoplasma antibodies. Mycoplasma antibodies, histoplasma urine antigen, and brucella antibodies negative. TB PCR from CSF is currently pending.  Patient had been without a fever for quite some time but now began spiking fever on 11/10/2024 with some chills as above.   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  MRI brain without acute intracranial abnormalities.  Status post LP 10/25 with mild lymphocytic pleocytosis and elevated protein.  ME panel and CSF culture negative. Suspect CSF abnormalities related to possible  underlying lymphoproliferative disorder.  Status post first session of plasmapheresis 10/26 with improvement in fever curve and mental status initially, but now seems to have worsened.  Repeat CT of the brain nondiagnostic for source on 10/31/2024. Neurology consulted. Concern for toxic-metabolic source and a routine EEG was obtained. It showed nearly continuous myoclonic jerking and concern for an epileptic etiology of myoclonus. However, repeat EEG was performed with an EMG on the limbs and there did not appear to be epileptic correlation. 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 MRI brain negative for acute pathology.  He is much improved cognitively although still a bit confused.  -Continue to monitor mental status  -Appreciate neurology evaluation and recommendations  Lymphadenopathy  During prior admission imaging showed enlarged periaortic, iliac chain and inguinal lymph nodes.  Repeat imaging showed persistent lymphadenopathy with worsened right axillary adenopathy.  As above, concern for an underlying lymphoproliferative disorder.  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  CBC differential also shows atypical lymphocytes.  Status post plasmapheresis 10/26. HIV screening negative. S/p lymph node and bone marrow biopsies. Lymph node biopsy overall findings are atypical and suspicious for a lymphoproliferative disorder. However, the tissue was too scant to further characterize the underlying process. Therefore, patient underwent an excisional biopsy with surgical oncology on 11/3 which was nondiagnostic.  He underwent repeat lymph node biopsy on 11/8/2024 the results of which are pending.  -Hematology/oncology following  -Follow-up pathology from excisional biopsy and bone marrow biopsy  -Additional infectious work-up as above  COVID-19 virus infection  Isolation discontinued on 11/1/2024  Myoclonia  Development of intermittent twitching/myoclonic jerking on 10/31.  Neurology consulted. Concern for toxic-metabolic source and a routine EEG was obtained. It showed nearly continuous myoclonic jerking and concern for an epileptic etiology of myoclonus. Patient started on Ativan and Keppra. However, repeat EEG was performed with an EMG on the limbs and there did not appear to be epileptic correlation. 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 MRI brain negative for acute pathology.  He seems improved.  -Appreciate neurology workup and follow-up    I have discussed with primary service the above plan to continue IV cefepime and IV vancomycin while following up repeat blood cultures. They agree with the plan.  Infectious Disease service will follow.    Antibiotics:  Vancomycin D#3  Cefepime D#3    Antibiotic D#3    Subjective   Awake, interactive, family at bedside. Patient states the pain in his RUE has improved significantly. Denies having any other pain or complaints. No fevers or chills this morning. Denies chest pain, SOB, N/V/D, abdominal pain.     Objective :  Temp:  [98.2 °F (36.8 °C)-101.2 °F (38.4 °C)] 98.9 °F (37.2 °C)  HR:  [] 87  BP: ()/(50-71) 111/68  Resp:  [17-20] 17  SpO2:  [98 %-100 %] 99 %    General Appearance:  Chronically ill appearing, awake and interactive today, nontoxic and no acute distress   Throat: Oropharynx moist without lesions.    Lungs:   Clear to auscultation bilaterally; no wheezes, rhonchi or rales; respirations unlabored. On room air.    Heart:  RRR; no murmur, rub or gallop.   Abdomen:   Soft, non-tender, non-distended, positive bowel sounds.     Extremities: No clubbing or cyanosis. RUE erythema improved, remains firm with palpation, but not fluctuant. Edema improved.    Skin: No new rashes, lesions, or draining wounds noted on exposed skin.       Lab Results: I have reviewed the following results:  Results from last 7 days   Lab Units 11/12/24  0909 11/11/24  0608 11/09/24  0529   WBC Thousand/uL  13.58* 8.29 7.93   HEMOGLOBIN g/dL 9.6* 10.3* 9.8*   PLATELETS Thousands/uL 241 284 293     Results from last 7 days   Lab Units 11/12/24  0909 11/11/24  0608 11/08/24  0559   SODIUM mmol/L 130* 135 143   POTASSIUM mmol/L 4.0 3.9 4.2   CHLORIDE mmol/L 100 100 106   CO2 mmol/L 22 28 25   BUN mg/dL 18 13 8   CREATININE mg/dL 0.62 0.50* 0.39*   EGFR ml/min/1.73sq m 89 98 108   CALCIUM mg/dL 7.7* 8.7 9.0   AST U/L  --   --  33   ALT U/L  --   --  24   ALK PHOS U/L  --   --  120*   ALBUMIN g/dL  --   --  3.7     Results from last 7 days   Lab Units 11/11/24  1047 11/11/24  1040   BLOOD CULTURE  No Growth at 24 hrs. No Growth at 24 hrs.     Results from last 7 days   Lab Units 11/11/24  1039   PROCALCITONIN ng/ml 0.07         Results from last 7 days   Lab Units 11/08/24  0559   FERRITIN ng/mL 184               Anais Kerr PA-C  [facility]

## 2024-11-13 NOTE — ASSESSMENT & PLAN NOTE
Disussed with family at bedside about the course of the patient's illness.  Patient had fevers on October 7, was diagnosed with viral infection.  Sent home and had rapid decline at home.    At baseline patient is very physical and able to work in the yard and cuts down trees, very sharp handles his finances.  Now patient is baseline very confused according to the family.  Patient's last travel history was to [place] in May of last year  Brother passed away of some sort of leukemia  No dementia history  No previous stroke or traumatic brain.  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    Lyme was negative on 10/21/2024.   Rhinovirus was initially positive on 10/8/2024.  Respiratory panel was redone and was negative.    Peripheral blood smear was done and there was no parasites seen on 10/22/2024.    TSH, ammonia, coma panel unremarkable  CSF was positive for elevated WBC on 10/25/2024, VDRL negative, glucose normal, protein CSF elevated 86, Lyme PCR negative, meningitis encephalitis negative, West Nile negative  Brucella negative 11/01.2024  Mycoplasma negativem histoplasma negative  HIV negative  TB PCR Pending 10/25    Heme onc workup  Labs show an elevated gamma spike on SPEP, elevated IgA, IgG, IgM. Positive free lambda light chain on immunofixation in the urine and plasma. Flow is unremarkable. Elevated beta-2 microglobulin. High normal viscosity.   s/p bone marrow biopsy (10/29/2024) - Highly limited sample but felt to be a lymphoproliferative disorder,   Pending excisional BX and leukemia/lymphoma   Patient had plasmapheresis x3 and noted some improvement per the family.    NEURO workup  MRI brain with no significant acute pathology  EEG with no evidence of epileptiform discharges thus no need for AED at this time       Concern noted of etiology of fevers due to  possible lymphoma/hyperviscosity   Discussed with Hematology/oncology, and they believe the patient should stay inpatient until his excision bx results are noted   Discussed with Heme onc. BX results seems to have resulted. Appears nonspecific with granulocyte predominance (77%). High degree of necrosis noted  S/p Axillary bx and bone marrow bx on 11/8. Awaiting results  [facility] physician evaluated 11/11/2024. They said that in order to place to [facility] they need a chemo plan.Will need to reach out to them after getting biopsy results and having treatment plan

## 2024-11-14 LAB
ALBUMIN SERPL BCG-MCNC: 2.7 G/DL (ref 3.5–5)
ALP SERPL-CCNC: 133 U/L (ref 34–104)
ALT SERPL W P-5'-P-CCNC: 31 U/L (ref 7–52)
ANION GAP SERPL CALCULATED.3IONS-SCNC: 4 MMOL/L (ref 4–13)
AST SERPL W P-5'-P-CCNC: 31 U/L (ref 13–39)
BILIRUB SERPL-MCNC: 0.67 MG/DL (ref 0.2–1)
BUN SERPL-MCNC: 12 MG/DL (ref 5–25)
CALCIUM ALBUM COR SERPL-MCNC: 8.9 MG/DL (ref 8.3–10.1)
CALCIUM SERPL-MCNC: 7.9 MG/DL (ref 8.4–10.2)
CHLORIDE SERPL-SCNC: 103 MMOL/L (ref 96–108)
CO2 SERPL-SCNC: 30 MMOL/L (ref 21–32)
CREAT SERPL-MCNC: 0.52 MG/DL (ref 0.6–1.3)
GFR SERPL CREATININE-BSD FRML MDRD: 96 ML/MIN/1.73SQ M
GLUCOSE SERPL-MCNC: 131 MG/DL (ref 65–140)
HEMOSIDERIN UR QL MICRO: ABNORMAL
MISCELLANEOUS LAB TEST RESULT: NORMAL
POTASSIUM SERPL-SCNC: 3.6 MMOL/L (ref 3.5–5.3)
PROT SERPL-MCNC: 5.9 G/DL (ref 6.4–8.4)
SODIUM SERPL-SCNC: 137 MMOL/L (ref 135–147)
VANCOMYCIN SERPL-MCNC: 12.6 UG/ML (ref 10–20)

## 2024-11-14 PROCEDURE — 97535 SELF CARE MNGMENT TRAINING: CPT

## 2024-11-14 PROCEDURE — 97530 THERAPEUTIC ACTIVITIES: CPT

## 2024-11-14 PROCEDURE — 99232 SBSQ HOSP IP/OBS MODERATE 35: CPT | Performed by: STUDENT IN AN ORGANIZED HEALTH CARE EDUCATION/TRAINING PROGRAM

## 2024-11-14 PROCEDURE — 99232 SBSQ HOSP IP/OBS MODERATE 35: CPT | Performed by: INTERNAL MEDICINE

## 2024-11-14 PROCEDURE — 97110 THERAPEUTIC EXERCISES: CPT

## 2024-11-14 PROCEDURE — 80053 COMPREHEN METABOLIC PANEL: CPT | Performed by: STUDENT IN AN ORGANIZED HEALTH CARE EDUCATION/TRAINING PROGRAM

## 2024-11-14 PROCEDURE — 80202 ASSAY OF VANCOMYCIN: CPT | Performed by: STUDENT IN AN ORGANIZED HEALTH CARE EDUCATION/TRAINING PROGRAM

## 2024-11-14 RX ORDER — PREDNISONE 20 MG/1
60 TABLET ORAL DAILY
Status: DISCONTINUED | OUTPATIENT
Start: 2024-11-14 | End: 2024-11-19 | Stop reason: HOSPADM

## 2024-11-14 RX ORDER — VANCOMYCIN HYDROCHLORIDE 1 G/200ML
1000 INJECTION, SOLUTION INTRAVENOUS EVERY 12 HOURS
Status: COMPLETED | OUTPATIENT
Start: 2024-11-14 | End: 2024-11-15

## 2024-11-14 RX ADMIN — FLUTICASONE PROPIONATE 1 SPRAY: 50 SPRAY, METERED NASAL at 17:00

## 2024-11-14 RX ADMIN — HYDROCORTISONE: 25 CREAM TOPICAL at 17:00

## 2024-11-14 RX ADMIN — CALCIUM CARBONATE (ANTACID) CHEW TAB 500 MG 500 MG: 500 CHEW TAB at 21:59

## 2024-11-14 RX ADMIN — KETOROLAC TROMETHAMINE 30 MG: 30 INJECTION, SOLUTION INTRAMUSCULAR; INTRAVENOUS at 02:03

## 2024-11-14 RX ADMIN — VANCOMYCIN HYDROCHLORIDE 750 MG: 750 INJECTION, SOLUTION INTRAVENOUS at 00:17

## 2024-11-14 RX ADMIN — LEVOTHYROXINE SODIUM 50 MCG: 75 TABLET ORAL at 06:00

## 2024-11-14 RX ADMIN — POLYETHYLENE GLYCOL 3350 17 G: 17 POWDER, FOR SOLUTION ORAL at 08:27

## 2024-11-14 RX ADMIN — MICONAZOLE NITRATE: 20 CREAM TOPICAL at 08:27

## 2024-11-14 RX ADMIN — CALCIUM CARBONATE (ANTACID) CHEW TAB 500 MG 500 MG: 500 CHEW TAB at 08:27

## 2024-11-14 RX ADMIN — VANCOMYCIN HYDROCHLORIDE 1000 MG: 1 INJECTION, SOLUTION INTRAVENOUS at 12:57

## 2024-11-14 RX ADMIN — FLUTICASONE PROPIONATE 1 SPRAY: 50 SPRAY, METERED NASAL at 08:27

## 2024-11-14 RX ADMIN — HYDROCORTISONE: 25 CREAM TOPICAL at 08:27

## 2024-11-14 RX ADMIN — PREDNISONE 60 MG: 20 TABLET ORAL at 12:57

## 2024-11-14 RX ADMIN — HEPARIN SODIUM 5000 UNITS: 5000 INJECTION INTRAVENOUS; SUBCUTANEOUS at 05:35

## 2024-11-14 RX ADMIN — MICONAZOLE NITRATE: 20 CREAM TOPICAL at 17:01

## 2024-11-14 RX ADMIN — DOCUSATE SODIUM 100 MG: 100 CAPSULE, LIQUID FILLED ORAL at 08:27

## 2024-11-14 RX ADMIN — GABAPENTIN 100 MG: 100 CAPSULE ORAL at 21:59

## 2024-11-14 RX ADMIN — HEPARIN SODIUM 5000 UNITS: 5000 INJECTION INTRAVENOUS; SUBCUTANEOUS at 21:59

## 2024-11-14 RX ADMIN — HYDROCORTISONE: 25 CREAM TOPICAL at 21:59

## 2024-11-14 RX ADMIN — SODIUM CHLORIDE, SODIUM GLUCONATE, SODIUM ACETATE, POTASSIUM CHLORIDE, MAGNESIUM CHLORIDE, SODIUM PHOSPHATE, DIBASIC, AND POTASSIUM PHOSPHATE 100 ML/HR: .53; .5; .37; .037; .03; .012; .00082 INJECTION, SOLUTION INTRAVENOUS at 18:15

## 2024-11-14 RX ADMIN — HEPARIN SODIUM 5000 UNITS: 5000 INJECTION INTRAVENOUS; SUBCUTANEOUS at 13:06

## 2024-11-14 RX ADMIN — SODIUM CHLORIDE, SODIUM GLUCONATE, SODIUM ACETATE, POTASSIUM CHLORIDE, MAGNESIUM CHLORIDE, SODIUM PHOSPHATE, DIBASIC, AND POTASSIUM PHOSPHATE 100 ML/HR: .53; .5; .37; .037; .03; .012; .00082 INJECTION, SOLUTION INTRAVENOUS at 02:03

## 2024-11-14 RX ADMIN — PANTOPRAZOLE SODIUM 40 MG: 40 TABLET, DELAYED RELEASE ORAL at 05:35

## 2024-11-14 RX ADMIN — DOCUSATE SODIUM 100 MG: 100 CAPSULE, LIQUID FILLED ORAL at 17:00

## 2024-11-14 NOTE — ASSESSMENT & PLAN NOTE
Disussed with family at bedside about the course of the patient's illness.  Patient had fevers on October 7, was diagnosed with viral infection.  Sent home and had rapid decline at home.    At baseline patient is very physical and able to work in the yard and cuts down trees, very sharp handles his finances.  Now patient is baseline very confused according to the family.  Patient's last travel history was to [place] in May of last year  Brother passed away of some sort of leukemia  No dementia history  No previous stroke or traumatic brain.  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    Lyme was negative on 10/21/2024.   Rhinovirus was initially positive on 10/8/2024.  Respiratory panel was redone and was negative.    Peripheral blood smear was done and there was no parasites seen on 10/22/2024.    TSH, ammonia, coma panel unremarkable  CSF was positive for elevated WBC on 10/25/2024, VDRL negative, glucose normal, protein CSF elevated 86, Lyme PCR negative, meningitis encephalitis negative, West Nile negative  Brucella negative 11/01.2024  Mycoplasma negativem histoplasma negative  HIV negative  TB PCR Pending 10/25    Heme onc workup  Labs show an elevated gamma spike on SPEP, elevated IgA, IgG, IgM. Positive free lambda light chain on immunofixation in the urine and plasma. Flow is unremarkable. Elevated beta-2 microglobulin. High normal viscosity.   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    NEURO workup  MRI brain with no significant acute pathology  EEG with no evidence of epileptiform  discharges thus no need for AED at this time       Concern noted of etiology of encephalopathy and fevers due to possible lymphoma/hyperviscosity   Mentation improved  Plan to start oral prednisone 60 mg daily with plan to discuss chemotherapy pending final GOC discussion and rehab performance

## 2024-11-14 NOTE — PROGRESS NOTES
Miguel Henderson is a 84 y.o. male who is currently ordered Vancomycin IV with management by the Pharmacy Consult service.  Relevant clinical data and objective / subjective history reviewed.  Vancomycin Assessment:  Indication and Goal AUC/Trough: Soft tissue (goal -600, trough >10), -600, trough >10  Clinical Status: stable  Micro:   BC LA/RA, no growth x 48 hr  Renal Function: improving  SCr: 0.52 mg/dL (was  0.6 mg/dL)  CrCl: 97.9 mL/min (was 82 mL/min)  Renal replacement: Not on dialysis  Days of Therapy: 4  Current Dose: vancomycin 750 mg IV q 12 hr  Vancomycin Plan: vancomycin random trough drawn today (11/14) at 0835 was normal at 12.6. Due to increased renal function, the vancomycin predicted trough will be < 400 so the dose will be increased to 1000 mg IV q 12 hr starting at noon today (11/14)  New Dosing: increased to vancomycin 1000 mg IV q 12 hr  Estimated AUC: 446 mcg hr/mL  Estimated Trough: 11.7 mcg/mL  Next Level: Thurs 11-21 at 0600   Renal Function Monitoring: Daily BMP and UOP  Pharmacy will continue to follow closely for s/sx of nephrotoxicity, infusion reactions and appropriateness of therapy.  BMP and CBC will be ordered per protocol. We will continue to follow the patient’s culture results and clinical progress daily.    [place]ien, R.Ph., Pharmacist

## 2024-11-14 NOTE — PROGRESS NOTES
Medical Oncology/Hematology Progress Note  Miguel Henderson, male, 84 y.o., 1940,  PPHP 817/PPHP [id number], [id number]     Assessment and Plan:  Mr. Henderson is a Armenian-speaking 84 yoM with PMHx of prostate cancer, hypertension, and hypothyroidism with encephalopathy, B symptoms, and lymphadenopathy concerning hyperviscosity syndrome secondary to lymphoma or lymphoproliferative disorder transferred from [facility] to [facility] to be evaluated for plasmapheresis which he received x3 treatments with initial improvement but subsequent return to malaise and waxing/waning encephalopathy.  He is now s/p BMBx, R inguinal core biopsy, R inguinal excisional biopsy, repeat BMBx, and R axillary core biopsy which initially were only conclusive for an unspecified lymphoproliferative disorder due to significant necrosis and all specimen.  Discussion with pathology on 11/13 suggests likely peripheral follicular T-cell lymphoma.    1. 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 B symptoms  3. Acute encephalopathy and lethargy  4. Cellulitis of RUE    We discussed recent biopsy results (R axillary and BMBx, 11/8) with pathology on 11/13.  While the official report is pending, they advised that the most recent biopsies are extensively necrotic, but they feel there is enough evidence to diagnose a peripheral follicular T-cell lymphoma.  The patient may be a candidate for induction therapy (see NCCN guidelines below), though likely with dose reduction.  Based on family discussions, given his good performance status prior to his recent acute decline, they seem to be treatment-focused.  They will continue to have [facility] family discussions.  His possible treatment options all include a combination of chemotherapy and steroids.  As his is currently  undergoing antibiotic therapy for cellulitis, he is needing rehab, and the family [facility] discussions are ongoing, we will wait to begin chemotherapy.  However, we will begin steroids now as he has received several days of antibiotic therapy already, and the steroids could help partially treat his lymphoma and assist him in his efforts in rehab.  If the patient is wholly treatments focused and he does poorly in rehab he may be a candidate for inpatient chemotherapy, but if he performs well in rehab on the steroid he could likely receive his induction therapy as an outpatient with good family support.  - Ordered prednisone 60 mg PO daily  - Holding induction chemotherapy pending final family [facility] decision, antibiotic therapy, and rehab performance  - We will continue to follow closely  - We will establish outpatient follow up in our office within two weeks of discharge after his acute symptoms have resolved        - Pathology results:  - s/p inguinal lymph node biopsy (10/28/2024)  - s/p bone marrow biopsy (10/29/2024)  - Highly limited and near inadequate though likely lymphoproliferative disorder, increased plasma cells and histiocytic aggregates, flow cytometry, stains, and ancillary studies are pending  - s/p R inguinal excisional lymph node biopsy (11/3/2024)  - Near completely necrotic tissue c/w rapidly progressive malignancy with clonal T-cells and scattered EBV-positive cells  - s/p R axillary lymph node core biopsy (11/8/2024): official report pending, d/w pathology and suggests peripheral follicular T-cell lymphoma  - s/p repeat bone marrow biopsy (11/8/2024): official report pending, d/w pathology and suggests peripheral follicular T-cell lymphoma    - Previous hospital course:  - s/p plasmapheresis (x3 days), appreciate [facility] [place] [facility] ([place] [phone])  - Appreciate IR: s/p R IJ catheter, s/p core biopsy, and s/p BMBx   - Appreciate neuro: EEG w/ near continuous myoclonus, s/p Ativan 2 mg x1 and Keppra  1000 mg BID, repeat routine EEG neg, MRI Brain neg, no need for LP, AEDs discontinued, neuro signed off  - Appreciate ID: infectious work has been negative, signed off    - Lab results:  - Viscosity: 1.9 (nml 1.4-2.1)  - Peripheral smear: negative for schistocytes  - Fibrinogen: 132  - PT-INR: 1.67  - LDH: 560  - Cryoglobulin: positive  - SPEP: elevated gamma spike  - UPEP: wnl  - Immunoglobulins: IgA 1364, IgG 2330, IgM 565  - Free light chain ratio: 0.61  - Flow cytometry: inverted CD4/CD8 ratio  - Immunofixation: free lambda light chains  - Beta-2 microglobulin: 4.7 (nml 0.6-2.4)  - LDH wnl  - haptoglobin mildly low  - PNH wnl  - Urine hemosiderin absent    Interval History:  NESSA, discussed case and recent path with pathology on 11/13 who indicated samples were largely necrotic though highly suspicious for peripheral follicular T-cell lymphoma, discussed these results and treatment implications at length with the patient, his wife, and son.  They voiced understanding and will continue to have C conversations with the patient and their family, but seem to be treatment focused at this time.  I reviewed the patient's vitals which show Tmax of 100.3 and all other vitals roughly WNL.  I reviewed the patient's labs which show interval improvement in WBC from 13.6-4.4, stable anemia of 9.6-9.3, stable and elevated alk phos of 133, albumin 2.7, SCR 0.52, and all other values roughly WNL.  There is no new imaging.  Blood cultures are no growth at 48 hours.    History of present illness:  Mr. Henderson is a Croatian-speaking 84-year-old gentleman with PMHx of prostate cancer (~15 years ago treated with surgery and radiation), hypertension, hypothyroidism, dysphagia, recent COVID and rhinovirus infections who presented to [facility] on 10/25/24 with encephalopathy, B symptoms and lymphadenopathy on imaging concerning for lymphoma with elevated clinical concern for potential hyperviscosity syndrome resulting  in a transfer to [facility] to be evaluated for plasmapheresis.    The Patient has had three hospital admissions in the last three weeks.  He has been feeling unwell for several weeks, has had fevers, is very fatigued, has poor appetite and poor PO intake, and has noted weight loss.  Initially, his fevers and lymphadenopathy were attributed to recent COVID and rhinovirus infections.  Imaging did demonstrate lymphadenopathy again thought to be attributed to infection with instructions to follow-up with hematology/medical oncology in the outpatient setting to ensure resolution.  However, his symptoms continued and empiric antibiotics were with concern for meningitis.  Antibiotics were discontinued when LP was negative and ID does not feel there is sufficient evidence for meningitis.     Vitals show Temp 100.2, HR 93, RR 16, /65, and 96% SpO2 on RA.  Labs show strong cold agglutinin, WBC 8.5, Hb 9.4, , , K 3.3, chloride 96, , BUN 7, creatinine 0.56, glucose 88, total protein 6.6, T. bili 1.53, Mg 1.7.    CT CAP w/ (10/25/2024) demonstrates right axillary adenopathy that is worsened compared to previous imaging on 10/11/2024.  Unchanged mediastinal, bilateral iliac chain and bilateral inguinal adenopathy compared to CT on 10/21/2024.  Mild potential pulmonary edema.  Small pleural effusions.  Unchanged cardiomegaly.  Right axillary and subpectoral adenopathy demonstrates a lymph node measuring 21 mm that was previously 12 mm.  Numerous new enlarged nodes unchanged subcentimeter left axillary nodes.  Right external iliac node measures 2.2 cm.  Left external iliac node demonstrates size of 10 mm.  Inguinal lymphadenopathy measures 16 mm on the right and 11 mm on the left.     MRI Brain (10/25/2024) demonstrates no acute infarct, mass effect or midline shift with mild to moderate chronic microangiopathy.  There is multiple indeterminate bilateral parotid nodes, the largest which  measures up to 1 cm on the left, and is incompletely evaluated on this imaging.     Peripheral smear (10/22/24) showed RBCs with marked agglutination limiting further evaluation of morphologic features, slight left shift in WBC showing granulocytes with numerous fragile cells and scattered reactive monocytes and lymphocytes without distinct features of dysplasia or circulating blasts, platelets show normal morphology without significant clumps or satellitosis.  Overall, the combination of findings is not definitively specific, though the red blood cell changes are compatible with a cold agglutinin in the correct clinical setting. The white blood cell changes may raise the possibility of infection, chemotherapy or drug effect, toxin exposure, postvaccination, or other reactive condition. The presence of fragile white blood cells may be an artifact of specimen handling, though a component of drug effect or nonspecific reactive change cannot be entirely excluded. Correlation with clinical impression and other laboratory findings is recommended. If clinical suspicion for more significant disease process such as a lymphoproliferative disorder exists then consideration for repeat sampling with peripheral blood flow cytometry may be of assistance to further evaluate the white blood cell changes as clinically indicated.  Flow cytometry was ordered and is pending.    Dr. Maxwell, hematology/oncology, coordinated care with [facility] primary attending, Dr. Borrero to arrange for urgent transfer to [place].  We called the [place] [facility] to discuss the case and inform them of a possible need for plasmapheresis.  The patient arrived promptly to [facility].  We evaluated the patient at bedside and found him to be AAOx3, states that he feels slightly improved, but exhibited a degree of encephalopathy.  We contacted the [place] [facility] again ([facility] [phone]) who recommended ordering labs, 3.5 L albumin  x2 days, and calcium gluconate 2 g x2 days.  We contacted IR who is planning to place urgent R IJ catheter.  Family history is positive for lymphoma in a brother.    Review of Systems:   ROS was performed and is negative except as indicated above.    Current Facility-Administered Medications:     acetaminophen (Ofirmev) injection 1,000 mg, 1,000 mg, Intravenous, Q6H PRN, Argelia Mccallum MD, Last Rate: 400 mL/hr at 11/12/24 0735, 1,000 mg at 11/12/24 0735    bisacodyl (DULCOLAX) rectal suppository 10 mg, 10 mg, Rectal, Once, Argelia Mccallum MD    bisacodyl (DULCOLAX) rectal suppository 10 mg, 10 mg, Rectal, Daily PRN, Bunny Pearson MD    calcium carbonate (TUMS) chewable tablet 500 mg, 500 mg, Oral, BID, Argelia Mccallum MD, 500 mg at 11/14/24 0827    docusate sodium (COLACE) capsule 100 mg, 100 mg, Oral, BID, Argelia Mccallum MD, 100 mg at 11/14/24 0827    fluticasone (FLONASE) 50 mcg/act nasal spray 1 spray, 1 spray, Nasal, Daily, Argelia Mccallum MD, 1 spray at 11/14/24 0827    gabapentin (NEURONTIN) capsule 100 mg, 100 mg, Oral, HS, Nancy Medinazka, 100 mg at 11/13/24 2205    heparin (porcine) subcutaneous injection 5,000 Units, 5,000 Units, Subcutaneous, Q8H JOE, 5,000 Units at 11/14/24 0535 **AND** [COMPLETED] Platelet count, , , Once, Nancy Mondragon    hydrocortisone (ANUSOL-HC) 2.5 % rectal cream, , Topical, TID, Bunny Pearson MD, Given at 11/14/24 0827    hydrOXYzine HCL (ATARAX) tablet 10 mg, 10 mg, Oral, Q6H PRN, Bunny Pearson MD, 10 mg at 11/13/24 2032    ketorolac (TORADOL) injection 30 mg, 30 mg, Intravenous, Q8H, Bunny Pearson MD, 30 mg at 11/14/24 0203    levothyroxine tablet 50 mcg, 50 mcg, Oral, Once per day on Monday Tuesday Wednesday Thursday, Argelia Mccallum MD, 50 mcg at 11/14/24 0600    levothyroxine tablet 75 mcg, 75 mcg, Oral, Once per day on Sunday Friday Saturday, Argelia Mccallum MD, 75 mcg at 11/10/24 0543    moisture barrier miconazole 2% cream (aka SAMEER MOISTURE BARRIER ANTIFUNGAL  "CREAM), , Topical, BID, Jonatan Campos MD, Given at 24    multi-electrolyte (PLASMALYTE-A/ISOLYTE-S PH 7.4) IV solution, 100 mL/hr, Intravenous, Continuous, Bunny Pearson MD, Last Rate: 100 mL/hr at 24 0203, 100 mL/hr at 24 0203    ondansetron (ZOFRAN) injection 4 mg, 4 mg, Intravenous, Q6H PRN, Argelia Mccallum MD    oxyCODONE (ROXICODONE) IR tablet 5 mg, 5 mg, Oral, Q6H PRN, Bunny Pearson MD, 5 mg at 24    pantoprazole (PROTONIX) EC tablet 40 mg, 40 mg, Oral, Early Morning, Argelia Mccallum MD, 40 mg at 24 05    polyethylene glycol (MIRALAX) packet 17 g, 17 g, Oral, BID, Bunny Pearson MD, 17 g at 24    vancomycin (VANCOCIN) IVPB (premix in dextrose) 750 mg 150 mL, 750 mg, Intravenous, Q12H, Tejas Ramirez DO, Last Rate: 150 mL/hr at 24 001, 750 mg at 24 001    Medications Prior to Admission:     benzonatate (TESSALON) 200 MG capsule    cyclobenzaprine (FLEXERIL) 5 mg tablet    fluticasone (FLONASE) 50 mcg/act nasal spray    hydroCHLOROthiazide 25 mg tablet    ibuprofen (MOTRIN) 800 mg tablet    levothyroxine 50 mcg tablet    levothyroxine 75 mcg tablet    [] nirmatrelvir & ritonavir (Paxlovid) tablet therapy pack    ondansetron (ZOFRAN) 4 mg tablet    pantoprazole (PROTONIX) 40 mg tablet    potassium chloride (Klor-Con M10) 10 mEq tablet    Allergies   Allergen Reactions    Shellfish Allergy - Food Allergy Anaphylaxis    Valtrex [Valacyclovir] Hives     Physical Exam:    /64   Pulse 84   Temp 98.6 °F (37 °C)   Resp 16   Ht 5' 5\" (1.651 m)   Wt 77.6 kg (171 lb)   SpO2 99%   BMI 28.46 kg/m²     General: AAOx4, improved-appearing  HEENT: PERRLA, moist mucosa, dentures  Respiratory: CTAB w/o wheezes, rales, or rhonchi, no increased work of breathing  Cardiovascular: RRR w/o murmurs, 2+ radial and pedal pulses, no b/l LE edema  Abdomen: soft, non-tender, non-distended, no hepatomegaly or " splenomegaly  /Rectal: deferred  Musculoskeletal: sedated, myoclonus in b/l LE and UE  Integumentary: reticular rash across chest and arms, dry, no bruising  Neurological: myoclonus in b/l LE and UE  Psychiatric: sedated    Recent Results (from the past 48 hours)   Basic metabolic panel    Collection Time: 11/12/24  9:09 AM   Result Value Ref Range    Sodium 130 (L) 135 - 147 mmol/L    Potassium 4.0 3.5 - 5.3 mmol/L    Chloride 100 96 - 108 mmol/L    CO2 22 21 - 32 mmol/L    ANION GAP 8 4 - 13 mmol/L    BUN 18 5 - 25 mg/dL    Creatinine 0.62 0.60 - 1.30 mg/dL    Glucose 169 (H) 65 - 140 mg/dL    Calcium 7.7 (L) 8.4 - 10.2 mg/dL    eGFR 89 ml/min/1.73sq m   CBC    Collection Time: 11/12/24  9:09 AM   Result Value Ref Range    WBC 13.58 (H) 4.31 - 10.16 Thousand/uL    RBC 3.12 (L) 3.88 - 5.62 Million/uL    Hemoglobin 9.6 (L) 12.0 - 17.0 g/dL    Hematocrit 29.5 (L) 36.5 - 49.3 %    MCV 95 82 - 98 fL    MCH 30.8 26.8 - 34.3 pg    MCHC 32.5 31.4 - 37.4 g/dL    RDW 18.6 (H) 11.6 - 15.1 %    Platelets 241 149 - 390 Thousands/uL    MPV 9.3 8.9 - 12.7 fL   Comprehensive metabolic panel    Collection Time: 11/13/24  6:39 PM   Result Value Ref Range    Sodium 134 (L) 135 - 147 mmol/L    Potassium 3.9 3.5 - 5.3 mmol/L    Chloride 101 96 - 108 mmol/L    CO2 29 21 - 32 mmol/L    ANION GAP 4 4 - 13 mmol/L    BUN 15 5 - 25 mg/dL    Creatinine 0.57 (L) 0.60 - 1.30 mg/dL    Glucose 150 (H) 65 - 140 mg/dL    Calcium 7.8 (L) 8.4 - 10.2 mg/dL    Corrected Calcium 8.8 8.3 - 10.1 mg/dL    AST 36 13 - 39 U/L    ALT 33 7 - 52 U/L    Alkaline Phosphatase 138 (H) 34 - 104 U/L    Total Protein 5.8 (L) 6.4 - 8.4 g/dL    Albumin 2.7 (L) 3.5 - 5.0 g/dL    Total Bilirubin 0.81 0.20 - 1.00 mg/dL    eGFR 92 ml/min/1.73sq m   CBC and differential    Collection Time: 11/13/24  6:39 PM   Result Value Ref Range    WBC 4.50 4.31 - 10.16 Thousand/uL    RBC 2.92 (L) 3.88 - 5.62 Million/uL    Hemoglobin 8.8 (L) 12.0 - 17.0 g/dL    Hematocrit 27.5 (L) 36.5  - 49.3 %    MCV 94 82 - 98 fL    MCH 30.1 26.8 - 34.3 pg    MCHC 32.0 31.4 - 37.4 g/dL    RDW 18.0 (H) 11.6 - 15.1 %    MPV 9.4 8.9 - 12.7 fL    Platelets 243 149 - 390 Thousands/uL    nRBC 0 /100 WBCs    Segmented % 52 43 - 75 %    Immature Grans % 2 0 - 2 %    Lymphocytes % 35 14 - 44 %    Monocytes % 6 4 - 12 %    Eosinophils Relative 4 0 - 6 %    Basophils Relative 1 0 - 1 %    Absolute Neutrophils 2.37 1.85 - 7.62 Thousands/µL    Absolute Immature Grans 0.07 0.00 - 0.20 Thousand/uL    Absolute Lymphocytes 1.56 0.60 - 4.47 Thousands/µL    Absolute Monocytes 0.27 0.17 - 1.22 Thousand/µL    Eosinophils Absolute 0.20 0.00 - 0.61 Thousand/µL    Basophils Absolute 0.03 0.00 - 0.10 Thousands/µL       MRI brain w wo contrast    Result Date: 10/26/2024  Narrative: MRI BRAIN WITH AND WITHOUT CONTRAST INDICATION: confusion,. COMPARISON: CT head 10/25/2024. TECHNIQUE: Multiplanar, multisequence imaging of the brain was performed before and after gadolinium administration. IV Contrast:  7 mL of Gadobutrol injection (SINGLE-DOSE) IMAGE QUALITY:   Motion-degraded, which reduces diagnostic sensitivity. FINDINGS: BRAIN PARENCHYMA: No acute infarct, mass effect or midline shift. Mild-moderate chronic ischemic changes of the white matter. Postcontrast imaging of the brain demonstrates no abnormal enhancement. VENTRICLES:  Normal for the patient's age. SELLA AND PITUITARY GLAND:  Normal. ORBITS: No acute abnormality. PARANASAL SINUSES: Mild mucosal thickening. VASCULATURE: Please refer to CTA. CALVARIUM AND SKULL BASE: No acute abnormality. EXTRACRANIAL SOFT TISSUES: Multiple bilateral parotid nodules, the largest of which measures up to 1.0 cm in the left parotid gland (series 6, image 1), noting these are not well evaluated.     Impression: Within the confines of a motion-degraded examination: 1. No acute infarct, mass effect or midline shift. Mild-moderate chronic microangiopathy. 2. Multiple indeterminate bilateral parotid  nodules, the largest of which measures up to 1.0 cm on the left, incompletely evaluated. If clinically appropriate, this could be further evaluated with contrast-enhanced neck CT on a nonemergent basis. The study was marked in EPIC for immediate notification. Workstation performed: [id number]     IR lumbar puncture    Result Date: 10/25/2024  Narrative: Lumbar puncture under fluoroscopic control. Clinical History: Confusion and fever Fluoro time: 0.9 minutes Conscious sedation time: 35 minutes Number of Images: 1 Technique: The patient was brought to the interventional radiology suite and identified by wristband. The patient was placed prone on the table, and the L4-5 spinous processes were identified. The skin was then prepped and draped in usual sterile fashion. Lidocaine was administered to the skin, and under fluoroscopic control, a 20 gauge spinal needle was advanced into the subarachnoid space between the L4-5 spinous processes . The table was then tilted into reverse Trendelenburg. 30 cc of clear spinal fluid was removed under it's own pressure into 4 separate sequential tubes. The tubes were sent to laboratory for testing as requested by the referring physician. After the procedure, the needle was removed, and a sterile dressing applied to the site. The patient tolerated the procedure well and suffered no complications.     Impression: Impression: 1. Successful lumbar puncture under fluoroscopic control. 2. 30 cc of clear spinal fluid was removed and sent to laboratory as described above. Workstation performed: [id number]     CT chest abdomen pelvis w contrast    Result Date: 10/25/2024  Narrative: CT CHEST, ABDOMEN AND PELVIS WITH IV CONTRAST INDICATION: Confusion, abdominal pain, emesis, generalized abdominal tenderness. COMPARISON: CT abdomen pelvis 10/21/2024. CT chest abdomen pelvis 10/11/2024. TECHNIQUE: CT examination of the chest, abdomen and pelvis was performed. Multiplanar 2D reformatted images  were created from the source data. This examination, like all CT scans performed in the [facility], was performed utilizing techniques to minimize radiation dose exposure, including the use of iterative reconstruction and automated exposure control. Radiation dose length product (DLP) for this visit: 850.79 mGy-cm IV Contrast: 100 mL of iohexol (OMNIPAQUE) Enteric Contrast: Not administered. FINDINGS: CHEST LUNGS: Mild interlobular septal thickening through both lower lobes may represent mild pulmonary edema. Mild bibasilar atelectasis. No focal consolidation. Unchanged known 4 mm and smaller pulmonary nodules. PLEURA: Small bibasilar pleural effusions. HEART/GREAT VESSELS: Cardiomegaly. Coronary artery calcifications. No thoracic aortic aneurysm. MEDIASTINUM AND JOE: Unchanged mild mediastinal and bilateral parahilar adenopathy. Dominant 16 mm precarinal node #2/49. Reidentified diffuse esophageal distention. Diffuse esophageal fluid may indicate reflux. CHEST WALL AND LOWER NECK: Worsened right axillary and right subpectoral adenopathy. Previously seen dominant 12 mm node now measures 21 mm #2/29. Numerous new enlarged nodes. Unchanged subcentimeter left axillary nodes. ABDOMEN LIVER/BILIARY TREE: Unremarkable. GALLBLADDER: No calcified gallstones. No pericholecystic inflammatory change. SPLEEN: Unremarkable. PANCREAS: Unremarkable. ADRENAL GLANDS: Unremarkable. KIDNEYS/URETERS: No hydronephrosis. Numerous unchanged bilateral simple renal cysts. Well-circumscribed subcentimeter bilateral renal hypoattenuating lesions are also unchanged and likely to represent subcentimeter simple cyst. STOMACH AND BOWEL: Unremarkable. APPENDIX: No findings to suggest appendicitis. ABDOMINOPELVIC CAVITY: No ascites. No pneumoperitoneum. Unchanged bilateral iliac chain adenopathy. A dominant 2.2 cm right external iliac node #2/199 is unchanged. Dominant 10 mm left external iliac node #2/185 is unchanged. Unchanged  9 mm left pelvic sidewall node #2/210. Numerous unchanged subcentimeter retroperitoneal nodes. 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 PELVIS REPRODUCTIVE ORGANS: Prostatectomy. URINARY BLADDER: Unremarkable. ABDOMINAL WALL/INGUINAL REGIONS: Unchanged bilateral inguinal adenopathy. Dominant 16 mm right inguinal node #2/214. Dominant 11 mm left inguinal node #2/237. BONES: No acute fracture or suspicious osseous lesion.     Impression: Right axillary adenopathy is worsened when compared with CT chest 10/11/2024 Unchanged mediastinal, bilateral iliac chain and bilateral inguinal adenopathy when compared with CT abdomen pelvis 10/21/2024. Potential mild pulmonary edema. Small pleural effusions. Unchanged cardiomegaly. The study was marked in EPIC for immediate notification. Workstation performed: [id number]     CT head without contrast    Result Date: 10/25/2024  Narrative: CT BRAIN - WITHOUT CONTRAST INDICATION:   Confusion, non focal. COMPARISON:  None. TECHNIQUE:  CT examination of the brain was performed.  Multiplanar 2D reformatted images were created from the source data. Radiation dose length product (DLP) for this visit:  870.27 mGy-cm .  This examination, like all CT scans performed in the [facility], was performed utilizing techniques to minimize radiation dose exposure, including the use of iterative  reconstruction and automated exposure control. IMAGE QUALITY:  Diagnostic. FINDINGS: PARENCHYMA: Decreased attenuation is noted in periventricular and subcortical white matter demonstrating an appearance that is statistically most likely to represent moderate microangiopathic change. No CT signs of acute infarction.  No intracranial mass, mass effect or midline shift.  No acute parenchymal hemorrhage. VENTRICLES AND EXTRA-AXIAL SPACES:  Normal for the patient's age. VISUALIZED ORBITS: Normal visualized orbits. PARANASAL SINUSES:  Normal visualized paranasal sinuses. CALVARIUM AND EXTRACRANIAL SOFT TISSUES: Normal.     Impression: No acute intracranial abnormality. Workstation performed: [id number]     XR chest 1 view portable    Result Date: 10/22/2024  Narrative: XR CHEST PORTABLE INDICATION: Cough/fever. COMPARISON: Chest radiograph 10/8/2024; CT chest, abdomen and pelvis 10/11/2024 FINDINGS: Monitoring leads and clips project over the chest. Mild bibasilar atelectasis. No focal consolidation. Blunting of the left costophrenic angle suggesting trace pleural effusion. No pneumothorax. Normal cardiomediastinal silhouette. Degenerative changes of the spine and bilateral shoulders. Normal upper abdomen.     Impression: Mild bibasilar atelectasis and trace left pleural effusion. Resident: MARTHA Whiteside I, the attending radiologist, have reviewed the images and agree with the final report above. Workstation performed: [id number]     CT abdomen pelvis with contrast    Result Date: 10/21/2024  Narrative: CT ABDOMEN AND PELVIS WITH IV CONTRAST INDICATION:, Nausea, vomiting and diarrhea. . COMPARISON: 10/11/2024. TECHNIQUE: CT examination of the abdomen and pelvis was performed. Multiplanar 2D reformatted images were created from the source data. This examination, like all CT scans performed in the [facility], was performed utilizing techniques to minimize radiation dose exposure, including the use of iterative reconstruction and automated exposure control. Radiation dose length product (DLP) for this visit: 585 mGy-cm IV Contrast: 100 mL of iohexol (OMNIPAQUE) Enteric Contrast: Not administered. FINDINGS: ABDOMEN LOWER CHEST: Small left pleural effusion and left basilar subsegmental atelectasis. LIVER/BILIARY TREE: Unremarkable. GALLBLADDER: No calcified gallstones. No pericholecystic inflammatory change. SPLEEN: Unremarkable. PANCREAS: Unremarkable. ADRENAL GLANDS: Unremarkable. KIDNEYS/URETERS: Symmetric nephrographic phase  enhancement the kidneys. Cysts measure up to 6.4 cm. Multiple left renal cysts measuring up to 8.2 cm. No obstructive uropathy. STOMACH AND BOWEL: Unremarkable. APPENDIX: Prior appendectomy. 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 PELVIS REPRODUCTIVE ORGANS: Prior prostatectomy. URINARY BLADDER: Unremarkable. ABDOMINAL WALL/INGUINAL REGIONS: Enlarged bilateral inguinal lymph nodes with normal fatty shen, measuring up to 1.9 cm.. BONES: No acute fracture or suspicious osseous lesion.     Impression: 1. No evidence of bowel obstruction, colitis or diverticulitis. 2. Numerous mildly enlarged para-aortic, iliac chain and inguinal lymph nodes, possibly reactive however lymphoproliferative disorder not excluded. Workstation performed: [id number]     CT chest abdomen pelvis w contrast    Result Date: 10/12/2024  Narrative: CT CHEST, ABDOMEN AND PELVIS WITH IV CONTRAST INDICATION: intermittent persistent fevers. COMPARISON: 10/8/2024. TECHNIQUE: CT examination of the chest, abdomen and pelvis was performed. Multiplanar 2D reformatted images were created from the source data. This examination, like all CT scans performed in the [facility], was performed utilizing techniques to minimize radiation dose exposure, including the use of iterative reconstruction and automated exposure control. Radiation dose length product (DLP) for this visit: 898.73 mGy-cm IV Contrast: 100 mL of iohexol (OMNIPAQUE) Enteric Contrast: Not administered. FINDINGS: Moderately degraded by respiratory motion and retained barium in the colon. CHEST LUNGS: There is bibasilar atelectasis. Stable 3 mm right apical lung nodule on series 4 image 24. Additional nodules are not well seen due to respiratory motion. No tracheal or endobronchial lesion. PLEURA: There are small  bilateral pleural effusions. HEART/GREAT VESSELS: Heart is unremarkable for patient's age. No thoracic aortic aneurysm. MEDIASTINUM AND JOE: There is diffuse distention of the esophagus as seen previously. No obstructing lesion is visualized. CHEST WALL AND LOWER NECK: Again seen is an enlarged right axillary lymph node measuring 1.2 x 1.9 cm, nonspecific. ABDOMEN LIVER/BILIARY TREE: There is a 1.9 x 1.6 cm enhancing lesion in segment 6 of the liver, indeterminate. GALLBLADDER: Not well-visualized due to under distention and beam hardening artifact from adjacent barium. SPLEEN: Unremarkable. PANCREAS: Unremarkable. ADRENAL GLANDS: Unremarkable. KIDNEYS/URETERS: There are numerous bilateral renal cysts. No hydronephrosis. STOMACH AND BOWEL: Unremarkable. APPENDIX: No findings to suggest appendicitis. ABDOMINOPELVIC CAVITY: No ascites. No pneumoperitoneum. No lymphadenopathy. 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 PELVIS REPRODUCTIVE ORGANS: Unremarkable for patient's age. URINARY BLADDER: Unremarkable. ABDOMINAL WALL/INGUINAL REGIONS: Unremarkable. BONES: No acute fracture or suspicious osseous lesion.     Impression: Moderately limited by respiratory motion. 1.  Small bilateral pleural effusions with overlying atelectasis. 2.  3 mm right apical lung nodule. Based on current Fleischner Society 2017 Guidelines on incidental pulmonary nodule, no routine follow-up is needed if the patient is low risk. If the patient is high risk, optional follow-up chest CT at 12 months can be  considered. 3.  Stable diffusely dilated esophagus. This may be related to dysmotility. Obstructing lesion is not visualized. This would be better evaluated with esophagram. 4.  No acute abnormality in the abdomen or pelvis. 5.  Enhancing liver lesion, indeterminate. Possibilities include hemangioma and vascular malformation though other  etiologies including neoplasm not excluded. Follow-up nonemergent MRI is recommended. The study was marked in EPIC for immediate notification. Workstation performed: [id number]     XR knee 3 vw left non injury    Result Date: 10/10/2024  Narrative: XR KNEE 3 VW LEFT NON INJURY INDICATION: left knee pain. COMPARISON: None FINDINGS: No acute fracture or dislocation. No joint effusion. No significant degenerative changes. No lytic or blastic osseous lesion. Unremarkable soft tissues.     Impression: No acute osseous abnormality. Computerized Assisted Algorithm (CAA) may have been used to analyze all applicable images. Workstation performed: [id number]     FL barium swallow video w speech    Result Date: 10/9/2024  Narrative: A video barium swallow study was performed by the Department of Speech Pathology. Please refer to the report for the official interpretation. The images are stored for archival purposes only. Study images were not formally reviewed by the Radiology Department.    FL barium swallow video w speech    Result Date: 10/9/2024  Narrative: JARROD Marie     10/9/2024  3:14 PM                                  Video Swallow Study Patient Name: Miguel Henderson Today's Date: 10/9/2024   Past Medical History Past Medical History: Diagnosis Date  Prostate cancer (HCC)   Thyroid cancer (HCC)   Past Surgical History Past Surgical History: Procedure Laterality Date  APPENDECTOMY   Summary: Images are on PACS for review. Oral Phase : Pt presented with mild oral dysphagia. Mastication was mod prolonged however eventual functional breakdown. Bolus control, formation were WNL. Transfer was piecemeal. Trace posterior base of tongue residue. Pharyngeal Phase :Pt presented with mild pharyngeal dysphagia. Swallow initiation was intermittently min delayed with solids and prompt with liquids. Spill to the valleculae occurred with solids. Hyoid elevation and laryngeal excursion were WNL. Pharyngeal stripping wave  diminished with solid trials. Epiglottic inversion was mostly complete. With nutri grain min epiglottic undercoat present. Trace vallecular retention with thin liquids and mild/mod with solids. Pt intermittently initiated secondary swallow to reduce residue. No pyriform retention. Large CP bar C4-C5 evident however did not impede bolus flow. No aspiration or penetration observed with trials. Per Esophageal screen Slow motility observed with liquids and solids. High retropulsion observed with liquids, pt risk for bottom up aspiration. Stasis occurred in distal esophagus with 13 mm pill however provided with additional sip of thin, ntl, and puree. Provided additional time x4 minutes pt continued with residue with esophagus. Observations: Son present throughout study provided translation. Recommendations: Diet: Dysphagia 3 dental soft Liquids:thin Meds: whole with thin liquids Strategies:slow rate, alternate liquids with solids, swallow prior to additional po Upright position F/u ST tx: yes Therapy Prognosis: fair Prognosis considerations:age, medical status, Intermittent/Distant Supervision Aspiration Precautions Reflux Precautions Consider consult with: GI, rehab Results reviewed with: pt, nursing, physician. ST reviewed images and recommendations reviewed with family at bedside. Aspiration precautions posted. If a dedicated assessment of the esophagus is desired, consider esophagram/barium swallow or EGD. Goals: Dysphagia LTG -Patient will demonstrate safe and effective oral intake (without overt s/s significant oral/pharyngeal dysphagia including s/s penetration or aspiration) for the highest appropriate diet level. Short Term Goals: -Pt will tolerate Dysphagia 3/advanced (dental soft) diet and thin liquid with no significant s/s oral or pharyngeal dysphagia across 1-3 diagnostic session/s. -Patient will tolerate trials of upgraded food and/or liquid texture with no significant s/s of oral or pharyngeal dysphagia  including aspiration across 1-3 diagnostic sessions -Patient will comply with a Video/Modified Barium Swallow study for more complete assessment of swallowing anatomy/physiology/aspiration risk and to assess efficacy of treatment techniques -Patient will demonstrate the ability to adequately self-monitor swallowing skills and perform appropriate compensatory techniques to reduce overt s/sx of penetration/aspiration to safely tolerate least restrictive food/liquid consistencies. Patient's goal: none stated H&P/Admit info, pertinent provider notes/procedures/studies/PMH:(copied and placed in this report) Miguel Henderson is a 84 y.o. male with a PMH of prostate cancer and thyroid cancer who presents with fever x 4 days.  Patient is Albanian-speaking only and history is obtained through assistance of his son who is present at bedside and acting as .  Patient has been having intermittent fevers which have been responsive to Tylenol and Motrin at home but have ranged as high as 103.7 and yesterday was having some apparent respiratory distress but he has no respiratory history and is on no respiratory medications at home.  Patient does have a scant cough which is nonproductive and denies any increased urinary frequency or dysuria though he does complain of some hematuria which has been ongoing for the past month.  Patient additionally has been complaining of a sore throat for the past 4 days with decreased p.o. fluid intake secondary to pain with swallowing.  While in ER patient was noted to meet severe sepsis criteria and was thus referred for admission. Special Studies XR CHEST PORTABLE 10/08/2024 IMPRESSION: Mild tree-in-bud pattern of opacity mostly in the right lung and in the left base again suggesting a mild infectious/inflammatory condition CT CHEST WITHOUT IV CONTRAST 10/08/2024 IMPRESSION: Mild bibasilar probable atelectatic changes, right greater than left. Otherwise no focal airspace consolidation  to suggest pneumonia. Minor tree-in-bud nodularity in the right lung which could relate to infectious versus inflammatory small airways disease. Clinical correlation recommended. Pulmonary nodules as above. Based on current Fleischner Society 2017 Guidelines on incidental pulmonary nodule, no routine follow-up is needed if the patient is low risk. If the patient is high risk, optional follow-up chest CT at 12 months can be considered. Trace right pleural effusion. Mild right axillary lymphadenopathy, nonspecific Code Status: Level 1 full code Previous VBS: none Precautions Contact Precautions Food allergies:  No know  Current diet:  Regular diet and thin liquids  Premorbid diet:  Regular diet and thin liquids  Dentition  Upper and lower dentures Oral Mech  WNL O2 requirements:  Room air  Vocal Quality/Speech WNL Cognitive status:  Alert  Consistencies administered: Barium laden applesauce, nutri grain bar, sandwich bite, thin liquids,ntl and  13mm barium pill. Liquids were administered by cup. Pt was seated laterally at 90 degrees. Pt  unable to be viewed in AP position, due to transfer status      Echo complete w/ contrast if indicated    Result Date: 10/9/2024  Narrative:   Left Ventricle: Left ventricular cavity size is normal. Wall thickness is mildly increased. The left ventricular ejection fraction is 55%. Systolic function is normal. Wall motion is normal.   Aortic Valve: There is aortic valve sclerosis.   Aorta: The aortic root is mildly dilated. The ascending aorta is mildly dilated. The aortic root is 3.80 cm. The ascending aorta is 3.9 cm.     XR chest portable    Result Date: 10/8/2024  Narrative: XR CHEST PORTABLE INDICATION: shortness of breath. COMPARISON: 10/7/2024 FINDINGS: Minimal tree-in-bud pattern of opacity mostly in the right lung, but also at the left base. No focal dense consolidation. The trace amount of pleural fluid seen on the prior CT is not apparent on this exam. No pneumothorax  identified. Stable cardiomediastinal silhouette Degenerative changes noted along the spine. Normal upper abdomen.     Impression: Mild tree-in-bud pattern of opacity mostly in the right lung and in the left base again suggesting a mild infectious/inflammatory condition Workstation performed: [id number]     XR chest portable - 1 view    Result Date: 10/8/2024  Narrative: XR CHEST PORTABLE INDICATION: Fever cough. COMPARISON: None FINDINGS: Clear lungs. No pneumothorax or pleural effusion. Normal cardiomediastinal silhouette. Bones are unremarkable for age. Normal upper abdomen.     Impression: No acute cardiopulmonary disease. Workstation performed: [id number]     CT chest wo contrast    Result Date: 10/8/2024  Narrative: CT CHEST WITHOUT IV CONTRAST INDICATION: Cough shortness of breath with fever. COMPARISON: None. TECHNIQUE: CT examination of the chest was performed without intravenous contrast. Multiplanar 2D reformatted images were created from the source data. This examination, like all CT scans performed in the [facility], was performed utilizing techniques to minimize radiation dose exposure, including the use of iterative reconstruction and automated exposure control. Radiation dose length product (DLP) for this visit: 441.84 mGy-cm FINDINGS: LUNGS: Evaluation somewhat limited by respiratory motion. There is some minor groundglass and tree-in-bud nodularity suggested in the right upper lobe inferior segment and to a lesser degree in the right lower lobe. No lobar consolidation. Trace paraseptal emphysematous changes. Mild basilar atelectatic changes, right greater than left. There is minor scarring in the posteromedial right lower lobe adjacent to bulky paravertebral osteophytes. 4 mm pulmonary nodule seen in the right upper lobe (3:35). Additional scattered pulmonary nodules are seen measuring on the order of 2 to 3 mm. Based on current Fleischner Society 2017 Guidelines on incidental  pulmonary nodule, no routine follow-up is needed if the patient is low risk. If the patient is high risk, optional follow-up chest CT at 12 months can be considered. PLEURA: Trace right pleural effusion with subjacent atelectasis. HEART/GREAT VESSELS: Heart is unremarkable for patient's age. No thoracic aortic aneurysm. Coronary atherosclerosis MEDIASTINUM AND JOE: Shotty subcentimeter mediastinal lymph nodes noted. Evaluation for hilar lymphadenopathy limited in the absence of intravenous contrast. CHEST WALL AND LOWER NECK: Gynecomastia. Few prominent/mildly enlarged lymph nodes seen in the right axillary region, largest measuring up to 1.2 cm,. VISUALIZED STRUCTURES IN THE UPPER ABDOMEN: No acute abnormality. Bilateral renal cysts. OSSEOUS STRUCTURES: Spinal degenerative changes are noted. No acute fracture or destructive osseous lesion.     Impression: Mild bibasilar probable atelectatic changes, right greater than left. Otherwise no focal airspace consolidation to suggest pneumonia. Minor tree-in-bud nodularity in the right lung which could relate to infectious versus inflammatory small airways disease. Clinical correlation recommended. Pulmonary nodules as above. Based on current Fleischner Society 2017 Guidelines on incidental pulmonary nodule, no routine follow-up is needed if the patient is low risk. If the patient is high risk, optional follow-up chest CT at 12 months can be considered. Trace right pleural effusion. Mild right axillary lymphadenopathy, nonspecific. Workstation performed: [id number]     Labs and pertinent reports reviewed.      This note has been generated by voice recognition software system.  Therefore, there may be spelling, grammar, and or syntax errors. Please contact if questions arise.    Additional recommendations to follow per attending, Dr. Garcia.    Scottie Ding DO, PGY4  Hematology/Oncology Fellow

## 2024-11-14 NOTE — ASSESSMENT & PLAN NOTE
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

## 2024-11-14 NOTE — UTILIZATION REVIEW
Continued Stay Review    Date: 11-24-24                          Current Patient Class: Inpatient  Current Level of Care: medical    HPI:84 y.o. male initially admitted on 10-26-24     Assessment/Plan: Patient continue on IV Vancomycin blood cultures repeated 11-13-24 will remain in house until blood cultures are known,      Certification Statement: The patient will continue to require additional inpatient hospital stay due to IV antibiotics, awaiting ID clearance   Medications:   Scheduled Medications:  bisacodyl, 10 mg, Rectal, Once  calcium carbonate, 500 mg, Oral, BID  docusate sodium, 100 mg, Oral, BID  fluticasone, 1 spray, Nasal, Daily  gabapentin, 100 mg, Oral, HS  heparin (porcine), 5,000 Units, Subcutaneous, Q8H JOE  hydrocortisone, , Topical, TID  levothyroxine, 50 mcg, Oral, Once per day on Monday Tuesday Wednesday Thursday  levothyroxine, 75 mcg, Oral, Once per day on Sunday Friday Saturday  SAMEER ANTIFUNGAL, , Topical, BID  pantoprazole, 40 mg, Oral, Early Morning  polyethylene glycol, 17 g, Oral, BID  predniSONE, 60 mg, Oral, Daily  vancomycin, 1,000 mg, Intravenous, Q12H      Continuous IV Infusions:  multi-electrolyte, 100 mL/hr, Intravenous, Continuous      PRN Meds:  acetaminophen, 1,000 mg, Intravenous, Q6H PRN  bisacodyl, 10 mg, Rectal, Daily PRN  hydrOXYzine HCL, 10 mg, Oral, Q6H PRN  ondansetron, 4 mg, Intravenous, Q6H PRN  oxyCODONE, 5 mg, Oral, Q6H PRN      Discharge Plan: tbd    Vital Signs (last 3 days)       Date/Time Temp Pulse Resp BP MAP (mmHg) SpO2 O2 Device Patient Position - Orthostatic VS Cristina Coma Scale Score Pain    11/14/24 07:46:12 98.6 °F (37 °C) 84 16 109/64 79 99 % -- -- -- No Pain    11/13/24 23:06:13 100.3 °F (37.9 °C) 92 16 109/64 79 99 % -- -- -- --    11/13/24 2205 -- -- -- -- -- -- -- -- -- 8    11/13/24 2035 -- -- -- -- -- -- -- -- 14 --    11/13/24 1752 -- -- -- -- -- -- -- -- -- Med Not Given for Pain - for MAR use only    11/13/24 14:52:02 97.9 °F (36.6 °C) 90 17  109/63 78 99 % -- -- -- --    11/13/24 1001 -- -- -- -- -- -- -- -- -- Med Not Given for Pain - for MAR use only    11/13/24 08:43:48 -- -- -- 111/68 82 -- -- -- -- --    11/13/24 0729 -- -- -- 84/50 -- -- -- -- -- --    11/13/24 07:24:11 98.9 °F (37.2 °C) 87 17 86/53 64 99 % -- -- -- --    11/13/24 03:23:25 99.8 °F (37.7 °C) 94 -- -- -- 98 % -- -- -- --    11/13/24 0319 -- -- -- -- -- -- -- -- -- No Pain    11/12/24 2300 -- -- -- -- -- -- -- -- 15 No Pain    11/12/24 22:10:17 99.9 °F (37.7 °C) 100 20 122/71 88 98 % -- -- -- --    11/12/24 16:40:05 101 °F (38.3 °C) 114 -- -- -- 100 % -- -- -- --    11/12/24 16:11:48 101.2 °F (38.4 °C) 107 -- -- -- 99 % -- -- -- --    11/12/24 15:21:42 98.2 °F (36.8 °C) 97 18 114/65 81 100 % -- -- -- --    11/12/24 0900 -- -- -- -- -- -- None (Room air) -- 15 5    11/12/24 06:42:48 103.2 °F (39.6 °C) 115 20 122/75 91 97 % -- -- -- --    11/12/24 0230 -- -- -- -- -- -- -- -- -- No Pain    11/11/24 22:28:34 102.6 °F (39.2 °C) 125 24 119/77 91 98 % -- -- -- --    11/11/24 18:30:07 100.6 °F (38.1 °C) 106 -- -- -- 100 % -- -- -- --    11/11/24 1732 -- -- -- -- -- -- -- -- -- 7 11/11/24 17:29:43 102.9 °F (39.4 °C) 110 -- -- -- 100 % -- -- -- --    11/11/24 16:29:29 -- 124 17 152/98 116 97 % -- Lying -- --    11/11/24 1620 -- -- -- -- -- -- -- -- 15 --    11/11/24 15:47:16 -- 129 -- 155/92 113 97 % -- -- -- --    11/11/24 1444 100.4 °F (38 °C) 116 -- 134/82 99 100 % -- -- -- --    11/11/24 14:42:07 100.4 °F (38 °C) 111 -- -- -- 100 % -- -- -- --    11/11/24 1300 -- 97 -- -- -- 100 % -- -- -- --    11/11/24 12:26:21 98.2 °F (36.8 °C) 103 -- -- -- 100 % -- -- -- --    11/11/24 1035 -- -- -- -- -- -- -- -- -- 7 11/11/24 10:01:59 102.1 °F (38.9 °C) 125 -- -- -- 96 % -- -- -- --    11/11/24 07:45:40 99 °F (37.2 °C) 103 18 123/88 100 96 % -- -- -- --    11/11/24 0705 -- -- -- -- -- -- -- -- 15 No Pain          Weight (last 2 days)       None            Pertinent Labs/Diagnostic Results:    Radiology:  VAS upper limb venous duplex scan, unilateral/limited   Final Interpretation by Radha Almonte MD (11/12 1534)      XR chest portable   Final Interpretation by Xander Brown MD (11/11 1237)      No acute cardiopulmonary disease.            Workstation performed: [id number]         IR biopsy bone marrow   Final Interpretation by Kenn Lamb MD (11/12 1026)   Successful image guided bone marrow biopsy of right ileum.         Workstation performed: [id number]         IR biopsy lymph node   Final Interpretation by Kenn Lamb MD (11/12 1027)   Ultrasound-guided right axillary lymph node core biopsy.      Workstation performed: [id number]         XR abdomen 1 vw portable   Final Interpretation by Sunny Aly MD (11/06 0845)      No evidence of small bowel obstruction or colonic distention. Moderately increased stool throughout the colon               Workstation performed: [id number]         MRI brain and orbits wo contrast   Final Interpretation by Natividad Amador MD (11/04 0550)      No acute intracranial pathology.         Workstation performed: [id number]         XR chest portable   Final Interpretation by Cody Zimmerman MD (11/02 1227)      Mild bibasilar atelectasis otherwise no focal consolidation, pleural effusion, or pneumothorax.            Workstation performed: [id number]         XR chest portable   Final Interpretation by Jamin Quiroz MD (11/01 1638)      Stable bibasilar atelectasis. No pneumothorax.            Workstation performed: [id number]         CT head wo contrast   Final Interpretation by Tom Griffiths MD (10/31 0507)      No acute intracranial abnormality.      Mild chronic small vessel ischemic changes.               Workstation performed: [id number]         IR biopsy bone marrow   Final Interpretation by Anton Perez MD (10/30 1629)   Bone marrow aspiration and core biopsy.      PERFORMED BY:   DARREN Prajapati      SUPERVISING PHYSICIAN: Dr. Anton Perez       Workstation performed: [id number]         XR hips bilateral 3-4 vw w pelvis if performed   Final Interpretation by Natalie Nj MD (10/29 0838)      No acute osseous abnormality.         Computerized Assisted Algorithm (CAA) may have been used to analyze all applicable images.            Workstation performed: [id number]         CT spine cervical wo contrast   Final Interpretation by Izaiah Christie DO (10/28 1924)      No cervical spine fracture or traumatic malalignment.                  Workstation performed: [id number]         CT head wo contrast   Final Interpretation by Izaiah Christie DO (10/28 1922)      No acute intracranial abnormality.  Stable chronic microangiopathic changes within the brain.                  Workstation performed: [id number]         IR biopsy inguinal lymph node   Final Interpretation by Jorge Bhatia MD (10/28 1644)   Impression:      Successful biopsy of the left inguinal lymph node.            Workstation performed: [id number]         IR temporary dialysis catheter placement   Final Interpretation by Eros Sotelo MD (10/26 1516)      Insertion of right IJV non-tunneled dual-lumen plasmapheresis catheter, with tip in the expected location of the superior vena cava.      Plan:      The catheter may be used immediately.      Workstation performed: [id number]           Cardiology:  ECG 12 lead   Final Result by Magnus Raymond MD (11/11 2127)   Sinus tachycardia   Left axis deviation   Abnormal ECG   When compared with ECG of 31-Oct-2024 16:53,   Premature supraventricular complexes are no longer Present   QRS axis Shifted left   Confirmed by Magnus Raymond ([id number]) on 11/11/2024 9:27:55 PM      ECG 12 lead   Final Result by Edu Cagle MD (10/31 2150)   Sinus rhythm with Premature supraventricular complexes   Otherwise normal ECG   When compared with ECG of 25-OCT-2024 11:09,   Fusion complexes are no longer Present   Nonspecific T wave abnormality,  "improved in Inferior leads   Confirmed by Edu Cagle ([id number]) on 10/31/2024 9:50:30 PM        GI:  No orders to display           Results from last 7 days   Lab Units 11/14/24 0835 11/13/24 1839 11/12/24  0909 11/11/24  0608 11/09/24  0529   WBC Thousand/uL 4.43 4.50 13.58* 8.29 7.93   HEMOGLOBIN g/dL 9.3* 8.8* 9.6* 10.3* 9.8*   HEMATOCRIT % 29.0* 27.5* 29.5* 32.6* 30.5*   PLATELETS Thousands/uL 260 243 241 284 293   TOTAL NEUT ABS Thousands/µL 1.71* 2.37  --   --  2.89         Results from last 7 days   Lab Units 11/14/24 0835 11/13/24 1839 11/12/24 0909 11/11/24 0608 11/08/24  0559   SODIUM mmol/L 137 134* 130* 135 143   POTASSIUM mmol/L 3.6 3.9 4.0 3.9 4.2   CHLORIDE mmol/L 103 101 100 100 106   CO2 mmol/L 30 29 22 28 25   ANION GAP mmol/L 4 4 8 7 12   BUN mg/dL 12 15 18 13 8   CREATININE mg/dL 0.52* 0.57* 0.62 0.50* 0.39*   EGFR ml/min/1.73sq m 96 92 89 98 108   CALCIUM mg/dL 7.9* 7.8* 7.7* 8.7 9.0     Results from last 7 days   Lab Units 11/14/24 0835 11/13/24 1839 11/08/24  0559   AST U/L 31 36 33   ALT U/L 31 33 24   ALK PHOS U/L 133* 138* 120*   TOTAL PROTEIN g/dL 5.9* 5.8* 7.5   ALBUMIN g/dL 2.7* 2.7* 3.7   TOTAL BILIRUBIN mg/dL 0.67 0.81 0.88         Results from last 7 days   Lab Units 11/14/24 0835 11/13/24 1839 11/12/24  0909 11/11/24  0608 11/08/24  0559   GLUCOSE RANDOM mg/dL 131 150* 169* 119 95             No results found for: \"BETA-HYDROXYBUTYRATE\"                                   Results from last 7 days   Lab Units 11/11/24  1039   PROCALCITONIN ng/ml 0.07     Results from last 7 days   Lab Units 11/11/24  1316 11/11/24  1039   LACTIC ACID mmol/L 1.4 2.5*                 Results from last 7 days   Lab Units 11/08/24  0559   FERRITIN ng/mL 184                                                                 Results from last 7 days   Lab Units 11/11/24  1047 11/11/24  1040   BLOOD CULTURE  No Growth at 72 hrs. No Growth at 72 hrs.             Results from last 7 days   Lab Units " 11/14/24  0835   VANCOMYCIN RM ug/mL 12.6       Network Utilization Review Department  ATTENTION: Please call with any questions or concerns to [phone] and carefully listen to the prompts so that you are directed to the right person. All voicemails are confidential.   For Discharge needs, contact Care Management DC Support Team at [phone] opt. [phone]  Send all requests for admission clinical reviews, approved or denied determinations and any other requests to dedicated fax number below belonging to the [facility] where the patient is receiving treatment. List of dedicated fax numbers for the Facilities:  FACILITY NAME UR FAX NUMBER   ADMISSION DENIALS (Administrative/Medical Necessity) [phone]   DISCHARGE SUPPORT TEAM (NETWORK) [phone]   PARENT CHILD HEALTH (Maternity/NICU/Pediatrics) [phone]   [facility] [phone]   [facility] [phone]   [facility] [phone]   [facility] [phone]   [facility] [phone]   [facility] [phone]   [facility] [phone]   [facility] [phone]   [facility] [phone]   [facility] [phone]   [facility] [phone]   [facility] [phone]

## 2024-11-14 NOTE — ASSESSMENT & PLAN NOTE
11/11/2024 patient had sepsis criteria.  Fever and tachycardia and rigors.  Possibly related to right upper extremity cellulitis with superficial thrombophlebitis  Blood cultures negative at 48 hours  Chest x-ray unremarkable  ID consulted, appreciate recommendations  Tmax of 100.3 overnight, leukocytosis resolved and hemodynamically stable  Continue empiric vancomycin for skin infection and cefepime discontinued   Continue to monitor per ID

## 2024-11-14 NOTE — PLAN OF CARE
Problem: OCCUPATIONAL THERAPY ADULT  Goal: Performs self-care activities at highest level of function for planned discharge setting.  See evaluation for individualized goals.  Description: Treatment Interventions: ADL retraining, Functional transfer training, UE strengthening/ROM, Endurance training, Patient/family training, Cognitive reorientation, Equipment evaluation/education, Fine motor coordination activities, Compensatory technique education, Continued evaluation, Energy conservation, Activityengagement          See flowsheet documentation for full assessment, interventions and recommendations.   11/14/2024 1457 by Vladislav Sanches OT  Note: Limitation: Decreased ADL status, Decreased cognition, Decreased Safe judgement during ADL, Decreased endurance, Decreased self-care trans, Decreased high-level ADLs  Prognosis: Fair  Assessment: Patient participated in Skilled OT session this date with interventions consisting of ADL re training with the use of correct body mechnaics, Energy Conservation techniques, safety awareness and fall prevention techniques,  therapeutic activities to: increase activity tolerance, and increase OOB/ sitting tolerance . Patient agreeable to OT treatment session, upon arrival patient was found supine in bed, alert, responsive , in no apparent distress, and noted to be incontinent of bladder .  In comparison to previous session, patient with improvements in ADL completion, functional mobility, orientation. Patient requiring frequent re direction, verbal cues for safety, frequent rest periods, and ocassional safety reminders. Pt completed functional STS txfs and SPT with max Ax2, b/l HHA. Pt required mod A for UB dressing and gown management. Max A for LB dressing and Max A for toileting and aleida hygiene. Patient continues to be functioning below baseline level, occupational performance remains limited secondary to factors listed above and increased risk for falls and injury. The  patient's raw score on the AM-PAC Daily Activity Inpatient Short Form is 14. A raw score of less than 19 suggests the patient may benefit from discharge to post-acute rehabilitation services. Please refer to the recommendation of the Occupational Therapist for safe discharge planning.  From OT standpoint, recommendation at time of d/c would be Level 1 resources.  Patient to benefit from continued Occupational Therapy treatment while in the hospital to address deficits as defined above and maximize level of functional independence with ADLs and functional mobility.     Rehab Resource Intensity Level, OT: I (Maximum Resource Intensity)

## 2024-11-14 NOTE — PROGRESS NOTES
Progress Note - Hospitalist   Name: Miguel Henderson 84 y.o. male I MRN: [id number]  [facility] Date of Admission: 10/26/2024   Date of Service: 11/14/2024 I Hospital Day: 19    Assessment & Plan  Encephalopathy  Disussed with family at bedside about the course of the patient's illness.  Patient had fevers on October 7, was diagnosed with viral infection.  Sent home and had rapid decline at home.    At baseline patient is very physical and able to work in the yard and cuts down trees, very sharp handles his finances.  Now patient is baseline very confused according to the family.  Patient's last travel history was to [place] in May of last year  Brother passed away of some sort of leukemia  No dementia history  No previous stroke or traumatic brain.  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    Lyme was negative on 10/21/2024.   Rhinovirus was initially positive on 10/8/2024.  Respiratory panel was redone and was negative.    Peripheral blood smear was done and there was no parasites seen on 10/22/2024.    TSH, ammonia, coma panel unremarkable  CSF was positive for elevated WBC on 10/25/2024, VDRL negative, glucose normal, protein CSF elevated 86, Lyme PCR negative, meningitis encephalitis negative, West Nile negative  Brucella negative 11/01.2024  Mycoplasma negativem histoplasma negative  HIV negative  TB PCR Pending 10/25    Heme onc workup  Labs show an elevated gamma spike on SPEP, elevated IgA, IgG, IgM. Positive free lambda light chain on immunofixation in the urine and plasma. Flow is unremarkable. Elevated beta-2 microglobulin. High normal viscosity.   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    NEURO workup  MRI brain with no significant acute pathology  EEG with no evidence of epileptiform discharges thus no need for AED at this time       Concern noted of etiology of encephalopathy and fevers due to possible lymphoma/hyperviscosity   Mentation improved  Plan to start oral prednisone 60 mg daily with plan to discuss chemotherapy pending final GOC discussion and rehab performance  Lymphadenopathy  Status post left inguinal lymph node biopsy on 10/28 -> await full pathology, however, oncology note reviewed -> SPEP with elevated gamma spike, elevated IgA/G/M, positive free lambda light chain, elevated beta-2 microglobulin, and high normal viscosity  Status post courses of plasmapheresis per oncology  Per discussion between oncology and pathology, 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   Sepsis (HCC)  11/11/2024 patient had sepsis criteria.  Fever and tachycardia and rigors.  Possibly related to right upper extremity cellulitis with superficial thrombophlebitis  Blood cultures negative at 48 hours  Chest x-ray unremarkable  ID consulted, appreciate recommendations  Tmax of 100.3 overnight, leukocytosis resolved and hemodynamically stable  Continue empiric vancomycin for skin infection and cefepime discontinued   Continue to monitor per ID  Abnormal LFTs  Elevated bilirubin and alkaline phosphatase generally improving  Suspected to be associated with lymphadenopathy (above)  Limit/avoid hepatotoxins as possible  Hypothyroidism  Continue Synthroid  Primary hypertension  Holding HCTZ due to soft pressures at this time  COVID-19 virus  infection  Tested positive on 10/21  Currently oxygenating on baseline room air   Contact/airborne precautions now discontinued   Supportive care otherwise  Anemia  Hemoglobin stable  Likely associated with lymphadenopathy  Hypocalcemia  Serum Andrion's calcium low -> continue intravenous and oral supplementation  Consider possible contribution to myoclonus  Myoclonia  Neurology suspects toxic metabolic etiology given unremarkable EEG  Superficial thrombophlebitis of right upper extremity  Supportive care  Fever  See above    VTE Pharmacologic Prophylaxis: VTE Score: 5 High Risk (Score >/= 5) - Pharmacological DVT Prophylaxis Ordered: heparin. Sequential Compression Devices Ordered.    Mobility:   Basic Mobility Inpatient Raw Score: 10  JH-HLM Goal: 4: Move to chair/commode  JH-HLM Achieved: 4: Move to chair/commode  JH-HLM Goal achieved. Continue to encourage appropriate mobility.    Patient Centered Rounds: I performed bedside rounds with nursing staff today.   Discussions with Specialists or Other Care Team Provider: Oncology    Education and Discussions with Family / Patient: Updated  (wife and son) via phone.    Current Length of Stay: 19 day(s)  Current Patient Status: Inpatient   Certification Statement: The patient will continue to require additional inpatient hospital stay due to IV antibiotics, awaiting ID clearance  Discharge Plan: Anticipate discharge in 24-48 hrs to rehab facility.    Code Status: Level 3 - DNAR and DNI    Subjective   Patient assessed at bedside.  Reports feeling tired.  No other acute complaints.    Objective :  Temp:  [98.1 °F (36.7 °C)-100.3 °F (37.9 °C)] 98.1 °F (36.7 °C)  HR:  [84-92] 85  BP: (109-116)/(64-68) 116/68  Resp:  [16] 16  SpO2:  [99 %] 99 %    Body mass index is 28.46 kg/m².     Input and Output Summary (last 24 hours):     Intake/Output Summary (Last 24 hours) at 11/14/2024 1634  Last data filed at 11/14/2024 0851  Gross per 24 hour   Intake 1681 ml    Output 950 ml   Net 731 ml       Physical Exam  Vitals reviewed.   Constitutional:       General: He is not in acute distress.     Appearance: Normal appearance. He is not ill-appearing.   HENT:      Head: Normocephalic.   Cardiovascular:      Rate and Rhythm: Normal rate and regular rhythm.      Heart sounds: Normal heart sounds.   Pulmonary:      Effort: Pulmonary effort is normal. No respiratory distress.   Abdominal:      General: Bowel sounds are normal.      Tenderness: There is no abdominal tenderness.   Musculoskeletal:      Right lower leg: No edema.      Left lower leg: No edema.   Neurological:      Mental Status: He is alert. Mental status is at baseline.         Lines/Drains:  Lines/Drains/Airways       Active Status       Name Placement date Placement time Site Days    HD Temporary Double Catheter 10/26/24  1451  Internal jugular  19                            Lab Results: I have reviewed the following results:   Results from last 7 days   Lab Units 11/14/24  0835   WBC Thousand/uL 4.43   HEMOGLOBIN g/dL 9.3*   HEMATOCRIT % 29.0*   PLATELETS Thousands/uL 260   SEGS PCT % 39*   LYMPHO PCT % 47*   MONO PCT % 7   EOS PCT % 5     Results from last 7 days   Lab Units 11/14/24  0835   SODIUM mmol/L 137   POTASSIUM mmol/L 3.6   CHLORIDE mmol/L 103   CO2 mmol/L 30   BUN mg/dL 12   CREATININE mg/dL 0.52*   ANION GAP mmol/L 4   CALCIUM mg/dL 7.9*   ALBUMIN g/dL 2.7*   TOTAL BILIRUBIN mg/dL 0.67   ALK PHOS U/L 133*   ALT U/L 31   AST U/L 31   GLUCOSE RANDOM mg/dL 131                 Results from last 7 days   Lab Units 11/11/24  1316 11/11/24  1039   LACTIC ACID mmol/L 1.4 2.5*   PROCALCITONIN ng/ml  --  0.07       Recent Cultures (last 7 days):   Results from last 7 days   Lab Units 11/11/24  1047 11/11/24  1040   BLOOD CULTURE  No Growth at 72 hrs. No Growth at 72 hrs.       Imaging Results Review: No pertinent imaging studies reviewed.  Other Study Results Review: No additional pertinent studies  reviewed.    Last 24 Hours Medication List:     Current Facility-Administered Medications:     acetaminophen (Ofirmev) injection 1,000 mg, Q6H PRN, Last Rate: 1,000 mg (11/12/24 0735)    bisacodyl (DULCOLAX) rectal suppository 10 mg, Once    bisacodyl (DULCOLAX) rectal suppository 10 mg, Daily PRN    calcium carbonate (TUMS) chewable tablet 500 mg, BID    docusate sodium (COLACE) capsule 100 mg, BID    fluticasone (FLONASE) 50 mcg/act nasal spray 1 spray, Daily    gabapentin (NEURONTIN) capsule 100 mg, HS    heparin (porcine) subcutaneous injection 5,000 Units, Q8H JOE **AND** [COMPLETED] Platelet count, Once    hydrocortisone (ANUSOL-HC) 2.5 % rectal cream, TID    hydrOXYzine HCL (ATARAX) tablet 10 mg, Q6H PRN    levothyroxine tablet 50 mcg, Once per day on Monday Tuesday Wednesday Thursday    levothyroxine tablet 75 mcg, Once per day on Sunday Friday Saturday    moisture barrier miconazole 2% cream (aka SAMEER MOISTURE BARRIER ANTIFUNGAL CREAM), BID    multi-electrolyte (PLASMALYTE-A/ISOLYTE-S PH 7.4) IV solution, Continuous, Last Rate: 100 mL/hr (11/14/24 0203)    ondansetron (ZOFRAN) injection 4 mg, Q6H PRN    oxyCODONE (ROXICODONE) IR tablet 5 mg, Q6H PRN    pantoprazole (PROTONIX) EC tablet 40 mg, Early Morning    polyethylene glycol (MIRALAX) packet 17 g, BID    predniSONE tablet 60 mg, Daily    vancomycin (VANCOCIN) IVPB (premix in dextrose) 1,000 mg 200 mL, Q12H, Last Rate: 1,000 mg (11/14/24 1257)    Administrative Statements   Today, Patient Was Seen By: Tejas Ramirez, DO  I have spent a total time of 25 minutes in caring for this patient on the day of the visit/encounter including Impressions, Counseling / Coordination of care, Documenting in the medical record, Reviewing / ordering tests, medicine, procedures  , Obtaining or reviewing history  , and Communicating with other healthcare professionals .    **Please Note: This note may have been constructed using a voice recognition system.**

## 2024-11-14 NOTE — PLAN OF CARE
Problem: PHYSICAL THERAPY ADULT  Goal: Performs mobility at highest level of function for planned discharge setting.  See evaluation for individualized goals.  Description: Treatment/Interventions: Functional transfer training, LE strengthening/ROM, Elevations, Endurance training, Therapeutic exercise, Patient/family training, Equipment eval/education, Bed mobility, Gait training  Equipment Recommended:  (TBD)       See flowsheet documentation for full assessment, interventions and recommendations.  Outcome: Progressing  Note: Prognosis: Fair  Problem List: Decreased strength, Decreased endurance, Impaired balance, Decreased mobility, Impaired judgement  Assessment: Pt seen for PT treatment session this date. Therapy session focused on bed mobility and functional transfers in order to improve overall mobility and independence. Pt requires MaxA x2 for bed mobility, Lance x1 for sitting at EOB, MaxA x2 for stand pivot transfer, and MaxA x2 for STS transfers. Pt completed transfer from EOB to recliner chair with MaxA x2 utilizing a stand pivot transfer and drop arm chair. Pt utilized B/L HHA for added stability. Pt then completed STS transfer from recliner chair with MaxA x2 and B/L HHA, requiring increased verbal cues. Pt continues to require increased repetition and is inconsistent with command following. Pt also completed seated LE exercises to help improve LE strength and endurance. Pt making steady progress toward goals, limited due to decreased activity tolerance. Pt was in bedside recliner at the end of PT session with all needs in reach and chair alarm activated. Pt would benefit from continued PT services while in hospital to address remaining limitations. The patient's AM-PAC Basic Mobility Inpatient Short Form Raw Score is 10. A Raw score of less than or equal to 16 suggests the patient may benefit from discharge to post-acute rehabilitation services. Please also refer to the recommendation of the Physical  Therapist for safe discharge planning. Recommend Level 2 upon pt d/c due to increased need from caregiver for functional mobility and decreased mobility tolerance compared to baseline.  Barriers to Discharge: Decreased caregiver support  Barriers to Discharge Comments: Pt has good family support from spouse, DIL, and son.  Rehab Resource Intensity Level, PT: II (Moderate Resource Intensity)    See flowsheet documentation for full assessment.      Tatyana Abdullahi, SPT

## 2024-11-14 NOTE — PHYSICAL THERAPY NOTE
PHYSICAL THERAPY NOTE          Patient Name: Miguel Henderson  Today's Date: 11/14/2024 11/14/24 1306   PT Last Visit   PT Visit Date 11/14/24   Note Type   Note Type Treatment   Education   Education Provided Yes   End of Consult   Patient Position at End of Consult Bedside chair;All needs within reach;Bed/Chair alarm activated   Pain Assessment   Pain Assessment Tool 0-10   Pain Score No Pain   Restrictions/Precautions   Weight Bearing Precautions Per Order No   Other Precautions Chair Alarm;Bed Alarm;Multiple lines;Fall Risk  (IV; pt is primarily Romansh speaking,  services utilized)   General   Chart Reviewed Yes   Family/Caregiver Present Yes   Cognition   Overall Cognitive Status Impaired   Arousal/Participation Alert   Attention Difficulty attending to directions   Orientation Level Oriented X4  (Pt oriented to month and year)   Following Commands Follows one step commands inconsistently   Comments Pt was pleasant and cooperative during therapy session.  services were utilized with live video . Pt limited with command following throughout session, with increased repetition and verbal cues required.   Subjective   Subjective Pt reports that he feels more fatigued when exerting himself   Bed Mobility   Supine to Sit 2  Maximal assistance   Additional items Assist x 2;HOB elevated;Bedrails;Increased time required;Verbal cues   Sit to Supine Unable to assess   Additional Comments Pt was in bed upon therapy arrival and returned to bedside recliner at conclusion of therapy session with all needs within reach + chair alarm activated. Pt was sitting at EOB with MaxA x1, progressing to Lance x1. Pt had a few instances of retropulsion when seated, with verbal and tactile cues to help lean forward.   Transfers   Sit to Stand 2  Maximal assistance   Additional items Assist x 2;Increased time  required;Verbal cues   Stand to Sit 2  Maximal assistance   Additional items Assist x 2;Increased time required;Verbal cues   Stand pivot 2  Maximal assistance  (Limited buttock clearance)   Additional items Assist x 2;Increased time required;Verbal cues   Additional Comments Pt required Max x2 with B/L HHA for STS transfers. Increased verbal cues required. Pt unable to achieve full upright standing during transfer to chair. Pt able to achieve full stand with MaxA x2 from recliner chair.   Ambulation/Elevation   Gait pattern Not appropriate   Balance   Static Sitting Fair -   Dynamic Sitting Poor +   Static Standing Poor   Dynamic Standing Poor -   Ambulatory Poor -   Endurance Deficit   Endurance Deficit Yes   Endurance Deficit Description Due to limited mobility and fatigue.   Activity Tolerance   Activity Tolerance Patient tolerated treatment well;Patient limited by fatigue   Medical Staff Made Aware Co-treat with OT due to increased medical complexity and safety concerns.   Nurse Made Aware RN cleared pt for therapy.   Exercises   Hip Flexion Sitting;10 reps;Bilateral   Knee AROM Long Arc Quad Sitting;10 reps;Bilateral   Ankle Pumps Sitting;10 reps;Bilateral   Assessment   Prognosis Fair   Problem List Decreased strength;Decreased endurance;Impaired balance;Decreased mobility;Impaired judgement   Assessment Pt seen for PT treatment session this date. Therapy session focused on bed mobility and functional transfers in order to improve overall mobility and independence. Pt requires MaxA x2 for bed mobility, Lance x1 for sitting at EOB, MaxA x2 for stand pivot transfer, and MaxA x2 for STS transfers. Pt completed transfer from EOB to recliner chair with MaxA x2 utilizing a stand pivot transfer and drop arm chair. Pt utilized B/L HHA for added stability. Pt then completed STS transfer from recliner chair with MaxA x2 and B/L HHA, requiring increased verbal cues. Pt continues to require increased repetition and is  inconsistent with command following. Pt also completed seated LE exercises to help improve LE strength and endurance. Pt making steady progress toward goals, limited due to decreased activity tolerance. Pt was in bedside recliner at the end of PT session with all needs in reach and chair alarm activated. Pt would benefit from continued PT services while in hospital to address remaining limitations. The patient's AM-PAC Basic Mobility Inpatient Short Form Raw Score is 10. A Raw score of less than or equal to 16 suggests the patient may benefit from discharge to post-acute rehabilitation services. Please also refer to the recommendation of the Physical Therapist for safe discharge planning. Recommend Level 2 upon pt d/c due to increased need from caregiver for functional mobility and decreased mobility tolerance compared to baseline.   Barriers to Discharge Comments Pt has good family support from spouse, DIL, and son.   Goals   Patient Goals None stated.   STG Expiration Date 11/28/24   Short Term Goal #1 Previous short-term goals still appropriate, care plan updated.   PT Treatment Day 4   Plan   Treatment/Interventions Functional transfer training;Therapeutic exercise;Endurance training;Bed mobility;Gait training;Spoke to nursing;Spoke to case management;OT   Progress Slow progress, decreased activity tolerance   PT Frequency 3-5x/wk   Discharge Recommendation   Rehab Resource Intensity Level, PT II (Moderate Resource Intensity)   Equipment Recommended Walker  (Pt benefits from using walker for added support and stability.)   AM-PAC Basic Mobility Inpatient   Turning in Flat Bed Without Bedrails 2   Lying on Back to Sitting on Edge of Flat Bed Without Bedrails 2   Moving Bed to Chair 2   Standing Up From Chair Using Arms 2   Walk in Room 1   Climb 3-5 Stairs With Railing 1   Basic Mobility Inpatient Raw Score 10   [facility] Highest Level Of Mobility   -[facility] Goal 4: Move to chair/commode   -[facility] Achieved 4:  Move to chair/commode   Education   Education Provided Mobility training   Patient Reinforcement needed   End of Consult   Patient Position at End of Consult Bedside chair;Bed/Chair alarm activated;All needs within reach   End of Consult Comments Pt was in bedside recliner at conclusion of therapy session with all needs within reach + chair alarm activated.     Tatyana Abdullahi, SPT

## 2024-11-14 NOTE — PLAN OF CARE
Problem: PAIN - ADULT  Goal: Verbalizes/displays adequate comfort level or baseline comfort level  Description: Interventions:  - Encourage patient to monitor pain and request assistance  - Assess pain using appropriate pain scale  - Administer analgesics based on type and severity of pain and evaluate response  - Implement non-pharmacological measures as appropriate and evaluate response  - Consider cultural and social influences on pain and pain management  - Notify physician/advanced practitioner if interventions unsuccessful or patient reports new pain  Outcome: Progressing     Problem: INFECTION - ADULT  Goal: Absence or prevention of progression during hospitalization  Description: INTERVENTIONS:  - Assess and monitor for signs and symptoms of infection  - Monitor lab/diagnostic results  - Monitor all insertion sites, i.e. indwelling lines, tubes, and drains  - Monitor endotracheal if appropriate and nasal secretions for changes in amount and color  - [place] appropriate cooling/warming therapies per order  - Administer medications as ordered  - Instruct and encourage patient and family to use good hand hygiene technique  - Identify and instruct in appropriate isolation precautions for identified infection/condition  Outcome: Progressing  Goal: Absence of fever/infection during neutropenic period  Description: INTERVENTIONS:  - Monitor WBC    Outcome: Progressing     Problem: SAFETY ADULT  Goal: Patient will remain free of falls  Description: INTERVENTIONS:  - Educate patient/family on patient safety including physical limitations  - Instruct patient to call for assistance with activity   - Consult OT/PT to assist with strengthening/mobility   - Keep Call bell within reach  - Keep bed low and locked with side rails adjusted as appropriate  - Keep care items and personal belongings within reach  - Initiate and maintain comfort rounds  - Make Fall Risk Sign visible to staff  - Offer Toileting every 2 Hours,  in advance of need  - Initiate/Maintain bed/chair alarm  - Apply yellow socks and bracelet for high fall risk patients  - Consider moving patient to room near nurses station  Outcome: Progressing  Goal: Maintain or return to baseline ADL function  Description: INTERVENTIONS:  -  Assess patient's ability to carry out ADLs; assess patient's baseline for ADL function and identify physical deficits which impact ability to perform ADLs (bathing, care of mouth/teeth, toileting, grooming, dressing, etc.)  - Assess/evaluate cause of self-care deficits   - Assess range of motion  - Assess patient's mobility; develop plan if impaired  - Assess patient's need for assistive devices and provide as appropriate  - Encourage maximum independence but intervene and supervise when necessary  - Involve family in performance of ADLs  - Assess for home care needs following discharge   - Consider OT consult to assist with ADL evaluation and planning for discharge  - Provide patient education as appropriate  Outcome: Progressing  Goal: Maintains/Returns to pre admission functional level  Description: INTERVENTIONS:  - Perform AM-PAC 6 Click Basic Mobility/ Daily Activity assessment daily.  - Set and communicate daily mobility goal to care team and patient/family/caregiver.   - Collaborate with rehabilitation services on mobility goals if consulted  - Reposition patient every 2 hours.  - Dangle patient 3 times a day  - Stand patient 3 times a day  - Ambulate patient 3 times a day  - Out of bed to chair 3 times a day   - Out of bed for meals 3 times a day  - Out of bed for toileting  - Record patient progress and toleration of activity level   Outcome: Progressing     Problem: DISCHARGE PLANNING  Goal: Discharge to home or other facility with appropriate resources  Description: INTERVENTIONS:  - Identify barriers to discharge w/patient and caregiver  - Arrange for needed discharge resources and transportation as appropriate  - Identify  discharge learning needs (meds, wound care, etc.)  - Arrange for interpretive services to assist at discharge as needed  - Refer to Case Management Department for coordinating discharge planning if the patient needs post-hospital services based on physician/advanced practitioner order or complex needs related to functional status, cognitive ability, or social support system  Outcome: Progressing     Problem: Knowledge Deficit  Goal: Patient/family/caregiver demonstrates understanding of disease process, treatment plan, medications, and discharge instructions  Description: Complete learning assessment and assess knowledge base.  Interventions:  - Provide teaching at level of understanding  - Provide teaching via preferred learning methods  Outcome: Progressing     Problem: Prexisting or High Potential for Compromised Skin Integrity  Goal: Skin integrity is maintained or improved  Description: INTERVENTIONS:  - Identify patients at risk for skin breakdown  - Assess and monitor skin integrity  - Assess and monitor nutrition and hydration status  - Monitor labs   - Assess for incontinence   - Turn and reposition patient  - Assist with mobility/ambulation  - Relieve pressure over bony prominences  - Avoid friction and shearing  - Provide appropriate hygiene as needed including keeping skin clean and dry  - Evaluate need for skin moisturizer/barrier cream  - Collaborate with interdisciplinary team   - Patient/family teaching  - Consider wound care consult   Outcome: Progressing     Problem: Nutrition/Hydration-ADULT  Goal: Nutrient/Hydration intake appropriate for improving, restoring or maintaining nutritional needs  Description: Monitor and assess patient's nutrition/hydration status for malnutrition. Collaborate with interdisciplinary team and initiate plan and interventions as ordered.  Monitor patient's weight and dietary intake as ordered or per policy. Utilize nutrition screening tool and intervene as necessary.  Determine patient's food preferences and provide high-protein, high-caloric foods as appropriate.     INTERVENTIONS:  - Monitor oral intake, urinary output, labs, and treatment plans  - Assess nutrition and hydration status and recommend course of action  - Evaluate amount of meals eaten  - Assist patient with eating if necessary   - Allow adequate time for meals  - Recommend/ encourage appropriate diets, oral nutritional supplements, and vitamin/mineral supplements  - Order, calculate, and assess calorie counts as needed  - Recommend, monitor, and adjust tube feedings and TPN/PPN based on assessed needs  - Assess need for intravenous fluids  - Provide specific nutrition/hydration education as appropriate  - Include patient/family/caregiver in decisions related to nutrition  Outcome: Progressing

## 2024-11-14 NOTE — OCCUPATIONAL THERAPY NOTE
Occupational Therapy Progress Note     Patient Name: Miguel Henderson  Today's Date: 11/14/2024  Problem List  Principal Problem:    Encephalopathy  Active Problems:    Hypothyroidism    Primary hypertension    Sepsis (HCC)    Lymphadenopathy    Abnormal LFTs    COVID-19 virus infection    Myoclonia    Anemia    Hypocalcemia    Superficial thrombophlebitis of right upper extremity    Fever            11/14/24 1333   OT Last Visit   OT Visit Date 11/14/24   Note Type   Note Type Treatment   Pain Assessment   Pain Assessment Tool 0-10   Pain Score No Pain   Restrictions/Precautions   Weight Bearing Precautions Per Order No   Other Precautions Cognitive;Chair Alarm;Bed Alarm;Multiple lines;Fall Risk  ([employer]  services utilized)   Lifestyle   Autonomy Assist PRN for ADLs/IADLs from family. RW used PRN. (-).   Reciprocal Relationships Lives with supportive family (Spouse, Son, DIL)   Service to Others Retired   Intrinsic Gratification Pt enjoys spending time with family   ADL   Where Assessed Edge of bed   UB Dressing Assistance 3  Moderate Assistance   UB Dressing Deficit Thread RUE;Thread LUE;Pull around back   LB Dressing Assistance 2  Maximal Assistance   LB Dressing Deficit Don/doff R sock;Don/doff L sock   Toileting Assistance  2  Maximal Assistance   Toileting Deficit Clothing management up;Clothing management down;Perineal hygiene   Toileting Comments Pt noted to be incontinent of urine upon arrival.   Bed Mobility   Supine to Sit 2  Maximal assistance   Additional items Assist x 2;HOB elevated;Bedrails;Increased time required;Verbal cues;LE management   Sit to Supine Unable to assess   Additional Comments Pt in bed upon arrival. Required Ax2 to sit EOB, with min A initially to maintain seated EOB. Pt progressed to close SUP to maintain seated, however noted to have 1-2x instances of posterior lean requiring increased assist. Pt OOB in recliner post session, all needs met, call bell within  "reach. Left in care of PT   Transfers   Sit to Stand 2  Maximal assistance   Additional items Assist x 2;Increased time required;Verbal cues   Stand to Sit 2  Maximal assistance   Additional items Assist x 2;Increased time required;Verbal cues   Stand pivot 2  Maximal assistance   Additional items Assist x 2;Increased time required;Verbal cues   Additional Comments Pt unable to achieve full stand from bed. Max Ax2 for SPT to chair. From chair, pt able to achieve full stand with max Ax2 for hygiene care. Fatigued very quickly and returned to sitting. b/l HHA   Functional Mobility   Additional Comments SPT completed 2/2 decreased standing tolerance. Deferred further functional mobility   Subjective   Subjective \"I feel a tingling sensation\"   Cognition   Overall Cognitive Status Impaired   Arousal/Participation Alert;Cooperative   Attention Attends with cues to redirect   Orientation Level Oriented X4  (not date, but knows month/year.)   Memory Decreased recall of recent events;Decreased recall of precautions   Following Commands Follows one step commands with increased time or repetition   Comments Pt very pleasant and cooperative, motivated to participate. Pt with improved cognition on this date, able to follow commands and respond appropriately. Pt oriented on this date. Spouse present in room but [employer]  services utilized 2/2 Vietnamese speaking. Requires some VCs for redirection and task completion   Activity Tolerance   Activity Tolerance Patient limited by fatigue;Patient limited by pain   Medical Staff Made Aware RN cleared for therapy, PT Jen, EDDIE Joe   Assessment   Assessment Patient participated in Skilled OT session this date with interventions consisting of ADL re training with the use of correct body mechnaics, Energy Conservation techniques, safety awareness and fall prevention techniques,  therapeutic activities to: increase activity tolerance, and increase OOB/ sitting tolerance . Patient " agreeable to OT treatment session, upon arrival patient was found supine in bed, alert, responsive , in no apparent distress, and noted to be incontinent of bladder .  In comparison to previous session, patient with improvements in ADL completion, functional mobility, orientation. Patient requiring frequent re direction, verbal cues for safety, frequent rest periods, and ocassional safety reminders. Pt completed functional STS txfs and SPT with max Ax2, b/l HHA. Pt required mod A for UB dressing and gown management. Max A for LB dressing and Max A for toileting and aleida hygiene. Patient continues to be functioning below baseline level, occupational performance remains limited secondary to factors listed above and increased risk for falls and injury. The patient's raw score on the AM-PAC Daily Activity Inpatient Short Form is 14. A raw score of less than 19 suggests the patient may benefit from discharge to post-acute rehabilitation services. Please refer to the recommendation of the Occupational Therapist for safe discharge planning.  From OT standpoint, recommendation at time of d/c would be Level 1 resources.  Patient to benefit from continued Occupational Therapy treatment while in the hospital to address deficits as defined above and maximize level of functional independence with ADLs and functional mobility.   Plan   Treatment Interventions ADL retraining;Functional transfer training;Endurance training;Equipment evaluation/education;Compensatory technique education;Continued evaluation;Energy conservation;Activityengagement;Cognitive reorientation;Patient/family training   Goal Expiration Date 11/28/24  (goals reviewed and extended)   OT Treatment Day 4   OT Frequency 2-3x/wk   Discharge Recommendation   Rehab Resource Intensity Level, OT I (Maximum Resource Intensity)   AM-PAC Daily Activity Inpatient   Lower Body Dressing 2   Bathing 2   Toileting 2   Upper Body Dressing 2   Grooming 3   Eating 3   Daily  Activity Raw Score 14   Daily Activity Standardized Score (Calc for Raw Score >=11) 33.39   AM-PAC Applied Cognition Inpatient   Following a Speech/Presentation 2   Understanding Ordinary Conversation 3   Taking Medications 2   Remembering Where Things Are Placed or Put Away 2   Remembering List of 4-5 Errands 2   Taking Care of Complicated Tasks 2   Applied Cognition Raw Score 13   Applied Cognition Standardized Score 30.46   End of Consult   Education Provided Yes;Family or social support of family present for education by provider   Patient Position at End of Consult Bedside chair;All needs within reach;Other (comment)  (left in care of PT)   Nurse Communication Nurse aware of consult         Vladislav Sanches MS, OTR/L     MOCA CERTIFIED RATER ID [id number]

## 2024-11-14 NOTE — PLAN OF CARE
Problem: PAIN - ADULT  Goal: Verbalizes/displays adequate comfort level or baseline comfort level  Description: Interventions:  - Encourage patient to monitor pain and request assistance  - Assess pain using appropriate pain scale  - Administer analgesics based on type and severity of pain and evaluate response  - Implement non-pharmacological measures as appropriate and evaluate response  - Consider cultural and social influences on pain and pain management  - Notify physician/advanced practitioner if interventions unsuccessful or patient reports new pain  Outcome: Progressing     Problem: INFECTION - ADULT  Goal: Absence or prevention of progression during hospitalization  Description: INTERVENTIONS:  - Assess and monitor for signs and symptoms of infection  - Monitor lab/diagnostic results  - Monitor all insertion sites, i.e. indwelling lines, tubes, and drains  - Monitor endotracheal if appropriate and nasal secretions for changes in amount and color  - [place] appropriate cooling/warming therapies per order  - Administer medications as ordered  - Instruct and encourage patient and family to use good hand hygiene technique  - Identify and instruct in appropriate isolation precautions for identified infection/condition  Outcome: Progressing  Goal: Absence of fever/infection during neutropenic period  Description: INTERVENTIONS:  - Monitor WBC    Outcome: Progressing     Problem: SAFETY ADULT  Goal: Patient will remain free of falls  Description: INTERVENTIONS:  - Educate patient/family on patient safety including physical limitations  - Instruct patient to call for assistance with activity   - Consult OT/PT to assist with strengthening/mobility   - Keep Call bell within reach  - Keep bed low and locked with side rails adjusted as appropriate  - Keep care items and personal belongings within reach  - Initiate and maintain comfort rounds  - Make Fall Risk Sign visible to staff  - Offer Toileting every 2 Hours,  in advance of need  - Initiate/Maintain bed/chair alarm  - Apply yellow socks and bracelet for high fall risk patients  - Consider moving patient to room near nurses station  Outcome: Progressing  Goal: Maintain or return to baseline ADL function  Description: INTERVENTIONS:  -  Assess patient's ability to carry out ADLs; assess patient's baseline for ADL function and identify physical deficits which impact ability to perform ADLs (bathing, care of mouth/teeth, toileting, grooming, dressing, etc.)  - Assess/evaluate cause of self-care deficits   - Assess range of motion  - Assess patient's mobility; develop plan if impaired  - Assess patient's need for assistive devices and provide as appropriate  - Encourage maximum independence but intervene and supervise when necessary  - Involve family in performance of ADLs  - Assess for home care needs following discharge   - Consider OT consult to assist with ADL evaluation and planning for discharge  - Provide patient education as appropriate  Outcome: Progressing  Goal: Maintains/Returns to pre admission functional level  Description: INTERVENTIONS:  - Perform AM-PAC 6 Click Basic Mobility/ Daily Activity assessment daily.  - Set and communicate daily mobility goal to care team and patient/family/caregiver.   - Collaborate with rehabilitation services on mobility goals if consulted  - Reposition patient every 2 hours.  - Dangle patient 3 times a day  - Stand patient 3 times a day  - Ambulate patient 3 times a day  - Out of bed to chair 3 times a day   - Out of bed for meals 3 times a day  - Out of bed for toileting  - Record patient progress and toleration of activity level   Outcome: Progressing     Problem: DISCHARGE PLANNING  Goal: Discharge to home or other facility with appropriate resources  Description: INTERVENTIONS:  - Identify barriers to discharge w/patient and caregiver  - Arrange for needed discharge resources and transportation as appropriate  - Identify  discharge learning needs (meds, wound care, etc.)  - Arrange for interpretive services to assist at discharge as needed  - Refer to Case Management Department for coordinating discharge planning if the patient needs post-hospital services based on physician/advanced practitioner order or complex needs related to functional status, cognitive ability, or social support system  Outcome: Progressing

## 2024-11-15 PROBLEM — R05.9 COUGH: Status: RESOLVED | Noted: 2024-10-16 | Resolved: 2024-11-15

## 2024-11-15 PROBLEM — F41.9 ANXIETY: Status: ACTIVE | Noted: 2024-11-15

## 2024-11-15 LAB
ANION GAP SERPL CALCULATED.3IONS-SCNC: 6 MMOL/L (ref 4–13)
BASOPHILS # BLD AUTO: 0.02 THOUSANDS/ÂΜL (ref 0–0.1)
BASOPHILS NFR BLD AUTO: 0 % (ref 0–1)
BUN SERPL-MCNC: 9 MG/DL (ref 5–25)
CALCIUM SERPL-MCNC: 8 MG/DL (ref 8.4–10.2)
CHLORIDE SERPL-SCNC: 102 MMOL/L (ref 96–108)
CO2 SERPL-SCNC: 28 MMOL/L (ref 21–32)
CREAT SERPL-MCNC: 0.34 MG/DL (ref 0.6–1.3)
EOSINOPHIL # BLD AUTO: 0.01 THOUSAND/ÂΜL (ref 0–0.61)
EOSINOPHIL NFR BLD AUTO: 0 % (ref 0–6)
ERYTHROCYTE [DISTWIDTH] IN BLOOD BY AUTOMATED COUNT: 17.2 % (ref 11.6–15.1)
ERYTHROCYTE [DISTWIDTH] IN BLOOD BY AUTOMATED COUNT: 17.9 % (ref 11.6–15.1)
GFR SERPL CREATININE-BSD FRML MDRD: 114 ML/MIN/1.73SQ M
GLUCOSE SERPL-MCNC: 108 MG/DL (ref 65–140)
HCT VFR BLD AUTO: 27.2 % (ref 36.5–49.3)
HCT VFR BLD AUTO: 29 % (ref 36.5–49.3)
HGB BLD-MCNC: 8.6 G/DL (ref 12–17)
HGB BLD-MCNC: 9.3 G/DL (ref 12–17)
IMM GRANULOCYTES # BLD AUTO: 0.07 THOUSAND/UL (ref 0–0.2)
IMM GRANULOCYTES NFR BLD AUTO: 1 % (ref 0–2)
LYMPHOCYTES # BLD AUTO: 2.79 THOUSANDS/ÂΜL (ref 0.6–4.47)
LYMPHOCYTES NFR BLD AUTO: 42 % (ref 14–44)
MCH RBC QN AUTO: 29.8 PG (ref 26.8–34.3)
MCH RBC QN AUTO: 30.2 PG (ref 26.8–34.3)
MCHC RBC AUTO-ENTMCNC: 31.6 G/DL (ref 31.4–37.4)
MCHC RBC AUTO-ENTMCNC: 32.1 G/DL (ref 31.4–37.4)
MCV RBC AUTO: 94 FL (ref 82–98)
MCV RBC AUTO: 94 FL (ref 82–98)
MISCELLANEOUS LAB TEST RESULT: NORMAL
MONOCYTES # BLD AUTO: 0.58 THOUSAND/ÂΜL (ref 0.17–1.22)
MONOCYTES NFR BLD AUTO: 9 % (ref 4–12)
NEUTROPHILS # BLD AUTO: 3.17 THOUSANDS/ÂΜL (ref 1.85–7.62)
NEUTS SEG NFR BLD AUTO: 48 % (ref 43–75)
NRBC BLD AUTO-RTO: 0 /100 WBCS
NRBC BLD AUTO-RTO: 0 /100 WBCS
PLATELET # BLD AUTO: 250 THOUSANDS/UL (ref 149–390)
PLATELET # BLD AUTO: 260 THOUSANDS/UL (ref 149–390)
PMV BLD AUTO: 9.2 FL (ref 8.9–12.7)
PMV BLD AUTO: 9.3 FL (ref 8.9–12.7)
POTASSIUM SERPL-SCNC: 3.5 MMOL/L (ref 3.5–5.3)
RBC # BLD AUTO: 2.89 MILLION/UL (ref 3.88–5.62)
RBC # BLD AUTO: 3.08 MILLION/UL (ref 3.88–5.62)
SODIUM SERPL-SCNC: 136 MMOL/L (ref 135–147)
WBC # BLD AUTO: 4.43 THOUSAND/UL (ref 4.31–10.16)
WBC # BLD AUTO: 6.64 THOUSAND/UL (ref 4.31–10.16)

## 2024-11-15 PROCEDURE — 99232 SBSQ HOSP IP/OBS MODERATE 35: CPT | Performed by: INTERNAL MEDICINE

## 2024-11-15 PROCEDURE — 97535 SELF CARE MNGMENT TRAINING: CPT

## 2024-11-15 PROCEDURE — 97530 THERAPEUTIC ACTIVITIES: CPT

## 2024-11-15 PROCEDURE — 99232 SBSQ HOSP IP/OBS MODERATE 35: CPT | Performed by: STUDENT IN AN ORGANIZED HEALTH CARE EDUCATION/TRAINING PROGRAM

## 2024-11-15 PROCEDURE — 80048 BASIC METABOLIC PNL TOTAL CA: CPT | Performed by: STUDENT IN AN ORGANIZED HEALTH CARE EDUCATION/TRAINING PROGRAM

## 2024-11-15 PROCEDURE — 85025 COMPLETE CBC W/AUTO DIFF WBC: CPT | Performed by: STUDENT IN AN ORGANIZED HEALTH CARE EDUCATION/TRAINING PROGRAM

## 2024-11-15 PROCEDURE — 99233 SBSQ HOSP IP/OBS HIGH 50: CPT | Performed by: INTERNAL MEDICINE

## 2024-11-15 RX ORDER — LORAZEPAM 0.5 MG/1
0.5 TABLET ORAL EVERY 8 HOURS PRN
Status: DISCONTINUED | OUTPATIENT
Start: 2024-11-15 | End: 2024-11-17

## 2024-11-15 RX ADMIN — PREDNISONE 60 MG: 20 TABLET ORAL at 09:22

## 2024-11-15 RX ADMIN — VANCOMYCIN HYDROCHLORIDE 1000 MG: 1 INJECTION, SOLUTION INTRAVENOUS at 12:47

## 2024-11-15 RX ADMIN — CALCIUM CARBONATE (ANTACID) CHEW TAB 500 MG 500 MG: 500 CHEW TAB at 09:22

## 2024-11-15 RX ADMIN — POLYETHYLENE GLYCOL 3350 17 G: 17 POWDER, FOR SOLUTION ORAL at 21:26

## 2024-11-15 RX ADMIN — HYDROCORTISONE: 25 CREAM TOPICAL at 09:25

## 2024-11-15 RX ADMIN — DOCUSATE SODIUM 100 MG: 100 CAPSULE, LIQUID FILLED ORAL at 18:30

## 2024-11-15 RX ADMIN — DOCUSATE SODIUM 100 MG: 100 CAPSULE, LIQUID FILLED ORAL at 09:22

## 2024-11-15 RX ADMIN — HEPARIN SODIUM 5000 UNITS: 5000 INJECTION INTRAVENOUS; SUBCUTANEOUS at 21:28

## 2024-11-15 RX ADMIN — SODIUM CHLORIDE, SODIUM GLUCONATE, SODIUM ACETATE, POTASSIUM CHLORIDE, MAGNESIUM CHLORIDE, SODIUM PHOSPHATE, DIBASIC, AND POTASSIUM PHOSPHATE 100 ML/HR: .53; .5; .37; .037; .03; .012; .00082 INJECTION, SOLUTION INTRAVENOUS at 05:25

## 2024-11-15 RX ADMIN — HEPARIN SODIUM 5000 UNITS: 5000 INJECTION INTRAVENOUS; SUBCUTANEOUS at 14:30

## 2024-11-15 RX ADMIN — GABAPENTIN 100 MG: 100 CAPSULE ORAL at 21:27

## 2024-11-15 RX ADMIN — MICONAZOLE NITRATE: 20 CREAM TOPICAL at 18:30

## 2024-11-15 RX ADMIN — LEVOTHYROXINE SODIUM 75 MCG: 75 TABLET ORAL at 05:12

## 2024-11-15 RX ADMIN — LORAZEPAM 0.5 MG: 0.5 TABLET ORAL at 12:46

## 2024-11-15 RX ADMIN — CALCIUM CARBONATE (ANTACID) CHEW TAB 500 MG 500 MG: 500 CHEW TAB at 21:27

## 2024-11-15 RX ADMIN — FLUTICASONE PROPIONATE 1 SPRAY: 50 SPRAY, METERED NASAL at 09:25

## 2024-11-15 RX ADMIN — PANTOPRAZOLE SODIUM 40 MG: 40 TABLET, DELAYED RELEASE ORAL at 05:12

## 2024-11-15 RX ADMIN — POLYETHYLENE GLYCOL 3350 17 G: 17 POWDER, FOR SOLUTION ORAL at 09:23

## 2024-11-15 RX ADMIN — HYDROCORTISONE: 25 CREAM TOPICAL at 21:28

## 2024-11-15 RX ADMIN — MICONAZOLE NITRATE: 20 CREAM TOPICAL at 09:29

## 2024-11-15 RX ADMIN — HEPARIN SODIUM 5000 UNITS: 5000 INJECTION INTRAVENOUS; SUBCUTANEOUS at 05:12

## 2024-11-15 RX ADMIN — VANCOMYCIN HYDROCHLORIDE 1000 MG: 1 INJECTION, SOLUTION INTRAVENOUS at 00:57

## 2024-11-15 NOTE — PHYSICAL THERAPY NOTE
PHYSICAL THERAPY NOTE          Patient Name: Miguel Henderson  Today's Date: 11/15/2024     11/15/24 1250   PT Last Visit   PT Visit Date 11/15/24   Note Type   Note Type Treatment   Pain Assessment   Pain Assessment Tool 0-10   Pain Score No Pain   Restrictions/Precautions   Weight Bearing Precautions Per Order No   Other Precautions Chair Alarm;Cognitive;Bed Alarm;Multiple lines;Fall Risk  ([place] speaking, family assists with translation)   General   Chart Reviewed Yes   Family/Caregiver Present Yes   Cognition   Overall Cognitive Status Impaired   Arousal/Participation Responsive;Alert   Attention Attends with cues to redirect   Following Commands Follows one step commands with increased time or repetition   Comments Pt cooperative and pleasant during session. Pt 's family present in room , assist with translation   Bed Mobility   Supine to Sit 3  Moderate assistance   Additional items Assist x 2;HOB elevated;Bedrails;Increased time required;Verbal cues;LE management   Sit to Supine Unable to assess   Additional Comments Pt gets to EOB with Ax 2. Continues with posterior lean, required min/mod assist for trunk management. Able to correct with cueing but fatigues quick   Transfers   Sit to Stand 3  Moderate assistance   Additional items Assist x 2;Increased time required;Verbal cues   Stand to Sit 3  Moderate assistance   Additional items Assist x 2;Increased time required;Verbal cues   Additional Comments w/Matias HHA + sacral support and knee block- tolerates 3 STS total during session   Ambulation/Elevation   Gait pattern Improper Weight shift;Forward Flexion;Narrow FAUSTINO;Step to;Excessively slow;Short stride   Gait Assistance 3  Moderate assist   Additional items Assist x 2;Verbal cues;Tactile cues   Assistive Device Other (Comment)  (B HHA)   Distance 2 ft   Ambulation/Elevation Additional Comments Required constant cueing,  limited distanec 2* fatigue   Balance   Static Sitting Fair -   Dynamic Sitting Poor +   Static Standing Poor   Dynamic Standing Poor -   Ambulatory Poor -   Endurance Deficit   Endurance Deficit Yes   Activity Tolerance   Activity Tolerance Patient limited by fatigue   Medical Staff Made Aware OT Vladislav   Nurse Made Aware RN cleared pt   Exercises   Balance training  EOB sitting with matias UE support ~ 5 minutes, with LE movement   Assessment   Prognosis Fair   Problem List Decreased strength;Decreased endurance;Impaired balance;Decreased mobility;Decreased cognition   Assessment Pt seen for PT treatment session this date. Pt cooperative during session. Pt's family assist s with translation, but does require increased cueing even with that. Pt demonstrates some gains in mobilioty , completing multiple transfers with Mo Alondra 2, and able to take 2-3 short steps from bed to drop arm recliner. Pt able to maintain static standing with Assist X 12, with Matias HHA for hygiene care durign session, challenging balance and endurance + performed EOB sitting with matias UE with min/mod assist for trunk mx.  Pt was left in recliner at the end of PT session with all needs in reach. Pt would benefit from continued PT services while in hospital to address remaining limitations. The patient's AM-PAC Basic Mobility Inpatient Short Form Raw Score is 10. A Raw score of less than or equal to 16 suggests the patient may benefit from discharge to post-acute rehabilitation services. Please also refer to the recommendation of the Physical Therapist for safe discharge planning. Post dc recommendation is Level 2 at this time.   Barriers to Discharge Decreased caregiver support   Goals   Patient Goals none stated   STG Expiration Date 11/28/24   PT Treatment Day 5   Plan   Treatment/Interventions Functional transfer training;Therapeutic exercise;Gait training;Bed mobility;Spoke to nursing;Spoke to case management;OT   Progress Progressing toward  goals   PT Frequency 3-5x/wk   Discharge Recommendation   Rehab Resource Intensity Level, PT II (Moderate Resource Intensity)   AM-PAC Basic Mobility Inpatient   Turning in Flat Bed Without Bedrails 2   Lying on Back to Sitting on Edge of Flat Bed Without Bedrails 2   Moving Bed to Chair 2   Standing Up From Chair Using Arms 2   Walk in Room 1   Climb 3-5 Stairs With Railing 1   Basic Mobility Inpatient Raw Score 10   Turning Head Towards Sound 3   Follow Simple Instructions 2   Low Function Basic Mobility Raw Score  15   Low Function Basic Mobility Standardized Score  23.9   [facility] Level Of Mobility   -[facility] Goal 4: Move to chair/commode   -[facility] Achieved 5: Stand (1 or more minutes)   Education   Education Provided Mobility training   Patient Reinforcement needed   End of Consult   Patient Position at End of Consult All needs within reach;Bed/Chair alarm activated;Bedside chair   Jen Soto PT DPT

## 2024-11-15 NOTE — ASSESSMENT & PLAN NOTE
Met sepsis criteria on 11/10 when patient started spiking fevers up to 103F with associated tachycardia and rigors. Fortunately has remained otherwise hemodynamically stable. Procal WNL at 0.07. CXR without evidence of acute cardiopulmonary disease. Patient noted to have RUE swelling and erythema. An ultrasound revealed an acute occlusive superficial thrombophlebitis in the cephalic vein from wrist to the distal upper arm which is the likely etiology of patient's sepsis. Blood cultures are currently NGTD x 24h. Patient was started on IV vancomycin and cefepime. Fever curve appears to be down-trending. Will stop cefepime at this time and continue on IV vancomycin for coverage of gram positive skin latanya.  The fever and any remaining arm cellulitis is now completely resolved.  Seems to have enough soft tissue coverage for now  -Discontinue intravenous vancomycin after last dose today  -No additional antibiotics recommended  -Follow-up blood cultures until finalized  -Continue to monitor temperature/vitals  -Serial examinations  -Continue to follow CBCD and CMP with AM labs to monitor for potential antimicrobial toxicities and assess for clinical response to antibiotics.

## 2024-11-15 NOTE — CASE MANAGEMENT
[facility] received request for authorization from Care Manager.  Authorization request submitted for: Acute Rehab  Facility Name:   [facility] NPI: [id number]   Facility MD: Ashley DePadua NPI: [id number]  Authorization initiated by contacting insurance:   Via: H&CC Portal   Clinicals submitted via Portal attachment   Pending Reference #: [id number]     Care Manager notified: Ashley Romero     Updates to authorization status will be noted in chart. Please reach out to CM for updates on any clinical information.

## 2024-11-15 NOTE — PLAN OF CARE
Problem: PAIN - ADULT  Goal: Verbalizes/displays adequate comfort level or baseline comfort level  Description: Interventions:  - Encourage patient to monitor pain and request assistance  - Assess pain using appropriate pain scale  - Administer analgesics based on type and severity of pain and evaluate response  - Implement non-pharmacological measures as appropriate and evaluate response  - Consider cultural and social influences on pain and pain management  - Notify physician/advanced practitioner if interventions unsuccessful or patient reports new pain  Outcome: Progressing     Problem: SAFETY ADULT  Goal: Maintain or return to baseline ADL function  Description: INTERVENTIONS:  -  Assess patient's ability to carry out ADLs; assess patient's baseline for ADL function and identify physical deficits which impact ability to perform ADLs (bathing, care of mouth/teeth, toileting, grooming, dressing, etc.)  - Assess/evaluate cause of self-care deficits   - Assess range of motion  - Assess patient's mobility; develop plan if impaired  - Assess patient's need for assistive devices and provide as appropriate  - Encourage maximum independence but intervene and supervise when necessary  - Involve family in performance of ADLs  - Assess for home care needs following discharge   - Consider OT consult to assist with ADL evaluation and planning for discharge  - Provide patient education as appropriate  Outcome: Progressing     Problem: Nutrition/Hydration-ADULT  Goal: Nutrient/Hydration intake appropriate for improving, restoring or maintaining nutritional needs  Description: Monitor and assess patient's nutrition/hydration status for malnutrition. Collaborate with interdisciplinary team and initiate plan and interventions as ordered.  Monitor patient's weight and dietary intake as ordered or per policy. Utilize nutrition screening tool and intervene as necessary. Determine patient's food preferences and provide  high-protein, high-caloric foods as appropriate.     INTERVENTIONS:  - Monitor oral intake, urinary output, labs, and treatment plans  - Assess nutrition and hydration status and recommend course of action  - Evaluate amount of meals eaten  - Assist patient with eating if necessary   - Allow adequate time for meals  - Recommend/ encourage appropriate diets, oral nutritional supplements, and vitamin/mineral supplements  - Order, calculate, and assess calorie counts as needed  - Recommend, monitor, and adjust tube feedings and TPN/PPN based on assessed needs  - Assess need for intravenous fluids  - Provide specific nutrition/hydration education as appropriate  - Include patient/family/caregiver in decisions related to nutrition  Outcome: Progressing

## 2024-11-15 NOTE — CONSULTS
Vancomycin IV Pharmacy-to-Dose Consultation    Miguel Henderson is a 84 y.o. male who was receiving Vancomycin IV with management by the Pharmacy Consult service for treatment of Soft tissue (goal -600, trough >10)  .       The patient's Vancomycin therapy has been discontinued. Thank you for allowing us to take part in this patient's care. Pharmacy will sign-off now; please call or re-consult if there are any questions.      Emelia Nickerson, pharmacist

## 2024-11-15 NOTE — ASSESSMENT & PLAN NOTE
11/11/2024 patient had sepsis criteria.  Fever and tachycardia and rigors.  Possibly related to right upper extremity cellulitis with superficial thrombophlebitis  Blood cultures negative at 48 hours  Chest x-ray unremarkable  ID consulted, appreciate recommendations  Afebrile overnight and possible soft tissue infection in the right upper extremity improved  Will plan to complete treatment for soft tissue infection with last dose of IV vancomycin today to complete total 5 days of antibiotic therapy  Discussed with ID

## 2024-11-15 NOTE — OCCUPATIONAL THERAPY NOTE
Occupational Therapy Progress Note     Patient Name: Miguel Henderson  Today's Date: 11/15/2024  Problem List  Principal Problem:    Encephalopathy  Active Problems:    Hypothyroidism    Primary hypertension    Sepsis (HCC)    Lymphadenopathy    Abnormal LFTs    COVID-19 virus infection    Myoclonia    Anemia    Hypocalcemia    Superficial thrombophlebitis of right upper extremity    Fever    Anxiety            11/15/24 1317   OT Last Visit   OT Visit Date 11/15/24   Note Type   Note Type Treatment   Pain Assessment   Pain Assessment Tool 0-10   Pain Score No Pain   Restrictions/Precautions   Weight Bearing Precautions Per Order No   Other Precautions Cognitive;Chair Alarm;Bed Alarm;Multiple lines;Fall Risk  (Malawian speaking, family provides assist with translation)   Lifestyle   Autonomy Assist PRN for ADLs/IADLs from family. RW used PRN. (-).   Reciprocal Relationships Lives with supportive family (Spouse, Son, DIL)   Service to Others Retired   Intrinsic Gratification Pt enjoys spending time with family   ADL   Where Assessed Edge of bed   UB Dressing Assistance 3  Moderate Assistance   UB Dressing Deficit Thread RUE;Thread LUE   LB Dressing Assistance 2  Maximal Assistance   LB Dressing Deficit Don/doff R sock;Don/doff L sock   Toileting Assistance  2  Maximal Assistance   Toileting Deficit Perineal hygiene   Toileting Comments Pt reporting increased itching and sensitivity in scrotal area. Dry powder applied.   Bed Mobility   Supine to Sit 3  Moderate assistance   Additional items Assist x 2;HOB elevated;Bedrails;Increased time required;Verbal cues;LE management   Sit to Supine Unable to assess   Additional Comments Pt able to maintain EOB with min-Mod A 2/2 posterior lean. OOB in recliner post session, all needs met, call bell within reach. +chair alarm on   Transfers   Sit to Stand 3  Moderate assistance   Additional items Assist x 2;Increased time required;Verbal cues   Stand to Sit 3  Moderate  "assistance   Additional items Assist x 2;Increased time required;Verbal cues   Additional Comments b/l HHA. Sacral support and b/l knee block   Functional Mobility   Functional Mobility 3  Moderate assistance   Additional Comments ax2, steps from bed>chair. b/l knee block and sacral support.   Additional items Hand hold assistance   Subjective   Subjective \"It itches\"   Cognition   Overall Cognitive Status Impaired   Arousal/Participation Alert;Cooperative   Attention Attends with cues to redirect   Memory Decreased recall of recent events   Following Commands Follows one step commands with increased time or repetition   Comments Pt pleasant and cooperative, motivated to participate.   Activity Tolerance   Activity Tolerance Patient limited by fatigue;Other (Comment)  (cognition)   Medical Staff Made Aware RN Cleared for therapy, updated. PT Jen   Assessment   Assessment Patient participated in Skilled OT session this date with interventions consisting of ADL re training with the use of correct body mechnaics, Energy Conservation techniques, safety awareness and fall prevention techniques,  therapeutic activities to: increase activity tolerance, and increase OOB/ sitting tolerance . Patient agreeable to OT treatment session, upon arrival patient was found supine in bed, alert, responsive , and in no apparent distress.  In comparison to previous session, patient with improvements in OOB tolerance, functional mobility. Patient requiring frequent re direction, verbal cues for safety, verbal cues for correct technique, verbal cues for pacing thru activity steps, cognitive assistance to anticipate next step, and one step directives. Pt completed functional STS txfs with mod Ax2, HHA. Pt required mod-max A for ADLs t/o session. Patient continues to be functioning below baseline level, occupational performance remains limited secondary to factors listed above and increased risk for falls and injury. The patient's raw " score on the [facility] Daily Activity Inpatient Short Form is 15. A raw score of less than 19 suggests the patient may benefit from discharge to post-acute rehabilitation services. Please refer to the recommendation of the Occupational Therapist for safe discharge planning.  From OT standpoint, recommendation at time of d/c would be Level 1 resources. Patient to benefit from continued Occupational Therapy treatment while in the hospital to address deficits as defined above and maximize level of functional independence with ADLs and functional mobility.   Plan   Treatment Interventions ADL retraining;Functional transfer training;Endurance training;Cognitive reorientation;Patient/family training;Equipment evaluation/education;Compensatory technique education;Continued evaluation;Energy conservation;Activityengagement   Goal Expiration Date 11/28/24   OT Treatment Day 5   OT Frequency 2-3x/wk   Discharge Recommendation   Rehab Resource Intensity Level, OT I (Maximum Resource Intensity)   AM-PAC Daily Activity Inpatient   Lower Body Dressing 2   Bathing 2   Toileting 2   Upper Body Dressing 3   Grooming 3   Eating 3   Daily Activity Raw Score 15   Daily Activity Standardized Score (Calc for Raw Score >=11) 34.69   AM-PAC Applied Cognition Inpatient   Following a Speech/Presentation 2   Understanding Ordinary Conversation 3   Taking Medications 2   Remembering Where Things Are Placed or Put Away 2   Remembering List of 4-5 Errands 2   Taking Care of Complicated Tasks 2   Applied Cognition Raw Score 13   Applied Cognition Standardized Score 30.46   End of Consult   Education Provided Yes;Family or social support of family present for education by provider   Patient Position at End of Consult Bedside chair;Bed/Chair alarm activated;All needs within reach   Nurse Communication Nurse aware of consult         Vladislav Sanches MS, OTR/KATHARINA     MOCA CERTIFIED RATER ID [id number]

## 2024-11-15 NOTE — PLAN OF CARE
Problem: OCCUPATIONAL THERAPY ADULT  Goal: Performs self-care activities at highest level of function for planned discharge setting.  See evaluation for individualized goals.  Description: Treatment Interventions: ADL retraining, Functional transfer training, UE strengthening/ROM, Endurance training, Patient/family training, Cognitive reorientation, Equipment evaluation/education, Fine motor coordination activities, Compensatory technique education, Continued evaluation, Energy conservation, Activityengagement          See flowsheet documentation for full assessment, interventions and recommendations.   Note: Limitation: Decreased ADL status, Decreased cognition, Decreased Safe judgement during ADL, Decreased endurance, Decreased self-care trans, Decreased high-level ADLs  Prognosis: Fair  Assessment: Patient participated in Skilled OT session this date with interventions consisting of ADL re training with the use of correct body mechnaics, Energy Conservation techniques, safety awareness and fall prevention techniques,  therapeutic activities to: increase activity tolerance, and increase OOB/ sitting tolerance . Patient agreeable to OT treatment session, upon arrival patient was found supine in bed, alert, responsive , and in no apparent distress.  In comparison to previous session, patient with improvements in OOB tolerance, functional mobility. Patient requiring frequent re direction, verbal cues for safety, verbal cues for correct technique, verbal cues for pacing thru activity steps, cognitive assistance to anticipate next step, and one step directives. Pt completed functional STS txfs with mod Ax2, HHA. Pt required mod-max A for ADLs t/o session. Patient continues to be functioning below baseline level, occupational performance remains limited secondary to factors listed above and increased risk for falls and injury. The patient's raw score on the AM-PAC Daily Activity Inpatient Short Form is 15. A raw score  of less than 19 suggests the patient may benefit from discharge to post-acute rehabilitation services. Please refer to the recommendation of the Occupational Therapist for safe discharge planning.  From OT standpoint, recommendation at time of d/c would be Level 1 resources. Patient to benefit from continued Occupational Therapy treatment while in the hospital to address deficits as defined above and maximize level of functional independence with ADLs and functional mobility.     Rehab Resource Intensity Level, OT: I (Maximum Resource Intensity)

## 2024-11-15 NOTE — ASSESSMENT & PLAN NOTE
During prior admission imaging showed enlarged periaortic, iliac chain and inguinal lymph nodes.  Repeat imaging showed persistent lymphadenopathy with worsened right axillary adenopathy.  As above, concern for an underlying lymphoproliferative disorder.  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  CBC differential also shows atypical lymphocytes.  Status post plasmapheresis 10/26. HIV screening negative. S/p lymph node and bone marrow biopsies. Lymph node biopsy overall findings are atypical and suspicious for a lymphoproliferative disorder. However, the tissue was too scant to further characterize the underlying process. Therefore, patient underwent an excisional biopsy with surgical oncology on 11/3 which was nondiagnostic.  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  -Hematology/oncology following  -Prednisone as per hematology oncology  -Follow-up final pathology from excisional biopsy and bone marrow biopsy  -Additional infectious work-up as above

## 2024-11-15 NOTE — ASSESSMENT & PLAN NOTE
The patient had intermittent fevers for 3 weeks and several admissions to [facility] with fairly unremarkable infectious workup other than positive rhinovirus and COVID. CT imaging was noted to show lymphadenopathy and he had atypical lymphocytes on CBC so hematology/oncology consult was recommended although the patient was discharged on Paxlovid with plan for an outpatient referral.  He then returned to the ED with worsening confusion and return of fever.  CT imaging with worsening axillary lymphadenopathy, CBC differential again showed atypical lymphocytes.  Status post LP 10/25 which showed mild elevation in protein and lymphocytic pleocytosis.  ME panel negative and culture showed no growth. 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  Labs show elevated LDH, ferritin, immunoglobulins. Status post plasmapheresis with improvement in fever curve and initially improvement in mental status, but now with waxing and waning mental status and development of myoclonic jerking as below. S/p lymph node and bone marrow biopsies. Bone marrow biopsy preliminary study demonstrated histiocytic aggregates, which are nonspecific, and can be seen in a variety of settings including infection. Mycoplasma antibodies, histoplasma urine antigen, Bartonella antibody, and brucella antibodies negative. Patient had been without a fever for quite some time but now began spiking fever on 11/10/2024 with some chills as above.   -Antibiotics as above  -Follow-up repeat blood cultures obtained 11/11  -Follow-up repeat bone marrow biopsy  -Follow-up repeat excisional lymph node biopsy  -Follow-up mTB PCR from CSF  -Ongoing follow-up with hematology/oncology  -Continue to monitor fever curve/vitals

## 2024-11-15 NOTE — PROGRESS NOTES
Medical Oncology/Hematology Progress Note  Miguel Henderson, male, 84 y.o., 1940,  PPHP 817/PPHP [id number], [id number]     Assessment and Plan:  Mr. Henderson is a Mongolian-speaking 84 yoM with PMHx of prostate cancer, hypertension, and hypothyroidism with encephalopathy, B symptoms, and lymphadenopathy concerning hyperviscosity syndrome secondary to lymphoma or lymphoproliferative disorder transferred from [facility] to [facility] to be evaluated for plasmapheresis which he received x3 treatments with initial improvement but subsequent return to malaise and waxing/waning encephalopathy.  He is now s/p BMBx, R inguinal core biopsy, R inguinal excisional biopsy, repeat BMBx, and R axillary core biopsy which initially were only conclusive for an unspecified lymphoproliferative disorder due to significant necrosis and all specimen.  Discussion with pathology on 11/13 suggests likely peripheral follicular T-cell lymphoma.    1. 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 B symptoms  3. Acute encephalopathy and lethargy  4. Cellulitis of RUE    We discussed recent biopsy results (R axillary and BMBx, 11/8) with pathology on 11/13.  While the official report is pending, they advised that the most recent biopsies are extensively necrotic, but they feel there is enough evidence to diagnose a peripheral follicular T-cell lymphoma.  The patient may be a candidate for induction therapy (see NCCN guidelines below), though likely with dose reduction.  Based on family discussions, given his good performance status prior to his recent acute decline, they seem to be treatment-focused.  They will continue to have [facility] family discussions.  His possible treatment options all include a combination of chemotherapy and steroids.  As his is currently  undergoing antibiotic therapy for cellulitis, he is needing rehab, and the family GOC discussions are ongoing, we will wait to begin chemotherapy.  However, we will begin steroids now as he has received several days of antibiotic therapy already, and the steroids could help partially treat his lymphoma and assist him in his efforts in rehab.  If the patient is wholly treatments focused and he does poorly in rehab he may be a candidate for inpatient chemotherapy, but if he performs well in rehab on the steroid he could likely receive his induction therapy as an outpatient with good family support.  - Continue prednisone 60 mg PO daily  - Holding induction chemotherapy pending final family C decision, antibiotic therapy, and rehab performance  - From our perspective R chest CVL can be removed, but will defer to ID whether they will need access for long-term IV antibiotics  - We will follow up CD30 status as an outpatient  - Ok for discharge to rehab from our perspective  - We will establish outpatient follow up in our office within two weeks of discharge after his acute symptoms have resolved        - Pathology results:  - s/p inguinal lymph node biopsy (10/28/2024)  - s/p bone marrow biopsy (10/29/2024)  - Highly limited and near inadequate though likely lymphoproliferative disorder, increased plasma cells and histiocytic aggregates, flow cytometry, stains, and ancillary studies are pending  - s/p R inguinal excisional lymph node biopsy (11/3/2024)  - Near completely necrotic tissue c/w rapidly progressive malignancy with clonal T-cells and scattered EBV-positive cells  - s/p R axillary lymph node core biopsy (11/8/2024): official report pending, d/w pathology and suggests peripheral follicular T-cell lymphoma  - s/p repeat bone marrow biopsy (11/8/2024): official report pending, d/w pathology and suggests peripheral follicular T-cell lymphoma    - Previous hospital course:  - s/p plasmapheresis (x3 days),  appreciate [facility] ([facility] [phone])  - Appreciate IR: s/p R IJ catheter, s/p core biopsy, and s/p BMBx   - Appreciate neuro: EEG w/ near continuous myoclonus, s/p Ativan 2 mg x1 and Keppra 1000 mg BID, repeat routine EEG neg, MRI Brain neg, no need for LP, AEDs discontinued, neuro signed off    - Lab results:  - Viscosity: 1.9 (nml 1.4-2.1)  - Peripheral smear: negative for schistocytes  - Fibrinogen: 132  - PT-INR: 1.67  - LDH: 560  - Cryoglobulin: positive  - SPEP: elevated gamma spike  - UPEP: wnl  - Immunoglobulins: IgA 1364, IgG 2330, IgM 565  - Free light chain ratio: 0.61  - Flow cytometry: inverted CD4/CD8 ratio  - Immunofixation: free lambda light chains  - Beta-2 microglobulin: 4.7 (nml 0.6-2.4)  - LDH wnl  - haptoglobin mildly low  - PNH wnl  - Urine hemosiderin absent    Interval History:  NESSA, held lengthy discussion with patient and family regarding next steps from treatment.  He is doing well on steroids, I reviewed the patient's vitals which are roughly WNL, I reviewed the patient's labs which show decrease in Hb from 9.3-8.6, and all other values are roughly WNL, there is no new imaging, BCx from 11/11 are no growth at 72 hours    History of present illness:  Mr. Henderson is a Tajik-speaking 84-year-old gentleman with PMHx of prostate cancer (~15 years ago treated with surgery and radiation), hypertension, hypothyroidism, dysphagia, recent COVID and rhinovirus infections who presented to [facility] on 10/25/24 with encephalopathy, B symptoms and lymphadenopathy on imaging concerning for lymphoma with elevated clinical concern for potential hyperviscosity syndrome resulting in a transfer to [facility] to be evaluated for plasmapheresis.    The Patient has had three hospital admissions in the last three weeks.  He has been feeling unwell for several weeks, has had fevers, is very fatigued, has poor appetite and poor PO intake, and has noted weight loss.   Initially, his fevers and lymphadenopathy were attributed to recent COVID and rhinovirus infections.  Imaging did demonstrate lymphadenopathy again thought to be attributed to infection with instructions to follow-up with hematology/medical oncology in the outpatient setting to ensure resolution.  However, his symptoms continued and empiric antibiotics were with concern for meningitis.  Antibiotics were discontinued when LP was negative and ID does not feel there is sufficient evidence for meningitis.     Vitals show Temp 100.2, HR 93, RR 16, /65, and 96% SpO2 on RA.  Labs show strong cold agglutinin, WBC 8.5, Hb 9.4, , , K 3.3, chloride 96, , BUN 7, creatinine 0.56, glucose 88, total protein 6.6, T. bili 1.53, Mg 1.7.    CT CAP w/ (10/25/2024) demonstrates right axillary adenopathy that is worsened compared to previous imaging on 10/11/2024.  Unchanged mediastinal, bilateral iliac chain and bilateral inguinal adenopathy compared to CT on 10/21/2024.  Mild potential pulmonary edema.  Small pleural effusions.  Unchanged cardiomegaly.  Right axillary and subpectoral adenopathy demonstrates a lymph node measuring 21 mm that was previously 12 mm.  Numerous new enlarged nodes unchanged subcentimeter left axillary nodes.  Right external iliac node measures 2.2 cm.  Left external iliac node demonstrates size of 10 mm.  Inguinal lymphadenopathy measures 16 mm on the right and 11 mm on the left.     MRI Brain (10/25/2024) demonstrates no acute infarct, mass effect or midline shift with mild to moderate chronic microangiopathy.  There is multiple indeterminate bilateral parotid nodes, the largest which measures up to 1 cm on the left, and is incompletely evaluated on this imaging.     Peripheral smear (10/22/24) showed RBCs with marked agglutination limiting further evaluation of morphologic features, slight left shift in WBC showing granulocytes with numerous fragile cells and scattered reactive  monocytes and lymphocytes without distinct features of dysplasia or circulating blasts, platelets show normal morphology without significant clumps or satellitosis.  Overall, the combination of findings is not definitively specific, though the red blood cell changes are compatible with a cold agglutinin in the correct clinical setting. The white blood cell changes may raise the possibility of infection, chemotherapy or drug effect, toxin exposure, postvaccination, or other reactive condition. The presence of fragile white blood cells may be an artifact of specimen handling, though a component of drug effect or nonspecific reactive change cannot be entirely excluded. Correlation with clinical impression and other laboratory findings is recommended. If clinical suspicion for more significant disease process such as a lymphoproliferative disorder exists then consideration for repeat sampling with peripheral blood flow cytometry may be of assistance to further evaluate the white blood cell changes as clinically indicated.  Flow cytometry was ordered and is pending.    Dr. Maxwell, hematology/oncology, coordinated care with [facility] primary attending, Dr. Borrero to arrange for urgent transfer to [place].  We called the [facility] to discuss the case and inform them of a possible need for plasmapheresis.  The patient arrived promptly to [facility].  We evaluated the patient at bedside and found him to be AAOx3, states that he feels slightly improved, but exhibited a degree of encephalopathy.  We contacted the [facility] [place] [facility] again ([facility] [phone]) who recommended ordering labs, 3.5 L albumin x2 days, and calcium gluconate 2 g x2 days.  We contacted IR who is planning to place urgent R IJ catheter.  Family history is positive for lymphoma in a brother.    Review of Systems:   ROS was performed and is negative except as indicated above.    Current Facility-Administered Medications:      acetaminophen (Ofirmev) injection 1,000 mg, 1,000 mg, Intravenous, Q6H PRN, Argelia Mccallum MD, Last Rate: 400 mL/hr at 11/12/24 0735, 1,000 mg at 11/12/24 0735    bisacodyl (DULCOLAX) rectal suppository 10 mg, 10 mg, Rectal, Once, Argelia Mccallum MD    bisacodyl (DULCOLAX) rectal suppository 10 mg, 10 mg, Rectal, Daily PRN, Bunny Pearson MD    calcium carbonate (TUMS) chewable tablet 500 mg, 500 mg, Oral, BID, Argelia Mccallum MD, 500 mg at 11/15/24 0922    docusate sodium (COLACE) capsule 100 mg, 100 mg, Oral, BID, Argelia Mccallum MD, 100 mg at 11/15/24 0922    fluticasone (FLONASE) 50 mcg/act nasal spray 1 spray, 1 spray, Nasal, Daily, Argelia Mccallum MD, 1 spray at 11/15/24 0925    gabapentin (NEURONTIN) capsule 100 mg, 100 mg, Oral, HS, Nancy Gradzka, 100 mg at 11/14/24 2159    heparin (porcine) subcutaneous injection 5,000 Units, 5,000 Units, Subcutaneous, Q8H JOE, 5,000 Units at 11/15/24 0512 **AND** [COMPLETED] Platelet count, , , Once, Nancy Mondragon    hydrocortisone (ANUSOL-HC) 2.5 % rectal cream, , Topical, TID, Bunny Pearson MD, Given at 11/15/24 0925    hydrOXYzine HCL (ATARAX) tablet 10 mg, 10 mg, Oral, Q6H PRN, Bunny Pearson MD, 10 mg at 11/13/24 2032    levothyroxine tablet 50 mcg, 50 mcg, Oral, Once per day on Monday Tuesday Wednesday Thursday, Argelia Mccallum MD, 50 mcg at 11/14/24 0600    levothyroxine tablet 75 mcg, 75 mcg, Oral, Once per day on Sunday Friday Saturday, Argelia Mccallum MD, 75 mcg at 11/15/24 0512    moisture barrier miconazole 2% cream (aka SAMEER MOISTURE BARRIER ANTIFUNGAL CREAM), , Topical, BID, Jonatan Campos MD, Given at 11/15/24 0970    ondansetron (ZOFRAN) injection 4 mg, 4 mg, Intravenous, Q6H PRN, Argelia Mccallum MD    oxyCODONE (ROXICODONE) IR tablet 5 mg, 5 mg, Oral, Q6H PRN, Bunny Pearson MD, 5 mg at 11/13/24 5512    pantoprazole (PROTONIX) EC tablet 40 mg, 40 mg, Oral, Early Morning, Argelia Mccallum MD, 40 mg at 11/15/24 0512    polyethylene glycol (MIRALAX)  "packet 17 g, 17 g, Oral, BID, Bunny Pearson MD, 17 g at 11/15/24 09    predniSONE tablet 60 mg, 60 mg, Oral, Daily, Scottie Ding DO, 60 mg at 11/15/24 09    vancomycin (VANCOCIN) IVPB (premix in dextrose) 1,000 mg 200 mL, 1,000 mg, Intravenous, Q12H, Bunny Pearson MD, Last Rate: 200 mL/hr at 11/15/24 0057, 1,000 mg at 11/15/24 0057    Medications Prior to Admission:     benzonatate (TESSALON) 200 MG capsule    cyclobenzaprine (FLEXERIL) 5 mg tablet    fluticasone (FLONASE) 50 mcg/act nasal spray    hydroCHLOROthiazide 25 mg tablet    ibuprofen (MOTRIN) 800 mg tablet    levothyroxine 50 mcg tablet    levothyroxine 75 mcg tablet    [] nirmatrelvir & ritonavir (Paxlovid) tablet therapy pack    ondansetron (ZOFRAN) 4 mg tablet    pantoprazole (PROTONIX) 40 mg tablet    potassium chloride (Klor-Con M10) 10 mEq tablet    Allergies   Allergen Reactions    Shellfish Allergy - Food Allergy Anaphylaxis    Valtrex [Valacyclovir] Hives     Physical Exam:    /72   Pulse 74   Temp 97.8 °F (36.6 °C)   Resp 18   Ht 5' 5\" (1.651 m)   Wt 77.6 kg (171 lb)   SpO2 98%   BMI 28.46 kg/m²     General: AAOx4, improved-appearing  HEENT: PERRLA, moist mucosa, dentures  Respiratory: CTAB w/o wheezes, rales, or rhonchi, no increased work of breathing  Cardiovascular: RRR w/o murmurs, 2+ radial and pedal pulses, no b/l LE edema  Abdomen: soft, non-tender, non-distended, no hepatomegaly or splenomegaly  /Rectal: deferred  Musculoskeletal: sedated, myoclonus in b/l LE and UE  Integumentary: reticular rash across chest and arms, dry, no bruising  Neurological: myoclonus in b/l LE and UE  Psychiatric: sedated    Recent Results (from the past 48 hours)   Comprehensive metabolic panel    Collection Time: 24  6:39 PM   Result Value Ref Range    Sodium 134 (L) 135 - 147 mmol/L    Potassium 3.9 3.5 - 5.3 mmol/L    Chloride 101 96 - 108 mmol/L    CO2 29 21 - 32 mmol/L    ANION GAP 4 4 - 13 mmol/L    BUN " 15 5 - 25 mg/dL    Creatinine 0.57 (L) 0.60 - 1.30 mg/dL    Glucose 150 (H) 65 - 140 mg/dL    Calcium 7.8 (L) 8.4 - 10.2 mg/dL    Corrected Calcium 8.8 8.3 - 10.1 mg/dL    AST 36 13 - 39 U/L    ALT 33 7 - 52 U/L    Alkaline Phosphatase 138 (H) 34 - 104 U/L    Total Protein 5.8 (L) 6.4 - 8.4 g/dL    Albumin 2.7 (L) 3.5 - 5.0 g/dL    Total Bilirubin 0.81 0.20 - 1.00 mg/dL    eGFR 92 ml/min/1.73sq m   CBC and differential    Collection Time: 11/13/24  6:39 PM   Result Value Ref Range    WBC 4.50 4.31 - 10.16 Thousand/uL    RBC 2.92 (L) 3.88 - 5.62 Million/uL    Hemoglobin 8.8 (L) 12.0 - 17.0 g/dL    Hematocrit 27.5 (L) 36.5 - 49.3 %    MCV 94 82 - 98 fL    MCH 30.1 26.8 - 34.3 pg    MCHC 32.0 31.4 - 37.4 g/dL    RDW 18.0 (H) 11.6 - 15.1 %    MPV 9.4 8.9 - 12.7 fL    Platelets 243 149 - 390 Thousands/uL    nRBC 0 /100 WBCs    Segmented % 52 43 - 75 %    Immature Grans % 2 0 - 2 %    Lymphocytes % 35 14 - 44 %    Monocytes % 6 4 - 12 %    Eosinophils Relative 4 0 - 6 %    Basophils Relative 1 0 - 1 %    Absolute Neutrophils 2.37 1.85 - 7.62 Thousands/µL    Absolute Immature Grans 0.07 0.00 - 0.20 Thousand/uL    Absolute Lymphocytes 1.56 0.60 - 4.47 Thousands/µL    Absolute Monocytes 0.27 0.17 - 1.22 Thousand/µL    Eosinophils Absolute 0.20 0.00 - 0.61 Thousand/µL    Basophils Absolute 0.03 0.00 - 0.10 Thousands/µL   Vancomycin, random    Collection Time: 11/14/24  8:35 AM   Result Value Ref Range    Vancomycin Rm 12.6 10.0 - 20.0 ug/mL   Comprehensive metabolic panel    Collection Time: 11/14/24  8:35 AM   Result Value Ref Range    Sodium 137 135 - 147 mmol/L    Potassium 3.6 3.5 - 5.3 mmol/L    Chloride 103 96 - 108 mmol/L    CO2 30 21 - 32 mmol/L    ANION GAP 4 4 - 13 mmol/L    BUN 12 5 - 25 mg/dL    Creatinine 0.52 (L) 0.60 - 1.30 mg/dL    Glucose 131 65 - 140 mg/dL    Calcium 7.9 (L) 8.4 - 10.2 mg/dL    Corrected Calcium 8.9 8.3 - 10.1 mg/dL    AST 31 13 - 39 U/L    ALT 31 7 - 52 U/L    Alkaline Phosphatase 133 (H)  34 - 104 U/L    Total Protein 5.9 (L) 6.4 - 8.4 g/dL    Albumin 2.7 (L) 3.5 - 5.0 g/dL    Total Bilirubin 0.67 0.20 - 1.00 mg/dL    eGFR 96 ml/min/1.73sq m   CBC and differential    Collection Time: 11/14/24  8:35 AM   Result Value Ref Range    WBC 4.43 4.31 - 10.16 Thousand/uL    RBC 3.08 (L) 3.88 - 5.62 Million/uL    Hemoglobin 9.3 (L) 12.0 - 17.0 g/dL    Hematocrit 29.0 (L) 36.5 - 49.3 %    MCV 94 82 - 98 fL    MCH 30.2 26.8 - 34.3 pg    MCHC 32.1 31.4 - 37.4 g/dL    RDW 17.9 (H) 11.6 - 15.1 %    MPV 9.2 8.9 - 12.7 fL    Platelets 260 149 - 390 Thousands/uL    nRBC 0 /100 WBCs   Basic metabolic panel    Collection Time: 11/15/24  6:41 AM   Result Value Ref Range    Sodium 136 135 - 147 mmol/L    Potassium 3.5 3.5 - 5.3 mmol/L    Chloride 102 96 - 108 mmol/L    CO2 28 21 - 32 mmol/L    ANION GAP 6 4 - 13 mmol/L    BUN 9 5 - 25 mg/dL    Creatinine 0.34 (L) 0.60 - 1.30 mg/dL    Glucose 108 65 - 140 mg/dL    Calcium 8.0 (L) 8.4 - 10.2 mg/dL    eGFR 114 ml/min/1.73sq m   CBC and differential    Collection Time: 11/15/24  6:41 AM   Result Value Ref Range    WBC 6.64 4.31 - 10.16 Thousand/uL    RBC 2.89 (L) 3.88 - 5.62 Million/uL    Hemoglobin 8.6 (L) 12.0 - 17.0 g/dL    Hematocrit 27.2 (L) 36.5 - 49.3 %    MCV 94 82 - 98 fL    MCH 29.8 26.8 - 34.3 pg    MCHC 31.6 31.4 - 37.4 g/dL    RDW 17.2 (H) 11.6 - 15.1 %    MPV 9.3 8.9 - 12.7 fL    Platelets 250 149 - 390 Thousands/uL    nRBC 0 /100 WBCs    Segmented % 48 43 - 75 %    Immature Grans % 1 0 - 2 %    Lymphocytes % 42 14 - 44 %    Monocytes % 9 4 - 12 %    Eosinophils Relative 0 0 - 6 %    Basophils Relative 0 0 - 1 %    Absolute Neutrophils 3.17 1.85 - 7.62 Thousands/µL    Absolute Immature Grans 0.07 0.00 - 0.20 Thousand/uL    Absolute Lymphocytes 2.79 0.60 - 4.47 Thousands/µL    Absolute Monocytes 0.58 0.17 - 1.22 Thousand/µL    Eosinophils Absolute 0.01 0.00 - 0.61 Thousand/µL    Basophils Absolute 0.02 0.00 - 0.10 Thousands/µL       MRI brain w wo  contrast    Result Date: 10/26/2024  Narrative: MRI BRAIN WITH AND WITHOUT CONTRAST INDICATION: confusion,. COMPARISON: CT head 10/25/2024. TECHNIQUE: Multiplanar, multisequence imaging of the brain was performed before and after gadolinium administration. IV Contrast:  7 mL of Gadobutrol injection (SINGLE-DOSE) IMAGE QUALITY:   Motion-degraded, which reduces diagnostic sensitivity. FINDINGS: BRAIN PARENCHYMA: No acute infarct, mass effect or midline shift. Mild-moderate chronic ischemic changes of the white matter. Postcontrast imaging of the brain demonstrates no abnormal enhancement. VENTRICLES:  Normal for the patient's age. SELLA AND PITUITARY GLAND:  Normal. ORBITS: No acute abnormality. PARANASAL SINUSES: Mild mucosal thickening. VASCULATURE: Please refer to CTA. CALVARIUM AND SKULL BASE: No acute abnormality. EXTRACRANIAL SOFT TISSUES: Multiple bilateral parotid nodules, the largest of which measures up to 1.0 cm in the left parotid gland (series 6, image 1), noting these are not well evaluated.     Impression: Within the confines of a motion-degraded examination: 1. No acute infarct, mass effect or midline shift. Mild-moderate chronic microangiopathy. 2. Multiple indeterminate bilateral parotid nodules, the largest of which measures up to 1.0 cm on the left, incompletely evaluated. If clinically appropriate, this could be further evaluated with contrast-enhanced neck CT on a nonemergent basis. The study was marked in EPIC for immediate notification. Workstation performed: [id number]     IR lumbar puncture    Result Date: 10/25/2024  Narrative: Lumbar puncture under fluoroscopic control. Clinical History: Confusion and fever Fluoro time: 0.9 minutes Conscious sedation time: 35 minutes Number of Images: 1 Technique: The patient was brought to the interventional radiology suite and identified by wristband. The patient was placed prone on the table, and the L4-5 spinous processes were identified. The skin was  then prepped and draped in usual sterile fashion. Lidocaine was administered to the skin, and under fluoroscopic control, a 20 gauge spinal needle was advanced into the subarachnoid space between the L4-5 spinous processes . The table was then tilted into reverse Trendelenburg. 30 cc of clear spinal fluid was removed under it's own pressure into 4 separate sequential tubes. The tubes were sent to laboratory for testing as requested by the referring physician. After the procedure, the needle was removed, and a sterile dressing applied to the site. The patient tolerated the procedure well and suffered no complications.     Impression: Impression: 1. Successful lumbar puncture under fluoroscopic control. 2. 30 cc of clear spinal fluid was removed and sent to laboratory as described above. Workstation performed: [id number]     CT chest abdomen pelvis w contrast    Result Date: 10/25/2024  Narrative: CT CHEST, ABDOMEN AND PELVIS WITH IV CONTRAST INDICATION: Confusion, abdominal pain, emesis, generalized abdominal tenderness. COMPARISON: CT abdomen pelvis 10/21/2024. CT chest abdomen pelvis 10/11/2024. TECHNIQUE: CT examination of the chest, abdomen and pelvis was performed. Multiplanar 2D reformatted images were created from the source data. This examination, like all CT scans performed in the [facility], was performed utilizing techniques to minimize radiation dose exposure, including the use of iterative reconstruction and automated exposure control. Radiation dose length product (DLP) for this visit: 850.79 mGy-cm IV Contrast: 100 mL of iohexol (OMNIPAQUE) Enteric Contrast: Not administered. FINDINGS: CHEST LUNGS: Mild interlobular septal thickening through both lower lobes may represent mild pulmonary edema. Mild bibasilar atelectasis. No focal consolidation. Unchanged known 4 mm and smaller pulmonary nodules. PLEURA: Small bibasilar pleural effusions. HEART/GREAT VESSELS: Cardiomegaly. Coronary  artery calcifications. No thoracic aortic aneurysm. MEDIASTINUM AND JOE: Unchanged mild mediastinal and bilateral parahilar adenopathy. Dominant 16 mm precarinal node #2/49. Reidentified diffuse esophageal distention. Diffuse esophageal fluid may indicate reflux. CHEST WALL AND LOWER NECK: Worsened right axillary and right subpectoral adenopathy. Previously seen dominant 12 mm node now measures 21 mm #2/29. Numerous new enlarged nodes. Unchanged subcentimeter left axillary nodes. ABDOMEN LIVER/BILIARY TREE: Unremarkable. GALLBLADDER: No calcified gallstones. No pericholecystic inflammatory change. SPLEEN: Unremarkable. PANCREAS: Unremarkable. ADRENAL GLANDS: Unremarkable. KIDNEYS/URETERS: No hydronephrosis. Numerous unchanged bilateral simple renal cysts. Well-circumscribed subcentimeter bilateral renal hypoattenuating lesions are also unchanged and likely to represent subcentimeter simple cyst. STOMACH AND BOWEL: Unremarkable. APPENDIX: No findings to suggest appendicitis. ABDOMINOPELVIC CAVITY: No ascites. No pneumoperitoneum. Unchanged bilateral iliac chain adenopathy. A dominant 2.2 cm right external iliac node #2/199 is unchanged. Dominant 10 mm left external iliac node #2/185 is unchanged. Unchanged 9 mm left pelvic sidewall node #2/210. Numerous unchanged subcentimeter retroperitoneal nodes. 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 PELVIS REPRODUCTIVE ORGANS: Prostatectomy. URINARY BLADDER: Unremarkable. ABDOMINAL WALL/INGUINAL REGIONS: Unchanged bilateral inguinal adenopathy. Dominant 16 mm right inguinal node #2/214. Dominant 11 mm left inguinal node #2/237. BONES: No acute fracture or suspicious osseous lesion.     Impression: Right axillary adenopathy is worsened when compared with CT chest 10/11/2024 Unchanged mediastinal, bilateral iliac chain and bilateral inguinal adenopathy when compared with CT abdomen pelvis 10/21/2024. Potential mild  pulmonary edema. Small pleural effusions. Unchanged cardiomegaly. The study was marked in EPIC for immediate notification. Workstation performed: [id number]     CT head without contrast    Result Date: 10/25/2024  Narrative: CT BRAIN - WITHOUT CONTRAST INDICATION:   Confusion, non focal. COMPARISON:  None. TECHNIQUE:  CT examination of the brain was performed.  Multiplanar 2D reformatted images were created from the source data. Radiation dose length product (DLP) for this visit:  870.27 mGy-cm .  This examination, like all CT scans performed in the [facility] [facility], was performed utilizing techniques to minimize radiation dose exposure, including the use of iterative  reconstruction and automated exposure control. IMAGE QUALITY:  Diagnostic. FINDINGS: PARENCHYMA: Decreased attenuation is noted in periventricular and subcortical white matter demonstrating an appearance that is statistically most likely to represent moderate microangiopathic change. No CT signs of acute infarction.  No intracranial mass, mass effect or midline shift.  No acute parenchymal hemorrhage. VENTRICLES AND EXTRA-AXIAL SPACES:  Normal for the patient's age. VISUALIZED ORBITS: Normal visualized orbits. PARANASAL SINUSES: Normal visualized paranasal sinuses. CALVARIUM AND EXTRACRANIAL SOFT TISSUES: Normal.     Impression: No acute intracranial abnormality. Workstation performed: [id number]     XR chest 1 view portable    Result Date: 10/22/2024  Narrative: XR CHEST PORTABLE INDICATION: Cough/fever. COMPARISON: Chest radiograph 10/8/2024; CT chest, abdomen and pelvis 10/11/2024 FINDINGS: Monitoring leads and clips project over the chest. Mild bibasilar atelectasis. No focal consolidation. Blunting of the left costophrenic angle suggesting trace pleural effusion. No pneumothorax. Normal cardiomediastinal silhouette. Degenerative changes of the spine and bilateral shoulders. Normal upper abdomen.     Impression: Mild bibasilar  atelectasis and trace left pleural effusion. Resident: MARTHA Whiteside I, the attending radiologist, have reviewed the images and agree with the final report above. Workstation performed: [id number]     CT abdomen pelvis with contrast    Result Date: 10/21/2024  Narrative: CT ABDOMEN AND PELVIS WITH IV CONTRAST INDICATION:, Nausea, vomiting and diarrhea. . COMPARISON: 10/11/2024. TECHNIQUE: CT examination of the abdomen and pelvis was performed. Multiplanar 2D reformatted images were created from the source data. This examination, like all CT scans performed in the [facility], was performed utilizing techniques to minimize radiation dose exposure, including the use of iterative reconstruction and automated exposure control. Radiation dose length product (DLP) for this visit: 585 mGy-cm IV Contrast: 100 mL of iohexol (OMNIPAQUE) Enteric Contrast: Not administered. FINDINGS: ABDOMEN LOWER CHEST: Small left pleural effusion and left basilar subsegmental atelectasis. LIVER/BILIARY TREE: Unremarkable. GALLBLADDER: No calcified gallstones. No pericholecystic inflammatory change. SPLEEN: Unremarkable. PANCREAS: Unremarkable. ADRENAL GLANDS: Unremarkable. KIDNEYS/URETERS: Symmetric nephrographic phase enhancement the kidneys. Cysts measure up to 6.4 cm. Multiple left renal cysts measuring up to 8.2 cm. No obstructive uropathy. STOMACH AND BOWEL: Unremarkable. APPENDIX: Prior appendectomy. 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 PELVIS REPRODUCTIVE ORGANS: Prior prostatectomy. URINARY BLADDER: Unremarkable. ABDOMINAL WALL/INGUINAL REGIONS: Enlarged bilateral inguinal lymph nodes with normal fatty shen, measuring up to 1.9 cm.. BONES: No acute fracture or suspicious osseous lesion.     Impression: 1. No evidence of bowel obstruction, colitis or  diverticulitis. 2. Numerous mildly enlarged para-aortic, iliac chain and inguinal lymph nodes, possibly reactive however lymphoproliferative disorder not excluded. Workstation performed: [id number]     CT chest abdomen pelvis w contrast    Result Date: 10/12/2024  Narrative: CT CHEST, ABDOMEN AND PELVIS WITH IV CONTRAST INDICATION: intermittent persistent fevers. COMPARISON: 10/8/2024. TECHNIQUE: CT examination of the chest, abdomen and pelvis was performed. Multiplanar 2D reformatted images were created from the source data. This examination, like all CT scans performed in the [facility], was performed utilizing techniques to minimize radiation dose exposure, including the use of iterative reconstruction and automated exposure control. Radiation dose length product (DLP) for this visit: 898.73 mGy-cm IV Contrast: 100 mL of iohexol (OMNIPAQUE) Enteric Contrast: Not administered. FINDINGS: Moderately degraded by respiratory motion and retained barium in the colon. CHEST LUNGS: There is bibasilar atelectasis. Stable 3 mm right apical lung nodule on series 4 image 24. Additional nodules are not well seen due to respiratory motion. No tracheal or endobronchial lesion. PLEURA: There are small bilateral pleural effusions. HEART/GREAT VESSELS: Heart is unremarkable for patient's age. No thoracic aortic aneurysm. MEDIASTINUM AND JOE: There is diffuse distention of the esophagus as seen previously. No obstructing lesion is visualized. CHEST WALL AND LOWER NECK: Again seen is an enlarged right axillary lymph node measuring 1.2 x 1.9 cm, nonspecific. ABDOMEN LIVER/BILIARY TREE: There is a 1.9 x 1.6 cm enhancing lesion in segment 6 of the liver, indeterminate. GALLBLADDER: Not well-visualized due to under distention and beam hardening artifact from adjacent barium. SPLEEN: Unremarkable. PANCREAS: Unremarkable. ADRENAL GLANDS: Unremarkable. KIDNEYS/URETERS: There are numerous bilateral renal cysts. No  hydronephrosis. STOMACH AND BOWEL: Unremarkable. APPENDIX: No findings to suggest appendicitis. ABDOMINOPELVIC CAVITY: No ascites. No pneumoperitoneum. No lymphadenopathy. 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 PELVIS REPRODUCTIVE ORGANS: Unremarkable for patient's age. URINARY BLADDER: Unremarkable. ABDOMINAL WALL/INGUINAL REGIONS: Unremarkable. BONES: No acute fracture or suspicious osseous lesion.     Impression: Moderately limited by respiratory motion. 1.  Small bilateral pleural effusions with overlying atelectasis. 2.  3 mm right apical lung nodule. Based on current Fleischner Society 2017 Guidelines on incidental pulmonary nodule, no routine follow-up is needed if the patient is low risk. If the patient is high risk, optional follow-up chest CT at 12 months can be  considered. 3.  Stable diffusely dilated esophagus. This may be related to dysmotility. Obstructing lesion is not visualized. This would be better evaluated with esophagram. 4.  No acute abnormality in the abdomen or pelvis. 5.  Enhancing liver lesion, indeterminate. Possibilities include hemangioma and vascular malformation though other etiologies including neoplasm not excluded. Follow-up nonemergent MRI is recommended. The study was marked in EPIC for immediate notification. Workstation performed: [id number]     XR knee 3 vw left non injury    Result Date: 10/10/2024  Narrative: XR KNEE 3 VW LEFT NON INJURY INDICATION: left knee pain. COMPARISON: None FINDINGS: No acute fracture or dislocation. No joint effusion. No significant degenerative changes. No lytic or blastic osseous lesion. Unremarkable soft tissues.     Impression: No acute osseous abnormality. Computerized Assisted Algorithm (CAA) may have been used to analyze all applicable images. Workstation performed: [id number]     FL barium swallow video w speech    Result Date:  10/9/2024  Narrative: A video barium swallow study was performed by the Department of Speech Pathology. Please refer to the report for the official interpretation. The images are stored for archival purposes only. Study images were not formally reviewed by the Radiology Department.    FL barium swallow video w speech    Result Date: 10/9/2024  Narrative: JARROD Marie     10/9/2024  3:14 PM                                  Video Swallow Study Patient Name: Miguel Henderson Today's Date: 10/9/2024   Past Medical History Past Medical History: Diagnosis Date  Prostate cancer (HCC)   Thyroid cancer (HCC)   Past Surgical History Past Surgical History: Procedure Laterality Date  APPENDECTOMY   Summary: Images are on PACS for review. Oral Phase : Pt presented with mild oral dysphagia. Mastication was mod prolonged however eventual functional breakdown. Bolus control, formation were WNL. Transfer was piecemeal. Trace posterior base of tongue residue. Pharyngeal Phase :Pt presented with mild pharyngeal dysphagia. Swallow initiation was intermittently min delayed with solids and prompt with liquids. Spill to the valleculae occurred with solids. Hyoid elevation and laryngeal excursion were WNL. Pharyngeal stripping wave diminished with solid trials. Epiglottic inversion was mostly complete. With nutri grain min epiglottic undercoat present. Trace vallecular retention with thin liquids and mild/mod with solids. Pt intermittently initiated secondary swallow to reduce residue. No pyriform retention. Large CP bar C4-C5 evident however did not impede bolus flow. No aspiration or penetration observed with trials. Per Esophageal screen Slow motility observed with liquids and solids. High retropulsion observed with liquids, pt risk for bottom up aspiration. Stasis occurred in distal esophagus with 13 mm pill however provided with additional sip of thin, ntl, and puree. Provided additional time x4 minutes pt continued with residue  with esophagus. Observations: Son present throughout study provided translation. Recommendations: Diet: Dysphagia 3 dental soft Liquids:thin Meds: whole with thin liquids Strategies:slow rate, alternate liquids with solids, swallow prior to additional po Upright position F/u ST tx: yes Therapy Prognosis: fair Prognosis considerations:age, medical status, Intermittent/Distant Supervision Aspiration Precautions Reflux Precautions Consider consult with: GI, rehab Results reviewed with: pt, nursing, physician. ST reviewed images and recommendations reviewed with family at bedside. Aspiration precautions posted. If a dedicated assessment of the esophagus is desired, consider esophagram/barium swallow or EGD. Goals: Dysphagia LTG -Patient will demonstrate safe and effective oral intake (without overt s/s significant oral/pharyngeal dysphagia including s/s penetration or aspiration) for the highest appropriate diet level. Short Term Goals: -Pt will tolerate Dysphagia 3/advanced (dental soft) diet and thin liquid with no significant s/s oral or pharyngeal dysphagia across 1-3 diagnostic session/s. -Patient will tolerate trials of upgraded food and/or liquid texture with no significant s/s of oral or pharyngeal dysphagia including aspiration across 1-3 diagnostic sessions -Patient will comply with a Video/Modified Barium Swallow study for more complete assessment of swallowing anatomy/physiology/aspiration risk and to assess efficacy of treatment techniques -Patient will demonstrate the ability to adequately self-monitor swallowing skills and perform appropriate compensatory techniques to reduce overt s/sx of penetration/aspiration to safely tolerate least restrictive food/liquid consistencies. Patient's goal: none stated H&P/Admit info, pertinent provider notes/procedures/studies/PMH:(copied and placed in this report) Miguel Henderson is a 84 y.o. male with a PMH of prostate cancer and thyroid cancer who presents with  fever x 4 days.  Patient is Latvian-speaking only and history is obtained through assistance of his son who is present at bedside and acting as .  Patient has been having intermittent fevers which have been responsive to Tylenol and Motrin at home but have ranged as high as 103.7 and yesterday was having some apparent respiratory distress but he has no respiratory history and is on no respiratory medications at home.  Patient does have a scant cough which is nonproductive and denies any increased urinary frequency or dysuria though he does complain of some hematuria which has been ongoing for the past month.  Patient additionally has been complaining of a sore throat for the past 4 days with decreased p.o. fluid intake secondary to pain with swallowing.  While in ER patient was noted to meet severe sepsis criteria and was thus referred for admission. Special Studies XR CHEST PORTABLE 10/08/2024 IMPRESSION: Mild tree-in-bud pattern of opacity mostly in the right lung and in the left base again suggesting a mild infectious/inflammatory condition CT CHEST WITHOUT IV CONTRAST 10/08/2024 IMPRESSION: Mild bibasilar probable atelectatic changes, right greater than left. Otherwise no focal airspace consolidation to suggest pneumonia. Minor tree-in-bud nodularity in the right lung which could relate to infectious versus inflammatory small airways disease. Clinical correlation recommended. Pulmonary nodules as above. Based on current Fleischner Society 2017 Guidelines on incidental pulmonary nodule, no routine follow-up is needed if the patient is low risk. If the patient is high risk, optional follow-up chest CT at 12 months can be considered. Trace right pleural effusion. Mild right axillary lymphadenopathy, nonspecific Code Status: Level 1 full code Previous VBS: none Precautions Contact Precautions Food allergies:  No know  Current diet:  Regular diet and thin liquids  Premorbid diet:  Regular diet and thin  liquids  Dentition  Upper and lower dentures Oral [facility]  WNL O2 requirements:  Room air  Vocal Quality/Speech WNL Cognitive status:  Alert  Consistencies administered: Barium laden applesauce, nutri grain bar, sandwich bite, thin liquids,ntl and  13mm barium pill. Liquids were administered by cup. Pt was seated laterally at 90 degrees. Pt  unable to be viewed in AP position, due to transfer status      Echo complete w/ contrast if indicated    Result Date: 10/9/2024  Narrative:   Left Ventricle: Left ventricular cavity size is normal. Wall thickness is mildly increased. The left ventricular ejection fraction is 55%. Systolic function is normal. Wall motion is normal.   Aortic Valve: There is aortic valve sclerosis.   Aorta: The aortic root is mildly dilated. The ascending aorta is mildly dilated. The aortic root is 3.80 cm. The ascending aorta is 3.9 cm.     XR chest portable    Result Date: 10/8/2024  Narrative: XR CHEST PORTABLE INDICATION: shortness of breath. COMPARISON: 10/7/2024 FINDINGS: Minimal tree-in-bud pattern of opacity mostly in the right lung, but also at the left base. No focal dense consolidation. The trace amount of pleural fluid seen on the prior CT is not apparent on this exam. No pneumothorax identified. Stable cardiomediastinal silhouette Degenerative changes noted along the spine. Normal upper abdomen.     Impression: Mild tree-in-bud pattern of opacity mostly in the right lung and in the left base again suggesting a mild infectious/inflammatory condition Workstation performed: [id number]     XR chest portable - 1 view    Result Date: 10/8/2024  Narrative: XR CHEST PORTABLE INDICATION: Fever cough. COMPARISON: None FINDINGS: Clear lungs. No pneumothorax or pleural effusion. Normal cardiomediastinal silhouette. Bones are unremarkable for age. Normal upper abdomen.     Impression: No acute cardiopulmonary disease. Workstation performed: [id number]     CT chest wo contrast    Result Date:  10/8/2024  Narrative: CT CHEST WITHOUT IV CONTRAST INDICATION: Cough shortness of breath with fever. COMPARISON: None. TECHNIQUE: CT examination of the chest was performed without intravenous contrast. Multiplanar 2D reformatted images were created from the source data. This examination, like all CT scans performed in the [facility], was performed utilizing techniques to minimize radiation dose exposure, including the use of iterative reconstruction and automated exposure control. Radiation dose length product (DLP) for this visit: 441.84 mGy-cm FINDINGS: LUNGS: Evaluation somewhat limited by respiratory motion. There is some minor groundglass and tree-in-bud nodularity suggested in the right upper lobe inferior segment and to a lesser degree in the right lower lobe. No lobar consolidation. Trace paraseptal emphysematous changes. Mild basilar atelectatic changes, right greater than left. There is minor scarring in the posteromedial right lower lobe adjacent to bulky paravertebral osteophytes. 4 mm pulmonary nodule seen in the right upper lobe (3:35). Additional scattered pulmonary nodules are seen measuring on the order of 2 to 3 mm. Based on current Fleischner Society 2017 Guidelines on incidental pulmonary nodule, no routine follow-up is needed if the patient is low risk. If the patient is high risk, optional follow-up chest CT at 12 months can be considered. PLEURA: Trace right pleural effusion with subjacent atelectasis. HEART/GREAT VESSELS: Heart is unremarkable for patient's age. No thoracic aortic aneurysm. Coronary atherosclerosis MEDIASTINUM AND JOE: Shotty subcentimeter mediastinal lymph nodes noted. Evaluation for hilar lymphadenopathy limited in the absence of intravenous contrast. CHEST WALL AND LOWER NECK: Gynecomastia. Few prominent/mildly enlarged lymph nodes seen in the right axillary region, largest measuring up to 1.2 cm,. VISUALIZED STRUCTURES IN THE UPPER ABDOMEN: No acute  abnormality. Bilateral renal cysts. OSSEOUS STRUCTURES: Spinal degenerative changes are noted. No acute fracture or destructive osseous lesion.     Impression: Mild bibasilar probable atelectatic changes, right greater than left. Otherwise no focal airspace consolidation to suggest pneumonia. Minor tree-in-bud nodularity in the right lung which could relate to infectious versus inflammatory small airways disease. Clinical correlation recommended. Pulmonary nodules as above. Based on current Fleischner Society 2017 Guidelines on incidental pulmonary nodule, no routine follow-up is needed if the patient is low risk. If the patient is high risk, optional follow-up chest CT at 12 months can be considered. Trace right pleural effusion. Mild right axillary lymphadenopathy, nonspecific. Workstation performed: [id number]     Labs and pertinent reports reviewed.      This note has been generated by voice recognition software system.  Therefore, there may be spelling, grammar, and or syntax errors. Please contact if questions arise.    Additional recommendations to follow per attending, Dr. Issa.    Scottie Ding DO, PGY4  Hematology/Oncology Fellow

## 2024-11-15 NOTE — PROGRESS NOTES
Progress Note - Infectious Disease   Name: Miguel Henderson 84 y.o. male I MRN: [id number]  [facility] I Date of Admission: 10/26/2024   Date of Service: 11/15/2024 I Hospital Day: 20    Assessment & Plan  Sepsis (HCC)  Met sepsis criteria on 11/10 when patient started spiking fevers up to 103F with associated tachycardia and rigors. Fortunately has remained otherwise hemodynamically stable. Procal WNL at 0.07. CXR without evidence of acute cardiopulmonary disease. Patient noted to have RUE swelling and erythema. An ultrasound revealed an acute occlusive superficial thrombophlebitis in the cephalic vein from wrist to the distal upper arm which is the likely etiology of patient's sepsis. Blood cultures are currently NGTD x 24h. Patient was started on IV vancomycin and cefepime. Fever curve appears to be down-trending. Will stop cefepime at this time and continue on IV vancomycin for coverage of gram positive skin latanya.  The fever and any remaining arm cellulitis is now completely resolved.  Seems to have enough soft tissue coverage for now  -Discontinue intravenous vancomycin after last dose today  -No additional antibiotics recommended  -Follow-up blood cultures until finalized  -Continue to monitor temperature/vitals  -Serial examinations  -Continue to follow CBCD and CMP with AM labs to monitor for potential antimicrobial toxicities and assess for clinical response to antibiotics.  Superficial thrombophlebitis of right upper extremity  Demonstrated on ultrasound of the RUE 11/11. In cephalic vein from wrist to distal upper arm. Potential etiology of acute sepsis as above. Blood cultures NGTD x 72 hours.  Any arm cellulitis is now completely resolved.  -Discontinue antibiotics as above  -Follow-up blood cultures  -Serial examinations of the RUE  Fever  The patient had intermittent fevers for 3 weeks and several admissions to [facility] with fairly unremarkable infectious workup other  than positive rhinovirus and COVID. CT imaging was noted to show lymphadenopathy and he had atypical lymphocytes on CBC so hematology/oncology consult was recommended although the patient was discharged on Paxlovid with plan for an outpatient referral.  He then returned to the ED with worsening confusion and return of fever.  CT imaging with worsening axillary lymphadenopathy, CBC differential again showed atypical lymphocytes.  Status post LP 10/25 which showed mild elevation in protein and lymphocytic pleocytosis.  ME panel negative and culture showed no growth. 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  Labs show elevated LDH, ferritin, immunoglobulins. Status post plasmapheresis with improvement in fever curve and initially improvement in mental status, but now with waxing and waning mental status and development of myoclonic jerking as below. S/p lymph node and bone marrow biopsies. Bone marrow biopsy preliminary study demonstrated histiocytic aggregates, which are nonspecific, and can be seen in a variety of settings including infection. Mycoplasma antibodies, histoplasma urine antigen, Bartonella antibody, and brucella antibodies negative. Patient had been without a fever for quite some time but now began spiking fever on 11/10/2024 with some chills as above.   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  MRI brain without acute intracranial abnormalities.  Status post LP 10/25 with mild lymphocytic pleocytosis and elevated protein.  ME panel and CSF culture negative. Suspect CSF abnormalities related to possible underlying  lymphoproliferative disorder.  Status post first session of plasmapheresis 10/26 with improvement in fever curve and mental status initially, but now seems to have worsened.  Repeat CT of the brain nondiagnostic for source on 10/31/2024. Neurology consulted. Concern for toxic-metabolic source and a routine EEG was obtained. It showed nearly continuous myoclonic jerking and concern for an epileptic etiology of myoclonus. However, repeat EEG was performed with an EMG on the limbs and there did not appear to be epileptic correlation. 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 MRI brain negative for acute pathology.  He is much improved cognitively although still a bit confused.  -Continue to monitor mental status  -Appreciate neurology evaluation and recommendations  Lymphadenopathy  During prior admission imaging showed enlarged periaortic, iliac chain and inguinal lymph nodes.  Repeat imaging showed persistent lymphadenopathy with worsened right axillary adenopathy.  As above, concern for an underlying lymphoproliferative disorder.  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  CBC differential also shows atypical lymphocytes.  Status post plasmapheresis 10/26. HIV screening negative. S/p lymph node and bone marrow biopsies. Lymph node biopsy overall findings are atypical and suspicious for a lymphoproliferative disorder. However, the tissue was too scant to further characterize the underlying process. Therefore, patient underwent an excisional biopsy with surgical oncology on 11/3 which was nondiagnostic.  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  -Hematology/oncology following  -Prednisone as per hematology oncology  -Follow-up final pathology from excisional biopsy and bone marrow biopsy  -Additional infectious work-up  as above  COVID-19 virus infection  Isolation discontinued on 11/1/2024  Myoclonia  Development of intermittent twitching/myoclonic jerking on 10/31. Neurology consulted. Concern for toxic-metabolic source and a routine EEG was obtained. It showed nearly continuous myoclonic jerking and concern for an epileptic etiology of myoclonus. Patient started on Ativan and Keppra. However, repeat EEG was performed with an EMG on the limbs and there did not appear to be epileptic correlation. 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 MRI brain negative for acute pathology.  He seems improved.  -Appreciate neurology workup and follow-up    I have discussed with the primary service the above plan to discontinue the vancomycin after the last dose today.  The primary service agrees with the plan.    Prolonged discussion with the family about the infectious issues with this patient which now seem to be quite limited as there is no active infection present at this time.    Infectious disease service will see the patient again 11/18/2024 if not discharged.  Please call if questions.    Antibiotics:  Vancomycin 5    Subjective   Patient has no fever, chills, sweats; no nausea, vomiting, diarrhea; no cough, shortness of breath; no pain. No new symptoms.    Objective :  Temp:  [97.8 °F (36.6 °C)-98.6 °F (37 °C)] 97.8 °F (36.6 °C)  HR:  [74-89] 74  BP: (115-117)/(68-72) 115/72  Resp:  [18] 18  SpO2:  [98 %-100 %] 98 %  O2 Device: None (Room air)    General:  No acute distress  Psychiatric:  Awake and alert  Pulmonary:  Normal respiratory excursion without accessory muscle use  Abdomen:  Soft, nontender  Extremities: Complete resolution of the upper extremity erythema and edema  Skin:  No rashes      Lab Results: I have reviewed the following results:  Results from last 7 days   Lab Units 11/15/24  0641 11/14/24  0835 11/13/24  1839   WBC Thousand/uL 6.64 4.43 4.50   HEMOGLOBIN g/dL 8.6* 9.3* 8.8*    PLATELETS Thousands/uL 250 260 243     Results from last 7 days   Lab Units 11/15/24  0641 11/14/24  0835 11/13/24  1839   SODIUM mmol/L 136 137 134*   POTASSIUM mmol/L 3.5 3.6 3.9   CHLORIDE mmol/L 102 103 101   CO2 mmol/L 28 30 29   BUN mg/dL 9 12 15   CREATININE mg/dL 0.34* 0.52* 0.57*   EGFR ml/min/1.73sq m 114 96 92   CALCIUM mg/dL 8.0* 7.9* 7.8*   AST U/L  --  31 36   ALT U/L  --  31 33   ALK PHOS U/L  --  133* 138*   ALBUMIN g/dL  --  2.7* 2.7*     Results from last 7 days   Lab Units 11/11/24  1047 11/11/24  1040   BLOOD CULTURE  No Growth at 72 hrs. No Growth at 72 hrs.     Results from last 7 days   Lab Units 11/11/24  1039   PROCALCITONIN ng/ml 0.07                       Administrative Statements   I have spent a total time of 50 minutes in caring for this patient on the day of the visit/encounter including Diagnostic results, Risks and benefits of tx options, Instructions for management, Patient and family education, Impressions, Counseling / Coordination of care, Documenting in the medical record, Reviewing / ordering tests, medicine, procedures  , Obtaining or reviewing history  , and Communicating with other healthcare professionals .

## 2024-11-15 NOTE — PROGRESS NOTES
Progress Note - Hospitalist   Name: Miguel Henderson 84 y.o. male I MRN: [id number]  [facility] Date of Admission: 10/26/2024   Date of Service: 11/15/2024 I Hospital Day: 20    Assessment & Plan  Encephalopathy  Disussed with family at bedside about the course of the patient's illness.  Patient had fevers on October 7, was diagnosed with viral infection.  Sent home and had rapid decline at home.    At baseline patient is very physical and able to work in the yard and cuts down trees, very sharp handles his finances.  Now patient is baseline very confused according to the family.  Patient's last travel history was to [place] in May of last year  Brother passed away of some sort of leukemia  No dementia history  No previous stroke or traumatic brain.  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    Lyme was negative on 10/21/2024.   Rhinovirus was initially positive on 10/8/2024.  Respiratory panel was redone and was negative.    Peripheral blood smear was done and there was no parasites seen on 10/22/2024.    TSH, ammonia, coma panel unremarkable  CSF was positive for elevated WBC on 10/25/2024, VDRL negative, glucose normal, protein CSF elevated 86, Lyme PCR negative, meningitis encephalitis negative, West Nile negative  Brucella negative 11/01.2024  Mycoplasma negativem histoplasma negative  HIV negative  mTB PCR Pending 10/25    Heme onc workup  Labs show an elevated gamma spike on SPEP, elevated IgA, IgG, IgM. Positive free lambda light chain on immunofixation in the urine and plasma. Flow is unremarkable. Elevated beta-2 microglobulin. High normal viscosity.   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    NEURO workup  MRI brain with no significant acute pathology  EEG with no evidence of epileptiform discharges thus no need for AED at this time       Concern noted of etiology of encephalopathy and fevers due to possible lymphoma/hyperviscosity   Mentation improved  Plan to start oral prednisone 60 mg daily with plan to discuss chemotherapy pending final GOC discussion and rehab performance  Lymphadenopathy  Status post left inguinal lymph node biopsy on 10/28 -> await full pathology, however, oncology note reviewed -> SPEP with elevated gamma spike, elevated IgA/G/M, positive free lambda light chain, elevated beta-2 microglobulin, and high normal viscosity  Status post courses of plasmapheresis per oncology  Per discussion between oncology and pathology, 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   Sepsis (HCC)  11/11/2024 patient had sepsis criteria.  Fever and tachycardia and rigors.  Possibly related to right upper extremity cellulitis with superficial thrombophlebitis  Blood cultures negative at 48 hours  Chest x-ray unremarkable  ID consulted, appreciate recommendations  Afebrile overnight and possible soft tissue infection in the right upper extremity improved  Will plan to complete treatment for soft tissue infection with last dose of IV vancomycin today to complete total 5 days of antibiotic therapy  Discussed with ID  Abnormal LFTs  Elevated bilirubin and alkaline phosphatase generally improving  Suspected to be associated with lymphadenopathy (above)  Limit/avoid hepatotoxins as possible  Hypothyroidism  Continue Synthroid  Primary  hypertension  Holding HCTZ due to soft pressures at this time  BP stable  COVID-19 virus infection  Tested positive on 10/21  Currently oxygenating on baseline room air   Contact/airborne precautions now discontinued   Supportive care otherwise  Anemia  Hemoglobin stable  Likely associated with lymphadenopathy  Hypocalcemia  Serum Andrion's calcium low -> continue intravenous and oral supplementation  Consider possible contribution to myoclonus  Myoclonia  Neurology suspects toxic metabolic etiology given unremarkable EEG  Superficial thrombophlebitis of right upper extremity  Supportive care  Fever  See above  Resolved  Anxiety  Reports increased anxiety after starting prednisone  Ordered Ativan as needed   Monitor     VTE Pharmacologic Prophylaxis: VTE Score: 5 High Risk (Score >/= 5) - Pharmacological DVT Prophylaxis Ordered: heparin. Sequential Compression Devices Ordered.    Mobility:   Basic Mobility Inpatient Raw Score: 10  JH-HLM Goal: 4: Move to chair/commode  JH-HLM Achieved: 5: Stand (1 or more minutes)  JH-HLM Goal achieved. Continue to encourage appropriate mobility.    Patient Centered Rounds: I performed bedside rounds with nursing staff today.   Discussions with Specialists or Other Care Team Provider: ID    Education and Discussions with Family / Patient: Updated  (son) at bedside.    Current Length of Stay: 20 day(s)  Current Patient Status: Inpatient   Certification Statement: The patient will continue to require additional inpatient hospital stay due to IV antibiotics today, anticipate stable for discharge after later this evening dose of antibiotics  Discharge Plan: Anticipate discharge tomorrow to rehab facility.    Code Status: Level 3 - DNAR and DNI    Subjective   Patient assessed at bedside.  Reports increased anxiety after starting steroids.     Objective :  Temp:  [97.8 °F (36.6 °C)-98.9 °F (37.2 °C)] 98.9 °F (37.2 °C)  HR:  [74-89] 76  BP: (115-122)/(71-72) 122/71  Resp:   [16-18] 16  SpO2:  [98 %-100 %] 100 %  O2 Device: None (Room air)    Body mass index is 28.46 kg/m².     Input and Output Summary (last 24 hours):     Intake/Output Summary (Last 24 hours) at 11/15/2024 1532  Last data filed at 11/15/2024 0700  Gross per 24 hour   Intake 685 ml   Output 350 ml   Net 335 ml       Physical Exam  Vitals reviewed.   Constitutional:       General: He is not in acute distress.     Appearance: Normal appearance. He is not ill-appearing.   HENT:      Head: Normocephalic and atraumatic.   Cardiovascular:      Rate and Rhythm: Normal rate and regular rhythm.      Heart sounds: Normal heart sounds.   Pulmonary:      Effort: Pulmonary effort is normal. No respiratory distress.   Abdominal:      General: Bowel sounds are normal.      Tenderness: There is no abdominal tenderness.   Musculoskeletal:      Right lower leg: No edema.      Left lower leg: No edema.   Neurological:      Mental Status: He is alert. Mental status is at baseline.           Lines/Drains:  Lines/Drains/Airways       Active Status       Name Placement date Placement time Site Days    HD Temporary Double Catheter 10/26/24  1451  Internal jugular  20                            Lab Results: I have reviewed the following results:   Results from last 7 days   Lab Units 11/15/24  0641   WBC Thousand/uL 6.64   HEMOGLOBIN g/dL 8.6*   HEMATOCRIT % 27.2*   PLATELETS Thousands/uL 250   SEGS PCT % 48   LYMPHO PCT % 42   MONO PCT % 9   EOS PCT % 0     Results from last 7 days   Lab Units 11/15/24  0641 11/14/24  0835   SODIUM mmol/L 136 137   POTASSIUM mmol/L 3.5 3.6   CHLORIDE mmol/L 102 103   CO2 mmol/L 28 30   BUN mg/dL 9 12   CREATININE mg/dL 0.34* 0.52*   ANION GAP mmol/L 6 4   CALCIUM mg/dL 8.0* 7.9*   ALBUMIN g/dL  --  2.7*   TOTAL BILIRUBIN mg/dL  --  0.67   ALK PHOS U/L  --  133*   ALT U/L  --  31   AST U/L  --  31   GLUCOSE RANDOM mg/dL 108 131                 Results from last 7 days   Lab Units 11/11/24  1316 11/11/24  1039    LACTIC ACID mmol/L 1.4 2.5*   PROCALCITONIN ng/ml  --  0.07       Recent Cultures (last 7 days):   Results from last 7 days   Lab Units 11/11/24  1047 11/11/24  1040   BLOOD CULTURE  No Growth After 4 Days. No Growth After 4 Days.       Imaging Results Review: No pertinent imaging studies reviewed.  Other Study Results Review: No additional pertinent studies reviewed.    Last 24 Hours Medication List:     Current Facility-Administered Medications:     acetaminophen (Ofirmev) injection 1,000 mg, Q6H PRN, Last Rate: 1,000 mg (11/12/24 0735)    bisacodyl (DULCOLAX) rectal suppository 10 mg, Once    bisacodyl (DULCOLAX) rectal suppository 10 mg, Daily PRN    calcium carbonate (TUMS) chewable tablet 500 mg, BID    docusate sodium (COLACE) capsule 100 mg, BID    fluticasone (FLONASE) 50 mcg/act nasal spray 1 spray, Daily    gabapentin (NEURONTIN) capsule 100 mg, HS    heparin (porcine) subcutaneous injection 5,000 Units, Q8H JOE **AND** [COMPLETED] Platelet count, Once    hydrocortisone (ANUSOL-HC) 2.5 % rectal cream, TID    hydrOXYzine HCL (ATARAX) tablet 10 mg, Q6H PRN    levothyroxine tablet 50 mcg, Once per day on Monday Tuesday Wednesday Thursday    levothyroxine tablet 75 mcg, Once per day on Sunday Friday Saturday    LORazepam (ATIVAN) tablet 0.5 mg, Q8H PRN    moisture barrier miconazole 2% cream (aka SAMEER MOISTURE BARRIER ANTIFUNGAL CREAM), BID    ondansetron (ZOFRAN) injection 4 mg, Q6H PRN    oxyCODONE (ROXICODONE) IR tablet 5 mg, Q6H PRN    pantoprazole (PROTONIX) EC tablet 40 mg, Early Morning    polyethylene glycol (MIRALAX) packet 17 g, BID    predniSONE tablet 60 mg, Daily    Administrative Statements   Today, Patient Was Seen By: Tejas Ramirez, DO  I have spent a total time of 25 minutes in caring for this patient on the day of the visit/encounter including Counseling / Coordination of care, Documenting in the medical record, Reviewing / ordering tests, medicine, procedures  , Obtaining or  reviewing history  , and Communicating with other healthcare professionals .    **Please Note: This note may have been constructed using a voice recognition system.**

## 2024-11-15 NOTE — ASSESSMENT & PLAN NOTE
Demonstrated on ultrasound of the RUE 11/11. In cephalic vein from wrist to distal upper arm. Potential etiology of acute sepsis as above. Blood cultures NGTD x 72 hours.  Any arm cellulitis is now completely resolved.  -Discontinue antibiotics as above  -Follow-up blood cultures  -Serial examinations of the RUE

## 2024-11-15 NOTE — ASSESSMENT & PLAN NOTE
Disussed with family at bedside about the course of the patient's illness.  Patient had fevers on October 7, was diagnosed with viral infection.  Sent home and had rapid decline at home.    At baseline patient is very physical and able to work in the yard and cuts down trees, very sharp handles his finances.  Now patient is baseline very confused according to the family.  Patient's last travel history was to [place] in May of last year  Brother passed away of some sort of leukemia  No dementia history  No previous stroke or traumatic brain.  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    Lyme was negative on 10/21/2024.   Rhinovirus was initially positive on 10/8/2024.  Respiratory panel was redone and was negative.    Peripheral blood smear was done and there was no parasites seen on 10/22/2024.    TSH, ammonia, coma panel unremarkable  CSF was positive for elevated WBC on 10/25/2024, VDRL negative, glucose normal, protein CSF elevated 86, Lyme PCR negative, meningitis encephalitis negative, West Nile negative  Brucella negative 11/01.2024  Mycoplasma negativem histoplasma negative  HIV negative  mTB PCR Pending 10/25    Heme onc workup  Labs show an elevated gamma spike on SPEP, elevated IgA, IgG, IgM. Positive free lambda light chain on immunofixation in the urine and plasma. Flow is unremarkable. Elevated beta-2 microglobulin. High normal viscosity.   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    NEURO workup  MRI brain with no significant acute pathology  EEG with no evidence of epileptiform  discharges thus no need for AED at this time       Concern noted of etiology of encephalopathy and fevers due to possible lymphoma/hyperviscosity   Mentation improved  Plan to start oral prednisone 60 mg daily with plan to discuss chemotherapy pending final GOC discussion and rehab performance

## 2024-11-16 LAB
ANION GAP SERPL CALCULATED.3IONS-SCNC: 8 MMOL/L (ref 4–13)
BACTERIA BLD CULT: NORMAL
BACTERIA BLD CULT: NORMAL
BUN SERPL-MCNC: 9 MG/DL (ref 5–25)
CALCIUM SERPL-MCNC: 8.2 MG/DL (ref 8.4–10.2)
CHLORIDE SERPL-SCNC: 99 MMOL/L (ref 96–108)
CO2 SERPL-SCNC: 30 MMOL/L (ref 21–32)
CREAT SERPL-MCNC: 0.34 MG/DL (ref 0.6–1.3)
ERYTHROCYTE [DISTWIDTH] IN BLOOD BY AUTOMATED COUNT: 17.3 % (ref 11.6–15.1)
GFR SERPL CREATININE-BSD FRML MDRD: 114 ML/MIN/1.73SQ M
GLUCOSE SERPL-MCNC: 99 MG/DL (ref 65–140)
HCT VFR BLD AUTO: 28.9 % (ref 36.5–49.3)
HGB BLD-MCNC: 9.1 G/DL (ref 12–17)
MCH RBC QN AUTO: 29.4 PG (ref 26.8–34.3)
MCHC RBC AUTO-ENTMCNC: 31.5 G/DL (ref 31.4–37.4)
MCV RBC AUTO: 93 FL (ref 82–98)
PLATELET # BLD AUTO: 304 THOUSANDS/UL (ref 149–390)
PMV BLD AUTO: 9 FL (ref 8.9–12.7)
POTASSIUM SERPL-SCNC: 3.2 MMOL/L (ref 3.5–5.3)
RBC # BLD AUTO: 3.1 MILLION/UL (ref 3.88–5.62)
SODIUM SERPL-SCNC: 137 MMOL/L (ref 135–147)
WBC # BLD AUTO: 9.04 THOUSAND/UL (ref 4.31–10.16)

## 2024-11-16 PROCEDURE — 80048 BASIC METABOLIC PNL TOTAL CA: CPT | Performed by: STUDENT IN AN ORGANIZED HEALTH CARE EDUCATION/TRAINING PROGRAM

## 2024-11-16 PROCEDURE — 99232 SBSQ HOSP IP/OBS MODERATE 35: CPT | Performed by: STUDENT IN AN ORGANIZED HEALTH CARE EDUCATION/TRAINING PROGRAM

## 2024-11-16 RX ADMIN — HEPARIN SODIUM 5000 UNITS: 5000 INJECTION INTRAVENOUS; SUBCUTANEOUS at 20:14

## 2024-11-16 RX ADMIN — HYDROCORTISONE: 25 CREAM TOPICAL at 09:51

## 2024-11-16 RX ADMIN — CALCIUM CARBONATE (ANTACID) CHEW TAB 500 MG 500 MG: 500 CHEW TAB at 09:49

## 2024-11-16 RX ADMIN — CALCIUM CARBONATE (ANTACID) CHEW TAB 500 MG 500 MG: 500 CHEW TAB at 20:09

## 2024-11-16 RX ADMIN — DOCUSATE SODIUM 100 MG: 100 CAPSULE, LIQUID FILLED ORAL at 09:51

## 2024-11-16 RX ADMIN — PANTOPRAZOLE SODIUM 40 MG: 40 TABLET, DELAYED RELEASE ORAL at 06:38

## 2024-11-16 RX ADMIN — HEPARIN SODIUM 5000 UNITS: 5000 INJECTION INTRAVENOUS; SUBCUTANEOUS at 06:39

## 2024-11-16 RX ADMIN — PREDNISONE 60 MG: 20 TABLET ORAL at 09:50

## 2024-11-16 RX ADMIN — LORAZEPAM 0.5 MG: 0.5 TABLET ORAL at 20:09

## 2024-11-16 RX ADMIN — POLYETHYLENE GLYCOL 3350 17 G: 17 POWDER, FOR SOLUTION ORAL at 09:58

## 2024-11-16 RX ADMIN — FLUTICASONE PROPIONATE 1 SPRAY: 50 SPRAY, METERED NASAL at 09:51

## 2024-11-16 RX ADMIN — HEPARIN SODIUM 5000 UNITS: 5000 INJECTION INTRAVENOUS; SUBCUTANEOUS at 14:23

## 2024-11-16 RX ADMIN — GABAPENTIN 100 MG: 100 CAPSULE ORAL at 20:08

## 2024-11-16 RX ADMIN — LEVOTHYROXINE SODIUM 75 MCG: 75 TABLET ORAL at 06:38

## 2024-11-16 NOTE — ASSESSMENT & PLAN NOTE
Reports increased anxiety after starting prednisone  Continue Ativan as needed which appears to be helping  Monitor

## 2024-11-16 NOTE — ASSESSMENT & PLAN NOTE
Disussed with family at bedside about the course of the patient's illness.  Patient had fevers on October 7, was diagnosed with viral infection.  Sent home and had rapid decline at home.    At baseline patient is very physical and able to work in the yard and cuts down trees, very sharp handles his finances.  Now patient is baseline very confused according to the family.  Patient's last travel history was to [place] in May of last year  Brother passed away of some sort of leukemia  No dementia history  No previous stroke or traumatic brain.  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    Lyme was negative on 10/21/2024.   Rhinovirus was initially positive on 10/8/2024.  Respiratory panel was redone and was negative.    Peripheral blood smear was done and there was no parasites seen on 10/22/2024.    TSH, ammonia, coma panel unremarkable  CSF was positive for elevated WBC on 10/25/2024, VDRL negative, glucose normal, protein CSF elevated 86, Lyme PCR negative, meningitis encephalitis negative, West Nile negative  Brucella negative 11/01.2024  Mycoplasma negativem histoplasma negative  HIV negative  mTB PCR Pending 10/25    Heme onc workup  Labs show an elevated gamma spike on SPEP, elevated IgA, IgG, IgM. Positive free lambda light chain on immunofixation in the urine and plasma. Flow is unremarkable. Elevated beta-2 microglobulin. High normal viscosity.   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    NEURO workup  MRI brain with no significant acute pathology  EEG with no evidence of epileptiform  discharges thus no need for AED at this time       Concern noted of etiology of encephalopathy and fevers due to possible lymphoma/hyperviscosity   Mentation improved  Plan to start oral prednisone 60 mg daily with plan to discuss chemotherapy pending final GOC discussion and rehab performance  Discussed with RN for removal of right temp HD cath

## 2024-11-16 NOTE — PROGRESS NOTES
Progress Note - Hospitalist   Name: Miguel Henderson 84 y.o. male I MRN: [id number]  [facility] Date of Admission: 10/26/2024   Date of Service: 11/16/2024 I Hospital Day: 21    Assessment & Plan  Encephalopathy  Disussed with family at bedside about the course of the patient's illness.  Patient had fevers on October 7, was diagnosed with viral infection.  Sent home and had rapid decline at home.    At baseline patient is very physical and able to work in the yard and cuts down trees, very sharp handles his finances.  Now patient is baseline very confused according to the family.  Patient's last travel history was to [place] in May of last year  Brother passed away of some sort of leukemia  No dementia history  No previous stroke or traumatic brain.  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    Lyme was negative on 10/21/2024.   Rhinovirus was initially positive on 10/8/2024.  Respiratory panel was redone and was negative.    Peripheral blood smear was done and there was no parasites seen on 10/22/2024.    TSH, ammonia, coma panel unremarkable  CSF was positive for elevated WBC on 10/25/2024, VDRL negative, glucose normal, protein CSF elevated 86, Lyme PCR negative, meningitis encephalitis negative, West Nile negative  Brucella negative 11/01.2024  Mycoplasma negativem histoplasma negative  HIV negative  mTB PCR Pending 10/25    Heme onc workup  Labs show an elevated gamma spike on SPEP, elevated IgA, IgG, IgM. Positive free lambda light chain on immunofixation in the urine and plasma. Flow is unremarkable. Elevated beta-2 microglobulin. High normal viscosity.   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    NEURO workup  MRI brain with no significant acute pathology  EEG with no evidence of epileptiform discharges thus no need for AED at this time       Concern noted of etiology of encephalopathy and fevers due to possible lymphoma/hyperviscosity   Mentation improved  Plan to start oral prednisone 60 mg daily with plan to discuss chemotherapy pending final GOC discussion and rehab performance  Discussed with RN for removal of right temp HD cath  Lymphadenopathy  Status post left inguinal lymph node biopsy on 10/28 -> await full pathology, however, oncology note reviewed -> SPEP with elevated gamma spike, elevated IgA/G/M, positive free lambda light chain, elevated beta-2 microglobulin, and high normal viscosity  Status post courses of plasmapheresis per oncology  Per discussion between oncology and pathology, 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  Fever and tachycardia and rigors.  Possibly related to right upper extremity cellulitis with superficial thrombophlebitis  Blood cultures negative at 48 hours  Chest x-ray unremarkable  ID consulted, appreciate recommendations  Remains afebrile  Completed 5 days of treatment with IV vancomycin for soft tissue infection on 11/15  Abnormal LFTs  Elevated bilirubin and alkaline phosphatase generally improving  Suspected to be associated with lymphadenopathy (above)  Limit/avoid hepatotoxins as possible  Hypothyroidism  Continue Synthroid  Primary hypertension  Holding HCTZ due to soft pressures at this time  BP stable  COVID-19 virus infection  Tested positive on 10/21  Currently oxygenating on baseline room air   Contact/airborne precautions now  discontinued   Supportive care otherwise  Anemia  Hemoglobin stable  Likely associated with lymphadenopathy  Hypocalcemia  Serum Andrion's calcium low -> continue intravenous and oral supplementation  Consider possible contribution to myoclonus  Myoclonia  Neurology suspects toxic metabolic etiology given unremarkable EEG  Superficial thrombophlebitis of right upper extremity  Supportive care  Fever  See above  Resolved  Anxiety  Reports increased anxiety after starting prednisone  Continue Ativan as needed which appears to be helping  Monitor     VTE Pharmacologic Prophylaxis: VTE Score: 5 High Risk (Score >/= 5) - Pharmacological DVT Prophylaxis Ordered: heparin. Sequential Compression Devices Ordered.    Mobility:   Basic Mobility Inpatient Raw Score: 10  -HLM Goal: 4: Move to chair/commode  JH-HLM Achieved: 5: Stand (1 or more minutes)  JH-HLM Goal achieved. Continue to encourage appropriate mobility.    Patient Centered Rounds: I performed bedside rounds with nursing staff today.   Discussions with Specialists or Other Care Team Provider: RN    Education and Discussions with Family / Patient: Updated  (wife and son) at bedside.    Current Length of Stay: 21 day(s)  Current Patient Status: Inpatient   Certification Statement: The patient will continue to require additional inpatient hospital stay due to awaiting placement to rehab for safe discharge  Discharge Plan:  Medically stable for discharge to rehab    Code Status: Level 3 - DNAR and DNI    Subjective   Patient assessed at bedside.  No complaints.    Objective :  Temp:  [98.1 °F (36.7 °C)-98.9 °F (37.2 °C)] 98.1 °F (36.7 °C)  HR:  [75-80] 80  BP: (122-130)/(71-75) 129/75  Resp:  [16-18] 18  SpO2:  [97 %-100 %] 97 %  O2 Device: None (Room air)    Body mass index is 26.19 kg/m².     Input and Output Summary (last 24 hours):     Intake/Output Summary (Last 24 hours) at 11/16/2024 1528  Last data filed at 11/16/2024 1523  Gross per 24 hour    Intake --   Output 2500 ml   Net -2500 ml       Physical Exam  Vitals reviewed.   Constitutional:       General: He is not in acute distress.     Appearance: He is not ill-appearing.   HENT:      Head: Normocephalic and atraumatic.   Pulmonary:      Effort: Pulmonary effort is normal. No respiratory distress.   Musculoskeletal:      Right lower leg: No edema.      Left lower leg: No edema.   Neurological:      Mental Status: He is alert. Mental status is at baseline.         Lines/Drains:              Lab Results: I have reviewed the following results:   Results from last 7 days   Lab Units 11/16/24  0811 11/15/24  0641   WBC Thousand/uL 9.04 6.64   HEMOGLOBIN g/dL 9.1* 8.6*   HEMATOCRIT % 28.9* 27.2*   PLATELETS Thousands/uL 304 250   SEGS PCT %  --  48   LYMPHO PCT %  --  42   MONO PCT %  --  9   EOS PCT %  --  0     Results from last 7 days   Lab Units 11/16/24  0811 11/15/24  0641 11/14/24  0835   SODIUM mmol/L 137   < > 137   POTASSIUM mmol/L 3.2*   < > 3.6   CHLORIDE mmol/L 99   < > 103   CO2 mmol/L 30   < > 30   BUN mg/dL 9   < > 12   CREATININE mg/dL 0.34*   < > 0.52*   ANION GAP mmol/L 8   < > 4   CALCIUM mg/dL 8.2*   < > 7.9*   ALBUMIN g/dL  --   --  2.7*   TOTAL BILIRUBIN mg/dL  --   --  0.67   ALK PHOS U/L  --   --  133*   ALT U/L  --   --  31   AST U/L  --   --  31   GLUCOSE RANDOM mg/dL 99   < > 131    < > = values in this interval not displayed.                 Results from last 7 days   Lab Units 11/11/24  1316 11/11/24  1039   LACTIC ACID mmol/L 1.4 2.5*   PROCALCITONIN ng/ml  --  0.07       Recent Cultures (last 7 days):   Results from last 7 days   Lab Units 11/11/24  1047 11/11/24  1040   BLOOD CULTURE  No Growth After 5 Days. No Growth After 5 Days.       Imaging Results Review: No pertinent imaging studies reviewed.  Other Study Results Review: No additional pertinent studies reviewed.    Last 24 Hours Medication List:     Current Facility-Administered Medications:     acetaminophen (Ofirmev)  injection 1,000 mg, Q6H PRN, Last Rate: 1,000 mg (11/12/24 1535)    bisacodyl (DULCOLAX) rectal suppository 10 mg, Once    bisacodyl (DULCOLAX) rectal suppository 10 mg, Daily PRN    calcium carbonate (TUMS) chewable tablet 500 mg, BID    docusate sodium (COLACE) capsule 100 mg, BID    fluticasone (FLONASE) 50 mcg/act nasal spray 1 spray, Daily    gabapentin (NEURONTIN) capsule 100 mg, HS    heparin (porcine) subcutaneous injection 5,000 Units, Q8H JOE **AND** [COMPLETED] Platelet count, Once    hydrocortisone (ANUSOL-HC) 2.5 % rectal cream, TID    hydrOXYzine HCL (ATARAX) tablet 10 mg, Q6H PRN    levothyroxine tablet 50 mcg, Once per day on Monday Tuesday Wednesday Thursday    levothyroxine tablet 75 mcg, Once per day on Sunday Friday Saturday    LORazepam (ATIVAN) tablet 0.5 mg, Q8H PRN    moisture barrier miconazole 2% cream (aka SAMEER MOISTURE BARRIER ANTIFUNGAL CREAM), BID    ondansetron (ZOFRAN) injection 4 mg, Q6H PRN    oxyCODONE (ROXICODONE) IR tablet 5 mg, Q6H PRN    pantoprazole (PROTONIX) EC tablet 40 mg, Early Morning    polyethylene glycol (MIRALAX) packet 17 g, BID    predniSONE tablet 60 mg, Daily    Administrative Statements   Today, Patient Was Seen By: Tejas Ramirez, DO  I have spent a total time of 25 minutes in caring for this patient on the day of the visit/encounter including Counseling / Coordination of care, Documenting in the medical record, Reviewing / ordering tests, medicine, procedures  , Obtaining or reviewing history  , and Communicating with other healthcare professionals .    **Please Note: This note may have been constructed using a voice recognition system.**

## 2024-11-16 NOTE — PLAN OF CARE
Problem: INFECTION - ADULT  Goal: Absence or prevention of progression during hospitalization  Description: INTERVENTIONS:  - Assess and monitor for signs and symptoms of infection  - Monitor lab/diagnostic results  - Monitor all insertion sites, i.e. indwelling lines, tubes, and drains  - Monitor endotracheal if appropriate and nasal secretions for changes in amount and color  - [place] appropriate cooling/warming therapies per order  - Administer medications as ordered  - Instruct and encourage patient and family to use good hand hygiene technique  - Identify and instruct in appropriate isolation precautions for identified infection/condition  Outcome: Progressing     Problem: SAFETY ADULT  Goal: Maintain or return to baseline ADL function  Description: INTERVENTIONS:  -  Assess patient's ability to carry out ADLs; assess patient's baseline for ADL function and identify physical deficits which impact ability to perform ADLs (bathing, care of mouth/teeth, toileting, grooming, dressing, etc.)  - Assess/evaluate cause of self-care deficits   - Assess range of motion  - Assess patient's mobility; develop plan if impaired  - Assess patient's need for assistive devices and provide as appropriate  - Encourage maximum independence but intervene and supervise when necessary  - Involve family in performance of ADLs  - Assess for home care needs following discharge   - Consider OT consult to assist with ADL evaluation and planning for discharge  - Provide patient education as appropriate  Outcome: Progressing     Problem: DISCHARGE PLANNING  Goal: Discharge to home or other facility with appropriate resources  Description: INTERVENTIONS:  - Identify barriers to discharge w/patient and caregiver  - Arrange for needed discharge resources and transportation as appropriate  - Identify discharge learning needs (meds, wound care, etc.)  - Arrange for interpretive services to assist at discharge as needed  - Refer to Case  Management Department for coordinating discharge planning if the patient needs post-hospital services based on physician/advanced practitioner order or complex needs related to functional status, cognitive ability, or social support system  Outcome: Progressing

## 2024-11-16 NOTE — ASSESSMENT & PLAN NOTE
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

## 2024-11-16 NOTE — ASSESSMENT & PLAN NOTE
11/11/2024 patient had sepsis criteria.  Fever and tachycardia and rigors.  Possibly related to right upper extremity cellulitis with superficial thrombophlebitis  Blood cultures negative at 48 hours  Chest x-ray unremarkable  ID consulted, appreciate recommendations  Remains afebrile  Completed 5 days of treatment with IV vancomycin for soft tissue infection on 11/15

## 2024-11-16 NOTE — CASE MANAGEMENT
Support Center has received INTENT TO DENY for Acute Rehab Authorization.   Insurance: [employer]  Information obtained via Insurance Rep: Argelia P:[phone]  Intent to Deny Reason:  Facility:[facility]   Pending Auth #:[id number]   Peer to Peer Phone#:  [phone] opt [phone]     Deadline:11/18 before 12:00 pm  CM to task P2P to Attending to complete if desired.      Please notify Discharge Support if P2P will not be completed.     Care Manager notified: Ashley Romero    Please reach out to CM for updates on any clinical information.

## 2024-11-17 PROCEDURE — 99232 SBSQ HOSP IP/OBS MODERATE 35: CPT | Performed by: STUDENT IN AN ORGANIZED HEALTH CARE EDUCATION/TRAINING PROGRAM

## 2024-11-17 RX ORDER — LORAZEPAM 0.5 MG/1
0.5 TABLET ORAL 2 TIMES DAILY PRN
Status: DISCONTINUED | OUTPATIENT
Start: 2024-11-17 | End: 2024-11-19 | Stop reason: HOSPADM

## 2024-11-17 RX ORDER — HYDROXYZINE HYDROCHLORIDE 25 MG/1
25 TABLET, FILM COATED ORAL
Status: DISCONTINUED | OUTPATIENT
Start: 2024-11-17 | End: 2024-11-19 | Stop reason: HOSPADM

## 2024-11-17 RX ADMIN — HYDROXYZINE HYDROCHLORIDE 25 MG: 25 TABLET, FILM COATED ORAL at 20:24

## 2024-11-17 RX ADMIN — FLUTICASONE PROPIONATE 1 SPRAY: 50 SPRAY, METERED NASAL at 10:34

## 2024-11-17 RX ADMIN — PREDNISONE 60 MG: 20 TABLET ORAL at 11:35

## 2024-11-17 RX ADMIN — CALCIUM CARBONATE (ANTACID) CHEW TAB 500 MG 500 MG: 500 CHEW TAB at 10:35

## 2024-11-17 RX ADMIN — PANTOPRAZOLE SODIUM 40 MG: 40 TABLET, DELAYED RELEASE ORAL at 05:36

## 2024-11-17 RX ADMIN — HEPARIN SODIUM 5000 UNITS: 5000 INJECTION INTRAVENOUS; SUBCUTANEOUS at 05:39

## 2024-11-17 RX ADMIN — OXYCODONE HYDROCHLORIDE 5 MG: 5 TABLET ORAL at 17:20

## 2024-11-17 RX ADMIN — HYDROCORTISONE: 25 CREAM TOPICAL at 10:35

## 2024-11-17 RX ADMIN — POLYETHYLENE GLYCOL 3350 17 G: 17 POWDER, FOR SOLUTION ORAL at 10:37

## 2024-11-17 RX ADMIN — MICONAZOLE NITRATE: 20 CREAM TOPICAL at 10:35

## 2024-11-17 RX ADMIN — POLYETHYLENE GLYCOL 3350 17 G: 17 POWDER, FOR SOLUTION ORAL at 20:24

## 2024-11-17 RX ADMIN — MICONAZOLE NITRATE: 20 CREAM TOPICAL at 17:21

## 2024-11-17 RX ADMIN — HEPARIN SODIUM 5000 UNITS: 5000 INJECTION INTRAVENOUS; SUBCUTANEOUS at 21:47

## 2024-11-17 RX ADMIN — HEPARIN SODIUM 5000 UNITS: 5000 INJECTION INTRAVENOUS; SUBCUTANEOUS at 14:02

## 2024-11-17 RX ADMIN — GABAPENTIN 100 MG: 100 CAPSULE ORAL at 21:47

## 2024-11-17 RX ADMIN — CALCIUM CARBONATE (ANTACID) CHEW TAB 500 MG 500 MG: 500 CHEW TAB at 20:24

## 2024-11-17 RX ADMIN — DOCUSATE SODIUM 100 MG: 100 CAPSULE, LIQUID FILLED ORAL at 10:34

## 2024-11-17 RX ADMIN — DOCUSATE SODIUM 100 MG: 100 CAPSULE, LIQUID FILLED ORAL at 17:21

## 2024-11-17 RX ADMIN — HYDROCORTISONE: 25 CREAM TOPICAL at 20:24

## 2024-11-17 RX ADMIN — LEVOTHYROXINE SODIUM 75 MCG: 75 TABLET ORAL at 05:36

## 2024-11-17 NOTE — ASSESSMENT & PLAN NOTE
Disussed with family at bedside about the course of the patient's illness.  Patient had fevers on October 7, was diagnosed with viral infection.  Sent home and had rapid decline at home.    At baseline patient is very physical and able to work in the yard and cuts down trees, very sharp handles his finances.  Now patient is baseline very confused according to the family.  Patient's last travel history was to [place] in May of last year  Brother passed away of some sort of leukemia  No dementia history  No previous stroke or traumatic brain.  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    Lyme was negative on 10/21/2024.   Rhinovirus was initially positive on 10/8/2024.  Respiratory panel was redone and was negative.    Peripheral blood smear was done and there was no parasites seen on 10/22/2024.    TSH, ammonia, coma panel unremarkable  CSF was positive for elevated WBC on 10/25/2024, VDRL negative, glucose normal, protein CSF elevated 86, Lyme PCR negative, meningitis encephalitis negative, West Nile negative  Brucella negative 11/01.2024  Mycoplasma negativem histoplasma negative  HIV negative  mTB PCR Pending 10/25    Heme onc workup  Labs show an elevated gamma spike on SPEP, elevated IgA, IgG, IgM. Positive free lambda light chain on immunofixation in the urine and plasma. Flow is unremarkable. Elevated beta-2 microglobulin. High normal viscosity.   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    NEURO workup  MRI brain with no significant acute pathology  EEG with no evidence of epileptiform  discharges thus no need for AED at this time       Concern noted of etiology of encephalopathy and fevers due to possible lymphoma/hyperviscosity   Mentation improved  Continue oral prednisone 60 mg daily with plan to discuss chemotherapy pending final GOC discussion and rehab performance  Attempted P2P.  Awaiting callback.

## 2024-11-17 NOTE — ASSESSMENT & PLAN NOTE
11/11/2024 patient had sepsis criteria.  Fever and tachycardia and rigors.  Possibly related to right upper extremity cellulitis with superficial thrombophlebitis  Blood cultures negative at 48 hours  Chest x-ray unremarkable  ID consulted, appreciate recommendations  Completed 5 days of treatment with IV vancomycin for soft tissue infection on 11/15  Remains afebrile and stable off antibiotics

## 2024-11-17 NOTE — PROGRESS NOTES
Progress Note - Hospitalist   Name: Miguel Henderson 84 y.o. male I MRN: [id number]  [facility] Date of Admission: 10/26/2024   Date of Service: 11/17/2024 I Hospital Day: 22    Assessment & Plan  Encephalopathy  Disussed with family at bedside about the course of the patient's illness.  Patient had fevers on October 7, was diagnosed with viral infection.  Sent home and had rapid decline at home.    At baseline patient is very physical and able to work in the yard and cuts down trees, very sharp handles his finances.  Now patient is baseline very confused according to the family.  Patient's last travel history was to [place] in May of last year  Brother passed away of some sort of leukemia  No dementia history  No previous stroke or traumatic brain.  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    Lyme was negative on 10/21/2024.   Rhinovirus was initially positive on 10/8/2024.  Respiratory panel was redone and was negative.    Peripheral blood smear was done and there was no parasites seen on 10/22/2024.    TSH, ammonia, coma panel unremarkable  CSF was positive for elevated WBC on 10/25/2024, VDRL negative, glucose normal, protein CSF elevated 86, Lyme PCR negative, meningitis encephalitis negative, West Nile negative  Brucella negative 11/01.2024  Mycoplasma negativem histoplasma negative  HIV negative  mTB PCR Pending 10/25    Heme onc workup  Labs show an elevated gamma spike on SPEP, elevated IgA, IgG, IgM. Positive free lambda light chain on immunofixation in the urine and plasma. Flow is unremarkable. Elevated beta-2 microglobulin. High normal viscosity.   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    NEURO workup  MRI brain with no significant acute pathology  EEG with no evidence of epileptiform discharges thus no need for AED at this time       Concern noted of etiology of encephalopathy and fevers due to possible lymphoma/hyperviscosity   Mentation improved  Continue oral prednisone 60 mg daily with plan to discuss chemotherapy pending final GOC discussion and rehab performance  Attempted P2P.  Awaiting callback.  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  Fever and tachycardia and rigors.  Possibly related to right upper extremity cellulitis with superficial thrombophlebitis  Blood cultures negative at 48 hours  Chest x-ray unremarkable  ID consulted, appreciate recommendations  Completed 5 days of treatment with IV vancomycin for soft tissue infection on 11/15  Remains afebrile and stable off antibiotics  Abnormal LFTs  Elevated bilirubin and alkaline phosphatase generally improving  Suspected to be associated with lymphadenopathy (above)  Limit/avoid hepatotoxins as possible  Hypothyroidism  Continue Synthroid  Primary hypertension  Holding HCTZ due to soft pressures at this time  BP stable  COVID-19 virus infection  Tested positive on 10/21  Currently oxygenating on baseline room air   Contact/airborne precautions now  discontinued   Supportive care otherwise  Anemia  Hemoglobin stable  Likely associated with lymphadenopathy  Hypocalcemia  Serum Andrion's calcium low -> continue intravenous and oral supplementation  Consider possible contribution to myoclonus  Myoclonia  Neurology suspects toxic metabolic etiology given unremarkable EEG  Superficial thrombophlebitis of right upper extremity  Supportive care  Fever  See above  Resolved  Anxiety  Reports increased anxiety after starting prednisone  Continue Ativan as needed which appears to be helping  Monitor     VTE Pharmacologic Prophylaxis: VTE Score: 5 High Risk (Score >/= 5) - Pharmacological DVT Prophylaxis Ordered: heparin. Sequential Compression Devices Ordered.    Mobility:   Basic Mobility Inpatient Raw Score: 18  -HLM Goal: 6: Walk 10 steps or more  JH-HLM Achieved: 4: Move to chair/commode  JH-HLM Goal NOT achieved. Continue with multidisciplinary rounding and encourage appropriate mobility to improve upon -HLM goals.    Patient Centered Rounds: I performed bedside rounds with nursing staff today.   Discussions with Specialists or Other Care Team Provider: PMR    Education and Discussions with Family / Patient: Updated  (son) at bedside.    Current Length of Stay: 22 day(s)  Current Patient Status: Inpatient   Certification Statement: The patient will continue to require additional inpatient hospital stay due to awaiting placement to rehab. P2P plan today.   Discharge Plan: Medically stable, awaiting P2P for rehab placement    Code Status: Level 3 - DNAR and DNI    Subjective   Patient assessed at bedside.  Continues to report anal itching.    Objective :  Temp:  [97.6 °F (36.4 °C)-99 °F (37.2 °C)] 99 °F (37.2 °C)  HR:  [63-94] 94  BP: (108-134)/(63-92) 134/78  Resp:  [18] 18  SpO2:  [97 %-100 %] 97 %    Body mass index is 26.19 kg/m².     Input and Output Summary (last 24 hours):     Intake/Output Summary (Last 24 hours) at 11/17/2024 1602  Last data  filed at 11/17/2024 0700  Gross per 24 hour   Intake 420 ml   Output 950 ml   Net -530 ml       Physical Exam  Vitals reviewed.   Constitutional:       General: He is not in acute distress.     Appearance: Normal appearance. He is not ill-appearing.   Cardiovascular:      Rate and Rhythm: Normal rate and regular rhythm.      Heart sounds: Normal heart sounds.   Pulmonary:      Effort: Pulmonary effort is normal. No respiratory distress.   Abdominal:      General: Bowel sounds are normal.      Tenderness: There is no abdominal tenderness.   Musculoskeletal:      Right lower leg: No edema.      Left lower leg: No edema.   Neurological:      Mental Status: He is alert. Mental status is at baseline.         Lines/Drains:              Lab Results: I have reviewed the following results:   Results from last 7 days   Lab Units 11/16/24  0811 11/15/24  0641   WBC Thousand/uL 9.04 6.64   HEMOGLOBIN g/dL 9.1* 8.6*   HEMATOCRIT % 28.9* 27.2*   PLATELETS Thousands/uL 304 250   SEGS PCT %  --  48   LYMPHO PCT %  --  42   MONO PCT %  --  9   EOS PCT %  --  0     Results from last 7 days   Lab Units 11/16/24  0811 11/15/24  0641 11/14/24  0835   SODIUM mmol/L 137   < > 137   POTASSIUM mmol/L 3.2*   < > 3.6   CHLORIDE mmol/L 99   < > 103   CO2 mmol/L 30   < > 30   BUN mg/dL 9   < > 12   CREATININE mg/dL 0.34*   < > 0.52*   ANION GAP mmol/L 8   < > 4   CALCIUM mg/dL 8.2*   < > 7.9*   ALBUMIN g/dL  --   --  2.7*   TOTAL BILIRUBIN mg/dL  --   --  0.67   ALK PHOS U/L  --   --  133*   ALT U/L  --   --  31   AST U/L  --   --  31   GLUCOSE RANDOM mg/dL 99   < > 131    < > = values in this interval not displayed.                 Results from last 7 days   Lab Units 11/11/24  1316 11/11/24  1039   LACTIC ACID mmol/L 1.4 2.5*   PROCALCITONIN ng/ml  --  0.07       Recent Cultures (last 7 days):   Results from last 7 days   Lab Units 11/11/24  1047 11/11/24  1040   BLOOD CULTURE  No Growth After 5 Days. No Growth After 5 Days.       Imaging  Results Review: No pertinent imaging studies reviewed.  Other Study Results Review: No additional pertinent studies reviewed.    Last 24 Hours Medication List:     Current Facility-Administered Medications:     acetaminophen (Ofirmev) injection 1,000 mg, Q6H PRN, Last Rate: 1,000 mg (11/12/24 4535)    bisacodyl (DULCOLAX) rectal suppository 10 mg, Once    bisacodyl (DULCOLAX) rectal suppository 10 mg, Daily PRN    calcium carbonate (TUMS) chewable tablet 500 mg, BID    docusate sodium (COLACE) capsule 100 mg, BID    fluticasone (FLONASE) 50 mcg/act nasal spray 1 spray, Daily    gabapentin (NEURONTIN) capsule 100 mg, HS    heparin (porcine) subcutaneous injection 5,000 Units, Q8H JOE **AND** [COMPLETED] Platelet count, Once    hydrocortisone (ANUSOL-HC) 2.5 % rectal cream, TID    hydrOXYzine HCL (ATARAX) tablet 25 mg, HS    levothyroxine tablet 50 mcg, Once per day on Monday Tuesday Wednesday Thursday    levothyroxine tablet 75 mcg, Once per day on Sunday Friday Saturday    LORazepam (ATIVAN) tablet 0.5 mg, BID PRN    moisture barrier miconazole 2% cream (aka SAMEER MOISTURE BARRIER ANTIFUNGAL CREAM), BID    ondansetron (ZOFRAN) injection 4 mg, Q6H PRN    oxyCODONE (ROXICODONE) IR tablet 5 mg, Q6H PRN    pantoprazole (PROTONIX) EC tablet 40 mg, Early Morning    polyethylene glycol (MIRALAX) packet 17 g, BID    predniSONE tablet 60 mg, Daily    Administrative Statements   Today, Patient Was Seen By: Tejas Ramirez, DO  I have spent a total time of 25 minutes in caring for this patient on the day of the visit/encounter including Counseling / Coordination of care, Documenting in the medical record, Reviewing / ordering tests, medicine, procedures  , Obtaining or reviewing history  , and Communicating with other healthcare professionals .    **Please Note: This note may have been constructed using a voice recognition system.**

## 2024-11-18 PROBLEM — K64.9 HEMORRHOIDS: Status: ACTIVE | Noted: 2024-11-18

## 2024-11-18 LAB — SCAN RESULT: NORMAL

## 2024-11-18 PROCEDURE — 99232 SBSQ HOSP IP/OBS MODERATE 35: CPT | Performed by: PHYSICIAN ASSISTANT

## 2024-11-18 PROCEDURE — 99231 SBSQ HOSP IP/OBS SF/LOW 25: CPT | Performed by: STUDENT IN AN ORGANIZED HEALTH CARE EDUCATION/TRAINING PROGRAM

## 2024-11-18 RX ORDER — HYDROCORTISONE ACETATE 25 MG/1
25 SUPPOSITORY RECTAL 2 TIMES DAILY
Status: DISCONTINUED | OUTPATIENT
Start: 2024-11-18 | End: 2024-11-19 | Stop reason: HOSPADM

## 2024-11-18 RX ADMIN — FLUTICASONE PROPIONATE 1 SPRAY: 50 SPRAY, METERED NASAL at 09:38

## 2024-11-18 RX ADMIN — GABAPENTIN 100 MG: 100 CAPSULE ORAL at 21:21

## 2024-11-18 RX ADMIN — HYDROCORTISONE ACETATE 25 MG: 25 SUPPOSITORY RECTAL at 18:20

## 2024-11-18 RX ADMIN — LEVOTHYROXINE SODIUM 50 MCG: 75 TABLET ORAL at 05:55

## 2024-11-18 RX ADMIN — PREDNISONE 60 MG: 20 TABLET ORAL at 09:37

## 2024-11-18 RX ADMIN — CALCIUM CARBONATE (ANTACID) CHEW TAB 500 MG 500 MG: 500 CHEW TAB at 09:37

## 2024-11-18 RX ADMIN — HEPARIN SODIUM 5000 UNITS: 5000 INJECTION INTRAVENOUS; SUBCUTANEOUS at 21:21

## 2024-11-18 RX ADMIN — HYDROXYZINE HYDROCHLORIDE 25 MG: 25 TABLET, FILM COATED ORAL at 18:19

## 2024-11-18 RX ADMIN — POLYETHYLENE GLYCOL 3350 17 G: 17 POWDER, FOR SOLUTION ORAL at 21:21

## 2024-11-18 RX ADMIN — MICONAZOLE NITRATE: 20 CREAM TOPICAL at 09:38

## 2024-11-18 RX ADMIN — DOCUSATE SODIUM 100 MG: 100 CAPSULE, LIQUID FILLED ORAL at 09:37

## 2024-11-18 RX ADMIN — CALCIUM CARBONATE (ANTACID) CHEW TAB 500 MG 500 MG: 500 CHEW TAB at 21:21

## 2024-11-18 RX ADMIN — MICONAZOLE NITRATE: 20 CREAM TOPICAL at 18:26

## 2024-11-18 RX ADMIN — DOCUSATE SODIUM 100 MG: 100 CAPSULE, LIQUID FILLED ORAL at 18:19

## 2024-11-18 RX ADMIN — HEPARIN SODIUM 5000 UNITS: 5000 INJECTION INTRAVENOUS; SUBCUTANEOUS at 05:55

## 2024-11-18 RX ADMIN — HYDROCORTISONE: 25 CREAM TOPICAL at 09:38

## 2024-11-18 RX ADMIN — HEPARIN SODIUM 5000 UNITS: 5000 INJECTION INTRAVENOUS; SUBCUTANEOUS at 14:48

## 2024-11-18 RX ADMIN — PANTOPRAZOLE SODIUM 40 MG: 40 TABLET, DELAYED RELEASE ORAL at 05:55

## 2024-11-18 RX ADMIN — POLYETHYLENE GLYCOL 3350 17 G: 17 POWDER, FOR SOLUTION ORAL at 09:37

## 2024-11-18 NOTE — ASSESSMENT & PLAN NOTE
Met sepsis criteria on 11/10 when patient started spiking fevers up to 103F with associated tachycardia and rigors. Fortunately has remained otherwise hemodynamically stable. Procal WNL at 0.07. CXR without evidence of acute cardiopulmonary disease. Patient noted to have RUE swelling and erythema. An ultrasound revealed an acute occlusive superficial thrombophlebitis in the cephalic vein from wrist to the distal upper arm which is the likely etiology of patient's sepsis. Blood cultures resulted NG. Patient was started on IV vancomycin and cefepime, but was continued on IV vancomycin monotherapy for 5d course for superficial thrombophlebitis of RUE. The fever and any remaining arm cellulitis is now completely resolved.  -Continue to monitor closely off antibiotics  -Continue to monitor temperature/vitals  -Serial examinations

## 2024-11-18 NOTE — ASSESSMENT & PLAN NOTE
During prior admission imaging showed enlarged periaortic, iliac chain and inguinal lymph nodes.  Repeat imaging showed persistent lymphadenopathy with worsened right axillary adenopathy.  As above, concern for an underlying lymphoproliferative disorder.  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  CBC differential also shows atypical lymphocytes.  Status post plasmapheresis 10/26. HIV screening negative. S/p lymph node and bone marrow biopsies. Lymph node biopsy overall findings are atypical and suspicious for a lymphoproliferative disorder. However, the tissue was too scant to further characterize the underlying process. Therefore, patient underwent an excisional biopsy with surgical oncology on 11/3 which was nondiagnostic.  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  -Hematology/oncology following  -Prednisone as per hematology oncology  -Follow-up final pathology from excisional biopsy and bone marrow biopsy

## 2024-11-18 NOTE — PLAN OF CARE
Problem: PAIN - ADULT  Goal: Verbalizes/displays adequate comfort level or baseline comfort level  Description: Interventions:  - Encourage patient to monitor pain and request assistance  - Assess pain using appropriate pain scale  - Administer analgesics based on type and severity of pain and evaluate response  - Implement non-pharmacological measures as appropriate and evaluate response  - Consider cultural and social influences on pain and pain management  - Notify physician/advanced practitioner if interventions unsuccessful or patient reports new pain  Outcome: Progressing     Problem: SAFETY ADULT  Goal: Patient will remain free of falls  Description: INTERVENTIONS:  - Educate patient/family on patient safety including physical limitations  - Instruct patient to call for assistance with activity   - Consult OT/PT to assist with strengthening/mobility   - Keep Call bell within reach  - Keep bed low and locked with side rails adjusted as appropriate  - Keep care items and personal belongings within reach  - Initiate and maintain comfort rounds  - Make Fall Risk Sign visible to staff  - Offer Toileting every 2 Hours, in advance of need  - Initiate/Maintain bed/chair alarm  - Apply yellow socks and bracelet for high fall risk patients  - Consider moving patient to room near nurses station  Outcome: Progressing     Problem: DISCHARGE PLANNING  Goal: Discharge to home or other facility with appropriate resources  Description: INTERVENTIONS:  - Identify barriers to discharge w/patient and caregiver  - Arrange for needed discharge resources and transportation as appropriate  - Identify discharge learning needs (meds, wound care, etc.)  - Arrange for interpretive services to assist at discharge as needed  - Refer to Case Management Department for coordinating discharge planning if the patient needs post-hospital services based on physician/advanced practitioner order or complex needs related to functional status,  cognitive ability, or social support system  Outcome: Progressing     Problem: Knowledge Deficit  Goal: Patient/family/caregiver demonstrates understanding of disease process, treatment plan, medications, and discharge instructions  Description: Complete learning assessment and assess knowledge base.  Interventions:  - Provide teaching at level of understanding  - Provide teaching via preferred learning methods  Outcome: Progressing     Problem: Nutrition/Hydration-ADULT  Goal: Nutrient/Hydration intake appropriate for improving, restoring or maintaining nutritional needs  Description: Monitor and assess patient's nutrition/hydration status for malnutrition. Collaborate with interdisciplinary team and initiate plan and interventions as ordered.  Monitor patient's weight and dietary intake as ordered or per policy. Utilize nutrition screening tool and intervene as necessary. Determine patient's food preferences and provide high-protein, high-caloric foods as appropriate.     INTERVENTIONS:  - Monitor oral intake, urinary output, labs, and treatment plans  - Assess nutrition and hydration status and recommend course of action  - Evaluate amount of meals eaten  - Assist patient with eating if necessary   - Allow adequate time for meals  - Recommend/ encourage appropriate diets, oral nutritional supplements, and vitamin/mineral supplements  - Order, calculate, and assess calorie counts as needed  - Recommend, monitor, and adjust tube feedings and TPN/PPN based on assessed needs  - Assess need for intravenous fluids  - Provide specific nutrition/hydration education as appropriate  - Include patient/family/caregiver in decisions related to nutrition  Outcome: Progressing

## 2024-11-18 NOTE — ASSESSMENT & PLAN NOTE
The patient had intermittent fevers for 3 weeks and several admissions to [facility] with fairly unremarkable infectious workup other than positive rhinovirus and COVID. CT imaging was noted to show lymphadenopathy and he had atypical lymphocytes on CBC so hematology/oncology consult was recommended although the patient was discharged on Paxlovid with plan for an outpatient referral.  He then returned to the ED with worsening confusion and return of fever.  CT imaging with worsening axillary lymphadenopathy, CBC differential again showed atypical lymphocytes.  Status post LP 10/25 which showed mild elevation in protein and lymphocytic pleocytosis.  ME panel negative and culture showed no growth. 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  Labs show elevated LDH, ferritin, immunoglobulins. Status post plasmapheresis with improvement in fever curve and initially improvement in mental status, but now with waxing and waning mental status and development of myoclonic jerking as below. S/p lymph node and bone marrow biopsies. Bone marrow biopsy preliminary study demonstrated histiocytic aggregates, which are nonspecific, and can be seen in a variety of settings including infection. Mycoplasma antibodies, histoplasma urine antigen, Bartonella antibody, and brucella antibodies negative.  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 Low concern for infectious etiology.   -Management per oncology  -Follow-up tissue pathology from 11/8 repeat bone marrow biopsy and excisional lymph node biopsy  -Follow-up mTB PCR from CSF for completeness  -Continue to monitor fever curve/vitals

## 2024-11-18 NOTE — CASE MANAGEMENT
"[facility] has received DENIAL for Acute Rehab Authorization.   Insurance: [employer]   Denial obtained via Insurance Rep: Remedios P#: [phone]  Denial Reason: Does not meet CMS guidelines; \"more appropriate for SNF level care\"   Facility: [facility]   Denial #: [id number]   H&CC Ref#: [id number]   Appeal P#: [phone] / Appeal F#: [phone]    Care Manager notified: Ashley Romero    Please reach out to  for updates on any clinical information.   "

## 2024-11-18 NOTE — PLAN OF CARE
Problem: PAIN - ADULT  Goal: Verbalizes/displays adequate comfort level or baseline comfort level  Description: Interventions:  - Encourage patient to monitor pain and request assistance  - Assess pain using appropriate pain scale  - Administer analgesics based on type and severity of pain and evaluate response  - Implement non-pharmacological measures as appropriate and evaluate response  - Consider cultural and social influences on pain and pain management  - Notify physician/advanced practitioner if interventions unsuccessful or patient reports new pain  Outcome: Progressing     Problem: INFECTION - ADULT  Goal: Absence or prevention of progression during hospitalization  Description: INTERVENTIONS:  - Assess and monitor for signs and symptoms of infection  - Monitor lab/diagnostic results  - Monitor all insertion sites, i.e. indwelling lines, tubes, and drains  - Monitor endotracheal if appropriate and nasal secretions for changes in amount and color  - Trent appropriate cooling/warming therapies per order  - Administer medications as ordered  - Instruct and encourage patient and family to use good hand hygiene technique  - Identify and instruct in appropriate isolation precautions for identified infection/condition  Outcome: Progressing  Goal: Absence of fever/infection during neutropenic period  Description: INTERVENTIONS:  - Monitor WBC    Outcome: Progressing     Problem: SAFETY ADULT  Goal: Patient will remain free of falls  Description: INTERVENTIONS:  - Educate patient/family on patient safety including physical limitations  - Instruct patient to call for assistance with activity   - Consult OT/PT to assist with strengthening/mobility   - Keep Call bell within reach  - Keep bed low and locked with side rails adjusted as appropriate  - Keep care items and personal belongings within reach  - Initiate and maintain comfort rounds  - Make Fall Risk Sign visible to staff  - Offer Toileting every 2 Hours,  in advance of need  - Initiate/Maintain bed/chair alarm  - Apply yellow socks and bracelet for high fall risk patients  - Consider moving patient to room near nurses station  Outcome: Progressing  Goal: Maintain or return to baseline ADL function  Description: INTERVENTIONS:  -  Assess patient's ability to carry out ADLs; assess patient's baseline for ADL function and identify physical deficits which impact ability to perform ADLs (bathing, care of mouth/teeth, toileting, grooming, dressing, etc.)  - Assess/evaluate cause of self-care deficits   - Assess range of motion  - Assess patient's mobility; develop plan if impaired  - Assess patient's need for assistive devices and provide as appropriate  - Encourage maximum independence but intervene and supervise when necessary  - Involve family in performance of ADLs  - Assess for home care needs following discharge   - Consider OT consult to assist with ADL evaluation and planning for discharge  - Provide patient education as appropriate  Outcome: Progressing  Goal: Maintains/Returns to pre admission functional level  Description: INTERVENTIONS:  - Perform AM-PAC 6 Click Basic Mobility/ Daily Activity assessment daily.  - Set and communicate daily mobility goal to care team and patient/family/caregiver.   - Collaborate with rehabilitation services on mobility goals if consulted  - Reposition patient every 2 hours.  - Dangle patient 3 times a day  - Stand patient 3 times a day  - Ambulate patient 3 times a day  - Out of bed to chair 3 times a day   - Out of bed for meals 3 times a day  - Out of bed for toileting  - Record patient progress and toleration of activity level   Outcome: Progressing     Problem: DISCHARGE PLANNING  Goal: Discharge to home or other facility with appropriate resources  Description: INTERVENTIONS:  - Identify barriers to discharge w/patient and caregiver  - Arrange for needed discharge resources and transportation as appropriate  - Identify  discharge learning needs (meds, wound care, etc.)  - Arrange for interpretive services to assist at discharge as needed  - Refer to Case Management Department for coordinating discharge planning if the patient needs post-hospital services based on physician/advanced practitioner order or complex needs related to functional status, cognitive ability, or social support system  Outcome: Progressing     Problem: Knowledge Deficit  Goal: Patient/family/caregiver demonstrates understanding of disease process, treatment plan, medications, and discharge instructions  Description: Complete learning assessment and assess knowledge base.  Interventions:  - Provide teaching at level of understanding  - Provide teaching via preferred learning methods  Outcome: Progressing     Problem: Prexisting or High Potential for Compromised Skin Integrity  Goal: Skin integrity is maintained or improved  Description: INTERVENTIONS:  - Identify patients at risk for skin breakdown  - Assess and monitor skin integrity  - Assess and monitor nutrition and hydration status  - Monitor labs   - Assess for incontinence   - Turn and reposition patient  - Assist with mobility/ambulation  - Relieve pressure over bony prominences  - Avoid friction and shearing  - Provide appropriate hygiene as needed including keeping skin clean and dry  - Evaluate need for skin moisturizer/barrier cream  - Collaborate with interdisciplinary team   - Patient/family teaching  - Consider wound care consult   Outcome: Progressing     Problem: Nutrition/Hydration-ADULT  Goal: Nutrient/Hydration intake appropriate for improving, restoring or maintaining nutritional needs  Description: Monitor and assess patient's nutrition/hydration status for malnutrition. Collaborate with interdisciplinary team and initiate plan and interventions as ordered.  Monitor patient's weight and dietary intake as ordered or per policy. Utilize nutrition screening tool and intervene as necessary.  Determine patient's food preferences and provide high-protein, high-caloric foods as appropriate.     INTERVENTIONS:  - Monitor oral intake, urinary output, labs, and treatment plans  - Assess nutrition and hydration status and recommend course of action  - Evaluate amount of meals eaten  - Assist patient with eating if necessary   - Allow adequate time for meals  - Recommend/ encourage appropriate diets, oral nutritional supplements, and vitamin/mineral supplements  - Order, calculate, and assess calorie counts as needed  - Recommend, monitor, and adjust tube feedings and TPN/PPN based on assessed needs  - Assess need for intravenous fluids  - Provide specific nutrition/hydration education as appropriate  - Include patient/family/caregiver in decisions related to nutrition  Outcome: Progressing

## 2024-11-18 NOTE — UTILIZATION REVIEW
Continued Stay Review    Date: 11/18/24                          Current Patient Class: Inpatient  Current Level of Care: MED-SURG    HPI:84 y.o. male initially admitted on 10/26.     Assessment/Plan: K 3.2.BLD CX NEGATIVE. monitor closely off antibiotics. 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 Low concern for infectious etiology. excisional lymph node biopsy.COVID RESOLVED. MYOCLONIA RESOLVED. RUE pain is resolved. Only other complaint is anal itching. change cream to Anusol suppository. Medically stable, awaiting placement to rehab for safe discharge. Denied for acute rehab, CM aware.    Medications:   Scheduled Medications:  bisacodyl, 10 mg, Rectal, Once  calcium carbonate, 500 mg, Oral, BID  docusate sodium, 100 mg, Oral, BID  fluticasone, 1 spray, Nasal, Daily  gabapentin, 100 mg, Oral, HS  heparin (porcine), 5,000 Units, Subcutaneous, Q8H JOE  hydrocortisone, 25 mg, Rectal, BID  hydrOXYzine HCL, 25 mg, Oral, HS  levothyroxine, 50 mcg, Oral, Once per day on Monday Tuesday Wednesday Thursday  levothyroxine, 75 mcg, Oral, Once per day on Sunday Friday Saturday  SAMEER ANTIFUNGAL, , Topical, BID  pantoprazole, 40 mg, Oral, Early Morning  polyethylene glycol, 17 g, Oral, BID  predniSONE, 60 mg, Oral, Daily    multi-electrolyte (PLASMALYTE-A/ISOLYTE-S PH 7.4) IV solution  Rate: 100 mL/hr Dose: 100 mL/hr  Freq: Continuous Route: IV  Indications of Use: IV Hydration  Last Dose: Stopped (11/15/24 1216)  Start: 11/11/24 1030 End: 11/15/24 1128      1032     1207     2229     2230      0916      0615     1214 [C]     1415     1925 [C]     2243      0205     1815      0525     1128-D/C'd  1216          PRN Meds:  acetaminophen, 1,000 mg, Intravenous, Q6H PRN  bisacodyl, 10 mg, Rectal, Daily PRN  LORazepam, 0.5 mg, Oral, BID PRN  ondansetron, 4 mg, Intravenous, Q6H PRN  oxyCODONE, 5 mg, Oral, Q6H PRN 11/17 X1      Discharge Plan: TBD    Vital Signs (last 3 days)        Date/Time Temp Pulse Resp BP MAP (mmHg) SpO2 O2 Device Patient Position - Orthostatic VS [place] Coma Scale Score Pain    11/18/24 0900 -- -- -- -- -- -- -- -- -- No Pain    11/18/24 07:47:06 97.7 °F (36.5 °C) 89 16 129/77 94 100 % -- -- -- --    11/17/24 21:51:53 98.3 °F (36.8 °C) 87 18 132/77 95 97 % None (Room air) Lying -- --    11/17/24 2016 -- -- -- -- -- -- None (Room air) -- 14 No Pain    11/17/24 1720 -- -- -- -- -- -- -- -- -- 7    11/17/24 14:25:55 99 °F (37.2 °C) 94 18 134/78 97 97 % -- -- -- --    11/17/24 0930 -- -- -- -- -- -- -- -- 14 No Pain    11/17/24 07:23:39 97.6 °F (36.4 °C) 63 18 134/92 106 99 % -- -- -- --    11/16/24 20:12:51 -- 92 -- 108/63 78 100 % -- -- 14 No Pain    11/16/24 15:03:22 98.1 °F (36.7 °C) 80 18 129/75 93 97 % -- -- -- --    11/16/24 0900 -- -- -- -- -- -- None (Room air) -- -- No Pain    11/16/24 07:23:18 98.8 °F (37.1 °C) 75 18 124/71 89 97 % -- -- -- --    11/15/24 22:13:33 98.2 °F (36.8 °C) 76 18 130/75 93 100 % -- -- -- --    11/15/24 2200 -- -- -- -- -- -- None (Room air) -- -- No Pain    11/15/24 15:32:01 98.9 °F (37.2 °C) 76 16 122/71 88 100 % -- -- -- --    11/15/24 1317 -- -- -- -- -- -- -- -- -- No Pain    11/15/24 1250 -- -- -- -- -- -- -- -- -- No Pain    11/15/24 0900 -- -- -- -- -- -- None (Room air) -- -- No Pain    11/15/24 08:07:50 97.8 °F (36.6 °C) 74 18 115/72 86 98 % -- -- -- --          Weight (last 2 days)       Date/Time Weight    11/16/24 0700 71.4 (157.41)            Pertinent Labs/Diagnostic Results:   Radiology:  VAS upper limb venous duplex scan, unilateral/limited   Final Interpretation by Radha Almonte MD (11/12 9746)      XR chest portable   Final Interpretation by Xander Brown MD (11/11 1237)      No acute cardiopulmonary disease.            Workstation performed: [id number]         IR biopsy bone marrow   Final Interpretation by Kenn Lamb MD (11/12 1026)   Successful image guided bone marrow biopsy of right ileum.         Workstation  performed: [id number]         IR biopsy lymph node   Final Interpretation by Kenn Lamb MD (11/12 1027)   Ultrasound-guided right axillary lymph node core biopsy.      Workstation performed: [id number]         XR abdomen 1 vw portable   Final Interpretation by Sunny Aly MD (11/06 0845)      No evidence of small bowel obstruction or colonic distention. Moderately increased stool throughout the colon               Workstation performed: [id number]         MRI brain and orbits wo contrast   Final Interpretation by Natividad Amador MD (11/04 0550)      No acute intracranial pathology.         Workstation performed: [id number]         XR chest portable   Final Interpretation by Cody Zimmerman MD (11/02 1227)      Mild bibasilar atelectasis otherwise no focal consolidation, pleural effusion, or pneumothorax.            Workstation performed: [id number]         XR chest portable   Final Interpretation by Jamin Quiroz MD (11/01 1638)      Stable bibasilar atelectasis. No pneumothorax.            Workstation performed: [id number]         CT head wo contrast   Final Interpretation by Tom Griffiths MD (10/31 0507)      No acute intracranial abnormality.      Mild chronic small vessel ischemic changes.               Workstation performed: [id number]         IR biopsy bone marrow   Final Interpretation by Anton Perez MD (10/30 1629)   Bone marrow aspiration and core biopsy.      PERFORMED BY:   DARREN Prajapati      SUPERVISING PHYSICIAN: Dr. Anton Perez      Workstation performed: [id number]         XR hips bilateral 3-4 vw w pelvis if performed   Final Interpretation by Natalie Nj MD (10/29 0838)      No acute osseous abnormality.         Computerized Assisted Algorithm (CAA) may have been used to analyze all applicable images.            Workstation performed: [id number]         CT spine cervical wo contrast   Final Interpretation by Izaiah Christie DO (10/28 1924)      No cervical  spine fracture or traumatic malalignment.                  Workstation performed: [id number]         CT head wo contrast   Final Interpretation by Izaiah Christie DO (10/28 1922)      No acute intracranial abnormality.  Stable chronic microangiopathic changes within the brain.                  Workstation performed: [id number]         IR biopsy inguinal lymph node   Final Interpretation by Jorge Bhatia MD (10/28 1644)   Impression:      Successful biopsy of the left inguinal lymph node.            Workstation performed: [id number]         IR temporary dialysis catheter placement   Final Interpretation by Eros Sotelo MD (10/26 1516)      Insertion of right IJV non-tunneled dual-lumen plasmapheresis catheter, with tip in the expected location of the superior vena cava.      Plan:      The catheter may be used immediately.      Workstation performed: [id number]           Cardiology:  ECG 12 lead   Final Result by Magnus Raymond MD (11/11 2127)   Sinus tachycardia   Left axis deviation   Abnormal ECG   When compared with ECG of 31-Oct-2024 16:53,   Premature supraventricular complexes are no longer Present   QRS axis Shifted left   Confirmed by Magnus Raymond ([id number]) on 11/11/2024 9:27:55 PM      ECG 12 lead   Final Result by Edu Cagle MD (10/31 2150)   Sinus rhythm with Premature supraventricular complexes   Otherwise normal ECG   When compared with ECG of 25-OCT-2024 11:09,   Fusion complexes are no longer Present   Nonspecific T wave abnormality, improved in Inferior leads   Confirmed by Edu Cagle ([id number]) on 10/31/2024 9:50:30 PM              Results from last 7 days   Lab Units 11/16/24  0811 11/15/24  0641 11/14/24  0835 11/13/24  1839 11/12/24  0909   WBC Thousand/uL 9.04 6.64 4.43 4.50 13.58*   HEMOGLOBIN g/dL 9.1* 8.6* 9.3* 8.8* 9.6*   HEMATOCRIT % 28.9* 27.2* 29.0* 27.5* 29.5*   PLATELETS Thousands/uL 304 250 260 243 241   TOTAL NEUT ABS Thousands/µL  --  3.17  --  2.37  --           Results from last 7 days   Lab Units 11/16/24  0811 11/15/24  0641 11/14/24  0835 11/13/24  1839 11/12/24  0909   SODIUM mmol/L 137 136 137 134* 130*   POTASSIUM mmol/L 3.2* 3.5 3.6 3.9 4.0   CHLORIDE mmol/L 99 102 103 101 100   CO2 mmol/L 30 28 30 29 22   ANION GAP mmol/L 8 6 4 4 8   BUN mg/dL 9 9 12 15 18   CREATININE mg/dL 0.34* 0.34* 0.52* 0.57* 0.62   EGFR ml/min/1.73sq m 114 114 96 92 89   CALCIUM mg/dL 8.2* 8.0* 7.9* 7.8* 7.7*     Results from last 7 days   Lab Units 11/14/24  0835 11/13/24  1839   AST U/L 31 36   ALT U/L 31 33   ALK PHOS U/L 133* 138*   TOTAL PROTEIN g/dL 5.9* 5.8*   ALBUMIN g/dL 2.7* 2.7*   TOTAL BILIRUBIN mg/dL 0.67 0.81         Results from last 7 days   Lab Units 11/16/24  0811 11/15/24  0641 11/14/24  0835 11/13/24  1839 11/12/24  0909   GLUCOSE RANDOM mg/dL 99 108 131 150* 169*       Results from last 7 days   Lab Units 11/14/24  0835   VANCOMYCIN RM ug/mL 12.6       Network Utilization Review Department  ATTENTION: Please call with any questions or concerns to [phone] and carefully listen to the prompts so that you are directed to the right person. All voicemails are confidential.   For Discharge needs, contact Care Management DC Support Team at [phone] opt. 2  Send all requests for admission clinical reviews, approved or denied determinations and any other requests to dedicated fax number below belonging to the campus where the patient is receiving treatment. List of dedicated fax numbers for the Facilities:  FACILITY NAME UR FAX NUMBER   ADMISSION DENIALS (Administrative/Medical Necessity) [phone]   DISCHARGE SUPPORT TEAM (NETWORK) [phone]   PARENT CHILD HEALTH (Maternity/NICU/Pediatrics) [phone]   [facility] [phone]   [facility] [phone]   [facility] [phone]   [facility] [phone]   [facility]  [phone]   [facility] [phone]   [facility] [phone]   [facility] [phone]   [facility] [phone]   [facility] [phone]   [facility] [phone]   [facility] [phone]

## 2024-11-18 NOTE — ASSESSMENT & PLAN NOTE
Demonstrated on ultrasound of the RUE 11/11. In cephalic vein from wrist to distal upper arm. Potential etiology of acute sepsis as above. Blood cultures  resulted NG. Arm cellulitis is now completely resolved s/p 5d of vancomycin as above.   -Continue to monitor closely off antibiotics  -Serial examinations of the RUE

## 2024-11-18 NOTE — ASSESSMENT & PLAN NOTE
Disussed with family at bedside about the course of the patient's illness.  Patient had fevers on October 7, was diagnosed with viral infection.  Sent home and had rapid decline at home.    At baseline patient is very physical and able to work in the yard and cuts down trees, very sharp handles his finances.  Now patient is baseline very confused according to the family.  Patient's last travel history was to [place] in May of last year  Brother passed away of some sort of leukemia  No dementia history  No previous stroke or traumatic brain.  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    Lyme was negative on 10/21/2024.   Rhinovirus was initially positive on 10/8/2024.  Respiratory panel was redone and was negative.    Peripheral blood smear was done and there was no parasites seen on 10/22/2024.    TSH, ammonia, coma panel unremarkable  CSF was positive for elevated WBC on 10/25/2024, VDRL negative, glucose normal, protein CSF elevated 86, Lyme PCR negative, meningitis encephalitis negative, West Nile negative  Brucella negative 11/01.2024  Mycoplasma negativem histoplasma negative  HIV negative  mTB PCR Pending 10/25    Heme onc workup  Labs show an elevated gamma spike on SPEP, elevated IgA, IgG, IgM. Positive free lambda light chain on immunofixation in the urine and plasma. Flow is unremarkable. Elevated beta-2 microglobulin. High normal viscosity.   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    NEURO workup  MRI brain with no significant acute pathology  EEG with no evidence of epileptiform  discharges thus no need for AED at this time       Concern noted of etiology of encephalopathy and fevers due to possible lymphoma/hyperviscosity   Mentation improved  Continue oral prednisone 60 mg daily with plan to discuss chemotherapy pending final GOC discussion and rehab performance  Denied for acute rehab, CM aware

## 2024-11-18 NOTE — ASSESSMENT & PLAN NOTE
Development of intermittent twitching/myoclonic jerking on 10/31. Neurology consulted. Concern for toxic-metabolic source and a routine EEG was obtained. It showed nearly continuous myoclonic jerking and concern for an epileptic etiology of myoclonus. Patient started on Ativan and Keppra. However, repeat EEG was performed with an EMG on the limbs and there did not appear to be epileptic correlation. 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 MRI brain negative for acute pathology. Now resolved.   -Appreciate neurology workup and follow-up

## 2024-11-18 NOTE — PROGRESS NOTES
Progress Note - Hospitalist   Name: Miguel Henderson 84 y.o. male I MRN: [id number]  [facility] Date of Admission: 10/26/2024   Date of Service: 11/18/2024 I Hospital Day: 23    Assessment & Plan  Encephalopathy  Disussed with family at bedside about the course of the patient's illness.  Patient had fevers on October 7, was diagnosed with viral infection.  Sent home and had rapid decline at home.    At baseline patient is very physical and able to work in the yard and cuts down trees, very sharp handles his finances.  Now patient is baseline very confused according to the family.  Patient's last travel history was to [place] in May of last year  Brother passed away of some sort of leukemia  No dementia history  No previous stroke or traumatic brain.  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    Lyme was negative on 10/21/2024.   Rhinovirus was initially positive on 10/8/2024.  Respiratory panel was redone and was negative.    Peripheral blood smear was done and there was no parasites seen on 10/22/2024.    TSH, ammonia, coma panel unremarkable  CSF was positive for elevated WBC on 10/25/2024, VDRL negative, glucose normal, protein CSF elevated 86, Lyme PCR negative, meningitis encephalitis negative, West Nile negative  Brucella negative 11/01.2024  Mycoplasma negativem histoplasma negative  HIV negative  mTB PCR Pending 10/25    Heme onc workup  Labs show an elevated gamma spike on SPEP, elevated IgA, IgG, IgM. Positive free lambda light chain on immunofixation in the urine and plasma. Flow is unremarkable. Elevated beta-2 microglobulin. High normal viscosity.   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    NEURO workup  MRI brain with no significant acute pathology  EEG with no evidence of epileptiform discharges thus no need for AED at this time       Concern noted of etiology of encephalopathy and fevers due to possible lymphoma/hyperviscosity   Mentation improved  Continue oral prednisone 60 mg daily with plan to discuss chemotherapy pending final GOC discussion and rehab performance  Denied for acute rehab, CM aware  Lymphadenopathy  Status post left inguinal lymph node biopsy on 10/28 -> await full pathology, however, oncology note reviewed -> SPEP with elevated gamma spike, elevated IgA/G/M, positive free lambda light chain, elevated beta-2 microglobulin, and high normal viscosity  Status post courses of plasmapheresis per oncology  Per discussion between oncology and pathology, 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  Fever and tachycardia and rigors.  Possibly related to right upper extremity cellulitis with superficial thrombophlebitis  Blood cultures negative at 48 hours  Chest x-ray unremarkable  ID consulted, appreciate recommendations  Completed 5 days of treatment with IV vancomycin for soft tissue infection on 11/15  Remains afebrile and stable off antibiotics  Abnormal LFTs  Elevated bilirubin and alkaline phosphatase generally improving  Suspected to be associated with lymphadenopathy (above)  Limit/avoid hepatotoxins as possible  Hypothyroidism  Continue Synthroid  Primary hypertension  Holding HCTZ due to soft pressures at this time  BP stable  COVID-19 virus infection  Tested positive on 10/21  Currently oxygenating on baseline room air   Contact/airborne precautions now  discontinued   Supportive care otherwise  Anemia  Hemoglobin stable  Likely associated with lymphadenopathy  Hypocalcemia  Serum Andrion's calcium low -> continue intravenous and oral supplementation  Consider possible contribution to myoclonus  Myoclonia  Neurology suspects toxic metabolic etiology given unremarkable EEG  Superficial thrombophlebitis of right upper extremity  Supportive care  Fever  See above  Resolved  Anxiety  Reports increased anxiety after starting prednisone  Continue Ativan as needed which appears to be helping  Monitor   Hemorrhoids  Continue to report anal itching  Will change cream to Anusol suppository    VTE Pharmacologic Prophylaxis: VTE Score: 5 High Risk (Score >/= 5) - Pharmacological DVT Prophylaxis Ordered: heparin. Sequential Compression Devices Ordered.    Mobility:   Basic Mobility Inpatient Raw Score: 18  JH-HLM Goal: 6: Walk 10 steps or more  JH-HLM Achieved: 2: Bed activities/Dependent transfer  JH-HLM Goal NOT achieved. Continue with multidisciplinary rounding and encourage appropriate mobility to improve upon JH-HLM goals.    Patient Centered Rounds: I performed bedside rounds with nursing staff today.   Discussions with Specialists or Other Care Team Provider: DESTINI GLASGOW    Education and Discussions with Family / Patient: Updated  (wife) at bedside.  Attempted to call son over the phone however he did not answer.    Current Length of Stay: 23 day(s)  Current Patient Status: Inpatient   Certification Statement: The patient will continue to require additional inpatient hospital stay due to awaiting placement to rehab for safe discharge  Discharge Plan:  Medically stable, awaiting placement to rehab for safe discharge    Code Status: Level 3 - DNAR and DNI    Subjective   Patient assessed at bedside.  Continues to report anal itching.  No other acute complaints other than fatigue.  Denies any pain or shortness of breath.    Objective :  Temp:  [97.7 °F (36.5 °C)-99  °F (37.2 °C)] 97.7 °F (36.5 °C)  HR:  [87-94] 89  BP: (129-134)/(77-78) 129/77  Resp:  [16-18] 16  SpO2:  [97 %-100 %] 100 %  O2 Device: None (Room air)    Body mass index is 26.19 kg/m².     Input and Output Summary (last 24 hours):     Intake/Output Summary (Last 24 hours) at 11/18/2024 1323  Last data filed at 11/18/2024 0830  Gross per 24 hour   Intake 420 ml   Output 1300 ml   Net -880 ml       Physical Exam  Vitals reviewed.   Constitutional:       General: He is not in acute distress.     Appearance: Normal appearance. He is not ill-appearing.   HENT:      Head: Normocephalic and atraumatic.   Cardiovascular:      Rate and Rhythm: Normal rate and regular rhythm.      Heart sounds: Normal heart sounds.   Pulmonary:      Effort: Pulmonary effort is normal. No respiratory distress.   Abdominal:      General: Bowel sounds are normal.      Palpations: Abdomen is soft.      Tenderness: There is no abdominal tenderness.   Musculoskeletal:      Right lower leg: No edema.      Left lower leg: No edema.   Neurological:      Mental Status: He is alert. Mental status is at baseline.           Lines/Drains:  Lines/Drains/Airways       Active Status       Name Placement date Placement time Site Days    External Urinary Catheter 11/17/24 2153  -- less than 1                            Lab Results: I have reviewed the following results:   Results from last 7 days   Lab Units 11/16/24  0811 11/15/24  0641   WBC Thousand/uL 9.04 6.64   HEMOGLOBIN g/dL 9.1* 8.6*   HEMATOCRIT % 28.9* 27.2*   PLATELETS Thousands/uL 304 250   SEGS PCT %  --  48   LYMPHO PCT %  --  42   MONO PCT %  --  9   EOS PCT %  --  0     Results from last 7 days   Lab Units 11/16/24  0811 11/15/24  0641 11/14/24  0835   SODIUM mmol/L 137   < > 137   POTASSIUM mmol/L 3.2*   < > 3.6   CHLORIDE mmol/L 99   < > 103   CO2 mmol/L 30   < > 30   BUN mg/dL 9   < > 12   CREATININE mg/dL 0.34*   < > 0.52*   ANION GAP mmol/L 8   < > 4   CALCIUM mg/dL 8.2*   < > 7.9*    ALBUMIN g/dL  --   --  2.7*   TOTAL BILIRUBIN mg/dL  --   --  0.67   ALK PHOS U/L  --   --  133*   ALT U/L  --   --  31   AST U/L  --   --  31   GLUCOSE RANDOM mg/dL 99   < > 131    < > = values in this interval not displayed.                       Recent Cultures (last 7 days):         Imaging Results Review: No pertinent imaging studies reviewed.  Other Study Results Review: No additional pertinent studies reviewed.    Last 24 Hours Medication List:     Current Facility-Administered Medications:     acetaminophen (Ofirmev) injection 1,000 mg, Q6H PRN, Last Rate: 1,000 mg (11/12/24 0735)    bisacodyl (DULCOLAX) rectal suppository 10 mg, Once    bisacodyl (DULCOLAX) rectal suppository 10 mg, Daily PRN    calcium carbonate (TUMS) chewable tablet 500 mg, BID    docusate sodium (COLACE) capsule 100 mg, BID    fluticasone (FLONASE) 50 mcg/act nasal spray 1 spray, Daily    gabapentin (NEURONTIN) capsule 100 mg, HS    heparin (porcine) subcutaneous injection 5,000 Units, Q8H JOE **AND** [COMPLETED] Platelet count, Once    hydrocortisone (ANUSOL-HC) rectal suppository 25 mg, BID    hydrOXYzine HCL (ATARAX) tablet 25 mg, HS    levothyroxine tablet 50 mcg, Once per day on Monday Tuesday Wednesday Thursday    levothyroxine tablet 75 mcg, Once per day on Sunday Friday Saturday    LORazepam (ATIVAN) tablet 0.5 mg, BID PRN    moisture barrier miconazole 2% cream (aka SAMEER MOISTURE BARRIER ANTIFUNGAL CREAM), BID    ondansetron (ZOFRAN) injection 4 mg, Q6H PRN    oxyCODONE (ROXICODONE) IR tablet 5 mg, Q6H PRN    pantoprazole (PROTONIX) EC tablet 40 mg, Early Morning    polyethylene glycol (MIRALAX) packet 17 g, BID    predniSONE tablet 60 mg, Daily    Administrative Statements   Today, Patient Was Seen By: Tejas Ramirez, DO  I have spent a total time of 20 minutes in caring for this patient on the day of the visit/encounter including Counseling / Coordination of care, Documenting in the medical record, Reviewing / ordering  tests, medicine, procedures  , Obtaining or reviewing history  , and Communicating with other healthcare professionals .    **Please Note: This note may have been constructed using a voice recognition system.**

## 2024-11-18 NOTE — PROGRESS NOTES
Progress Note - Infectious Disease   Name: Miguel Henderson 84 y.o. male I MRN: [id number]  [facility] Date of Admission: 10/26/2024   Date of Service: 11/18/2024 I Hospital Day: 23    Assessment & Plan  Sepsis (HCC)  Met sepsis criteria on 11/10 when patient started spiking fevers up to 103F with associated tachycardia and rigors. Fortunately has remained otherwise hemodynamically stable. Procal WNL at 0.07. CXR without evidence of acute cardiopulmonary disease. Patient noted to have RUE swelling and erythema. An ultrasound revealed an acute occlusive superficial thrombophlebitis in the cephalic vein from wrist to the distal upper arm which is the likely etiology of patient's sepsis. Blood cultures resulted NG. Patient was started on IV vancomycin and cefepime, but was continued on IV vancomycin monotherapy for 5d course for superficial thrombophlebitis of RUE. The fever and any remaining arm cellulitis is now completely resolved.  -Continue to monitor closely off antibiotics  -Continue to monitor temperature/vitals  -Serial examinations  Superficial thrombophlebitis of right upper extremity  Demonstrated on ultrasound of the RUE 11/11. In cephalic vein from wrist to distal upper arm. Potential etiology of acute sepsis as above. Blood cultures  resulted NG. Arm cellulitis is now completely resolved s/p 5d of vancomycin as above.   -Continue to monitor closely off antibiotics  -Serial examinations of the RUE  Fever  The patient had intermittent fevers for 3 weeks and several admissions to [facility] with fairly unremarkable infectious workup other than positive rhinovirus and COVID. CT imaging was noted to show lymphadenopathy and he had atypical lymphocytes on CBC so hematology/oncology consult was recommended although the patient was discharged on Paxlovid with plan for an outpatient referral.  He then returned to the ED with worsening confusion and return of fever.  CT imaging with  worsening axillary lymphadenopathy, CBC differential again showed atypical lymphocytes.  Status post LP 10/25 which showed mild elevation in protein and lymphocytic pleocytosis.  ME panel negative and culture showed no growth. 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  Labs show elevated LDH, ferritin, immunoglobulins. Status post plasmapheresis with improvement in fever curve and initially improvement in mental status, but now with waxing and waning mental status and development of myoclonic jerking as below. S/p lymph node and bone marrow biopsies. Bone marrow biopsy preliminary study demonstrated histiocytic aggregates, which are nonspecific, and can be seen in a variety of settings including infection. Mycoplasma antibodies, histoplasma urine antigen, Bartonella antibody, and brucella antibodies negative.  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 Low concern for infectious etiology.   -Management per oncology  -Follow-up tissue pathology from 11/8 repeat bone marrow biopsy and excisional lymph node biopsy  -Follow-up mTB PCR from CSF for completeness  -Continue to monitor fever curve/vitals  Encephalopathy  Oncology initially with concern for hyperviscosity syndrome.  MRI brain without acute intracranial abnormalities.  Status post LP 10/25 with mild lymphocytic pleocytosis and elevated protein.  ME panel and CSF culture negative. Suspect CSF abnormalities related to possible underlying lymphoproliferative disorder.  Status post first session of plasmapheresis 10/26 with improvement in fever curve and mental status initially, but now seems to have worsened.  Repeat CT of the brain nondiagnostic for source on 10/31/2024. Neurology consulted. Concern for toxic-metabolic source and a routine EEG was obtained. It showed nearly continuous myoclonic jerking and concern for an epileptic etiology of  myoclonus. However, repeat EEG was performed with an EMG on the limbs and there did not appear to be epileptic correlation. 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 MRI brain negative for acute pathology.  He is much improved cognitively although still a bit confused.  -Continue to monitor mental status  -Appreciate neurology evaluation and recommendations  Lymphadenopathy  During prior admission imaging showed enlarged periaortic, iliac chain and inguinal lymph nodes.  Repeat imaging showed persistent lymphadenopathy with worsened right axillary adenopathy.  As above, concern for an underlying lymphoproliferative disorder.  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  CBC differential also shows atypical lymphocytes.  Status post plasmapheresis 10/26. HIV screening negative. S/p lymph node and bone marrow biopsies. Lymph node biopsy overall findings are atypical and suspicious for a lymphoproliferative disorder. However, the tissue was too scant to further characterize the underlying process. Therefore, patient underwent an excisional biopsy with surgical oncology on 11/3 which was nondiagnostic.  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  -Hematology/oncology following  -Prednisone as per hematology oncology  -Follow-up final pathology from excisional biopsy and bone marrow biopsy  COVID-19 virus infection  Resolved. Isolation discontinued on 11/1/2024.  Myoclonia  Development of intermittent twitching/myoclonic jerking on 10/31. Neurology consulted. Concern for toxic-metabolic source and a routine EEG was obtained. It showed nearly continuous myoclonic jerking and concern for an epileptic etiology of myoclonus. Patient started on Ativan and Keppra. However, repeat EEG was performed with an EMG on the limbs and there  did not appear to be epileptic correlation. 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 MRI brain negative for acute pathology. Now resolved.   -Appreciate neurology workup and follow-up    I have discussed with primary service the above plan to monitor closely off antibiotics. They agree with the plan.    Infectious Disease service will sign off at this time. Please reach out via secure chat with any additional questions or concerns.     Antibiotics:  none    Subjective   Awake, interactive, family at bedside. RUE pain is resolved. Only other complaint is anal itching. Otherwise, no new symptoms or complaints. No fevers or chills this morning. Denies chest pain, SOB, N/V/D, abdominal pain.     Objective :  Temp:  [97.7 °F (36.5 °C)-99 °F (37.2 °C)] 97.7 °F (36.5 °C)  HR:  [87-94] 89  BP: (129-134)/(77-78) 129/77  Resp:  [16-18] 16  SpO2:  [97 %-100 %] 100 %  O2 Device: None (Room air)    General Appearance:  Chronically ill appearing, awake and interactive today, nontoxic and no acute distress   Throat: Oropharynx moist without lesions.    Lungs:   Clear to auscultation bilaterally; no wheezes, rhonchi or rales; respirations unlabored. On room air.    Heart:  RRR; no murmur, rub or gallop.   Abdomen:   Soft, non-tender, non-distended, positive bowel sounds.     Extremities: No clubbing or cyanosis. RUE erythema resolved, some bruising noted. No longer edematous.    Skin: No new rashes, lesions, or draining wounds noted on exposed skin.       Lab Results: I have reviewed the following results:  Results from last 7 days   Lab Units 11/16/24  0811 11/15/24  0641 11/14/24  0835   WBC Thousand/uL 9.04 6.64 4.43   HEMOGLOBIN g/dL 9.1* 8.6* 9.3*   PLATELETS Thousands/uL 304 250 260     Results from last 7 days   Lab Units 11/16/24  0811 11/15/24  0641 11/14/24  0835 11/13/24  1839   SODIUM mmol/L 137 136 137 134*   POTASSIUM mmol/L 3.2* 3.5 3.6 3.9   CHLORIDE mmol/L 99 102 103 101   CO2  mmol/L 30 28 30 29   BUN mg/dL 9 9 12 15   CREATININE mg/dL 0.34* 0.34* 0.52* 0.57*   EGFR ml/min/1.73sq m 114 114 96 92   CALCIUM mg/dL 8.2* 8.0* 7.9* 7.8*   AST U/L  --   --  31 36   ALT U/L  --   --  31 33   ALK PHOS U/L  --   --  133* 138*   ALBUMIN g/dL  --   --  2.7* 2.7*                                     Anais Kerr PA-C  [facility]

## 2024-11-19 VITALS
BODY MASS INDEX: 26.12 KG/M2 | HEIGHT: 65 IN | HEART RATE: 94 BPM | TEMPERATURE: 99.3 F | WEIGHT: 156.75 LBS | RESPIRATION RATE: 16 BRPM | DIASTOLIC BLOOD PRESSURE: 65 MMHG | SYSTOLIC BLOOD PRESSURE: 124 MMHG | OXYGEN SATURATION: 99 %

## 2024-11-19 PROCEDURE — 97530 THERAPEUTIC ACTIVITIES: CPT

## 2024-11-19 PROCEDURE — 97116 GAIT TRAINING THERAPY: CPT

## 2024-11-19 PROCEDURE — 99239 HOSP IP/OBS DSCHRG MGMT >30: CPT | Performed by: FAMILY MEDICINE

## 2024-11-19 PROCEDURE — NC001 PR NO CHARGE: Performed by: FAMILY MEDICINE

## 2024-11-19 RX ORDER — HYDROCORTISONE 25 MG/G
CREAM TOPICAL 4 TIMES DAILY PRN
Status: DISCONTINUED | OUTPATIENT
Start: 2024-11-19 | End: 2024-11-19 | Stop reason: HOSPADM

## 2024-11-19 RX ORDER — PREDNISONE 20 MG/1
60 TABLET ORAL DAILY
Qty: 90 TABLET | Refills: 0 | Status: SHIPPED | OUTPATIENT
Start: 2024-11-20

## 2024-11-19 RX ORDER — GABAPENTIN 100 MG/1
100 CAPSULE ORAL
Qty: 30 CAPSULE | Refills: 0 | Status: SHIPPED | OUTPATIENT
Start: 2024-11-19

## 2024-11-19 RX ORDER — HYDROCORTISONE 25 MG/G
CREAM TOPICAL 4 TIMES DAILY PRN
Qty: 28 G | Refills: 0 | Status: SHIPPED | OUTPATIENT
Start: 2024-11-19

## 2024-11-19 RX ADMIN — HEPARIN SODIUM 5000 UNITS: 5000 INJECTION INTRAVENOUS; SUBCUTANEOUS at 05:09

## 2024-11-19 RX ADMIN — MICONAZOLE NITRATE: 20 CREAM TOPICAL at 08:16

## 2024-11-19 RX ADMIN — PREDNISONE 60 MG: 20 TABLET ORAL at 08:15

## 2024-11-19 RX ADMIN — LEVOTHYROXINE SODIUM 50 MCG: 75 TABLET ORAL at 05:10

## 2024-11-19 RX ADMIN — HEPARIN SODIUM 5000 UNITS: 5000 INJECTION INTRAVENOUS; SUBCUTANEOUS at 13:30

## 2024-11-19 RX ADMIN — FLUTICASONE PROPIONATE 1 SPRAY: 50 SPRAY, METERED NASAL at 08:16

## 2024-11-19 RX ADMIN — POLYETHYLENE GLYCOL 3350 17 G: 17 POWDER, FOR SOLUTION ORAL at 08:16

## 2024-11-19 RX ADMIN — DOCUSATE SODIUM 100 MG: 100 CAPSULE, LIQUID FILLED ORAL at 08:15

## 2024-11-19 RX ADMIN — PANTOPRAZOLE SODIUM 40 MG: 40 TABLET, DELAYED RELEASE ORAL at 05:10

## 2024-11-19 RX ADMIN — CALCIUM CARBONATE (ANTACID) CHEW TAB 500 MG 500 MG: 500 CHEW TAB at 08:15

## 2024-11-19 RX ADMIN — HYDROCORTISONE ACETATE 25 MG: 25 SUPPOSITORY RECTAL at 08:15

## 2024-11-19 NOTE — ASSESSMENT & PLAN NOTE
Patient significantly improved on steroids however will need rehab.  Patient unfortunately was denied by his insurance for rehab.  Case management working to obtain skilled nursing facility.  Continue prednisone 60 mg daily throughout rehab

## 2024-11-19 NOTE — PROGRESS NOTES
Progress Note - Hospitalist   Name: Miguel Henderson 84 y.o. male I MRN: [id number]  Unit/Bed#: [facility] 817-01 I Date of Admission: 10/26/2024   Date of Service: 11/19/2024 I Hospital Day: 24    Assessment & Plan  Encephalopathy  Patient significantly improved on steroids however will need rehab.  Patient unfortunately was denied by his insurance for rehab.  Case management working to obtain skilled nursing facility.  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  Initiated on oral prednisone 60 mg daily with plan for chemotherapy pending rehab performance and final [facility] discussion  Sepsis (HCC)  Patient remains afebrile  Continue to monitor off antibiotics  Abnormal LFTs  Elevated bilirubin and alkaline phosphatase generally improving  Suspected to be associated with lymphadenopathy (above)  Limit/avoid hepatotoxins as possible  Hypothyroidism  Continue Synthroid  Primary hypertension  Holding HCTZ due to soft pressures at this time  BP stable  COVID-19 virus infection  Tested positive on 10/21  Resolved  Anemia  Hemoglobin stable  Likely associated with lymphadenopathy  Hypocalcemia  Serum Andrion's calcium low -> continue intravenous and oral supplementation  Consider possible contribution to myoclonus  Myoclonia  Neurology suspects toxic metabolic etiology given unremarkable EEG  Superficial thrombophlebitis of right upper extremity  Supportive care  Fever  See above  Resolved  Anxiety  Reports increased anxiety after starting prednisone  Continue Ativan as needed which appears to  be helping  Monitor   Hemorrhoids  Continue to report anal itching  Will change cream to Anusol suppository    VTE Pharmacologic Prophylaxis: VTE Score: 5 Moderate Risk (Score 3-4) - Pharmacological DVT Prophylaxis Ordered: heparin.    Mobility:   Basic Mobility Inpatient Raw Score: 15  JH-HLM Goal: 4: Move to chair/commode  JH-HLM Achieved: 7: Walk 25 feet or more  JH-HLM Goal achieved. Continue to encourage appropriate mobility.    Patient Centered Rounds: I performed bedside rounds with nursing staff today.   Discussions with Specialists or Other Care Team Provider: None    Education and Discussions with Family / Patient: Updated  (wife and son) at bedside.    Current Length of Stay: 24 day(s)  Current Patient Status: Inpatient   Certification Statement: The patient will continue to require additional inpatient hospital stay due to safe discharge plan  Discharge Plan: Anticipate discharge in 24-48 hrs to rehab facility.    Code Status: Level 3 - DNAR and DNI    Subjective   This is a very pleasant 84-year-old gentleman who was seen evaluated today at bedside.  Patient denies any acute complaints at this time.    Objective :  Temp:  [98 °F (36.7 °C)-99.3 °F (37.4 °C)] 99.3 °F (37.4 °C)  HR:  [65-94] 94  BP: (119-131)/(65-86) 124/65  Resp:  [15-16] 16  SpO2:  [98 %-100 %] 99 %    Body mass index is 26.08 kg/m².     Input and Output Summary (last 24 hours):     Intake/Output Summary (Last 24 hours) at 11/19/2024 1514  Last data filed at 11/19/2024 1310  Gross per 24 hour   Intake 180 ml   Output 1124 ml   Net -944 ml       Physical Exam  Vitals reviewed.   Constitutional:       General: He is not in acute distress.     Appearance: Normal appearance. He is not ill-appearing.   HENT:      Head: Normocephalic and atraumatic.   Cardiovascular:      Rate and Rhythm: Normal rate and regular rhythm.      Heart sounds: Normal heart sounds.   Pulmonary:      Effort: Pulmonary effort is normal. No respiratory  distress.   Abdominal:      General: Bowel sounds are normal.      Palpations: Abdomen is soft.      Tenderness: There is no abdominal tenderness.   Musculoskeletal:      Right lower leg: No edema.      Left lower leg: No edema.   Neurological:      Mental Status: He is alert. Mental status is at baseline.           Lines/Drains:              Lab Results: I have reviewed the following results:   Results from last 7 days   Lab Units 11/16/24  0811 11/15/24  0641   WBC Thousand/uL 9.04 6.64   HEMOGLOBIN g/dL 9.1* 8.6*   HEMATOCRIT % 28.9* 27.2*   PLATELETS Thousands/uL 304 250   SEGS PCT %  --  48   LYMPHO PCT %  --  42   MONO PCT %  --  9   EOS PCT %  --  0     Results from last 7 days   Lab Units 11/16/24  0811 11/15/24  0641 11/14/24  0835   SODIUM mmol/L 137   < > 137   POTASSIUM mmol/L 3.2*   < > 3.6   CHLORIDE mmol/L 99   < > 103   CO2 mmol/L 30   < > 30   BUN mg/dL 9   < > 12   CREATININE mg/dL 0.34*   < > 0.52*   ANION GAP mmol/L 8   < > 4   CALCIUM mg/dL 8.2*   < > 7.9*   ALBUMIN g/dL  --   --  2.7*   TOTAL BILIRUBIN mg/dL  --   --  0.67   ALK PHOS U/L  --   --  133*   ALT U/L  --   --  31   AST U/L  --   --  31   GLUCOSE RANDOM mg/dL 99   < > 131    < > = values in this interval not displayed.                       Recent Cultures (last 7 days):         Imaging Results Review: No pertinent imaging studies reviewed.  Other Study Results Review: No additional pertinent studies reviewed.    Last 24 Hours Medication List:     Current Facility-Administered Medications:     acetaminophen (Ofirmev) injection 1,000 mg, Q6H PRN, Last Rate: 1,000 mg (11/12/24 0735)    bisacodyl (DULCOLAX) rectal suppository 10 mg, Once    bisacodyl (DULCOLAX) rectal suppository 10 mg, Daily PRN    calcium carbonate (TUMS) chewable tablet 500 mg, BID    docusate sodium (COLACE) capsule 100 mg, BID    fluticasone (FLONASE) 50 mcg/act nasal spray 1 spray, Daily    gabapentin (NEURONTIN) capsule 100 mg, HS    heparin (porcine)  subcutaneous injection 5,000 Units, Q8H JOE **AND** [COMPLETED] Platelet count, Once    hydrocortisone (ANUSOL-HC) 2.5 % rectal cream, 4x Daily PRN    hydrocortisone (ANUSOL-HC) rectal suppository 25 mg, BID    hydrOXYzine HCL (ATARAX) tablet 25 mg, HS    levothyroxine tablet 50 mcg, Once per day on Monday Tuesday Wednesday Thursday    levothyroxine tablet 75 mcg, Once per day on Sunday Friday Saturday    LORazepam (ATIVAN) tablet 0.5 mg, BID PRN    moisture barrier miconazole 2% cream (aka SAMEER MOISTURE BARRIER ANTIFUNGAL CREAM), BID    ondansetron (ZOFRAN) injection 4 mg, Q6H PRN    oxyCODONE (ROXICODONE) IR tablet 5 mg, Q6H PRN    pantoprazole (PROTONIX) EC tablet 40 mg, Early Morning    polyethylene glycol (MIRALAX) packet 17 g, BID    predniSONE tablet 60 mg, Daily    Administrative Statements   Today, Patient Was Seen By: Darrius Gr MD  I have spent a total time of 45 minutes in caring for this patient on the day of the visit/encounter including Diagnostic results, Prognosis, Patient and family education, Impressions, Reviewing / ordering tests, medicine, procedures  , and Obtaining or reviewing history  .    **Please Note: This note may have been constructed using a voice recognition system.**

## 2024-11-19 NOTE — CASE MANAGEMENT
Case Management Discharge Planning Note    Patient name Miguel Henderson  Location [facility] 817/[facility] 817-01 MRN [id number]  : 1940 Date 2024       Current Admission Date: 10/26/2024  Current Admission Diagnosis:Encephalopathy   Patient Active Problem List    Diagnosis Date Noted Date Diagnosed    Hemorrhoids 2024     Anxiety 11/15/2024     Superficial thrombophlebitis of right upper extremity 2024     Fever 2024     Anemia 2024     Hypocalcemia 2024     Myoclonia 10/31/2024     COVID-19 virus infection 10/30/2024     Abnormal LFTs 10/29/2024     Lymphadenopathy 10/27/2024     Encephalopathy 10/26/2024     Sepsis (HCC) 10/26/2024     Metabolic encephalopathy 10/25/2024     Febrile illness 10/22/2024     Hyponatremia 10/22/2024     Lactic acidosis 10/22/2024     Oral candidiasis 10/22/2024     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     SIRS (systemic inflammatory response syndrome) (HCC) 10/22/2024     Odynophagia 10/22/2024     Localized edema 10/16/2024     Shortness of breath on exertion 10/16/2024     Lesion of oral mucosa 10/15/2024     Liver lesion 10/15/2024     Dysphagia 10/14/2024     Ulceration of oral mucosa 10/14/2024     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     Left knee pain 10/10/2024     Rhinovirus 10/09/2024     Adenocarcinoma of prostate (HCC) 2024     Lumbar radiculopathy 2024     Hypothyroidism 2024     Primary hypertension 2024       LOS (days): 24  Geometric Mean LOS (GMLOS) (days): 7.5  Days to GMLOS:-16.7     OBJECTIVE:  Risk of Unplanned Readmission Score: 25.4         Current admission status: Inpatient   Preferred Pharmacy:   [id number] - [place] - [address]  [address] [address]  Phone: [phone] Fax: [phone]    [id number] - [place] - [address]  [address]  [place] [address]  Phone: [phone] Fax: [phone]    Primary  Care Provider: Greg Foster MD    Primary Insurance: [facility]  Secondary Insurance:     DISCHARGE DETAILS:    Discharge planning discussed with:: Juan Bauer  Freedom of Choice: Yes  Comments - [place] of Choice:  HHC Reserved     Requested Home Health Care         Is the patient interested in HH at discharge?: Yes  Home Health Discipline requested:: Occupational Therapy, Physical Therapy, Nursing  [facility]  Home Health Follow-Up Provider:: PCP  Home Health Services Needed:: Gait/ADL Training, Evaluate Functional Status and Safety  Homebound Criteria Met:: Uses an Assist Device (i.e. cane, walker, etc)  Supporting Clincal Findings:: Limited Endurance     IMM Given (Date):: 11/19/24  IMM Given to:: Patient..IMM reviewed with patient and caregiver, patient and caregiver agrees with discharge determination. Son made aware that patient's scripts were sent to his [facility].  ..CM reviewed d/c planning process including the following: identifying help at home, patient preference for d/c planning needs, Discharge Lounge, [facility] Meds to Bed program, availability of treatment team to discuss questions or concerns patient and/or family may have regarding understanding medications and recognizing signs and symptoms once discharged.  CM also encouraged patient to follow up with all recommended appointments after discharge. Patient advised of importance for patient and family to participate in managing patient’s medical well being.

## 2024-11-19 NOTE — PROGRESS NOTES
Patient:    MRN:  [id number]    Jordan Request ID:  [id number]    Level of care reserved:  Home Health Agency    Partner Reserved:  [facility], [place] [address] [phone]    Clinical needs requested:    Geography searched:  [phone]    Start of Service:    Request sent:  1:08pm EST on 11/19/2024 by Fiona Wood (maggie)    Partner reserved:  4:33pm EST on 11/19/2024 by Fiona Wood (maggie)    Choice list shared:  4:32pm EST on 11/19/2024 by Fiona Wood (maggie)

## 2024-11-19 NOTE — PLAN OF CARE
Problem: PHYSICAL THERAPY ADULT  Goal: Performs mobility at highest level of function for planned discharge setting.  See evaluation for individualized goals.  Description: Treatment/Interventions: Functional transfer training, LE strengthening/ROM, Elevations, Endurance training, Therapeutic exercise, Patient/family training, Equipment eval/education, Bed mobility, Gait training  Equipment Recommended:  (TBD)       See flowsheet documentation for full assessment, interventions and recommendations.  Outcome: Progressing  Note: Prognosis: Fair  Problem List: Decreased strength, Decreased endurance, Impaired balance, Decreased mobility, Decreased safety awareness  Assessment: Pt seen for PT treatment session this date. Pt seen in split therapy session due to pt request 2' to fatigue, first session from 13:02-13:16 and second session from 13:26-13:49. Therapy session focused on functional transfers, balance training and ambulation in order to improve overall mobility and independence. Pt requires ModA x2 for transfers from bedside commode and lower chair, Lance x2 for transfers from recliner chair, and Lance x2 for ambulation with RW. Pt was in hallway at window upon therapy arrival, noting that he had been walking with his family prior. Pt ambulated 100 ft with use of RW and Lance x2 back to his room, with chair follow for pt to sit in back in room. Pt notes that he was feeling fairly fatigued after ambulation. Pt treated in split session due to request for a break before continuing with session. PT came back for split session and completed standing balance training with pt, reaching outside of his FAUSTINO. Pt required Lance x2 and use of RW when reaching outside of FAUSTINO for added stability. Pt also completed transfer to bedside commode with ModA x2 and use of RW, with verbal cues for hand placement throughout. Pt was assisted with hygiene and then was able to transfer back to bedside recliner with RW and Modx2 to stand from  commode. Pt reports that he was feeling fatigued at conclusion of therapy session. Pt was in bedside recliner at conclusion of therapy session, with all needs within reach and RN updated. Pt making steady progress toward goals, limited due to decreased activity tolerance. Pt would benefit from continued PT services while in hospital to address remaining limitations. The patient's AM-PAC Basic Mobility Inpatient Short Form Raw Score is 15. A Raw score of less than or equal to 16 suggests the patient may benefit from discharge to post-acute rehabilitation services. Please also refer to the recommendation of the Physical Therapist for safe discharge planning. Recommend Level 2 upon hospital d/c due to increased need for caregiver support and decreased tolerance to functional activities.  Barriers to Discharge: None  Barriers to Discharge Comments: Pt has good family support at home from spouse, DIL, and son.  Rehab Resource Intensity Level, PT: II (Moderate Resource Intensity)    See flowsheet documentation for full assessment.      Tatyana Abdullahi, SPT

## 2024-11-19 NOTE — DISCHARGE SUMMARY
Discharge Summary - Hospitalist   Name: Miguel Henderson 84 y.o. male I MRN: [id number]  Unit/Bed#: [facility] 817-01 I Date of Admission: 10/26/2024   Date of Service: 11/19/2024 I Hospital Day: 24     Assessment & Plan  Encephalopathy  Patient significantly improved on steroids however will need rehab.  Patient unfortunately was denied by his insurance for rehab.  Case management working to obtain skilled nursing facility.  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  Initiated on oral prednisone 60 mg daily with plan for chemotherapy pending rehab performance and final [facility] discussion  Sepsis (HCC)  Patient remains afebrile  Continue to monitor off antibiotics  Abnormal LFTs  Elevated bilirubin and alkaline phosphatase generally improving  Suspected to be associated with lymphadenopathy (above)  Limit/avoid hepatotoxins as possible  Hypothyroidism  Continue Synthroid  Primary hypertension  Holding HCTZ due to soft pressures at this time  BP stable  COVID-19 virus infection  Tested positive on 10/21  Resolved  Anemia  Hemoglobin stable  Likely associated with lymphadenopathy  Hypocalcemia  Serum Andrion's calcium low -> continue intravenous and oral supplementation  Consider possible contribution to myoclonus  Myoclonia  Neurology suspects toxic metabolic etiology given unremarkable EEG  Superficial thrombophlebitis of right upper extremity  Supportive care  Fever  See above  Resolved  Anxiety  Reports increased anxiety after starting prednisone  Continue Ativan as needed which  appears to be helping  Monitor   Hemorrhoids  Continue to report anal itching  Will change cream to Anusol suppository     Medical Problems       Resolved Problems  Date Reviewed: 11/11/2024   None       Discharging Physician / Practitioner: Darrius Gr MD  PCP: Greg Foster MD  Admission Date:   Admission Orders (From admission, onward)       Ordered        10/26/24 1332  INPATIENT ADMISSION  Once                          Discharge Date: 11/19/24    Consultations During Hospital Stay:  PMR  Interventional radiology  Medical oncology  Neurology  Interventional radiology  Infectious disease    Procedures Performed:   Biopsy    Significant Findings / Test Results:   None    Incidental Findings:   None     Test Results Pending at Discharge (will require follow up):   Bone marrow biopsy lymph node biopsy     Outpatient Tests Requested:  None    Complications:  None    Reason for Admission: Encephalopathy    Hospital Course:   Miguel Henderson is a 84 y.o. male patient who originally presented to the hospital on 10/26/2024 due to encephalopathy.  Patient had extensive workup.  Patient ultimately responded well to 3 treatments of plasmapheresis as well as steroids.  Patient had bone marrow biopsy as well as lymph node biopsy results pending.  Patient will need to have close follow-up with hematology oncology.  Etiology does not appear to be infectious, continue to to monitor patient off antibiotics.  Patient will require rehab.      Please see above list of diagnoses and related plan for additional information.     Condition at Discharge: stable    Discharge Day Visit / Exam:   Subjective: Is a very pleasant 84-year-old gentleman who was seen evaluated today at bedside.  Patient denies any acute complaints this time.  Vitals: Blood Pressure: 124/65 (11/19/24 1430)  Pulse: 94 (11/19/24 1430)  Temperature: 99.3 °F (37.4 °C) (11/19/24 1430)  Temp Source: Oral (11/17/24 2151)  Respirations: 16 (11/19/24 1430)  Height:  "5' 5\" (165.1 cm) (10/26/24 1404)  Weight - Scale: 71.1 kg (156 lb 12 oz) (11/19/24 1310)  SpO2: 99 % (11/19/24 1430)  Physical Exam  Vitals reviewed.   Constitutional:       General: He is not in acute distress.     Appearance: Normal appearance. He is not ill-appearing.   HENT:      Head: Normocephalic and atraumatic.   Cardiovascular:      Rate and Rhythm: Normal rate and regular rhythm.      Heart sounds: Normal heart sounds.   Pulmonary:      Effort: Pulmonary effort is normal. No respiratory distress.   Abdominal:      General: Bowel sounds are normal.      Palpations: Abdomen is soft.      Tenderness: There is no abdominal tenderness.   Musculoskeletal:      Right lower leg: No edema.      Left lower leg: No edema.   Neurological:      Mental Status: He is alert. Mental status is at baseline.          Discussion with Family: Updated  (wife and son) at bedside.    Discharge instructions/Information to patient and family:   See after visit summary for information provided to patient and family.      Provisions for Follow-Up Care:  See after visit summary for information related to follow-up care and any pertinent home health orders.      Mobility at time of Discharge:   Basic Mobility Inpatient Raw Score: 15  JH-HLM Goal: 4: Move to chair/commode  JH-HLM Achieved: 7: Walk 25 feet or more  HLM Goal achieved. Continue to encourage appropriate mobility.     Disposition:   Home with VNA Services (Reminder: Complete face to face encounter)    Planned Readmission: None    Discharge Medications:  See after visit summary for reconciled discharge medications provided to patient and/or family.      Administrative Statements   Discharge Statement:  I have spent a total time of 45 minutes in caring for this patient on the day of the visit/encounter. >30 minutes of time was spent on: Diagnostic results, Prognosis, Instructions for management, Impressions, Documenting in the medical record, and Reviewing / " ordering tests, medicine, procedures  .    **Please Note: This note may have been constructed using a voice recognition system**

## 2024-11-19 NOTE — PHYSICAL THERAPY NOTE
PHYSICAL THERAPY NOTE          Patient Name: Miguel Henderson  Today's Date: 11/19/2024 11/19/24 1349   PT Last Visit   PT Visit Date 11/19/24   Education   Education Provided Yes   End of Consult   Patient Position at End of Consult Bedside chair;All needs within reach   Pain Assessment   Pain Assessment Tool 0-10   Pain Score No Pain   Restrictions/Precautions   Weight Bearing Precautions Per Order No   Other Precautions Cognitive;Chair Alarm;Bed Alarm;Fall Risk   General   Family/Caregiver Present Yes  (Son, spouse)   Cognition   Overall Cognitive Status WFL   Attention Attends with cues to redirect   Orientation Level Oriented to person;Oriented to place;Oriented to time;Disoriented to situation   Following Commands Follows one step commands with increased time or repetition   Comments Pt was pleasant and cooperative during therapy session today. Pt was motivated to be up and walking, but notes increased fatigue after ambulation. Pt's son was present during therapy session to assist with translation.   Subjective   Subjective Pt was motivated to be up and walking today.   Bed Mobility   Supine to Sit Unable to assess   Sit to Supine Unable to assess   Additional Comments Pt was sitting by window with his son upon therapy arrival and was in bedside recliner at conclusion of therapy session with all needs within reach. RN updated after session.   Transfers   Sit to Stand 4  Minimal assistance   Additional items Assist x 2;Increased time required;Verbal cues   Stand to Sit 4  Minimal assistance   Additional items Assist x 2;Increased time required;Verbal cues   Toilet transfer 3  Moderate assistance   Additional items Assist x 2;Increased time required;Verbal cues;Commode   Additional Comments Pt required ModA x2 for STS transfer from lower chair by window and for bedside commode transfer with use of RW. Pt required Lance x2 for  STS transfer from recliner chair.   Ambulation/Elevation   Gait pattern Forward Flexion;Decreased foot clearance;Wide FAUSTINO;Inconsistent [place];Short stride;Excessively slow   Gait Assistance 4  Minimal assist   Additional items Assist x 2;Verbal cues   Assistive Device Rolling walker   Distance 100 ft   Ambulation/Elevation Additional Comments Pt ambulated from chair by window back to his room with Lance x2 for assist and use of RW, with chair follow. Pt sat in chair upon return to his room.   Balance   Static Sitting Fair +   Dynamic Sitting Fair   Static Standing Fair   Dynamic Standing Fair -   Ambulatory Fair -   Endurance Deficit   Endurance Deficit Yes   Endurance Deficit Description Due to limited mobility and fatigue.   Activity Tolerance   Activity Tolerance Patient tolerated treatment well;Patient limited by fatigue   Nurse Made Aware RN cleared pt for therapy.   Assessment   Prognosis Fair   Problem List Decreased strength;Decreased endurance;Impaired balance;Decreased mobility;Decreased safety awareness   Assessment Pt seen for PT treatment session this date. Pt seen in split therapy session due to pt request 2' to fatigue, first session from 13:02-13:16 and second session from 13:26-13:49. Therapy session focused on functional transfers, balance training and ambulation in order to improve overall mobility and independence. Pt requires ModA x2 for transfers from bedside commode and lower chair, Lance x2 for transfers from recliner chair, and Lance x2 for ambulation with RW. Pt was in hallway at [facility] upon therapy arrival, noting that he had been walking with his family prior. Pt ambulated 100 ft with use of RW and Lance x2 back to his room, with chair follow for pt to sit in back in room. Pt notes that he was feeling fairly fatigued after ambulation. Pt treated in split session due to request for a break before continuing with session. PT came back for split session and completed standing balance training  with pt, reaching outside of his FAUSTINO. Pt required Lance x2 and use of RW when reaching outside of FAUSTINO for added stability. Pt also completed transfer to bedside commode with ModA x2 and use of RW, with verbal cues for hand placement throughout. Pt was assisted with hygiene and then was able to transfer back to bedside recliner with RW and Modx2 to stand from commode. Pt reports that he was feeling fatigued at conclusion of therapy session. Pt was in bedside recliner at conclusion of therapy session, with all needs within reach and RN updated. Pt making steady progress toward goals, limited due to decreased activity tolerance. Pt would benefit from continued PT services while in hospital to address remaining limitations. The patient's AM-PAC Basic Mobility Inpatient Short Form Raw Score is 15. A Raw score of less than or equal to 16 suggests the patient may benefit from discharge to post-acute rehabilitation services. Please also refer to the recommendation of the Physical Therapist for safe discharge planning. Recommend Level 2 upon hospital d/c due to increased need for caregiver support and decreased tolerance to functional activities.   Barriers to Discharge None   Barriers to Discharge Comments Pt has good family support at home from spouse, DIL, and son.   Goals   Patient Goals To be up and walking.   STG Expiration Date 11/28/24   PT Treatment Day 6   Plan   Treatment/Interventions Functional transfer training;LE strengthening/ROM;Therapeutic exercise;Endurance training;Bed mobility;Gait training;Spoke to nursing;Spoke to case management;OT   PT Frequency 3-5x/wk   Discharge Recommendation   Rehab Resource Intensity Level, PT II (Moderate Resource Intensity)   Equipment Recommended Walker  (Pt currently ambulating with RW for added support and stability.)   AM-PAC Basic Mobility Inpatient   Turning in Flat Bed Without Bedrails 3   Lying on Back to Sitting on Edge of Flat Bed Without Bedrails 2   Moving Bed to  Chair 3   Standing Up From Chair Using Arms 3   Walk in Room 3   Climb 3-5 Stairs With Railing 1   Basic Mobility Inpatient Raw Score 15   Basic Mobility Standardized Score 36.97   Turning Head Towards Sound 3   Follow Simple Instructions 3   Low Function Basic Mobility Raw Score  21   Low Function Basic Mobility Standardized Score  34.39   [facility] Highest Level Of Mobility   -[facility] Goal 4: Move to chair/commode   -HLM Achieved 7: Walk 25 feet or more   Exercises   Balance training  Pt completed static standing balance ~8 minutes reaching outside of his FAUSTINO for high fives and reaching. Pt utilized RW and Lance x2 for added support.   End of Consult   Patient Position at End of Consult Bedside chair;All needs within reach   End of Consult Comments Pt was in bedside recliner at conclusion of therapy session with all needs within reach. RN updated at conclusion of session.     Tatyana Abdullahi, SPT

## 2024-11-19 NOTE — PLAN OF CARE
Problem: PAIN - ADULT  Goal: Verbalizes/displays adequate comfort level or baseline comfort level  Description: Interventions:  - Encourage patient to monitor pain and request assistance  - Assess pain using appropriate pain scale  - Administer analgesics based on type and severity of pain and evaluate response  - Implement non-pharmacological measures as appropriate and evaluate response  - Consider cultural and social influences on pain and pain management  - Notify physician/advanced practitioner if interventions unsuccessful or patient reports new pain  11/19/2024 1838 by Fidelia Dewitt RN  Outcome: Completed  11/19/2024 0712 by Fidelia Dewitt RN  Outcome: Progressing     Problem: INFECTION - ADULT  Goal: Absence or prevention of progression during hospitalization  Description: INTERVENTIONS:  - Assess and monitor for signs and symptoms of infection  - Monitor lab/diagnostic results  - Monitor all insertion sites, i.e. indwelling lines, tubes, and drains  - Monitor endotracheal if appropriate and nasal secretions for changes in amount and color  - [place] appropriate cooling/warming therapies per order  - Administer medications as ordered  - Instruct and encourage patient and family to use good hand hygiene technique  - Identify and instruct in appropriate isolation precautions for identified infection/condition  11/19/2024 1838 by Fidelia Dewitt RN  Outcome: Completed  11/19/2024 0712 by Fidelia Dewitt RN  Outcome: Progressing  Goal: Absence of fever/infection during neutropenic period  Description: INTERVENTIONS:  - Monitor WBC    Outcome: Completed     Problem: SAFETY ADULT  Goal: Patient will remain free of falls  Description: INTERVENTIONS:  - Educate patient/family on patient safety including physical limitations  - Instruct patient to call for assistance with activity   - Consult OT/PT to assist with strengthening/mobility   - Keep Call bell within reach  - Keep bed low and locked with  side rails adjusted as appropriate  - Keep care items and personal belongings within reach  - Initiate and maintain comfort rounds  - Make Fall Risk Sign visible to staff  - Offer Toileting every 2 Hours, in advance of need  - Initiate/Maintain bed/chair alarm  - Apply yellow socks and bracelet for high fall risk patients  - Consider moving patient to room near nurses station  11/19/2024 1838 by Fidelia Dewitt RN  Outcome: Completed  11/19/2024 0712 by Fidelia Dewitt RN  Outcome: Progressing  Goal: Maintain or return to baseline ADL function  Description: INTERVENTIONS:  -  Assess patient's ability to carry out ADLs; assess patient's baseline for ADL function and identify physical deficits which impact ability to perform ADLs (bathing, care of mouth/teeth, toileting, grooming, dressing, etc.)  - Assess/evaluate cause of self-care deficits   - Assess range of motion  - Assess patient's mobility; develop plan if impaired  - Assess patient's need for assistive devices and provide as appropriate  - Encourage maximum independence but intervene and supervise when necessary  - Involve family in performance of ADLs  - Assess for home care needs following discharge   - Consider OT consult to assist with ADL evaluation and planning for discharge  - Provide patient education as appropriate  11/19/2024 1838 by Fidelia Dewitt RN  Outcome: Completed  11/19/2024 0712 by Fidelia Dewitt RN  Outcome: Progressing  Goal: Maintains/Returns to pre admission functional level  Description: INTERVENTIONS:  - Perform AM-PAC 6 Click Basic Mobility/ Daily Activity assessment daily.  - Set and communicate daily mobility goal to care team and patient/family/caregiver.   - Collaborate with rehabilitation services on mobility goals if consulted  - Reposition patient every 2 hours.  - Dangle patient 3 times a day  - Stand patient 3 times a day  - Ambulate patient 3 times a day  - Out of bed to chair 3 times a day   - Out of bed for  meals 3 times a day  - Out of bed for toileting  - Record patient progress and toleration of activity level   11/19/2024 1838 by Fidelia Dewitt RN  Outcome: Completed  11/19/2024 0712 by Fidelia Dewitt RN  Outcome: Progressing     Problem: DISCHARGE PLANNING  Goal: Discharge to home or other facility with appropriate resources  Description: INTERVENTIONS:  - Identify barriers to discharge w/patient and caregiver  - Arrange for needed discharge resources and transportation as appropriate  - Identify discharge learning needs (meds, wound care, etc.)  - Arrange for interpretive services to assist at discharge as needed  - Refer to Case Management Department for coordinating discharge planning if the patient needs post-hospital services based on physician/advanced practitioner order or complex needs related to functional status, cognitive ability, or social support system  11/19/2024 1838 by Fidelia Dewitt RN  Outcome: Completed  11/19/2024 0712 by Fidelia Dewitt RN  Outcome: Progressing     Problem: Knowledge Deficit  Goal: Patient/family/caregiver demonstrates understanding of disease process, treatment plan, medications, and discharge instructions  Description: Complete learning assessment and assess knowledge base.  Interventions:  - Provide teaching at level of understanding  - Provide teaching via preferred learning methods  11/19/2024 1838 by Fidelia Dewitt RN  Outcome: Completed  11/19/2024 0712 by Fidelia Dewitt RN  Outcome: Progressing     Problem: Prexisting or High Potential for Compromised Skin Integrity  Goal: Skin integrity is maintained or improved  Description: INTERVENTIONS:  - Identify patients at risk for skin breakdown  - Assess and monitor skin integrity  - Assess and monitor nutrition and hydration status  - Monitor labs   - Assess for incontinence   - Turn and reposition patient  - Assist with mobility/ambulation  - Relieve pressure over bony prominences  - Avoid friction  and shearing  - Provide appropriate hygiene as needed including keeping skin clean and dry  - Evaluate need for skin moisturizer/barrier cream  - Collaborate with interdisciplinary team   - Patient/family teaching  - Consider wound care consult   11/19/2024 1838 by Fidelia Dewitt RN  Outcome: Completed  11/19/2024 0712 by Fidelia Dewitt RN  Outcome: Progressing     Problem: Nutrition/Hydration-ADULT  Goal: Nutrient/Hydration intake appropriate for improving, restoring or maintaining nutritional needs  Description: Monitor and assess patient's nutrition/hydration status for malnutrition. Collaborate with interdisciplinary team and initiate plan and interventions as ordered.  Monitor patient's weight and dietary intake as ordered or per policy. Utilize nutrition screening tool and intervene as necessary. Determine patient's food preferences and provide high-protein, high-caloric foods as appropriate.     INTERVENTIONS:  - Monitor oral intake, urinary output, labs, and treatment plans  - Assess nutrition and hydration status and recommend course of action  - Evaluate amount of meals eaten  - Assist patient with eating if necessary   - Allow adequate time for meals  - Recommend/ encourage appropriate diets, oral nutritional supplements, and vitamin/mineral supplements  - Order, calculate, and assess calorie counts as needed  - Recommend, monitor, and adjust tube feedings and TPN/PPN based on assessed needs  - Assess need for intravenous fluids  - Provide specific nutrition/hydration education as appropriate  - Include patient/family/caregiver in decisions related to nutrition  11/19/2024 1838 by Fidelia Dewitt RN  Outcome: Completed  11/19/2024 0712 by Fidelia Dewitt RN  Outcome: Progressing     Problem: CARDIOVASCULAR - ADULT  Goal: Maintains optimal cardiac output and hemodynamic stability  Description: INTERVENTIONS:  - Monitor I/O, vital signs and rhythm  - Monitor for S/S and trends of decreased  cardiac output  - Administer and titrate ordered vasoactive medications to optimize hemodynamic stability  - Assess quality of pulses, skin color and temperature  - Assess for signs of decreased coronary artery perfusion  - Instruct patient to report change in severity of symptoms  11/19/2024 1838 by Fidelia Dewitt RN  Outcome: Completed  11/19/2024 0712 by Fidelia Dewitt RN  Outcome: Progressing  Goal: Absence of cardiac dysrhythmias or at baseline rhythm  Description: INTERVENTIONS:  - Continuous cardiac monitoring, vital signs, obtain 12 lead EKG if ordered  - Administer antiarrhythmic and heart rate control medications as ordered  - Monitor electrolytes and administer replacement therapy as ordered  11/19/2024 1838 by Fidelia Dewitt RN  Outcome: Completed  11/19/2024 0712 by Fidelia Dewitt RN  Outcome: Progressing     Problem: METABOLIC, FLUID AND ELECTROLYTES - ADULT  Goal: Electrolytes maintained within normal limits  Description: INTERVENTIONS:  - Monitor labs and assess patient for signs and symptoms of electrolyte imbalances  - Administer electrolyte replacement as ordered  - Monitor response to electrolyte replacements, including repeat lab results as appropriate  - Instruct patient on fluid and nutrition as appropriate  11/19/2024 1838 by Fidelia Dewitt RN  Outcome: Completed  11/19/2024 0712 by Fidelia Dewitt RN  Outcome: Progressing  Goal: Fluid balance maintained  Description: INTERVENTIONS:  - Monitor labs   - Monitor I/O and WT  - Instruct patient on fluid and nutrition as appropriate  - Assess for signs & symptoms of volume excess or deficit  11/19/2024 1838 by Fidelia Dewitt RN  Outcome: Completed  11/19/2024 0712 by Fidelia Dewitt RN  Outcome: Progressing     Problem: SKIN/TISSUE INTEGRITY - ADULT  Goal: Incision(s), wounds(s) or drain site(s) healing without S/S of infection  Description: INTERVENTIONS  - Assess and document dressing, incision, wound bed, drain  sites and surrounding tissue  - Provide patient and family education  - Perform skin care/dressing changes every   11/19/2024 1838 by Fidelia Dewitt RN  Outcome: Completed  11/19/2024 0712 by Fidelia Dewitt RN  Outcome: Progressing     Problem: HEMATOLOGIC - ADULT  Goal: Maintains hematologic stability  Description: INTERVENTIONS  - Assess for signs and symptoms of bleeding or hemorrhage  - Monitor labs  - Administer supportive blood products/factors as ordered and appropriate  11/19/2024 1838 by Fidelia Dewitt RN  Outcome: Completed  11/19/2024 0712 by Fidelia Dewitt RN  Outcome: Progressing

## 2024-11-19 NOTE — PLAN OF CARE
Problem: PAIN - ADULT  Goal: Verbalizes/displays adequate comfort level or baseline comfort level  Description: Interventions:  - Encourage patient to monitor pain and request assistance  - Assess pain using appropriate pain scale  - Administer analgesics based on type and severity of pain and evaluate response  - Implement non-pharmacological measures as appropriate and evaluate response  - Consider cultural and social influences on pain and pain management  - Notify physician/advanced practitioner if interventions unsuccessful or patient reports new pain  Outcome: Progressing     Problem: INFECTION - ADULT  Goal: Absence or prevention of progression during hospitalization  Description: INTERVENTIONS:  - Assess and monitor for signs and symptoms of infection  - Monitor lab/diagnostic results  - Monitor all insertion sites, i.e. indwelling lines, tubes, and drains  - Monitor endotracheal if appropriate and nasal secretions for changes in amount and color  - [place] appropriate cooling/warming therapies per order  - Administer medications as ordered  - Instruct and encourage patient and family to use good hand hygiene technique  - Identify and instruct in appropriate isolation precautions for identified infection/condition  Outcome: Progressing     Problem: SAFETY ADULT  Goal: Patient will remain free of falls  Description: INTERVENTIONS:  - Educate patient/family on patient safety including physical limitations  - Instruct patient to call for assistance with activity   - Consult OT/PT to assist with strengthening/mobility   - Keep Call bell within reach  - Keep bed low and locked with side rails adjusted as appropriate  - Keep care items and personal belongings within reach  - Initiate and maintain comfort rounds  - Make Fall Risk Sign visible to staff  - Offer Toileting every 2 Hours, in advance of need  - Initiate/Maintain bed/chair alarm  - Apply yellow socks and bracelet for high fall risk patients  -  Consider moving patient to room near nurses station  Outcome: Progressing     Problem: DISCHARGE PLANNING  Goal: Discharge to home or other facility with appropriate resources  Description: INTERVENTIONS:  - Identify barriers to discharge w/patient and caregiver  - Arrange for needed discharge resources and transportation as appropriate  - Identify discharge learning needs (meds, wound care, etc.)  - Arrange for interpretive services to assist at discharge as needed  - Refer to Case Management Department for coordinating discharge planning if the patient needs post-hospital services based on physician/advanced practitioner order or complex needs related to functional status, cognitive ability, or social support system  Outcome: Progressing     Problem: Knowledge Deficit  Goal: Patient/family/caregiver demonstrates understanding of disease process, treatment plan, medications, and discharge instructions  Description: Complete learning assessment and assess knowledge base.  Interventions:  - Provide teaching at level of understanding  - Provide teaching via preferred learning methods  Outcome: Progressing     Problem: Prexisting or High Potential for Compromised Skin Integrity  Goal: Skin integrity is maintained or improved  Description: INTERVENTIONS:  - Identify patients at risk for skin breakdown  - Assess and monitor skin integrity  - Assess and monitor nutrition and hydration status  - Monitor labs   - Assess for incontinence   - Turn and reposition patient  - Assist with mobility/ambulation  - Relieve pressure over bony prominences  - Avoid friction and shearing  - Provide appropriate hygiene as needed including keeping skin clean and dry  - Evaluate need for skin moisturizer/barrier cream  - Collaborate with interdisciplinary team   - Patient/family teaching  - Consider wound care consult   Outcome: Progressing     Problem: Nutrition/Hydration-ADULT  Goal: Nutrient/Hydration intake appropriate for improving,  restoring or maintaining nutritional needs  Description: Monitor and assess patient's nutrition/hydration status for malnutrition. Collaborate with interdisciplinary team and initiate plan and interventions as ordered.  Monitor patient's weight and dietary intake as ordered or per policy. Utilize nutrition screening tool and intervene as necessary. Determine patient's food preferences and provide high-protein, high-caloric foods as appropriate.     INTERVENTIONS:  - Monitor oral intake, urinary output, labs, and treatment plans  - Assess nutrition and hydration status and recommend course of action  - Evaluate amount of meals eaten  - Assist patient with eating if necessary   - Allow adequate time for meals  - Recommend/ encourage appropriate diets, oral nutritional supplements, and vitamin/mineral supplements  - Order, calculate, and assess calorie counts as needed  - Recommend, monitor, and adjust tube feedings and TPN/PPN based on assessed needs  - Assess need for intravenous fluids  - Provide specific nutrition/hydration education as appropriate  - Include patient/family/caregiver in decisions related to nutrition  Outcome: Progressing     Problem: CARDIOVASCULAR - ADULT  Goal: Maintains optimal cardiac output and hemodynamic stability  Description: INTERVENTIONS:  - Monitor I/O, vital signs and rhythm  - Monitor for S/S and trends of decreased cardiac output  - Administer and titrate ordered vasoactive medications to optimize hemodynamic stability  - Assess quality of pulses, skin color and temperature  - Assess for signs of decreased coronary artery perfusion  - Instruct patient to report change in severity of symptoms  Outcome: Progressing     Problem: CARDIOVASCULAR - ADULT  Goal: Absence of cardiac dysrhythmias or at baseline rhythm  Description: INTERVENTIONS:  - Continuous cardiac monitoring, vital signs, obtain 12 lead EKG if ordered  - Administer antiarrhythmic and heart rate control medications as  ordered  - Monitor electrolytes and administer replacement therapy as ordered  Outcome: Progressing     Problem: METABOLIC, FLUID AND ELECTROLYTES - ADULT  Goal: Electrolytes maintained within normal limits  Description: INTERVENTIONS:  - Monitor labs and assess patient for signs and symptoms of electrolyte imbalances  - Administer electrolyte replacement as ordered  - Monitor response to electrolyte replacements, including repeat lab results as appropriate  - Instruct patient on fluid and nutrition as appropriate  Outcome: Progressing     Problem: METABOLIC, FLUID AND ELECTROLYTES - ADULT  Goal: Fluid balance maintained  Description: INTERVENTIONS:  - Monitor labs   - Monitor I/O and WT  - Instruct patient on fluid and nutrition as appropriate  - Assess for signs & symptoms of volume excess or deficit  Outcome: Progressing     Problem: SKIN/TISSUE INTEGRITY - ADULT  Goal: Incision(s), wounds(s) or drain site(s) healing without S/S of infection  Description: INTERVENTIONS  - Assess and document dressing, incision, wound bed, drain sites and surrounding tissue  - Provide patient and family education  - Perform skin care/dressing changes every   Outcome: Progressing     Problem: HEMATOLOGIC - ADULT  Goal: Maintains hematologic stability  Description: INTERVENTIONS  - Assess for signs and symptoms of bleeding or hemorrhage  - Monitor labs  - Administer supportive blood products/factors as ordered and appropriate  Outcome: Progressing

## 2024-11-19 NOTE — CASE MANAGEMENT
Case Management Discharge Planning Note    Patient name Miguel Henderson  Location [facility]-01 MRN [id number]  : 1940 Date 2024       Current Admission Date: 10/26/2024  Current Admission Diagnosis:Encephalopathy   Patient Active Problem List    Diagnosis Date Noted Date Diagnosed    Hemorrhoids 2024     Anxiety 11/15/2024     Superficial thrombophlebitis of right upper extremity 2024     Fever 2024     Anemia 2024     Hypocalcemia 2024     Myoclonia 10/31/2024     COVID-19 virus infection 10/30/2024     Abnormal LFTs 10/29/2024     Lymphadenopathy 10/27/2024     Encephalopathy 10/26/2024     Sepsis (HCC) 10/26/2024     Metabolic encephalopathy 10/25/2024     Febrile illness 10/22/2024     Hyponatremia 10/22/2024     Lactic acidosis 10/22/2024     Oral candidiasis 10/22/2024     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     SIRS (systemic inflammatory response syndrome) (HCC) 10/22/2024     Odynophagia 10/22/2024     Localized edema 10/16/2024     Shortness of breath on exertion 10/16/2024     Lesion of oral mucosa 10/15/2024     Liver lesion 10/15/2024     Dysphagia 10/14/2024     Ulceration of oral mucosa 10/14/2024     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     Left knee pain 10/10/2024     Rhinovirus 10/09/2024     Adenocarcinoma of prostate (HCC) 2024     Lumbar radiculopathy 2024     Hypothyroidism 2024     Primary hypertension 2024       LOS (days): 24  Geometric Mean LOS (GMLOS) (days): 7.5  Days to GMLOS:-16.5     OBJECTIVE:  Risk of Unplanned Readmission Score: 25.35         Current admission status: Inpatient   Preferred Pharmacy:   [id number] - [place] - [address] [address]  Phone: [phone] Fax: [phone]    [id number] - [place] - [address]  [address]  [place] [address]  Phone: [phone] Fax: [phone]    Primary  Care Provider: Greg Foster MD    Primary Insurance: [facility]  Secondary Insurance:     DISCHARGE DETAILS:    Discharge planning discussed with:: pt's sons Juan and Buck via phone  Freedom of Choice: Yes  Comments - Freedom of Choice: Provider List discussed with son's-declined recommendations and want [facility] Referral sent  CM contacted family/caregiver?: Yes  Were Treatment Team discharge recommendations reviewed with patient/caregiver?: Yes  Did patient/caregiver verbalize understanding of patient care needs?: Yes  Were patient/caregiver advised of the risks associated with not following Treatment Team discharge recommendations?: Yes    Contacts  Patient Contacts: Juan Henderson - son  Contact Method: Phone  Reason/Outcome: Discharge Planning       Treatment Team Recommendation: Home with Home Health Care  Discharge Destination Plan:: Home with Home Health Care       Additional Comments: CM received a return phone call from pt's son's, Juan and Buck who declined patient going to [facility].  They want him to go with [facility] and conveyed that they will always have someone at home and have enough help from family members to meet his needs safely.  CM sent a [place] Referral in [facility].

## 2024-11-20 ENCOUNTER — TRANSITIONAL CARE MANAGEMENT (OUTPATIENT)
Dept: FAMILY MEDICINE CLINIC | Facility: CLINIC | Age: 84
End: 2024-11-20

## 2024-11-20 ENCOUNTER — HOME HEALTH ADMISSION (OUTPATIENT)
Dept: HOME HEALTH SERVICES | Facility: HOME HEALTHCARE | Age: 84
End: 2024-11-20
Payer: COMMERCIAL

## 2024-11-20 ENCOUNTER — PATIENT OUTREACH (OUTPATIENT)
Dept: CASE MANAGEMENT | Facility: OTHER | Age: 84
End: 2024-11-20

## 2024-11-20 DIAGNOSIS — Z71.89 COMPLEX CARE COORDINATION: Primary | ICD-10-CM

## 2024-11-20 NOTE — PROGRESS NOTES
Received referral via in basket message as covering RN Care Manager. Chart reviewed.  Pt was admitted 10/26-11/19/24 with Encephalopathy. Had groin biopsied, Peripheral Follicular T Cell Lymphoma.  Family elected to have patient discharged to home with home health. Referred to [facility].  Telephone call to patient, left message on machine with my name and number if call returned today as well as RN MYAH Tuttle.  Will retry again next week.

## 2024-11-20 NOTE — UTILIZATION REVIEW
NOTIFICATION OF ADMISSION DISCHARGE   This is a Notification of Discharge from [facility]. Please be advised that this patient has been discharge from our facility. Below you will find the admission and discharge date and time including the patient’s disposition.   UTILIZATION REVIEW CONTACT:  Chetan Bahena  Utilization   Network Utilization Review Department  Phone: [phone] x carefully listen to the prompts. All voicemails are confidential.  Email: [email or web address]     ADMISSION INFORMATION  PRESENTATION DATE: 10/26/2024  1:16 PM  OBERVATION ADMISSION DATE: N/A  INPATIENT ADMISSION DATE: 10/26/24  1:16 PM   DISCHARGE DATE: 11/19/2024  6:44 PM   DISPOSITION:Home with Home Health Care    Network Utilization Review Department  ATTENTION: Please call with any questions or concerns to [phone] and carefully listen to the prompts so that you are directed to the right person. All voicemails are confidential.   For Discharge needs, contact Care Management DC Support Team at [phone] opt. 2  Send all requests for admission clinical reviews, approved or denied determinations and any other requests to dedicated fax number below belonging to the campus where the patient is receiving treatment. List of dedicated fax numbers for the Facilities:  FACILITY NAME UR FAX NUMBER   ADMISSION DENIALS (Administrative/Medical Necessity) [phone]   DISCHARGE SUPPORT TEAM ([facility]) [phone]   PARENT CHILD HEALTH (Maternity/NICU/Pediatrics) [phone]   [facility] [phone]   [facility] [phone]   [facility] [phone]   [facility] [phone]   [facility] [phone]   [facility] [phone]   [facility] [phone]   [facility]  [phone]   [facility] [phone]   [facility] [phone]   [facility] [phone]   [facility] [phone]

## 2024-11-21 ENCOUNTER — HOME CARE VISIT (OUTPATIENT)
Dept: HOME HEALTH SERVICES | Facility: HOME HEALTHCARE | Age: 84
End: 2024-11-21
Payer: COMMERCIAL

## 2024-11-21 PROCEDURE — G0299 HHS/HOSPICE OF RN EA 15 MIN: HCPCS

## 2024-11-21 PROCEDURE — 400013 VN SOC

## 2024-11-25 ENCOUNTER — HOME CARE VISIT (OUTPATIENT)
Dept: HOME HEALTH SERVICES | Facility: HOME HEALTHCARE | Age: 84
End: 2024-11-25
Payer: COMMERCIAL

## 2024-11-25 VITALS — DIASTOLIC BLOOD PRESSURE: 62 MMHG | HEART RATE: 86 BPM | SYSTOLIC BLOOD PRESSURE: 108 MMHG | OXYGEN SATURATION: 98 %

## 2024-11-25 LAB — SCAN RESULT: NORMAL

## 2024-11-25 PROCEDURE — G0151 HHCP-SERV OF PT,EA 15 MIN: HCPCS

## 2024-11-26 ENCOUNTER — HOME CARE VISIT (OUTPATIENT)
Dept: HOME HEALTH SERVICES | Facility: HOME HEALTHCARE | Age: 84
End: 2024-11-26
Payer: COMMERCIAL

## 2024-11-26 ENCOUNTER — TELEPHONE (OUTPATIENT)
Dept: HEMATOLOGY ONCOLOGY | Facility: CLINIC | Age: 84
End: 2024-11-26

## 2024-11-26 ENCOUNTER — OFFICE VISIT (OUTPATIENT)
Dept: HEMATOLOGY ONCOLOGY | Facility: CLINIC | Age: 84
End: 2024-11-26
Payer: COMMERCIAL

## 2024-11-26 ENCOUNTER — OFFICE VISIT (OUTPATIENT)
Dept: FAMILY MEDICINE CLINIC | Facility: CLINIC | Age: 84
End: 2024-11-26
Payer: COMMERCIAL

## 2024-11-26 ENCOUNTER — PATIENT OUTREACH (OUTPATIENT)
Dept: CASE MANAGEMENT | Facility: HOSPITAL | Age: 84
End: 2024-11-26

## 2024-11-26 ENCOUNTER — TELEPHONE (OUTPATIENT)
Age: 84
End: 2024-11-26

## 2024-11-26 VITALS
SYSTOLIC BLOOD PRESSURE: 116 MMHG | OXYGEN SATURATION: 99 % | TEMPERATURE: 98.4 F | RESPIRATION RATE: 18 BRPM | HEART RATE: 88 BPM | DIASTOLIC BLOOD PRESSURE: 66 MMHG

## 2024-11-26 VITALS
HEIGHT: 65 IN | SYSTOLIC BLOOD PRESSURE: 122 MMHG | HEART RATE: 103 BPM | TEMPERATURE: 97.8 F | BODY MASS INDEX: 26.49 KG/M2 | DIASTOLIC BLOOD PRESSURE: 74 MMHG | OXYGEN SATURATION: 98 % | WEIGHT: 159 LBS

## 2024-11-26 VITALS
HEART RATE: 96 BPM | TEMPERATURE: 98.2 F | SYSTOLIC BLOOD PRESSURE: 100 MMHG | RESPIRATION RATE: 20 BRPM | OXYGEN SATURATION: 94 % | DIASTOLIC BLOOD PRESSURE: 60 MMHG

## 2024-11-26 DIAGNOSIS — C84.90 MATURE NK/T-CELL LYMPHOMA, UNSPECIFIED BODY REGION, UNSPECIFIED MATURE NK/T-CELL LYMPHOMA TYPE (HCC): ICD-10-CM

## 2024-11-26 DIAGNOSIS — C84.90 NK/T-CELL LYMPHOMA, UNSPECIFIED TYPE (HCC): Primary | ICD-10-CM

## 2024-11-26 DIAGNOSIS — Z51.11 ENCOUNTER FOR CHEMOTHERAPY MANAGEMENT: Primary | ICD-10-CM

## 2024-11-26 DIAGNOSIS — C86.50 ANGIOIMMUNOBLASTIC T-CELL LYMPHOMA: Primary | ICD-10-CM

## 2024-11-26 DIAGNOSIS — C84.90 NK/T-CELL LYMPHOMA, UNSPECIFIED TYPE (HCC): ICD-10-CM

## 2024-11-26 DIAGNOSIS — C91.50 ADULT T-CELL LYMPHOMA/LEUKEMIA (HTLV-1-ASSOCIATED) NOT HAVING ACHIEVED REMISSION (HCC): ICD-10-CM

## 2024-11-26 DIAGNOSIS — R59.1 LYMPHADENOPATHY: Primary | ICD-10-CM

## 2024-11-26 DIAGNOSIS — D47.2 MONOCLONAL GAMMOPATHY: ICD-10-CM

## 2024-11-26 DIAGNOSIS — Z11.59 NEED FOR HEPATITIS B SCREENING TEST: Primary | ICD-10-CM

## 2024-11-26 DIAGNOSIS — G93.40 ENCEPHALOPATHY, UNSPECIFIED TYPE: Primary | ICD-10-CM

## 2024-11-26 DIAGNOSIS — C86.50 ANGIOIMMUNOBLASTIC T-CELL LYMPHOMA: ICD-10-CM

## 2024-11-26 PROBLEM — E87.20 LACTIC ACIDOSIS: Status: RESOLVED | Noted: 2024-10-22 | Resolved: 2024-11-26

## 2024-11-26 PROBLEM — U07.1 COVID-19 VIRUS INFECTION: Status: RESOLVED | Noted: 2024-10-30 | Resolved: 2024-11-26

## 2024-11-26 PROBLEM — R60.0 LOCALIZED EDEMA: Status: RESOLVED | Noted: 2024-10-16 | Resolved: 2024-11-26

## 2024-11-26 PROBLEM — R06.02 SHORTNESS OF BREATH ON EXERTION: Status: RESOLVED | Noted: 2024-10-16 | Resolved: 2024-11-26

## 2024-11-26 PROBLEM — E83.51 HYPOCALCEMIA: Status: RESOLVED | Noted: 2024-11-03 | Resolved: 2024-11-26

## 2024-11-26 PROBLEM — Z45.2 ENCOUNTER FOR CENTRAL LINE CARE: Status: ACTIVE | Noted: 2024-11-26

## 2024-11-26 PROBLEM — E87.1 HYPONATREMIA: Status: RESOLVED | Noted: 2024-10-22 | Resolved: 2024-11-26

## 2024-11-26 PROBLEM — B34.8 RHINOVIRUS: Status: RESOLVED | Noted: 2024-10-09 | Resolved: 2024-11-26

## 2024-11-26 PROBLEM — R79.89 ABNORMAL LFTS: Status: RESOLVED | Noted: 2024-10-29 | Resolved: 2024-11-26

## 2024-11-26 PROBLEM — R50.9 FEVER: Status: RESOLVED | Noted: 2024-11-12 | Resolved: 2024-11-26

## 2024-11-26 PROBLEM — M25.562 LEFT KNEE PAIN: Status: RESOLVED | Noted: 2024-10-10 | Resolved: 2024-11-26

## 2024-11-26 LAB
ANISOCYTOSIS BLD QL SMEAR: PRESENT
BASOPHILS NFR MAR MANUAL: 1 % (ref 0–1)
LYMPHOCYTES # BLD AUTO: 18 %
MONOCYTES NFR BLD AUTO: 13 % (ref 4–12)
NEUTS BAND NFR BLD MANUAL: 2 % (ref 0–8)
NEUTS SEG NFR BLD AUTO: 48 %
PATHOLOGY REVIEW: YES
POIKILOCYTOSIS BLD QL SMEAR: PRESENT
POLYCHROMASIA BLD QL SMEAR: PRESENT
TOTAL CELLS COUNTED SPEC: 100
VARIANT LYMPHS # BLD AUTO: 18 % (ref 0–0)

## 2024-11-26 PROCEDURE — G0152 HHCP-SERV OF OT,EA 15 MIN: HCPCS | Performed by: OCCUPATIONAL THERAPIST

## 2024-11-26 PROCEDURE — 99496 TRANSJ CARE MGMT HIGH F2F 7D: CPT | Performed by: FAMILY MEDICINE

## 2024-11-26 PROCEDURE — 99205 OFFICE O/P NEW HI 60 MIN: CPT | Performed by: INTERNAL MEDICINE

## 2024-11-26 RX ORDER — DOXORUBICIN HYDROCHLORIDE 2 MG/ML
25 INJECTION, SOLUTION INTRAVENOUS ONCE
Status: CANCELLED | OUTPATIENT
Start: 2024-12-16

## 2024-11-26 RX ORDER — PANTOPRAZOLE SODIUM 40 MG/1
40 TABLET, DELAYED RELEASE ORAL DAILY
Qty: 30 TABLET | Refills: 6 | Status: SHIPPED | OUTPATIENT
Start: 2024-11-26

## 2024-11-26 RX ORDER — SODIUM CHLORIDE 9 MG/ML
20 INJECTION, SOLUTION INTRAVENOUS ONCE
Status: CANCELLED | OUTPATIENT
Start: 2024-12-16

## 2024-11-26 RX ORDER — HYDROCORTISONE 25 MG/G
CREAM TOPICAL 4 TIMES DAILY PRN
Qty: 28 G | Refills: 3 | Status: SHIPPED | OUTPATIENT
Start: 2024-11-26

## 2024-11-26 NOTE — TELEPHONE ENCOUNTER
Juan is calling to reschedule his dad's infusion appointment on 12/12/24, he is scheduled to get his port on the same day.

## 2024-11-26 NOTE — PROGRESS NOTES
Hematology/Oncology Outpatient Follow-up  Miguel Henderson 84 y.o. male 1940 [id number]    Date:  11/26/2024        Assessment and Plan:  1. 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  CD30 negative.    The patient had lymph node biopsy and 2 bone marrow biopsies which confirmed the diagnosis as above.    He and his family were educated extensively about this relatively rare type of T-cell lymphoma.  The case and his clinical scenario was discussed with Dr. Nieto from [facility].  He did have an echocardiogram on 10/9/2024 which showed ejection fraction of 55% with normal systolic function.  He should be able to tolerate Adriamycin based regimen.  The decision was made to get the patient treated with mini-CHOP regimen on every 3-week basis for total of 6 cycles if he can tolerated.   The vincristine will be omitted to avoid significant side effects.  The patient and the family were educated extensively about the potential side effects/toxicity of the recommended regimen.    We did discuss the benefit of getting get a PET CT scan to complete the staging and to get Port-A-Cath placed by IR.  He will need to be monitored closely for any hint of complication related to the treatment.  The patient stated he is treatment focused even though his ECOG performance status is borderline somewhere between 3-4.  The family also was very enthusiastic about getting him started on treatment.  He is currently on tapering dose of prednisone and continues to be on 60 mg of prednisone daily for the last 10 days or so.  A tapering dose of prednisone will be prescribed.  The full dose of prednisone will be given during cycle 2 of chemotherapy.  He will need to be monitored closely for any obvious hint of tumor lysis syndrome.        2. 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  The exact significance is not entirely clear.  We will wait for the final pathology report of the bone marrow biopsy to have a better idea about the exact percentage of the clonal plasma cells.      HPI:  Mr. Henderson is a [place]-speaking 84 yoM with PMHx of prostate cancer, hypertension, and hypothyroidism.  He apparently was admitted to the hospital around the month of October due to encephalopathy and significant constitutional symptoms with deterioration of his overall condition.  He had to be [place] the [facility] [place] [place] for further workup.  Imaging showed scattered adenopathy involving the axilla, mediastinum, and the pelvic area.   During the hospitalization he had extensive workup and was found to have lambda light chain gammopathy with high immunoglobulins including IgA IgG and IgM.  Cryoglobulin was positive, serum viscosity was normal, the decision was made to pursue couple sessions of plasmapheresis.  He was also treated for potential infection by the infectious disease team since he was febrile.  He did undergo multiple biopsies including inguinal lymph node biopsy on 10/28/2024 which showed atypical clonal T-cell population compatible with follicular helper T-cell phenotype, previously called angioimmunoblastic T-cell lymphoma (AITL).  Bone marrow biopsy on 10/29/2024 was highly limited sample.  However findings were consistent of involvement with a clonal T-cell population.  Another bone marrow biopsy was done on 11/8/2024 which confirmed a follicular helper T-cell lymphoma, angioimmunoblastictype(previously called angioimmunoblastic T-cell lymphoma (AITL)).  CD30 negative.  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    Doppler study of the right upper extremity showed acute occlusive superficial thrombophlebitis in the cephalic vein which could be due to IV line related complication.     blood work on 11/16/2024 showed hemoglobin of 9.1 with normal  white cells and platelets.  Creatinine 0.3 with calcium of 8.2.  Oncology History   NK/T-cell lymphoma (HCC)   11/26/2024 Initial Diagnosis    NK/T-cell lymphoma (HCC)     12/4/2024 -  Chemotherapy    DOXOrubicin (ADRIAMYCIN), 25 mg/m2 = 45 mg, Intravenous, Once, 0 of 6 cycles  alteplase (CATHFLO), 2 mg, Intracatheter, Every 1 Minute as needed, 0 of 6 cycles  pegfilgrastim (NEULASTA ONPRO), 6 mg, Subcutaneous, Once, 0 of 6 cycles  cyclophosphamide (CYTOXAN) IVPB, 400 mg/m2 = 712 mg, Intravenous, Once, 0 of 6 cycles  fosaprepitant (EMEND) IVPB, 150 mg, Intravenous, Once, 0 of 6 cycles  riTUXimab (RITUXAN) subsequent titrated chemo infusion, 375 mg/m2 = 667.6 mg, Intravenous, Once, 0 of 5 cycles         Interval history:    ROS: Review of Systems   Constitutional:  Positive for fatigue. Negative for chills and fever.   HENT:  Positive for hearing loss and trouble swallowing. Negative for ear pain and sore throat.    Eyes:  Negative for pain and visual disturbance.   Respiratory:  Positive for cough and shortness of breath.    Cardiovascular:  Negative for chest pain and palpitations.   Gastrointestinal:  Negative for abdominal pain and vomiting.   Genitourinary:  Negative for dysuria and hematuria.   Musculoskeletal:  Negative for arthralgias and back pain.   Skin:  Negative for color change and rash.   Neurological:  Positive for numbness. Negative for seizures and syncope.   Psychiatric/Behavioral:  Positive for sleep disturbance.    All other systems reviewed and are negative.      Past Medical History:   Diagnosis Date    Prostate cancer (HCC)        Past Surgical History:   Procedure Laterality Date    APPENDECTOMY      IR BIOPSY BONE MARROW  10/29/2024    IR BIOPSY BONE MARROW  11/8/2024    IR BIOPSY INGUINAL LYMPH NODE  10/28/2024    IR BIOPSY LYMPH NODE  11/8/2024    IR LUMBAR PUNCTURE  10/25/2024    IR TEMPORARY DIALYSIS CATHETER PLACEMENT  10/26/2024    LYMPH NODE BIOPSY Right 11/3/2024    Procedure:  EXCISION BIOPSY LYMPH NODE INGUINAL;  Surgeon: Michael Rush MD;  Location: [facility];  Service: Surgical Oncology       Social History     Socioeconomic History    Marital status: Unknown     Spouse name: None    Number of children: None    Years of education: None    Highest education level: None   Occupational History    None   Tobacco Use    Smoking status: Never    Smokeless tobacco: Never   Vaping Use    Vaping status: Never Used   Substance and Sexual Activity    Alcohol use: Yes     Comment: Occasional    Drug use: Never    Sexual activity: None   Other Topics Concern    None   Social History Narrative    None     Social Drivers of Health     Financial Resource Strain: Not on file   Food Insecurity: No Food Insecurity (10/26/2024)    Nursing - Inadequate Food Risk Classification     Worried About Running Out of Food in the Last Year: Never true     Ran Out of Food in the Last Year: Never true     Ran Out of Food in the Last Year: 1   Transportation Needs: No Transportation Needs (2024)    OASIS : Transportation     Lack of Transportation (Medical): No     Lack of Transportation (Non-Medical): No     Patient Unable or Declines to Respond: No   Physical Activity: Not on file   Stress: Not on file   Social Connections: Not on file   Intimate Partner Violence: Unknown (10/26/2024)    Nursing IPS     Feels Physically and Emotionally Safe: Not on file     Physically Hurt by Someone: Not on file     Humiliated or Emotionally Abused by Someone: Not on file     Physically Hurt by Someone: 2     Hurt or Threatened by Someone: 2   Housing Stability: Unknown (10/26/2024)    Nursing: Inadequate Housing Risk Classification     Has Housing: Not on file     Worried About Losing Housing: Not on file     Unable to Get Utilities: Not on file     Unable to Pay for Housing in the Last Year: 2     Has Housin       No family history on file.    Allergies   Allergen Reactions    Shellfish Allergy - Food Allergy  Anaphylaxis    Valtrex [Valacyclovir] Hives         Current Outpatient Medications:     benzonatate (TESSALON) 200 MG capsule, Take 1 capsule (200 mg total) by mouth 2 (two) times a day as needed for cough, Disp: 40 capsule, Rfl: 2    cyclobenzaprine (FLEXERIL) 5 mg tablet, Take 5 mg by mouth 3 (three) times a day as needed for muscle spasms for muscle spasms, Disp: , Rfl:     fluticasone (FLONASE) 50 mcg/act nasal spray, 1 spray into each nostril daily, Disp: 11.1 mL, Rfl: 1    gabapentin (NEURONTIN) 100 mg capsule, Take 1 capsule (100 mg total) by mouth daily at bedtime, Disp: 30 capsule, Rfl: 0    hydrocortisone (ANUSOL-HC) 2.5 % rectal cream, Apply topically 4 (four) times a day as needed for hemorrhoids, Disp: 28 g, Rfl: 0    ibuprofen (MOTRIN) 800 mg tablet, Take 1 tablet (800 mg total) by mouth every 8 (eight) hours as needed for moderate pain, Disp: 60 tablet, Rfl: 2    levothyroxine 50 mcg tablet, Take 50 mcg by mouth daily Monday through Thursday, Disp: , Rfl:     levothyroxine 75 mcg tablet, Take 75 mcg by mouth Contingent Taken every Friday through Sunday, Disp: , Rfl:     ondansetron (ZOFRAN) 4 mg tablet, Take 1 tablet (4 mg total) by mouth every 8 (eight) hours as needed for nausea or vomiting, Disp: 20 tablet, Rfl: 0    predniSONE 20 mg tablet, Take 3 tablets (60 mg total) by mouth daily, Disp: 90 tablet, Rfl: 0    pantoprazole (PROTONIX) 40 mg tablet, Take 1 tablet (40 mg total) by mouth daily in the early morning, Disp: 30 tablet, Rfl: 0      Physical Exam:  /66 (BP Location: Left arm, Patient Position: Sitting, Cuff Size: Adult)   Pulse 88   Temp 98.4 °F (36.9 °C)   Resp 18   SpO2 99%     Physical Exam  Constitutional:       Appearance: He is well-developed.   HENT:      Head: Normocephalic and atraumatic.      Nose: Nose normal.   Eyes:      General: No scleral icterus.        Right eye: No discharge.         Left eye: No discharge.      Conjunctiva/sclera: Conjunctivae normal.       Pupils: Pupils are equal, round, and reactive to light.   Neck:      Thyroid: No thyromegaly.      Trachea: No tracheal deviation.   Cardiovascular:      Rate and Rhythm: Normal rate and regular rhythm.      Heart sounds: Normal heart sounds. No murmur heard.     No friction rub.   Pulmonary:      Effort: Pulmonary effort is normal. No respiratory distress.      Breath sounds: Normal breath sounds. No wheezing or rales.   Chest:      Chest wall: No tenderness.   Abdominal:      General: There is no distension.      Palpations: Abdomen is soft. There is no hepatomegaly or splenomegaly.      Tenderness: There is no abdominal tenderness. There is no guarding or rebound.   Musculoskeletal:         General: No tenderness or deformity.      Cervical back: Normal range of motion and neck supple.      Comments: Sitting in a wheelchair.   Lymphadenopathy:      Cervical: No cervical adenopathy.   Skin:     General: Skin is warm and dry.      Coloration: Skin is not pale.      Findings: No erythema or rash.   Neurological:      Mental Status: He is alert and oriented to person, place, and time.      Cranial Nerves: No cranial nerve deficit.      Coordination: Coordination normal.      Deep Tendon Reflexes: Reflexes are normal and symmetric.   Psychiatric:         Behavior: Behavior normal.         Thought Content: Thought content normal.         Judgment: Judgment normal.           Labs:  Lab Results   Component Value Date    WBC 9.04 11/16/2024    HGB 9.1 (L) 11/16/2024    HCT 28.9 (L) 11/16/2024    MCV 93 11/16/2024     11/16/2024     Lab Results   Component Value Date    K 3.2 (L) 11/16/2024    CL 99 11/16/2024    CO2 30 11/16/2024    BUN 9 11/16/2024    CREATININE 0.34 (L) 11/16/2024    GLUF 90 09/14/2022    CALCIUM 8.2 (L) 11/16/2024    CORRECTEDCA 8.9 11/14/2024    AST 31 11/14/2024    ALT 31 11/14/2024    ALKPHOS 133 (H) 11/14/2024    EGFR 114 11/16/2024     Lab Results   Component Value Date    TSH 1.07  06/18/2024       Patient voiced understanding and agreement in the above discussion. Aware to contact our office with questions/symptoms in the interim.

## 2024-11-26 NOTE — TELEPHONE ENCOUNTER
Patient is rescheduled to 12/16 - please change date on plan & sign additional appt requests.  Thank you.

## 2024-11-26 NOTE — CASE COMMUNICATION
HHC PT eval complete. Patient demo reduction in baseline mobility from indep to min/modA for transfers and gait in home with RW. [place] of 20/28 indicate moderate fall risk.  Plan 2 x week for 3 weeks.

## 2024-11-26 NOTE — PROGRESS NOTES
Name: Miguel Henderson      : 1940      MRN: [id number]  Encounter Provider: Greg Foster MD  Encounter Date: 2024   Encounter department: [facility]  :  Assessment & Plan  Encephalopathy, unspecified type  Symptoms improved, resolved with steroids    Mature NK/t-cell lymphoma, unspecified body region, unspecified mature NK/t-cell lymphoma type (HCC)  F/U with Heme/Onc       Angioimmunoblastic T-cell lymphoma  F/U with Heme/Onc    Follow up in 3 months or as needed            History of Present Illness     [facility] 10/29 to     Patient is here for a hospital follow up. He presented to the ER on 10/26/2024 due to encephalopathy.  Patient had extensive workup.  Patient ultimately responded well to 3 treatments of plasmapheresis as well as steroids.  Patient had bone marrow biopsy as well as lymph node biopsy done given lymphadenopathy. He was evaluated for sepsis however antibiotics were held given that the etiology of the encephalopathy was not likely infections per hospital discharge summary.    He saw Heme/onc today and was explained biopsy results which shows T-cell lymphoma. He and his family have agreed to do chemotherapy treatment.    He currently feels tired today given that he does not sleep well at night and woke up today at 3:00 am per family members.      Review of Systems   Constitutional:  Negative for activity change, appetite change, fatigue and fever.   HENT:  Negative for congestion and ear discharge.    Respiratory:  Negative for cough and shortness of breath.    Cardiovascular:  Negative for chest pain and palpitations.   Gastrointestinal:  Negative for diarrhea and nausea.   Musculoskeletal:  Negative for arthralgias and back pain.   Skin:  Negative for color change and rash.   Neurological:  Negative for dizziness and headaches.   Psychiatric/Behavioral:  Negative for agitation and behavioral problems.      Pertinent Medical History       Medical  History Reviewed by provider this encounter:  Tobacco  Allergies  Meds  Problems  Med Hx  Surg Hx  Fam Hx     .  Past Medical History   Past Medical History:   Diagnosis Date    Prostate cancer (HCC)      Past Surgical History:   Procedure Laterality Date    APPENDECTOMY      IR BIOPSY BONE MARROW  10/29/2024    IR BIOPSY BONE MARROW  11/8/2024    IR BIOPSY INGUINAL LYMPH NODE  10/28/2024    IR BIOPSY LYMPH NODE  11/8/2024    IR LUMBAR PUNCTURE  10/25/2024    IR TEMPORARY DIALYSIS CATHETER PLACEMENT  10/26/2024    LYMPH NODE BIOPSY Right 11/3/2024    Procedure: EXCISION BIOPSY LYMPH NODE INGUINAL;  Surgeon: Michael Rush MD;  Location: [facility];  Service: Surgical Oncology     History reviewed. No pertinent family history.   reports that he has never smoked. He has never used smokeless tobacco. He reports current alcohol use. He reports that he does not use drugs.  Current Outpatient Medications on File Prior to Visit   Medication Sig Dispense Refill    benzonatate (TESSALON) 200 MG capsule Take 1 capsule (200 mg total) by mouth 2 (two) times a day as needed for cough 40 capsule 2    cyclobenzaprine (FLEXERIL) 5 mg tablet Take 5 mg by mouth 3 (three) times a day as needed for muscle spasms for muscle spasms      fluticasone (FLONASE) 50 mcg/act nasal spray 1 spray into each nostril daily 11.1 mL 1    gabapentin (NEURONTIN) 100 mg capsule Take 1 capsule (100 mg total) by mouth daily at bedtime 30 capsule 0    ibuprofen (MOTRIN) 800 mg tablet Take 1 tablet (800 mg total) by mouth every 8 (eight) hours as needed for moderate pain 60 tablet 2    levothyroxine 50 mcg tablet Take 50 mcg by mouth daily Monday through Thursday      levothyroxine 75 mcg tablet Take 75 mcg by mouth Contingent Taken every Friday through Sunday      ondansetron (ZOFRAN) 4 mg tablet Take 1 tablet (4 mg total) by mouth every 8 (eight) hours as needed for nausea or vomiting 20 tablet 0    pantoprazole (PROTONIX) 40 mg tablet Take 1 tablet  (40 mg total) by mouth daily in the early morning 30 tablet 0    predniSONE 20 mg tablet Take 3 tablets (60 mg total) by mouth daily 90 tablet 0    [DISCONTINUED] hydrocortisone (ANUSOL-HC) 2.5 % rectal cream Apply topically 4 (four) times a day as needed for hemorrhoids 28 g 0     No current facility-administered medications on file prior to visit.     Allergies   Allergen Reactions    Shellfish Allergy - Food Allergy Anaphylaxis    Valtrex [Valacyclovir] Hives      Current Outpatient Medications on File Prior to Visit   Medication Sig Dispense Refill    benzonatate (TESSALON) 200 MG capsule Take 1 capsule (200 mg total) by mouth 2 (two) times a day as needed for cough 40 capsule 2    cyclobenzaprine (FLEXERIL) 5 mg tablet Take 5 mg by mouth 3 (three) times a day as needed for muscle spasms for muscle spasms      fluticasone (FLONASE) 50 mcg/act nasal spray 1 spray into each nostril daily 11.1 mL 1    gabapentin (NEURONTIN) 100 mg capsule Take 1 capsule (100 mg total) by mouth daily at bedtime 30 capsule 0    ibuprofen (MOTRIN) 800 mg tablet Take 1 tablet (800 mg total) by mouth every 8 (eight) hours as needed for moderate pain 60 tablet 2    levothyroxine 50 mcg tablet Take 50 mcg by mouth daily Monday through Thursday      levothyroxine 75 mcg tablet Take 75 mcg by mouth Contingent Taken every Friday through Sunday      ondansetron (ZOFRAN) 4 mg tablet Take 1 tablet (4 mg total) by mouth every 8 (eight) hours as needed for nausea or vomiting 20 tablet 0    pantoprazole (PROTONIX) 40 mg tablet Take 1 tablet (40 mg total) by mouth daily in the early morning 30 tablet 0    predniSONE 20 mg tablet Take 3 tablets (60 mg total) by mouth daily 90 tablet 0    [DISCONTINUED] hydrocortisone (ANUSOL-HC) 2.5 % rectal cream Apply topically 4 (four) times a day as needed for hemorrhoids 28 g 0     No current facility-administered medications on file prior to visit.      Social History     Tobacco Use    Smoking status: Never  "   Smokeless tobacco: Never   Vaping Use    Vaping status: Never Used   Substance and Sexual Activity    Alcohol use: Yes     Comment: Occasional    Drug use: Never    Sexual activity: Not on file        Objective   /74 (BP Location: Left arm, Patient Position: Sitting)   Pulse 103   Temp 97.8 °F (36.6 °C) (Temporal)   Ht 5' 5\" (1.651 m)   Wt 72.1 kg (159 lb)   SpO2 98%   BMI 26.46 kg/m²      Physical Exam  Constitutional:       General: He is not in acute distress.     Appearance: He is well-developed. He is not diaphoretic.   Eyes:      General: No scleral icterus.     Pupils: Pupils are equal, round, and reactive to light.   Cardiovascular:      Rate and Rhythm: Normal rate and regular rhythm.      Heart sounds: Normal heart sounds. No murmur heard.  Pulmonary:      Effort: Pulmonary effort is normal. No respiratory distress.      Breath sounds: Normal breath sounds. No wheezing.   Abdominal:      General: Bowel sounds are normal. There is no distension.      Palpations: Abdomen is soft.      Tenderness: There is no abdominal tenderness.   Skin:     General: Skin is warm and dry.      Findings: No rash.   Neurological:      Mental Status: He is alert and oriented to person, place, and time.         "

## 2024-11-26 NOTE — PROGRESS NOTES
Chart reviewed. This RNCM called patient for follow up and spoke with her son Juan. I introduced myself and explained the role of the RNCM and CCM services. He states patient is doing good. His brother is with patient at this time and took her to her Oncology and PCP's apt's. He says that her groin looks good. No s/s of infection and no fever or chills. He says they have all of patient medications and she takes them as directed. He declined reviewing. He says that the Nurse with [facility] went over all of her medications with them. Patient has services through [facility]. She has transport to apt's with family. Son was appreciative of call but declined the need for CCM services or follow up.

## 2024-11-26 NOTE — TELEPHONE ENCOUNTER
Phoned pt's son and instructed him about prednisone taper. Pt has been on prednisone 60 mg daily starting 11/27/24 pt to take 40 mg daily for 1 wk then 12/4/24 20 mg for 1 wk then 12/11/24 start 10 mg daily for one week but on 12/16/24 the start of cycle 1 pt to take 70 mg daily for five days with each cycle of chemo. Pt is to have baseline labs before cycle 1 and then weekly labs during first cycle to check for tumor lysis syndrome.

## 2024-11-26 NOTE — PROGRESS NOTES
Chemo education presented to pt, dtr and wife and dtr was translating. Chemo sheets provided in Citizen of [place] and English. Phone contact list, diarrhea, constipation and laxative protocols provided to pt. Pt given disability parking placard application form. FMLA forms for pt's sone given to office to fill out. Pt was very sleepy during chemo teach as he had been up since 3 AM Dtr and wife very attentive during chemo teach. I let family know that  I would phone them about the prednisone dose later today. Rx for Zofran and protonix sent to local pharmacy. Pt has prednisone 20 mg tabs at home, pt to be weaned to 40 mg daily for 1 week then 20 mg for 1 week then 10 mg for 1 week so by cycle 2 pt will then only take 70 mg po daily for days 1-5. Chemo consent was signed. Order s placed for Pet Ct scan port placement via IR and Palliative care referral.

## 2024-11-26 NOTE — TELEPHONE ENCOUNTER
Pt needs to be scheduled for Mini Chop minus the Vincristine at  inf after port placement via IR.

## 2024-11-27 ENCOUNTER — HOME CARE VISIT (OUTPATIENT)
Dept: HOME HEALTH SERVICES | Facility: HOME HEALTHCARE | Age: 84
End: 2024-11-27
Payer: COMMERCIAL

## 2024-11-27 ENCOUNTER — PATIENT OUTREACH (OUTPATIENT)
Dept: HEMATOLOGY ONCOLOGY | Facility: CLINIC | Age: 84
End: 2024-11-27

## 2024-11-27 ENCOUNTER — TELEPHONE (OUTPATIENT)
Dept: HEMATOLOGY ONCOLOGY | Facility: CLINIC | Age: 84
End: 2024-11-27

## 2024-11-27 VITALS — HEART RATE: 67 BPM | OXYGEN SATURATION: 98 % | SYSTOLIC BLOOD PRESSURE: 110 MMHG | DIASTOLIC BLOOD PRESSURE: 78 MMHG

## 2024-11-27 VITALS — HEART RATE: 77 BPM | SYSTOLIC BLOOD PRESSURE: 120 MMHG | OXYGEN SATURATION: 97 % | DIASTOLIC BLOOD PRESSURE: 68 MMHG

## 2024-11-27 DIAGNOSIS — C84.90 NK/T-CELL LYMPHOMA, UNSPECIFIED TYPE (HCC): Primary | ICD-10-CM

## 2024-11-27 PROCEDURE — G0299 HHS/HOSPICE OF RN EA 15 MIN: HCPCS

## 2024-11-27 PROCEDURE — G0151 HHCP-SERV OF PT,EA 15 MIN: HCPCS

## 2024-11-27 RX ORDER — ONDANSETRON 4 MG/1
4 TABLET, FILM COATED ORAL EVERY 8 HOURS PRN
Qty: 20 TABLET | Refills: 3 | Status: SHIPPED | OUTPATIENT
Start: 2024-11-27

## 2024-11-27 RX ORDER — PREDNISONE 10 MG/1
70 TABLET ORAL DAILY
Qty: 35 TABLET | Refills: 5 | Status: SHIPPED | OUTPATIENT
Start: 2024-11-27

## 2024-11-27 NOTE — PROGRESS NOTES
I reached out and spoke with Miguel's son Juan now that consults have been completed with the oncology teams to review for any barriers to care and offer supportive services as needed. Distress Thermometer completed at this time. Patient scored 8/10. Referral to  placed.. I reviewed and updated the members assigned to the care team in [facility].   He knows the members of the care team as well as how and when to contact them with any needs.   He verbalizes managing the schedules well.   He is currently unable to drive but is supported by family or friends and denies transportation needs.  Son will driving   He denies any uncontrolled symptoms. Discussed the role of Palliative Care in symptom and side effect management with this complex diagnosis. Referral placed. Patient aware they will receive a call to schedule. I provided the direct number as well if they would prefer to call.    He states that he is eating and drinking as per usual with no unintentional weight loss.     Patient does not smoke.   He states he is well supported by family and friends.  Community support groups discussed including the Cancer Support Community of the [facility]. Patient declined information at this time.   He feels he has adequate insurance coverage and denies any financial concerns at this time.   Outreach made to Miguel and spoke to his son Juan who states he handles and translates everything for his father. Juan expressed his father has trouble sleeping and has been worried and fearful due to his cancer diagnosis. Juan expressed his father has been forgetful at times and ever since he left the hospital he really can't do many of the things like he used to by himself such as going to the bathroom. Miguel has a great support system with his family who helps him out a lot. Juan lives [place] away from [facility] and also works in a hospital. He stated we can contact him and if he doesn't answer to leave a  and  he will return the call. He was very grateful for my call.   Based on individual needs I will follow up in about 4-6 weeks. I have provided my direct contact information and welcome them to contact me if needs as discussed above change. He was appreciative for the call.

## 2024-11-27 NOTE — TELEPHONE ENCOUNTER
FMLA for sanchez Martinez was sent to [facility]'s [employer] department. Scanned into media.

## 2024-11-28 NOTE — CASE COMMUNICATION
[facility] OT evaluation 11/26/24    OT to continue 1 week 1 then 2 wk 2 for ADL training,  review of safety with transfers/ mobility and assistance with obtaining DME to maximize safety with performance of self-care and daily activities.

## 2024-12-02 VITALS
TEMPERATURE: 99.2 F | SYSTOLIC BLOOD PRESSURE: 118 MMHG | OXYGEN SATURATION: 95 % | HEART RATE: 88 BPM | RESPIRATION RATE: 20 BRPM | DIASTOLIC BLOOD PRESSURE: 80 MMHG

## 2024-12-03 ENCOUNTER — HOME CARE VISIT (OUTPATIENT)
Dept: HOME HEALTH SERVICES | Facility: HOME HEALTHCARE | Age: 84
End: 2024-12-03
Payer: COMMERCIAL

## 2024-12-03 ENCOUNTER — PATIENT OUTREACH (OUTPATIENT)
Dept: CASE MANAGEMENT | Facility: HOSPITAL | Age: 84
End: 2024-12-03

## 2024-12-03 VITALS — SYSTOLIC BLOOD PRESSURE: 122 MMHG | HEART RATE: 80 BPM | OXYGEN SATURATION: 96 % | DIASTOLIC BLOOD PRESSURE: 72 MMHG

## 2024-12-03 VITALS
RESPIRATION RATE: 20 BRPM | OXYGEN SATURATION: 96 % | TEMPERATURE: 97.9 F | SYSTOLIC BLOOD PRESSURE: 122 MMHG | HEART RATE: 80 BPM | DIASTOLIC BLOOD PRESSURE: 72 MMHG

## 2024-12-03 PROCEDURE — G0180 MD CERTIFICATION HHA PATIENT: HCPCS | Performed by: FAMILY MEDICINE

## 2024-12-03 PROCEDURE — G0299 HHS/HOSPICE OF RN EA 15 MIN: HCPCS

## 2024-12-03 PROCEDURE — G0152 HHCP-SERV OF OT,EA 15 MIN: HCPCS | Performed by: OCCUPATIONAL THERAPIST

## 2024-12-03 PROCEDURE — G0151 HHCP-SERV OF PT,EA 15 MIN: HCPCS

## 2024-12-03 NOTE — Clinical Note
Pt's son Juan reports pt is not sleeping well and feels it is from anxiety. He is asking if it is ok to give Melatonin and what dose? Pt will be seeing palliative care as well.  Thanks,  Dianne WASHBURN nurse

## 2024-12-03 NOTE — Clinical Note
Pt seen today and coughing during snv. Family reports pt is coughing up clear to green mucus. Is it ok for them to give plain mucinex for cough? Lungs were clear after cough (were tight before cough and deep breaths) and we also instructed on using incentive spirometer.  Any recommendations, please call Monse BAUTISTA at [phone].  Thanks,   Dianne WASHBURN nurse

## 2024-12-03 NOTE — PROGRESS NOTES
[facility] SW reached out to patient's son due to referral for patient scoring 8/10 on DT. Per son, they had a family meeting and the patient has decided not to pursue treatment at this time and will instead be transitioning to palliative care.     No needs identified.

## 2024-12-04 VITALS — DIASTOLIC BLOOD PRESSURE: 78 MMHG | OXYGEN SATURATION: 97 % | SYSTOLIC BLOOD PRESSURE: 110 MMHG | HEART RATE: 88 BPM

## 2024-12-05 ENCOUNTER — HOME CARE VISIT (OUTPATIENT)
Dept: HOME HEALTH SERVICES | Facility: HOME HEALTHCARE | Age: 84
End: 2024-12-05
Payer: COMMERCIAL

## 2024-12-05 VITALS — HEART RATE: 65 BPM | DIASTOLIC BLOOD PRESSURE: 80 MMHG | SYSTOLIC BLOOD PRESSURE: 120 MMHG | OXYGEN SATURATION: 95 %

## 2024-12-05 PROCEDURE — G0152 HHCP-SERV OF OT,EA 15 MIN: HCPCS | Performed by: OCCUPATIONAL THERAPIST

## 2024-12-05 NOTE — CASE COMMUNICATION
OT discussed continuation of care vs discharge on next visit as patient has progress more quickly than OT anticipated.  OT to continue for one additional visit 12/10 for additional review of transfer safety and ADL training for dressing / bathing followed by discharge with goals met.

## 2024-12-06 ENCOUNTER — HOME CARE VISIT (OUTPATIENT)
Dept: HOME HEALTH SERVICES | Facility: HOME HEALTHCARE | Age: 84
End: 2024-12-06
Payer: COMMERCIAL

## 2024-12-06 NOTE — CASE COMMUNICATION
H&P PRE-OP Update    Please see previous related notes from Ortho clinic and/or referring provider    Physical Exam  COR: regular, without murmur  RESP: Clear, equal breath sounds bilaterally  ABD: soft, non-tender, positive bowel sounds  SURGICAL SITE: initialed by myself    Plan  Patient's clinic notes & H&P related to current orthopedic condition are reviewed, completed, signed and included in the EMR chart.  Extensive informed consent process completed, explaining risks & benefits of surgery, potential complications, reasonable expectations, anticipated recovery process and outcomes.  No guarantees were made.    Pt agrees to proceed and has signed the OR consent form.  Pre-op preparations have been successfully completed.  All questions answered.  Dr. Jose Gutierrez  Orthopedics & Sports Medicine  [facility]  Zbigniew Gutierrez MD  7/19/2017 7:39 AM       Missed visit. Arrived for PT visit and patient not home. Son arlet reports they forgot about visit and his father went to stay with his brother for the weekend.  Reduce frequency to 1 x this week.

## 2024-12-09 ENCOUNTER — HOME CARE VISIT (OUTPATIENT)
Dept: HOME HEALTH SERVICES | Facility: HOME HEALTHCARE | Age: 84
End: 2024-12-09
Payer: COMMERCIAL

## 2024-12-09 VITALS — DIASTOLIC BLOOD PRESSURE: 72 MMHG | SYSTOLIC BLOOD PRESSURE: 122 MMHG | OXYGEN SATURATION: 97 % | HEART RATE: 97 BPM

## 2024-12-09 PROCEDURE — G0151 HHCP-SERV OF PT,EA 15 MIN: HCPCS

## 2024-12-10 ENCOUNTER — HOME CARE VISIT (OUTPATIENT)
Dept: HOME HEALTH SERVICES | Facility: HOME HEALTHCARE | Age: 84
End: 2024-12-10
Payer: COMMERCIAL

## 2024-12-10 PROCEDURE — G0152 HHCP-SERV OF OT,EA 15 MIN: HCPCS | Performed by: OCCUPATIONAL THERAPIST

## 2024-12-10 PROCEDURE — G0299 HHS/HOSPICE OF RN EA 15 MIN: HCPCS

## 2024-12-11 ENCOUNTER — HOSPITAL ENCOUNTER (OUTPATIENT)
Dept: NUCLEAR MEDICINE | Facility: HOSPITAL | Age: 84
Discharge: HOME | End: 2024-12-11
Attending: INTERNAL MEDICINE
Payer: COMMERCIAL

## 2024-12-11 VITALS — OXYGEN SATURATION: 98 % | DIASTOLIC BLOOD PRESSURE: 60 MMHG | HEART RATE: 89 BPM | SYSTOLIC BLOOD PRESSURE: 114 MMHG

## 2024-12-11 DIAGNOSIS — C84.90 NK/T-CELL LYMPHOMA, UNSPECIFIED TYPE (HCC): ICD-10-CM

## 2024-12-11 DIAGNOSIS — C91.50 ADULT T-CELL LYMPHOMA/LEUKEMIA (HTLV-1-ASSOCIATED) NOT HAVING ACHIEVED REMISSION (HCC): ICD-10-CM

## 2024-12-11 PROBLEM — A41.9 SEPSIS (HCC): Status: RESOLVED | Noted: 2024-10-26 | Resolved: 2024-12-11

## 2024-12-11 LAB — GLUCOSE SERPL-MCNC: 140 MG/DL (ref 65–140)

## 2024-12-11 PROCEDURE — A9552 F18 FDG: HCPCS

## 2024-12-11 PROCEDURE — 78815 PET IMAGE W/CT SKULL-THIGH: CPT

## 2024-12-11 PROCEDURE — 82948 REAGENT STRIP/BLOOD GLUCOSE: CPT

## 2024-12-11 NOTE — CASE COMMUNICATION
OT performed discharge from [facility] OT servises following today visit as previously planned.    No further [facility] OT services are planned at this time as patient has partially met OT goals and achieved optimal therapeutic benefit for [facility] OT services at this time.

## 2024-12-12 ENCOUNTER — HOME CARE VISIT (OUTPATIENT)
Dept: HOME HEALTH SERVICES | Facility: HOME HEALTHCARE | Age: 84
End: 2024-12-12
Payer: COMMERCIAL

## 2024-12-12 ENCOUNTER — HOSPITAL ENCOUNTER (OUTPATIENT)
Dept: INFUSION CENTER | Facility: HOSPITAL | Age: 84
End: 2024-12-12
Attending: INTERNAL MEDICINE

## 2024-12-12 VITALS — DIASTOLIC BLOOD PRESSURE: 62 MMHG | SYSTOLIC BLOOD PRESSURE: 94 MMHG | HEART RATE: 90 BPM | OXYGEN SATURATION: 96 %

## 2024-12-12 PROCEDURE — G0151 HHCP-SERV OF PT,EA 15 MIN: HCPCS

## 2024-12-13 RX ORDER — HYDROCHLOROTHIAZIDE 25 MG/1
TABLET ORAL
COMMUNITY
Start: 2024-12-06

## 2024-12-13 RX ORDER — POTASSIUM CHLORIDE 750 MG/1
TABLET, EXTENDED RELEASE ORAL
COMMUNITY
Start: 2024-12-06

## 2024-12-13 NOTE — CASE COMMUNICATION
Patient is discharged from [facility] services with max benefit achieved.  He continues to require assist of family for transfers, gait using RW and assist for ADLs. Limitations appear mostly due to LBP.  No further HHC needed. He is performing HEP and son to perform soft tissue mobilization to gluteals.  If pain persists, OPT may be beneficial.

## 2024-12-16 ENCOUNTER — TELEPHONE (OUTPATIENT)
Dept: PALLIATIVE MEDICINE | Facility: CLINIC | Age: 84
End: 2024-12-16

## 2024-12-16 ENCOUNTER — HOSPITAL ENCOUNTER (OUTPATIENT)
Dept: INFUSION CENTER | Facility: HOSPITAL | Age: 84
End: 2024-12-16
Attending: INTERNAL MEDICINE

## 2024-12-16 ENCOUNTER — CONSULT (OUTPATIENT)
Dept: PALLIATIVE MEDICINE | Facility: CLINIC | Age: 84
End: 2024-12-16
Payer: COMMERCIAL

## 2024-12-16 VITALS
DIASTOLIC BLOOD PRESSURE: 70 MMHG | OXYGEN SATURATION: 95 % | RESPIRATION RATE: 20 BRPM | TEMPERATURE: 97.8 F | SYSTOLIC BLOOD PRESSURE: 110 MMHG | HEART RATE: 96 BPM

## 2024-12-16 VITALS
DIASTOLIC BLOOD PRESSURE: 78 MMHG | SYSTOLIC BLOOD PRESSURE: 118 MMHG | TEMPERATURE: 98.4 F | HEIGHT: 65 IN | RESPIRATION RATE: 16 BRPM | HEART RATE: 77 BPM | OXYGEN SATURATION: 97 % | BODY MASS INDEX: 26.46 KG/M2

## 2024-12-16 DIAGNOSIS — G89.3 CANCER RELATED PAIN: ICD-10-CM

## 2024-12-16 DIAGNOSIS — T40.2X5A THERAPEUTIC OPIOID-INDUCED CONSTIPATION (OIC): ICD-10-CM

## 2024-12-16 DIAGNOSIS — G93.40 ENCEPHALOPATHY, UNSPECIFIED TYPE: ICD-10-CM

## 2024-12-16 DIAGNOSIS — G89.29 CHRONIC LEFT-SIDED LOW BACK PAIN WITH LEFT-SIDED SCIATICA: ICD-10-CM

## 2024-12-16 DIAGNOSIS — Z51.5 PALLIATIVE CARE BY SPECIALIST: ICD-10-CM

## 2024-12-16 DIAGNOSIS — M54.42 CHRONIC LEFT-SIDED LOW BACK PAIN WITH LEFT-SIDED SCIATICA: ICD-10-CM

## 2024-12-16 DIAGNOSIS — Z71.89 COUNSELING REGARDING ADVANCE CARE PLANNING AND GOALS OF CARE: ICD-10-CM

## 2024-12-16 DIAGNOSIS — K59.03 THERAPEUTIC OPIOID-INDUCED CONSTIPATION (OIC): ICD-10-CM

## 2024-12-16 DIAGNOSIS — C84.90 NK/T-CELL LYMPHOMA, UNSPECIFIED TYPE (HCC): Primary | ICD-10-CM

## 2024-12-16 DIAGNOSIS — R63.0 LOSS OF APPETITE: ICD-10-CM

## 2024-12-16 PROCEDURE — 99205 OFFICE O/P NEW HI 60 MIN: CPT | Performed by: PHYSICIAN ASSISTANT

## 2024-12-16 RX ORDER — PREDNISONE 10 MG/1
5 TABLET ORAL 2 TIMES DAILY WITH MEALS
Qty: 30 TABLET | Refills: 0 | Status: SHIPPED | OUTPATIENT
Start: 2024-12-16 | End: 2025-01-15

## 2024-12-16 RX ORDER — AMOXICILLIN 250 MG
1 CAPSULE ORAL DAILY PRN
Qty: 30 TABLET | Refills: 1 | Status: SHIPPED | OUTPATIENT
Start: 2024-12-16

## 2024-12-16 RX ORDER — OXYCODONE HYDROCHLORIDE 5 MG/1
2.5 TABLET ORAL EVERY 6 HOURS PRN
Qty: 20 TABLET | Refills: 0 | Status: SHIPPED | OUTPATIENT
Start: 2024-12-16

## 2024-12-16 RX ORDER — GABAPENTIN 100 MG/1
300 CAPSULE ORAL
Qty: 90 CAPSULE | Refills: 0 | Status: SHIPPED | OUTPATIENT
Start: 2024-12-16

## 2024-12-16 RX ORDER — GABAPENTIN 100 MG/1
300 CAPSULE ORAL
Qty: 90 CAPSULE | Refills: 0 | Status: SHIPPED | OUTPATIENT
Start: 2024-12-16 | End: 2024-12-16

## 2024-12-16 NOTE — PROGRESS NOTES
Name: Miguel Henderson      : 1940      MRN: [id number]  Encounter Provider: Walker Ellis PA-C  Encounter Date: 2024   Encounter department: [facility] PALLIATIVE CARE [place]  :  Assessment & Plan  NK/T-cell lymphoma, unspecified type (HCC)  Stage IV follicular helper T-cell lymphoma   Follows with Dr. Franco (heme onc)    Orders:    Ambulatory Referral to Palliative Care    Counseling regarding advance care planning and goals of care  Decisional apparatus:  Patient is competent on exam today.  If competency is lost, patient's substitute decision maker would default to spouse by [place] Act 169.  Advance Directive / Living Will / POLST / POA Forms: Yes, living will at home. Completed POLST today. Son Juan Henderson is HCR.     Goals  Does not want to pursue antineoplastic therapies  Would like to continue following with outpatient providers and would seek ED/hospital care if needed  Discussed hospice v. Palliative. Not considering hospice at this time but would if condition worsened or symptoms uncontrolled.   DNR/DNI limits determined. POLST form completed.     Palliative care by specialist  Palliative follow up in 1 month with Dr. Lane   Call sooner with new or worsening symptoms  Loss of appetite  Continue prednisone 10mg/day  Schedule MMJ visit after registering online  Orders:    predniSONE 10 mg tablet; Take 0.5 tablets (5 mg total) by mouth 2 (two) times a day with meals    Cancer related pain  Trial oxyIR 2.5mg q6h PRN  Increase gabapentin to 300mg QHS  Monitor for adverse effects, somnolence.   Orders:    oxyCODONE (Roxicodone) 5 immediate release tablet; Take 0.5 tablets (2.5 mg total) by mouth every 6 (six) hours as needed for moderate pain or severe pain Max Daily Amount: 10 mg    Therapeutic opioid-induced constipation (OIC)    Orders:    senna-docusate sodium (SENOKOT S) 8.6-50 mg per tablet; Take 1 tablet by mouth daily as needed for constipation    Chronic left-sided low back  pain with left-sided sciatica  Refer to S&P for procedural pain interventions  Orders:    Ambulatory referral to Spine & Pain Management; Future    gabapentin (NEURONTIN) 100 mg capsule; Take 3 capsules (300 mg total) by mouth daily at bedtime      PDMP Review: I have reviewed the patient's controlled substance dispensing history in the Prescription Drug Monitoring Program in compliance with the [facility] regulations before prescribing any controlled substances.  No data    History of Present Illness   Miguel Henderson is a 84 y.o. male who presents for palliative consult for oncologic symptom management, goals of care counseling, and assistance with advanced directives. PMH significant for Stage IV T-cell lymphoma, hx of prostate cancer, HTN, hypothyroidism.    Patient presents with his son, Juan, who is a [facility] RN and offered to translate for us today, and his spouse. Patient has already come to a decision not to pursue antineoplastic therapies. He would like to continue to follow with oncology for monitoring.  Patient and family would like to continue follow up with outpatient providers and seek emergency care if needed. He would not want resuscitation at this point. He has living will at home. Agreeable to completion of POLST form today. We reviewed hospice services at length. Although they are not ready for hospice at this time, they would consider if his condition worsened or if his symptoms were difficulty to control.     Complains of feeling tired, muscle aches and joint pains, and poor appetite. He has been on prednisone which has helped. Can continue daily low dose prednisone to help with fatigue and loss of appetite. Patient interested in MMJ. Son will help register and call once completed for MMJ certification visit. He has had general muscle aches and joint pains recently. He is taking tylenol and ibuprofen sporadically. Has limited function d/t pain. Agreeable to try oxycodone at a low dose to see if  "this can help improve function. Reviewed medications risks and side effects at length. He has also been taking gabapentin at night. Reports inconsistent sleep at night. Also taking melatonin. Agreeable to increase in gabapentin to help with sleep and chronic pain. Patient also requests referral to pain management for epidural injections to help with chronic low back pain/l sided sciatica. Palliative and Supportive Care services reviewed. 24 hour on-call provider line discuss and contact information provided. All questions and concerns were addressed today to the satisfaction of the patient. His spouse and son.            Objective   /78   Pulse 77   Temp 98.4 °F (36.9 °C) (Temporal)   Resp 16   Ht 5' 5\" (1.651 m)   SpO2 97%   BMI 26.46 kg/m²     Physical Exam  Vitals and nursing note reviewed.   Constitutional:       General: He is not in acute distress.     Comments: wheelchair   HENT:      Head: Normocephalic and atraumatic.   Cardiovascular:      Rate and Rhythm: Normal rate.   Pulmonary:      Effort: Pulmonary effort is normal.   Musculoskeletal:         General: No deformity.   Neurological:      Mental Status: He is alert and oriented to person, place, and time.   Psychiatric:         Mood and Affect: Mood normal.         Behavior: Behavior normal.       Recent labs:  Lab Results   Component Value Date/Time    SODIUM 137 11/16/2024 08:11 AM    SODIUM 142 06/18/2024 08:27 AM    K 3.2 (L) 11/16/2024 08:11 AM    K 3.8 06/18/2024 08:27 AM    BUN 9 11/16/2024 08:11 AM    BUN 15 06/18/2024 08:27 AM    CREATININE 0.34 (L) 11/16/2024 08:11 AM    CREATININE 0.66 06/18/2024 08:27 AM    GLUC 99 11/16/2024 08:11 AM    GLUC 87 06/18/2024 08:27 AM    CALCIUM 8.2 (L) 11/16/2024 08:11 AM    CALCIUM 8.3 (L) 06/18/2024 08:27 AM    AST 31 11/14/2024 08:35 AM    AST 19 06/18/2024 08:27 AM    ALT 31 11/14/2024 08:35 AM    ALT 16 06/18/2024 08:27 AM    ALB 2.7 (L) 11/14/2024 08:35 AM    ALB 3.4 (L) 06/18/2024 08:27 AM "    TP 5.9 (L) 11/14/2024 08:35 AM    TP 5.9 (L) 06/18/2024 08:27 AM    EGFR 114 11/16/2024 08:11 AM    EGFR 91 06/18/2024 08:27 AM    EGFR 91 12/05/2019 06:47 AM     Lab Results   Component Value Date/Time    HGB 9.1 (L) 11/16/2024 08:11 AM    WBC 9.04 11/16/2024 08:11 AM     11/16/2024 08:11 AM    INR 1.67 (H) 10/27/2024 06:39 AM    PTT 33 10/21/2024 04:49 PM     Lab Results   Component Value Date/Time    [id number] 3.279 10/31/2024 08:52 AM       Recent Imaging:  Procedure: NM PET CT skull base to mid thigh  Result Date: 12/13/2024  Narrative: PET/CT SCAN INDICATION: Staging of T-cell lymphoma. Additional history of prostate cancer. C84.90: Mature T/NK-cell lymphomas, unspecified, unspecified site C91.50: Adult T-cell lymphoma/leukemia ([id number]) not having achieved remission MODIFIER: PI COMPARISON: CT chest abdomen pelvis 10/25/2024 and additional priors CELL TYPE: Atypical clonal T cell population compatible with follicular helper T cell lymphoma, inguinal lymph node. TECHNIQUE:   8.2 mCi F-18-FDG administered IV. Multiplanar attenuation corrected and non attenuation corrected PET images are available for interpretation, and contiguous, low dose, axial CT sections were obtained from the skull vertex through the femurs. Intravenous contrast material was not utilized. This examination, like all CT scans performed in the [facility], was performed utilizing techniques to minimize radiation dose exposure, including the use of iterative reconstruction and automated exposure control. Fasting serum glucose: 140 mg/dl FINDINGS: VISUALIZED BRAIN: No acute abnormalities are seen. HEAD/NECK: FDG avid left posterior parotid nodule, SUV max of 7.8. This measures up to 1.1 cm image 42 series 9. Small cutaneous focus of uptake at the right lower neck anteriorly, SUV max of 3.5. Slight infiltration change noted here on CT. See image 37 series 603. This could be inflammatory. No FDG avid  cervical chain lymphadenopathy. CT images: No additional significant findings. CHEST: Foci of increased uptake in the bilateral hilar regions. Left hilar focus demonstrates SUV max of 7.7. Right hilar focus demonstrates SUV max of 4.7. Mild FDG uptake in a precarinal lymph node on the right, SUV max of 2.9. This measures 8 mm short axis image 72 series 4. Previously this measured up to 1.7 cm. Resolution of previously seen axillary lymphadenopathy. CT images: Moderate coronary artery calcifications. Tortuous thoracic aorta. Bilateral gynecomastia. Stable mild perifissural nodularity. Mild to moderate esophageal distention with air-fluid level similar to prior exam. Scattered patchy peripheral atelectasis. Resolution of the small bilateral pleural effusions. ABDOMEN: No FDG avid soft tissue lesions are seen. Decreased number of small retroperitoneal lymph nodes without significant uptake. CT images: Bilateral renal cysts. Stable aneurysmal dilatation of the abdominal aorta at 3.4 cm in diameter. PELVIS: Improved pelvic lymphadenopathy. There is mild FDG uptake in a left inguinal lymph node laterally, SUV max of 2.3. This measures 9 mm in short axis image 183 series 4 smaller from the prior exam previously 1.3 cm. A right inguinal lymph node demonstrates SUV max of 1.3. This measures up to 1.3 cm short axis image 186 series 4, slightly smaller, previously 1.6 cm. There is a prominent fatty hilum with thin cortical rim. Mild patchy uptake in the right inguinal region superficially suggesting prior surgery. CT images: Suggestion of prior TURP. OSSEOUS STRUCTURES: Mild to moderate generalized osseous uptake. No discrete lesion seen on CT. For example, at the L5 level this demonstrates SUV max of 4.1. CT images: Multilevel degenerative changes of the spine. For reference: SUV max of the mediastinal blood pool: 2.5 SUV max of the liver: 3.7     Impression: 1. Generally improved lymphadenopathy in the chest, abdomen and  pelvis compared to CT of 10/25/2024. There is FDG avid yariel disease identified most intense in the bilateral hilar regions. 2. FDG avid left parotid nodule could be an incidental salivary gland lesion rather than related to lymphoma. Reassess on follow-up. 3. Mild to moderate generalized osseous uptake, nonspecific. This can be seen in various settings such as anemia, related to a marrow stimulating agent or response to therapy. However, osseous infiltration by disease is not excluded given the history. Correlate clinically. 4. Small cutaneous focus of uptake at the right lower neck anteriorly with slight infiltration noted on CT. This could be inflammatory. Correlate clinically. Workstation performed: [employer]     Procedure: VAS upper limb venous duplex scan, unilateral/limited  Result Date: 11/12/2024  Narrative:  [facility] REPORT CLINICAL: Indications: Patient presents with fever and palpable painful cord at the right lateral forearm.  He is currently  receiving subcutaneous Heparin.  Physician wants to rule out right upper extremity deep and superficial thrombus. Operative History: No prior heart or vascular surgery Risk Factors: The patient has history of HTN, prostate cancer, SIRS, hypothyroidism,  and COVID-19 virus.  He is a non-smoker. Height:  65 inches.  Weight:  171 lbs.  FINDINGS:  Right                             Thrombus           Cephalic Upper Arm Distal         Acute - Occlusive  Ceph AC                           Acute - Occlusive  Cephalic Vein - Forearm Proximal  Acute - Occlusive  Ceph Mid Forearm                  Acute - Occlusive  Cephalic Vein - Forearm Distal    Acute - Occlusive  Ceph Wrist                        Acute - Occlusive     CONCLUSION:  Impression  RIGHT UPPER LIMB:  ABNORMAL Evaluation shows acute occlusive superficial thrombophlebitis in the cephalic vein from the wrist to the distal upper arm. There is an IV/dressing mid chest. No evidence of acute deep vein  thrombosis.  LEFT UPPER LIMB LIMITED: Not evaluated secondary to patient agitation.  Technical findings posted on EPIC and given to medical staff.  SIGNATURE: Electronically Signed by: OZIEL ALVAREZ MD on 2024-11-12 03:34:06 PM    Procedure: IR biopsy lymph node  Result Date: 11/12/2024  Narrative: PROCEDURE: Ultrasound-guided right axillary lymph node core biopsy PROCEDURAL PERSONNEL: Attending physician(s): Kenn Lamb MD Resident physician(s): None Advanced practice provider(s): None INDICATION:  Newly diagnosed unspecified lymphoproliferative disorder, prior core and excisional biopsies have been necrotic and not completely diagnostic, concern for lymphoma and HLH. COMPLICATIONS: No immediate complications. ESTIMATED BLOOD LOSS (mL): Less than 10 CONTRAST: None RADIATION DOSE: None ANESTHESIA/SEDATION: Level of anesthesia/sedation: No sedation Anesthesia/sedation administered by: Not applicable Total intra-service sedation time (minutes): 0 DESCRIPTION OF PROCEDURE: Informed consent for the procedure including risks, benefits and alternatives was obtained and time-out was performed prior to the procedure. Limited ultrasound evaluation of the lymph node was performed and images were archived. The site was marked, prepared and draped using all elements of maximal sterile barrier technique including sterile gloves, sterile gown, cap, mask, large sterile sheet, sterile ultrasound probe cover, hand hygiene and cutaneous antisepsis with 2% chlorhexidine. Under real-time ultrasound guidance, a 17-gauge coaxial needle guide was advanced into the right axilla lymph node. Through the needle guide, a total of 3 18G core specimens were obtained. The needle guide and biopsy needle were removed intact.  Completion ultrasound was performed and images were saved.  Hemostasis was achieved. FINDINGS: 1.  Pre-procedural ultrasound showed a right axial lymph node with thin cortex. 2.  Intra-procedural ultrasound confirmed good  positioning of the biopsy needle within the cortex of the right axillary lymph node. 3.  Post-procedural ultrasound showed no immediate complication.     Impression: Ultrasound-guided right axillary lymph node core biopsy. Workstation performed: [id number]     Procedure: IR biopsy bone marrow  Result Date: 11/12/2024  Narrative: PROCEDURE: Fluoroscopy-guided bone marrow biopsy PROCEDURAL PERSONNEL: Attending physician(s): Kenn Lamb MD Resident physician(s): None Advanced practice provider(s): None INDICATION:  Newly diagnosed unspecified lymphoproliferative disorder, prior core and excisional biopsies have been necrotic and not completely diagnostic, concern for lymphoma and HLH. COMPLICATIONS: No immediate complications. ESTIMATED BLOOD LOSS (mL): Less than 10 CONTRAST: Contrast agent: None Contrast volume (mL): 0 RADIATION DOSE: Fluoroscopy time: 1.36 MIUTES Reference air kerma: 45 mGy ANESTHESIA/SEDATION: Level of anesthesia/sedation: Moderate sedation (conscious sedation) Anesthesia/sedation administered by: Independent trained observer under attending supervision with continuous monitoring of the patient's level of consciousness and physiologic status Total intra-service sedation time (minutes): 15 DESCRIPTION OF PROCEDURE: Informed consent for the procedure including risks, benefits and alternatives was obtained and time-out was performed prior to the procedure. The site was prepared and draped using all elements of maximal sterile barrier technique including sterile gloves, sterile gown, cap, mask, large sterile sheet, sterile ultrasound probe cover, hand hygiene and cutaneous antisepsis with 2% chlorhexidine. Using fluoroscopic guidance, the [employer] system was used to perform bone marrow aspiration through the right posterior superior iliac spine. 0.5 mL of aspirate was obtained and placed on slides.  10 mL of marrow was aspirated and placed in test tubes. Next, core biopsy was performed and placed in  formalin. The needle was removed and hemostasis was achieved.     Impression: Successful image guided bone marrow biopsy of right ileum. Workstation performed: [id number]     Procedure: XR chest portable  Result Date: 2024  Narrative: XR CHEST PORTABLE INDICATION: Shortness of breath. COMPARISON: 2024 FINDINGS: Right-sided central venous catheter with its tip in the SVC. Clear lungs. No pneumothorax or pleural effusion. Normal cardiomediastinal silhouette. Bones are unremarkable for age. Normal upper abdomen.     Impression: No acute cardiopulmonary disease. Workstation performed: [id number]     Procedure: XR abdomen 1 vw portable  Result Date: 2024  Narrative: XR ABDOMEN 1 VW PORTABLE INDICATION: stool burden. COMPARISON: None FINDINGS: No evidence of small bowel obstruction Moderately increased stool present throughout the colon but without colonic distention. Air is seen in the rectum. No evidence of pneumoperitoneum on this supine study. Upright or left lateral decubitus imaging is more sensitive to detect subtle free air in the appropriate setting. No pathologic calcification or soft tissue mass. Central line tip noted in upper portion of right atrium Bones are unremarkable for the patient's age.     Impression: No evidence of small bowel obstruction or colonic distention. Moderately increased stool throughout the colon Workstation performed: [id number]     Procedure: EEG awake or drowsy routine  Result Date: 2024  Narrative: Table formatting from the original result was not included. Images from the original result were not included. Routine EEG Patient Name:  Miguel Henderson  MRN: [id number] :  1940 File #: [id number] Age: 84 y.o. Ordering Provider: Argelia Mccallum MD  Study Start: 24 8:52 Study End: 24 9:26            Study type: Routine EEG ICD 10 diagnosis: Spells/Fit NOS R56.9 Patient History: Patient is 84 y.o. male for whom EEG was ordered for the evaluation of  myoclonic jerking. EMG electrodes were added to this EEG montage to aid in diagnosis. Medications include: Prior to Admission medications  Medication Sig Start Date Authorizing Provider benzonatate (TESSALON) 200 MG capsule Take 1 capsule (200 mg total) by mouth 2 (two) times a day as needed for cough 10/16/24 Greg Foster MD cyclobenzaprine (FLEXERIL) 5 mg tablet Take 5 mg by mouth 3 (three) times a day as needed 8/26/24 Historical Provider, MD fluticasone (FLONASE) 50 mcg/act nasal spray 1 spray into each nostril daily 10/16/24 Greg Foster MD hydroCHLOROthiazide 25 mg tablet Take 1 tablet by mouth daily 7/3/24 Historical Provider, MD ibuprofen (MOTRIN) 800 mg tablet Take 1 tablet (800 mg total) by mouth every 8 (eight) hours as needed for moderate pain 9/18/24 Greg Foster MD levothyroxine 50 mcg tablet Take 50 mcg by mouth daily 7/3/24 Historical Provider, MD levothyroxine 75 mcg tablet Take 75 mcg by mouth Contingent Taken every Friday through Bill 7/3/24 Historical Provider, MD ondansetron (ZOFRAN) 4 mg tablet Take 1 tablet (4 mg total) by mouth every 8 (eight) hours as needed for nausea or vomiting 10/23/24 Cody Metzger MD pantoprazole (PROTONIX) 40 mg tablet Take 1 tablet (40 mg total) by mouth daily in the early morning 10/16/24 Judy Wade MD potassium chloride (Klor-Con M10) 10 mEq tablet Take 10 mEq by mouth daily  Historical Provider, MD  Description of Procedure: 32 channel digital recording with electrodes placed according to the International 10-20 system with additional T1/T2 electrodes, EOG, EKG, and simultaneous video. The recording was technically satisfactory. Terminology Key Abnormal I - mild encephalopathy Abnormal II- moderate encephalopathy or mild/moderate focal cerebral dysfunction Abnormal III- severe encephalopathy, epileptiform activity, or severe focal cerebral dysfunction EEG Findings: During wakefulness, the background is disorganized, symmetric, and continuous. The  predominant activity is low-moderate voltage theta. There is no posterior dominant rhythm. Abnormal findings: Interictal Continuous Slow, Generalized Percentage: 100% Triphasic Waves, Generalized Comments: Every few seconds Ictal Non-Epileptic Semiology: Left leg myoclonic Clinical Event Description: Patient has continuous left leg myoclonic jerking every few seconds. EEG Event Description: EMG channel activity from left leg is not consistently time-locked to sharp waves which are favored to be triphasics. Number of Events Recorded: Continuous 11/1/24- 9:13:18- EMG artifact Activating Procedures: Photic stimulation was performed. No Photic driving was observed.  Hyperventilation was contraindicated due to the patient's medical conditions. Other findings: The single lead ECG shows a regular rate and rhythm around 90bpm. EEG Impression: Abnormal II State of Consciousness: Lethargy - Organic Non-Epileptic Paroxysmal Event: Left Leg Myoclonic - Continuous Slow, Generalized - Triphasic waves, Generalized - No PDR Interpretation: This routine EEG with EMG suggests that the patient's left leg myoclonic jerks are non-epileptic. In addition, it shows a moderate diffuse encephalopathy. No epileptiform discharges or lateralizing signs are recorded. Yumiko Gallardo MD Attending Epileptologist/ Neurologist [facility]     Procedure: MRI brain and orbits wo contrast  Result Date: 11/4/2024  Narrative: MRI OF THE BRAIN AND ORBITS -WITHOUT CONTRAST INDICATION: eval for any new pathology since MRI on 10/25.   New twitching and abnormal pupils. Encephalopathy. COMPARISON: 10/25/2024 brain MRI. 10/31/2024 brain CT. TECHNIQUE: Multiplanar, multisequence imaging of the brain and orbits was performed. IMAGE QUALITY: Degraded by motion artifact. FINDINGS: BRAIN PARENCHYMA: No acute infarct,  hemorrhage, extra-axial fluid collection, mass effect or midline shift. Similar mild, focal  patchy periventricular and subcortical white matter foci of abnormal T2 and FLAIR hyperintensity are nonspecific, but usually secondary to changes of microangiopathy. VENTRICLES:  Within normal limits for age. Stable size and configuration. SELLA AND PITUITARY GLAND:  Within normal limits. ORBITS: Normal position, signal intensity and morphology of the globes. Normal position of lens implants. Bilateral lacrimal glands, extraocular muscles and optic nerve sheath complexes are within normal limits. Preserved orbital fat. No mass. Orbital apices and optic chiasm are within normal limits. PARANASAL SINUSES:  Well aerated. VASCULATURE: Grossly preserved. CALVARIUM AND SKULL BASE:  Within normal limits.  Mastoid air cells clear. EXTRACRANIAL SOFT TISSUES:  Within normal limits.     Impression: No acute intracranial pathology. Workstation performed: [id number]     Procedure: XR chest portable  Result Date: 11/2/2024  Narrative: XR CHEST PORTABLE INDICATION: r/o fluid overload. COMPARISON: 10/31/24 FINDINGS: Right-sided central venous catheter with tip at the cavoatrial junction. Mild stable bibasilar atelectasis otherwise clear lungs. No pneumothorax or pleural effusion. Normal cardiomediastinal silhouette. Bones are unremarkable for age. Normal upper abdomen.     Impression: Mild bibasilar atelectasis otherwise no focal consolidation, pleural effusion, or pneumothorax. Workstation performed: [id number]     Procedure: XR chest portable  Result Date: 11/1/2024  Narrative: XR CHEST PORTABLE INDICATION: sob. COMPARISON: 10/21/2024 FINDINGS: There is a right internal jugular central venous catheter with tip projecting over the caval atrial junction. Stable mild bibasilar atelectasis. No pneumothorax or pleural effusion. Normal cardiomediastinal silhouette. Bones are unremarkable for age. Normal upper abdomen.     Impression: Stable bibasilar atelectasis. No pneumothorax. Workstation performed: [id number]     Procedure: EEG  awake or drowsy routine  Result Date: 10/31/2024  Narrative: Table formatting from the original result was not included. Images from the original result were not included. Routine EEG Patient Name:  Miguel Henderson  MRN: [id number] :  1940 File #: [id number] Age: 84 y.o. Ordering Provider: DARREN Jaeger  Study Start: 10/31/24 14:36 Study End: 10/31/24 15:12            Study type: Routine EEG ICD 10 diagnosis: Encephalopathy, unspecified G93.40 Patient History: Patient is 84 y.o. male for whom EEG was ordered for the evaluation of encephalopathy and myoclonic jerks. Medications include: Prior to Admission medications  Medication Sig Start Date Authorizing Provider benzonatate (TESSALON) 200 MG capsule Take 1 capsule (200 mg total) by mouth 2 (two) times a day as needed for cough 10/16/24 Greg Foster MD cyclobenzaprine (FLEXERIL) 5 mg tablet Take 5 mg by mouth 3 (three) times a day as needed 24 Historical Provider, MD fluticasone (FLONASE) 50 mcg/act nasal spray 1 spray into each nostril daily 10/16/24 Greg Foster MD hydroCHLOROthiazide 25 mg tablet Take 1 tablet by mouth daily 7/3/24 Historical Provider, MD ibuprofen (MOTRIN) 800 mg tablet Take 1 tablet (800 mg total) by mouth every 8 (eight) hours as needed for moderate pain 24 Greg Foster MD levothyroxine 50 mcg tablet Take 50 mcg by mouth daily 7/3/24 Historical Provider, MD levothyroxine 75 mcg tablet Take 75 mcg by mouth Contingent Taken every Friday through Bill 7/3/24 Historical Provider, MD ondansetron (ZOFRAN) 4 mg tablet Take 1 tablet (4 mg total) by mouth every 8 (eight) hours as needed for nausea or vomiting 10/23/24 Cody Metzger MD pantoprazole (PROTONIX) 40 mg tablet Take 1 tablet (40 mg total) by mouth daily in the early morning 10/16/24 Judy Wade MD potassium chloride (Klor-Con M10) 10 mEq tablet Take 10 mEq by mouth daily  Historical Provider, MD  Description of Procedure: 32 channel digital recording with  "electrodes placed according to the International 10-20 system with additional T1/T2 electrodes, EOG, EKG, and simultaneous video. The recording was technically satisfactory. Terminology Key Abnormal I - mild encephalopathy Abnormal II- moderate encephalopathy or mild/moderate focal cerebral dysfunction Abnormal III- severe encephalopathy, epileptiform activity, or severe focal cerebral dysfunction EEG Findings: During wakefulness, the background is disorganized, symmetric, and continuous. The predominant activity is low-moderate voltage theta. There is no posterior dominant rhythm. Abnormal findings: Interictal Continuous Slow, Central Distribution Details: Maximum Pz>Cz>Fz Comments: Sharply contoured, semi-periodic slowing is seen in the central areas around 0.5-2Hz Continuous Slow, Generalized Percentage: 100% 10/31/24- Transverse bipolar- 14:53:42 Ictal Paroxysmal Event Semiology: Trunk/ Left hemibody myoclonic -> Left eye versive Clinical Event Description: Patient has nearly continuous trunk and left arm myoclonic jerk, with the sheets removed there is involvement of the left leg > right leg as well. Occasionally he has a non-forced head turn towards the left, with eyes looking upward and leftward. EEG Event Description: There is no clearly epileptic activity seen, but there are sharply contoured slow waves maximum at Pz which appear to occur about at the same frequency as the jerks. See \"periodic pattern\" above. Number of Events Recorded: Multiple Activating Procedures: Photic stimulation was performed. No Photic driving was observed.  Hyperventilation was contraindicated due to the patient's medical conditions. Other findings: The single lead ECG shows a regular rate and rhythm around 105bpm. EEG Impression: Abnormal III State of Consciousness: Awake - Paroxysmal Event: Trunk/ Left hemibody myoclonic x multiple - Continuous Slow, Central - Continuous Slow, Generalized - No PDR Interpretation: This routine EEG " shows nearly continuous myoclonic jerking, predominantly of the trunk and left hemibody, with frequent eye deviation towards the left. 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 EMG electrodes placed on the left arm would be helpful in correlating whether these slow waves are time-locked to muscle activity. In addition, this recording shows a moderate diffuse encephalopathy. Yumiko Gallardo MD Attending Epileptologist/ Neurologist [facility]     Procedure: CT head wo contrast  Result Date: 10/31/2024  Narrative: CT BRAIN - WITHOUT CONTRAST INDICATION:   change in mental status. COMPARISON: CT brain dated October 28, 2024. TECHNIQUE:  CT examination of the brain was performed.  Multiplanar 2D reformatted images were created from the source data. Radiation dose length product (DLP) for this visit:  1223 mGy-cm .  This examination, like all CT scans performed in the [facility], was performed utilizing techniques to minimize radiation dose exposure, including the use of iterative reconstruction and automated exposure control. IMAGE QUALITY:  Diagnostic. FINDINGS: PARENCHYMA:No intracranial mass, mass effect or midline shift. No CT signs of acute infarction.  No acute parenchymal hemorrhage. There is mild periventricular white matter low attenuation which is nonspecific and most likely related to chronic small vessel ischemic changes. VENTRICLES AND EXTRA-AXIAL SPACES:  Normal for the patient's age. VISUALIZED ORBITS: Normal visualized orbits. PARANASAL SINUSES: Normal visualized paranasal sinuses. CALVARIUM AND EXTRACRANIAL SOFT TISSUES: Normal.     Impression: No acute intracranial abnormality. Mild chronic small vessel ischemic changes. Workstation performed: [id number]     Procedure: IR biopsy bone marrow  Result  Date: 10/30/2024  Narrative: PROCEDURE: Fluoroscopic-guided bone marrow biopsy and bone marrow aspiration INDICATION: Lymphadenopathy with concerns for lymphoma COMPLICATIONS: None immediate. FLUOROSCOPY TIME: 0.6 minjutes minutes. MODERATE SEDATION TIME: 30 minutes Moderate sedation was monitored by radiology nursing staff. LOCAL ANESTHETIC: 1% lidocaine PROCEDURE: Using fluoroscopic guidance, the [employer] system was used to perform bone marrow aspiration via the right posterior superior iliac spine. Approximately 6 mL of marrow was removed and sent to pathology . Next, bone marrow biopsy was performed. The biopsy specimen was given to the in-room pathologist. The needle was then removed and hemostasis was achieved using manual compression.     Impression: Bone marrow aspiration and core biopsy. PERFORMED BY: DARREN Prajapati SUPERVISING PHYSICIAN: Dr. Anton Perez Workstation performed: [id number]     Procedure: XR hips bilateral 3-4 vw w pelvis if performed  Result Date: 10/29/2024  Narrative: XR HIPS BILATERAL 3-4 VW W PELVIS IF PERFORMED INDICATION: Hip pain. fall. COMPARISON: None FINDINGS: Intact bony pelvis. Unremarkable visualized lumbar spine. Small focus of heterotopic ossification in the lateral right hip. RIGHT HIP: No acute fracture or dislocation. No significant hip degenerative changes. No lytic or blastic osseous lesion. LEFT HIP: No acute fracture or dislocation. No significant hip degenerative changes. No lytic or blastic osseous lesion.     Impression: No acute osseous abnormality. Computerized Assisted Algorithm (CAA) may have been used to analyze all applicable images. Workstation performed: [id number]     Procedure: CT spine cervical wo contrast  Result Date: 10/28/2024  Narrative: CT CERVICAL SPINE - WITHOUT CONTRAST INDICATION:   Fall unwitnessed. COMPARISON:  None. TECHNIQUE:  CT examination of the cervical spine was performed without intravenous contrast.  Contiguous axial images were  obtained. Multiplanar 2D reformatted images were created from the source data. Radiation dose length product (DLP) for this visit:  234.82 mGy-cm .  This examination, like all CT scans performed in the [facility], was performed utilizing techniques to minimize radiation dose exposure, including the use of iterative  reconstruction and automated exposure control. IMAGE QUALITY:  Diagnostic. FINDINGS: ALIGNMENT:  Normal alignment of the cervical spine. No subluxation. VERTEBRAE: Diffuse osteopenia. No fracture. No lytic or destructive lesion. DEGENERATIVE CHANGES: Minor degenerative change. The C2-3 endplates and facets are fused. PREVERTEBRAL AND PARASPINAL SOFT TISSUES: Unremarkable THORACIC INLET:  Normal.     Impression: No cervical spine fracture or traumatic malalignment. Workstation performed: [id number]     Procedure: CT head wo contrast  Result Date: 10/28/2024  Narrative: CT BRAIN - WITHOUT CONTRAST INDICATION:   Fall unwitnessed. COMPARISON: 10/25/2024 TECHNIQUE:  CT examination of the brain was performed.  Multiplanar 2D reformatted images were created from the source data. Radiation dose length product (DLP) for this visit:  1157.68 mGy-cm .  This examination, like all CT scans performed in the [facility], was performed utilizing techniques to minimize radiation dose exposure, including the use of iterative reconstruction and automated exposure control. IMAGE QUALITY:  Diagnostic. FINDINGS: PARENCHYMA: Decreased attenuation is noted in periventricular and subcortical white matter demonstrating an appearance that is statistically most likely to represent mild microangiopathic change; this appearance is similar when compared to most recent prior examination. No CT signs of acute infarction.  No intracranial mass, mass effect or midline shift.  No acute parenchymal hemorrhage. VENTRICLES AND EXTRA-AXIAL SPACES: Ventricles and extra-axial CSF spaces are prominent commensurate  with the degree of volume loss.  No hydrocephalus.  No acute extra-axial hemorrhage. VISUALIZED ORBITS: Normal visualized orbits. PARANASAL SINUSES: Normal visualized paranasal sinuses. CALVARIUM AND EXTRACRANIAL SOFT TISSUES: Normal.     Impression: No acute intracranial abnormality.  Stable chronic microangiopathic changes within the brain. Workstation performed: [id number]     Procedure: IR biopsy inguinal lymph node  Result Date: 10/28/2024  Narrative: Ultrasound-guided lymph node biopsy Clinical History: Bilateral inguinal lymphadenopathy Procedure: After explaining the risks and benefits of the procedure to the patient, informed consent was obtained. Ultrasound was used to localize the enlarged left inguinal lymph node. The overlying skin was prepped and draped in the usual sterile fashion. Local anesthesia was obtained with a 1% lidocaine solution. Using ultrasound guidance, a 18-gauge needle was advanced into the lesion. 5 passes with a 18 gauge core biopsy needle were performed. The tissue was sent for surgical pathology and flow cytometry. The patient tolerated the procedure well and left the department in stable condition.     Impression: Impression: Successful biopsy of the left inguinal lymph node. Workstation performed: [id number]     Procedure: IR temporary dialysis catheter placement  Result Date: 10/26/2024  Narrative: PROCEDURE: Non-tunneled plasmapheresis catheter placement Procedural Personnel Attending physician(s): Dr. 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 PROCEDURE SUMMARY: - Venous access with ultrasound guidance - Non-tunneled plasmapheresis catheter insertion with fluoroscopic guidance PROCEDURE DETAILS: Pre-procedure Consent: Informed consent for the procedure including risks, benefits and alternatives was obtained and  time-out was performed prior to the procedure. Preparation (MIPS): The site was prepared and draped using all elements of maximal sterile barrier technique including sterile gloves, sterile gown, cap, mask, large sterile sheet, sterile ultrasound probe cover, hand hygiene and cutaneous antisepsis with 2% chlorhexidine. Anesthesia/sedation Level of anesthesia/sedation: Local lidocaine Access Local anesthesia was administered. The vessel was sonographically evaluated and determined to be patent. Real time ultrasound was used to visualize needle entry into the vessel and a permanent image was stored. Vein accessed: Right internal jugular vein Access technique: Micropuncture set with 21 gauge needle Catheter placement The access site was dilated and the catheter was placed into the vein over a wire  under fluoroscopic guidance.  The catheter tip location was fluoroscopically verified and a permanent image was stored.. A sterile dressing was applied. Catheter placed: 14 Yi 15 cm double-lumen catheter Catheter flush: Normal saline Catheter securement technique: 2-0 Prolene suture Radiation Dose Fluoroscopy time (minutes): 0.5 Reference air kerma (mGy): 1 Additional Details Estimated blood loss (mL): None Complications: No immediate complications.     Impression: Insertion of right IJV non-tunneled dual-lumen plasmapheresis catheter, with tip in the expected location of the superior vena cava. Plan: The catheter may be used immediately. Workstation performed: [id number]     Procedure: MRI brain w wo contrast  Result Date: 10/26/2024  Narrative: MRI BRAIN WITH AND WITHOUT CONTRAST INDICATION: confusion,. COMPARISON: CT head 10/25/2024. TECHNIQUE: Multiplanar, multisequence imaging of the brain was performed before and after gadolinium administration. IV Contrast:  7 mL of Gadobutrol injection (SINGLE-DOSE) IMAGE QUALITY:   Motion-degraded, which reduces diagnostic sensitivity. FINDINGS: BRAIN PARENCHYMA: No acute  infarct, mass effect or midline shift. Mild-moderate chronic ischemic changes of the white matter. Postcontrast imaging of the brain demonstrates no abnormal enhancement. VENTRICLES:  Normal for the patient's age. SELLA AND PITUITARY GLAND:  Normal. ORBITS: No acute abnormality. PARANASAL SINUSES: Mild mucosal thickening. VASCULATURE: Please refer to CTA. CALVARIUM AND SKULL BASE: No acute abnormality. EXTRACRANIAL SOFT TISSUES: Multiple bilateral parotid nodules, the largest of which measures up to 1.0 cm in the left parotid gland (series 6, image 1), noting these are not well evaluated.     Impression: Within the confines of a motion-degraded examination: 1. No acute infarct, mass effect or midline shift. Mild-moderate chronic microangiopathy. 2. Multiple indeterminate bilateral parotid nodules, the largest of which measures up to 1.0 cm on the left, incompletely evaluated. If clinically appropriate, this could be further evaluated with contrast-enhanced neck CT on a nonemergent basis. The study was marked in EPIC for immediate notification. Workstation performed: [id number]     Procedure: IR lumbar puncture  Result Date: 10/25/2024  Narrative: Lumbar puncture under fluoroscopic control. Clinical History: Confusion and fever Fluoro time: 0.9 minutes Conscious sedation time: 35 minutes Number of Images: 1 Technique: The patient was brought to the interventional radiology suite and identified by wristband. The patient was placed prone on the table, and the L4-5 spinous processes were identified. The skin was then prepped and draped in usual sterile fashion. Lidocaine was administered to the skin, and under fluoroscopic control, a 20 gauge spinal needle was advanced into the subarachnoid space between the L4-5 spinous processes . The table was then tilted into reverse Trendelenburg. 30 cc of clear spinal fluid was removed under it's own pressure into 4 separate sequential tubes. The tubes were sent to laboratory for  testing as requested by the referring physician. After the procedure, the needle was removed, and a sterile dressing applied to the site. The patient tolerated the procedure well and suffered no complications.     Impression: Impression: 1. Successful lumbar puncture under fluoroscopic control. 2. 30 cc of clear spinal fluid was removed and sent to laboratory as described above. Workstation performed: [id number]     Procedure: CT chest abdomen pelvis w contrast  Result Date: 10/25/2024  Narrative: CT CHEST, ABDOMEN AND PELVIS WITH IV CONTRAST INDICATION: Confusion, abdominal pain, emesis, generalized abdominal tenderness. COMPARISON: CT abdomen pelvis 10/21/2024. CT chest abdomen pelvis 10/11/2024. TECHNIQUE: CT examination of the chest, abdomen and pelvis was performed. Multiplanar 2D reformatted images were created from the source data. This examination, like all CT scans performed in the [facility], was performed utilizing techniques to minimize radiation dose exposure, including the use of iterative reconstruction and automated exposure control. Radiation dose length product (DLP) for this visit: 850.79 mGy-cm IV Contrast: 100 mL of iohexol (OMNIPAQUE) Enteric Contrast: Not administered. FINDINGS: CHEST LUNGS: Mild interlobular septal thickening through both lower lobes may represent mild pulmonary edema. Mild bibasilar atelectasis. No focal consolidation. Unchanged known 4 mm and smaller pulmonary nodules. PLEURA: Small bibasilar pleural effusions. HEART/GREAT VESSELS: Cardiomegaly. Coronary artery calcifications. No thoracic aortic aneurysm. MEDIASTINUM AND JOE: Unchanged mild mediastinal and bilateral parahilar adenopathy. Dominant 16 mm precarinal node #2/49. Reidentified diffuse esophageal distention. Diffuse esophageal fluid may indicate reflux. CHEST WALL AND LOWER NECK: Worsened right axillary and right subpectoral adenopathy. Previously seen dominant 12 mm node now measures 21 mm  #2/29. Numerous new enlarged nodes. Unchanged subcentimeter left axillary nodes. ABDOMEN LIVER/BILIARY TREE: Unremarkable. GALLBLADDER: No calcified gallstones. No pericholecystic inflammatory change. SPLEEN: Unremarkable. PANCREAS: Unremarkable. ADRENAL GLANDS: Unremarkable. KIDNEYS/URETERS: No hydronephrosis. Numerous unchanged bilateral simple renal cysts. Well-circumscribed subcentimeter bilateral renal hypoattenuating lesions are also unchanged and likely to represent subcentimeter simple cyst. STOMACH AND BOWEL: Unremarkable. APPENDIX: No findings to suggest appendicitis. ABDOMINOPELVIC CAVITY: No ascites. No pneumoperitoneum. Unchanged bilateral iliac chain adenopathy. A dominant 2.2 cm right external iliac node #2/199 is unchanged. Dominant 10 mm left external iliac node #2/185 is unchanged. Unchanged 9 mm left pelvic sidewall node #2/210. Numerous unchanged subcentimeter retroperitoneal nodes. 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 PELVIS REPRODUCTIVE ORGANS: Prostatectomy. URINARY BLADDER: Unremarkable. ABDOMINAL WALL/INGUINAL REGIONS: Unchanged bilateral inguinal adenopathy. Dominant 16 mm right inguinal node #2/214. Dominant 11 mm left inguinal node #2/237. BONES: No acute fracture or suspicious osseous lesion.     Impression: Right axillary adenopathy is worsened when compared with CT chest 10/11/2024 Unchanged mediastinal, bilateral iliac chain and bilateral inguinal adenopathy when compared with CT abdomen pelvis 10/21/2024. Potential mild pulmonary edema. Small pleural effusions. Unchanged cardiomegaly. The study was marked in EPIC for immediate notification. Workstation performed: [id number]     Procedure: CT head without contrast  Result Date: 10/25/2024  Narrative: CT BRAIN - WITHOUT CONTRAST INDICATION:   Confusion, non focal. COMPARISON:  None. TECHNIQUE:  CT examination of the brain was performed.  Multiplanar 2D reformatted images  were created from the source data. Radiation dose length product (DLP) for this visit:  870.27 mGy-cm .  This examination, like all CT scans performed in the [facility], was performed utilizing techniques to minimize radiation dose exposure, including the use of iterative  reconstruction and automated exposure control. IMAGE QUALITY:  Diagnostic. FINDINGS: PARENCHYMA: Decreased attenuation is noted in periventricular and subcortical white matter demonstrating an appearance that is statistically most likely to represent moderate microangiopathic change. No CT signs of acute infarction.  No intracranial mass, mass effect or midline shift.  No acute parenchymal hemorrhage. VENTRICLES AND EXTRA-AXIAL SPACES:  Normal for the patient's age. VISUALIZED ORBITS: Normal visualized orbits. PARANASAL SINUSES: Normal visualized paranasal sinuses. CALVARIUM AND EXTRACRANIAL SOFT TISSUES: Normal.     Impression: No acute intracranial abnormality. Workstation performed: [id number]     Procedure: XR chest 1 view portable  Result Date: 10/22/2024  Narrative: XR CHEST PORTABLE INDICATION: Cough/fever. COMPARISON: Chest radiograph 10/8/2024; CT chest, abdomen and pelvis 10/11/2024 FINDINGS: Monitoring leads and clips project over the chest. Mild bibasilar atelectasis. No focal consolidation. Blunting of the left costophrenic angle suggesting trace pleural effusion. No pneumothorax. Normal cardiomediastinal silhouette. Degenerative changes of the spine and bilateral shoulders. Normal upper abdomen.     Impression: Mild bibasilar atelectasis and trace left pleural effusion. Resident: MARTHA Whiteside I, the attending radiologist, have reviewed the images and agree with the final report above. Workstation performed: [id number]     Procedure: CT abdomen pelvis with contrast  Result Date: 10/21/2024  Narrative: CT ABDOMEN AND PELVIS WITH IV CONTRAST INDICATION:, Nausea, vomiting and diarrhea. . COMPARISON: 10/11/2024.  TECHNIQUE: CT examination of the abdomen and pelvis was performed. Multiplanar 2D reformatted images were created from the source data. This examination, like all CT scans performed in the [facility], was performed utilizing techniques to minimize radiation dose exposure, including the use of iterative reconstruction and automated exposure control. Radiation dose length product (DLP) for this visit: 585 mGy-cm IV Contrast: 100 mL of iohexol (OMNIPAQUE) Enteric Contrast: Not administered. FINDINGS: ABDOMEN LOWER CHEST: Small left pleural effusion and left basilar subsegmental atelectasis. LIVER/BILIARY TREE: Unremarkable. GALLBLADDER: No calcified gallstones. No pericholecystic inflammatory change. SPLEEN: Unremarkable. PANCREAS: Unremarkable. ADRENAL GLANDS: Unremarkable. KIDNEYS/URETERS: Symmetric nephrographic phase enhancement the kidneys. Cysts measure up to 6.4 cm. Multiple left renal cysts measuring up to 8.2 cm. No obstructive uropathy. STOMACH AND BOWEL: Unremarkable. APPENDIX: Prior appendectomy. 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 PELVIS REPRODUCTIVE ORGANS: Prior prostatectomy. URINARY BLADDER: Unremarkable. ABDOMINAL WALL/INGUINAL REGIONS: Enlarged bilateral inguinal lymph nodes with normal fatty shen, measuring up to 1.9 cm.. BONES: No acute fracture or suspicious osseous lesion.     Impression: 1. No evidence of bowel obstruction, colitis or diverticulitis. 2. Numerous mildly enlarged para-aortic, iliac chain and inguinal lymph nodes, possibly reactive however lymphoproliferative disorder not excluded. Workstation performed: [id number]     Procedure: CT chest abdomen pelvis w contrast  Result Date: 10/12/2024  Narrative: CT CHEST, ABDOMEN AND PELVIS WITH IV CONTRAST INDICATION: intermittent persistent fevers.  COMPARISON: 10/8/2024. TECHNIQUE: CT examination of the chest, abdomen and pelvis was performed. Multiplanar 2D reformatted images were created from the source data. This examination, like all CT scans performed in the [facility], was performed utilizing techniques to minimize radiation dose exposure, including the use of iterative reconstruction and automated exposure control. Radiation dose length product (DLP) for this visit: 898.73 mGy-cm IV Contrast: 100 mL of iohexol (OMNIPAQUE) Enteric Contrast: Not administered. FINDINGS: Moderately degraded by respiratory motion and retained barium in the colon. CHEST LUNGS: There is bibasilar atelectasis. Stable 3 mm right apical lung nodule on series 4 image 24. Additional nodules are not well seen due to respiratory motion. No tracheal or endobronchial lesion. PLEURA: There are small bilateral pleural effusions. HEART/GREAT VESSELS: Heart is unremarkable for patient's age. No thoracic aortic aneurysm. MEDIASTINUM AND JOE: There is diffuse distention of the esophagus as seen previously. No obstructing lesion is visualized. CHEST WALL AND LOWER NECK: Again seen is an enlarged right axillary lymph node measuring 1.2 x 1.9 cm, nonspecific. ABDOMEN LIVER/BILIARY TREE: There is a 1.9 x 1.6 cm enhancing lesion in segment 6 of the liver, indeterminate. GALLBLADDER: Not well-visualized due to under distention and beam hardening artifact from adjacent barium. SPLEEN: Unremarkable. PANCREAS: Unremarkable. ADRENAL GLANDS: Unremarkable. KIDNEYS/URETERS: There are numerous bilateral renal cysts. No hydronephrosis. STOMACH AND BOWEL: Unremarkable. APPENDIX: No findings to suggest appendicitis. ABDOMINOPELVIC CAVITY: No ascites. No pneumoperitoneum. No lymphadenopathy. 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 PELVIS REPRODUCTIVE ORGANS: Unremarkable for patient's age.  URINARY BLADDER: Unremarkable. ABDOMINAL WALL/INGUINAL REGIONS: Unremarkable. BONES: No acute fracture or suspicious osseous lesion.     Impression: Moderately limited by respiratory motion. 1.  Small bilateral pleural effusions with overlying atelectasis. 2.  3 mm right apical lung nodule. Based on current Fleischner Society 2017 Guidelines on incidental pulmonary nodule, no routine follow-up is needed if the patient is low risk. If the patient is high risk, optional follow-up chest CT at 12 months can be  considered. 3.  Stable diffusely dilated esophagus. This may be related to dysmotility. Obstructing lesion is not visualized. This would be better evaluated with esophagram. 4.  No acute abnormality in the abdomen or pelvis. 5.  Enhancing liver lesion, indeterminate. Possibilities include hemangioma and vascular malformation though other etiologies including neoplasm not excluded. Follow-up nonemergent MRI is recommended. The study was marked in EPIC for immediate notification. Workstation performed: [id number]     Procedure: XR knee 3 vw left non injury  Result Date: 10/10/2024  Narrative: XR KNEE 3 VW LEFT NON INJURY INDICATION: left knee pain. COMPARISON: None FINDINGS: No acute fracture or dislocation. No joint effusion. No significant degenerative changes. No lytic or blastic osseous lesion. Unremarkable soft tissues.     Impression: No acute osseous abnormality. Computerized Assisted Algorithm (CAA) may have been used to analyze all applicable images. Workstation performed: [id number]     Procedure: FL barium swallow video w speech  Result Date: 10/9/2024  Narrative: A video barium swallow study was performed by the Department of Speech Pathology. Please refer to the report for the official interpretation. The images are stored for archival purposes only. Study images were not formally reviewed by the Radiology Department.    Procedure: FL barium swallow video w speech  Result Date: 10/9/2024  Narrative:  Ceci Lilly, SLP     10/9/2024  3:14 PM                                  Video Swallow Study Patient Name: Miguel Henderson Today's Date: 10/9/2024   Past Medical History Past Medical History: Diagnosis Date  Prostate cancer (HCC)   Thyroid cancer (HCC)   Past Surgical History Past Surgical History: Procedure Laterality Date  APPENDECTOMY   Summary: Images are on PACS for review. Oral Phase : Pt presented with mild oral dysphagia. Mastication was mod prolonged however eventual functional breakdown. Bolus control, formation were WNL. Transfer was piecemeal. Trace posterior base of tongue residue. Pharyngeal Phase :Pt presented with mild pharyngeal dysphagia. Swallow initiation was intermittently min delayed with solids and prompt with liquids. Spill to the valleculae occurred with solids. Hyoid elevation and laryngeal excursion were WNL. Pharyngeal stripping wave diminished with solid trials. Epiglottic inversion was mostly complete. With nutri grain min epiglottic undercoat present. Trace vallecular retention with thin liquids and mild/mod with solids. Pt intermittently initiated secondary swallow to reduce residue. No pyriform retention. Large CP bar C4-C5 evident however did not impede bolus flow. No aspiration or penetration observed with trials. Per Esophageal screen Slow motility observed with liquids and solids. High retropulsion observed with liquids, pt risk for bottom up aspiration. Stasis occurred in distal esophagus with 13 mm pill however provided with additional sip of thin, ntl, and puree. Provided additional time x4 minutes pt continued with residue with esophagus. Observations: Son present throughout study provided translation. Recommendations: Diet: Dysphagia 3 dental soft Liquids:thin Meds: whole with thin liquids Strategies:slow rate, alternate liquids with solids, swallow prior to additional po Upright position F/u ST tx: yes Therapy Prognosis: fair Prognosis considerations:age, medical status,  Intermittent/Distant Supervision Aspiration Precautions Reflux Precautions Consider consult with: GI, rehab Results reviewed with: pt, nursing, physician. ST reviewed images and recommendations reviewed with family at bedside. Aspiration precautions posted. If a dedicated assessment of the esophagus is desired, consider esophagram/barium swallow or EGD. Goals: Dysphagia LTG -Patient will demonstrate safe and effective oral intake (without overt s/s significant oral/pharyngeal dysphagia including s/s penetration or aspiration) for the highest appropriate diet level. Short Term Goals: -Pt will tolerate Dysphagia 3/advanced (dental soft) diet and thin liquid with no significant s/s oral or pharyngeal dysphagia across 1-3 diagnostic session/s. -Patient will tolerate trials of upgraded food and/or liquid texture with no significant s/s of oral or pharyngeal dysphagia including aspiration across 1-3 diagnostic sessions -Patient will comply with a Video/Modified Barium Swallow study for more complete assessment of swallowing anatomy/physiology/aspiration risk and to assess efficacy of treatment techniques -Patient will demonstrate the ability to adequately self-monitor swallowing skills and perform appropriate compensatory techniques to reduce overt s/sx of penetration/aspiration to safely tolerate least restrictive food/liquid consistencies. Patient's goal: none stated H&P/Admit info, pertinent provider notes/procedures/studies/PMH:(copied and placed in this report) Miguel Henderson is a 84 y.o. male with a PMH of prostate cancer and thyroid cancer who presents with fever x 4 days.  Patient is [place]-speaking only and history is obtained through assistance of his son who is present at bedside and acting as .  Patient has been having intermittent fevers which have been responsive to Tylenol and Motrin at home but have ranged as high as 103.7 and yesterday was having some apparent respiratory distress but he  has no respiratory history and is on no respiratory medications at home.  Patient does have a scant cough which is nonproductive and denies any increased urinary frequency or dysuria though he does complain of some hematuria which has been ongoing for the past month.  Patient additionally has been complaining of a sore throat for the past 4 days with decreased p.o. fluid intake secondary to pain with swallowing.  While in ER patient was noted to meet severe sepsis criteria and was thus referred for admission. Special Studies XR CHEST PORTABLE 10/08/2024 IMPRESSION: Mild tree-in-bud pattern of opacity mostly in the right lung and in the left base again suggesting a mild infectious/inflammatory condition CT CHEST WITHOUT IV CONTRAST 10/08/2024 IMPRESSION: Mild bibasilar probable atelectatic changes, right greater than left. Otherwise no focal airspace consolidation to suggest pneumonia. Minor tree-in-bud nodularity in the right lung which could relate to infectious versus inflammatory small airways disease. Clinical correlation recommended. Pulmonary nodules as above. Based on current Fleischner Society 2017 Guidelines on incidental pulmonary nodule, no routine follow-up is needed if the patient is low risk. If the patient is high risk, optional follow-up chest CT at 12 months can be considered. Trace right pleural effusion. Mild right axillary lymphadenopathy, nonspecific Code Status: Level 1 full code Previous VBS: none Precautions Contact Precautions Food allergies:  No know  Current diet:  Regular diet and thin liquids  Premorbid diet:  Regular diet and thin liquids  Dentition  Upper and lower dentures Oral Mech  WNL O2 requirements:  Room air  Vocal Quality/Speech WNL Cognitive status:  Alert  Consistencies administered: Barium laden applesauce, nutri grain bar, sandwich bite, thin liquids,ntl and  13mm barium pill. Liquids were administered by cup. Pt was seated laterally at 90 degrees. Pt  unable to be viewed  in AP position, due to transfer status      Procedure: Echo complete w/ contrast if indicated  Result Date: 10/9/2024  Narrative:   Left Ventricle: Left ventricular cavity size is normal. Wall thickness is mildly increased. The left ventricular ejection fraction is 55%. Systolic function is normal. Wall motion is normal.   Aortic Valve: There is aortic valve sclerosis.   Aorta: The aortic root is mildly dilated. The ascending aorta is mildly dilated. The aortic root is 3.80 cm. The ascending aorta is 3.9 cm.     Procedure: XR chest portable  Result Date: 10/8/2024  Narrative: XR CHEST PORTABLE INDICATION: shortness of breath. COMPARISON: 10/7/2024 FINDINGS: Minimal tree-in-bud pattern of opacity mostly in the right lung, but also at the left base. No focal dense consolidation. The trace amount of pleural fluid seen on the prior CT is not apparent on this exam. No pneumothorax identified. Stable cardiomediastinal silhouette Degenerative changes noted along the spine. Normal upper abdomen.     Impression: Mild tree-in-bud pattern of opacity mostly in the right lung and in the left base again suggesting a mild infectious/inflammatory condition Workstation performed: [id number]     Procedure: XR chest portable - 1 view  Result Date: 10/8/2024  Narrative: XR CHEST PORTABLE INDICATION: Fever cough. COMPARISON: None FINDINGS: Clear lungs. No pneumothorax or pleural effusion. Normal cardiomediastinal silhouette. Bones are unremarkable for age. Normal upper abdomen.     Impression: No acute cardiopulmonary disease. Workstation performed: [id number]     Procedure: CT chest wo contrast  Result Date: 10/8/2024  Narrative: CT CHEST WITHOUT IV CONTRAST INDICATION: Cough shortness of breath with fever. COMPARISON: None. TECHNIQUE: CT examination of the chest was performed without intravenous contrast. Multiplanar 2D reformatted images were created from the source data. This examination, like all CT scans performed in the [facility]  [facility], was performed utilizing techniques to minimize radiation dose exposure, including the use of iterative reconstruction and automated exposure control. Radiation dose length product (DLP) for this visit: 441.84 mGy-cm FINDINGS: LUNGS: Evaluation somewhat limited by respiratory motion. There is some minor groundglass and tree-in-bud nodularity suggested in the right upper lobe inferior segment and to a lesser degree in the right lower lobe. No lobar consolidation. Trace paraseptal emphysematous changes. Mild basilar atelectatic changes, right greater than left. There is minor scarring in the posteromedial right lower lobe adjacent to bulky paravertebral osteophytes. 4 mm pulmonary nodule seen in the right upper lobe (3:35). Additional scattered pulmonary nodules are seen measuring on the order of 2 to 3 mm. Based on current Fleischner Society 2017 Guidelines on incidental pulmonary nodule, no routine follow-up is needed if the patient is low risk. If the patient is high risk, optional follow-up chest CT at 12 months can be considered. PLEURA: Trace right pleural effusion with subjacent atelectasis. HEART/GREAT VESSELS: Heart is unremarkable for patient's age. No thoracic aortic aneurysm. Coronary atherosclerosis MEDIASTINUM AND JOE: Shotty subcentimeter mediastinal lymph nodes noted. Evaluation for hilar lymphadenopathy limited in the absence of intravenous contrast. CHEST WALL AND LOWER NECK: Gynecomastia. Few prominent/mildly enlarged lymph nodes seen in the right axillary region, largest measuring up to 1.2 cm,. VISUALIZED STRUCTURES IN THE UPPER ABDOMEN: No acute abnormality. Bilateral renal cysts. OSSEOUS STRUCTURES: Spinal degenerative changes are noted. No acute fracture or destructive osseous lesion.     Impression: Mild bibasilar probable atelectatic changes, right greater than left. Otherwise no focal airspace consolidation to suggest pneumonia. Minor tree-in-bud nodularity in the right  lung which could relate to infectious versus inflammatory small airways disease. Clinical correlation recommended. Pulmonary nodules as above. Based on current Fleischner Society 2017 Guidelines on incidental pulmonary nodule, no routine follow-up is needed if the patient is low risk. If the patient is high risk, optional follow-up chest CT at 12 months can be considered. Trace right pleural effusion. Mild right axillary lymphadenopathy, nonspecific. Workstation performed: [id number]       Administrative Statements   I have spent a total time of 80 minutes in caring for this patient on the day of the visit/encounter including Diagnostic results, Prognosis, Risks and benefits of tx options, Instructions for management, Patient and family education, Importance of tx compliance, Risk factor reductions, Counseling / Coordination of care, Documenting in the medical record, Reviewing / ordering tests, medicine, procedures  , Obtaining or reviewing history  , and Communicating with other healthcare professionals . Topics discussed with the patient / family include symptom assessment and management, medication review, medication adjustment, psychosocial support, advanced directives, goals of care, hospice services, medical marijuana, opioid titration, supportive listening, and anticipatory guidance.

## 2024-12-16 NOTE — TELEPHONE ENCOUNTER
PA for GABAPENTIN 100 MG  APPROVED     Date(s) approved UNTIL 12/14/2025        Patient advised by          [x]MyChart Message  []Phone call   []LMOM  []L/M to call office as no active Communication consent on file  []Unable to leave detailed message as VM not approved on Communication consent       Pharmacy advised by    [x]Fax  []Phone call    Approval letter scanned into Media No WILL SCAN UPON RECEIPT

## 2024-12-16 NOTE — TELEPHONE ENCOUNTER
Prior Authorization [NEEDED] for [Gabapentin 100mg Capsules]    KEY# [id number]    Pharmacy: [facility]  Phone: [phone]  Fax: [phone]

## 2024-12-16 NOTE — PATIENT INSTRUCTIONS
[place] for medical marijuana certification on [place]. Gov registry prior to appointment with Dr. Lane in [place].     POLST form completed - keep this in a visible area in the home or in a freezer pack if you have.

## 2024-12-16 NOTE — ASSESSMENT & PLAN NOTE
Stage IV follicular helper T-cell lymphoma   Follows with Dr. Franco ([facility])    Orders:    Ambulatory Referral to Palliative Care

## 2024-12-17 LAB
ANISOCYTOSIS BLD QL SMEAR: PRESENT
ANISOCYTOSIS BLD QL SMEAR: PRESENT
BASOPHILS # BLD MANUAL: 0.04 THOUSAND/UL (ref 0–0.1)
BASOPHILS # BLD MANUAL: 0.09 THOUSAND/UL (ref 0–0.1)
BASOPHILS NFR MAR MANUAL: 1 % (ref 0–1)
BASOPHILS NFR MAR MANUAL: 1 % (ref 0–1)
EOSINOPHIL # BLD MANUAL: 0.13 THOUSAND/UL (ref 0–0.4)
EOSINOPHIL NFR BLD MANUAL: 3 % (ref 0–6)
ERYTHROCYTE [DISTWIDTH] IN BLOOD BY AUTOMATED COUNT: 17.3 % (ref 11.6–15.1)
HCT VFR BLD AUTO: 28.9 % (ref 36.5–49.3)
HGB BLD-MCNC: 9.1 G/DL (ref 12–17)
LYMPHOCYTES # BLD AUTO: 1.77 THOUSAND/UL (ref 0.6–4.47)
LYMPHOCYTES # BLD AUTO: 26 % (ref 14–44)
LYMPHOCYTES # BLD AUTO: 4.16 THOUSAND/UL (ref 0.6–4.47)
LYMPHOCYTES # BLD AUTO: 9 % (ref 14–44)
MCH RBC QN AUTO: 29.4 PG (ref 26.8–34.3)
MCHC RBC AUTO-ENTMCNC: 31.5 G/DL (ref 31.4–37.4)
MCV RBC AUTO: 93 FL (ref 82–98)
METAMYELOCYTE ABSOLUTE CT: 0.09 THOUSAND/UL (ref 0–0.1)
METAMYELOCYTES NFR BLD MANUAL: 1 % (ref 0–1)
MISCELLANEOUS LAB TEST RESULT: NORMAL
MONOCYTES # BLD AUTO: 0.18 THOUSAND/UL (ref 0–1.22)
MONOCYTES # BLD AUTO: 0.45 THOUSAND/UL (ref 0–1.22)
MONOCYTES NFR BLD: 4 % (ref 4–12)
MONOCYTES NFR BLD: 5 % (ref 4–12)
MYELOCYTE ABSOLUTE CT: 0.09 THOUSAND/UL (ref 0–0.1)
MYELOCYTES NFR BLD MANUAL: 1 % (ref 0–1)
NEUTROPHILS # BLD MANUAL: 2.22 THOUSAND/UL (ref 1.85–7.62)
NEUTROPHILS # BLD MANUAL: 3.98 THOUSAND/UL (ref 1.85–7.62)
NEUTS BAND NFR BLD MANUAL: 2 % (ref 0–8)
NEUTS BAND NFR BLD MANUAL: 2 % (ref 0–8)
NEUTS SEG NFR BLD AUTO: 44 % (ref 43–75)
NEUTS SEG NFR BLD AUTO: 50 % (ref 43–75)
PATHOLOGY REVIEW: NO
PATHOLOGY REVIEW: YES
PLATELET # BLD AUTO: 304 THOUSANDS/UL (ref 149–390)
PLATELET BLD QL SMEAR: ADEQUATE
PLATELET BLD QL SMEAR: ADEQUATE
PMV BLD AUTO: 9 FL (ref 8.9–12.7)
POLYCHROMASIA BLD QL SMEAR: PRESENT
RBC # BLD AUTO: 3.1 MILLION/UL (ref 3.88–5.62)
RBC MORPH BLD: PRESENT
TOTAL CELLS COUNTED SPEC: 100
TOTAL CELLS COUNTED SPEC: 100
VARIANT LYMPHS # BLD AUTO: 20 %
VARIANT LYMPHS # BLD AUTO: 31 %
WBC # BLD AUTO: 9.04 THOUSAND/UL (ref 4.31–10.16)

## 2024-12-26 ENCOUNTER — TELEPHONE (OUTPATIENT)
Dept: PALLIATIVE MEDICINE | Facility: CLINIC | Age: 84
End: 2024-12-26

## 2024-12-26 LAB — MISCELLANEOUS LAB TEST RESULT: NORMAL

## 2024-12-27 ENCOUNTER — PATIENT OUTREACH (OUTPATIENT)
Dept: HEMATOLOGY ONCOLOGY | Facility: CLINIC | Age: 84
End: 2024-12-27

## 2024-12-27 NOTE — PROGRESS NOTES
ASSESSMENT- completed with son Juan       Are you having any side effects from your treatment?  -no    Are you eating and drinking properly?  -yes     Are you having any pain?  -yes- pain controlled by medication son expressed by palliative care     Have needs changed for a palliative care referral?  -pt established       Do you know when your upcoming appointments are?  -yes     Do you have a good support system?  -yes     Are you interested in any support groups?  - declined at this time     Are there any changes in barriers to care?  -no    Do you have any questions or concerns regarding your treatment plan?  -Not at this time. Juan was very grateful for my support and call.

## 2025-01-07 ENCOUNTER — TELEPHONE (OUTPATIENT)
Dept: HEMATOLOGY ONCOLOGY | Facility: CLINIC | Age: 85
End: 2025-01-07

## 2025-01-07 NOTE — TELEPHONE ENCOUNTER
Spoke with patient to let him know his follow up with dr mcneil is on 01/22/2025 @ 8:20. Provided patient with hopeline to see if anything became available anytime later time.

## 2025-01-17 ENCOUNTER — OFFICE VISIT (OUTPATIENT)
Dept: PALLIATIVE MEDICINE | Facility: CLINIC | Age: 85
End: 2025-01-17
Payer: COMMERCIAL

## 2025-01-17 ENCOUNTER — DOCUMENTATION (OUTPATIENT)
Dept: PALLIATIVE MEDICINE | Facility: CLINIC | Age: 85
End: 2025-01-17

## 2025-01-17 ENCOUNTER — TELEPHONE (OUTPATIENT)
Age: 85
End: 2025-01-17

## 2025-01-17 VITALS
DIASTOLIC BLOOD PRESSURE: 68 MMHG | SYSTOLIC BLOOD PRESSURE: 126 MMHG | TEMPERATURE: 98.3 F | HEART RATE: 83 BPM | OXYGEN SATURATION: 99 % | RESPIRATION RATE: 18 BRPM

## 2025-01-17 DIAGNOSIS — G89.3 CANCER RELATED PAIN: ICD-10-CM

## 2025-01-17 DIAGNOSIS — C84.90 NK/T-CELL LYMPHOMA, UNSPECIFIED TYPE (HCC): Primary | ICD-10-CM

## 2025-01-17 DIAGNOSIS — T40.2X5A THERAPEUTIC OPIOID-INDUCED CONSTIPATION (OIC): ICD-10-CM

## 2025-01-17 DIAGNOSIS — Z51.5 PALLIATIVE CARE BY SPECIALIST: ICD-10-CM

## 2025-01-17 DIAGNOSIS — R63.0 LOSS OF APPETITE: ICD-10-CM

## 2025-01-17 DIAGNOSIS — Z79.899 MEDICAL MARIJUANA USE: ICD-10-CM

## 2025-01-17 DIAGNOSIS — K59.03 THERAPEUTIC OPIOID-INDUCED CONSTIPATION (OIC): ICD-10-CM

## 2025-01-17 DIAGNOSIS — L29.9 PRURITUS: ICD-10-CM

## 2025-01-17 PROBLEM — J96.01 ACUTE RESPIRATORY FAILURE WITH HYPOXIA (HCC): Status: ACTIVE | Noted: 2025-01-17

## 2025-01-17 PROBLEM — M54.42 CHRONIC LEFT-SIDED LOW BACK PAIN WITH LEFT-SIDED SCIATICA: Status: ACTIVE | Noted: 2025-01-17

## 2025-01-17 PROBLEM — G89.29 CHRONIC LEFT-SIDED LOW BACK PAIN WITH LEFT-SIDED SCIATICA: Status: ACTIVE | Noted: 2025-01-17

## 2025-01-17 PROBLEM — C91.50: Status: ACTIVE | Noted: 2025-01-17

## 2025-01-17 PROCEDURE — 99215 OFFICE O/P EST HI 40 MIN: CPT | Performed by: STUDENT IN AN ORGANIZED HEALTH CARE EDUCATION/TRAINING PROGRAM

## 2025-01-17 PROCEDURE — G2211 COMPLEX E/M VISIT ADD ON: HCPCS | Performed by: STUDENT IN AN ORGANIZED HEALTH CARE EDUCATION/TRAINING PROGRAM

## 2025-01-17 RX ORDER — OXYCODONE HYDROCHLORIDE 5 MG/1
2.5 TABLET ORAL EVERY 6 HOURS PRN
Qty: 20 TABLET | Refills: 0 | Status: SHIPPED | OUTPATIENT
Start: 2025-01-17

## 2025-01-17 RX ORDER — HYDROXYZINE HYDROCHLORIDE 25 MG/1
25 TABLET, FILM COATED ORAL EVERY 6 HOURS PRN
Qty: 90 TABLET | Refills: 0 | Status: SHIPPED | OUTPATIENT
Start: 2025-01-17

## 2025-01-17 RX ORDER — SENNOSIDES 8.6 MG
650 CAPSULE ORAL 3 TIMES DAILY
Qty: 90 TABLET | Refills: 2 | Status: SHIPPED | OUTPATIENT
Start: 2025-01-17

## 2025-01-17 RX ORDER — GABAPENTIN 100 MG/1
200 CAPSULE ORAL
Qty: 60 CAPSULE | Refills: 2 | Status: SHIPPED | OUTPATIENT
Start: 2025-01-17

## 2025-01-17 RX ORDER — PREDNISONE 20 MG/1
20 TABLET ORAL DAILY
Qty: 30 TABLET | Refills: 1 | Status: SHIPPED | OUTPATIENT
Start: 2025-01-17

## 2025-01-17 RX ORDER — PREDNISONE 10 MG/1
10 TABLET ORAL 2 TIMES DAILY WITH MEALS
Qty: 60 TABLET | Refills: 0 | Status: SHIPPED | OUTPATIENT
Start: 2025-01-17 | End: 2025-01-17

## 2025-01-17 NOTE — PROGRESS NOTES
Name: Miguel Henderson      : 1940      MRN: [id number]  Encounter Provider: Shilpi Lane MD  Encounter Date: 2025   Encounter department: [facility] [place]  :  Assessment & Plan  NK/T-cell lymphoma, unspecified type (HCC)  Following with oncology for monitoring, does not want to pursue further anti-neoplastic therapies  Symptom management as below    Orders:    predniSONE 20 mg tablet; Take 1 tablet (20 mg total) by mouth daily    Cancer related pain  Schedule tylenol 1g TID  Continue gabapentin at reduced dose with as needed oxycodone      Orders:    gabapentin (NEURONTIN) 100 mg capsule; Take 2 capsules (200 mg total) by mouth daily at bedtime    oxyCODONE (Roxicodone) 5 immediate release tablet; Take 0.5 tablets (2.5 mg total) by mouth every 6 (six) hours as needed for moderate pain or severe pain Max Daily Amount: 10 mg    acetaminophen (TYLENOL) 650 mg CR tablet; Take 1 tablet (650 mg total) by mouth 3 (three) times a day    predniSONE 20 mg tablet; Take 1 tablet (20 mg total) by mouth daily    Loss of appetite  Trial MMJ, monitor response         Pruritus  Increase prednisone to 20 mg daily  Start hydroxyzine 25 mg q6h prn pruritus    Orders:    hydrOXYzine HCL (ATARAX) 25 mg tablet; Take 1 tablet (25 mg total) by mouth every 6 (six) hours as needed for itching (insomnia)    predniSONE 20 mg tablet; Take 1 tablet (20 mg total) by mouth daily    Therapeutic opioid-induced constipation (OIC)  Continue senna daily as needed         Medical marijuana use  The patient qualifies for use of MMJ in the [facility] by having the following medical condition: metastatic cancer.  From a palliative care standpoint the patient is suffering with pain, insomnia, and decreased appetite. These symptoms and side effects might be alleviated with use of MMJ products. The patient registered online (patient ID #[id number]). The patient read and I reviewed the informed consent document with the  patient. I answered all questions related to it before the patient signed it. The patient's medical certification was completed on this date (certification # [id number]). The patient was given a copy of the medical certification.  The patient accepts extensive counseling today on MMJ risks and benefits, legal concerns, possible adverse effects, routes of administration, active ingredients such as THC+CBD, etc.  I issued a certification for MMJ use with palliative intent, to help alleviate cancer-related symptoms and cancer-treatment-related side effects. I do not endorse the belief that MMJ can treat cancer and strongly encouraged the patient to continue treatments and surveillance as recommend by cancer specialists.         Palliative care by specialist  Time spent developing patient/family-provider relationship, providing psychosocial support, and validating patient/family experience  Encouraged to call the office with any questions/concerns  Follow up in 6 weeks or sooner as needed           PDMP Review: I have reviewed the patient's controlled substance dispensing history in the Prescription Drug Monitoring Program in compliance with the [facility] regulations before prescribing any controlled substances.    History of Present Illness   Miguel Henderson is an 84 y.o. 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 He saw Walker Ellis PA-C for consultation on 12/16 at which time he expressed interest in getting certified for MMJ for appetite stimulation. He continues on low dose steroid, nightly gabapentin, and low dose oxycodone as needed for cancer pain. He has not had any other provider visits since he saw Walker.    He is accompanied by his son, Juan, who he requests be his . He reports significant pruritus, especially at night. He took atarax in the hospital with some relief.    Regarding his pain, he reports continued discomfort in his lower extremities. He  was too somnolent with the 300 mg gabapentin so he typically takes 100-200 mg. He typically only takes oxycodone once daily in the morning which helps him be more comfortable when his son helps him get washed up and ready for the day.    He does have intermittent fevers for which he takes tylenol. He did register for MMJ with his son as his caregiver. He hopes to utilize this for sleep, pain, and appetite.     Objective   /68   Pulse 83   Temp 98.3 °F (36.8 °C)   Resp 18   SpO2 99%     Physical Exam  Constitutional:       General: He is not in acute distress.     Comments: Elderly male, uses wheelchair   Pulmonary:      Effort: Pulmonary effort is normal. No respiratory distress.   Skin:     General: Skin is warm and dry.   Neurological:      General: No focal deficit present.      Mental Status: He is alert.   Psychiatric:         Mood and Affect: Mood normal.         Behavior: Behavior normal.         Recent labs:  Lab Results   Component Value Date/Time    SODIUM 140 01/08/2025 08:44 AM    K 3.7 01/08/2025 08:44 AM    BUN 11 01/08/2025 08:44 AM    CREATININE 0.54 01/08/2025 08:44 AM    GLUC 79 01/08/2025 08:44 AM    CALCIUM 8.6 01/08/2025 08:44 AM    AST 19 01/08/2025 08:44 AM    ALT 17 01/08/2025 08:44 AM    ALB 3.2 (L) 01/08/2025 08:44 AM    TP 6.7 01/08/2025 08:44 AM    EGFR 97 01/08/2025 08:44 AM    EGFR 91 12/05/2019 06:47 AM     Lab Results   Component Value Date/Time    HGB 9.1 (L) 11/16/2024 08:11 AM    WBC 9.04 11/16/2024 08:11 AM     11/16/2024 08:11 AM    INR 1.67 (H) 10/27/2024 06:39 AM    PTT 33 10/21/2024 04:49 PM     Lab Results   Component Value Date/Time    KFT4TMGTISGO 3.279 10/31/2024 08:52 AM       Recent Imaging:  Procedure: NM PET CT skull base to mid thigh  Result Date: 12/13/2024  Narrative: PET/CT SCAN INDICATION: Staging of T-cell lymphoma. Additional history of prostate cancer. 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 TECHNIQUE:   8.2 mCi F-18-FDG administered IV. Multiplanar attenuation corrected and non attenuation corrected PET images are available for interpretation, and contiguous, low dose, axial CT sections were obtained from the skull vertex through the femurs. Intravenous contrast material was not utilized. This examination, like all CT scans performed in the [facility], was performed utilizing techniques to minimize radiation dose exposure, including the use of iterative reconstruction and automated exposure control. Fasting serum glucose: 140 mg/dl FINDINGS: VISUALIZED BRAIN: No acute abnormalities are seen. HEAD/NECK: FDG avid left posterior parotid nodule, SUV max of 7.8. This measures up to 1.1 cm image 42 series 9. Small cutaneous focus of uptake at the right lower neck anteriorly, SUV max of 3.5. Slight infiltration change noted here on CT. See image 37 series 603. This could be inflammatory. No FDG avid cervical chain lymphadenopathy. CT images: No additional significant findings. CHEST: Foci of increased uptake in the bilateral hilar regions. Left hilar focus demonstrates SUV max of 7.7. Right hilar focus demonstrates SUV max of 4.7. Mild FDG uptake in a precarinal lymph node on the right, SUV max of 2.9. This measures 8 mm short axis image 72 series 4. Previously this measured up to 1.7 cm. Resolution of previously seen axillary lymphadenopathy. CT images: Moderate coronary artery calcifications. Tortuous thoracic aorta. Bilateral gynecomastia. Stable mild perifissural nodularity. Mild to moderate esophageal distention with air-fluid level similar to prior exam. Scattered patchy peripheral atelectasis. Resolution of the small  bilateral pleural effusions. ABDOMEN: No FDG avid soft tissue lesions are seen. Decreased number of small retroperitoneal lymph nodes without significant uptake. CT images: Bilateral renal cysts. Stable aneurysmal dilatation of the abdominal aorta at 3.4 cm in diameter. PELVIS: Improved pelvic lymphadenopathy. There is mild FDG uptake in a left inguinal lymph node laterally, SUV max of 2.3. This measures 9 mm in short axis image 183 series 4 smaller from the prior exam previously 1.3 cm. A right inguinal lymph node demonstrates SUV max of 1.3. This measures up to 1.3 cm short axis image 186 series 4, slightly smaller, previously 1.6 cm. There is a prominent fatty hilum with thin cortical rim. Mild patchy uptake in the right inguinal region superficially suggesting prior surgery. CT images: Suggestion of prior TURP. OSSEOUS STRUCTURES: Mild to moderate generalized osseous uptake. No discrete lesion seen on CT. For example, at the L5 level this demonstrates SUV max of 4.1. CT images: Multilevel degenerative changes of the spine. For reference: SUV max of the mediastinal blood pool: 2.5 SUV max of the liver: 3.7     Impression: 1. Generally improved lymphadenopathy in the chest, abdomen and pelvis compared to CT of 10/25/2024. There is FDG avid yariel disease identified most intense in the bilateral hilar regions. 2. FDG avid left parotid nodule could be an incidental salivary gland lesion rather than related to lymphoma. Reassess on follow-up. 3. Mild to moderate generalized osseous uptake, nonspecific. This can be seen in various settings such as anemia, related to a marrow stimulating agent or response to therapy. However, osseous infiltration by disease is not excluded given the history. Correlate clinically. 4. Small cutaneous focus of uptake at the right lower neck anteriorly with slight infiltration noted on CT. This could be inflammatory. Correlate clinically. Workstation performed: Location Labs     Procedure: VAS  upper limb venous duplex scan, unilateral/limited  Result Date: 11/12/2024  Narrative:  [facility] REPORT CLINICAL: Indications: Patient presents with fever and palpable painful cord at the right lateral forearm.  He is currently  receiving subcutaneous Heparin.  Physician wants to rule out right upper extremity deep and superficial thrombus. Operative History: No prior heart or vascular surgery Risk Factors: The patient has history of HTN, prostate cancer, SIRS, hypothyroidism,  and COVID-19 virus.  He is a non-smoker. Height:  65 inches.  Weight:  171 lbs.  FINDINGS:  Right                             Thrombus           Cephalic Upper Arm Distal         Acute - Occlusive  Ceph AC                           Acute - Occlusive  Cephalic Vein - Forearm Proximal  Acute - Occlusive  Ceph Mid Forearm                  Acute - Occlusive  Cephalic Vein - Forearm Distal    Acute - Occlusive  Ceph Wrist                        Acute - Occlusive     CONCLUSION:  Impression  RIGHT UPPER LIMB:  ABNORMAL Evaluation shows acute occlusive superficial thrombophlebitis in the cephalic vein from the wrist to the distal upper arm. There is an IV/dressing mid chest. No evidence of acute deep vein thrombosis.  LEFT UPPER LIMB LIMITED: Not evaluated secondary to patient agitation.  Technical findings posted on EPIC and given to medical staff.  SIGNATURE: Electronically Signed by: OZIEL ALVAREZ MD on 2024-11-12 03:34:06 PM    Procedure: IR biopsy lymph node  Result Date: 11/12/2024  Narrative: PROCEDURE: Ultrasound-guided right axillary lymph node core biopsy PROCEDURAL PERSONNEL: Attending physician(s): Kenn Lamb MD Resident physician(s): None Advanced practice provider(s): None INDICATION:  Newly diagnosed unspecified lymphoproliferative disorder, prior core and excisional biopsies have been necrotic and not completely diagnostic, concern for lymphoma and HLH. COMPLICATIONS: No immediate complications. ESTIMATED BLOOD LOSS (mL):  Less than 10 CONTRAST: None RADIATION DOSE: None ANESTHESIA/SEDATION: Level of anesthesia/sedation: No sedation Anesthesia/sedation administered by: Not applicable Total intra-service sedation time (minutes): 0 DESCRIPTION OF PROCEDURE: Informed consent for the procedure including risks, benefits and alternatives was obtained and time-out was performed prior to the procedure. Limited ultrasound evaluation of the lymph node was performed and images were archived. The site was marked, prepared and draped using all elements of maximal sterile barrier technique including sterile gloves, sterile gown, cap, mask, large sterile sheet, sterile ultrasound probe cover, hand hygiene and cutaneous antisepsis with 2% chlorhexidine. Under real-time ultrasound guidance, a 17-gauge coaxial needle guide was advanced into the right axilla lymph node. Through the needle guide, a total of 3 18G core specimens were obtained. The needle guide and biopsy needle were removed intact.  Completion ultrasound was performed and images were saved.  Hemostasis was achieved. FINDINGS: 1.  Pre-procedural ultrasound showed a right axial lymph node with thin cortex. 2.  Intra-procedural ultrasound confirmed good positioning of the biopsy needle within the cortex of the right axillary lymph node. 3.  Post-procedural ultrasound showed no immediate complication.     Impression: Ultrasound-guided right axillary lymph node core biopsy. Workstation performed: [id number]     Procedure: IR biopsy bone marrow  Result Date: 11/12/2024  Narrative: PROCEDURE: Fluoroscopy-guided bone marrow biopsy PROCEDURAL PERSONNEL: Attending physician(s): eKnn Lamb MD Resident physician(s): None Advanced practice provider(s): None INDICATION:  Newly diagnosed unspecified lymphoproliferative disorder, prior core and excisional biopsies have been necrotic and not completely diagnostic, concern for lymphoma and HLH. COMPLICATIONS: No immediate complications. ESTIMATED BLOOD  LOSS (mL): Less than 10 CONTRAST: Contrast agent: None Contrast volume (mL): 0 RADIATION DOSE: Fluoroscopy time: 1.36 MIUTES Reference air kerma: 45 mGy ANESTHESIA/SEDATION: Level of anesthesia/sedation: Moderate sedation (conscious sedation) Anesthesia/sedation administered by: Independent trained observer under attending supervision with continuous monitoring of the patient's level of consciousness and physiologic status Total intra-service sedation time (minutes): 15 DESCRIPTION OF PROCEDURE: Informed consent for the procedure including risks, benefits and alternatives was obtained and time-out was performed prior to the procedure. The site was prepared and draped using all elements of maximal sterile barrier technique including sterile gloves, sterile gown, cap, mask, large sterile sheet, sterile ultrasound probe cover, hand hygiene and cutaneous antisepsis with 2% chlorhexidine. Using fluoroscopic guidance, the PedidosYa / PedidosJÃ¡ system was used to perform bone marrow aspiration through the right posterior superior iliac spine. 0.5 mL of aspirate was obtained and placed on slides.  10 mL of marrow was aspirated and placed in test tubes. Next, core biopsy was performed and placed in formalin. The needle was removed and hemostasis was achieved.     Impression: Successful image guided bone marrow biopsy of right ileum. Workstation performed: [id number]     Procedure: XR chest portable  Result Date: 11/11/2024  Narrative: XR CHEST PORTABLE INDICATION: Shortness of breath. COMPARISON: 11/1/2024 FINDINGS: Right-sided central venous catheter with its tip in the SVC. Clear lungs. No pneumothorax or pleural effusion. Normal cardiomediastinal silhouette. Bones are unremarkable for age. Normal upper abdomen.     Impression: No acute cardiopulmonary disease. Workstation performed: [id number]     Procedure: XR abdomen 1 vw portable  Result Date: 11/6/2024  Narrative: XR ABDOMEN 1 VW PORTABLE INDICATION: stool burden. COMPARISON:  None FINDINGS: No evidence of small bowel obstruction Moderately increased stool present throughout the colon but without colonic distention. Air is seen in the rectum. No evidence of pneumoperitoneum on this supine study. Upright or left lateral decubitus imaging is more sensitive to detect subtle free air in the appropriate setting. No pathologic calcification or soft tissue mass. Central line tip noted in upper portion of right atrium Bones are unremarkable for the patient's age.     Impression: No evidence of small bowel obstruction or colonic distention. Moderately increased stool throughout the colon Workstation performed: [id number]     Procedure: EEG awake or drowsy routine  Result Date: 2024  Narrative: Table formatting from the original result was not included. Images from the original result were not included. Routine EEG Patient Name:  Miguel Henderson  MRN: [id number] :  1940 File #: [id number] Age: 84 y.o. Ordering Provider: Argelia Mccallum MD  Study Start: 24 8:52 Study End: 24 9:26            Study type: Routine EEG ICD 10 diagnosis: Spells/Fit NOS R56.9 Patient History: Patient is 84 y.o. male for whom EEG was ordered for the evaluation of myoclonic jerking. EMG electrodes were added to this EEG montage to aid in diagnosis. Medications include: Prior to Admission medications  Medication Sig Start Date Authorizing Provider benzonatate (TESSALON) 200 MG capsule Take 1 capsule (200 mg total) by mouth 2 (two) times a day as needed for cough 10/16/24 Greg Foster MD cyclobenzaprine (FLEXERIL) 5 mg tablet Take 5 mg by mouth 3 (three) times a day as needed 24 Historical Provider, MD fluticasone (FLONASE) 50 mcg/act nasal spray 1 spray into each nostril daily 10/16/24 Greg Foster MD hydroCHLOROthiazide 25 mg tablet Take 1 tablet by mouth daily 7/3/24 Historical Provider, MD ibuprofen (MOTRIN) 800 mg tablet Take 1 tablet (800 mg total) by mouth every 8 (eight) hours as needed for  moderate pain 9/18/24 Greg Foster MD levothyroxine 50 mcg tablet Take 50 mcg by mouth daily 7/3/24 Historical Provider, MD levothyroxine 75 mcg tablet Take 75 mcg by mouth Contingent Taken every Friday through Bill 7/3/24 Historical Provider, MD ondansetron (ZOFRAN) 4 mg tablet Take 1 tablet (4 mg total) by mouth every 8 (eight) hours as needed for nausea or vomiting 10/23/24 Cody Metzger MD pantoprazole (PROTONIX) 40 mg tablet Take 1 tablet (40 mg total) by mouth daily in the early morning 10/16/24 Judy Wade MD potassium chloride (Klor-Con M10) 10 mEq tablet Take 10 mEq by mouth daily  Historical Provider, MD  Description of Procedure: 32 channel digital recording with electrodes placed according to the International 10-20 system with additional T1/T2 electrodes, EOG, EKG, and simultaneous video. The recording was technically satisfactory. Terminology Key Abnormal I - mild encephalopathy Abnormal II- moderate encephalopathy or mild/moderate focal cerebral dysfunction Abnormal III- severe encephalopathy, epileptiform activity, or severe focal cerebral dysfunction EEG Findings: During wakefulness, the background is disorganized, symmetric, and continuous. The predominant activity is low-moderate voltage theta. There is no posterior dominant rhythm. Abnormal findings: Interictal Continuous Slow, Generalized Percentage: 100% Triphasic Waves, Generalized Comments: Every few seconds Ictal Non-Epileptic Semiology: Left leg myoclonic Clinical Event Description: Patient has continuous left leg myoclonic jerking every few seconds. EEG Event Description: EMG channel activity from left leg is not consistently time-locked to sharp waves which are favored to be triphasics. Number of Events Recorded: Continuous 11/1/24- 9:13:18- EMG artifact Activating Procedures: Photic stimulation was performed. No Photic driving was observed.  Hyperventilation was contraindicated due to the patient's medical conditions. Other  findings: The single lead ECG shows a regular rate and rhythm around 90bpm. EEG Impression: Abnormal II State of Consciousness: Lethargy - Organic Non-Epileptic Paroxysmal Event: Left Leg Myoclonic - Continuous Slow, Generalized - Triphasic waves, Generalized - No PDR Interpretation: This routine EEG with EMG suggests that the patient's left leg myoclonic jerks are non-epileptic. In addition, it shows a moderate diffuse encephalopathy. No epileptiform discharges or lateralizing signs are recorded. Yumiko Gallardo MD Attending Epileptologist/ Neurologist [facility]     Procedure: MRI brain and orbits wo contrast  Result Date: 11/4/2024  Narrative: MRI OF THE BRAIN AND ORBITS -WITHOUT CONTRAST INDICATION: eval for any new pathology since MRI on 10/25.   New twitching and abnormal pupils. Encephalopathy. COMPARISON: 10/25/2024 brain MRI. 10/31/2024 brain CT. TECHNIQUE: Multiplanar, multisequence imaging of the brain and orbits was performed. IMAGE QUALITY: Degraded by motion artifact. FINDINGS: BRAIN PARENCHYMA: No acute infarct,  hemorrhage, extra-axial fluid collection, mass effect or midline shift. Similar mild, focal patchy periventricular and subcortical white matter foci of abnormal T2 and FLAIR hyperintensity are nonspecific, but usually secondary to changes of microangiopathy. VENTRICLES:  Within normal limits for age. Stable size and configuration. SELLA AND PITUITARY GLAND:  Within normal limits. ORBITS: Normal position, signal intensity and morphology of the globes. Normal position of lens implants. Bilateral lacrimal glands, extraocular muscles and optic nerve sheath complexes are within normal limits. Preserved orbital fat. No mass. Orbital apices and optic chiasm are within normal limits. PARANASAL SINUSES:  Well aerated. VASCULATURE: Grossly preserved. CALVARIUM AND SKULL BASE:  Within normal limits.  Mastoid air cells clear. EXTRACRANIAL SOFT  TISSUES:  Within normal limits.     Impression: No acute intracranial pathology. Workstation performed: [id number]     Procedure: XR chest portable  Result Date: 2024  Narrative: XR CHEST PORTABLE INDICATION: r/o fluid overload. COMPARISON: 10/31/24 FINDINGS: Right-sided central venous catheter with tip at the cavoatrial junction. Mild stable bibasilar atelectasis otherwise clear lungs. No pneumothorax or pleural effusion. Normal cardiomediastinal silhouette. Bones are unremarkable for age. Normal upper abdomen.     Impression: Mild bibasilar atelectasis otherwise no focal consolidation, pleural effusion, or pneumothorax. Workstation performed: [id number]     Procedure: XR chest portable  Result Date: 2024  Narrative: XR CHEST PORTABLE INDICATION: sob. COMPARISON: 10/21/2024 FINDINGS: There is a right internal jugular central venous catheter with tip projecting over the caval atrial junction. Stable mild bibasilar atelectasis. No pneumothorax or pleural effusion. Normal cardiomediastinal silhouette. Bones are unremarkable for age. Normal upper abdomen.     Impression: Stable bibasilar atelectasis. No pneumothorax. Workstation performed: [id number]     Procedure: EEG awake or drowsy routine  Result Date: 10/31/2024  Narrative: Table formatting from the original result was not included. Images from the original result were not included. Routine EEG Patient Name:  Miguel Henderson  MRN: [id number] :  1940 File #: [id number] Age: 84 y.o. Ordering Provider: DARREN Jaeger  Study Start: 10/31/24 14:36 Study End: 10/31/24 15:12            Study type: Routine EEG ICD 10 diagnosis: Encephalopathy, unspecified G93.40 Patient History: Patient is 84 y.o. male for whom EEG was ordered for the evaluation of encephalopathy and myoclonic jerks. Medications include: Prior to Admission medications  Medication Sig Start Date Authorizing Provider benzonatate (TESSALON) 200 MG capsule Take 1 capsule (200 mg  total) by mouth 2 (two) times a day as needed for cough 10/16/24 Gerg Foster MD cyclobenzaprine (FLEXERIL) 5 mg tablet Take 5 mg by mouth 3 (three) times a day as needed 8/26/24 Historical Provider, MD fluticasone (FLONASE) 50 mcg/act nasal spray 1 spray into each nostril daily 10/16/24 Greg Foster MD hydroCHLOROthiazide 25 mg tablet Take 1 tablet by mouth daily 7/3/24 Historical Provider, MD ibuprofen (MOTRIN) 800 mg tablet Take 1 tablet (800 mg total) by mouth every 8 (eight) hours as needed for moderate pain 9/18/24 Greg Foster MD levothyroxine 50 mcg tablet Take 50 mcg by mouth daily 7/3/24 Historical Provider, MD levothyroxine 75 mcg tablet Take 75 mcg by mouth Contingent Taken every Friday through Bill 7/3/24 Historical Provider, MD ondansetron (ZOFRAN) 4 mg tablet Take 1 tablet (4 mg total) by mouth every 8 (eight) hours as needed for nausea or vomiting 10/23/24 Cody Metzger MD pantoprazole (PROTONIX) 40 mg tablet Take 1 tablet (40 mg total) by mouth daily in the early morning 10/16/24 Judy Wade MD potassium chloride (Klor-Con M10) 10 mEq tablet Take 10 mEq by mouth daily  Historical Provider, MD  Description of Procedure: 32 channel digital recording with electrodes placed according to the International 10-20 system with additional T1/T2 electrodes, EOG, EKG, and simultaneous video. The recording was technically satisfactory. Terminology Key Abnormal I - mild encephalopathy Abnormal II- moderate encephalopathy or mild/moderate focal cerebral dysfunction Abnormal III- severe encephalopathy, epileptiform activity, or severe focal cerebral dysfunction EEG Findings: During wakefulness, the background is disorganized, symmetric, and continuous. The predominant activity is low-moderate voltage theta. There is no posterior dominant rhythm. Abnormal findings: Interictal Continuous Slow, Central Distribution Details: Maximum Pz>Cz>Fz Comments: Sharply contoured, semi-periodic slowing is seen in the  "central areas around 0.5-2Hz Continuous Slow, Generalized Percentage: 100% 10/31/24- Transverse bipolar- 14:53:42 Ictal Paroxysmal Event Semiology: Trunk/ Left hemibody myoclonic -> Left eye versive Clinical Event Description: Patient has nearly continuous trunk and left arm myoclonic jerk, with the sheets removed there is involvement of the left leg > right leg as well. Occasionally he has a non-forced head turn towards the left, with eyes looking upward and leftward. EEG Event Description: There is no clearly epileptic activity seen, but there are sharply contoured slow waves maximum at Pz which appear to occur about at the same frequency as the jerks. See \"periodic pattern\" above. Number of Events Recorded: Multiple Activating Procedures: Photic stimulation was performed. No Photic driving was observed.  Hyperventilation was contraindicated due to the patient's medical conditions. Other findings: The single lead ECG shows a regular rate and rhythm around 105bpm. EEG Impression: Abnormal III State of Consciousness: Awake - Paroxysmal Event: Trunk/ Left hemibody myoclonic x multiple - Continuous Slow, Central - Continuous Slow, Generalized - No PDR Interpretation: This routine EEG shows nearly continuous myoclonic jerking, predominantly of the trunk and left hemibody, with frequent eye deviation towards the left. 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 EMG electrodes placed on the left arm would be helpful in correlating whether these slow waves are time-locked to muscle activity. In addition, this recording shows a moderate diffuse encephalopathy. Yumiko Gallardo MD Attending Epileptologist/ Neurologist [facility]     Procedure: CT head wo contrast  Result Date: 10/31/2024  Narrative: CT BRAIN - WITHOUT CONTRAST INDICATION:   " change in mental status. COMPARISON: CT brain dated October 28, 2024. TECHNIQUE:  CT examination of the brain was performed.  Multiplanar 2D reformatted images were created from the source data. Radiation dose length product (DLP) for this visit:  1223 mGy-cm .  This examination, like all CT scans performed in the [facility], was performed utilizing techniques to minimize radiation dose exposure, including the use of iterative reconstruction and automated exposure control. IMAGE QUALITY:  Diagnostic. FINDINGS: PARENCHYMA:No intracranial mass, mass effect or midline shift. No CT signs of acute infarction.  No acute parenchymal hemorrhage. There is mild periventricular white matter low attenuation which is nonspecific and most likely related to chronic small vessel ischemic changes. VENTRICLES AND EXTRA-AXIAL SPACES:  Normal for the patient's age. VISUALIZED ORBITS: Normal visualized orbits. PARANASAL SINUSES: Normal visualized paranasal sinuses. CALVARIUM AND EXTRACRANIAL SOFT TISSUES: Normal.     Impression: No acute intracranial abnormality. Mild chronic small vessel ischemic changes. Workstation performed: [id number]     Procedure: IR biopsy bone marrow  Result Date: 10/30/2024  Narrative: PROCEDURE: Fluoroscopic-guided bone marrow biopsy and bone marrow aspiration INDICATION: Lymphadenopathy with concerns for lymphoma COMPLICATIONS: None immediate. FLUOROSCOPY TIME: 0.6 minjutes minutes. MODERATE SEDATION TIME: 30 minutes Moderate sedation was monitored by radiology nursing staff. LOCAL ANESTHETIC: 1% lidocaine PROCEDURE: Using fluoroscopic guidance, the Embedded [employer] system was used to perform bone marrow aspiration via the right posterior superior iliac spine. Approximately 6 mL of marrow was removed and sent to pathology . Next, bone marrow biopsy was performed. The biopsy specimen was given to the in-room pathologist. The needle was then removed and hemostasis was achieved using manual compression.      Impression: Bone marrow aspiration and core biopsy. PERFORMED BY: DARREN Prajapati SUPERVISING PHYSICIAN: Dr. Anton Perez Workstation performed: [id number]     Procedure: XR hips bilateral 3-4 vw w pelvis if performed  Result Date: 10/29/2024  Narrative: XR HIPS BILATERAL 3-4 VW W PELVIS IF PERFORMED INDICATION: Hip pain. fall. COMPARISON: None FINDINGS: Intact bony pelvis. Unremarkable visualized lumbar spine. Small focus of heterotopic ossification in the lateral right hip. RIGHT HIP: No acute fracture or dislocation. No significant hip degenerative changes. No lytic or blastic osseous lesion. LEFT HIP: No acute fracture or dislocation. No significant hip degenerative changes. No lytic or blastic osseous lesion.     Impression: No acute osseous abnormality. Computerized Assisted Algorithm (CAA) may have been used to analyze all applicable images. Workstation performed: [id number]     Procedure: CT spine cervical wo contrast  Result Date: 10/28/2024  Narrative: CT CERVICAL SPINE - WITHOUT CONTRAST INDICATION:   Fall unwitnessed. COMPARISON:  None. TECHNIQUE:  CT examination of the cervical spine was performed without intravenous contrast.  Contiguous axial images were obtained. Multiplanar 2D reformatted images were created from the source data. Radiation dose length product (DLP) for this visit:  234.82 mGy-cm .  This examination, like all CT scans performed in the [facility], was performed utilizing techniques to minimize radiation dose exposure, including the use of iterative  reconstruction and automated exposure control. IMAGE QUALITY:  Diagnostic. FINDINGS: ALIGNMENT:  Normal alignment of the cervical spine. No subluxation. VERTEBRAE: Diffuse osteopenia. No fracture. No lytic or destructive lesion. DEGENERATIVE CHANGES: Minor degenerative change. The C2-3 endplates and facets are fused. PREVERTEBRAL AND PARASPINAL SOFT TISSUES: Unremarkable THORACIC INLET:  Normal.     Impression: No  cervical spine fracture or traumatic malalignment. Workstation performed: [id number]     Procedure: CT head wo contrast  Result Date: 10/28/2024  Narrative: CT BRAIN - WITHOUT CONTRAST INDICATION:   Fall unwitnessed. COMPARISON: 10/25/2024 TECHNIQUE:  CT examination of the brain was performed.  Multiplanar 2D reformatted images were created from the source data. Radiation dose length product (DLP) for this visit:  1157.68 mGy-cm .  This examination, like all CT scans performed in the [facility], was performed utilizing techniques to minimize radiation dose exposure, including the use of iterative reconstruction and automated exposure control. IMAGE QUALITY:  Diagnostic. FINDINGS: PARENCHYMA: Decreased attenuation is noted in periventricular and subcortical white matter demonstrating an appearance that is statistically most likely to represent mild microangiopathic change; this appearance is similar when compared to most recent prior examination. No CT signs of acute infarction.  No intracranial mass, mass effect or midline shift.  No acute parenchymal hemorrhage. VENTRICLES AND EXTRA-AXIAL SPACES: Ventricles and extra-axial CSF spaces are prominent commensurate with the degree of volume loss.  No hydrocephalus.  No acute extra-axial hemorrhage. VISUALIZED ORBITS: Normal visualized orbits. PARANASAL SINUSES: Normal visualized paranasal sinuses. CALVARIUM AND EXTRACRANIAL SOFT TISSUES: Normal.     Impression: No acute intracranial abnormality.  Stable chronic microangiopathic changes within the brain. Workstation performed: [id number]     Procedure: IR biopsy inguinal lymph node  Result Date: 10/28/2024  Narrative: Ultrasound-guided lymph node biopsy Clinical History: Bilateral inguinal lymphadenopathy Procedure: After explaining the risks and benefits of the procedure to the patient, informed consent was obtained. Ultrasound was used to localize the enlarged left inguinal lymph node. The overlying skin  was prepped and draped in the usual sterile fashion. Local anesthesia was obtained with a 1% lidocaine solution. Using ultrasound guidance, a 18-gauge needle was advanced into the lesion. 5 passes with a 18 gauge core biopsy needle were performed. The tissue was sent for surgical pathology and flow cytometry. The patient tolerated the procedure well and left the department in stable condition.     Impression: Impression: Successful biopsy of the left inguinal lymph node. Workstation performed: [id number]     Procedure: IR temporary dialysis catheter placement  Result Date: 10/26/2024  Narrative: PROCEDURE: Non-tunneled plasmapheresis catheter placement Procedural Personnel Attending physician(s): Dr. 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 PROCEDURE SUMMARY: - Venous access with ultrasound guidance - Non-tunneled plasmapheresis catheter insertion with fluoroscopic guidance PROCEDURE DETAILS: Pre-procedure Consent: Informed consent for the procedure including risks, benefits and alternatives was obtained and time-out was performed prior to the procedure. Preparation (MIPS): The site was prepared and draped using all elements of maximal sterile barrier technique including sterile gloves, sterile gown, cap, mask, large sterile sheet, sterile ultrasound probe cover, hand hygiene and cutaneous antisepsis with 2% chlorhexidine. Anesthesia/sedation Level of anesthesia/sedation: Local lidocaine Access Local anesthesia was administered. The vessel was sonographically evaluated and determined to be patent. Real time ultrasound was used to visualize needle entry into the vessel and a permanent image was stored. Vein accessed: Right internal jugular vein Access technique: Micropuncture set with 21 gauge needle Catheter placement The access site was dilated  and the catheter was placed into the vein over a wire  under fluoroscopic guidance.  The catheter tip location was fluoroscopically verified and a permanent image was stored.. A sterile dressing was applied. Catheter placed: 14 [place]z 15 cm double-lumen catheter Catheter flush: Normal saline Catheter securement technique: 2-0 Prolene suture Radiation Dose Fluoroscopy time (minutes): 0.5 Reference air kerma (mGy): 1 Additional Details Estimated blood loss (mL): None Complications: No immediate complications.     Impression: Insertion of right IJV non-tunneled dual-lumen plasmapheresis catheter, with tip in the expected location of the superior vena cava. Plan: The catheter may be used immediately. Workstation performed: [id number]     Procedure: MRI brain w wo contrast  Result Date: 10/26/2024  Narrative: MRI BRAIN WITH AND WITHOUT CONTRAST INDICATION: confusion,. COMPARISON: CT head 10/25/2024. TECHNIQUE: Multiplanar, multisequence imaging of the brain was performed before and after gadolinium administration. IV Contrast:  7 mL of Gadobutrol injection (SINGLE-DOSE) IMAGE QUALITY:   Motion-degraded, which reduces diagnostic sensitivity. FINDINGS: BRAIN PARENCHYMA: No acute infarct, mass effect or midline shift. Mild-moderate chronic ischemic changes of the white matter. Postcontrast imaging of the brain demonstrates no abnormal enhancement. VENTRICLES:  Normal for the patient's age. SELLA AND PITUITARY GLAND:  Normal. ORBITS: No acute abnormality. PARANASAL SINUSES: Mild mucosal thickening. VASCULATURE: Please refer to CTA. CALVARIUM AND SKULL BASE: No acute abnormality. EXTRACRANIAL SOFT TISSUES: Multiple bilateral parotid nodules, the largest of which measures up to 1.0 cm in the left parotid gland (series 6, image 1), noting these are not well evaluated.     Impression: Within the confines of a motion-degraded examination: 1. No acute infarct, mass effect or midline shift. Mild-moderate chronic microangiopathy.  2. Multiple indeterminate bilateral parotid nodules, the largest of which measures up to 1.0 cm on the left, incompletely evaluated. If clinically appropriate, this could be further evaluated with contrast-enhanced neck CT on a nonemergent basis. The study was marked in EPIC for immediate notification. Workstation performed: [id number]     Procedure: IR lumbar puncture  Result Date: 10/25/2024  Narrative: Lumbar puncture under fluoroscopic control. Clinical History: Confusion and fever Fluoro time: 0.9 minutes Conscious sedation time: 35 minutes Number of Images: 1 Technique: The patient was brought to the interventional radiology suite and identified by wristband. The patient was placed prone on the table, and the L4-5 spinous processes were identified. The skin was then prepped and draped in usual sterile fashion. Lidocaine was administered to the skin, and under fluoroscopic control, a 20 gauge spinal needle was advanced into the subarachnoid space between the L4-5 spinous processes . The table was then tilted into reverse Trendelenburg. 30 cc of clear spinal fluid was removed under it's own pressure into 4 separate sequential tubes. The tubes were sent to laboratory for testing as requested by the referring physician. After the procedure, the needle was removed, and a sterile dressing applied to the site. The patient tolerated the procedure well and suffered no complications.     Impression: Impression: 1. Successful lumbar puncture under fluoroscopic control. 2. 30 cc of clear spinal fluid was removed and sent to laboratory as described above. Workstation performed: [id number]     Procedure: CT chest abdomen pelvis w contrast  Result Date: 10/25/2024  Narrative: CT CHEST, ABDOMEN AND PELVIS WITH IV CONTRAST INDICATION: Confusion, abdominal pain, emesis, generalized abdominal tenderness. COMPARISON: CT abdomen pelvis 10/21/2024. CT chest abdomen pelvis 10/11/2024. TECHNIQUE: CT examination of the chest, abdomen  and pelvis was performed. Multiplanar 2D reformatted images were created from the source data. This examination, like all CT scans performed in the [facility], was performed utilizing techniques to minimize radiation dose exposure, including the use of iterative reconstruction and automated exposure control. Radiation dose length product (DLP) for this visit: 850.79 mGy-cm IV Contrast: 100 mL of iohexol (OMNIPAQUE) Enteric Contrast: Not administered. FINDINGS: CHEST LUNGS: Mild interlobular septal thickening through both lower lobes may represent mild pulmonary edema. Mild bibasilar atelectasis. No focal consolidation. Unchanged known 4 mm and smaller pulmonary nodules. PLEURA: Small bibasilar pleural effusions. HEART/GREAT VESSELS: Cardiomegaly. Coronary artery calcifications. No thoracic aortic aneurysm. MEDIASTINUM AND JOE: Unchanged mild mediastinal and bilateral parahilar adenopathy. Dominant 16 mm precarinal node #2/49. Reidentified diffuse esophageal distention. Diffuse esophageal fluid may indicate reflux. CHEST WALL AND LOWER NECK: Worsened right axillary and right subpectoral adenopathy. Previously seen dominant 12 mm node now measures 21 mm #2/29. Numerous new enlarged nodes. Unchanged subcentimeter left axillary nodes. ABDOMEN LIVER/BILIARY TREE: Unremarkable. GALLBLADDER: No calcified gallstones. No pericholecystic inflammatory change. SPLEEN: Unremarkable. PANCREAS: Unremarkable. ADRENAL GLANDS: Unremarkable. KIDNEYS/URETERS: No hydronephrosis. Numerous unchanged bilateral simple renal cysts. Well-circumscribed subcentimeter bilateral renal hypoattenuating lesions are also unchanged and likely to represent subcentimeter simple cyst. STOMACH AND BOWEL: Unremarkable. APPENDIX: No findings to suggest appendicitis. ABDOMINOPELVIC CAVITY: No ascites. No pneumoperitoneum. Unchanged bilateral iliac chain adenopathy. A dominant 2.2 cm right external iliac node #2/199 is unchanged. Dominant 10  mm left external iliac node #2/185 is unchanged. Unchanged 9 mm left pelvic sidewall node #2/210. Numerous unchanged subcentimeter retroperitoneal nodes. 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 PELVIS REPRODUCTIVE ORGANS: Prostatectomy. URINARY BLADDER: Unremarkable. ABDOMINAL WALL/INGUINAL REGIONS: Unchanged bilateral inguinal adenopathy. Dominant 16 mm right inguinal node #2/214. Dominant 11 mm left inguinal node #2/237. BONES: No acute fracture or suspicious osseous lesion.     Impression: Right axillary adenopathy is worsened when compared with CT chest 10/11/2024 Unchanged mediastinal, bilateral iliac chain and bilateral inguinal adenopathy when compared with CT abdomen pelvis 10/21/2024. Potential mild pulmonary edema. Small pleural effusions. Unchanged cardiomegaly. The study was marked in EPIC for immediate notification. Workstation performed: [id number]     Procedure: CT head without contrast  Result Date: 10/25/2024  Narrative: CT BRAIN - WITHOUT CONTRAST INDICATION:   Confusion, non focal. COMPARISON:  None. TECHNIQUE:  CT examination of the brain was performed.  Multiplanar 2D reformatted images were created from the source data. Radiation dose length product (DLP) for this visit:  870.27 mGy-cm .  This examination, like all CT scans performed in the [facility], was performed utilizing techniques to minimize radiation dose exposure, including the use of iterative  reconstruction and automated exposure control. IMAGE QUALITY:  Diagnostic. FINDINGS: PARENCHYMA: Decreased attenuation is noted in periventricular and subcortical white matter demonstrating an appearance that is statistically most likely to represent moderate microangiopathic change. No CT signs of acute infarction.  No intracranial mass, mass effect or midline shift.  No acute parenchymal hemorrhage. VENTRICLES AND EXTRA-AXIAL SPACES:  Normal for the patient's age.  VISUALIZED ORBITS: Normal visualized orbits. PARANASAL SINUSES: Normal visualized paranasal sinuses. CALVARIUM AND EXTRACRANIAL SOFT TISSUES: Normal.     Impression: No acute intracranial abnormality. Workstation performed: [id number]     Procedure: XR chest 1 view portable  Result Date: 10/22/2024  Narrative: XR CHEST PORTABLE INDICATION: Cough/fever. COMPARISON: Chest radiograph 10/8/2024; CT chest, abdomen and pelvis 10/11/2024 FINDINGS: Monitoring leads and clips project over the chest. Mild bibasilar atelectasis. No focal consolidation. Blunting of the left costophrenic angle suggesting trace pleural effusion. No pneumothorax. Normal cardiomediastinal silhouette. Degenerative changes of the spine and bilateral shoulders. Normal upper abdomen.     Impression: Mild bibasilar atelectasis and trace left pleural effusion. Resident: MARTHA Whiteside I, the attending radiologist, have reviewed the images and agree with the final report above. Workstation performed: [id number]     Procedure: CT abdomen pelvis with contrast  Result Date: 10/21/2024  Narrative: CT ABDOMEN AND PELVIS WITH IV CONTRAST INDICATION:, Nausea, vomiting and diarrhea. . COMPARISON: 10/11/2024. TECHNIQUE: CT examination of the abdomen and pelvis was performed. Multiplanar 2D reformatted images were created from the source data. This examination, like all CT scans performed in the [facility], was performed utilizing techniques to minimize radiation dose exposure, including the use of iterative reconstruction and automated exposure control. Radiation dose length product (DLP) for this visit: 585 mGy-cm IV Contrast: 100 mL of iohexol (OMNIPAQUE) Enteric Contrast: Not administered. FINDINGS: ABDOMEN LOWER CHEST: Small left pleural effusion and left basilar subsegmental atelectasis. LIVER/BILIARY TREE: Unremarkable. GALLBLADDER: No calcified gallstones. No pericholecystic inflammatory change. SPLEEN: Unremarkable. PANCREAS: Unremarkable.  ADRENAL GLANDS: Unremarkable. KIDNEYS/URETERS: Symmetric nephrographic phase enhancement the kidneys. Cysts measure up to 6.4 cm. Multiple left renal cysts measuring up to 8.2 cm. No obstructive uropathy. STOMACH AND BOWEL: Unremarkable. APPENDIX: Prior appendectomy. 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 PELVIS REPRODUCTIVE ORGANS: Prior prostatectomy. URINARY BLADDER: Unremarkable. ABDOMINAL WALL/INGUINAL REGIONS: Enlarged bilateral inguinal lymph nodes with normal fatty shen, measuring up to 1.9 cm.. BONES: No acute fracture or suspicious osseous lesion.     Impression: 1. No evidence of bowel obstruction, colitis or diverticulitis. 2. Numerous mildly enlarged para-aortic, iliac chain and inguinal lymph nodes, possibly reactive however lymphoproliferative disorder not excluded. Workstation performed: [id number]     Procedure: CT chest abdomen pelvis w contrast  Result Date: 10/12/2024  Narrative: CT CHEST, ABDOMEN AND PELVIS WITH IV CONTRAST INDICATION: intermittent persistent fevers. COMPARISON: 10/8/2024. TECHNIQUE: CT examination of the chest, abdomen and pelvis was performed. Multiplanar 2D reformatted images were created from the source data. This examination, like all CT scans performed in the [facility], was performed utilizing techniques to minimize radiation dose exposure, including the use of iterative reconstruction and automated exposure control. Radiation dose length product (DLP) for this visit: 898.73 mGy-cm IV Contrast: 100 mL of iohexol (OMNIPAQUE) Enteric Contrast: Not administered. FINDINGS: Moderately degraded by respiratory motion and retained barium in the colon. CHEST LUNGS: There is bibasilar atelectasis. Stable 3 mm right apical lung nodule on series 4 image 24. Additional nodules are not well seen due to  respiratory motion. No tracheal or endobronchial lesion. PLEURA: There are small bilateral pleural effusions. HEART/GREAT VESSELS: Heart is unremarkable for patient's age. No thoracic aortic aneurysm. MEDIASTINUM AND JOE: There is diffuse distention of the esophagus as seen previously. No obstructing lesion is visualized. CHEST WALL AND LOWER NECK: Again seen is an enlarged right axillary lymph node measuring 1.2 x 1.9 cm, nonspecific. ABDOMEN LIVER/BILIARY TREE: There is a 1.9 x 1.6 cm enhancing lesion in segment 6 of the liver, indeterminate. GALLBLADDER: Not well-visualized due to under distention and beam hardening artifact from adjacent barium. SPLEEN: Unremarkable. PANCREAS: Unremarkable. ADRENAL GLANDS: Unremarkable. KIDNEYS/URETERS: There are numerous bilateral renal cysts. No hydronephrosis. STOMACH AND BOWEL: Unremarkable. APPENDIX: No findings to suggest appendicitis. ABDOMINOPELVIC CAVITY: No ascites. No pneumoperitoneum. No lymphadenopathy. 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 PELVIS REPRODUCTIVE ORGANS: Unremarkable for patient's age. URINARY BLADDER: Unremarkable. ABDOMINAL WALL/INGUINAL REGIONS: Unremarkable. BONES: No acute fracture or suspicious osseous lesion.     Impression: Moderately limited by respiratory motion. 1.  Small bilateral pleural effusions with overlying atelectasis. 2.  3 mm right apical lung nodule. Based on current Fleischner Society 2017 Guidelines on incidental pulmonary nodule, no routine follow-up is needed if the patient is low risk. If the patient is high risk, optional follow-up chest CT at 12 months can be  considered. 3.  Stable diffusely dilated esophagus. This may be related to dysmotility. Obstructing lesion is not visualized. This would be better evaluated with esophagram. 4.  No acute abnormality in the abdomen or pelvis. 5.  Enhancing liver lesion,  indeterminate. Possibilities include hemangioma and vascular malformation though other etiologies including neoplasm not excluded. Follow-up nonemergent MRI is recommended. The study was marked in EPIC for immediate notification. Workstation performed: [id number]     Procedure: XR knee 3 vw left non injury  Result Date: 10/10/2024  Narrative: XR KNEE 3 VW LEFT NON INJURY INDICATION: left knee pain. COMPARISON: None FINDINGS: No acute fracture or dislocation. No joint effusion. No significant degenerative changes. No lytic or blastic osseous lesion. Unremarkable soft tissues.     Impression: No acute osseous abnormality. Computerized Assisted Algorithm (CAA) may have been used to analyze all applicable images. Workstation performed: [id number]     Procedure: FL barium swallow video w speech  Result Date: 10/9/2024  Narrative: A video barium swallow study was performed by the Department of Speech Pathology. Please refer to the report for the official interpretation. The images are stored for archival purposes only. Study images were not formally reviewed by the Radiology Department.    Procedure: FL barium swallow video w speech  Result Date: 10/9/2024  Narrative: JARROD Marie     10/9/2024  3:14 PM                                  Video Swallow Study Patient Name: Miguel Henderson Today's Date: 10/9/2024   Past Medical History Past Medical History: Diagnosis Date  Prostate cancer (HCC)   Thyroid cancer (HCC)   Past Surgical History Past Surgical History: Procedure Laterality Date  APPENDECTOMY   Summary: Images are on PACS for review. Oral Phase : Pt presented with mild oral dysphagia. Mastication was mod prolonged however eventual functional breakdown. Bolus control, formation were WNL. Transfer was piecemeal. Trace posterior base of tongue residue. Pharyngeal Phase :Pt presented with mild pharyngeal dysphagia. Swallow initiation was intermittently min delayed with solids and prompt with liquids. Spill to the  valleculae occurred with solids. Hyoid elevation and laryngeal excursion were WNL. Pharyngeal stripping wave diminished with solid trials. Epiglottic inversion was mostly complete. With nutri grain min epiglottic undercoat present. Trace vallecular retention with thin liquids and mild/mod with solids. Pt intermittently initiated secondary swallow to reduce residue. No pyriform retention. Large CP bar C4-C5 evident however did not impede bolus flow. No aspiration or penetration observed with trials. Per Esophageal screen Slow motility observed with liquids and solids. High retropulsion observed with liquids, pt risk for bottom up aspiration. Stasis occurred in distal esophagus with 13 mm pill however provided with additional sip of thin, ntl, and puree. Provided additional time x4 minutes pt continued with residue with esophagus. Observations: Son present throughout study provided translation. Recommendations: Diet: Dysphagia 3 dental soft Liquids:thin Meds: whole with thin liquids Strategies:slow rate, alternate liquids with solids, swallow prior to additional po Upright position F/u ST tx: yes Therapy Prognosis: fair Prognosis considerations:age, medical status, Intermittent/Distant Supervision Aspiration Precautions Reflux Precautions Consider consult with: GI, rehab Results reviewed with: pt, nursing, physician. ST reviewed images and recommendations reviewed with family at bedside. Aspiration precautions posted. If a dedicated assessment of the esophagus is desired, consider esophagram/barium swallow or EGD. Goals: Dysphagia LTG -Patient will demonstrate safe and effective oral intake (without overt s/s significant oral/pharyngeal dysphagia including s/s penetration or aspiration) for the highest appropriate diet level. Short Term Goals: -Pt will tolerate Dysphagia 3/advanced (dental soft) diet and thin liquid with no significant s/s oral or pharyngeal dysphagia across 1-3 diagnostic session/s. -Patient will  tolerate trials of upgraded food and/or liquid texture with no significant s/s of oral or pharyngeal dysphagia including aspiration across 1-3 diagnostic sessions -Patient will comply with a Video/Modified Barium Swallow study for more complete assessment of swallowing anatomy/physiology/aspiration risk and to assess efficacy of treatment techniques -Patient will demonstrate the ability to adequately self-monitor swallowing skills and perform appropriate compensatory techniques to reduce overt s/sx of penetration/aspiration to safely tolerate least restrictive food/liquid consistencies. Patient's goal: none stated H&P/Admit info, pertinent provider notes/procedures/studies/PMH:(copied and placed in this report) Miguel Henderson is a 84 y.o. male with a PMH of prostate cancer and thyroid cancer who presents with fever x 4 days.  Patient is Telugu-speaking only and history is obtained through assistance of his son who is present at bedside and acting as .  Patient has been having intermittent fevers which have been responsive to Tylenol and Motrin at home but have ranged as high as 103.7 and yesterday was having some apparent respiratory distress but he has no respiratory history and is on no respiratory medications at home.  Patient does have a scant cough which is nonproductive and denies any increased urinary frequency or dysuria though he does complain of some hematuria which has been ongoing for the past month.  Patient additionally has been complaining of a sore throat for the past 4 days with decreased p.o. fluid intake secondary to pain with swallowing.  While in ER patient was noted to meet severe sepsis criteria and was thus referred for admission. Special Studies XR CHEST PORTABLE 10/08/2024 IMPRESSION: Mild tree-in-bud pattern of opacity mostly in the right lung and in the left base again suggesting a mild infectious/inflammatory condition CT CHEST WITHOUT IV CONTRAST 10/08/2024 IMPRESSION:  Mild bibasilar probable atelectatic changes, right greater than left. Otherwise no focal airspace consolidation to suggest pneumonia. Minor tree-in-bud nodularity in the right lung which could relate to infectious versus inflammatory small airways disease. Clinical correlation recommended. Pulmonary nodules as above. Based on current Fleischner Society 2017 Guidelines on incidental pulmonary nodule, no routine follow-up is needed if the patient is low risk. If the patient is high risk, optional follow-up chest CT at 12 months can be considered. Trace right pleural effusion. Mild right axillary lymphadenopathy, nonspecific Code Status: Level 1 full code Previous VBS: none Precautions Contact Precautions Food allergies:  No know  Current diet:  Regular diet and thin liquids  Premorbid diet:  Regular diet and thin liquids  Dentition  Upper and lower dentures Oral Mech  WNL O2 requirements:  Room air  Vocal Quality/Speech WNL Cognitive status:  Alert  Consistencies administered: Barium laden applesauce, nutri grain bar, sandwich bite, thin liquids,ntl and  13mm barium pill. Liquids were administered by cup. Pt was seated laterally at 90 degrees. Pt  unable to be viewed in AP position, due to transfer status      Procedure: Echo complete w/ contrast if indicated  Result Date: 10/9/2024  Narrative:   Left Ventricle: Left ventricular cavity size is normal. Wall thickness is mildly increased. The left ventricular ejection fraction is 55%. Systolic function is normal. Wall motion is normal.   Aortic Valve: There is aortic valve sclerosis.   Aorta: The aortic root is mildly dilated. The ascending aorta is mildly dilated. The aortic root is 3.80 cm. The ascending aorta is 3.9 cm.     Procedure: XR chest portable  Result Date: 10/8/2024  Narrative: XR CHEST PORTABLE INDICATION: shortness of breath. COMPARISON: 10/7/2024 FINDINGS: Minimal tree-in-bud pattern of opacity mostly in the right lung, but also at the left base. No  focal dense consolidation. The trace amount of pleural fluid seen on the prior CT is not apparent on this exam. No pneumothorax identified. Stable cardiomediastinal silhouette Degenerative changes noted along the spine. Normal upper abdomen.     Impression: Mild tree-in-bud pattern of opacity mostly in the right lung and in the left base again suggesting a mild infectious/inflammatory condition Workstation performed: [id number]     Procedure: XR chest portable - 1 view  Result Date: 10/8/2024  Narrative: XR CHEST PORTABLE INDICATION: Fever cough. COMPARISON: None FINDINGS: Clear lungs. No pneumothorax or pleural effusion. Normal cardiomediastinal silhouette. Bones are unremarkable for age. Normal upper abdomen.     Impression: No acute cardiopulmonary disease. Workstation performed: [id number]     Procedure: CT chest wo contrast  Result Date: 10/8/2024  Narrative: CT CHEST WITHOUT IV CONTRAST INDICATION: Cough shortness of breath with fever. COMPARISON: None. TECHNIQUE: CT examination of the chest was performed without intravenous contrast. Multiplanar 2D reformatted images were created from the source data. This examination, like all CT scans performed in the [facility], was performed utilizing techniques to minimize radiation dose exposure, including the use of iterative reconstruction and automated exposure control. Radiation dose length product (DLP) for this visit: 441.84 mGy-cm FINDINGS: LUNGS: Evaluation somewhat limited by respiratory motion. There is some minor groundglass and tree-in-bud nodularity suggested in the right upper lobe inferior segment and to a lesser degree in the right lower lobe. No lobar consolidation. Trace paraseptal emphysematous changes. Mild basilar atelectatic changes, right greater than left. There is minor scarring in the posteromedial right lower lobe adjacent to bulky paravertebral osteophytes. 4 mm pulmonary nodule seen in the right upper lobe (3:35). Additional  scattered pulmonary nodules are seen measuring on the order of 2 to 3 mm. Based on current Fleischner Society 2017 Guidelines on incidental pulmonary nodule, no routine follow-up is needed if the patient is low risk. If the patient is high risk, optional follow-up chest CT at 12 months can be considered. PLEURA: Trace right pleural effusion with subjacent atelectasis. HEART/GREAT VESSELS: Heart is unremarkable for patient's age. No thoracic aortic aneurysm. Coronary atherosclerosis MEDIASTINUM AND JOE: Shotty subcentimeter mediastinal lymph nodes noted. Evaluation for hilar lymphadenopathy limited in the absence of intravenous contrast. CHEST WALL AND LOWER NECK: Gynecomastia. Few prominent/mildly enlarged lymph nodes seen in the right axillary region, largest measuring up to 1.2 cm,. VISUALIZED STRUCTURES IN THE UPPER ABDOMEN: No acute abnormality. Bilateral renal cysts. OSSEOUS STRUCTURES: Spinal degenerative changes are noted. No acute fracture or destructive osseous lesion.     Impression: Mild bibasilar probable atelectatic changes, right greater than left. Otherwise no focal airspace consolidation to suggest pneumonia. Minor tree-in-bud nodularity in the right lung which could relate to infectious versus inflammatory small airways disease. Clinical correlation recommended. Pulmonary nodules as above. Based on current Fleischner Society 2017 Guidelines on incidental pulmonary nodule, no routine follow-up is needed if the patient is low risk. If the patient is high risk, optional follow-up chest CT at 12 months can be considered. Trace right pleural effusion. Mild right axillary lymphadenopathy, nonspecific. Workstation performed: [id number]       Administrative Statements   I have spent a total time of 45 minutes in caring for this patient on the day of the visit/encounter including Risks and benefits of tx options, Instructions for management, Patient and family education, Documenting in the medical record,  Reviewing / ordering tests, medicine, procedures  , and Obtaining or reviewing history  . Topics discussed with the patient / family include symptom assessment and management, medication review, medication adjustment, psychosocial support, medical marijuana, supportive listening, and anticipatory guidance.

## 2025-01-17 NOTE — ASSESSMENT & PLAN NOTE
Increase prednisone to 20 mg daily  Start hydroxyzine 25 mg q6h prn pruritus    Orders:    hydrOXYzine HCL (ATARAX) 25 mg tablet; Take 1 tablet (25 mg total) by mouth every 6 (six) hours as needed for itching (insomnia)    predniSONE 20 mg tablet; Take 1 tablet (20 mg total) by mouth daily

## 2025-01-17 NOTE — ASSESSMENT & PLAN NOTE
Schedule tylenol 1g TID  Continue gabapentin at reduced dose with as needed oxycodone      Orders:    gabapentin (NEURONTIN) 100 mg capsule; Take 2 capsules (200 mg total) by mouth daily at bedtime    oxyCODONE (Roxicodone) 5 immediate release tablet; Take 0.5 tablets (2.5 mg total) by mouth every 6 (six) hours as needed for moderate pain or severe pain Max Daily Amount: 10 mg    acetaminophen (TYLENOL) 650 mg CR tablet; Take 1 tablet (650 mg total) by mouth 3 (three) times a day    predniSONE 20 mg tablet; Take 1 tablet (20 mg total) by mouth daily

## 2025-01-17 NOTE — TELEPHONE ENCOUNTER
Liseth called from [facility] to clarify patient's prednisone prescription. They received 1 script for 10mg tablets and another for 20mg and wanted to check which should be filled.  I reviewed medication list and saw that 10mg is cancelled. Advised her to dispense 20mg as prescribed.

## 2025-01-17 NOTE — ASSESSMENT & PLAN NOTE
Time spent developing patient/family-provider relationship, providing psychosocial support, and validating patient/family experience  Encouraged to call the office with any questions/concerns  Follow up in 6 weeks or sooner as needed

## 2025-01-17 NOTE — ASSESSMENT & PLAN NOTE
The patient qualifies for use of MMJ in the [facility] by having the following medical condition: metastatic cancer.  From a palliative care standpoint the patient is suffering with pain, insomnia, and decreased appetite. These symptoms and side effects might be alleviated with use of MMJ products. The patient registered online (patient ID #[id number]). The patient read and I reviewed the informed consent document with the patient. I answered all questions related to it before the patient signed it. The patient's medical certification was completed on this date (certification # [id number]). The patient was given a copy of the medical certification.  The patient accepts extensive counseling today on MMJ risks and benefits, legal concerns, possible adverse effects, routes of administration, active ingredients such as THC+CBD, etc.  I issued a certification for MMJ use with palliative intent, to help alleviate cancer-related symptoms and cancer-treatment-related side effects. I do not endorse the belief that MMJ can treat cancer and strongly encouraged the patient to continue treatments and surveillance as recommend by cancer specialists.

## 2025-01-17 NOTE — ASSESSMENT & PLAN NOTE
Following with oncology for monitoring, does not want to pursue further anti-neoplastic therapies  Symptom management as below    Orders:    predniSONE 20 mg tablet; Take 1 tablet (20 mg total) by mouth daily

## 2025-01-17 NOTE — PROGRESS NOTES
Palliative Supportive Care  met with patient/family to provide emotional support and guidance.      Updated biopsychosocial information relevant to support: Social work participated in office visit with Dr. Lane, patient, son Juan today.  Patient is Swedish-speaking only.  Patient presents as pleasant and social.    Identified areas of need include: No specific needs mention for .      Resources provided: MMJ information for dispensaries provided for patient and son.    Areas that need future follow-up include: Social work to continue to follow to provide support and encouragement with needs as they present.    I have spent 30 minutes with Patient and family today in which greater than 50% of this time was spent in counseling/coordination of care.     Palliative  will follow-up as requested by patient, family, and primary team.  Please contact with any specific requests.

## 2025-01-20 ENCOUNTER — TELEPHONE (OUTPATIENT)
Age: 85
End: 2025-01-20

## 2025-01-20 NOTE — TELEPHONE ENCOUNTER
PA for HYDROXYZINE 25 MG  APPROVED     Date(s) approved UNTIL 01/18/2026        Patient advised by          []MyChart Message  []Phone call   [x]LMOM  []L/M to call office as no active Communication consent on file  []Unable to leave detailed message as VM not approved on Communication consent       Pharmacy advised by    [x]Fax  []Phone call    Approval letter scanned into [employer] WILL SCAN UPON RECEIPT

## 2025-02-07 ENCOUNTER — PATIENT OUTREACH (OUTPATIENT)
Dept: HEMATOLOGY ONCOLOGY | Facility: CLINIC | Age: 85
End: 2025-02-07

## 2025-02-07 NOTE — PROGRESS NOTES
I reached out to Miguel's son Juan to reassess for any barriers to care and offer any supportive services that may be needed. Juan asked if I can give him a call back Monday around noon due to him being at work. I will plan a outreach Monday around noon as requested. He was appreciative of the call.

## 2025-02-10 ENCOUNTER — PATIENT OUTREACH (OUTPATIENT)
Dept: HEMATOLOGY ONCOLOGY | Facility: CLINIC | Age: 85
End: 2025-02-10

## 2025-02-10 NOTE — PROGRESS NOTES
I reached out to Miguel's son Juan  to reassess for any barriers to care and offer any supportive services that may be needed. I left  with reason for my call including my direct phone number [phone]

## 2025-02-17 ENCOUNTER — PATIENT OUTREACH (OUTPATIENT)
Dept: HEMATOLOGY ONCOLOGY | Facility: CLINIC | Age: 85
End: 2025-02-17

## 2025-02-17 NOTE — PROGRESS NOTES
I reached out and spoke with Miguel's son Juan  to follow up and to review for any new changes in barriers to care and offer supportive services as needed.     Barriers noted previously and outcome of interventions;  N/a    Reviewed for any new barriers as noted below.    Are you having any side effects from your treatment?No    Are you eating and drinking normally? yes    Have you been experiencing any uncontrolled pain related to your cancer diagnosis? No pain has been manageable with palliative care     If not already established, have needs changed for a palliative care referral? established    Do you have a good support system? Yes     Are you interested in any support groups? declined    How are you doing with transportation to your appointments? good    Do you have any questions or concerns regarding your treatment plan? No    Any new financial concerns for your household or medical bills? No    Do you know when your upcoming appointments are? yes  Future Appointments   Date Time Provider Department Center   3/5/2025  9:00 AM MD SHAWNEE Steele  [facility]   3/7/2025 11:30 AM Shilpi Lane MD [facility]   7/9/2025  9:20 AM MD [facility]        At this time Miguel is not getting any tx. He plans on following up with palliative care. Juan stated he will give me a call if any barriers to care were to arise. I have provided my direct contact information and welcome them to contact me if their needs as discussed above change. They were appreciative for the call.

## 2025-02-27 ENCOUNTER — RA CDI HCC (OUTPATIENT)
Dept: OTHER | Facility: HOSPITAL | Age: 85
End: 2025-02-27

## 2025-02-27 NOTE — PROGRESS NOTES
HCC coding opportunities          Chart Reviewed number of suggestions sent to Provider: 3    B37.81  C61  C84.90     Please review and document all HCC diagnoses, using MEAT criteria, as risk scores reset with the new year.     Patients Insurance     Medicare Insurance: [employer]           none

## 2025-03-07 ENCOUNTER — OFFICE VISIT (OUTPATIENT)
Dept: PALLIATIVE MEDICINE | Facility: CLINIC | Age: 85
End: 2025-03-07
Payer: COMMERCIAL

## 2025-03-07 ENCOUNTER — DOCUMENTATION (OUTPATIENT)
Dept: PALLIATIVE MEDICINE | Facility: CLINIC | Age: 85
End: 2025-03-07

## 2025-03-07 VITALS
TEMPERATURE: 98.7 F | HEART RATE: 98 BPM | OXYGEN SATURATION: 98 % | BODY MASS INDEX: 26.33 KG/M2 | WEIGHT: 158 LBS | DIASTOLIC BLOOD PRESSURE: 60 MMHG | HEIGHT: 65 IN | RESPIRATION RATE: 18 BRPM | SYSTOLIC BLOOD PRESSURE: 104 MMHG

## 2025-03-07 DIAGNOSIS — C84.90 NK/T-CELL LYMPHOMA, UNSPECIFIED TYPE (HCC): Primary | ICD-10-CM

## 2025-03-07 DIAGNOSIS — L29.9 PRURITUS: ICD-10-CM

## 2025-03-07 DIAGNOSIS — G89.3 CANCER RELATED PAIN: ICD-10-CM

## 2025-03-07 DIAGNOSIS — Z51.5 PALLIATIVE CARE BY SPECIALIST: ICD-10-CM

## 2025-03-07 DIAGNOSIS — T40.2X5A THERAPEUTIC OPIOID-INDUCED CONSTIPATION (OIC): ICD-10-CM

## 2025-03-07 DIAGNOSIS — K59.03 THERAPEUTIC OPIOID-INDUCED CONSTIPATION (OIC): ICD-10-CM

## 2025-03-07 PROCEDURE — G2211 COMPLEX E/M VISIT ADD ON: HCPCS | Performed by: STUDENT IN AN ORGANIZED HEALTH CARE EDUCATION/TRAINING PROGRAM

## 2025-03-07 PROCEDURE — 99215 OFFICE O/P EST HI 40 MIN: CPT | Performed by: STUDENT IN AN ORGANIZED HEALTH CARE EDUCATION/TRAINING PROGRAM

## 2025-03-07 RX ORDER — HYDROXYZINE HYDROCHLORIDE 25 MG/1
TABLET, FILM COATED ORAL
Qty: 90 TABLET | Refills: 0 | Status: SHIPPED | OUTPATIENT
Start: 2025-03-07

## 2025-03-07 RX ORDER — GABAPENTIN 100 MG/1
200 CAPSULE ORAL
Qty: 60 CAPSULE | Refills: 2 | Status: SHIPPED | OUTPATIENT
Start: 2025-03-07

## 2025-03-07 RX ORDER — PREDNISONE 20 MG/1
20 TABLET ORAL DAILY
Qty: 30 TABLET | Refills: 1 | Status: SHIPPED | OUTPATIENT
Start: 2025-03-07

## 2025-03-07 RX ORDER — OXYCODONE HYDROCHLORIDE 5 MG/1
2.5 TABLET ORAL EVERY 6 HOURS PRN
Qty: 90 TABLET | Refills: 0 | Status: SHIPPED | OUTPATIENT
Start: 2025-03-07

## 2025-03-07 NOTE — ASSESSMENT & PLAN NOTE
Stable, continue scheduled tylenol and gabapentin with oxycodone and MMJ as needed    Orders:    oxyCODONE (Roxicodone) 5 immediate release tablet; Take 0.5 tablets (2.5 mg total) by mouth every 6 (six) hours as needed for moderate pain or severe pain Max Daily Amount: 10 mg    gabapentin (NEURONTIN) 100 mg capsule; Take 2 capsules (200 mg total) by mouth daily at bedtime

## 2025-03-07 NOTE — ASSESSMENT & PLAN NOTE
Following with oncology for monitoring, does not wish to pursue further anti-neoplastic therapies. Symptom management as below.

## 2025-03-07 NOTE — PROGRESS NOTES
Name: Miguel Henderson      : 1940      MRN: [id number]  Encounter Provider: Shilpi aLne MD  Encounter Date: 3/7/2025   Encounter department: [facility] PALLIATIVE CARE [place]  :  Assessment & Plan  NK/T-cell lymphoma, unspecified type (HCC)  Following with oncology for monitoring, does not wish to pursue further anti-neoplastic therapies. Symptom management as below.         Cancer related pain  Stable, continue scheduled tylenol and gabapentin with oxycodone and MMJ as needed    Orders:    oxyCODONE (Roxicodone) 5 immediate release tablet; Take 0.5 tablets (2.5 mg total) by mouth every 6 (six) hours as needed for moderate pain or severe pain Max Daily Amount: 10 mg    gabapentin (NEURONTIN) 100 mg capsule; Take 2 capsules (200 mg total) by mouth daily at bedtime    Pruritus  Trial bedtime dose of 50 mg hydroxyzine in addition to as needed doses, continue prednisone 20 mg daily (could consider dose increase if adjustment to hydroxyzine is not sufficient)    Orders:    hydrOXYzine HCL (ATARAX) 25 mg tablet; Take 2 tablets (50 mg total) by mouth daily at bedtime. May also take 1 tablet (25 mg total) 3 (three) times a day as needed for itching (insomnia).    predniSONE 20 mg tablet; Take 1 tablet (20 mg total) by mouth daily    Therapeutic opioid-induced constipation (OIC)  Stable, continue bowel regimen as needed         Palliative care by specialist  Time spent developing patient/family-provider relationship, providing psychosocial support, and validating patient/family experience  Encouraged to call the office with any questions/concerns  Follow up in 2 months or sooner as needed         PDMP Review: I have reviewed the patient's controlled substance dispensing history in the Prescription Drug Monitoring Program in compliance with the NEO regulations before prescribing any controlled substances.    History of Present Illness     Miguel Henderson is an 84 y.o. 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 He last saw me about 6 weeks ago at which time I increased his prednisone and started atarax for itching. He is accompanied by his son, who he requests be his , and his wife.    He notes persistent generalized pruritus worst at night. The atarax does help take the edge off. His pain is relatively well managed with oxycodone and tylenol, though he ran out of the oxycodone last week so he has been having more pain. He has intermittent fevers that respond to tylenol. He did try some MMJ products which seem to help his pain a bit. His bowels are regulated with OTC products as needed. His son notes that he has been a bit more down and anxious though he is able to distract himself with things like movies and music. He continues to be frustrated by his limitations and wants to be well enough to travel to [place].     Objective   /60   Pulse 98   Temp 98.7 °F (37.1 °C)   Resp 18   Ht 5' 5\" (1.651 m)   Wt 71.7 kg (158 lb)   SpO2 98%   BMI 26.29 kg/m²     Physical Exam  Constitutional:       Comments: Frail elderly male in wheelchair, no acute distress   Pulmonary:      Effort: Pulmonary effort is normal. No respiratory distress.   Skin:     General: Skin is warm and dry.      Coloration: Skin is not pale.   Neurological:      General: No focal deficit present.   Psychiatric:      Comments: Mood and affect appropriate         Recent labs:  Lab Results   Component Value Date/Time    SODIUM 140 01/08/2025 08:44 AM    K 3.7 01/08/2025 08:44 AM    BUN 11 01/08/2025 08:44 AM    CREATININE 0.54 01/08/2025 08:44 AM    GLUC 79 01/08/2025 08:44 AM    CALCIUM 8.6 01/08/2025 08:44 AM    AST 19 01/08/2025 08:44 AM    ALT 17 01/08/2025 08:44 AM    ALB 3.2 (L) 01/08/2025 08:44 AM    TP 6.7 01/08/2025 08:44 AM    EGFR 97 01/08/2025 08:44 AM    EGFR 91 12/05/2019 06:47 AM     Lab Results   Component Value Date/Time    HGB 9.1 (L) 11/16/2024 08:11 AM    WBC 9.04 " 11/16/2024 08:11 AM     11/16/2024 08:11 AM    INR 1.67 (H) 10/27/2024 06:39 AM    PTT 33 10/21/2024 04:49 PM     Lab Results   Component Value Date/Time    KIS4UTYGULTA 3.279 10/31/2024 08:52 AM       Recent Imaging:  Procedure: NM PET CT skull base to mid thigh  Result Date: 12/13/2024  Impression: 1. Generally improved lymphadenopathy in the chest, abdomen and pelvis compared to CT of 10/25/2024. There is FDG avid yariel disease identified most intense in the bilateral hilar regions. 2. FDG avid left parotid nodule could be an incidental salivary gland lesion rather than related to lymphoma. Reassess on follow-up. 3. Mild to moderate generalized osseous uptake, nonspecific. This can be seen in various settings such as anemia, related to a marrow stimulating agent or response to therapy. However, osseous infiltration by disease is not excluded given the history. Correlate clinically. 4. Small cutaneous focus of uptake at the right lower neck anteriorly with slight infiltration noted on CT. This could be inflammatory. Correlate clinically. Workstation performed: [id number]     Procedure: IR biopsy lymph node  Result Date: 11/12/2024  Impression: Ultrasound-guided right axillary lymph node core biopsy. Workstation performed: [id number]     Procedure: IR biopsy bone marrow  Result Date: 11/12/2024  Impression: Successful image guided bone marrow biopsy of right ileum. Workstation performed: [id number]     Procedure: XR chest portable  Result Date: 11/11/2024  Impression: No acute cardiopulmonary disease. Workstation performed: [id number]       Administrative Statements   I have spent a total time of 55 minutes in caring for this patient on the day of the visit/encounter including Diagnostic results, Prognosis, Risks and benefits of tx options, Instructions for management, Patient and family education, Impressions, Counseling / Coordination of care, Documenting in the medical record, Reviewing/placing orders  in the medical record (including tests, medications, and/or procedures), and Obtaining or reviewing history  .   Topics discussed with the patient / family include symptom assessment and management, medication review, medication adjustment, psychosocial support, medical marijuana, supportive listening, and anticipatory guidance.

## 2025-03-07 NOTE — PROGRESS NOTES
Palliative Supportive Care  and Dr. Ayon met with patient/family to continue to provide emotional support and guidance.      Updated biopsychosocial information relevant to support: Pt presents as very pleasant and social. His primary language is Telugu, and his son translates for him. His spouse also joined the visit. Pt reports several medical issues which Dr Lane addressed. Please see her note. SW provided some MMJ info.    Identified areas of need include: No needs or concerns expressed presently for specific SW f/u.    Resources provided: support and encouragement.    Areas that need future follow-up include: SW to follow and assist as able with needs and concerns as they present.    I have spent 50 minutes with Patient and family today in which greater than 50% of this time was spent in counseling/coordination of care.     Palliative  will follow-up as requested by patient, family, and primary team.  Please contact with any specific requests.

## 2025-03-07 NOTE — ASSESSMENT & PLAN NOTE
Time spent developing patient/family-provider relationship, providing psychosocial support, and validating patient/family experience  Encouraged to call the office with any questions/concerns  Follow up in 2 months or sooner as needed

## 2025-03-07 NOTE — ASSESSMENT & PLAN NOTE
Trial bedtime dose of 50 mg hydroxyzine in addition to as needed doses, continue prednisone 20 mg daily (could consider dose increase if adjustment to hydroxyzine is not sufficient)    Orders:    hydrOXYzine HCL (ATARAX) 25 mg tablet; Take 2 tablets (50 mg total) by mouth daily at bedtime. May also take 1 tablet (25 mg total) 3 (three) times a day as needed for itching (insomnia).    predniSONE 20 mg tablet; Take 1 tablet (20 mg total) by mouth daily

## 2025-03-21 NOTE — PHYSICAL THERAPY NOTE
PHYSICAL THERAPY TREATMENT NOTE  NAME:  Miguel Henderson  DATE: 10/14/24    Length Of Stay: 6  Performed at least 2 patient identifiers during session: Name and ID bracelet    TREATMENT:      10/14/24 1338   PT Last Visit   PT Visit Date 10/14/24   Note Type   Note Type Treatment   Pain Assessment   Pain Assessment Tool 0-10   Pain Score 3   Pain Location/Orientation Orientation: Left;Location: Leg   Pain Onset/Description Onset: Ongoing   Patient's Stated Pain Goal No pain   Hospital Pain Intervention(s) Ambulation/increased activity;Repositioned   Multiple Pain Sites No   Restrictions/Precautions   Weight Bearing Precautions Per Order No   Other Precautions Droplet precautions;Contact/isolation;Fall Risk;Pain   General   Chart Reviewed Yes   Family/Caregiver Present Yes   Cognition   Overall Cognitive Status WFL   Arousal/Participation Alert;Responsive;Cooperative   Attention Within functional limits   Orientation Level Oriented X4   Memory Within functional limits   Following Commands Follows one step commands without difficulty   Comments pt agreeable to PT treatment, family available to translate   Bed Mobility   Supine to Sit 5  Supervision   Additional items Assist x 1;HOB elevated;Bedrails;Increased time required   Additional Comments pt sat unsupported at EOB x20+ minutes to complete B LE ther ex   Transfers   Sit to Stand 4  Minimal assistance   Additional items Assist x 1;Increased time required;Verbal cues   Stand to Sit 5  Supervision   Additional items Assist x 1;Armrests;Bedrails;Increased time required;Verbal cues   Stand pivot 4  Minimal assistance   Additional items Assist x 1;Increased time required;Verbal cues   Toilet transfer 4  Minimal assistance   Additional items Assist x 1;Increased time required;Verbal cues;Standard toilet   Additional Comments verbal cues for proper hand placement and safe technique   Ambulation/Elevation   Gait pattern Improper Weight shift;Forward Flexion;Decreased  ----- Message from Jose Parker MD sent at 3/21/2025  9:35 AM CDT -----  Please let the patient know that her US shows fatty liver, I believe this to be the cause of her elevated liver enzymes, it is very important to abstain from alcohol, and high fat diet, recommend referral to RD.   Untreated fatty liver can develop to cirrhosis sometimes (cirrhosis is irreversible liver damage, fatty liver is a reversible injury)    foot clearance;Short stride   Gait Assistance 4  Minimal assist   Additional items Assist x 1;Verbal cues   Assistive Device Rolling walker   Distance 40 ft x1, 20 ft x1   Ambulation/Elevation Additional Comments SOB noted post ambulation   Balance   Static Sitting Good   Dynamic Sitting Fair +   Static Standing Fair   Dynamic Standing Fair   Ambulatory Fair -   Activity Tolerance   Activity Tolerance Patient limited by fatigue  (SOB noted with minimal exertion)   Nurse Made Aware RN made aware of outcomes   Exercises   Heelslides Sitting;20 reps;AROM;Bilateral   Glute Sets Sitting;20 reps;AROM;Bilateral   Hip Abduction Sitting;20 reps;AROM;Bilateral   Hip Adduction Sitting;20 reps;AROM;Bilateral   Knee AROM Long Arc Quad Sitting;20 reps;AROM;Bilateral   Ankle Pumps Sitting;20 reps;AROM;Bilateral   Marching Sitting;20 reps;AROM;Bilateral   Assessment   Prognosis Good   Problem List Decreased strength;Decreased endurance;Impaired balance;Decreased mobility;Pain   Assessment Pt seen for PT treatment session this date with interventions consisting of gait training w/ emphasis on improving pt's ability to ambulate level surfaces x 40 ft x1, 20 ftx1 with min A provided by therapist with RW, Therapeutic exercise consisting of: AROM 20 reps B LE in seated unsupported at EOB position, and therapeutic activity consisting of training: bed mobility, supine<>sit transfers, sit<>stand transfers, and stand pivot transfers towards B direction. Pt agreeable to PT treatment session upon arrival, pt found supine in bed w/ HOB elevated, in no apparent distress and responsive. In comparison to previous session, pt with improvements in distance ambulated . Post session: pt returned BTB, all needs in reach, and RN notified of session findings/recommendations. Continue to recommend Level II (Moderate Resource Intensity at time of d/c in order to maximize pt's functional independence and safety w/ mobility. Pt continues to be functioning  below baseline level. PT will continue to see pt during current hospitalization in order to address the deficits listed above and provide interventions consistent w/ POC in effort to achieve STGs.    Annemarie Rodriguez PTA   Goals   Patient Goals none stated   LTG Expiration Date 10/21/24   Plan   Treatment/Interventions Functional transfer training;LE strengthening/ROM;Therapeutic exercise;Endurance training;Bed mobility;Gait training   PT Frequency 3-5x/wk   AM-PAC Basic Mobility Inpatient   Turning in Flat Bed Without Bedrails 3   Lying on Back to Sitting on Edge of Flat Bed Without Bedrails 3   Moving Bed to Chair 3   Standing Up From Chair Using Arms 3   Walk in Room 3   Climb 3-5 Stairs With Railing 2   Basic Mobility Inpatient Raw Score 17   Basic Mobility Standardized Score 39.67   [facility] Highest Level Of Mobility   -HLM Goal 5: Stand one or more mins   -HLM Achieved 7: Walk 25 feet or more   End of Consult   Patient Position at End of Consult All needs within reach       The patient's AM-PAC Basic Mobility Inpatient Short Form Raw Score is 17. A Raw score of greater than 16 suggests the patient may benefit from discharge to home. Please also refer to the recommendation of the Physical Therapist for safe discharge planning.      Annemarie Rodriguez PTA,PTA

## 2025-04-21 DIAGNOSIS — G89.3 CANCER RELATED PAIN: ICD-10-CM

## 2025-04-21 RX ORDER — GABAPENTIN 100 MG/1
CAPSULE ORAL
Qty: 60 CAPSULE | Refills: 2 | OUTPATIENT
Start: 2025-04-21

## 2025-05-05 RX ORDER — CLOTRIMAZOLE AND BETAMETHASONE DIPROPIONATE 10; .5 MG/ML; MG/ML
LOTION TOPICAL
COMMUNITY
Start: 2025-02-27

## 2025-05-09 ENCOUNTER — OFFICE VISIT (OUTPATIENT)
Dept: PALLIATIVE MEDICINE | Facility: CLINIC | Age: 85
End: 2025-05-09
Payer: COMMERCIAL

## 2025-05-09 ENCOUNTER — DOCUMENTATION (OUTPATIENT)
Dept: PALLIATIVE MEDICINE | Facility: CLINIC | Age: 85
End: 2025-05-09

## 2025-05-09 VITALS
WEIGHT: 181 LBS | HEART RATE: 88 BPM | TEMPERATURE: 98.1 F | SYSTOLIC BLOOD PRESSURE: 122 MMHG | RESPIRATION RATE: 18 BRPM | DIASTOLIC BLOOD PRESSURE: 70 MMHG | OXYGEN SATURATION: 99 % | BODY MASS INDEX: 30.16 KG/M2 | HEIGHT: 65 IN

## 2025-05-09 DIAGNOSIS — Z51.5 PALLIATIVE CARE BY SPECIALIST: ICD-10-CM

## 2025-05-09 DIAGNOSIS — G89.3 CANCER RELATED PAIN: ICD-10-CM

## 2025-05-09 DIAGNOSIS — G47.01 INSOMNIA DUE TO MEDICAL CONDITION: ICD-10-CM

## 2025-05-09 DIAGNOSIS — C84.90 NK/T-CELL LYMPHOMA, UNSPECIFIED TYPE (HCC): Primary | ICD-10-CM

## 2025-05-09 DIAGNOSIS — L29.9 PRURITUS: ICD-10-CM

## 2025-05-09 DIAGNOSIS — R60.0 BILATERAL LOWER EXTREMITY EDEMA: ICD-10-CM

## 2025-05-09 PROCEDURE — 99215 OFFICE O/P EST HI 40 MIN: CPT | Performed by: STUDENT IN AN ORGANIZED HEALTH CARE EDUCATION/TRAINING PROGRAM

## 2025-05-09 PROCEDURE — G2211 COMPLEX E/M VISIT ADD ON: HCPCS | Performed by: STUDENT IN AN ORGANIZED HEALTH CARE EDUCATION/TRAINING PROGRAM

## 2025-05-09 RX ORDER — HYDROXYZINE HYDROCHLORIDE 25 MG/1
TABLET, FILM COATED ORAL
Qty: 90 TABLET | Refills: 2 | Status: SHIPPED | OUTPATIENT
Start: 2025-05-09

## 2025-05-09 RX ORDER — DIAZEPAM 2 MG/1
2 TABLET ORAL
Qty: 30 TABLET | Refills: 0 | Status: SHIPPED | OUTPATIENT
Start: 2025-05-09

## 2025-05-09 RX ORDER — OXYCODONE HYDROCHLORIDE 5 MG/1
2.5-5 TABLET ORAL EVERY 6 HOURS PRN
Qty: 90 TABLET | Refills: 0 | Status: SHIPPED | OUTPATIENT
Start: 2025-05-09

## 2025-05-09 NOTE — PROGRESS NOTES
Name: Miguel Henderson      : 1940      MRN: [id number]  Encounter Provider: Shilpi Lane MD  Encounter Date: 2025   Encounter department: [facility] PALLIATIVE CARE [place]  :  Assessment & Plan  NK/T-cell lymphoma, unspecified type (HCC)  Following with oncology for monitoring, does not wish to pursue further anti-neoplastic therapies. Symptom management as below.         Cancer related pain  Stable, continue scheduled tylenol and gabapentin with oxycodone and MMJ as needed    Orders:    oxyCODONE (Roxicodone) 5 immediate release tablet; Take 0.5-1 tablets (2.5-5 mg total) by mouth every 6 (six) hours as needed for moderate pain or severe pain Max Daily Amount: 20 mg    naloxone (NARCAN) 4 mg/0.1 mL nasal spray; Administer 1 spray into a nostril. If no response after 2-3 minutes, give another dose in the other nostril using a new spray.    Insomnia due to medical condition  Trial valium to help distract from itching at night and aid sleep, monitor response    Orders:    diazepam (VALIUM) 2 mg tablet; Take 1 tablet (2 mg total) by mouth daily at bedtime    Bilateral lower extremity edema    Orders:    Ambulatory Referral to PT/OT Lymphedema Therapy; Future    Pruritus    Orders:    hydrOXYzine HCL (ATARAX) 25 mg tablet; Take 2 tablets (50 mg total) by mouth daily at bedtime. May also take 1 tablet (25 mg total) 3 (three) times a day as needed for itching (insomnia).    Palliative care by specialist  Completed FMLA paperwork for patient's son  Time spent developing patient/family-provider relationship, providing psychosocial support, and validating patient/family experience  Encouraged to call the office with any questions/concerns  Follow up in 1 month or sooner as needed         PDMP Review: I have reviewed the patient's controlled substance dispensing history in the Prescription Drug Monitoring Program in compliance with the NEO regulations before prescribing any controlled  "substances.    History of Present Illness     Miguel Henderson is an 84 y.o. 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 He last saw me about 6 weeks ago at which time I increased his prednisone and started atarax for itching. He is accompanied by his son, who he requests be his , and his wife.    He continues to endorse severe itching at nighttime which is very distressing to him. Pain is about the same, he typically uses 0.5-1 tab of oxy daily. He has been a bit more active and came in with a walker today instead of a wheelchair!     Objective   /70   Pulse 88   Temp 98.1 °F (36.7 °C) (Temporal)   Resp 18   Ht 5' 5\" (1.651 m)   Wt 82.1 kg (181 lb)   SpO2 99%   BMI 30.12 kg/m²     Physical Exam  Constitutional:       General: He is not in acute distress.     Appearance: He is not ill-appearing.   Pulmonary:      Effort: Pulmonary effort is normal. No respiratory distress.   Skin:     General: Skin is warm and dry.      Coloration: Skin is not pale.   Neurological:      General: No focal deficit present.      Mental Status: He is alert.   Psychiatric:         Mood and Affect: Mood normal.         Behavior: Behavior normal.         Recent labs:  Lab Results   Component Value Date/Time    SODIUM 142 05/02/2025 08:43 AM    K 3.4 (L) 05/02/2025 08:43 AM    BUN 16 05/02/2025 08:43 AM    CREATININE 0.66 05/02/2025 08:43 AM    GLUC 93 05/02/2025 08:43 AM    CALCIUM 8.5 05/02/2025 08:43 AM    AST 16 05/02/2025 08:43 AM    ALT 13 05/02/2025 08:43 AM    ALB 3.4 (L) 05/02/2025 08:43 AM    TP 6.5 05/02/2025 08:43 AM    EGFR 91 05/02/2025 08:43 AM    EGFR 91 12/05/2019 06:47 AM     Lab Results   Component Value Date/Time    HGB 9.1 (L) 11/16/2024 08:11 AM    WBC 9.04 11/16/2024 08:11 AM     11/16/2024 08:11 AM    INR 1.67 (H) 10/27/2024 06:39 AM    PTT 33 10/21/2024 04:49 PM     Lab Results   Component Value Date/Time    PYA9NLTBNABR 3.279 10/31/2024 " 08:52 AM       Recent Imaging:  No results found.    Administrative Statements   I have spent a total time of 45 minutes in caring for this patient on the day of the visit/encounter including Diagnostic results, Prognosis, Risks and benefits of tx options, Instructions for management, Patient and family education, Impressions, Counseling / Coordination of care, Documenting in the medical record, Reviewing/placing orders in the medical record (including tests, medications, and/or procedures), and Obtaining or reviewing history  .   Topics discussed with the patient / family include symptom assessment and management, medication review, medication adjustment, psychosocial support, medical marijuana, supportive listening, and anticipatory guidance.

## 2025-05-09 NOTE — PROGRESS NOTES
Palliative Supportive Care  with Dr Lane met with patient/family to continue to provide emotional support and guidance.      Updated biopsychosocial information relevant to support: Pt continues to present as very pleasant and social. He is accompanied by his spouse and son. He reports continuing to have the itching. Dr Lane to address.    Identified areas of need include: no specific needs for SW f/u today.    Resources provided:SW provide support and encouragement. SW attempt to speak a very little amount of [place]an with Pt.    Areas that need future follow-up include: SW to continue to follow and assist as able with needs and concerns as they present.    I have spent 45 minutes with Patient and family today in which greater than 50% of this time was spent in counseling/coordination of care.     Palliative  will follow-up as requested by patient, family, and primary team.  Please contact with any specific requests.

## 2025-05-09 NOTE — ASSESSMENT & PLAN NOTE
Orders:    Ambulatory Referral to PT/OT Lymphedema Therapy; Future    
  Orders:    hydrOXYzine HCL (ATARAX) 25 mg tablet; Take 2 tablets (50 mg total) by mouth daily at bedtime. May also take 1 tablet (25 mg total) 3 (three) times a day as needed for itching (insomnia).    
Completed FMLA paperwork for patient's son  Time spent developing patient/family-provider relationship, providing psychosocial support, and validating patient/family experience  Encouraged to call the office with any questions/concerns  Follow up in 1 month or sooner as needed         
Following with oncology for monitoring, does not wish to pursue further anti-neoplastic therapies. Symptom management as below.         
Stable, continue scheduled tylenol and gabapentin with oxycodone and MMJ as needed    Orders:    oxyCODONE (Roxicodone) 5 immediate release tablet; Take 0.5-1 tablets (2.5-5 mg total) by mouth every 6 (six) hours as needed for moderate pain or severe pain Max Daily Amount: 20 mg    naloxone (NARCAN) 4 mg/0.1 mL nasal spray; Administer 1 spray into a nostril. If no response after 2-3 minutes, give another dose in the other nostril using a new spray.    
Trial valium to help distract from itching at night and aid sleep, monitor response    Orders:    diazepam (VALIUM) 2 mg tablet; Take 1 tablet (2 mg total) by mouth daily at bedtime    
Yes

## 2025-05-23 ENCOUNTER — DOCUMENTATION (OUTPATIENT)
Dept: PALLIATIVE MEDICINE | Facility: CLINIC | Age: 85
End: 2025-05-23

## 2025-05-23 NOTE — PROGRESS NOTES
Dr. Lane completed [place] paperwork for patient's son Juan.  Social work faxed over to employer.  Social work also provided to be scanned onto the chart.

## 2025-05-30 RX ORDER — AMMONIUM LACTATE 12 G/100G
LOTION TOPICAL
COMMUNITY
Start: 2025-05-08 | End: 2026-05-08

## 2025-05-30 RX ORDER — LINAGLIPTIN 5 MG/1
5 TABLET, FILM COATED ORAL DAILY
COMMUNITY
Start: 2025-05-08 | End: 2026-05-08

## 2025-06-04 ENCOUNTER — DOCUMENTATION (OUTPATIENT)
Dept: PALLIATIVE MEDICINE | Facility: CLINIC | Age: 85
End: 2025-06-04

## 2025-06-04 ENCOUNTER — OFFICE VISIT (OUTPATIENT)
Dept: PALLIATIVE MEDICINE | Facility: CLINIC | Age: 85
End: 2025-06-04
Payer: COMMERCIAL

## 2025-06-04 VITALS
BODY MASS INDEX: 31.16 KG/M2 | HEART RATE: 76 BPM | DIASTOLIC BLOOD PRESSURE: 60 MMHG | OXYGEN SATURATION: 96 % | WEIGHT: 187 LBS | SYSTOLIC BLOOD PRESSURE: 118 MMHG | RESPIRATION RATE: 18 BRPM | TEMPERATURE: 98.2 F | HEIGHT: 65 IN

## 2025-06-04 DIAGNOSIS — G89.3 CANCER RELATED PAIN: ICD-10-CM

## 2025-06-04 DIAGNOSIS — C84.90 NK/T-CELL LYMPHOMA, UNSPECIFIED TYPE (HCC): Primary | ICD-10-CM

## 2025-06-04 DIAGNOSIS — G47.01 INSOMNIA DUE TO MEDICAL CONDITION: ICD-10-CM

## 2025-06-04 DIAGNOSIS — R05.9 COUGH, UNSPECIFIED TYPE: ICD-10-CM

## 2025-06-04 DIAGNOSIS — Z51.5 PALLIATIVE CARE BY SPECIALIST: ICD-10-CM

## 2025-06-04 DIAGNOSIS — L29.9 PRURITUS: ICD-10-CM

## 2025-06-04 PROCEDURE — G2211 COMPLEX E/M VISIT ADD ON: HCPCS | Performed by: STUDENT IN AN ORGANIZED HEALTH CARE EDUCATION/TRAINING PROGRAM

## 2025-06-04 PROCEDURE — 99215 OFFICE O/P EST HI 40 MIN: CPT | Performed by: STUDENT IN AN ORGANIZED HEALTH CARE EDUCATION/TRAINING PROGRAM

## 2025-06-04 RX ORDER — BENZONATATE 200 MG/1
200 CAPSULE ORAL 3 TIMES DAILY PRN
Qty: 40 CAPSULE | Refills: 2 | Status: SHIPPED | OUTPATIENT
Start: 2025-06-04

## 2025-06-04 RX ORDER — ALBUTEROL SULFATE 90 UG/1
2 INHALANT RESPIRATORY (INHALATION) EVERY 4 HOURS PRN
Qty: 8.5 G | Refills: 1 | Status: SHIPPED | OUTPATIENT
Start: 2025-06-04

## 2025-06-04 RX ORDER — GABAPENTIN 100 MG/1
200 CAPSULE ORAL
Qty: 60 CAPSULE | Refills: 2 | Status: SHIPPED | OUTPATIENT
Start: 2025-06-04

## 2025-06-04 RX ORDER — AZITHROMYCIN 250 MG/1
TABLET, FILM COATED ORAL
Qty: 6 TABLET | Refills: 0 | Status: SHIPPED | OUTPATIENT
Start: 2025-06-04 | End: 2025-06-09

## 2025-06-04 RX ORDER — SENNOSIDES 8.6 MG
650 CAPSULE ORAL 3 TIMES DAILY
Qty: 90 TABLET | Refills: 2 | Status: SHIPPED | OUTPATIENT
Start: 2025-06-04

## 2025-06-04 RX ORDER — PREDNISONE 20 MG/1
20 TABLET ORAL DAILY
Qty: 30 TABLET | Refills: 1 | Status: SHIPPED | OUTPATIENT
Start: 2025-06-04

## 2025-06-04 RX ORDER — DIAZEPAM 2 MG/1
2 TABLET ORAL
Qty: 30 TABLET | Refills: 0 | Status: SHIPPED | OUTPATIENT
Start: 2025-06-04

## 2025-06-04 NOTE — PROGRESS NOTES
Palliative Supportive Care  and Dr. Lane met with patient/family to continue to provide emotional support and guidance.      Updated biopsychosocial information relevant to support: Patient present for office visit today with spouse and son, Juan.  Patient and son discussed with Dr. Lane his situation medically.  Son does report that itching has reduced.  Patient reports that he is well pleased with his care with Dr. Lane and that he has a lot of trust in her ability to keep him moving in the right direction.    Identified areas of need include: Patient and doctor discuss several different medical issues.  Please see Dr. Lane's note for details.    Resources provided: Social work provide support and encouragement for patient and son.    Areas that need future follow-up include: Social work to continue to follow and assist as able with needs and concerns as they present.    I have spent 30 minutes with Patient and family today in which greater than 50% of this time was spent in counseling/coordination of care.    Palliative  will follow-up as requested by patient, family, and primary team.  Please contact with any specific requests.

## 2025-06-04 NOTE — PROGRESS NOTES
Name: Miguel Henderson      : 1940      MRN: [id number]  Encounter Provider: Shilpi Lane MD  Encounter Date: 2025   Encounter department: [facility] PALLIATIVE C[facility] [place]  :  Assessment & Plan  NK/T-cell lymphoma, unspecified type (HCC)  Following with oncology for monitoring, does not wish to pursue further anti-neoplastic therapies. Symptom management as below.         Cancer related pain  Stable, continue scheduled tylenol and gabapentin with oxycodone and MMJ as needed    Orders:    acetaminophen (TYLENOL) 650 mg CR tablet; Take 1 tablet (650 mg total) by mouth 3 (three) times a day    gabapentin (NEURONTIN) 100 mg capsule; Take 2 capsules (200 mg total) by mouth daily at bedtime    Cough, unspecified type  Refilled tessalon perles and sent inhaler and zpak for presumed pneumonia  Advised to call PCP Friday AM if not improving and provided ED precautions    Orders:    albuterol (ProAir HFA) 90 mcg/act inhaler; Inhale 2 puffs every 4 (four) hours as needed for wheezing or shortness of breath (cough)    azithromycin (Zithromax) 250 mg tablet; Take 2 tablets (500 mg total) by mouth daily for 1 day, THEN 1 tablet (250 mg total) daily for 4 days.    benzonatate (TESSALON) 200 MG capsule; Take 1 capsule (200 mg total) by mouth 3 (three) times a day as needed for cough    Spacer Device for Inhaler    Insomnia due to medical condition  Improving, continue valium    Orders:    diazepam (VALIUM) 2 mg tablet; Take 1 tablet (2 mg total) by mouth daily at bedtime    Pruritus  Sleep improved with valium, continue this and atarax as needed    Orders:    predniSONE 20 mg tablet; Take 1 tablet (20 mg total) by mouth daily    Palliative care by specialist  Time spent developing patient/family-provider relationship, providing psychosocial support, and validating patient/family experience  Encouraged to call the office with any questions/concerns  Follow up in 1 month or sooner as needed           PDMP  "Review: I have reviewed the patient's controlled substance dispensing history in the Prescription Drug Monitoring Program in compliance with the [facility] regulations before prescribing any controlled substances.    History of Present Illness     Miguel Henderson is an 84 y.o. 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 He last saw me about 4 weeks ago at which time I started Valium to help with his nighttime itching. He is accompanied by his son, who he requests be his , and his wife.     Today, he endorses a wet semi-productive cough for the past few days, using flutter valve they have at home. Tried his son's albuterol inhaler with mild relief.  Itching better since starting the valium  Having episodes of fecal incontinence when he urinates as he is not able to hold it in     Objective   /60   Pulse 76   Temp 98.2 °F (36.8 °C)   Resp 18   Ht 5' 5\" (1.651 m)   Wt 84.8 kg (187 lb)   SpO2 96%   BMI 31.12 kg/m²     Physical Exam  Constitutional:       Appearance: He is ill-appearing.   Pulmonary:      Comments: Moist cough productive of tan sputum  Diffuse rhonchi    Skin:     General: Skin is warm and dry.      Coloration: Skin is not pale.     Neurological:      General: No focal deficit present.      Mental Status: He is alert.     Psychiatric:      Comments: Intermittently tearful, appropriate         Recent labs:  Lab Results   Component Value Date/Time    SODIUM 142 05/02/2025 08:43 AM    K 3.4 (L) 05/02/2025 08:43 AM    BUN 16 05/02/2025 08:43 AM    CREATININE 0.66 05/02/2025 08:43 AM    GLUC 93 05/02/2025 08:43 AM    CALCIUM 8.5 05/02/2025 08:43 AM    AST 16 05/02/2025 08:43 AM    ALT 13 05/02/2025 08:43 AM    ALB 3.4 (L) 05/02/2025 08:43 AM    TP 6.5 05/02/2025 08:43 AM    EGFR 91 05/02/2025 08:43 AM    EGFR 91 12/05/2019 06:47 AM     Lab Results   Component Value Date/Time    HGB 9.1 (L) 11/16/2024 08:11 AM    WBC 9.04 11/16/2024 08:11 AM    "  11/16/2024 08:11 AM    INR 1.67 (H) 10/27/2024 06:39 AM    PTT 33 10/21/2024 04:49 PM     Lab Results   Component Value Date/Time    IXC1OJLIIJJT 3.279 10/31/2024 08:52 AM       Recent Imaging:  No results found.    Administrative Statements   I have spent a total time of 50 minutes in caring for this patient on the day of the visit/encounter including Diagnostic results, Prognosis, Risks and benefits of tx options, Instructions for management, Patient and family education, Importance of tx compliance, Impressions, Counseling / Coordination of care, Documenting in the medical record, Reviewing/placing orders in the medical record (including tests, medications, and/or procedures), and Obtaining or reviewing history  .   Topics discussed with the patient / family include symptom assessment and management, medication review, medication adjustment, psychosocial support, medical marijuana, opioid titration, supportive listening, and anticipatory guidance.

## 2025-06-13 NOTE — ASSESSMENT & PLAN NOTE
Improving, continue valium    Orders:    diazepam (VALIUM) 2 mg tablet; Take 1 tablet (2 mg total) by mouth daily at bedtime

## 2025-06-13 NOTE — ASSESSMENT & PLAN NOTE
Time spent developing patient/family-provider relationship, providing psychosocial support, and validating patient/family experience  Encouraged to call the office with any questions/concerns  Follow up in 1 month or sooner as needed

## 2025-06-13 NOTE — ASSESSMENT & PLAN NOTE
Refilled tessalon perles and sent inhaler and zpak for presumed pneumonia  Advised to call PCP Friday AM if not improving and provided ED precautions    Orders:    albuterol (ProAir HFA) 90 mcg/act inhaler; Inhale 2 puffs every 4 (four) hours as needed for wheezing or shortness of breath (cough)    azithromycin (Zithromax) 250 mg tablet; Take 2 tablets (500 mg total) by mouth daily for 1 day, THEN 1 tablet (250 mg total) daily for 4 days.    benzonatate (TESSALON) 200 MG capsule; Take 1 capsule (200 mg total) by mouth 3 (three) times a day as needed for cough    Spacer Device for Inhaler

## 2025-06-13 NOTE — ASSESSMENT & PLAN NOTE
Sleep improved with valium, continue this and atarax as needed    Orders:    predniSONE 20 mg tablet; Take 1 tablet (20 mg total) by mouth daily

## 2025-06-13 NOTE — ASSESSMENT & PLAN NOTE
Stable, continue scheduled tylenol and gabapentin with oxycodone and MMJ as needed    Orders:    acetaminophen (TYLENOL) 650 mg CR tablet; Take 1 tablet (650 mg total) by mouth 3 (three) times a day    gabapentin (NEURONTIN) 100 mg capsule; Take 2 capsules (200 mg total) by mouth daily at bedtime

## 2025-07-04 PROBLEM — R05.9 COUGH, UNSPECIFIED TYPE: Status: RESOLVED | Noted: 2025-06-04 | Resolved: 2025-07-04

## 2025-07-09 ENCOUNTER — OFFICE VISIT (OUTPATIENT)
Dept: FAMILY MEDICINE CLINIC | Facility: CLINIC | Age: 85
End: 2025-07-09
Payer: COMMERCIAL

## 2025-07-09 ENCOUNTER — APPOINTMENT (OUTPATIENT)
Dept: LAB | Facility: CLINIC | Age: 85
End: 2025-07-09
Payer: COMMERCIAL

## 2025-07-09 VITALS
HEIGHT: 65 IN | HEART RATE: 82 BPM | WEIGHT: 188.4 LBS | BODY MASS INDEX: 31.39 KG/M2 | TEMPERATURE: 98 F | OXYGEN SATURATION: 98 % | SYSTOLIC BLOOD PRESSURE: 126 MMHG | DIASTOLIC BLOOD PRESSURE: 80 MMHG

## 2025-07-09 DIAGNOSIS — C86.50 ANGIOIMMUNOBLASTIC T-CELL LYMPHOMA NOT HAVING ACHIEVED REMISSION: ICD-10-CM

## 2025-07-09 DIAGNOSIS — R06.83 LOUD SNORING: ICD-10-CM

## 2025-07-09 DIAGNOSIS — F11.20 OPIOID DEPENDENCE, UNCOMPLICATED (HCC): ICD-10-CM

## 2025-07-09 DIAGNOSIS — D64.9 ANEMIA, UNSPECIFIED TYPE: ICD-10-CM

## 2025-07-09 DIAGNOSIS — I10 PRIMARY HYPERTENSION: ICD-10-CM

## 2025-07-09 DIAGNOSIS — R06.81 EPISODE OF APNEA: ICD-10-CM

## 2025-07-09 DIAGNOSIS — D59.12 COLD AUTOIMMUNE HEMOLYTIC ANEMIA (HCC): ICD-10-CM

## 2025-07-09 DIAGNOSIS — E11.65 TYPE 2 DIABETES MELLITUS WITH HYPERGLYCEMIA, WITHOUT LONG-TERM CURRENT USE OF INSULIN (HCC): ICD-10-CM

## 2025-07-09 DIAGNOSIS — L30.8 PRURITIC DERMATITIS: Primary | ICD-10-CM

## 2025-07-09 PROBLEM — J96.01 ACUTE RESPIRATORY FAILURE WITH HYPOXIA (HCC): Status: RESOLVED | Noted: 2025-01-17 | Resolved: 2025-07-09

## 2025-07-09 PROBLEM — R65.10 SIRS (SYSTEMIC INFLAMMATORY RESPONSE SYNDROME) (HCC): Status: RESOLVED | Noted: 2024-10-22 | Resolved: 2025-07-09

## 2025-07-09 PROBLEM — R13.10 DYSPHAGIA: Status: RESOLVED | Noted: 2024-10-14 | Resolved: 2025-07-09

## 2025-07-09 PROBLEM — R50.9 FEBRILE ILLNESS: Status: RESOLVED | Noted: 2024-10-22 | Resolved: 2025-07-09

## 2025-07-09 LAB
BASOPHILS # BLD AUTO: 0.06 THOUSANDS/ÂΜL (ref 0–0.1)
BASOPHILS NFR BLD AUTO: 1 % (ref 0–1)
EOSINOPHIL # BLD AUTO: 0.35 THOUSAND/ÂΜL (ref 0–0.61)
EOSINOPHIL NFR BLD AUTO: 3 % (ref 0–6)
ERYTHROCYTE [DISTWIDTH] IN BLOOD BY AUTOMATED COUNT: 15.3 % (ref 11.6–15.1)
HCT VFR BLD AUTO: 35.1 % (ref 36.5–49.3)
HGB BLD-MCNC: 10.9 G/DL (ref 12–17)
IMM GRANULOCYTES # BLD AUTO: 0.04 THOUSAND/UL (ref 0–0.2)
IMM GRANULOCYTES NFR BLD AUTO: 0 % (ref 0–2)
LYMPHOCYTES # BLD AUTO: 1.44 THOUSANDS/ÂΜL (ref 0.6–4.47)
LYMPHOCYTES NFR BLD AUTO: 14 % (ref 14–44)
MCH RBC QN AUTO: 25.2 PG (ref 26.8–34.3)
MCHC RBC AUTO-ENTMCNC: 31.1 G/DL (ref 31.4–37.4)
MCV RBC AUTO: 81 FL (ref 82–98)
MONOCYTES # BLD AUTO: 0.93 THOUSAND/ÂΜL (ref 0.17–1.22)
MONOCYTES NFR BLD AUTO: 9 % (ref 4–12)
NEUTROPHILS # BLD AUTO: 7.38 THOUSANDS/ÂΜL (ref 1.85–7.62)
NEUTS SEG NFR BLD AUTO: 73 % (ref 43–75)
NRBC BLD AUTO-RTO: 0 /100 WBCS
PLATELET # BLD AUTO: 281 THOUSANDS/UL (ref 149–390)
PMV BLD AUTO: 9.9 FL (ref 8.9–12.7)
RBC # BLD AUTO: 4.33 MILLION/UL (ref 3.88–5.62)
WBC # BLD AUTO: 10.2 THOUSAND/UL (ref 4.31–10.16)

## 2025-07-09 PROCEDURE — G2211 COMPLEX E/M VISIT ADD ON: HCPCS | Performed by: FAMILY MEDICINE

## 2025-07-09 PROCEDURE — 85025 COMPLETE CBC W/AUTO DIFF WBC: CPT

## 2025-07-09 PROCEDURE — 99214 OFFICE O/P EST MOD 30 MIN: CPT | Performed by: FAMILY MEDICINE

## 2025-07-09 PROCEDURE — 36415 COLL VENOUS BLD VENIPUNCTURE: CPT

## 2025-07-09 RX ORDER — FUROSEMIDE 20 MG/1
20 TABLET ORAL DAILY
Qty: 30 TABLET | Refills: 3 | Status: SHIPPED | OUTPATIENT
Start: 2025-07-09

## 2025-07-09 RX ORDER — CLOBETASOL PROPIONATE 0.5 MG/G
OINTMENT TOPICAL 2 TIMES DAILY
Qty: 60 G | Refills: 3 | Status: SHIPPED | OUTPATIENT
Start: 2025-07-09

## 2025-07-09 RX ORDER — RAMELTEON 8 MG/1
8 TABLET ORAL
Qty: 30 TABLET | Refills: 3 | Status: SHIPPED | OUTPATIENT
Start: 2025-07-09

## 2025-07-09 RX ORDER — BETAMETHASONE VALERATE 1.2 MG/G
1 AEROSOL, FOAM TOPICAL 2 TIMES DAILY
Qty: 100 G | Refills: 3 | Status: SHIPPED | OUTPATIENT
Start: 2025-07-09

## 2025-07-09 NOTE — PROGRESS NOTES
Name: Miguel Henderson      : 1940      MRN: [id number]  Encounter Provider: Greg Foster MD  Encounter Date: 2025   Encounter department: [facility]  :  Assessment & Plan  Pruritic dermatitis  After discussing risks and benefits of medication along with side effects will start the following:   Orders:  •  betamethasone valerate (LUXIQ) 0.12 % foam; Apply 1 Application topically 2 (two) times a day  •  clobetasol (TEMOVATE) 0.05 % ointment; Apply topically 2 (two) times a day  •  ramelteon (ROZEREM) 8 mg tablet; Take 1 tablet (8 mg total) by mouth daily at bedtime    Cold autoimmune hemolytic anemia (HCC)  F/U with Heme/Onc       Opioid dependence, uncomplicated (HCC)  F/U with Palliative care       Type 2 diabetes mellitus with hyperglycemia, without long-term current use of insulin (HCC)    Lab Results   Component Value Date    HGBA1C 6.7 (H) 2025     Stable controlled       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  He has itching on his arms, trunk and legs. Has a Hx of auto-immune hemolytic anemia and T cell lymphoma.  Also has leg swelling, has evidence of lower ext superficial lower ext superficial thrombophlebitis. Has on an off leg swelling. Wears compression stocklings however not daily.  Also has HTN however takes hydrochlorothiazide as needed.  He feels tired all the time falls asleep talking to people.      Review of Systems   Constitutional:  Negative for  "activity change, appetite change, fatigue and fever.   HENT:  Negative for congestion and ear discharge.    Respiratory:  Negative for cough and shortness of breath.    Cardiovascular:  Negative for chest pain and palpitations.   Gastrointestinal:  Negative for diarrhea and nausea.   Musculoskeletal:  Positive for arthralgias and joint swelling. Negative for back pain.   Skin:  Negative for color change and rash.   Neurological:  Negative for dizziness and headaches.   Psychiatric/Behavioral:  Negative for agitation and behavioral problems.        Objective   /80 (BP Location: Right arm, Patient Position: Sitting)   Pulse 82   Temp 98 °F (36.7 °C) (Temporal)   Ht 5' 5\" (1.651 m)   Wt 85.5 kg (188 lb 6.4 oz)   SpO2 98%   BMI 31.35 kg/m²      Physical Exam  Constitutional:       General: He is not in acute distress.     Appearance: He is well-developed. He is not diaphoretic.     Eyes:      General: No scleral icterus.     Pupils: Pupils are equal, round, and reactive to light.       Cardiovascular:      Rate and Rhythm: Normal rate and regular rhythm.      Heart sounds: Normal heart sounds. No murmur heard.  Pulmonary:      Effort: Pulmonary effort is normal. No respiratory distress.      Breath sounds: Normal breath sounds. No wheezing.   Abdominal:      General: Bowel sounds are normal. There is no distension.      Palpations: Abdomen is soft.      Tenderness: There is no abdominal tenderness.     Musculoskeletal:         General: Swelling present.      Right lower leg: Edema present.      Left lower leg: Edema present.     Skin:     General: Skin is warm and dry.      Findings: No rash.     Neurological:      Mental Status: He is alert and oriented to person, place, and time.         "

## 2025-07-09 NOTE — ASSESSMENT & PLAN NOTE
Stable controlled    Orders:  •  furosemide (LASIX) 20 mg tablet; Take 1 tablet (20 mg total) by mouth daily

## 2025-07-09 NOTE — ASSESSMENT & PLAN NOTE
After discussing risks and benefits of medication along with side effects will start the following:   Orders:  •  betamethasone valerate (LUXIQ) 0.12 % foam; Apply 1 Application topically 2 (two) times a day  •  clobetasol (TEMOVATE) 0.05 % ointment; Apply topically 2 (two) times a day  •  ramelteon (ROZEREM) 8 mg tablet; Take 1 tablet (8 mg total) by mouth daily at bedtime

## 2025-07-13 ENCOUNTER — TELEPHONE (OUTPATIENT)
Dept: FAMILY MEDICINE CLINIC | Facility: CLINIC | Age: 85
End: 2025-07-13

## 2025-07-14 ENCOUNTER — TELEPHONE (OUTPATIENT)
Age: 85
End: 2025-07-14

## 2025-07-14 NOTE — TELEPHONE ENCOUNTER
PA for clobetasol (TEMOVATE) 0.05 % ointment SUBMITTED         via    [x]CMM-KEY:     [x]PA sent as URGENT    All office notes, labs and other pertaining documents and studies sent. Clinical questions answered. Awaiting determination from insurance company.     Turnaround time for your insurance to make a decision on your Prior Authorization can take 7-21 business days.

## 2025-07-14 NOTE — TELEPHONE ENCOUNTER
PA for SUBMITTED           via    [x]CMM-KEY          [x]PA sent as URGENT    All office notes, labs and other pertaining documents and studies sent. Clinical questions answered. Awaiting determination from insurance company.     Turnaround time for your insurance to make a decision on your Prior Authorization can take 7-21 business days.

## 2025-07-22 DIAGNOSIS — Z51.11 ENCOUNTER FOR CHEMOTHERAPY MANAGEMENT: ICD-10-CM

## 2025-07-22 RX ORDER — PANTOPRAZOLE SODIUM 40 MG/1
40 TABLET, DELAYED RELEASE ORAL DAILY
Qty: 30 TABLET | Refills: 6 | OUTPATIENT
Start: 2025-07-22

## 2025-08-03 DIAGNOSIS — Z51.11 ENCOUNTER FOR CHEMOTHERAPY MANAGEMENT: ICD-10-CM

## 2025-08-06 ENCOUNTER — TELEPHONE (OUTPATIENT)
Dept: HEMATOLOGY ONCOLOGY | Facility: CLINIC | Age: 85
End: 2025-08-06

## 2025-08-06 RX ORDER — PANTOPRAZOLE SODIUM 40 MG/1
40 TABLET, DELAYED RELEASE ORAL DAILY
Qty: 30 TABLET | Refills: 0 | Status: SHIPPED | OUTPATIENT
Start: 2025-08-06 | End: 2025-08-14 | Stop reason: SDUPTHER

## 2025-08-14 DIAGNOSIS — I10 PRIMARY HYPERTENSION: ICD-10-CM

## 2025-08-14 DIAGNOSIS — G93.40 ENCEPHALOPATHY, UNSPECIFIED TYPE: ICD-10-CM

## 2025-08-14 DIAGNOSIS — R05.9 COUGH, UNSPECIFIED TYPE: ICD-10-CM

## 2025-08-14 DIAGNOSIS — L30.8 PRURITIC DERMATITIS: ICD-10-CM

## 2025-08-14 RX ORDER — CLOBETASOL PROPIONATE 0.5 MG/G
OINTMENT TOPICAL 2 TIMES DAILY
Qty: 60 G | Refills: 1 | Status: SHIPPED | OUTPATIENT
Start: 2025-08-14

## 2025-08-14 RX ORDER — BETAMETHASONE VALERATE 1.2 MG/G
1 AEROSOL, FOAM TOPICAL 2 TIMES DAILY
Qty: 100 G | Refills: 1 | Status: SHIPPED | OUTPATIENT
Start: 2025-08-14

## 2025-08-14 RX ORDER — FLUTICASONE PROPIONATE 50 MCG
1 SPRAY, SUSPENSION (ML) NASAL DAILY
Qty: 11.1 ML | Refills: 1 | Status: SHIPPED | OUTPATIENT
Start: 2025-08-14

## 2025-08-14 RX ORDER — HYDROCORTISONE 25 MG/G
CREAM TOPICAL 4 TIMES DAILY PRN
Qty: 28 G | Refills: 1 | Status: SHIPPED | OUTPATIENT
Start: 2025-08-14

## 2025-08-14 RX ORDER — FUROSEMIDE 20 MG/1
20 TABLET ORAL DAILY
Qty: 30 TABLET | Refills: 5 | Status: SHIPPED | OUTPATIENT
Start: 2025-08-14

## 2025-08-15 ENCOUNTER — TELEPHONE (OUTPATIENT)
Dept: FAMILY MEDICINE CLINIC | Facility: CLINIC | Age: 85
End: 2025-08-15

## 2025-08-17 RX ORDER — RAMELTEON 8 MG/1
8 TABLET ORAL
Qty: 90 TABLET | Refills: 3 | Status: SHIPPED | OUTPATIENT
Start: 2025-08-17

## (undated) DEVICE — GLOVE SRG BIOGEL ECLIPSE 8

## (undated) DEVICE — INTENDED FOR TISSUE SEPARATION, AND OTHER PROCEDURES THAT REQUIRE A SHARP SURGICAL BLADE TO PUNCTURE OR CUT.: Brand: BARD-PARKER SAFETY BLADES SIZE 15, STERILE

## (undated) DEVICE — PLUMEPEN PRO 10FT

## (undated) DEVICE — SUT MONOCRYL 4-0 PS-2 27 IN Y426H

## (undated) DEVICE — NEEDLE 25G X 1 1/2

## (undated) DEVICE — ANTIBACTERIAL UNDYED BRAIDED (POLYGLACTIN 910), SYNTHETIC ABSORBABLE SUTURE: Brand: COATED VICRYL

## (undated) DEVICE — TUBING SUCTION 5MM X 12 FT

## (undated) DEVICE — MEDI-VAC YANK SUCT HNDL W/TPRD BULBOUS TIP: Brand: CARDINAL HEALTH

## (undated) DEVICE — SUT VICRYL 2-0 D-SPECIAL 27 IN D8114

## (undated) DEVICE — GLOVE INDICATOR PI UNDERGLOVE SZ 8 BLUE

## (undated) DEVICE — SPECIMEN CONTAINER STERILE PEEL PACK

## (undated) DEVICE — CHLORAPREP HI-LITE 10.5ML ORANGE

## (undated) DEVICE — BETHLEHEM UNIVERSAL MINOR GEN: Brand: CARDINAL HEALTH

## (undated) DEVICE — EXOFIN PRECISION PEN HIGH VISCOSITY TOPICAL SKIN ADHESIVE: Brand: EXOFIN PRECISION PEN, 1G

## (undated) DEVICE — SUT VICRYL 3-0 18 IN J110T